# Patient Record
Sex: MALE | Race: WHITE | NOT HISPANIC OR LATINO | Employment: OTHER | URBAN - NONMETROPOLITAN AREA
[De-identification: names, ages, dates, MRNs, and addresses within clinical notes are randomized per-mention and may not be internally consistent; named-entity substitution may affect disease eponyms.]

---

## 2019-06-06 DIAGNOSIS — Z79.01 ANTICOAGULATED: Primary | ICD-10-CM

## 2019-06-07 DIAGNOSIS — Z79.01 ANTICOAGULATED: Primary | ICD-10-CM

## 2019-06-11 ENCOUNTER — APPOINTMENT (OUTPATIENT)
Dept: LAB | Facility: CLINIC | Age: 84
End: 2019-06-11
Payer: MEDICARE

## 2019-06-11 LAB
INR PPP: 1.67 (ref 0.86–1.17)
PROTHROMBIN TIME: 19.8 SECONDS (ref 11.8–14.2)

## 2019-06-11 PROCEDURE — 85610 PROTHROMBIN TIME: CPT | Performed by: FAMILY MEDICINE

## 2019-06-11 PROCEDURE — 36415 COLL VENOUS BLD VENIPUNCTURE: CPT | Performed by: FAMILY MEDICINE

## 2019-07-01 DIAGNOSIS — D68.9 COAGULATION DEFECT (HCC): Primary | ICD-10-CM

## 2019-07-02 ENCOUNTER — APPOINTMENT (OUTPATIENT)
Dept: LAB | Facility: CLINIC | Age: 84
End: 2019-07-02
Payer: MEDICARE

## 2019-07-02 DIAGNOSIS — D68.9 COAGULATION DEFECT (HCC): ICD-10-CM

## 2019-07-02 LAB
INR PPP: 1.63 (ref 0.84–1.19)
PROTHROMBIN TIME: 18.9 SECONDS (ref 11.6–14.5)

## 2019-07-02 PROCEDURE — 85610 PROTHROMBIN TIME: CPT

## 2019-07-02 PROCEDURE — 36415 COLL VENOUS BLD VENIPUNCTURE: CPT

## 2019-08-01 ENCOUNTER — TELEPHONE (OUTPATIENT)
Dept: FAMILY MEDICINE CLINIC | Facility: CLINIC | Age: 84
End: 2019-08-01

## 2019-08-01 DIAGNOSIS — I27.82 CHRONIC PULMONARY EMBOLISM WITHOUT ACUTE COR PULMONALE, UNSPECIFIED PULMONARY EMBOLISM TYPE (HCC): Primary | ICD-10-CM

## 2019-08-01 RX ORDER — WARFARIN SODIUM 5 MG/1
TABLET ORAL
Qty: 135 TABLET | Refills: 2 | Status: SHIPPED | OUTPATIENT
Start: 2019-08-01 | End: 2019-09-03 | Stop reason: DRUGHIGH

## 2019-08-07 DIAGNOSIS — Z79.01 ANTICOAGULATED: ICD-10-CM

## 2019-08-07 LAB
INR PPP: 1.55 (ref 0.84–1.19)
PROTHROMBIN TIME: 18.1 SECONDS (ref 11.6–14.5)

## 2019-08-07 PROCEDURE — 85610 PROTHROMBIN TIME: CPT | Performed by: FAMILY MEDICINE

## 2019-08-30 ENCOUNTER — OFFICE VISIT (OUTPATIENT)
Dept: FAMILY MEDICINE CLINIC | Facility: CLINIC | Age: 84
End: 2019-08-30
Payer: MEDICARE

## 2019-08-30 VITALS
HEIGHT: 69 IN | OXYGEN SATURATION: 98 % | BODY MASS INDEX: 26.28 KG/M2 | WEIGHT: 177.4 LBS | HEART RATE: 79 BPM | SYSTOLIC BLOOD PRESSURE: 152 MMHG | DIASTOLIC BLOOD PRESSURE: 76 MMHG

## 2019-08-30 DIAGNOSIS — J45.30 MILD PERSISTENT ASTHMA, UNCOMPLICATED: ICD-10-CM

## 2019-08-30 DIAGNOSIS — E78.5 DYSLIPIDEMIA: ICD-10-CM

## 2019-08-30 DIAGNOSIS — Z79.899 ENCOUNTER FOR LONG-TERM (CURRENT) USE OF OTHER MEDICATIONS: ICD-10-CM

## 2019-08-30 DIAGNOSIS — I10 ESSENTIAL HYPERTENSION, BENIGN: ICD-10-CM

## 2019-08-30 DIAGNOSIS — B02.9 HERPES ZOSTER WITHOUT COMPLICATION: ICD-10-CM

## 2019-08-30 DIAGNOSIS — I27.82 CHRONIC PULMONARY EMBOLISM WITHOUT ACUTE COR PULMONALE, UNSPECIFIED PULMONARY EMBOLISM TYPE (HCC): Primary | ICD-10-CM

## 2019-08-30 DIAGNOSIS — D68.52 PROTHROMBIN GENE MUTATION (HCC): ICD-10-CM

## 2019-08-30 DIAGNOSIS — J45.909 UNCOMPLICATED ASTHMA, UNSPECIFIED ASTHMA SEVERITY, UNSPECIFIED WHETHER PERSISTENT: ICD-10-CM

## 2019-08-30 LAB
INR PPP: 1.61 (ref 0.84–1.19)
PROTHROMBIN TIME: 18.7 SECONDS (ref 11.6–14.5)

## 2019-08-30 PROCEDURE — 99214 OFFICE O/P EST MOD 30 MIN: CPT | Performed by: FAMILY MEDICINE

## 2019-08-30 PROCEDURE — 85610 PROTHROMBIN TIME: CPT | Performed by: FAMILY MEDICINE

## 2019-08-30 RX ORDER — LOSARTAN POTASSIUM 25 MG/1
25 TABLET ORAL DAILY
COMMUNITY
Start: 2018-11-23 | End: 2019-08-30 | Stop reason: CLARIF

## 2019-08-30 RX ORDER — BUDESONIDE AND FORMOTEROL FUMARATE DIHYDRATE 160; 4.5 UG/1; UG/1
2 AEROSOL RESPIRATORY (INHALATION) 2 TIMES DAILY
Refills: 3 | COMMUNITY
Start: 2019-08-24 | End: 2019-12-17 | Stop reason: SDUPTHER

## 2019-08-30 RX ORDER — ALBUTEROL SULFATE 90 UG/1
2 AEROSOL, METERED RESPIRATORY (INHALATION) AS NEEDED
COMMUNITY
End: 2019-12-17 | Stop reason: SDUPTHER

## 2019-08-30 RX ORDER — TRAVOPROST 0.004 %
1 DROPS OPHTHALMIC (EYE)
Refills: 0 | COMMUNITY
Start: 2019-08-20 | End: 2021-08-19 | Stop reason: ALTCHOICE

## 2019-08-30 RX ORDER — MONTELUKAST SODIUM 10 MG/1
10 TABLET ORAL DAILY
Refills: 5 | COMMUNITY
Start: 2019-07-13 | End: 2019-08-30 | Stop reason: SDUPTHER

## 2019-08-30 RX ORDER — LOSARTAN POTASSIUM AND HYDROCHLOROTHIAZIDE 12.5; 5 MG/1; MG/1
1 TABLET ORAL DAILY
Refills: 3 | COMMUNITY
Start: 2019-07-13 | End: 2019-11-20 | Stop reason: SDUPTHER

## 2019-08-30 RX ORDER — TRIAMCINOLONE ACETONIDE 1 MG/G
0.1 CREAM TOPICAL
COMMUNITY
Start: 2012-09-21 | End: 2019-12-17 | Stop reason: SDUPTHER

## 2019-08-30 RX ORDER — OMEPRAZOLE 20 MG/1
20 TABLET, DELAYED RELEASE ORAL
COMMUNITY
Start: 2012-06-20

## 2019-08-30 RX ORDER — POLYETHYLENE GLYCOL 3350 17 G/17G
17 POWDER, FOR SOLUTION ORAL AS NEEDED
COMMUNITY
Start: 2018-11-23

## 2019-08-30 RX ORDER — MONTELUKAST SODIUM 10 MG/1
10 TABLET ORAL DAILY
Qty: 90 TABLET | Refills: 3 | Status: SHIPPED | OUTPATIENT
Start: 2019-08-30 | End: 2020-09-30

## 2019-08-30 NOTE — PROGRESS NOTES
Assessment/Plan:    Essential hypertension, benign  Patient has hypertension  His blood pressures generally very well controlled  Today, it was very unusual for his systolic blood pressure to be up like that  His systolic blood pressure generally runs in the 120s  As such, I did not make any changes with his medication today  However, I discussed with the patient that if it remains high, we are going to have to adjust his medicine  He does check his blood pressures at home and tells me it has been running good  He plans to continue checking it  He will contact me if he sees any elevated numbers  Dyslipidemia  I ordered fasting blood work to be done towards the end of September  I reviewed his previous labs which were done September 19, 2018  Prothrombin gene mutation 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  He has prothrombin gene  He will continue lifelong anticoagulation with warfarin  Mild persistent asthma, uncomplicated  Patient's asthma is currently stable  He will continue Symbicort inhaler twice a day and albuterol p r n  Herpes zoster without complication  We discussed this diagnosis  Fortunately, patient has not had pain  At this point, he does not require any treatment  If he starts to develop pain, he needs to contact me immediately  We discussed his options  Diagnoses and all orders for this visit:    Chronic pulmonary embolism without acute cor pulmonale, unspecified pulmonary embolism type ([address] )  -     Protime-INR; Standing  -     Protime-INR    Essential hypertension, benign    Dyslipidemia  -     Lipid panel; Future  -     Comprehensive metabolic panel; Future    Encounter for long-term (current) use of other medications  -     CBC and differential; Future    Uncomplicated asthma, unspecified asthma severity, unspecified whether persistent  -     montelukast (SINGULAIR) 10 mg tablet;  Take 1 tablet (10 mg total) by mouth daily    Prothrombin gene mutation ([address] )    Mild persistent asthma, uncomplicated    Herpes zoster without complication    Other orders  -     SYMBICORT 160-4 5 MCG/ACT inhaler; TAKE 2 PUFFS HFA AEROSOL WITH ADAPTER (GRAM) PUFF(S) TWICE A DAY  -     losartan-hydrochlorothiazide (HYZAAR) 50-12 5 mg per tablet; Take 1 tablet by mouth daily  -     Discontinue: montelukast (SINGULAIR) 10 mg tablet; Take 10 mg by mouth daily  -     TRAVATAN Z 0 004 % ophthalmic solution; 1 DROP INTO BOTH EYES AT BEDTIME  -     triamcinolone (KENALOG) 0 1 % cream; Apply 0 1 % topically  -     omeprazole (PRILOSEC OTC) 20 MG tablet; Take 20 mg by mouth  -     Discontinue: losartan (COZAAR) 25 mg tablet; Take 25 mg by mouth daily  -     polyethylene glycol (MIRALAX) powder; Take 17 g by mouth  -     albuterol (PROVENTIL HFA,VENTOLIN HFA) 90 mcg/act inhaler; Inhale 2 puffs as needed for wheezing          Subjective:      Patient ID: Martínez Delgado is a 80 y o  male  This patient is an 26-year-old white male presents to the office today for his routine checkup  The patient is doing well  He does report he is just getting over a case of shingles  It began several weeks ago  He did not do anything for it  He did not contact me  He tells me he is not experiencing any pain  He tells me he had shingles once before years ago  He is taking his medication as prescribed  He has little more difficulty ambulating now since his hip fracture  He has to walk with a cane  Despite this, he still goes into his garage in exercises regularly on his exercise equipment  The following portions of the patient's history were reviewed and updated as appropriate: allergies, current medications, past family history, past medical history, past social history, past surgical history and problem list     Review of Systems   Constitutional: Negative for activity change, appetite change, fatigue and unexpected weight change  Respiratory: Negative for cough, shortness of breath and wheezing  Cardiovascular: Negative for chest pain, palpitations and leg swelling  Gastrointestinal: Negative for abdominal distention, abdominal pain, blood in stool, constipation, diarrhea, nausea and vomiting  Musculoskeletal: Positive for arthralgias and gait problem  Negative for back pain, joint swelling and myalgias  Skin: Positive for color change and rash  Objective:      /76 (BP Location: Left arm, Patient Position: Sitting, Cuff Size: Adult)   Pulse 79   Ht 5' 9" (1 753 m)   Wt 80 5 kg (177 lb 6 4 oz)   SpO2 98%   BMI 26 20 kg/m²          Physical Exam   Constitutional:   The patient is a pleasant 49-year-old white male who appears his stated age  He is in no apparent distress   HENT:   Head: Normocephalic and atraumatic  Right Ear: External ear normal    Left Ear: External ear normal    Mouth/Throat: Oropharynx is clear and moist  No oropharyngeal exudate  Tympanic membranes are clear   Eyes: Pupils are equal, round, and reactive to light  Conjunctivae are normal  No scleral icterus  Neck: Neck supple  No tracheal deviation present  No thyromegaly present  Cardiovascular: Normal rate, regular rhythm and normal heart sounds  Exam reveals no gallop and no friction rub  No murmur heard  Pulmonary/Chest: Effort normal and breath sounds normal  No stridor  No respiratory distress  He has no wheezes  He has no rales  Abdominal: Soft  He exhibits no distension and no mass  There is no tenderness  There is no rebound and no guarding  Skin:   There is postinflammatory hyperpigmentation present along the left lower back extending into the left abdomen   Vitals reviewed  extremities:  Without cyanosis, clubbing, or edema

## 2019-09-03 DIAGNOSIS — D68.52 PROTHROMBIN GENE MUTATION (HCC): Primary | ICD-10-CM

## 2019-09-03 PROBLEM — J45.30 MILD PERSISTENT ASTHMA, UNCOMPLICATED: Status: ACTIVE | Noted: 2019-09-03

## 2019-09-03 PROBLEM — I10 ESSENTIAL HYPERTENSION, BENIGN: Status: ACTIVE | Noted: 2019-09-03

## 2019-09-03 PROBLEM — B02.9 HERPES ZOSTER WITHOUT COMPLICATION: Status: ACTIVE | Noted: 2019-09-03

## 2019-09-03 PROBLEM — M15.0 PRIMARY GENERALIZED (OSTEO)ARTHRITIS: Status: ACTIVE | Noted: 2019-09-03

## 2019-09-03 PROBLEM — E78.5 DYSLIPIDEMIA: Status: ACTIVE | Noted: 2019-09-03

## 2019-09-03 RX ORDER — WARFARIN SODIUM 4 MG/1
TABLET ORAL
Qty: 60 TABLET | Refills: 5 | Status: SHIPPED | OUTPATIENT
Start: 2019-09-03 | End: 2019-12-17 | Stop reason: SDUPTHER

## 2019-09-03 NOTE — ASSESSMENT & PLAN NOTE
I ordered fasting blood work to be done towards the end of September  I reviewed his previous labs which were done September 19, 2018

## 2019-09-03 NOTE — ASSESSMENT & PLAN NOTE
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

## 2019-09-03 NOTE — ASSESSMENT & PLAN NOTE
Patient has hypertension  His blood pressures generally very well controlled  Today, it was very unusual for his systolic blood pressure to be up like that  His systolic blood pressure generally runs in the 120s  As such, I did not make any changes with his medication today  However, I discussed with the patient that if it remains high, we are going to have to adjust his medicine  He does check his blood pressures at home and tells me it has been running good  He plans to continue checking it  He will contact me if he sees any elevated numbers

## 2019-09-03 NOTE — ASSESSMENT & PLAN NOTE
We discussed this diagnosis  Fortunately, patient has not had pain  At this point, he does not require any treatment  If he starts to develop pain, he needs to contact me immediately  We discussed his options

## 2019-09-03 NOTE — ASSESSMENT & PLAN NOTE
Patient's asthma is currently stable  He will continue Symbicort inhaler twice a day and albuterol p r n Rosella Goldmann

## 2019-09-30 ENCOUNTER — APPOINTMENT (OUTPATIENT)
Dept: LAB | Facility: CLINIC | Age: 84
End: 2019-09-30
Payer: MEDICARE

## 2019-09-30 DIAGNOSIS — I27.82 CHRONIC PULMONARY EMBOLISM WITHOUT ACUTE COR PULMONALE, UNSPECIFIED PULMONARY EMBOLISM TYPE (HCC): Primary | ICD-10-CM

## 2019-09-30 DIAGNOSIS — Z79.899 ENCOUNTER FOR LONG-TERM (CURRENT) USE OF OTHER MEDICATIONS: ICD-10-CM

## 2019-09-30 DIAGNOSIS — E78.5 DYSLIPIDEMIA: ICD-10-CM

## 2019-09-30 LAB
ALBUMIN SERPL BCP-MCNC: 3.9 G/DL (ref 3.5–5)
ALP SERPL-CCNC: 114 U/L (ref 46–116)
ALT SERPL W P-5'-P-CCNC: 26 U/L (ref 12–78)
ANION GAP SERPL CALCULATED.3IONS-SCNC: 6 MMOL/L (ref 4–13)
AST SERPL W P-5'-P-CCNC: 23 U/L (ref 5–45)
BASOPHILS # BLD AUTO: 0.05 THOUSANDS/ΜL (ref 0–0.1)
BASOPHILS NFR BLD AUTO: 1 % (ref 0–1)
BILIRUB SERPL-MCNC: 0.54 MG/DL (ref 0.2–1)
BUN SERPL-MCNC: 13 MG/DL (ref 5–25)
CALCIUM SERPL-MCNC: 9.7 MG/DL (ref 8.3–10.1)
CHLORIDE SERPL-SCNC: 104 MMOL/L (ref 100–108)
CHOLEST SERPL-MCNC: 189 MG/DL (ref 50–200)
CO2 SERPL-SCNC: 27 MMOL/L (ref 21–32)
CREAT SERPL-MCNC: 0.94 MG/DL (ref 0.6–1.3)
EOSINOPHIL # BLD AUTO: 0.13 THOUSAND/ΜL (ref 0–0.61)
EOSINOPHIL NFR BLD AUTO: 2 % (ref 0–6)
ERYTHROCYTE [DISTWIDTH] IN BLOOD BY AUTOMATED COUNT: 13.1 % (ref 11.6–15.1)
GFR SERPL CREATININE-BSD FRML MDRD: 72 ML/MIN/1.73SQ M
GLUCOSE P FAST SERPL-MCNC: 96 MG/DL (ref 65–99)
HCT VFR BLD AUTO: 41.6 % (ref 36.5–49.3)
HDLC SERPL-MCNC: 74 MG/DL (ref 40–60)
HGB BLD-MCNC: 13.6 G/DL (ref 12–17)
IMM GRANULOCYTES # BLD AUTO: 0.01 THOUSAND/UL (ref 0–0.2)
IMM GRANULOCYTES NFR BLD AUTO: 0 % (ref 0–2)
INR PPP: 1.79 (ref 0.84–1.19)
LDLC SERPL CALC-MCNC: 96 MG/DL (ref 0–100)
LYMPHOCYTES # BLD AUTO: 2.58 THOUSANDS/ΜL (ref 0.6–4.47)
LYMPHOCYTES NFR BLD AUTO: 42 % (ref 14–44)
MCH RBC QN AUTO: 30.2 PG (ref 26.8–34.3)
MCHC RBC AUTO-ENTMCNC: 32.7 G/DL (ref 31.4–37.4)
MCV RBC AUTO: 92 FL (ref 82–98)
MONOCYTES # BLD AUTO: 0.63 THOUSAND/ΜL (ref 0.17–1.22)
MONOCYTES NFR BLD AUTO: 10 % (ref 4–12)
NEUTROPHILS # BLD AUTO: 2.81 THOUSANDS/ΜL (ref 1.85–7.62)
NEUTS SEG NFR BLD AUTO: 45 % (ref 43–75)
NONHDLC SERPL-MCNC: 115 MG/DL
NRBC BLD AUTO-RTO: 0 /100 WBCS
PLATELET # BLD AUTO: 265 THOUSANDS/UL (ref 149–390)
PMV BLD AUTO: 10 FL (ref 8.9–12.7)
POTASSIUM SERPL-SCNC: 4.1 MMOL/L (ref 3.5–5.3)
PROT SERPL-MCNC: 7 G/DL (ref 6.4–8.2)
PROTHROMBIN TIME: 20.3 SECONDS (ref 11.6–14.5)
RBC # BLD AUTO: 4.51 MILLION/UL (ref 3.88–5.62)
SODIUM SERPL-SCNC: 137 MMOL/L (ref 136–145)
TRIGL SERPL-MCNC: 95 MG/DL
WBC # BLD AUTO: 6.21 THOUSAND/UL (ref 4.31–10.16)

## 2019-09-30 PROCEDURE — 36415 COLL VENOUS BLD VENIPUNCTURE: CPT

## 2019-09-30 PROCEDURE — 80053 COMPREHEN METABOLIC PANEL: CPT

## 2019-09-30 PROCEDURE — 85610 PROTHROMBIN TIME: CPT

## 2019-09-30 PROCEDURE — 85025 COMPLETE CBC W/AUTO DIFF WBC: CPT

## 2019-09-30 PROCEDURE — 80061 LIPID PANEL: CPT

## 2019-10-22 ENCOUNTER — APPOINTMENT (OUTPATIENT)
Dept: LAB | Facility: CLINIC | Age: 84
End: 2019-10-22
Payer: MEDICARE

## 2019-10-22 DIAGNOSIS — Z79.01 ANTICOAGULATED: ICD-10-CM

## 2019-10-24 ENCOUNTER — DOCUMENTATION (OUTPATIENT)
Dept: FAMILY MEDICINE CLINIC | Facility: CLINIC | Age: 84
End: 2019-10-24

## 2019-11-13 ENCOUNTER — APPOINTMENT (OUTPATIENT)
Dept: LAB | Facility: CLINIC | Age: 84
End: 2019-11-13
Payer: MEDICARE

## 2019-11-13 DIAGNOSIS — Z79.01 ANTICOAGULATED: ICD-10-CM

## 2019-11-20 DIAGNOSIS — I10 ESSENTIAL HYPERTENSION, BENIGN: Primary | ICD-10-CM

## 2019-11-20 RX ORDER — LOSARTAN POTASSIUM AND HYDROCHLOROTHIAZIDE 12.5; 5 MG/1; MG/1
1 TABLET ORAL DAILY
Qty: 30 TABLET | Refills: 5 | Status: SHIPPED | OUTPATIENT
Start: 2019-11-20 | End: 2019-12-17 | Stop reason: RX

## 2019-12-04 ENCOUNTER — APPOINTMENT (OUTPATIENT)
Dept: LAB | Facility: CLINIC | Age: 84
End: 2019-12-04
Payer: MEDICARE

## 2019-12-04 LAB — INR PPP: 2 (ref 0.84–1.19)

## 2019-12-05 ENCOUNTER — ANTICOAG VISIT (OUTPATIENT)
Dept: FAMILY MEDICINE CLINIC | Facility: CLINIC | Age: 84
End: 2019-12-05

## 2019-12-17 ENCOUNTER — OFFICE VISIT (OUTPATIENT)
Dept: FAMILY MEDICINE CLINIC | Facility: CLINIC | Age: 84
End: 2019-12-17
Payer: MEDICARE

## 2019-12-17 VITALS
OXYGEN SATURATION: 97 % | SYSTOLIC BLOOD PRESSURE: 152 MMHG | WEIGHT: 175 LBS | HEART RATE: 90 BPM | HEIGHT: 66 IN | DIASTOLIC BLOOD PRESSURE: 86 MMHG | BODY MASS INDEX: 28.12 KG/M2

## 2019-12-17 DIAGNOSIS — I10 ESSENTIAL HYPERTENSION, BENIGN: Primary | ICD-10-CM

## 2019-12-17 DIAGNOSIS — E78.5 DYSLIPIDEMIA: ICD-10-CM

## 2019-12-17 DIAGNOSIS — R23.3 PETECHIAE: ICD-10-CM

## 2019-12-17 DIAGNOSIS — D68.52 PROTHROMBIN GENE MUTATION (HCC): ICD-10-CM

## 2019-12-17 DIAGNOSIS — L20.84 INTRINSIC ECZEMA: ICD-10-CM

## 2019-12-17 DIAGNOSIS — J45.30 MILD PERSISTENT ASTHMA, UNCOMPLICATED: ICD-10-CM

## 2019-12-17 PROCEDURE — 99214 OFFICE O/P EST MOD 30 MIN: CPT | Performed by: FAMILY MEDICINE

## 2019-12-17 RX ORDER — BUDESONIDE AND FORMOTEROL FUMARATE DIHYDRATE 160; 4.5 UG/1; UG/1
2 AEROSOL RESPIRATORY (INHALATION) 2 TIMES DAILY
Qty: 3 INHALER | Refills: 3 | Status: SHIPPED | OUTPATIENT
Start: 2019-12-17 | End: 2021-05-19 | Stop reason: SDUPTHER

## 2019-12-17 RX ORDER — HYDROCHLOROTHIAZIDE 12.5 MG/1
12.5 CAPSULE, GELATIN COATED ORAL DAILY
Qty: 90 CAPSULE | Refills: 3 | Status: SHIPPED | OUTPATIENT
Start: 2019-12-17 | End: 2020-11-19 | Stop reason: SDUPTHER

## 2019-12-17 RX ORDER — CARBOXYMETHYLCELLULOSE SODIUM 10 MG/ML
1 GEL OPHTHALMIC AS NEEDED
COMMUNITY
End: 2022-08-05

## 2019-12-17 RX ORDER — LOSARTAN POTASSIUM 50 MG/1
50 TABLET ORAL DAILY
Qty: 90 TABLET | Refills: 3 | Status: SHIPPED | OUTPATIENT
Start: 2019-12-17 | End: 2020-11-19 | Stop reason: SDUPTHER

## 2019-12-17 RX ORDER — TRIAMCINOLONE ACETONIDE 1 MG/G
CREAM TOPICAL 2 TIMES DAILY PRN
Qty: 80 G | Refills: 2 | Status: SHIPPED | OUTPATIENT
Start: 2019-12-17

## 2019-12-17 RX ORDER — WARFARIN SODIUM 4 MG/1
TABLET ORAL
Qty: 180 TABLET | Refills: 1 | Status: SHIPPED | OUTPATIENT
Start: 2019-12-17 | End: 2020-06-13 | Stop reason: SDUPTHER

## 2019-12-17 RX ORDER — ALBUTEROL SULFATE 90 UG/1
2 AEROSOL, METERED RESPIRATORY (INHALATION) AS NEEDED
Qty: 3 INHALER | Refills: 1 | Status: SHIPPED | OUTPATIENT
Start: 2019-12-17 | End: 2021-07-22 | Stop reason: SDUPTHER

## 2019-12-17 NOTE — PROGRESS NOTES
Assessment/Plan:    Essential hypertension, benign  Blood pressure was elevated today due to the unavailability of his blood pressure medication  I sent in a new prescription for losartan as well as a separate prescription for hydrochlorothiazide  I am certain his blood pressure will normalize quickly when he gets back on his medication  Mild persistent asthma, uncomplicated  I encouraged the patient to continue to use his Symbicort inhaler and rinse his mouth out after each use  He will continue albuterol inhaler p r n  Kymberly Frejackelyn Petechiae  I am going to check a CBC with diff to stool out any abnormalities  I do not see any evidence of vasculitis  I am going to have the patient use triamcinolone cream   If it fails to improve or worsens, he needs to get back to the office  Prothrombin gene mutation [facility])  Patient has history of unprovoked pulmonary embolism  Workup later revealed a factor to prothrombin gene  I refilled his warfarin  Diagnoses and all orders for this visit:    Essential hypertension, benign  -     losartan (COZAAR) 50 mg tablet; Take 1 tablet (50 mg total) by mouth daily  -     hydrochlorothiazide (MICROZIDE) 12 5 mg capsule; Take 1 capsule (12 5 mg total) by mouth daily    Prothrombin gene mutation (HCC)  -     warfarin (COUMADIN) 4 mg tablet; Take 2 daily or as directed    Mild persistent asthma, uncomplicated  -     SYMBICORT 160-4 5 MCG/ACT inhaler; Inhale 2 puffs 2 (two) times a day  -     albuterol (PROVENTIL HFA,VENTOLIN HFA) 90 mcg/act inhaler; Inhale 2 puffs as needed for wheezing    Dyslipidemia    Intrinsic eczema  -     triamcinolone (KENALOG) 0 1 % cream; Apply topically 2 (two) times a day as needed for rash    Petechiae  -     CBC and differential    Other orders  -     Carboxymethylcellulose Sodium (REFRESH LIQUIGEL) 1 % GEL; Apply 1 drop to eye as needed          Subjective:      Patient ID: Armando Villegas is a 80 y o  male      This patient is an 55-year-old white male presents to the office today for his routine checkup  Patient is doing well  He reports no breathing difficulties  No wheezing or shortness of breath or cough  His appetite is good  He is still exercising regularly in his garage  He reports an issue with his medication  He tells me he has not been taking his blood pressure medication  He tells me losartan-hydrochlorothiazide has been unavailable from the pharmacy  He asked if I can prescribe the medication to him separately  His only other complaint is a rash which he has on his legs, arms, and back  He tells me it is not itchy  He just developed this yesterday  He thinks he may be allergic to something the 8  He tells me if it was not for the appointment today, he would not have scheduled an appointment to have this evaluated  The following portions of the patient's history were reviewed and updated as appropriate: allergies, current medications, past family history, past medical history, past social history, past surgical history and problem list     Review of Systems   Constitutional: Negative for activity change, appetite change and unexpected weight change  Respiratory: Negative for cough, shortness of breath and wheezing  Cardiovascular: Negative for chest pain, palpitations and leg swelling  Gastrointestinal: Negative for abdominal distention, abdominal pain, blood in stool, constipation, diarrhea, nausea and vomiting  Skin: Positive for rash  Denies pruritus   Allergic/Immunologic: Negative for food allergies  Hematological: Negative for adenopathy  Does not bruise/bleed easily  Objective:      /86 (BP Location: Left arm, Patient Position: Sitting, Cuff Size: Adult)   Pulse 90   Ht 5' 6 25" (1 683 m)   Wt 79 4 kg (175 lb)   SpO2 97%   BMI 28 03 kg/m²          Physical Exam   Constitutional: He appears well-developed and well-nourished  No distress  HENT:   Head: Normocephalic and atraumatic  Right Ear: External ear normal    Left Ear: External ear normal    Mouth/Throat: Oropharynx is clear and moist  No oropharyngeal exudate  Eyes: Pupils are equal, round, and reactive to light  Conjunctivae are normal  No scleral icterus  Neck: Neck supple  No tracheal deviation present  No thyromegaly present  Cardiovascular: Normal rate, regular rhythm and normal heart sounds  Exam reveals no gallop and no friction rub  No murmur heard  Pulmonary/Chest: Effort normal and breath sounds normal  No stridor  No respiratory distress  He has no wheezes  He has no rales  Abdominal: Soft  Bowel sounds are normal  He exhibits no distension and no mass  There is no tenderness  There is no rebound and no guarding  Musculoskeletal:   Scoliosis is present unchanged   Lymphadenopathy:     He has no cervical adenopathy  Skin:   There is a petechial rash present on the legs which extends up into the lower thighs  It is nonpalpable  It did not visualize any rash on his arms or trunk  Vitals reviewed  extremities:  Varicosities present in both lower extremities with no cyanosis, clubbing, or edema present

## 2019-12-18 ENCOUNTER — APPOINTMENT (OUTPATIENT)
Dept: LAB | Facility: CLINIC | Age: 84
End: 2019-12-18
Payer: MEDICARE

## 2019-12-18 LAB
BASOPHILS # BLD AUTO: 0.04 THOUSANDS/ΜL (ref 0–0.1)
BASOPHILS NFR BLD AUTO: 1 % (ref 0–1)
EOSINOPHIL # BLD AUTO: 0.21 THOUSAND/ΜL (ref 0–0.61)
EOSINOPHIL NFR BLD AUTO: 4 % (ref 0–6)
ERYTHROCYTE [DISTWIDTH] IN BLOOD BY AUTOMATED COUNT: 13.2 % (ref 11.6–15.1)
HCT VFR BLD AUTO: 40.1 % (ref 36.5–49.3)
HGB BLD-MCNC: 13.2 G/DL (ref 12–17)
IMM GRANULOCYTES # BLD AUTO: 0.02 THOUSAND/UL (ref 0–0.2)
IMM GRANULOCYTES NFR BLD AUTO: 0 % (ref 0–2)
LYMPHOCYTES # BLD AUTO: 2.14 THOUSANDS/ΜL (ref 0.6–4.47)
LYMPHOCYTES NFR BLD AUTO: 35 % (ref 14–44)
MCH RBC QN AUTO: 30.8 PG (ref 26.8–34.3)
MCHC RBC AUTO-ENTMCNC: 32.9 G/DL (ref 31.4–37.4)
MCV RBC AUTO: 94 FL (ref 82–98)
MONOCYTES # BLD AUTO: 0.66 THOUSAND/ΜL (ref 0.17–1.22)
MONOCYTES NFR BLD AUTO: 11 % (ref 4–12)
NEUTROPHILS # BLD AUTO: 3.01 THOUSANDS/ΜL (ref 1.85–7.62)
NEUTS SEG NFR BLD AUTO: 49 % (ref 43–75)
NRBC BLD AUTO-RTO: 0 /100 WBCS
PLATELET # BLD AUTO: 242 THOUSANDS/UL (ref 149–390)
PMV BLD AUTO: 10.2 FL (ref 8.9–12.7)
RBC # BLD AUTO: 4.29 MILLION/UL (ref 3.88–5.62)
WBC # BLD AUTO: 6.08 THOUSAND/UL (ref 4.31–10.16)

## 2019-12-18 PROCEDURE — 36415 COLL VENOUS BLD VENIPUNCTURE: CPT | Performed by: FAMILY MEDICINE

## 2019-12-18 PROCEDURE — 85025 COMPLETE CBC W/AUTO DIFF WBC: CPT | Performed by: FAMILY MEDICINE

## 2019-12-18 NOTE — ASSESSMENT & PLAN NOTE
I encouraged the patient to continue to use his Symbicort inhaler and rinse his mouth out after each use  He will continue albuterol inhaler jorge Cabral

## 2019-12-18 NOTE — ASSESSMENT & PLAN NOTE
I am going to check a CBC with diff to stool out any abnormalities  I do not see any evidence of vasculitis  I am going to have the patient use triamcinolone cream   If it fails to improve or worsens, he needs to get back to the office

## 2019-12-18 NOTE — ASSESSMENT & PLAN NOTE
Patient has history of unprovoked pulmonary embolism  Workup later revealed a factor to prothrombin gene  I refilled his warfarin

## 2019-12-18 NOTE — ASSESSMENT & PLAN NOTE
Blood pressure was elevated today due to the unavailability of his blood pressure medication  I sent in a new prescription for losartan as well as a separate prescription for hydrochlorothiazide  I am certain his blood pressure will normalize quickly when he gets back on his medication

## 2020-01-07 ENCOUNTER — APPOINTMENT (OUTPATIENT)
Dept: LAB | Facility: CLINIC | Age: 85
End: 2020-01-07
Payer: MEDICARE

## 2020-01-08 ENCOUNTER — ANTICOAG VISIT (OUTPATIENT)
Dept: FAMILY MEDICINE CLINIC | Facility: CLINIC | Age: 85
End: 2020-01-08

## 2020-01-08 LAB — INR PPP: 2.07 (ref 0.84–1.19)

## 2020-02-05 ENCOUNTER — ANTICOAG VISIT (OUTPATIENT)
Dept: FAMILY MEDICINE CLINIC | Facility: CLINIC | Age: 85
End: 2020-02-05

## 2020-02-05 ENCOUNTER — APPOINTMENT (OUTPATIENT)
Dept: LAB | Facility: CLINIC | Age: 85
End: 2020-02-05
Payer: MEDICARE

## 2020-03-11 ENCOUNTER — ANTICOAG VISIT (OUTPATIENT)
Dept: FAMILY MEDICINE CLINIC | Facility: CLINIC | Age: 85
End: 2020-03-11

## 2020-03-11 ENCOUNTER — APPOINTMENT (OUTPATIENT)
Dept: LAB | Facility: CLINIC | Age: 85
End: 2020-03-11
Payer: MEDICARE

## 2020-05-07 ENCOUNTER — APPOINTMENT (OUTPATIENT)
Dept: LAB | Facility: CLINIC | Age: 85
End: 2020-05-07
Payer: MEDICARE

## 2020-05-08 ENCOUNTER — ANTICOAG VISIT (OUTPATIENT)
Dept: FAMILY MEDICINE CLINIC | Facility: CLINIC | Age: 85
End: 2020-05-08

## 2020-05-19 ENCOUNTER — OFFICE VISIT (OUTPATIENT)
Dept: FAMILY MEDICINE CLINIC | Facility: CLINIC | Age: 85
End: 2020-05-19
Payer: MEDICARE

## 2020-05-19 ENCOUNTER — TELEPHONE (OUTPATIENT)
Dept: FAMILY MEDICINE CLINIC | Facility: CLINIC | Age: 85
End: 2020-05-19

## 2020-05-19 VITALS
TEMPERATURE: 98.8 F | HEIGHT: 66 IN | OXYGEN SATURATION: 97 % | WEIGHT: 177.2 LBS | DIASTOLIC BLOOD PRESSURE: 82 MMHG | HEART RATE: 72 BPM | BODY MASS INDEX: 28.48 KG/M2 | SYSTOLIC BLOOD PRESSURE: 128 MMHG

## 2020-05-19 DIAGNOSIS — L21.8 OTHER SEBORRHEIC DERMATITIS: ICD-10-CM

## 2020-05-19 DIAGNOSIS — J45.30 MILD PERSISTENT ASTHMA, UNCOMPLICATED: ICD-10-CM

## 2020-05-19 DIAGNOSIS — E78.5 DYSLIPIDEMIA: ICD-10-CM

## 2020-05-19 DIAGNOSIS — Z00.00 ENCOUNTER FOR MEDICARE ANNUAL WELLNESS EXAM: ICD-10-CM

## 2020-05-19 DIAGNOSIS — I10 ESSENTIAL HYPERTENSION, BENIGN: Primary | ICD-10-CM

## 2020-05-19 DIAGNOSIS — R26.9 ABNORMALITY OF GAIT: ICD-10-CM

## 2020-05-19 DIAGNOSIS — D68.52 PROTHROMBIN GENE MUTATION (HCC): ICD-10-CM

## 2020-05-19 DIAGNOSIS — L21.8 OTHER SEBORRHEIC DERMATITIS: Primary | ICD-10-CM

## 2020-05-19 PROCEDURE — 3008F BODY MASS INDEX DOCD: CPT | Performed by: FAMILY MEDICINE

## 2020-05-19 PROCEDURE — 1123F ACP DISCUSS/DSCN MKR DOCD: CPT | Performed by: FAMILY MEDICINE

## 2020-05-19 PROCEDURE — 3079F DIAST BP 80-89 MM HG: CPT | Performed by: FAMILY MEDICINE

## 2020-05-19 PROCEDURE — 99214 OFFICE O/P EST MOD 30 MIN: CPT | Performed by: FAMILY MEDICINE

## 2020-05-19 PROCEDURE — 1125F AMNT PAIN NOTED PAIN PRSNT: CPT | Performed by: FAMILY MEDICINE

## 2020-05-19 PROCEDURE — 1170F FXNL STATUS ASSESSED: CPT | Performed by: FAMILY MEDICINE

## 2020-05-19 PROCEDURE — G0438 PPPS, INITIAL VISIT: HCPCS | Performed by: FAMILY MEDICINE

## 2020-05-19 PROCEDURE — 3074F SYST BP LT 130 MM HG: CPT | Performed by: FAMILY MEDICINE

## 2020-05-19 PROCEDURE — 1160F RVW MEDS BY RX/DR IN RCRD: CPT | Performed by: FAMILY MEDICINE

## 2020-05-19 PROCEDURE — 1036F TOBACCO NON-USER: CPT | Performed by: FAMILY MEDICINE

## 2020-05-19 RX ORDER — DESONIDE 0.5 MG/G
CREAM TOPICAL 2 TIMES DAILY
Qty: 60 G | Refills: 0 | Status: SHIPPED | OUTPATIENT
Start: 2020-05-19 | End: 2020-05-19 | Stop reason: CLARIF

## 2020-05-19 RX ORDER — ALCLOMETASONE DIPROPIONATE 0.5 MG/G
CREAM TOPICAL 2 TIMES DAILY
Qty: 60 G | Refills: 0 | Status: SHIPPED | OUTPATIENT
Start: 2020-05-19 | End: 2022-08-05

## 2020-06-03 ENCOUNTER — APPOINTMENT (OUTPATIENT)
Dept: LAB | Facility: CLINIC | Age: 85
End: 2020-06-03
Payer: MEDICARE

## 2020-06-03 DIAGNOSIS — E78.5 DYSLIPIDEMIA: ICD-10-CM

## 2020-06-03 DIAGNOSIS — I10 ESSENTIAL HYPERTENSION, BENIGN: ICD-10-CM

## 2020-06-03 LAB
ALBUMIN SERPL BCP-MCNC: 3.5 G/DL (ref 3.5–5)
ALP SERPL-CCNC: 102 U/L (ref 46–116)
ALT SERPL W P-5'-P-CCNC: 30 U/L (ref 12–78)
ANION GAP SERPL CALCULATED.3IONS-SCNC: 4 MMOL/L (ref 4–13)
AST SERPL W P-5'-P-CCNC: 22 U/L (ref 5–45)
BILIRUB SERPL-MCNC: 0.59 MG/DL (ref 0.2–1)
BUN SERPL-MCNC: 14 MG/DL (ref 5–25)
CALCIUM SERPL-MCNC: 9.4 MG/DL (ref 8.3–10.1)
CHLORIDE SERPL-SCNC: 101 MMOL/L (ref 100–108)
CO2 SERPL-SCNC: 28 MMOL/L (ref 21–32)
CREAT SERPL-MCNC: 1.01 MG/DL (ref 0.6–1.3)
GFR SERPL CREATININE-BSD FRML MDRD: 66 ML/MIN/1.73SQ M
GLUCOSE P FAST SERPL-MCNC: 102 MG/DL (ref 65–99)
LDLC SERPL DIRECT ASSAY-MCNC: 107 MG/DL (ref 0–100)
POTASSIUM SERPL-SCNC: 4.4 MMOL/L (ref 3.5–5.3)
PROT SERPL-MCNC: 7.2 G/DL (ref 6.4–8.2)
SODIUM SERPL-SCNC: 133 MMOL/L (ref 136–145)

## 2020-06-03 PROCEDURE — 83721 ASSAY OF BLOOD LIPOPROTEIN: CPT

## 2020-06-03 PROCEDURE — 80053 COMPREHEN METABOLIC PANEL: CPT

## 2020-06-04 ENCOUNTER — ANTICOAG VISIT (OUTPATIENT)
Dept: FAMILY MEDICINE CLINIC | Facility: CLINIC | Age: 85
End: 2020-06-04

## 2020-06-13 DIAGNOSIS — D68.52 PROTHROMBIN GENE MUTATION (HCC): ICD-10-CM

## 2020-06-13 RX ORDER — WARFARIN SODIUM 4 MG/1
TABLET ORAL
Qty: 180 TABLET | Refills: 1 | Status: SHIPPED | OUTPATIENT
Start: 2020-06-13 | End: 2020-08-19 | Stop reason: SDUPTHER

## 2020-06-25 ENCOUNTER — OFFICE VISIT (OUTPATIENT)
Dept: OBGYN CLINIC | Facility: CLINIC | Age: 85
End: 2020-06-25
Payer: MEDICARE

## 2020-06-25 VITALS
TEMPERATURE: 96.8 F | SYSTOLIC BLOOD PRESSURE: 152 MMHG | DIASTOLIC BLOOD PRESSURE: 90 MMHG | BODY MASS INDEX: 28.84 KG/M2 | WEIGHT: 176 LBS

## 2020-06-25 DIAGNOSIS — M17.0 PRIMARY OSTEOARTHRITIS OF BOTH KNEES: Primary | ICD-10-CM

## 2020-06-25 PROCEDURE — 99213 OFFICE O/P EST LOW 20 MIN: CPT | Performed by: ORTHOPAEDIC SURGERY

## 2020-06-25 PROCEDURE — 1160F RVW MEDS BY RX/DR IN RCRD: CPT | Performed by: ORTHOPAEDIC SURGERY

## 2020-06-25 PROCEDURE — 3077F SYST BP >= 140 MM HG: CPT | Performed by: ORTHOPAEDIC SURGERY

## 2020-06-25 PROCEDURE — 1036F TOBACCO NON-USER: CPT | Performed by: ORTHOPAEDIC SURGERY

## 2020-06-25 PROCEDURE — 3080F DIAST BP >= 90 MM HG: CPT | Performed by: ORTHOPAEDIC SURGERY

## 2020-06-25 PROCEDURE — 20610 DRAIN/INJ JOINT/BURSA W/O US: CPT | Performed by: ORTHOPAEDIC SURGERY

## 2020-06-25 RX ADMIN — BUPIVACAINE HYDROCHLORIDE 10 ML: 2.5 INJECTION, SOLUTION EPIDURAL; INFILTRATION; INTRACAUDAL at 09:53

## 2020-06-25 RX ADMIN — METHYLPREDNISOLONE ACETATE 2 ML: 40 INJECTION, SUSPENSION INTRA-ARTICULAR; INTRALESIONAL; INTRAMUSCULAR; SOFT TISSUE at 09:53

## 2020-06-27 RX ORDER — BUPIVACAINE HYDROCHLORIDE 2.5 MG/ML
10 INJECTION, SOLUTION EPIDURAL; INFILTRATION; INTRACAUDAL
Status: DISCONTINUED | OUTPATIENT
Start: 2020-06-25 | End: 2022-08-05

## 2020-06-27 RX ORDER — METHYLPREDNISOLONE ACETATE 40 MG/ML
2 INJECTION, SUSPENSION INTRA-ARTICULAR; INTRALESIONAL; INTRAMUSCULAR; SOFT TISSUE
Status: DISCONTINUED | OUTPATIENT
Start: 2020-06-25 | End: 2022-08-05

## 2020-07-07 ENCOUNTER — APPOINTMENT (OUTPATIENT)
Dept: LAB | Facility: CLINIC | Age: 85
End: 2020-07-07
Payer: MEDICARE

## 2020-07-08 ENCOUNTER — ANTICOAG VISIT (OUTPATIENT)
Dept: FAMILY MEDICINE CLINIC | Facility: CLINIC | Age: 85
End: 2020-07-08

## 2020-07-17 DIAGNOSIS — R26.9 ABNORMALITY OF GAIT: Primary | ICD-10-CM

## 2020-07-24 ENCOUNTER — TELEPHONE (OUTPATIENT)
Dept: OBGYN CLINIC | Facility: HOSPITAL | Age: 85
End: 2020-07-24

## 2020-07-24 NOTE — TELEPHONE ENCOUNTER
Kristen Patterson    [phone]    Dr Gt Bernstein    Patient is calling statting that he needs to cancel his visco appt due to having a cortisone injection 6/25  When is he able to get his visco injections?

## 2020-08-13 ENCOUNTER — APPOINTMENT (OUTPATIENT)
Dept: LAB | Facility: CLINIC | Age: 85
End: 2020-08-13
Payer: MEDICARE

## 2020-08-17 RX ORDER — PREDNISOLONE ACETATE 10 MG/ML
SUSPENSION/ DROPS OPHTHALMIC
COMMUNITY
Start: 2020-07-17 | End: 2020-12-30 | Stop reason: ALTCHOICE

## 2020-08-17 RX ORDER — OFLOXACIN 3 MG/ML
SOLUTION/ DROPS OPHTHALMIC
COMMUNITY
Start: 2020-07-17 | End: 2020-12-30 | Stop reason: ALTCHOICE

## 2020-08-19 ENCOUNTER — OFFICE VISIT (OUTPATIENT)
Dept: FAMILY MEDICINE CLINIC | Facility: CLINIC | Age: 85
End: 2020-08-19
Payer: MEDICARE

## 2020-08-19 ENCOUNTER — ANTICOAG VISIT (OUTPATIENT)
Dept: FAMILY MEDICINE CLINIC | Facility: CLINIC | Age: 85
End: 2020-08-19

## 2020-08-19 DIAGNOSIS — D68.52 PROTHROMBIN GENE MUTATION (HCC): ICD-10-CM

## 2020-08-19 DIAGNOSIS — J45.30 MILD PERSISTENT ASTHMA, UNCOMPLICATED: ICD-10-CM

## 2020-08-19 DIAGNOSIS — I10 ESSENTIAL HYPERTENSION, BENIGN: ICD-10-CM

## 2020-08-19 DIAGNOSIS — I27.82 CHRONIC PULMONARY EMBOLISM WITHOUT ACUTE COR PULMONALE, UNSPECIFIED PULMONARY EMBOLISM TYPE (HCC): ICD-10-CM

## 2020-08-19 DIAGNOSIS — M15.0 PRIMARY GENERALIZED (OSTEO)ARTHRITIS: Primary | ICD-10-CM

## 2020-08-19 DIAGNOSIS — R26.9 ABNORMALITY OF GAIT: ICD-10-CM

## 2020-08-19 PROCEDURE — 3078F DIAST BP <80 MM HG: CPT | Performed by: FAMILY MEDICINE

## 2020-08-19 PROCEDURE — 99214 OFFICE O/P EST MOD 30 MIN: CPT | Performed by: FAMILY MEDICINE

## 2020-08-19 PROCEDURE — 1160F RVW MEDS BY RX/DR IN RCRD: CPT | Performed by: FAMILY MEDICINE

## 2020-08-19 PROCEDURE — 3075F SYST BP GE 130 - 139MM HG: CPT | Performed by: FAMILY MEDICINE

## 2020-08-19 PROCEDURE — 1036F TOBACCO NON-USER: CPT | Performed by: FAMILY MEDICINE

## 2020-08-19 RX ORDER — WARFARIN SODIUM 4 MG/1
TABLET ORAL
Qty: 180 TABLET | Refills: 1 | Status: SHIPPED | OUTPATIENT
Start: 2020-08-19 | End: 2020-11-19 | Stop reason: SDUPTHER

## 2020-08-19 NOTE — PROGRESS NOTES
Assessment/Plan:    Essential hypertension, benign  Patient has hypertension which is well controlled  He will continue losartan 50 mg daily and hydrochlorothiazide 12 5 mg daily    Mild persistent asthma, uncomplicated  Asthma is well controlled  He will continue Symbicort 2 puffs twice a day  Continue albuterol p r n  [place] Alexise Chronic pulmonary embolism without acute cor pulmonale (HCC)  Patient has history of pulmonary embolism secondary to factor 2 prothrombin gene  He will continue warfarin indefinitely  I will continue to monitor his pro times monthly and p r n       Abnormality of gait  Physical therapy has been going well for the patient  We discussed his treatment and I encouraged him to continue his exercises at home as well  Primary generalized (osteo)arthritis  I renewed the patient's diclofenac gel  Diagnoses and all orders for this visit:    Primary generalized (osteo)arthritis  -     diclofenac sodium (VOLTAREN) 1 %; Apply 2 g topically 4 (four) times a day    Prothrombin gene mutation (HCC)  -     warfarin (COUMADIN) 4 mg tablet; Take 2 daily or as directed    Chronic pulmonary embolism without acute cor pulmonale, unspecified pulmonary embolism type ([facility] )  -     Protime-INR; Standing  -     Protime-INR    Mild persistent asthma, uncomplicated    Essential hypertension, benign    Abnormality of gait          Subjective:      Patient ID: Rosalia Lamb is a 80 y o  male  This is a 54-year-old white male who presents to the office today for his routine checkup  He tells me his balance is much better  He is going to physical therapy twice a week  He is also doing exercises at home  He just celebrated his 89th birthday  He thinks he is doing well  He reports his asthma has not been an issue  He is not experiencing any acid reflux  He reports compliance with his medication        The following portions of the patient's history were reviewed and updated as appropriate: allergies, current medications, past family history, past medical history, past social history, past surgical history and problem list     Review of Systems   Constitutional: Negative for activity change, appetite change and unexpected weight change  Respiratory: Negative for cough, shortness of breath and wheezing  Cardiovascular: Negative for chest pain, palpitations and leg swelling  Gastrointestinal: Negative for abdominal distention, abdominal pain, blood in stool, constipation, diarrhea, nausea and vomiting  Genitourinary: Negative for difficulty urinating, dysuria and frequency  Musculoskeletal: Positive for gait problem (Reports improvement in his balance and gait)  Negative for arthralgias and back pain  Objective:      /76   Pulse 97   Temp 98 1 °F (36 7 °C) (Temporal)   Ht 5' 6" (1 676 m)   Wt 79 4 kg (175 lb 1 6 oz)   SpO2 96%   BMI 28 26 kg/m²          Physical Exam  Vitals signs reviewed  Constitutional:       Comments: This is a 25-year-old white male who appears his stated age  He is pleasant, cooperative, and in no distress  HENT:      Head: Normocephalic and atraumatic  Right Ear: Tympanic membrane, ear canal and external ear normal       Left Ear: Tympanic membrane, ear canal and external ear normal       Mouth/Throat:      Mouth: Mucous membranes are moist       Pharynx: Oropharynx is clear  No oropharyngeal exudate or posterior oropharyngeal erythema  Eyes:      General: No scleral icterus  Right eye: No discharge  Left eye: No discharge  Conjunctiva/sclera: Conjunctivae normal       Pupils: Pupils are equal, round, and reactive to light  Neck:      Musculoskeletal: Neck supple  Vascular: No carotid bruit  Comments: No thyromegaly is noted  Cardiovascular:      Rate and Rhythm: Normal rate and regular rhythm  Heart sounds: Normal heart sounds  No murmur  No friction rub  No gallop      Pulmonary:      Effort: Pulmonary effort is normal  No respiratory distress  Breath sounds: Normal breath sounds  No stridor  No wheezing, rhonchi or rales  Abdominal:      General: Abdomen is flat  Bowel sounds are normal  There is no distension  Palpations: Abdomen is soft  There is no mass  Tenderness: There is no abdominal tenderness  There is no guarding  Comments: No organomegaly is noted   Lymphadenopathy:      Cervical: No cervical adenopathy  Psychiatric:         Mood and Affect: Mood normal          Behavior: Behavior normal          Thought Content: Thought content normal          Judgment: Judgment normal        extremities: There is trace lower extremity edema noted bilaterally with no cyanosis or clubbing

## 2020-08-19 NOTE — PATIENT INSTRUCTIONS
Cataracts, Ambulatory Care   GENERAL INFORMATION:   A cataract  is clouding of the eye lens  You may develop a cataract in one or both eyes  The cause of a cataracts is not known  Common symptoms include the following:   · Vision loss    · Cloudy, foggy, fuzzy, or hazy blurring of vision    · Problems driving at night or in bright sunlight    · Double vision    · Problem seeing shades of colors  Seek immediate care for the following symptoms:   · Vision loss    · Sudden, sharp pain in your eye  Treatment for cataracts  may include glasses or contact lenses to correct your vision  You may also need surgery to remove your cataract  An artificial lens will be put into your eye to replace the damaged lens  Protect your eyes:   · Wear sunglasses  to protect your eyes from the sunlight and prevent eye discomfort  · Do not smoke  If you smoke, it is never too late to quit  Smoking can damage your eyes  Ask for information if you need help quitting  Follow up with your healthcare provider as directed:  Write down your questions so you remember to ask them during your visits  CARE AGREEMENT:   You have the right to help plan your care  Learn about your health condition and how it may be treated  Discuss treatment options with your caregivers to decide what care you want to receive  You always have the right to refuse treatment  The above information is an  only  It is not intended as medical advice for individual conditions or treatments  Talk to your doctor, nurse or pharmacist before following any medical regimen to see if it is safe and effective for you  © 2014 [address] is for End User's use only and may not be sold, redistributed or otherwise used for commercial purposes  All illustrations and images included in CareNotes® are the copyrighted property of A D [employer] M [employer]  or Reynaldo Calderon

## 2020-08-30 VITALS
BODY MASS INDEX: 28.14 KG/M2 | TEMPERATURE: 98.1 F | OXYGEN SATURATION: 96 % | SYSTOLIC BLOOD PRESSURE: 136 MMHG | WEIGHT: 175.1 LBS | HEIGHT: 66 IN | HEART RATE: 97 BPM | DIASTOLIC BLOOD PRESSURE: 76 MMHG

## 2020-08-30 NOTE — ASSESSMENT & PLAN NOTE
Physical therapy has been going well for the patient  We discussed his treatment and I encouraged him to continue his exercises at home as well

## 2020-08-30 NOTE — ASSESSMENT & PLAN NOTE
Patient has hypertension which is well controlled    He will continue losartan 50 mg daily and hydrochlorothiazide 12 5 mg daily

## 2020-08-30 NOTE — ASSESSMENT & PLAN NOTE
Patient has history of pulmonary embolism secondary to factor 2 prothrombin gene  He will continue warfarin indefinitely  I will continue to monitor his pro times monthly and jorge Negron

## 2020-08-30 NOTE — ASSESSMENT & PLAN NOTE
Asthma is well controlled  He will continue Symbicort 2 puffs twice a day  Continue albuterol jorge r nelsy Brenner

## 2020-09-02 ENCOUNTER — APPOINTMENT (OUTPATIENT)
Dept: LAB | Facility: CLINIC | Age: 85
End: 2020-09-02
Payer: MEDICARE

## 2020-09-02 LAB
INR PPP: 2.99 (ref 0.84–1.19)
PROTHROMBIN TIME: 30.9 SECONDS (ref 11.6–14.5)

## 2020-09-02 PROCEDURE — 85610 PROTHROMBIN TIME: CPT | Performed by: FAMILY MEDICINE

## 2020-09-02 PROCEDURE — 36415 COLL VENOUS BLD VENIPUNCTURE: CPT | Performed by: FAMILY MEDICINE

## 2020-09-03 ENCOUNTER — ANTICOAG VISIT (OUTPATIENT)
Dept: FAMILY MEDICINE CLINIC | Facility: CLINIC | Age: 85
End: 2020-09-03

## 2020-09-04 ENCOUNTER — CONSULT (OUTPATIENT)
Dept: FAMILY MEDICINE CLINIC | Facility: CLINIC | Age: 85
End: 2020-09-04
Payer: MEDICARE

## 2020-09-04 VITALS
WEIGHT: 180.7 LBS | DIASTOLIC BLOOD PRESSURE: 80 MMHG | SYSTOLIC BLOOD PRESSURE: 158 MMHG | TEMPERATURE: 97.4 F | HEART RATE: 93 BPM | BODY MASS INDEX: 29.04 KG/M2 | OXYGEN SATURATION: 94 % | HEIGHT: 66 IN

## 2020-09-04 DIAGNOSIS — H25.9 AGE-RELATED CATARACT OF BOTH EYES, UNSPECIFIED AGE-RELATED CATARACT TYPE: Primary | ICD-10-CM

## 2020-09-04 DIAGNOSIS — I49.49 PREMATURE BEATS: ICD-10-CM

## 2020-09-04 DIAGNOSIS — I10 ESSENTIAL HYPERTENSION, BENIGN: ICD-10-CM

## 2020-09-04 PROCEDURE — 93000 ELECTROCARDIOGRAM COMPLETE: CPT | Performed by: FAMILY MEDICINE

## 2020-09-04 PROCEDURE — 99213 OFFICE O/P EST LOW 20 MIN: CPT | Performed by: FAMILY MEDICINE

## 2020-09-04 NOTE — PROGRESS NOTES
Assessment/Plan:    Age-related cataract of both eyes  I advised the patient that he may continue to take warfarin as he normally does  It does not need to be stopped  The patient's forms were completed and he was cleared for his proposed surgery    Premature beats  Frequent ectopy was noted on exam today  The patient was asymptomatic in this regard  An EKG was done today  The EKG showed a normal sinus rhythm at 93 beats per minute  There were nonspecific ST and T changes noted  No ectopy was noted on his EKG  Essential hypertension, benign  Blood pressure was up a bit today which is unusual for the patient  This will be followed  Diagnoses and all orders for this visit:    Age-related cataract of both eyes, unspecified age-related cataract type    Premature beats  -     POCT ECG    Essential hypertension, benign          Subjective:      Patient ID: Megan Escobar is a 80 y o  male  This patient is a 77-year-old white male presents to the office today for preoperative medical clearance  The patient is scheduled to undergo cataract surgery  Surgeon will be Dr Caitlin Beasley  He is having right eye surgery on September 14  He is having left eye surgery on September 28  He wonders if he needs to stop his warfarin  The following portions of the patient's history were reviewed and updated as appropriate: allergies, current medications, past family history, past medical history, past social history, past surgical history and problem list     Review of Systems   Respiratory: Negative for cough, shortness of breath and wheezing  Cardiovascular: Negative for chest pain, palpitations and leg swelling  Gastrointestinal: Negative for abdominal distention, abdominal pain, blood in stool, constipation, diarrhea, nausea and vomiting           Objective:      /80   Pulse 93   Temp (!) 97 4 °F (36 3 °C) (Temporal)   Ht 5' 6" (1 676 m)   Wt 82 kg (180 lb 11 2 oz)   SpO2 94%   BMI 29 17 kg/m²          Physical Exam  Vitals signs reviewed  Constitutional:       Comments: This is a pleasant 25-year-old white male who appears his stated age  He is pleasant, cooperative, and in no distress   HENT:      Head: Normocephalic and atraumatic  Right Ear: Tympanic membrane, ear canal and external ear normal       Left Ear: Tympanic membrane, ear canal and external ear normal       Nose: No congestion or rhinorrhea  Mouth/Throat:      Mouth: Mucous membranes are moist       Pharynx: Oropharynx is clear  No oropharyngeal exudate or posterior oropharyngeal erythema  Eyes:      General: No scleral icterus  Right eye: No discharge  Left eye: No discharge  Conjunctiva/sclera: Conjunctivae normal       Pupils: Pupils are equal, round, and reactive to light  Neck:      Musculoskeletal: Neck supple  Cardiovascular:      Rate and Rhythm: Normal rate  Heart sounds: Murmur present  No friction rub  No gallop  Comments: Frequent ectopy was noted on exam   There is a grade 2/6 systolic ejection murmur present  Pulmonary:      Effort: No respiratory distress  Breath sounds: Normal breath sounds  No stridor  No wheezing, rhonchi or rales  Abdominal:      General: Bowel sounds are normal  There is no distension  Palpations: Abdomen is soft  There is no mass  Tenderness: There is no abdominal tenderness  There is no guarding  Hernia: No hernia is present  Lymphadenopathy:      Cervical: No cervical adenopathy  Psychiatric:         Mood and Affect: Mood normal          Behavior: Behavior normal          Thought Content:  Thought content normal          Judgment: Judgment normal        extremities:  Without cyanosis, clubbing, or edema

## 2020-09-04 NOTE — ASSESSMENT & PLAN NOTE
Frequent ectopy was noted on exam today  The patient was asymptomatic in this regard  An EKG was done today  The EKG showed a normal sinus rhythm at 93 beats per minute  There were nonspecific ST and T changes noted  No ectopy was noted on his EKG

## 2020-09-04 NOTE — ASSESSMENT & PLAN NOTE
I advised the patient that he may continue to take warfarin as he normally does  It does not need to be stopped    The patient's forms were completed and he was cleared for his proposed surgery

## 2020-09-29 ENCOUNTER — OFFICE VISIT (OUTPATIENT)
Dept: OBGYN CLINIC | Facility: CLINIC | Age: 85
End: 2020-09-29
Payer: MEDICARE

## 2020-09-29 VITALS
HEART RATE: 137 BPM | WEIGHT: 180 LBS | BODY MASS INDEX: 28.93 KG/M2 | DIASTOLIC BLOOD PRESSURE: 94 MMHG | HEIGHT: 66 IN | TEMPERATURE: 98.5 F | SYSTOLIC BLOOD PRESSURE: 157 MMHG

## 2020-09-29 DIAGNOSIS — M17.0 PRIMARY OSTEOARTHRITIS OF BOTH KNEES: Primary | ICD-10-CM

## 2020-09-29 PROCEDURE — 20610 DRAIN/INJ JOINT/BURSA W/O US: CPT | Performed by: ORTHOPAEDIC SURGERY

## 2020-09-29 RX ORDER — HYALURONATE SODIUM 10 MG/ML
20 SYRINGE (ML) INTRAARTICULAR
Status: COMPLETED | OUTPATIENT
Start: 2020-09-29 | End: 2020-09-29

## 2020-09-29 RX ADMIN — Medication 20 MG: at 11:47

## 2020-09-29 NOTE — PROGRESS NOTES
ASSESSMENT/PLAN:    Diagnoses and all orders for this visit:    Primary osteoarthritis of both knees    Other orders  -     Large joint arthrocentesis        Both of the patient's knees were injected with his 1st set of Hyalgan  He tolerated the injections quite well  He will follow up next week for his 2nd set of injections  Return in about 1 week (around 10/6/2020)  _____________________________________________________  CHIEF COMPLAINT:  Chief Complaint   Patient presents with    Right Knee - Pain    Left Knee - Pain         SUBJECTIVE:  Mary Martin is a 80y o  year old male who presents for viscosupplementation of bilateral knees  He presents today to begin Hyalgan injections  He still complains of bilateral knee pain  He denies any numbness or tingling  He denies any fever or chills      PAST MEDICAL HISTORY:  Past Medical History:   Diagnosis Date    Arthritis     Coagulation defect ([facility] )     last assessed: 11/28/2018    COPD (chronic obstructive pulmonary disease) ([facility] )     last assessed: 5/14/2019, 12/6/2017    Generalized osteoarthritis     last assessed: 1/28/2019    GERD without esophagitis     last assessed: 1/28/2019    Glaucoma     last assessed: 8/4/2011    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[facility] )     last assessed: 10/16/2018    [facility]    History of kidney stones 1985    Hx of blood clots     Hypertension     last assessed: 5/14/2019    Impaired fasting glucose     last assessed: 8/26/2013    Mild persistent asthma, uncomplicated     last assessed: 11/28/2018    Nephrolithiasis     Nondisplaced intertrochanteric fracture of right femur, initial encounter for closed fracture ([facility] )     last assessed: 1/28/2019    Premature ventricular contractions     last assessed: 8/4/2011    Pulmonary nodule     Scoliosis (and kyphoscoliosis), idiopathic        PAST SURGICAL HISTORY:  Past Surgical History:   Procedure Laterality Date    CARPAL TUNNEL RELEASE Left     ENDO 8/801    COLONOSCOPY  10/07/2008    last assessed: 3/29/2016    HERNIA REPAIR      HIP SURGERY      HIP SURGERY Left 03/05/1999    [facility]    OTHER SURGICAL HISTORY Right 10/30/2001    [facility]       FAMILY HISTORY:  Family History   Problem Relation Age of Onset    Hypertension Mother     Stroke Mother     Heart attack Father        SOCIAL HISTORY:  Social History     Tobacco Use    Smoking status: Former Smoker    Smokeless tobacco: Never Used   Substance Use Topics    Alcohol use: Yes     Comment: social    Drug use: Never       MEDICATIONS:    Current Outpatient Medications:     albuterol (PROVENTIL HFA,VENTOLIN HFA) 90 mcg/act inhaler, Inhale 2 puffs as needed for wheezing, Disp: 3 Inhaler, Rfl: 1    alclomethasone (ACLOVATE) 0 05 % cream, Apply topically 2 (two) times a day, Disp: 60 g, Rfl: 0    Carboxymethylcellulose Sodium (REFRESH LIQUIGEL) 1 % GEL, Apply 1 drop to eye as needed, Disp: , Rfl:     diclofenac sodium (VOLTAREN) 1 %, Apply 2 g topically 4 (four) times a day, Disp: 500 g, Rfl: 5    hydrochlorothiazide (MICROZIDE) 12 5 mg capsule, Take 1 capsule (12 5 mg total) by mouth daily, Disp: 90 capsule, Rfl: 3    losartan (COZAAR) 50 mg tablet, Take 1 tablet (50 mg total) by mouth daily, Disp: 90 tablet, Rfl: 3    montelukast (SINGULAIR) 10 mg tablet, Take 1 tablet (10 mg total) by mouth daily, Disp: 90 tablet, Rfl: 3    ofloxacin (OCUFLOX) 0 3 % ophthalmic solution, INSTILL 1 DROP BY OPHTHALMIC ROUTE 4 TIMES EVERY DAY INTO LEFT EYE START 3 DAYS PRIOR TO SURGERY, Disp: , Rfl:     omeprazole (PRILOSEC OTC) 20 MG tablet, Take 20 mg by mouth, Disp: , Rfl:     polyethylene glycol (MIRALAX) powder, Take 17 g by mouth as needed , Disp: , Rfl:     prednisoLONE acetate (PRED FORTE) 1 % ophthalmic suspension, INSTILL 1 DROP BY OPHTHALMIC ROUTE 4 TIMES EVERY DAY LEFT EYE STARTING AFTER SURGERY, Disp: , Rfl:     SYMBICORT 160-4 5 MCG/ACT inhaler, Inhale 2 puffs 2 (two) times a day, Disp: 3 Inhaler, Rfl: 3    TRAVATAN Z 0 004 % ophthalmic solution, Administer 1 drop to both eyes daily at bedtime , Disp: , Rfl: 0    triamcinolone (KENALOG) 0 1 % cream, Apply topically 2 (two) times a day as needed for rash, Disp: 80 g, Rfl: 2    warfarin (COUMADIN) 4 mg tablet, Take 2 daily or as directed, Disp: 180 tablet, Rfl: 1    Current Facility-Administered Medications:     bupivacaine (PF) (MARCAINE) 0 25 % injection 10 mL, 10 mL, Infiltration, , Simonne Weatherby [place], DO, 10 mL at 06/25/20 0953    bupivacaine (PF) (MARCAINE) 0 25 % injection 10 mL, 10 mL, Infiltration, , Simonne [place], DO, 10 mL at 06/25/20 4889    methylPREDNISolone acetate (DEPO-MEDROL) injection 2 mL, 2 mL, Intra-articular, , Simonne [place], DO, 2 mL at 06/25/20 5429    methylPREDNISolone acetate (DEPO-MEDROL) injection 2 mL, 2 mL, Intra-articular, , Simonne [place], DO, 2 mL at 06/25/20 5538    ALLERGIES:  Allergies   Allergen Reactions    Alphagan P [Brimonidine Tartrate] Itching       REVIEW OF SYSTEMS:  Pertinent items are noted in HPI  A comprehensive review of systems was negative   _____________________________________________________  PHYSICAL EXAMINATION:  General: well developed and well nourished, alert, oriented times 3 and appears comfortable  Psychiatric: Normal  HEENT:  Normocephalic, atraumatic  Cardiovascular:  Regular  Pulmonary: No wheezing or stridor  Skin: No masses, erthema, lacerations, fluctation, ulcerations  Neurovascular: Motor and sensory exams are grossly intact, +2 PT pulse       MUSCULOSKELETAL EXAMINATION:    Bilateral lower extremities are neurovascularly intact  Toes are pink and mobile   Compartments are soft  No warmth, erythema or ecchymosis  ROM of knees are from 5-115 degrees  Negative Lachman, drawer or pivot shift  No medial instability  Medial joint line tenderness, slight lateral joint line tenderness  Patellofemoral crepitation    _____________________________________________________  PROCEDURES PERFORMED:  Large joint arthrocentesis: bilateral knee  Date/Time: 9/29/2020 11:47 AM  Consent given by: patient  Site marked: site marked  Timeout: Immediately prior to procedure a time out was called to verify the correct patient, procedure, equipment, support staff and site/side marked as required   Supporting Documentation  Indications: pain   Procedure Details  Location: knee - bilateral knee  Preparation: Patient was prepped and draped in the usual sterile fashion  Needle size: 22 G  Approach: lateral    Medications (Right): 20 mg Sodium Hyaluronate 20 MG/2MLMedications (Left): 20 mg Sodium Hyaluronate 20 MG/2ML   Patient tolerance: patient tolerated the procedure well with no immediate complications  Dressing:  Sterile dressing applied              Fahad Olvera PA-C

## 2020-09-30 DIAGNOSIS — J45.909 UNCOMPLICATED ASTHMA, UNSPECIFIED ASTHMA SEVERITY, UNSPECIFIED WHETHER PERSISTENT: ICD-10-CM

## 2020-09-30 RX ORDER — MONTELUKAST SODIUM 10 MG/1
TABLET ORAL
Qty: 90 TABLET | Refills: 3 | Status: SHIPPED | OUTPATIENT
Start: 2020-09-30 | End: 2021-07-22 | Stop reason: SDUPTHER

## 2020-10-06 ENCOUNTER — OFFICE VISIT (OUTPATIENT)
Dept: OBGYN CLINIC | Facility: CLINIC | Age: 85
End: 2020-10-06
Payer: MEDICARE

## 2020-10-06 VITALS
BODY MASS INDEX: 28.93 KG/M2 | TEMPERATURE: 98.2 F | HEIGHT: 66 IN | SYSTOLIC BLOOD PRESSURE: 157 MMHG | DIASTOLIC BLOOD PRESSURE: 94 MMHG | WEIGHT: 180 LBS | HEART RATE: 93 BPM

## 2020-10-06 DIAGNOSIS — M17.0 PRIMARY OSTEOARTHRITIS OF BOTH KNEES: Primary | ICD-10-CM

## 2020-10-06 PROCEDURE — 20610 DRAIN/INJ JOINT/BURSA W/O US: CPT | Performed by: ORTHOPAEDIC SURGERY

## 2020-10-06 RX ORDER — HYALURONATE SODIUM 10 MG/ML
20 SYRINGE (ML) INTRAARTICULAR
Status: COMPLETED | OUTPATIENT
Start: 2020-10-06 | End: 2020-10-06

## 2020-10-06 RX ADMIN — Medication 20 MG: at 10:57

## 2020-10-13 ENCOUNTER — APPOINTMENT (OUTPATIENT)
Dept: LAB | Facility: CLINIC | Age: 85
End: 2020-10-13
Payer: MEDICARE

## 2020-10-13 ENCOUNTER — OFFICE VISIT (OUTPATIENT)
Dept: OBGYN CLINIC | Facility: CLINIC | Age: 85
End: 2020-10-13
Payer: MEDICARE

## 2020-10-13 VITALS
WEIGHT: 180 LBS | DIASTOLIC BLOOD PRESSURE: 82 MMHG | BODY MASS INDEX: 28.93 KG/M2 | SYSTOLIC BLOOD PRESSURE: 160 MMHG | HEIGHT: 66 IN | TEMPERATURE: 98.2 F

## 2020-10-13 DIAGNOSIS — M17.0 PRIMARY OSTEOARTHRITIS OF BOTH KNEES: Primary | ICD-10-CM

## 2020-10-13 PROCEDURE — 20610 DRAIN/INJ JOINT/BURSA W/O US: CPT | Performed by: ORTHOPAEDIC SURGERY

## 2020-10-13 RX ORDER — HYALURONATE SODIUM 10 MG/ML
20 SYRINGE (ML) INTRAARTICULAR
Status: COMPLETED | OUTPATIENT
Start: 2020-10-13 | End: 2020-10-13

## 2020-10-13 RX ADMIN — Medication 20 MG: at 10:53

## 2020-10-14 ENCOUNTER — ANTICOAG VISIT (OUTPATIENT)
Dept: FAMILY MEDICINE CLINIC | Facility: CLINIC | Age: 85
End: 2020-10-14

## 2020-10-16 ENCOUNTER — ANTICOAG VISIT (OUTPATIENT)
Dept: FAMILY MEDICINE CLINIC | Facility: CLINIC | Age: 85
End: 2020-10-16

## 2020-10-20 ENCOUNTER — OFFICE VISIT (OUTPATIENT)
Dept: OBGYN CLINIC | Facility: CLINIC | Age: 85
End: 2020-10-20
Payer: MEDICARE

## 2020-10-20 VITALS
WEIGHT: 180 LBS | BODY MASS INDEX: 28.93 KG/M2 | HEART RATE: 108 BPM | DIASTOLIC BLOOD PRESSURE: 102 MMHG | TEMPERATURE: 97.4 F | SYSTOLIC BLOOD PRESSURE: 182 MMHG | HEIGHT: 66 IN

## 2020-10-20 DIAGNOSIS — M17.0 PRIMARY OSTEOARTHRITIS OF BOTH KNEES: Primary | ICD-10-CM

## 2020-10-20 PROCEDURE — 20610 DRAIN/INJ JOINT/BURSA W/O US: CPT | Performed by: ORTHOPAEDIC SURGERY

## 2020-10-20 RX ORDER — HYALURONATE SODIUM 10 MG/ML
20 SYRINGE (ML) INTRAARTICULAR
Status: COMPLETED | OUTPATIENT
Start: 2020-10-20 | End: 2020-10-20

## 2020-10-20 RX ADMIN — Medication 20 MG: at 11:07

## 2020-10-27 ENCOUNTER — OFFICE VISIT (OUTPATIENT)
Dept: OBGYN CLINIC | Facility: CLINIC | Age: 85
End: 2020-10-27
Payer: MEDICARE

## 2020-10-27 VITALS
DIASTOLIC BLOOD PRESSURE: 98 MMHG | HEART RATE: 114 BPM | WEIGHT: 180 LBS | BODY MASS INDEX: 28.93 KG/M2 | TEMPERATURE: 97.5 F | SYSTOLIC BLOOD PRESSURE: 185 MMHG | HEIGHT: 66 IN

## 2020-10-27 DIAGNOSIS — M17.0 PRIMARY OSTEOARTHRITIS OF BOTH KNEES: Primary | ICD-10-CM

## 2020-10-27 PROCEDURE — 20610 DRAIN/INJ JOINT/BURSA W/O US: CPT | Performed by: PHYSICIAN ASSISTANT

## 2020-10-27 RX ORDER — HYALURONATE SODIUM 10 MG/ML
20 SYRINGE (ML) INTRAARTICULAR
Status: COMPLETED | OUTPATIENT
Start: 2020-10-27 | End: 2020-10-27

## 2020-10-27 RX ADMIN — Medication 20 MG: at 11:31

## 2020-11-16 ENCOUNTER — LAB (OUTPATIENT)
Dept: LAB | Facility: CLINIC | Age: 85
End: 2020-11-16
Payer: MEDICARE

## 2020-11-17 ENCOUNTER — ANTICOAG VISIT (OUTPATIENT)
Dept: FAMILY MEDICINE CLINIC | Facility: CLINIC | Age: 85
End: 2020-11-17

## 2020-11-19 ENCOUNTER — OFFICE VISIT (OUTPATIENT)
Dept: FAMILY MEDICINE CLINIC | Facility: CLINIC | Age: 85
End: 2020-11-19
Payer: MEDICARE

## 2020-11-19 VITALS
HEIGHT: 65 IN | TEMPERATURE: 97.8 F | SYSTOLIC BLOOD PRESSURE: 152 MMHG | BODY MASS INDEX: 29.16 KG/M2 | WEIGHT: 175 LBS | HEART RATE: 101 BPM | OXYGEN SATURATION: 97 % | DIASTOLIC BLOOD PRESSURE: 82 MMHG

## 2020-11-19 DIAGNOSIS — E78.5 DYSLIPIDEMIA: ICD-10-CM

## 2020-11-19 DIAGNOSIS — D68.52 PROTHROMBIN GENE MUTATION (HCC): ICD-10-CM

## 2020-11-19 DIAGNOSIS — J45.30 MILD PERSISTENT ASTHMA, UNCOMPLICATED: ICD-10-CM

## 2020-11-19 DIAGNOSIS — D48.5 NEOPLASM OF UNCERTAIN BEHAVIOR OF SKIN: ICD-10-CM

## 2020-11-19 DIAGNOSIS — Z79.899 ENCOUNTER FOR LONG-TERM (CURRENT) USE OF MEDICATIONS: ICD-10-CM

## 2020-11-19 DIAGNOSIS — I10 ESSENTIAL HYPERTENSION, BENIGN: Primary | ICD-10-CM

## 2020-11-19 PROCEDURE — 99214 OFFICE O/P EST MOD 30 MIN: CPT | Performed by: FAMILY MEDICINE

## 2020-11-19 RX ORDER — LOSARTAN POTASSIUM 50 MG/1
50 TABLET ORAL DAILY
Qty: 90 TABLET | Refills: 3 | Status: SHIPPED | OUTPATIENT
Start: 2020-11-19 | End: 2021-03-02 | Stop reason: SDUPTHER

## 2020-11-19 RX ORDER — HYDROCHLOROTHIAZIDE 12.5 MG/1
12.5 CAPSULE, GELATIN COATED ORAL DAILY
Qty: 90 CAPSULE | Refills: 3 | Status: SHIPPED | OUTPATIENT
Start: 2020-11-19 | End: 2021-11-19 | Stop reason: SDUPTHER

## 2020-11-19 RX ORDER — WARFARIN SODIUM 4 MG/1
TABLET ORAL
Qty: 180 TABLET | Refills: 1 | Status: SHIPPED | OUTPATIENT
Start: 2020-11-19 | End: 2020-11-23 | Stop reason: SDUPTHER

## 2020-11-20 ENCOUNTER — TELEPHONE (OUTPATIENT)
Dept: FAMILY MEDICINE CLINIC | Facility: CLINIC | Age: 85
End: 2020-11-20

## 2020-11-23 DIAGNOSIS — D68.52 PROTHROMBIN GENE MUTATION (HCC): ICD-10-CM

## 2020-11-23 RX ORDER — WARFARIN SODIUM 4 MG/1
TABLET ORAL
Qty: 180 TABLET | Refills: 1 | Status: SHIPPED | OUTPATIENT
Start: 2020-11-23 | End: 2021-08-19 | Stop reason: SDUPTHER

## 2020-12-08 ENCOUNTER — APPOINTMENT (OUTPATIENT)
Dept: LAB | Facility: CLINIC | Age: 85
End: 2020-12-08
Payer: MEDICARE

## 2020-12-08 ENCOUNTER — OFFICE VISIT (OUTPATIENT)
Dept: OBGYN CLINIC | Facility: CLINIC | Age: 85
End: 2020-12-08
Payer: MEDICARE

## 2020-12-08 VITALS
DIASTOLIC BLOOD PRESSURE: 103 MMHG | BODY MASS INDEX: 29.16 KG/M2 | HEIGHT: 65 IN | HEART RATE: 120 BPM | SYSTOLIC BLOOD PRESSURE: 183 MMHG | WEIGHT: 175 LBS

## 2020-12-08 DIAGNOSIS — M17.0 PRIMARY OSTEOARTHRITIS OF BOTH KNEES: Primary | ICD-10-CM

## 2020-12-08 PROCEDURE — 20610 DRAIN/INJ JOINT/BURSA W/O US: CPT | Performed by: PHYSICIAN ASSISTANT

## 2020-12-08 PROCEDURE — 99213 OFFICE O/P EST LOW 20 MIN: CPT | Performed by: PHYSICIAN ASSISTANT

## 2020-12-08 RX ORDER — METHYLPREDNISOLONE ACETATE 40 MG/ML
2 INJECTION, SUSPENSION INTRA-ARTICULAR; INTRALESIONAL; INTRAMUSCULAR; SOFT TISSUE
Status: COMPLETED | OUTPATIENT
Start: 2020-12-08 | End: 2020-12-08

## 2020-12-08 RX ORDER — BUPIVACAINE HYDROCHLORIDE 2.5 MG/ML
4 INJECTION, SOLUTION INFILTRATION; PERINEURAL
Status: COMPLETED | OUTPATIENT
Start: 2020-12-08 | End: 2020-12-08

## 2020-12-08 RX ADMIN — BUPIVACAINE HYDROCHLORIDE 4 ML: 2.5 INJECTION, SOLUTION INFILTRATION; PERINEURAL at 11:24

## 2020-12-08 RX ADMIN — METHYLPREDNISOLONE ACETATE 2 ML: 40 INJECTION, SUSPENSION INTRA-ARTICULAR; INTRALESIONAL; INTRAMUSCULAR; SOFT TISSUE at 11:24

## 2020-12-09 ENCOUNTER — ANTICOAG VISIT (OUTPATIENT)
Dept: FAMILY MEDICINE CLINIC | Facility: CLINIC | Age: 85
End: 2020-12-09

## 2020-12-23 ENCOUNTER — ANTICOAG VISIT (OUTPATIENT)
Dept: FAMILY MEDICINE CLINIC | Facility: CLINIC | Age: 85
End: 2020-12-23

## 2020-12-30 ENCOUNTER — OFFICE VISIT (OUTPATIENT)
Dept: FAMILY MEDICINE CLINIC | Facility: CLINIC | Age: 85
End: 2020-12-30
Payer: MEDICARE

## 2020-12-30 VITALS
HEIGHT: 64 IN | HEART RATE: 114 BPM | WEIGHT: 175.5 LBS | SYSTOLIC BLOOD PRESSURE: 162 MMHG | DIASTOLIC BLOOD PRESSURE: 92 MMHG | OXYGEN SATURATION: 95 % | BODY MASS INDEX: 29.96 KG/M2 | TEMPERATURE: 96.9 F

## 2020-12-30 DIAGNOSIS — D48.5 NEOPLASM OF UNCERTAIN BEHAVIOR OF SKIN: Primary | ICD-10-CM

## 2020-12-30 PROCEDURE — 99212 OFFICE O/P EST SF 10 MIN: CPT | Performed by: FAMILY MEDICINE

## 2020-12-30 PROCEDURE — 88341 IMHCHEM/IMCYTCHM EA ADD ANTB: CPT | Performed by: PATHOLOGY

## 2020-12-30 PROCEDURE — 88305 TISSUE EXAM BY PATHOLOGIST: CPT | Performed by: PATHOLOGY

## 2020-12-30 PROCEDURE — 88364 INSITU HYBRIDIZATION (FISH): CPT | Performed by: PATHOLOGY

## 2020-12-30 PROCEDURE — 88312 SPECIAL STAINS GROUP 1: CPT | Performed by: PATHOLOGY

## 2020-12-30 PROCEDURE — 11311 SHAVE SKIN LESION 0.6-1.0 CM: CPT | Performed by: FAMILY MEDICINE

## 2020-12-30 PROCEDURE — 88365 INSITU HYBRIDIZATION (FISH): CPT | Performed by: PATHOLOGY

## 2020-12-30 PROCEDURE — 88342 IMHCHEM/IMCYTCHM 1ST ANTB: CPT | Performed by: PATHOLOGY

## 2020-12-30 RX ORDER — DIPHENOXYLATE HYDROCHLORIDE AND ATROPINE SULFATE 2.5; .025 MG/1; MG/1
1 TABLET ORAL DAILY
COMMUNITY

## 2021-01-19 DIAGNOSIS — I27.82 CHRONIC PULMONARY EMBOLISM WITHOUT ACUTE COR PULMONALE, UNSPECIFIED PULMONARY EMBOLISM TYPE (HCC): ICD-10-CM

## 2021-01-19 LAB
INR PPP: 3.18 (ref 0.84–1.19)
PROTHROMBIN TIME: 32.4 SECONDS (ref 11.6–14.5)

## 2021-01-19 PROCEDURE — 85610 PROTHROMBIN TIME: CPT | Performed by: FAMILY MEDICINE

## 2021-01-21 ENCOUNTER — ANTICOAG VISIT (OUTPATIENT)
Dept: FAMILY MEDICINE CLINIC | Facility: CLINIC | Age: 86
End: 2021-01-21

## 2021-02-12 DIAGNOSIS — Z23 ENCOUNTER FOR IMMUNIZATION: ICD-10-CM

## 2021-02-16 ENCOUNTER — LAB (OUTPATIENT)
Dept: LAB | Facility: CLINIC | Age: 86
End: 2021-02-16
Payer: MEDICARE

## 2021-02-16 LAB
INR PPP: 2.95 (ref 0.84–1.19)
PROTHROMBIN TIME: 30.5 SECONDS (ref 11.6–14.5)

## 2021-02-16 PROCEDURE — 36415 COLL VENOUS BLD VENIPUNCTURE: CPT

## 2021-02-16 PROCEDURE — 85610 PROTHROMBIN TIME: CPT

## 2021-02-18 ENCOUNTER — ANTICOAG VISIT (OUTPATIENT)
Dept: FAMILY MEDICINE CLINIC | Facility: CLINIC | Age: 86
End: 2021-02-18

## 2021-03-02 ENCOUNTER — OFFICE VISIT (OUTPATIENT)
Dept: FAMILY MEDICINE CLINIC | Facility: CLINIC | Age: 86
End: 2021-03-02
Payer: MEDICARE

## 2021-03-02 VITALS
HEART RATE: 120 BPM | WEIGHT: 179.6 LBS | OXYGEN SATURATION: 97 % | TEMPERATURE: 97.7 F | BODY MASS INDEX: 30.66 KG/M2 | HEIGHT: 64 IN | SYSTOLIC BLOOD PRESSURE: 148 MMHG | DIASTOLIC BLOOD PRESSURE: 90 MMHG

## 2021-03-02 DIAGNOSIS — M15.0 PRIMARY GENERALIZED (OSTEO)ARTHRITIS: ICD-10-CM

## 2021-03-02 DIAGNOSIS — J45.30 MILD PERSISTENT ASTHMA, UNCOMPLICATED: Primary | ICD-10-CM

## 2021-03-02 DIAGNOSIS — D68.52 PROTHROMBIN GENE MUTATION (HCC): ICD-10-CM

## 2021-03-02 DIAGNOSIS — K04.7 DENTAL ABSCESS: ICD-10-CM

## 2021-03-02 DIAGNOSIS — E78.5 DYSLIPIDEMIA: ICD-10-CM

## 2021-03-02 DIAGNOSIS — I10 ESSENTIAL HYPERTENSION, BENIGN: ICD-10-CM

## 2021-03-02 DIAGNOSIS — I27.82 CHRONIC PULMONARY EMBOLISM WITHOUT ACUTE COR PULMONALE, UNSPECIFIED PULMONARY EMBOLISM TYPE (HCC): ICD-10-CM

## 2021-03-02 PROCEDURE — 99214 OFFICE O/P EST MOD 30 MIN: CPT | Performed by: FAMILY MEDICINE

## 2021-03-02 RX ORDER — LOSARTAN POTASSIUM 50 MG/1
50 TABLET ORAL 2 TIMES DAILY
Qty: 180 TABLET | Refills: 3 | Status: SHIPPED | OUTPATIENT
Start: 2021-03-02 | End: 2022-04-18

## 2021-03-02 RX ORDER — LATANOPROST 50 UG/ML
1 SOLUTION/ DROPS OPHTHALMIC
COMMUNITY
Start: 2021-02-04

## 2021-03-02 NOTE — ASSESSMENT & PLAN NOTE
Patient will continue warfarin therapy  He will be having a repeat protime done in approximately 1 week  Patient does have a strong family history of thromboembolisms

## 2021-03-02 NOTE — ASSESSMENT & PLAN NOTE
Patient's blood pressure was noted to be high today  I recheck his blood pressure myself and found to be similar to what is noted in chart  I also noted that the last 2 visits he was here, prior to today, his blood pressure was also high  I increased his losartan to 50 mg twice a day  He will continue hydrochlorothiazide 12 5 mg once a day  His wife argues that she checks his blood pressure with a digital risk off and his systolic blood pressures are typically 120s-130s  Unfortunately, that is not were seeing here    I asked the patient to follow a low-sodium diet

## 2021-03-02 NOTE — PATIENT INSTRUCTIONS
Low-Sodium Diet   AMBULATORY CARE:   A low-sodium diet  limits foods that are high in sodium (salt)  You will need to follow a low-sodium diet if you have high blood pressure, kidney disease, or heart failure  You may also need to follow this diet if you have a condition that is causing your body to retain (hold) extra fluid  You may need to limit the amount of sodium you eat in a day to 1,500 to 2,000 mg  Ask your healthcare provider how much sodium you can have each day  How to use food labels to choose foods that are low in sodium:  Read food labels to find the amount of sodium they contain  The amount of sodium is listed in milligrams (mg)  The % Daily Value (DV) column tells you how much of your daily needs are met by 1 serving of the food for each nutrient listed  Choose foods that have less than 5% of the DV of sodium  These foods are considered low in sodium  Foods that have 20% or more of the DV of sodium are considered high in sodium  Some food labels may also list any of the following terms that tell you about the sodium content in the food:  · Sodium-free:  Less than 5 mg in each serving    · Very low sodium:  35 mg of sodium or less in each serving    · Low sodium:  140 mg of sodium or less in each serving    · Reduced sodium: At least 25% less sodium in each serving than the regular type    · Light in sodium:  50% less sodium in each serving    · Unsalted or no added salt:  No extra salt is added during processing (the food may still contain sodium)     Foods to avoid:  Salty foods are high in sodium  You should avoid the following:  · Processed foods:      ? Mixes for cornbread, biscuits, cake, and pudding     ? Instant foods, such as potatoes, cereals, noodles, and rice     ? Packaged foods, such as bread stuffing, rice and pasta mixes, snack dip mixes, and macaroni and cheese     ? Canned foods, such as canned vegetables, soups, broths, sauces, and vegetable or tomato juice    ?  Snack foods, such as salted chips, popcorn, pretzels, pork rinds, salted crackers, and salted nuts    ? Frozen foods, such as dinners, entrees, vegetables with sauces, and breaded meats    ? Sauerkraut, pickled vegetables, and other foods prepared in brine    · Meats and cheeses:      ? Smoked or cured meat, such as corned beef, hicks, ham, hot dogs, and sausage    ? Canned meats or spreads, such as potted meats, sardines, anchovies, and imitation seafood    ? Deli or lunch meats, such as bologna, ham, turkey, and roast beef    ? Processed cheese, such as American cheese and cheese spreads    · Condiments, sauces, and seasonings:      ? Salt (¼ teaspoon of salt contains 575 mg of sodium)    ? Seasonings made with salt, such as garlic salt, celery salt, onion salt, and seasoned salt    ? Regular soy sauce, barbecue sauce, teriyaki sauce, steak sauce, Worcestershire sauce, and most flavored vinegars    ? Canned gravy and mixes     ? Regular condiments, such as mustard, ketchup, and salad dressings    ? Pickles and olives    ? Meat tenderizers and monosodium glutamate (MSG)    Foods to include:  Read the food label to find the exact amount of sodium in each serving  · Bread and cereal:  Try to choose breads with less than 80 mg of sodium per serving  ? Bread, roll, keerthi, tortilla, or unsalted crackers  ? Ready-to-eat cereals with less than 5% DV of sodium (examples include shredded wheat and puffed rice)    ? Pasta    · Vegetables and fruits:      ? Unsalted fresh, frozen, or canned vegetables    ? Fresh, frozen, or canned fruits    ? Fruit juice    · Dairy:  One serving has about 150 mg of sodium  ? Milk, all types    ? Yogurt    ? Hard cheese, such as cheddar, Swiss, [employer], or mozzarella    · Meat and other protein foods:  Some raw meats may have added sodium  ? Plain meats, fish, and poultry     ? Eggs    · Other foods:      ? Homemade pudding    ? Unsalted nuts, popcorn, or pretzels    ?  Unsalted butter or margarine    Ways to decrease sodium:   · Add spices and herbs to foods instead of salt during cooking  Use salt-free seasonings to add flavor to foods  Examples include onion powder, garlic powder, basil, mayorga powder, paprika, and parsley  Try lemon or lime juice or vinegar to give foods a tart flavor  Use hot peppers, pepper, or cayenne pepper to add a spicy flavor  · Do not keep a salt shaker at your kitchen table  This may help keep you from adding salt to food at the table  A teaspoon of salt has 2,300 mg of sodium  It may take time to get used to enjoying the natural flavor of food instead of adding salt  Talk to your healthcare provider before you use salt substitutes  Some salt substitutes have a high amount of potassium and need to be avoided if you have kidney disease  · Choose low-sodium foods at restaurants  Meals from restaurants are often high in sodium  Some restaurants have nutrition information on the menu that tells you the amount of sodium in their foods  If possible, ask for your food to be prepared with less, or no salt  · Shop for unsalted or low-sodium foods and snacks at the grocery store  Examples include unsalted or low-sodium broths, soups, and canned vegetables  Choose fresh or frozen vegetables instead  Choose unsalted nuts or seeds or fresh fruits or vegetables as snacks  Read food labels and choose salt-free, very low-sodium, or low-sodium foods  © Copyright [address] Information is for End User's use only and may not be sold, redistributed or otherwise used for commercial purposes  All illustrations and images included in CareNotes® are the copyrighted property of [employer]  or [facility]   The above information is an  only  It is not intended as medical advice for individual conditions or treatments  Talk to your doctor, nurse or pharmacist before following any medical regimen to see if it is safe and effective for you

## 2021-03-02 NOTE — ASSESSMENT & PLAN NOTE
Patient never had his blood work done that I ordered back in November for him  I asked him to get this done fasting when he goes for his next prothrombin time

## 2021-03-02 NOTE — PROGRESS NOTES
Assessment/Plan:    Essential hypertension, benign    Patient's blood pressure was noted to be high today  I recheck his blood pressure myself and found to be similar to what is noted in chart  I also noted that the last 2 visits he was here, prior to today, his blood pressure was also high  I increased his losartan to 50 mg twice a day  He will continue hydrochlorothiazide 12 5 mg once a day  His wife argues that she checks his blood pressure with a digital risk off and his systolic blood pressures are typically 120s-130s  Unfortunately, that is not were seeing here  I asked the patient to follow a low-sodium diet    Mild persistent asthma, uncomplicated   Patient's asthma is currently stable  He is going to continue his Symbicort inhaler regularly  Continue albuterol p r n       I counseled the patient regarding the COVID-19 vaccine  His wife is dead set against this vaccine and I am not sure I was able to persuade him to get it  Prothrombin gene mutation [facility])    Patient will continue warfarin therapy  He will be having a repeat protime done in approximately 1 week  Patient does have a strong family history of thromboembolisms  Dyslipidemia    Patient never had his blood work done that I ordered back in November for him  I asked him to get this done fasting when he goes for his next prothrombin time  GERD without esophagitis    Patient has gastroesophageal reflux disease which is currently stable  He will continue omeprazole 20 mg daily  He purchase this over-the-counter because it is cheaper for him  Dental abscess   The biopsy a did on the patient's face showed granulation tissue  I believe this is consistent with a draining sinus from his dental abscess  He needs to follow up with his dentist for further evaluation  Diagnoses and all orders for this visit:    Mild persistent asthma, uncomplicated    Essential hypertension, benign  -     losartan (COZAAR) 50 mg tablet;  Take 1 tablet (50 mg total) by mouth 2 (two) times a day    Chronic pulmonary embolism without acute cor pulmonale, unspecified pulmonary embolism type (HCC)    Prothrombin gene mutation ([address] )    Dyslipidemia    Dental abscess    Other orders  -     latanoprost (XALATAN) 0 005 % ophthalmic solution; Administer 1 drop to both eyes daily at bedtime  -     sodium chloride (ARIEL 128) 2 % hypertonic ophthalmic solution; Administer 1 drop into the left eye 5 (five) times a day  -     Polyethyl Glycol-Propyl Glycol (SYSTANE OP); Administer 1 drop to both eyes 4 (four) times a day          Subjective:      Patient ID: Ammon Tee is a 80 y o  male  This is 6h 78-year-old white male who presents to the office today for his regular checkup  Despite his advanced age, he is exercising regularly in his garage  He uses various equipment, including a treadmill, recumbent bike, stair stepper and does various exercises with a pulley  He reports compliance with his medication  Since his last visit, he still has not seen a dentist  He denies toothache  The following portions of the patient's history were reviewed and updated as appropriate: allergies, current medications, past family history, past medical history, past social history, past surgical history and problem list     Review of Systems   Constitutional: Negative for activity change, appetite change and unexpected weight change  HENT:        Denies toothache   Respiratory: Negative for cough, shortness of breath and wheezing  Cardiovascular: Negative for chest pain, palpitations and leg swelling  Gastrointestinal: Negative for abdominal distention, abdominal pain, blood in stool, constipation, diarrhea and nausea  Denies heartburn and indigestion   Musculoskeletal: Positive for arthralgias and gait problem  Negative for back pain  Neurological: Positive for weakness  Hematological: Adenopathy: Reports generalized weakness  Objective:      /90 (BP Location: Left arm, Patient Position: Sitting, Cuff Size: Adult)   Pulse (!) 120   Temp 97 7 °F (36 5 °C) (Tympanic)   Ht 5' 4" (1 626 m)   Wt 81 5 kg (179 lb 9 6 oz)   SpO2 97%   BMI 30 83 kg/m²          Physical Exam  Vitals signs reviewed  Constitutional:       Comments: This is a 59-year-old white male who appears his stated age  He was pleasant, cooperative, and in no distress  HENT:      Head: Normocephalic and atraumatic  Right Ear: Tympanic membrane, ear canal and external ear normal       Left Ear: Tympanic membrane, ear canal and external ear normal       Mouth/Throat:      Mouth: Mucous membranes are moist       Pharynx: Oropharynx is clear  No oropharyngeal exudate or posterior oropharyngeal erythema  Eyes:      General: No scleral icterus  Right eye: No discharge  Left eye: No discharge  Conjunctiva/sclera: Conjunctivae normal       Pupils: Pupils are equal, round, and reactive to light  Neck:      Musculoskeletal: Neck supple  Vascular: No carotid bruit  Comments: There was no thyromegaly appreciated  Cardiovascular:      Rate and Rhythm: Normal rate and regular rhythm  Heart sounds: Normal heart sounds  No murmur  No friction rub  No gallop  Pulmonary:      Effort: Pulmonary effort is normal  No respiratory distress  Breath sounds: Normal breath sounds  No stridor  No wheezing, rhonchi or rales  Abdominal:      General: Bowel sounds are normal  There is no distension  Palpations: Abdomen is soft  There is no mass  Tenderness: There is no abdominal tenderness  There is no guarding  Hernia: No hernia is present  Comments: There is no organomegaly noted   Lymphadenopathy:      Cervical: No cervical adenopathy     Skin:     Comments: Small reddish friable papule present on the patient's right face   Psychiatric:         Mood and Affect: Mood normal          Behavior: Behavior normal  Thought Content:  Thought content normal          Judgment: Judgment normal        Extremities:  Without cyanosis, clubbing, or edema

## 2021-03-02 NOTE — ASSESSMENT & PLAN NOTE
Patient's asthma is currently stable  He is going to continue his Symbicort inhaler regularly  Continue albuterol p r n       I counseled the patient regarding the COVID-19 vaccine  His wife is dead set against this vaccine and I am not sure I was able to persuade him to get it

## 2021-03-02 NOTE — ASSESSMENT & PLAN NOTE
Patient has gastroesophageal reflux disease which is currently stable  He will continue omeprazole 20 mg daily  He purchase this over-the-counter because it is cheaper for him

## 2021-03-02 NOTE — ASSESSMENT & PLAN NOTE
The biopsy a did on the patient's face showed granulation tissue  I believe this is consistent with a draining sinus from his dental abscess  He needs to follow up with his dentist for further evaluation

## 2021-03-09 ENCOUNTER — LAB (OUTPATIENT)
Dept: LAB | Facility: CLINIC | Age: 86
End: 2021-03-09
Payer: MEDICARE

## 2021-03-09 DIAGNOSIS — I27.82 CHRONIC PULMONARY EMBOLISM WITHOUT ACUTE COR PULMONALE, UNSPECIFIED PULMONARY EMBOLISM TYPE (HCC): ICD-10-CM

## 2021-03-09 DIAGNOSIS — Z79.899 ENCOUNTER FOR LONG-TERM (CURRENT) USE OF MEDICATIONS: ICD-10-CM

## 2021-03-09 DIAGNOSIS — E78.5 DYSLIPIDEMIA: ICD-10-CM

## 2021-03-09 LAB
ALBUMIN SERPL BCP-MCNC: 3.7 G/DL (ref 3.5–5)
ALP SERPL-CCNC: 96 U/L (ref 46–116)
ALT SERPL W P-5'-P-CCNC: 26 U/L (ref 12–78)
ANION GAP SERPL CALCULATED.3IONS-SCNC: 4 MMOL/L (ref 4–13)
AST SERPL W P-5'-P-CCNC: 25 U/L (ref 5–45)
BASOPHILS # BLD AUTO: 0.04 THOUSANDS/ΜL (ref 0–0.1)
BASOPHILS NFR BLD AUTO: 1 % (ref 0–1)
BILIRUB SERPL-MCNC: 0.68 MG/DL (ref 0.2–1)
BUN SERPL-MCNC: 21 MG/DL (ref 5–25)
CALCIUM SERPL-MCNC: 9.6 MG/DL (ref 8.3–10.1)
CHLORIDE SERPL-SCNC: 102 MMOL/L (ref 100–108)
CHOLEST SERPL-MCNC: 216 MG/DL (ref 50–200)
CO2 SERPL-SCNC: 29 MMOL/L (ref 21–32)
CREAT SERPL-MCNC: 1.08 MG/DL (ref 0.6–1.3)
EOSINOPHIL # BLD AUTO: 0.17 THOUSAND/ΜL (ref 0–0.61)
EOSINOPHIL NFR BLD AUTO: 3 % (ref 0–6)
ERYTHROCYTE [DISTWIDTH] IN BLOOD BY AUTOMATED COUNT: 13.1 % (ref 11.6–15.1)
GFR SERPL CREATININE-BSD FRML MDRD: 60 ML/MIN/1.73SQ M
GLUCOSE P FAST SERPL-MCNC: 97 MG/DL (ref 65–99)
HCT VFR BLD AUTO: 40.3 % (ref 36.5–49.3)
HDLC SERPL-MCNC: 83 MG/DL
HGB BLD-MCNC: 13.2 G/DL (ref 12–17)
IMM GRANULOCYTES # BLD AUTO: 0.02 THOUSAND/UL (ref 0–0.2)
IMM GRANULOCYTES NFR BLD AUTO: 0 % (ref 0–2)
LDLC SERPL CALC-MCNC: 112 MG/DL (ref 0–100)
LYMPHOCYTES # BLD AUTO: 2.18 THOUSANDS/ΜL (ref 0.6–4.47)
LYMPHOCYTES NFR BLD AUTO: 40 % (ref 14–44)
MCH RBC QN AUTO: 30.5 PG (ref 26.8–34.3)
MCHC RBC AUTO-ENTMCNC: 32.8 G/DL (ref 31.4–37.4)
MCV RBC AUTO: 93 FL (ref 82–98)
MONOCYTES # BLD AUTO: 0.61 THOUSAND/ΜL (ref 0.17–1.22)
MONOCYTES NFR BLD AUTO: 11 % (ref 4–12)
NEUTROPHILS # BLD AUTO: 2.37 THOUSANDS/ΜL (ref 1.85–7.62)
NEUTS SEG NFR BLD AUTO: 45 % (ref 43–75)
NONHDLC SERPL-MCNC: 133 MG/DL
NRBC BLD AUTO-RTO: 0 /100 WBCS
PLATELET # BLD AUTO: 234 THOUSANDS/UL (ref 149–390)
PMV BLD AUTO: 10.3 FL (ref 8.9–12.7)
POTASSIUM SERPL-SCNC: 4.4 MMOL/L (ref 3.5–5.3)
PROT SERPL-MCNC: 7 G/DL (ref 6.4–8.2)
RBC # BLD AUTO: 4.33 MILLION/UL (ref 3.88–5.62)
SODIUM SERPL-SCNC: 135 MMOL/L (ref 136–145)
TRIGL SERPL-MCNC: 104 MG/DL
WBC # BLD AUTO: 5.39 THOUSAND/UL (ref 4.31–10.16)

## 2021-03-09 PROCEDURE — 36415 COLL VENOUS BLD VENIPUNCTURE: CPT

## 2021-03-09 PROCEDURE — 85610 PROTHROMBIN TIME: CPT

## 2021-03-09 PROCEDURE — 80061 LIPID PANEL: CPT

## 2021-03-09 PROCEDURE — 85025 COMPLETE CBC W/AUTO DIFF WBC: CPT

## 2021-03-09 PROCEDURE — 80053 COMPREHEN METABOLIC PANEL: CPT

## 2021-03-10 ENCOUNTER — ANTICOAG VISIT (OUTPATIENT)
Dept: FAMILY MEDICINE CLINIC | Facility: CLINIC | Age: 86
End: 2021-03-10

## 2021-03-10 LAB
INR PPP: 3.37 (ref 0.84–1.19)
PROTHROMBIN TIME: 33.9 SECONDS (ref 11.6–14.5)

## 2021-03-30 ENCOUNTER — TELEPHONE (OUTPATIENT)
Dept: FAMILY MEDICINE CLINIC | Facility: CLINIC | Age: 86
End: 2021-03-30

## 2021-04-08 ENCOUNTER — APPOINTMENT (OUTPATIENT)
Dept: LAB | Facility: CLINIC | Age: 86
End: 2021-04-08
Payer: MEDICARE

## 2021-04-08 DIAGNOSIS — I27.82 CHRONIC PULMONARY EMBOLISM WITHOUT ACUTE COR PULMONALE, UNSPECIFIED PULMONARY EMBOLISM TYPE (HCC): ICD-10-CM

## 2021-04-08 LAB
INR PPP: 2.03 (ref 0.84–1.19)
PROTHROMBIN TIME: 22.9 SECONDS (ref 11.6–14.5)

## 2021-04-08 PROCEDURE — 85610 PROTHROMBIN TIME: CPT

## 2021-04-08 PROCEDURE — 36415 COLL VENOUS BLD VENIPUNCTURE: CPT

## 2021-04-09 ENCOUNTER — ANTICOAG VISIT (OUTPATIENT)
Dept: FAMILY MEDICINE CLINIC | Facility: CLINIC | Age: 86
End: 2021-04-09

## 2021-04-27 ENCOUNTER — OFFICE VISIT (OUTPATIENT)
Dept: OBGYN CLINIC | Facility: CLINIC | Age: 86
End: 2021-04-27
Payer: MEDICARE

## 2021-04-27 VITALS
BODY MASS INDEX: 30.56 KG/M2 | HEIGHT: 64 IN | SYSTOLIC BLOOD PRESSURE: 182 MMHG | WEIGHT: 179 LBS | DIASTOLIC BLOOD PRESSURE: 96 MMHG | HEART RATE: 123 BPM

## 2021-04-27 DIAGNOSIS — M17.0 PRIMARY OSTEOARTHRITIS OF BOTH KNEES: Primary | ICD-10-CM

## 2021-04-27 PROCEDURE — 20610 DRAIN/INJ JOINT/BURSA W/O US: CPT | Performed by: ORTHOPAEDIC SURGERY

## 2021-04-27 RX ORDER — HYALURONATE SODIUM 10 MG/ML
20 SYRINGE (ML) INTRAARTICULAR
Status: COMPLETED | OUTPATIENT
Start: 2021-04-27 | End: 2021-04-27

## 2021-04-27 RX ADMIN — Medication 20 MG: at 11:06

## 2021-04-27 NOTE — PROGRESS NOTES
Assessment/Plan:    No problem-specific Assessment & Plan notes found for this encounter  Diagnoses and all orders for this visit:    Primary osteoarthritis of both knees            Both knees were injected with his 1st set of Hyalgan  He tolerated procedures quite well  Return back next week for 2nd set of injections    Subjective:      Patient ID: Yancy Schwarz is a 80 y o  male  HPI     the patient has a history of degenerative joint disease of his bilateral knees  He is here for another set of viscosupplementation with Euflexxa  He denies any problems from last visit  He denies any numbness or tingling  He denies any fever chills  He does well from the injections  He states he is slowing down which is an age related issue  The following portions of the patient's history were reviewed and updated as appropriate: allergies, current medications, past family history, past medical history, past social history, past surgical history and problem list     Review of Systems   Constitutional: Negative for chills, fever and unexpected weight change  HENT: Negative for hearing loss, nosebleeds and sore throat  Eyes: Negative for pain, redness and visual disturbance  Respiratory: Negative for cough, shortness of breath and wheezing  Cardiovascular: Negative for chest pain, palpitations and leg swelling  Gastrointestinal: Negative for abdominal pain, nausea and vomiting  Endocrine: Negative for polydipsia and polyuria  Genitourinary: Negative for dysuria and hematuria  Musculoskeletal: Positive for arthralgias, gait problem and myalgias  Negative for back pain, joint swelling, neck pain and neck stiffness  As noted in HPI   Skin: Negative for rash and wound  Neurological: Negative for dizziness, numbness and headaches  Psychiatric/Behavioral: Negative for decreased concentration and suicidal ideas  The patient is not nervous/anxious            Objective:      BP (!) 182/96 Pulse (!) 123   Ht 5' 4" (1 626 m)   Wt 81 2 kg (179 lb)   BMI 30 73 kg/m²          Physical Exam          Bilateral lower extremities are neurovascular intact  Toes are pink and mobile  Compartments are soft  Range of motion both his knees are from 5-115 degrees  There is a negative Lachman's, drawer, pivot shift test   There is medial joint tenderness, lateral joint tenderness, and patellofemoral crepitation  There is a painful arc of motion of both his knees  Large joint arthrocentesis: bilateral knee  Universal Protocol:  Consent: Verbal consent obtained  Risks and benefits: risks, benefits and alternatives were discussed  Consent given by: patient  Time out: Immediately prior to procedure a "time out" was called to verify the correct patient, procedure, equipment, support staff and site/side marked as required  Patient understanding: patient states understanding of the procedure being performed  Test results: test results available and properly labeled  Site marked: the operative site was marked  Radiology Images displayed and confirmed   If images not available, report reviewed: imaging studies available  Patient identity confirmed: verbally with patient    Supporting Documentation  Indications: pain   Procedure Details  Location: knee - bilateral knee  Needle size: 22 G  Ultrasound guidance: no  Approach: lateral    Medications (Right): 20 mg Sodium Hyaluronate 20 MG/2MLMedications (Left): 20 mg Sodium Hyaluronate 20 MG/2ML   Patient tolerance: patient tolerated the procedure well with no immediate complications  Dressing:  Sterile dressing applied

## 2021-05-04 ENCOUNTER — OFFICE VISIT (OUTPATIENT)
Dept: OBGYN CLINIC | Facility: CLINIC | Age: 86
End: 2021-05-04
Payer: MEDICARE

## 2021-05-04 ENCOUNTER — APPOINTMENT (OUTPATIENT)
Dept: LAB | Facility: CLINIC | Age: 86
End: 2021-05-04
Payer: MEDICARE

## 2021-05-04 VITALS
WEIGHT: 179 LBS | DIASTOLIC BLOOD PRESSURE: 81 MMHG | HEART RATE: 121 BPM | SYSTOLIC BLOOD PRESSURE: 159 MMHG | BODY MASS INDEX: 30.56 KG/M2 | HEIGHT: 64 IN

## 2021-05-04 DIAGNOSIS — M17.0 PRIMARY OSTEOARTHRITIS OF BOTH KNEES: Primary | ICD-10-CM

## 2021-05-04 DIAGNOSIS — I27.82 CHRONIC PULMONARY EMBOLISM WITHOUT ACUTE COR PULMONALE, UNSPECIFIED PULMONARY EMBOLISM TYPE (HCC): ICD-10-CM

## 2021-05-04 LAB
INR PPP: 2.68 (ref 0.84–1.19)
PROTHROMBIN TIME: 28.4 SECONDS (ref 11.6–14.5)

## 2021-05-04 PROCEDURE — 20610 DRAIN/INJ JOINT/BURSA W/O US: CPT | Performed by: ORTHOPAEDIC SURGERY

## 2021-05-04 PROCEDURE — 85610 PROTHROMBIN TIME: CPT

## 2021-05-04 PROCEDURE — 36415 COLL VENOUS BLD VENIPUNCTURE: CPT

## 2021-05-04 RX ORDER — HYALURONATE SODIUM 10 MG/ML
20 SYRINGE (ML) INTRAARTICULAR
Status: COMPLETED | OUTPATIENT
Start: 2021-05-04 | End: 2021-05-04

## 2021-05-04 RX ADMIN — Medication 20 MG: at 11:11

## 2021-05-04 NOTE — PROGRESS NOTES
Assessment/Plan:    No problem-specific Assessment & Plan notes found for this encounter  Diagnoses and all orders for this visit:    Primary osteoarthritis of both knees           both knees were injected with her 2nd set of Hyalgan  He tolerated suture quite well  Return back next week for his 3rd set    Subjective:      Patient ID: Michele Cortes is a 80 y o  male  HPI     the patient has a history of degenerative joint disease of his bilateral knees  He presents for his 2nd set of Hyalgan  He denies any problems from last visit  He denies any numbness or tingling  He denies any fever chills  He continues to ambulate with a single-point cane which is his baseline  The following portions of the patient's history were reviewed and updated as appropriate: allergies, current medications, past family history, past medical history, past social history, past surgical history and problem list     Review of Systems   Constitutional: Negative for chills, fever and unexpected weight change  HENT: Negative for hearing loss, nosebleeds and sore throat  Eyes: Negative for pain, redness and visual disturbance  Respiratory: Negative for cough, shortness of breath and wheezing  Cardiovascular: Negative for chest pain, palpitations and leg swelling  Gastrointestinal: Negative for abdominal pain, nausea and vomiting  Endocrine: Negative for polydipsia and polyuria  Genitourinary: Negative for dysuria and hematuria  Musculoskeletal: Positive for arthralgias, gait problem and myalgias  Negative for back pain, joint swelling, neck pain and neck stiffness  As noted in HPI   Skin: Negative for rash and wound  Neurological: Negative for dizziness, numbness and headaches  Psychiatric/Behavioral: Negative for decreased concentration and suicidal ideas  The patient is not nervous/anxious            Objective:      /81 (BP Location: Left arm, Patient Position: Sitting, Cuff Size: Standard)   Pulse (!) 121   Ht 5' 4" (1 626 m)   Wt 81 2 kg (179 lb)   BMI 30 73 kg/m²          Physical Exam          Bilateral lower extremities are neurovascular intact  Toes are pink and mobile  Compartments are soft  Range of motion both his knees from 5-115 degrees  There is a negative Lachman's, drawer, pivot shift test   There is medial joint tenderness, lateral joint tenderness, patellofemoral crepitation  There is a painful arc of motion throughout both his knees  Neurologically intact distally  Negative Homans  Large joint arthrocentesis: bilateral knee  Universal Protocol:  Consent: Verbal consent obtained  Risks and benefits: risks, benefits and alternatives were discussed  Consent given by: patient  Time out: Immediately prior to procedure a "time out" was called to verify the correct patient, procedure, equipment, support staff and site/side marked as required  Patient understanding: patient states understanding of the procedure being performed  Test results: test results available and properly labeled  Site marked: the operative site was marked  Radiology Images displayed and confirmed   If images not available, report reviewed: imaging studies available  Patient identity confirmed: verbally with patient    Supporting Documentation  Indications: pain   Procedure Details  Location: knee - bilateral knee  Needle size: 22 G  Ultrasound guidance: no  Approach: lateral    Medications (Right): 20 mg Sodium Hyaluronate 20 MG/2MLMedications (Left): 20 mg Sodium Hyaluronate 20 MG/2ML   Patient tolerance: patient tolerated the procedure well with no immediate complications  Dressing:  Sterile dressing applied

## 2021-05-05 ENCOUNTER — ANTICOAG VISIT (OUTPATIENT)
Dept: FAMILY MEDICINE CLINIC | Facility: CLINIC | Age: 86
End: 2021-05-05

## 2021-05-11 ENCOUNTER — OFFICE VISIT (OUTPATIENT)
Dept: OBGYN CLINIC | Facility: CLINIC | Age: 86
End: 2021-05-11
Payer: MEDICARE

## 2021-05-11 VITALS
WEIGHT: 179 LBS | SYSTOLIC BLOOD PRESSURE: 163 MMHG | BODY MASS INDEX: 30.56 KG/M2 | HEART RATE: 114 BPM | DIASTOLIC BLOOD PRESSURE: 91 MMHG | HEIGHT: 64 IN

## 2021-05-11 DIAGNOSIS — M17.0 PRIMARY OSTEOARTHRITIS OF BOTH KNEES: Primary | ICD-10-CM

## 2021-05-11 PROCEDURE — 20610 DRAIN/INJ JOINT/BURSA W/O US: CPT | Performed by: ORTHOPAEDIC SURGERY

## 2021-05-11 RX ORDER — HYALURONATE SODIUM 10 MG/ML
20 SYRINGE (ML) INTRAARTICULAR
Status: COMPLETED | OUTPATIENT
Start: 2021-05-11 | End: 2021-05-11

## 2021-05-11 RX ADMIN — Medication 20 MG: at 11:12

## 2021-05-11 NOTE — PROGRESS NOTES
ASSESSMENT/PLAN:    Diagnoses and all orders for this visit:    Primary osteoarthritis of both knees    Other orders  -     Large joint arthrocentesis      80 y o  [place] with bilateral knee osteoarthritis  Patient received his 3rd set of Hyalgan injections today  He tolerated the injections well  I will see him back in office next week for his 4th set  Return in about 1 week (around 5/18/2021) for Recheck bilateral knee injections   both knees were injected with his 3rd set of Hyalgan  He tolerated procedure quite well  Return back next week for his 4th set of injections  The patient is noting a therapeutic advantage with the  medications      _____________________________________________________  CHIEF COMPLAINT:  Chief Complaint   Patient presents with    Right Knee - Follow-up    Left Knee - Follow-up         SUBJECTIVE:  Catherine Singleton is a 80 y o  male who presents for his 3rd set of Hyalgan injections about his bilateral knees for his bilateral knee osteoarthritis  Patient denied any previous problems from last visit  He denied any numbness or tingling  He denied any fever or chills  He presents to the office ambulating with a single-point cane       The following portions of the patient's history were reviewed and updated as appropriate: allergies, current medications, past family history, past medical history, past social history, past surgical history and problem list     PAST MEDICAL HISTORY:  Past Medical History:   Diagnosis Date    Arthritis     Coagulation defect ([facility] [address] )     last assessed: 11/28/2018    COPD (chronic obstructive pulmonary disease) ([facility] [address] )     last assessed: 5/14/2019, 12/6/2017    Generalized osteoarthritis     last assessed: 1/28/2019    GERD without esophagitis     last assessed: 1/28/2019    Glaucoma     last assessed: 8/4/2011    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[facility] [address] )     last assessed: 10/16/2018    Factor II Prothrombin Gene per [place]    History of kidney stones 1985    Hx of blood clots     Hypertension     last assessed: 2019    Impaired fasting glucose     last assessed: 2013    Mild persistent asthma, uncomplicated     last assessed: 2018    Nephrolithiasis     Nondisplaced intertrochanteric fracture of right femur, initial encounter for closed fracture ([facility] [address] )     last assessed: 2019    Premature ventricular contractions     last assessed: 2011    Pulmonary nodule     Scoliosis (and kyphoscoliosis), idiopathic     Wears dentures        PAST SURGICAL HISTORY:  Past Surgical History:   Procedure Laterality Date    CARPAL TUNNEL RELEASE Left     ENDO     CATARACT EXTRACTION Left     COLONOSCOPY  10/07/2008    last assessed: 3/29/2016    HERNIA REPAIR      HIP SURGERY      HIP SURGERY Left 1999    [facility]    MULTIPLE TOOTH EXTRACTIONS Bilateral     OTHER SURGICAL HISTORY Right 10/30/2001    [facility]    TONSILLECTOMY Bilateral     WISDOM TOOTH EXTRACTION Bilateral        FAMILY HISTORY:  Family History   Problem Relation Age of Onset    Hypertension Mother     Stroke Mother     Heart attack Father        SOCIAL HISTORY:  Social History     Tobacco Use    Smoking status: Former Smoker     Years: 10      Types: Pipe     Start date:      Quit date:      Years since quittin 3    Smokeless tobacco: Never Used   Substance Use Topics    Alcohol use: Yes     Comment: social    Drug use: Never       MEDICATIONS:    Current Outpatient Medications:     albuterol (PROVENTIL HFA,VENTOLIN HFA) 90 mcg/act inhaler, Inhale 2 puffs as needed for wheezing, Disp: 3 Inhaler, Rfl: 1    alclomethasone (ACLOVATE) 0 05 % cream, Apply topically 2 (two) times a day, Disp: 60 g, Rfl: 0    Carboxymethylcellulose Sodium (REFRESH LIQUIGEL) 1 % GEL, Apply 1 drop to eye as needed, Disp: , Rfl:     Diclofenac Sodium (VOLTAREN) 1 %, APPLY 2 GRAMS TO AFFECTED AREA 4 TIMES A DAY, Disp: 500 g, Rfl: 5   hydrochlorothiazide (MICROZIDE) 12 5 mg capsule, Take 1 capsule (12 5 mg total) by mouth daily, Disp: 90 capsule, Rfl: 3    latanoprost (XALATAN) 0 005 % ophthalmic solution, Administer 1 drop to both eyes daily at bedtime, Disp: , Rfl:     losartan (COZAAR) 50 mg tablet, Take 1 tablet (50 mg total) by mouth 2 (two) times a day, Disp: 180 tablet, Rfl: 3    montelukast (SINGULAIR) 10 mg tablet, TAKE 1 TABLET BY MOUTH EVERY DAY, Disp: 90 tablet, Rfl: 3    multivitamin (THERAGRAN) TABS, Take 1 tablet by mouth daily, Disp: , Rfl:     omeprazole (PRILOSEC OTC) 20 MG tablet, Take 20 mg by mouth, Disp: , Rfl:     Polyethyl Glycol-Propyl Glycol (SYSTANE OP), Administer 1 drop to both eyes 4 (four) times a day, Disp: , Rfl:     polyethylene glycol (MIRALAX) powder, Take 17 g by mouth as needed , Disp: , Rfl:     sodium chloride (ARIEL 128) 2 % hypertonic ophthalmic solution, Administer 1 drop into the left eye 5 (five) times a day, Disp: , Rfl:     SYMBICORT 160-4 5 MCG/ACT inhaler, Inhale 2 puffs 2 (two) times a day, Disp: 3 Inhaler, Rfl: 3    TRAVATAN Z 0 004 % ophthalmic solution, Administer 1 drop to both eyes daily at bedtime , Disp: , Rfl: 0    triamcinolone (KENALOG) 0 1 % cream, Apply topically 2 (two) times a day as needed for rash, Disp: 80 g, Rfl: 2    warfarin (COUMADIN) 4 mg tablet, Take 2 daily or as directed, Disp: 180 tablet, Rfl: 1    Current Facility-Administered Medications:     bupivacaine (PF) (MARCAINE) 0 25 % injection 10 mL, 10 mL, Infiltration, , Matthew Bhat DO, 10 mL at 06/25/20 0953    bupivacaine (PF) (MARCAINE) 0 25 % injection 10 mL, 10 mL, Infiltration, , Matthew Bhat DO, 10 mL at 06/25/20 0953    methylPREDNISolone acetate (DEPO-MEDROL) injection 2 mL, 2 mL, Intra-articular, , Matthew Bhat DO, 2 mL at 06/25/20 0991    methylPREDNISolone acetate (DEPO-MEDROL) injection 2 mL, 2 mL, Intra-articular, , Matthew Bhat DO, 2 mL at 06/25/20 2367    ALLERGIES:  Allergies   Allergen Reactions    Alphagan P [Brimonidine Tartrate] Itching       ROS:  Review of Systems   Constitutional: Negative for chills and fever  HENT: Negative for ear pain and sore throat  Eyes: Negative for pain and visual disturbance  Respiratory: Negative for cough and shortness of breath  Cardiovascular: Negative for chest pain and palpitations  Gastrointestinal: Negative for abdominal pain and vomiting  Genitourinary: Negative for dysuria and hematuria  Musculoskeletal: Positive for arthralgias  Negative for back pain  Skin: Negative for color change and rash  Neurological: Negative for seizures and syncope  All other systems reviewed and are negative  Constitutional: Negative for fatigue, fever or loss of appetite  HENT: Negative  Respiratory: Negative for shortness of breath, dyspnea  Cardiovascular: Negative for chest pain/tightness  Gastrointestinal: Negative for abdominal pain, N/V  Endocrine: Negative for cold/heat intolerance, unexplained weight loss/gain  Genitourinary: Negative for flank pain, dysuria, hematuria  Musculoskeletal: Positive for arthralgia   Skin: Negative for rash  Neurological: Negative for numbness or tingling  Psychiatric/Behavioral: Negative for agitation  _____________________________________________________  PHYSICAL EXAMINATION:    Blood pressure 163/91, pulse (!) 114, height 5' 4" (1 626 m), weight 81 2 kg (179 lb)  Constitutional: Oriented to person, place, and time  Appears well-developed and well-nourished  No distress  HENT:   Head: Normocephalic  Eyes: Conjunctivae are normal  Right eye exhibits no discharge  Left eye exhibits no discharge  No scleral icterus  Cardiovascular: Normal rate  Pulmonary/Chest: Effort normal    Neurological: Alert and oriented to person, place, and time  Skin: Skin is warm and dry  No rash noted  Not diaphoretic  No erythema  No pallor  Psychiatric: Normal mood and affect   Behavior is normal  Judgment and thought content normal       MUSCULOSKELETAL EXAMINATION:   Physical Exam  Ortho Exam    Bilateral Knees:   Skin intact  No obvious swelling   Toes pink and mobile  Compartments soft   ROM of knee is 5-115   Negative Lachman, drawer or pivot shift   Medial joint line tenderness, slight lateral joint line tenderness  Patellofemoral crepitation   Lower extremity is neurovascularly intact   Sensation intact     Objective:  BP Readings from Last 1 Encounters:   05/11/21 163/91      Wt Readings from Last 1 Encounters:   05/11/21 81 2 kg (179 lb)        BMI:   Estimated body mass index is 30 73 kg/m² as calculated from the following:    Height as of this encounter: 5' 4" (1 626 m)  Weight as of this encounter: 81 2 kg (179 lb)  PROCEDURES PERFORMED:  Large joint arthrocentesis: bilateral knee  Universal Protocol:  Consent: Verbal consent obtained  Risks and benefits: risks, benefits and alternatives were discussed  Consent given by: patient  Time out: Immediately prior to procedure a "time out" was called to verify the correct patient, procedure, equipment, support staff and site/side marked as required    Timeout called at: 5/11/2021 11:00 AM   Patient understanding: patient states understanding of the procedure being performed  Site marked: the operative site was marked  Patient identity confirmed: verbally with patient    Supporting Documentation  Indications: pain   Procedure Details  Location: knee - bilateral knee  Preparation: Patient was prepped and draped in the usual sterile fashion  Needle size: 22 G  Ultrasound guidance: no  Approach: anterolateral    Medications (Right): 20 mg Sodium Hyaluronate 20 MG/2MLMedications (Left): 20 mg Sodium Hyaluronate 20 MG/2ML   Patient tolerance: patient tolerated the procedure well with no immediate complications  Dressing:  Sterile dressing applied          Scribe Attestation    I,:  Alexsander Flowers am acting as a scribe while in the presence of the attending physician :       I,:  Sindy Rose, DO personally performed the services described in this documentation    as scribed in my presence :

## 2021-05-14 RX ORDER — TRIPROLIDINE/PSEUDOEPHEDRINE 2.5MG-60MG
1 TABLET ORAL 2 TIMES DAILY
COMMUNITY
Start: 2021-05-10 | End: 2022-08-05

## 2021-05-14 RX ORDER — LATANOPROST 50 UG/ML
1 SOLUTION/ DROPS OPHTHALMIC
COMMUNITY
Start: 2021-05-10 | End: 2021-11-19

## 2021-05-18 ENCOUNTER — OFFICE VISIT (OUTPATIENT)
Dept: OBGYN CLINIC | Facility: CLINIC | Age: 86
End: 2021-05-18
Payer: MEDICARE

## 2021-05-18 VITALS
DIASTOLIC BLOOD PRESSURE: 96 MMHG | WEIGHT: 179 LBS | BODY MASS INDEX: 30.56 KG/M2 | HEIGHT: 64 IN | SYSTOLIC BLOOD PRESSURE: 165 MMHG | HEART RATE: 99 BPM

## 2021-05-18 DIAGNOSIS — M17.0 PRIMARY OSTEOARTHRITIS OF BOTH KNEES: Primary | ICD-10-CM

## 2021-05-18 PROCEDURE — 20610 DRAIN/INJ JOINT/BURSA W/O US: CPT | Performed by: ORTHOPAEDIC SURGERY

## 2021-05-18 RX ORDER — HYALURONATE SODIUM 10 MG/ML
20 SYRINGE (ML) INTRAARTICULAR
Status: COMPLETED | OUTPATIENT
Start: 2021-05-18 | End: 2021-05-18

## 2021-05-18 RX ADMIN — Medication 20 MG: at 11:08

## 2021-05-18 NOTE — PROGRESS NOTES
Assessment/Plan:    No problem-specific Assessment & Plan notes found for this encounter  Diagnoses and all orders for this visit:    Primary osteoarthritis of both knees           both knees were injected with his 4th set of Hyalgan  He tolerated procedure quite well  Return back next week for his last set of injections      Subjective:      Patient ID: Leda Guerrero is a 80 y o  male  HPI     the patient has a history of degenerative joint disease of his bilateral knees  He presents for his 4th set of Hyalgan  He denies any problems from last visit  He denies any numbness or tingling  He denies any fever or chills  The following portions of the patient's history were reviewed and updated as appropriate: allergies, current medications, past family history, past medical history, past social history, past surgical history and problem list     Review of Systems   Constitutional: Negative for chills, fever and unexpected weight change  HENT: Negative for hearing loss, nosebleeds and sore throat  Eyes: Negative for pain, redness and visual disturbance  Respiratory: Negative for cough, shortness of breath and wheezing  Cardiovascular: Negative for chest pain, palpitations and leg swelling  Gastrointestinal: Negative for abdominal pain, nausea and vomiting  Endocrine: Negative for polydipsia and polyuria  Genitourinary: Negative for dysuria and hematuria  Musculoskeletal: Positive for arthralgias, gait problem and joint swelling  Negative for back pain, myalgias, neck pain and neck stiffness  As noted in HPI   Skin: Negative for rash and wound  Neurological: Negative for dizziness, numbness and headaches  Psychiatric/Behavioral: Negative for decreased concentration and suicidal ideas  The patient is not nervous/anxious            Objective:      /96   Pulse 99   Ht 5' 4" (1 626 m)   Wt 81 2 kg (179 lb)   BMI 30 73 kg/m²          Physical Exam          Bilateral lower extremities are neurovascular intact  Toes are pink and mobile  Compartments are soft  Range of motion both his knees are from 5-115 degrees  There is a negative Lachman's, drawer, pivot shift test   There is medial joint tenderness, lateral joint tenderness, and patellofemoral crepitation  There is a painful arc of motion of both his knees  Arc of motion is however improved    Large joint arthrocentesis: bilateral knee  Universal Protocol:  Consent: Verbal consent obtained  Risks and benefits: risks, benefits and alternatives were discussed  Consent given by: patient  Time out: Immediately prior to procedure a "time out" was called to verify the correct patient, procedure, equipment, support staff and site/side marked as required  Patient understanding: patient states understanding of the procedure being performed  Test results: test results available and properly labeled  Site marked: the operative site was marked  Radiology Images displayed and confirmed   If images not available, report reviewed: imaging studies available  Patient identity confirmed: verbally with patient    Supporting Documentation  Indications: pain   Procedure Details  Location: knee - bilateral knee  Needle size: 22 G  Ultrasound guidance: no  Approach: lateral    Medications (Right): 20 mg Sodium Hyaluronate 20 MG/2MLMedications (Left): 20 mg Sodium Hyaluronate 20 MG/2ML   Patient tolerance: patient tolerated the procedure well with no immediate complications  Dressing:  Sterile dressing applied

## 2021-05-19 ENCOUNTER — OFFICE VISIT (OUTPATIENT)
Dept: FAMILY MEDICINE CLINIC | Facility: CLINIC | Age: 86
End: 2021-05-19
Payer: MEDICARE

## 2021-05-19 VITALS
BODY MASS INDEX: 29.94 KG/M2 | TEMPERATURE: 97.9 F | WEIGHT: 175.4 LBS | HEIGHT: 64 IN | SYSTOLIC BLOOD PRESSURE: 138 MMHG | OXYGEN SATURATION: 96 % | DIASTOLIC BLOOD PRESSURE: 78 MMHG | HEART RATE: 94 BPM

## 2021-05-19 DIAGNOSIS — D68.52 PROTHROMBIN GENE MUTATION (HCC): ICD-10-CM

## 2021-05-19 DIAGNOSIS — J43.9 PULMONARY EMPHYSEMA, UNSPECIFIED EMPHYSEMA TYPE (HCC): ICD-10-CM

## 2021-05-19 DIAGNOSIS — I10 ESSENTIAL HYPERTENSION, BENIGN: Primary | ICD-10-CM

## 2021-05-19 DIAGNOSIS — J45.30 MILD PERSISTENT ASTHMA, UNCOMPLICATED: ICD-10-CM

## 2021-05-19 PROCEDURE — 99213 OFFICE O/P EST LOW 20 MIN: CPT | Performed by: FAMILY MEDICINE

## 2021-05-19 RX ORDER — BUDESONIDE AND FORMOTEROL FUMARATE DIHYDRATE 160; 4.5 UG/1; UG/1
2 AEROSOL RESPIRATORY (INHALATION) 2 TIMES DAILY
Qty: 30.6 G | Refills: 3 | Status: SHIPPED | OUTPATIENT
Start: 2021-05-19 | End: 2021-09-09 | Stop reason: SDUPTHER

## 2021-05-19 NOTE — ASSESSMENT & PLAN NOTE
Patient has asthma - COPD overlap  I renewed his prescription for Symbicort inhaler  He will continue 2 puffs twice a day  He should rinse his mouth out after each use    I encouraged the patient to continue with his exercise program

## 2021-05-19 NOTE — PROGRESS NOTES
Assessment/Plan:    Essential hypertension, benign   Patient has hypertension  His blood pressure is reasonably well controlled  He is going to continue losartan  50 mg daily and  Hydrochlorothiazide 12 5 mg daily  I asked him to follow a low-salt diet  I reviewed the patient's most recent labs  Pulmonary emphysema ([address] )   Patient has asthma - COPD overlap  I renewed his prescription for Symbicort inhaler  He will continue 2 puffs twice a day  He should rinse his mouth out after each use  I encouraged the patient to continue with his exercise program     Prothrombin gene mutation [facility])    Patient will continue warfarin therapy  We discussed dental extractions  He is going to need to stop his warfarin for 3-5 days prior to any dental extractions  He should have the dentist contact me  Diagnoses and all orders for this visit:    Essential hypertension, benign    Mild persistent asthma, uncomplicated  -     Symbicort 160-4 5 MCG/ACT inhaler; Inhale 2 puffs 2 (two) times a day    Pulmonary emphysema, unspecified emphysema type ([address] )    Prothrombin gene mutation ([facility] )          Subjective:      Patient ID: Garo Cardoza is a 80 y o  male  This is a 19-year-old white male who presents to the office today for his routine checkup  The patient is doing well and has no complaints  He is still exercising regularly  His current regimen consists of 15 minutes on a treadmill, 1-2 minutes on a cardio glide, and 5 minutes on a recumbent bike  He reports compliance with his medication  He is not experiencing any wheezing or shortness of breath  Overall, he has felt well        The following portions of the patient's history were reviewed and updated as appropriate: allergies, current medications, past family history, past medical history, past social history, past surgical history and problem list     Review of Systems   Constitutional: Negative for activity change, appetite change and unexpected weight change  Respiratory: Negative for cough, shortness of breath and wheezing  Cardiovascular: Negative for chest pain, palpitations and leg swelling  Gastrointestinal: Negative for abdominal distention, abdominal pain, blood in stool, constipation, diarrhea and nausea  Objective:      /78 (BP Location: Left arm, Patient Position: Sitting, Cuff Size: Adult)   Pulse 94   Temp 97 9 °F (36 6 °C) (Temporal)   Ht 5' 4" (1 626 m)   Wt 79 6 kg (175 lb 6 4 oz)   SpO2 96%   BMI 30 11 kg/m²          Physical Exam  Vitals signs reviewed  Constitutional:       Comments: This 59-year-old white male appears his stated age  He is well nourished, cooperative, and in no distress  HENT:      Head: Normocephalic and atraumatic  Right Ear: Tympanic membrane, ear canal and external ear normal  There is no impacted cerumen  Left Ear: Tympanic membrane, ear canal and external ear normal  There is no impacted cerumen  Mouth/Throat:      Mouth: Mucous membranes are moist       Pharynx: Oropharynx is clear  No oropharyngeal exudate or posterior oropharyngeal erythema  Eyes:      General: No scleral icterus  Right eye: No discharge  Left eye: No discharge  Conjunctiva/sclera: Conjunctivae normal       Pupils: Pupils are equal, round, and reactive to light  Neck:      Musculoskeletal: Neck supple  Vascular: No carotid bruit  Comments: There is no thyromegaly  Cardiovascular:      Rate and Rhythm: Normal rate and regular rhythm  Heart sounds: Normal heart sounds  No murmur  No friction rub  No gallop  Pulmonary:      Effort: Pulmonary effort is normal  No respiratory distress  Breath sounds: Normal breath sounds  No stridor  No wheezing, rhonchi or rales  Abdominal:      General: Bowel sounds are normal  There is no distension  Palpations: Abdomen is soft  There is no mass  Tenderness: There is no abdominal tenderness   There is no guarding  Comments: There is no organomegaly   Musculoskeletal:      Comments:   Patient has a scoliosis which is present and unchanged   Lymphadenopathy:      Cervical: No cervical adenopathy  Psychiatric:         Mood and Affect: Mood normal          Behavior: Behavior normal          Thought Content:  Thought content normal          Judgment: Judgment normal        Extremities:  Without cyanosis, clubbing, or edema

## 2021-05-19 NOTE — ASSESSMENT & PLAN NOTE
Patient will continue warfarin therapy  We discussed dental extractions  He is going to need to stop his warfarin for 3-5 days prior to any dental extractions  He should have the dentist contact me

## 2021-05-19 NOTE — PATIENT INSTRUCTIONS
Low-Sodium Diet   AMBULATORY CARE:   A low-sodium diet  limits foods that are high in sodium (salt)  You will need to follow a low-sodium diet if you have high blood pressure, kidney disease, or heart failure  You may also need to follow this diet if you have a condition that is causing your body to retain (hold) extra fluid  You may need to limit the amount of sodium you eat in a day to 1,500 to 2,000 mg  Ask your healthcare provider how much sodium you can have each day  How to use food labels to choose foods that are low in sodium:  Read food labels to find the amount of sodium they contain  The amount of sodium is listed in milligrams (mg)  The % Daily Value (DV) column tells you how much of your daily needs are met by 1 serving of the food for each nutrient listed  Choose foods that have less than 5% of the DV of sodium  These foods are considered low in sodium  Foods that have 20% or more of the DV of sodium are considered high in sodium  Some food labels may also list any of the following terms that tell you about the sodium content in the food:  · Sodium-free:  Less than 5 mg in each serving    · Very low sodium:  35 mg of sodium or less in each serving    · Low sodium:  140 mg of sodium or less in each serving    · Reduced sodium: At least 25% less sodium in each serving than the regular type    · Light in sodium:  50% less sodium in each serving    · Unsalted or no added salt:  No extra salt is added during processing (the food may still contain sodium)     Foods to avoid:  Salty foods are high in sodium  You should avoid the following:  · Processed foods:      ? Mixes for cornbread, biscuits, cake, and pudding     ? Instant foods, such as potatoes, cereals, noodles, and rice     ? Packaged foods, such as bread stuffing, rice and pasta mixes, snack dip mixes, and macaroni and cheese     ? Canned foods, such as canned vegetables, soups, broths, sauces, and vegetable or tomato juice    ?  Snack foods, such as salted chips, popcorn, pretzels, pork rinds, salted crackers, and salted nuts    ? Frozen foods, such as dinners, entrees, vegetables with sauces, and breaded meats    ? Sauerkraut, pickled vegetables, and other foods prepared in brine    · Meats and cheeses:      ? Smoked or cured meat, such as corned beef, hicks, ham, hot dogs, and sausage    ? Canned meats or spreads, such as potted meats, sardines, anchovies, and imitation seafood    ? Deli or lunch meats, such as bologna, ham, turkey, and roast beef    ? Processed cheese, such as American cheese and cheese spreads    · Condiments, sauces, and seasonings:      ? Salt (¼ teaspoon of salt contains 575 mg of sodium)    ? Seasonings made with salt, such as garlic salt, celery salt, onion salt, and seasoned salt    ? Regular soy sauce, barbecue sauce, teriyaki sauce, steak sauce, Worcestershire sauce, and most flavored vinegars    ? Canned gravy and mixes     ? Regular condiments, such as mustard, ketchup, and salad dressings    ? Pickles and olives    ? Meat tenderizers and monosodium glutamate (MSG)    Foods to include:  Read the food label to find the exact amount of sodium in each serving  · Bread and cereal:  Try to choose breads with less than 80 mg of sodium per serving  ? Bread, roll, keerthi, tortilla, or unsalted crackers  ? Ready-to-eat cereals with less than 5% DV of sodium (examples include shredded wheat and puffed rice)    ? Pasta    · Vegetables and fruits:      ? Unsalted fresh, frozen, or canned vegetables    ? Fresh, frozen, or canned fruits    ? Fruit juice    · Dairy:  One serving has about 150 mg of sodium  ? Milk, all types    ? Yogurt    ? Hard cheese, such as cheddar, Swiss, [employer], or mozzarella    · Meat and other protein foods:  Some raw meats may have added sodium  ? Plain meats, fish, and poultry     ? Eggs    · Other foods:      ? Homemade pudding    ? Unsalted nuts, popcorn, or pretzels    ?  Unsalted butter or margarine    Ways to decrease sodium:   · Add spices and herbs to foods instead of salt during cooking  Use salt-free seasonings to add flavor to foods  Examples include onion powder, garlic powder, basil, mayorga powder, paprika, and parsley  Try lemon or lime juice or vinegar to give foods a tart flavor  Use hot peppers, pepper, or cayenne pepper to add a spicy flavor  · Do not keep a salt shaker at your kitchen table  This may help keep you from adding salt to food at the table  A teaspoon of salt has 2,300 mg of sodium  It may take time to get used to enjoying the natural flavor of food instead of adding salt  Talk to your healthcare provider before you use salt substitutes  Some salt substitutes have a high amount of potassium and need to be avoided if you have kidney disease  · Choose low-sodium foods at restaurants  Meals from restaurants are often high in sodium  Some restaurants have nutrition information on the menu that tells you the amount of sodium in their foods  If possible, ask for your food to be prepared with less, or no salt  · Shop for unsalted or low-sodium foods and snacks at the grocery store  Examples include unsalted or low-sodium broths, soups, and canned vegetables  Choose fresh or frozen vegetables instead  Choose unsalted nuts or seeds or fresh fruits or vegetables as snacks  Read food labels and choose salt-free, very low-sodium, or low-sodium foods  © Copyright [address] Information is for End User's use only and may not be sold, redistributed or otherwise used for commercial purposes  All illustrations and images included in CareNotes® are the copyrighted property of [employer]  or [facility] Donovan Saldaña   The above information is an  only  It is not intended as medical advice for individual conditions or treatments  Talk to your doctor, nurse or pharmacist before following any medical regimen to see if it is safe and effective for you

## 2021-05-19 NOTE — ASSESSMENT & PLAN NOTE
Patient has hypertension  His blood pressure is reasonably well controlled  He is going to continue losartan  50 mg daily and  Hydrochlorothiazide 12 5 mg daily  I asked him to follow a low-salt diet  I reviewed the patient's most recent labs

## 2021-05-25 ENCOUNTER — OFFICE VISIT (OUTPATIENT)
Dept: OBGYN CLINIC | Facility: CLINIC | Age: 86
End: 2021-05-25
Payer: MEDICARE

## 2021-05-25 ENCOUNTER — APPOINTMENT (OUTPATIENT)
Dept: LAB | Facility: CLINIC | Age: 86
End: 2021-05-25
Payer: MEDICARE

## 2021-05-25 VITALS
BODY MASS INDEX: 29.88 KG/M2 | WEIGHT: 175 LBS | HEIGHT: 64 IN | SYSTOLIC BLOOD PRESSURE: 181 MMHG | DIASTOLIC BLOOD PRESSURE: 88 MMHG | HEART RATE: 132 BPM

## 2021-05-25 DIAGNOSIS — I27.82 CHRONIC PULMONARY EMBOLISM WITHOUT ACUTE COR PULMONALE, UNSPECIFIED PULMONARY EMBOLISM TYPE (HCC): ICD-10-CM

## 2021-05-25 DIAGNOSIS — M17.0 PRIMARY OSTEOARTHRITIS OF BOTH KNEES: Primary | ICD-10-CM

## 2021-05-25 LAB
INR PPP: 2.67 (ref 0.84–1.19)
PROTHROMBIN TIME: 28.3 SECONDS (ref 11.6–14.5)

## 2021-05-25 PROCEDURE — 85610 PROTHROMBIN TIME: CPT

## 2021-05-25 PROCEDURE — 20610 DRAIN/INJ JOINT/BURSA W/O US: CPT | Performed by: PHYSICIAN ASSISTANT

## 2021-05-25 PROCEDURE — 36415 COLL VENOUS BLD VENIPUNCTURE: CPT

## 2021-05-25 RX ORDER — HYALURONATE SODIUM 10 MG/ML
20 SYRINGE (ML) INTRAARTICULAR
Status: COMPLETED | OUTPATIENT
Start: 2021-05-25 | End: 2021-05-25

## 2021-05-25 RX ADMIN — Medication 20 MG: at 10:35

## 2021-05-25 NOTE — PROGRESS NOTES
ASSESSMENT/PLAN:    Diagnoses and all orders for this visit:    Primary osteoarthritis of both knees    Other orders  -     Large joint arthrocentesis          Both of the patient's knees were injected with his 5th and final set of Hyalgan  He tolerated the injections quite well  He will follow up with our office in 6 weeks  The patient is acceptable to this plan  Return in about 6 weeks (around 7/6/2021)  _____________________________________________________  CHIEF COMPLAINT:  Chief Complaint   Patient presents with    Left Knee - Follow-up    Right Knee - Follow-up         SUBJECTIVE:  Mai Richards is a 80 y o  male who presents   To our office for viscosupplementation of both knees  He is here today for his 5th and final set of Hyalgan  He tolerated last week's injections without issue  He denies any numbness or tingling  He denies any fever or chills       The following portions of the patient's history were reviewed and updated as appropriate: allergies, current medications, past family history, past medical history, past social history, past surgical history and problem list     PAST MEDICAL HISTORY:  Past Medical History:   Diagnosis Date    Arthritis     Coagulation defect ([address] )     last assessed: 11/28/2018    COPD (chronic obstructive pulmonary disease) ([address] )     last assessed: 5/14/2019, 12/6/2017    Generalized osteoarthritis     last assessed: 1/28/2019    GERD without esophagitis     last assessed: 1/28/2019    Glaucoma     last assessed: 8/4/2011    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[facility] )     last assessed: 10/16/2018    Factor II Prothrombin Gene per [place]    History of kidney stones 1985    Hx of blood clots     Hypertension     last assessed: 5/14/2019    Impaired fasting glucose     last assessed: 8/26/2013    Mild persistent asthma, uncomplicated     last assessed: 11/28/2018    Nephrolithiasis     Nondisplaced intertrochanteric fracture of right femur, initial encounter for closed fracture ([facility] )     last assessed: 2019    Premature ventricular contractions     last assessed: 2011    Pulmonary nodule     Scoliosis (and kyphoscoliosis), idiopathic     Wears dentures        PAST SURGICAL HISTORY:  Past Surgical History:   Procedure Laterality Date    CARPAL TUNNEL RELEASE Left     ENDO     CATARACT EXTRACTION Left     COLONOSCOPY  10/07/2008    last assessed: 3/29/2016    HERNIA REPAIR      HIP SURGERY      HIP SURGERY Left 1999    [facility]    MULTIPLE TOOTH EXTRACTIONS Bilateral     OTHER SURGICAL HISTORY Right 10/30/2001    [facility]    TONSILLECTOMY Bilateral     WISDOM TOOTH EXTRACTION Bilateral        FAMILY HISTORY:  Family History   Problem Relation Age of Onset    Hypertension Mother     Stroke Mother     Heart attack Father        SOCIAL HISTORY:  Social History     Tobacco Use    Smoking status: Former Smoker     Years: 10      Types: Pipe     Start date:      Quit date:      Years since quittin 4    Smokeless tobacco: Never Used   Substance Use Topics    Alcohol use: Yes     Comment: social    Drug use: Never       MEDICATIONS:    Current Outpatient Medications:     albuterol (PROVENTIL HFA,VENTOLIN HFA) 90 mcg/act inhaler, Inhale 2 puffs as needed for wheezing, Disp: 3 Inhaler, Rfl: 1    alclomethasone (ACLOVATE) 0 05 % cream, Apply topically 2 (two) times a day, Disp: 60 g, Rfl: 0    Carboxymethylcellulose Sodium (REFRESH LIQUIGEL) 1 % GEL, Apply 1 drop to eye as needed, Disp: , Rfl:     Diclofenac Sodium (VOLTAREN) 1 %, APPLY 2 GRAMS TO AFFECTED AREA 4 TIMES A DAY, Disp: 500 g, Rfl: 5    Difluprednate (Durezol) 0 05 % EMUL, Apply 1 drop to eye 2 (two) times a day, Disp: , Rfl:     hydrochlorothiazide (MICROZIDE) 12 5 mg capsule, Take 1 capsule (12 5 mg total) by mouth daily, Disp: 90 capsule, Rfl: 3    latanoprost (XALATAN) 0 005 % ophthalmic solution, Administer 1 drop to both eyes daily at bedtime, Disp: , Rfl:     latanoprost (XALATAN) 0 005 % ophthalmic solution, Apply 1 drop to eye, Disp: , Rfl:     losartan (COZAAR) 50 mg tablet, Take 1 tablet (50 mg total) by mouth 2 (two) times a day, Disp: 180 tablet, Rfl: 3    montelukast (SINGULAIR) 10 mg tablet, TAKE 1 TABLET BY MOUTH EVERY DAY, Disp: 90 tablet, Rfl: 3    multivitamin (THERAGRAN) TABS, Take 1 tablet by mouth daily, Disp: , Rfl:     omeprazole (PRILOSEC OTC) 20 MG tablet, Take 20 mg by mouth, Disp: , Rfl:     Polyethyl Glycol-Propyl Glycol (SYSTANE OP), Administer 1 drop to both eyes 4 (four) times a day, Disp: , Rfl:     polyethylene glycol (MIRALAX) powder, Take 17 g by mouth as needed , Disp: , Rfl:     sodium chloride (ARIEL 128) 2 % hypertonic ophthalmic solution, Administer 1 drop into the left eye 5 (five) times a day, Disp: , Rfl:     Symbicort 160-4 5 MCG/ACT inhaler, Inhale 2 puffs 2 (two) times a day, Disp: 30 6 g, Rfl: 3    TRAVATAN Z 0 004 % ophthalmic solution, Administer 1 drop to both eyes daily at bedtime , Disp: , Rfl: 0    warfarin (COUMADIN) 4 mg tablet, Take 2 daily or as directed, Disp: 180 tablet, Rfl: 1    triamcinolone (KENALOG) 0 1 % cream, Apply topically 2 (two) times a day as needed for rash (Patient not taking: Reported on 5/19/2021), Disp: 80 g, Rfl: 2    Current Facility-Administered Medications:     bupivacaine (PF) (MARCAINE) 0 25 % injection 10 mL, 10 mL, Infiltration, , Simonne O[place]ogan, DO, 10 mL at 06/25/20 0953    bupivacaine (PF) (MARCAINE) 0 25 % injection 10 mL, 10 mL, Infiltration, , Simonne O[place]ogan, DO, 10 mL at 06/25/20 2510    methylPREDNISolone acetate (DEPO-MEDROL) injection 2 mL, 2 mL, Intra-articular, , Gem O[place]ogan, DO, 2 mL at 06/25/20 6242    methylPREDNISolone acetate (DEPO-MEDROL) injection 2 mL, 2 mL, Intra-articular, , Gem Nixon DO, 2 mL at 06/25/20 2276    ALLERGIES:  Allergies   Allergen Reactions    Alphagan P [Brimonidine Tartrate] Itching       ROS:  Review of Systems     Constitutional: Negative for fatigue, fever or loss of appetite  HENT: Negative  Respiratory: Negative for shortness of breath, dyspnea  Cardiovascular: Negative for chest pain/tightness  Gastrointestinal: Negative for abdominal pain, N/V  Endocrine: Negative for cold/heat intolerance, unexplained weight loss/gain  Genitourinary: Negative for flank pain, dysuria, hematuria  Musculoskeletal: Positive for arthralgia   Skin: Negative for rash  Neurological: Negative for numbness or tingling  Psychiatric/Behavioral: Negative for agitation  _____________________________________________________  PHYSICAL EXAMINATION:    Blood pressure (!) 181/88, pulse (!) 132, height 5' 4" (1 626 m), weight 79 4 kg (175 lb)  Constitutional: Oriented to person, place, and time  Appears well-developed and well-nourished  No distress  HENT:   Head: Normocephalic  Eyes: Conjunctivae are normal  Right eye exhibits no discharge  Left eye exhibits no discharge  No scleral icterus  Cardiovascular: Normal rate  Pulmonary/Chest: Effort normal    Neurological: Alert and oriented to person, place, and time  Skin: Skin is warm and dry  No rash noted  Not diaphoretic  No erythema  No pallor  Psychiatric: Normal mood and affect   Behavior is normal  Judgment and thought content normal       MUSCULOSKELETAL EXAMINATION:   Physical Exam  Ortho Exam    Bilateral lower extremities are neurovascularly intact  Toes are pink and mobile   Compartments are soft  No warmth, erythema or ecchymosis  ROM of knees are from 5-115 degrees  Negative Lachman, drawer or pivot shift  No medial instability  Medial joint line tenderness, slight lateral joint line tenderness  Patellofemoral crepitation    Objective:  BP Readings from Last 1 Encounters:   05/25/21 (!) 181/88      Wt Readings from Last 1 Encounters:   05/25/21 79 4 kg (175 lb)        BMI:   Estimated body mass index is 30 04 kg/m² as calculated from the following:    Height as of this encounter: 5' 4" (1 626 m)  Weight as of this encounter: 79 4 kg (175 lb)  PROCEDURES PERFORMED:  Large joint arthrocentesis: bilateral knee  Universal Protocol:  Consent given by: patient  Time out: Immediately prior to procedure a "time out" was called to verify the correct patient, procedure, equipment, support staff and site/side marked as required    Site marked: the operative site was marked  Supporting Documentation  Indications: pain   Procedure Details  Location: knee - bilateral knee  Preparation: Patient was prepped and draped in the usual sterile fashion  Needle size: 22 G  Approach: lateral    Medications (Right): 20 mg Sodium Hyaluronate 20 MG/2MLMedications (Left): 20 mg Sodium Hyaluronate 20 MG/2ML   Patient tolerance: patient tolerated the procedure well with no immediate complications  Dressing:  Sterile dressing applied            Ervin Martinez PA-C

## 2021-05-26 ENCOUNTER — ANTICOAG VISIT (OUTPATIENT)
Dept: FAMILY MEDICINE CLINIC | Facility: CLINIC | Age: 86
End: 2021-05-26

## 2021-07-06 ENCOUNTER — APPOINTMENT (OUTPATIENT)
Dept: LAB | Facility: CLINIC | Age: 86
End: 2021-07-06
Payer: MEDICARE

## 2021-07-06 ENCOUNTER — OFFICE VISIT (OUTPATIENT)
Dept: OBGYN CLINIC | Facility: CLINIC | Age: 86
End: 2021-07-06
Payer: MEDICARE

## 2021-07-06 VITALS
HEIGHT: 64 IN | DIASTOLIC BLOOD PRESSURE: 100 MMHG | HEART RATE: 94 BPM | BODY MASS INDEX: 29.88 KG/M2 | WEIGHT: 175 LBS | SYSTOLIC BLOOD PRESSURE: 175 MMHG

## 2021-07-06 DIAGNOSIS — I27.82 CHRONIC PULMONARY EMBOLISM WITHOUT ACUTE COR PULMONALE, UNSPECIFIED PULMONARY EMBOLISM TYPE (HCC): ICD-10-CM

## 2021-07-06 DIAGNOSIS — M17.0 PRIMARY OSTEOARTHRITIS OF BOTH KNEES: Primary | ICD-10-CM

## 2021-07-06 LAB
INR PPP: 2.09 (ref 0.84–1.19)
PROTHROMBIN TIME: 23.4 SECONDS (ref 11.6–14.5)

## 2021-07-06 PROCEDURE — 36415 COLL VENOUS BLD VENIPUNCTURE: CPT

## 2021-07-06 PROCEDURE — 99213 OFFICE O/P EST LOW 20 MIN: CPT | Performed by: ORTHOPAEDIC SURGERY

## 2021-07-06 PROCEDURE — 20610 DRAIN/INJ JOINT/BURSA W/O US: CPT | Performed by: ORTHOPAEDIC SURGERY

## 2021-07-06 PROCEDURE — 85610 PROTHROMBIN TIME: CPT

## 2021-07-06 RX ORDER — BUPIVACAINE HYDROCHLORIDE 2.5 MG/ML
4 INJECTION, SOLUTION INFILTRATION; PERINEURAL
Status: COMPLETED | OUTPATIENT
Start: 2021-07-06 | End: 2021-07-06

## 2021-07-06 RX ORDER — METHYLPREDNISOLONE ACETATE 40 MG/ML
2 INJECTION, SUSPENSION INTRA-ARTICULAR; INTRALESIONAL; INTRAMUSCULAR; SOFT TISSUE
Status: COMPLETED | OUTPATIENT
Start: 2021-07-06 | End: 2021-07-06

## 2021-07-06 RX ADMIN — METHYLPREDNISOLONE ACETATE 2 ML: 40 INJECTION, SUSPENSION INTRA-ARTICULAR; INTRALESIONAL; INTRAMUSCULAR; SOFT TISSUE at 10:25

## 2021-07-06 RX ADMIN — BUPIVACAINE HYDROCHLORIDE 4 ML: 2.5 INJECTION, SOLUTION INFILTRATION; PERINEURAL at 10:25

## 2021-07-06 NOTE — PROGRESS NOTES
Assessment/Plan:  Assessment/Plan   Diagnoses and all orders for this visit:    Primary osteoarthritis of both knees            Patient did not respond well to viscosupplementation treatment series  Patient was offered, and accepted,   Depo-Medrol and Marcaine injection to the  Bilateral knees for relief of pain and inflammation  Patient tolerated treatment well  He can continue using his walker as needed  He will be seen for follow-up in 3 months for re-evaluation and consideration for repeat injections  Patient is in agreement with this treatment plan  under aseptic technique, both knees were injected with Depo-Medrol and Marcaine  He tolerated procedures quite well  Return back in 3 months for re-evaluation  This any problems sooner, he will not hesitate to let us know    Subjective:   Patient ID: Gloria Pizarro is a 80 y o  male  HPI     patient presents for follow-up evaluation of primary osteoarthritis of the bilateral knees  He was last seen in regards to this issue on 5/25/2021, which time he completed a course of viscosupplementation injections  On today's presentation he states that he had very minimal relief following those injections  On today's presentation he continues to report medial and anterior knee pain with weight-bearing activity  He reports grinding sensations with knee flexion motions  He is able to bear weight without the assistance of a device for short distances, but if he is walking for any length of time, he currently uses a walker      The following portions of the patient's history were reviewed and updated as appropriate: allergies, current medications, past family history, past medical history, past social history, past surgical history and problem list     Past Medical History:   Diagnosis Date    Arthritis     Coagulation defect ([facility] [place] [facility] )     last assessed: 11/28/2018    COPD (chronic obstructive pulmonary disease) ([facility] [place] [facility] )     last assessed: 5/14/2019, 12/6/2017  Generalized osteoarthritis     last assessed: 2019    GERD without esophagitis     last assessed: 2019    Glaucoma     last assessed: 2011    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[facility] )     last assessed: 10/16/2018    Factor II Prothrombin Gene per Chartmaker    History of kidney stones 1985    Hx of blood clots     Hypertension     last assessed: 2019    Impaired fasting glucose     last assessed: 2013    Mild persistent asthma, uncomplicated     last assessed: 2018    Nephrolithiasis     Nondisplaced intertrochanteric fracture of right femur, initial encounter for closed fracture ([facility] )     last assessed: 2019    Premature ventricular contractions     last assessed: 2011    Pulmonary nodule     Scoliosis (and kyphoscoliosis), idiopathic     Wears dentures      Past Surgical History:   Procedure Laterality Date    CARPAL TUNNEL RELEASE Left     ENDO     CATARACT EXTRACTION Left     COLONOSCOPY  10/07/2008    last assessed: 3/29/2016    HERNIA REPAIR      HIP SURGERY      HIP SURGERY Left 1999    [facility]    MULTIPLE TOOTH EXTRACTIONS Bilateral     OTHER SURGICAL HISTORY Right 10/30/2001    [facility]    TONSILLECTOMY Bilateral     WISDOM TOOTH EXTRACTION Bilateral      Family History   Problem Relation Age of Onset    Hypertension Mother     Stroke Mother     Heart attack Father      Social History     Socioeconomic History    Marital status: /Civil Union     Spouse name: None    Number of children: None    Years of education: None    Highest education level: None   Occupational History    None   Tobacco Use    Smoking status: Former Smoker     Years: 10      Types: Pipe     Start date:      Quit date: 1970     Years since quittin 5    Smokeless tobacco: Never Used   Vaping Use    Vaping Use: Never used   Substance and Sexual Activity    Alcohol use: Yes     Comment: social    Drug use: Never    Sexual activity: None   Other Topics Concern    None   Social History Narrative    None     Social Determinants of Health     Financial Resource Strain:     Difficulty of Paying Living Expenses:    Food Insecurity:     Worried About Running Out of Food in the Last Year:     [address] in the Last Year:    Transportation Needs:     Lack of Transportation (Medical):      Lack of Transportation (Non-Medical):    Physical Activity:     Days of Exercise per Week:     Minutes of Exercise per Session:    Stress:     Feeling of Stress :    Social Connections:     Frequency of Communication with Friends and Family:     Frequency of Social Gatherings with Friends and Family:     Attends Yaz[employer] Services:     Active Member of Clubs or Organizations:     Attends Club or Organization Meetings:     Marital Status:    Intimate Partner Violence:     Fear of Current or Ex-Partner:     Emotionally Abused:     Physically Abused:     Sexually Abused:        Current Outpatient Medications:     albuterol (PROVENTIL HFA,VENTOLIN HFA) 90 mcg/act inhaler, Inhale 2 puffs as needed for wheezing, Disp: 3 Inhaler, Rfl: 1    alclomethasone (ACLOVATE) 0 05 % cream, Apply topically 2 (two) times a day, Disp: 60 g, Rfl: 0    Carboxymethylcellulose Sodium (REFRESH LIQUIGEL) 1 % GEL, Apply 1 drop to eye as needed, Disp: , Rfl:     Diclofenac Sodium (VOLTAREN) 1 %, APPLY 2 GRAMS TO AFFECTED AREA 4 TIMES A DAY, Disp: 500 g, Rfl: 5    Difluprednate (Durezol) 0 05 % EMUL, Apply 1 drop to eye 2 (two) times a day, Disp: , Rfl:     hydrochlorothiazide (MICROZIDE) 12 5 mg capsule, Take 1 capsule (12 5 mg total) by mouth daily, Disp: 90 capsule, Rfl: 3    latanoprost (XALATAN) 0 005 % ophthalmic solution, Administer 1 drop to both eyes daily at bedtime, Disp: , Rfl:     latanoprost (XALATAN) 0 005 % ophthalmic solution, Apply 1 drop to eye, Disp: , Rfl:     losartan (COZAAR) 50 mg tablet, Take 1 tablet (50 mg total) by mouth 2 (two) times a day, Disp: 180 tablet, Rfl: 3    montelukast (SINGULAIR) 10 mg tablet, TAKE 1 TABLET BY MOUTH EVERY DAY, Disp: 90 tablet, Rfl: 3    multivitamin (THERAGRAN) TABS, Take 1 tablet by mouth daily, Disp: , Rfl:     omeprazole (PRILOSEC OTC) 20 MG tablet, Take 20 mg by mouth, Disp: , Rfl:     Polyethyl Glycol-Propyl Glycol (SYSTANE OP), Administer 1 drop to both eyes 4 (four) times a day, Disp: , Rfl:     polyethylene glycol (MIRALAX) powder, Take 17 g by mouth as needed , Disp: , Rfl:     sodium chloride (ARIEL 128) 2 % hypertonic ophthalmic solution, Administer 1 drop into the left eye 5 (five) times a day, Disp: , Rfl:     Symbicort 160-4 5 MCG/ACT inhaler, Inhale 2 puffs 2 (two) times a day, Disp: 30 6 g, Rfl: 3    TRAVATAN Z 0 004 % ophthalmic solution, Administer 1 drop to both eyes daily at bedtime , Disp: , Rfl: 0    triamcinolone (KENALOG) 0 1 % cream, Apply topically 2 (two) times a day as needed for rash, Disp: 80 g, Rfl: 2    warfarin (COUMADIN) 4 mg tablet, Take 2 daily or as directed, Disp: 180 tablet, Rfl: 1    Current Facility-Administered Medications:     bupivacaine (PF) (MARCAINE) 0 25 % injection 10 mL, 10 mL, Infiltration, , Cresenciano Louisa, DO, 10 mL at 06/25/20 0953    bupivacaine (PF) (MARCAINE) 0 25 % injection 10 mL, 10 mL, Infiltration, , Cresenciano Louisa, DO, 10 mL at 06/25/20 0953    methylPREDNISolone acetate (DEPO-MEDROL) injection 2 mL, 2 mL, Intra-articular, , Cresenciano Louisa, DO, 2 mL at 06/25/20 1837    methylPREDNISolone acetate (DEPO-MEDROL) injection 2 mL, 2 mL, Intra-articular, , Cresenciano Louisa, DO, 2 mL at 06/25/20 6011    Allergies   Allergen Reactions    Alphagan P [Brimonidine Tartrate] Itching       Review of Systems   Constitutional: Negative for chills, fever and unexpected weight change  HENT: Negative for hearing loss, nosebleeds and sore throat  Eyes: Negative for pain, redness and visual disturbance     Respiratory: Negative for cough, shortness of breath and wheezing  Cardiovascular: Negative for chest pain, palpitations and leg swelling  Gastrointestinal: Negative for abdominal pain, nausea and vomiting  Endocrine: Negative for polydipsia and polyuria  Genitourinary: Negative for dysuria and hematuria  Musculoskeletal:        As noted in HPI   Skin: Negative for rash and wound  Neurological: Negative for dizziness, numbness and headaches  Psychiatric/Behavioral: Negative for decreased concentration and suicidal ideas  The patient is not nervous/anxious  Objective:  BP (!) 175/100   Pulse 94   Ht 5' 4" (1 626 m)   Wt 79 4 kg (175 lb)   BMI 30 04 kg/m²     Ortho Exam   Bilateral knees -    Patient ambulates with antalgic gait pattern  Uses walker as assistive device   genu varus anatomical deformity  Skin is warm and dry to touch with no signs of erythema, ecchymosis, infection  No soft tissue swelling,  trace effusion noted  ROM  5°- 115°  TTP over  Medial joint line, mildly TTP over lateral joint line  Flexor and extensor mechanisms intact  Knee is stable to varus and valgus stress  - Lachman's  - Anterior Drawer, - Posterior Drawer  - medial Allyson's, - lateral Allyson's  - Pivot Shift  Patella tracks centrally with significant palpable crepitus  Calf compartments are soft and supple  2+ TP and DP pulses with brisk capillary refill to the toes  Sural, saphenous, tibial, superficial and deep peroneal motor and sensory distributions intact  Sensation light touch intact distally    Physical Exam  Constitutional:       Appearance: He is well-developed  HENT:      Right Ear: External ear normal       Left Ear: External ear normal       Nose: Nose normal    Eyes:      Conjunctiva/sclera: Conjunctivae normal       Pupils: Pupils are equal, round, and reactive to light  Pulmonary:      Effort: Pulmonary effort is normal    Musculoskeletal:      Cervical back: Normal range of motion        Comments:  As noted in HPI   Skin:     General: Skin is warm and dry  Neurological:      Mental Status: He is alert and oriented to person, place, and time  Psychiatric:         Behavior: Behavior normal          Thought Content: Thought content normal          Judgment: Judgment normal        Imaging:  No new imaging performed this visit    Large joint arthrocentesis: R knee  [place] Protocol:  Procedure performed by: BRIDGER Araujo ATC)  Consent: Verbal consent obtained  Risks and benefits: risks, benefits and alternatives were discussed  Consent given by: patient  Time out: Immediately prior to procedure a "time out" was called to verify the correct patient, procedure, equipment, support staff and site/side marked as required  Timeout called at: 7/6/2021 10:18 AM   Patient understanding: patient states understanding of the procedure being performed  Site marked: the operative site was marked  Patient identity confirmed: verbally with patient    Supporting Documentation  Indications: pain and joint swelling   Procedure Details  Location: knee - R knee  Preparation: Patient was prepped and draped in the usual sterile fashion  Needle size: 22 G  Ultrasound guidance: no  Approach: anterolateral  Medications administered: 4 mL bupivacaine 0 25 %; 2 mL methylPREDNISolone acetate 40 mg/mL    Patient tolerance: patient tolerated the procedure well with no immediate complications  Dressing:  Sterile dressing applied    Large joint arthrocentesis: L knee  Universal Protocol:  Procedure performed by: BRIDGER Araujo ATC)  Consent: Verbal consent obtained  Risks and benefits: risks, benefits and alternatives were discussed  Consent given by: patient  Time out: Immediately prior to procedure a "time out" was called to verify the correct patient, procedure, equipment, support staff and site/side marked as required    Timeout called at: 7/6/2021 10:18 AM   Patient understanding: patient states understanding of the procedure being performed  Site marked: the operative site was marked  Patient identity confirmed: verbally with patient    Supporting Documentation  Indications: pain and joint swelling   Procedure Details  Location: knee - L knee  Preparation: Patient was prepped and draped in the usual sterile fashion  Needle size: 22 G  Ultrasound guidance: no  Approach: anterolateral  Medications administered: 4 mL bupivacaine 0 25 %; 2 mL methylPREDNISolone acetate 40 mg/mL    Patient tolerance: patient tolerated the procedure well with no immediate complications  Dressing:  Sterile dressing applied        Scribe Attestation    I,:  Diana Osman am acting as a scribe while in the presence of the attending physician :       I,:  Vickie Tee DO personally performed the services described in this documentation    as scribed in my presence :

## 2021-07-07 ENCOUNTER — ANTICOAG VISIT (OUTPATIENT)
Dept: FAMILY MEDICINE CLINIC | Facility: CLINIC | Age: 86
End: 2021-07-07

## 2021-07-07 ENCOUNTER — TELEPHONE (OUTPATIENT)
Dept: FAMILY MEDICINE CLINIC | Facility: CLINIC | Age: 86
End: 2021-07-07

## 2021-07-07 DIAGNOSIS — D68.9 COAGULATION DEFECT, UNSPECIFIED (HCC): ICD-10-CM

## 2021-07-07 DIAGNOSIS — I27.82 CHRONIC PULMONARY EMBOLISM WITHOUT ACUTE COR PULMONALE, UNSPECIFIED PULMONARY EMBOLISM TYPE (HCC): Primary | ICD-10-CM

## 2021-07-22 DIAGNOSIS — J45.909 UNCOMPLICATED ASTHMA, UNSPECIFIED ASTHMA SEVERITY, UNSPECIFIED WHETHER PERSISTENT: ICD-10-CM

## 2021-07-22 DIAGNOSIS — J45.30 MILD PERSISTENT ASTHMA, UNCOMPLICATED: ICD-10-CM

## 2021-07-22 RX ORDER — ALBUTEROL SULFATE 90 UG/1
2 AEROSOL, METERED RESPIRATORY (INHALATION) EVERY 4 HOURS PRN
Qty: 18 G | Refills: 2 | Status: SHIPPED | OUTPATIENT
Start: 2021-07-22 | End: 2022-06-21 | Stop reason: CLARIF

## 2021-07-22 RX ORDER — MONTELUKAST SODIUM 10 MG/1
10 TABLET ORAL DAILY
Qty: 90 TABLET | Refills: 3 | Status: SHIPPED | OUTPATIENT
Start: 2021-07-22

## 2021-08-11 ENCOUNTER — ANTICOAG VISIT (OUTPATIENT)
Dept: FAMILY MEDICINE CLINIC | Facility: CLINIC | Age: 86
End: 2021-08-11

## 2021-08-11 ENCOUNTER — APPOINTMENT (OUTPATIENT)
Dept: LAB | Facility: CLINIC | Age: 86
End: 2021-08-11
Payer: MEDICARE

## 2021-08-15 ENCOUNTER — RA CDI HCC (OUTPATIENT)
Dept: OTHER | Facility: HOSPITAL | Age: 86
End: 2021-08-15

## 2021-08-15 NOTE — PROGRESS NOTES
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

## 2021-08-19 ENCOUNTER — OFFICE VISIT (OUTPATIENT)
Dept: FAMILY MEDICINE CLINIC | Facility: CLINIC | Age: 86
End: 2021-08-19
Payer: MEDICARE

## 2021-08-19 VITALS
HEART RATE: 106 BPM | OXYGEN SATURATION: 97 % | BODY MASS INDEX: 29.72 KG/M2 | HEIGHT: 64 IN | SYSTOLIC BLOOD PRESSURE: 128 MMHG | TEMPERATURE: 97.4 F | WEIGHT: 174.1 LBS | DIASTOLIC BLOOD PRESSURE: 84 MMHG

## 2021-08-19 DIAGNOSIS — Z00.00 ENCOUNTER FOR MEDICARE ANNUAL WELLNESS EXAM: ICD-10-CM

## 2021-08-19 DIAGNOSIS — N18.30 STAGE 3 CHRONIC KIDNEY DISEASE, UNSPECIFIED WHETHER STAGE 3A OR 3B CKD (HCC): ICD-10-CM

## 2021-08-19 DIAGNOSIS — J45.30 MILD PERSISTENT ASTHMA, UNCOMPLICATED: ICD-10-CM

## 2021-08-19 DIAGNOSIS — D68.52 PROTHROMBIN GENE MUTATION (HCC): ICD-10-CM

## 2021-08-19 DIAGNOSIS — I10 ESSENTIAL HYPERTENSION, BENIGN: Primary | ICD-10-CM

## 2021-08-19 PROCEDURE — 1123F ACP DISCUSS/DSCN MKR DOCD: CPT | Performed by: FAMILY MEDICINE

## 2021-08-19 PROCEDURE — G0439 PPPS, SUBSEQ VISIT: HCPCS | Performed by: FAMILY MEDICINE

## 2021-08-19 PROCEDURE — 99213 OFFICE O/P EST LOW 20 MIN: CPT | Performed by: FAMILY MEDICINE

## 2021-08-19 RX ORDER — WARFARIN SODIUM 4 MG/1
TABLET ORAL
Qty: 180 TABLET | Refills: 2 | Status: SHIPPED | OUTPATIENT
Start: 2021-08-19 | End: 2021-11-19 | Stop reason: SDUPTHER

## 2021-08-19 RX ORDER — BACITRACIN ZINC AND POLYMYXIN B SULFATE 500; 10000 [USP'U]/G; [USP'U]/G
1 OINTMENT OPHTHALMIC
COMMUNITY
Start: 2021-07-30 | End: 2022-08-05

## 2021-08-19 NOTE — ASSESSMENT & PLAN NOTE
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

## 2021-08-19 NOTE — PROGRESS NOTES
Assessment and Plan:     Problem List Items Addressed This Visit     None        BMI Counseling: Body mass index is 29 88 kg/m²  Follow-up plan was not completed due to elderly patient (72 years old) where weight reduction/weight gain would complicate underlying health condition such as: illness or physical disability  Preventive health issues were discussed with patient, and age appropriate screening tests were ordered as noted in patient's After Visit Summary  Personalized health advice and appropriate referrals for health education or preventive services given if needed, as noted in patient's After Visit Summary       History of Present Illness:     Patient presents for Medicare Annual Wellness visit    Patient Care Team:  Elinor Andrew DO as PCP - General (Family Medicine)     Problem List:     Patient Active Problem List   Diagnosis    Mild persistent asthma, uncomplicated    Prothrombin gene mutation ([facility] )    Primary generalized (osteo)arthritis    Essential hypertension, benign    Dyslipidemia    Herpes zoster without complication    Petechiae    Intrinsic eczema    Encounter for Medicare annual wellness exam    Other seborrheic dermatitis    Abnormality of gait    Osteoarthritis of both knees    Chronic pulmonary embolism without acute cor pulmonale (HCC)    Age-related cataract of both eyes    Premature beats    Primary osteoarthritis of both knees    Neoplasm of uncertain behavior of skin    Encounter for long-term (current) use of medications    GERD without esophagitis    Dental abscess    Pulmonary emphysema ([facility] )      Past Medical and Surgical History:     Past Medical History:   Diagnosis Date    Arthritis     Coagulation defect ([facility] )     last assessed: 11/28/2018    COPD (chronic obstructive pulmonary disease) ([facility] )     last assessed: 5/14/2019, 12/6/2017    Generalized osteoarthritis     last assessed: 1/28/2019    GERD without esophagitis     last assessed: 2019    Glaucoma     last assessed: 2011    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[facility] )     last assessed: 10/16/2018    Factor II Prothrombin Gene per Chart[employer]    History of kidney stones 1985    Hx of blood clots     Hypertension     last assessed: 2019    Impaired fasting glucose     last assessed: 2013    Mild persistent asthma, uncomplicated     last assessed: 2018    Nephrolithiasis     Nondisplaced intertrochanteric fracture of right femur, initial encounter for closed fracture ([facility] )     last assessed: 2019    Premature ventricular contractions     last assessed: 2011    Pulmonary nodule     Scoliosis (and kyphoscoliosis), idiopathic     Wears dentures      Past Surgical History:   Procedure Laterality Date    CARPAL TUNNEL RELEASE Left     ENDO     CATARACT EXTRACTION Left     COLONOSCOPY  10/07/2008    last assessed: 3/29/2016    HERNIA REPAIR      HIP SURGERY      HIP SURGERY Left 1999    [facility]    MULTIPLE TOOTH EXTRACTIONS Bilateral     OTHER SURGICAL HISTORY Right 10/30/2001    [facility]    TONSILLECTOMY Bilateral     WISDOM TOOTH EXTRACTION Bilateral       Family History:     Family History   Problem Relation Age of Onset    Hypertension Mother     Stroke Mother     Heart attack Father       Social History:     Social History     Socioeconomic History    Marital status: /Civil Union     Spouse name: None    Number of children: None    Years of education: None    Highest education level: None   Occupational History    None   Tobacco Use    Smoking status: Former Smoker     Years: 10      Types: Pipe     Start date:      Quit date: 1970     Years since quittin 6    Smokeless tobacco: Never Used   Vaping Use    Vaping Use: Never used   Substance and Sexual Activity    Alcohol use: Yes     Comment: social    Drug use: Never    Sexual activity: None   Other Topics Concern    None   Social History Narrative    None     Social Determinants of Health     Financial Resource Strain:     Difficulty of Paying Living Expenses:    Food Insecurity:     Worried About Running Out of Food in the Last Year:     [address] in the Last Year:    Transportation Needs:     Lack of Transportation (Medical):      Lack of Transportation (Non-Medical):    Physical Activity:     Days of Exercise per Week:     Minutes of Exercise per Session:    Stress:     Feeling of Stress :    Social Connections:     Frequency of Communication with Friends and Family:     Frequency of Social Gatherings with Friends and Family:     Attends [employer] Services:     Active Member of Clubs or Organizations:     Attends Club or Organization Meetings:     Marital Status:    Intimate Partner Violence:     Fear of Current or Ex-Partner:     Emotionally Abused:     Physically Abused:     Sexually Abused:       Medications and Allergies:     Current Outpatient Medications   Medication Sig Dispense Refill    albuterol (PROVENTIL HFA,VENTOLIN HFA) 90 mcg/act inhaler Inhale 2 puffs every 4 (four) hours as needed for wheezing 18 g 2    alclomethasone (ACLOVATE) 0 05 % cream Apply topically 2 (two) times a day 60 g 0    bacitracin-polymyxin b ophthalmic ointment Administer 1 inch into the left eye daily at bedtime      Carboxymethylcellulose Sodium (REFRESH LIQUIGEL) 1 % GEL Apply 1 drop to eye as needed      Diclofenac Sodium (VOLTAREN) 1 % APPLY 2 GRAMS TO AFFECTED AREA 4 TIMES A  g 5    Difluprednate (Durezol) 0 05 % EMUL Apply 1 drop to eye 2 (two) times a day      hydrochlorothiazide (MICROZIDE) 12 5 mg capsule Take 1 capsule (12 5 mg total) by mouth daily 90 capsule 3    latanoprost (XALATAN) 0 005 % ophthalmic solution Administer 1 drop to both eyes daily at bedtime      latanoprost (XALATAN) 0 005 % ophthalmic solution Apply 1 drop to eye      losartan (COZAAR) 50 mg tablet Take 1 tablet (50 mg total) by mouth 2 (two) times a day 180 tablet 3    montelukast (SINGULAIR) 10 mg tablet Take 1 tablet (10 mg total) by mouth daily 90 tablet 3    multivitamin (THERAGRAN) TABS Take 1 tablet by mouth daily      omeprazole (PRILOSEC OTC) 20 MG tablet Take 20 mg by mouth      Polyethyl Glycol-Propyl Glycol (SYSTANE OP) Administer 1 drop to both eyes 4 (four) times a day      polyethylene glycol (MIRALAX) powder Take 17 g by mouth as needed       sodium chloride (ARIEL 128) 2 % hypertonic ophthalmic solution Administer 1 drop into the left eye 5 (five) times a day      Symbicort 160-4 5 MCG/ACT inhaler Inhale 2 puffs 2 (two) times a day 30 6 g 3    triamcinolone (KENALOG) 0 1 % cream Apply topically 2 (two) times a day as needed for rash 80 g 2    warfarin (COUMADIN) 4 mg tablet Take 2 daily or as directed 180 tablet 1     Current Facility-Administered Medications   Medication Dose Route Frequency Provider Last Rate Last Admin    bupivacaine (PF) (MARCAINE) 0 25 % injection 10 mL  10 mL Infiltration  Anne Gale, DO   10 mL at 06/25/20 0953    bupivacaine (PF) (MARCAINE) 0 25 % injection 10 mL  10 mL Infiltration  Anne Gale, DO   10 mL at 06/25/20 0953    methylPREDNISolone acetate (DEPO-MEDROL) injection 2 mL  2 mL Intra-articular  Anne Gale, DO   2 mL at 06/25/20 8106    methylPREDNISolone acetate (DEPO-MEDROL) injection 2 mL  2 mL Intra-articular  Anne Gale, DO   2 mL at 06/25/20 9472     Allergies   Allergen Reactions    Alphagan P [Brimonidine Tartrate] Itching      Immunizations:     Immunization History   Administered Date(s) Administered    INFLUENZA 12/18/2012      Health Maintenance: There are no preventive care reminders to display for this patient        Topic Date Due    Pneumococcal Vaccine: 65+ Years (1 of 2 - PPSV23) Never done    COVID-19 Vaccine (1) Never done    DTaP,Tdap,and Td Vaccines (1 - Tdap) Never done    Influenza Vaccine (1) 09/01/2021      Medicare Health Risk Assessment:     BP (!) 188/100 (BP Location: Left arm, Patient Position: Sitting, Cuff Size: Adult)   Pulse (!) 106   Temp (!) 97 4 °F (36 3 °C) (Tympanic)   Ht 5' 4" (1 626 m)   Wt 79 kg (174 lb 1 6 oz)   SpO2 97%   BMI 29 88 kg/m²      Swapna Vargas is here for his Subsequent Wellness visit  Last Medicare Wellness visit information reviewed, patient interviewed and updates made to the record today  Health Risk Assessment:   Patient rates overall health as very good  Patient feels that their physical health rating is same  Patient is satisfied with their life  Eyesight was rated as slightly worse  Hearing was rated as same  Patient feels that their emotional and mental health rating is same  Patients states they are never, rarely angry  Patient states they are sometimes unusually tired/fatigued  Pain experienced in the last 7 days has been some  Patient's pain rating has been 2/10  Patient states that he has experienced no weight loss or gain in last 6 months  Reports knee pain and lower back pain    Depression Screening:   PHQ-2 Score: 0      Fall Risk Screening: In the past year, patient has experienced: no history of falling in past year      Home Safety:  Patient has trouble with stairs inside or outside of their home  Patient has working smoke alarms and has no working carbon monoxide detector  Home safety hazards include: none  Nutrition:   Current diet is Regular and No Added Salt  Medications:   Patient is currently taking over-the-counter supplements  OTC medications include: see medication list  Patient is able to manage medications  Activities of Daily Living (ADLs)/Instrumental Activities of Daily Living (IADLs):   Walk and transfer into and out of bed and chair?: Yes  Dress and groom yourself?: Yes    Bathe or shower yourself?: Yes    Feed yourself?  Yes  Do your laundry/housekeeping?: Yes  Manage your money, pay your bills and track your expenses?: Yes  Make your own meals?: Yes Do your own shopping?: Yes    Previous Hospitalizations:   Any hospitalizations or ED visits within the last 12 months?: No      Advance Care Planning:   Living will: Yes    Durable POA for healthcare: Yes    Advanced directive: Yes      Cognitive Screening:   Provider or family/friend/caregiver concerned regarding cognition?: No    PREVENTIVE SCREENINGS      Cardiovascular Screening:    General: Screening Current      Diabetes Screening:     General: Screening Current      Colorectal Cancer Screening:     General: Screening Not Indicated      Prostate Cancer Screening:    General: Screening Not Indicated      Osteoporosis Screening:    General: Screening Not Indicated      Abdominal Aortic Aneurysm (AAA) Screening:    Risk factors include: tobacco use        General: Screening Not Indicated      Lung Cancer Screening:     General: Screening Not Indicated      Hepatitis C Screening:    General: Screening Not Indicated    Screening, Brief Intervention, and Referral to Treatment (SBIRT)    Screening  Typical number of drinks in a day: 0  Typical number of drinks in a week: 3  Interpretation: Low risk drinking behavior  AUDIT-C Screenin) How often did you have a drink containing alcohol in the past year? 2 to 3 times a week  2) How many drinks did you have on a typical day when you were drinking in the past year?  1 to 2  3) How often did you have 6 or more drinks on one occasion in the past year? never    AUDIT-C Score: 3  Interpretation: Score 0-3 (male): Negative screen for alcohol misuse    Single Item Drug Screening:  How often have you used an illegal drug (including marijuana) or a prescription medication for non-medical reasons in the past year? never    Single Item Drug Screen Score: 0  Interpretation: Negative screen for possible drug use disorder      Tray Olivo,

## 2021-08-19 NOTE — PATIENT INSTRUCTIONS

## 2021-08-19 NOTE — ASSESSMENT & PLAN NOTE
Patient has hypertension  I recheck his blood pressure myself and found to be 128/84  I am going to have him continue losartan 50 mg b i d  and losartan 25 mg q day  Patient should continue to follow a low-sodium diet and check his blood pressures regularly at home

## 2021-08-19 NOTE — PROGRESS NOTES
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  Mild persistent asthma, uncomplicated    Patient will continue his inhaled bronchodilators  Diagnoses and all orders for this visit:    Essential hypertension, benign    Prothrombin gene mutation (HCC)  -     warfarin (COUMADIN) 4 mg tablet; Take 2 daily or as directed    Stage 3 chronic kidney disease, unspecified whether stage 3a or 3b CKD (HCC)    Mild persistent asthma, uncomplicated    Encounter for Medicare annual wellness exam    Other orders  -     bacitracin-polymyxin b ophthalmic ointment; Administer 1 inch into the left eye daily at bedtime          Subjective:      Patient ID: Ryanne Lynn is a 80 y o  male  This is a 25-year-old white female who presents to the office today for his routine checkup and annual Medicare wellness exam   The patient is doing well and has no complaints  He is still exercising regularly  He tells me rather than using a treadmill, he is now using some type of rolling or gliding machine  He tells me he checks his blood pressures daily at home  He tells me yesterday, his blood pressure was 114/70 and in general, his blood pressures have been very well controlled  He reports compliance with his medication  He requests a refill for his warfarin        The following portions of the patient's history were reviewed and updated as appropriate: allergies, current medications, past family history, past medical history, past social history, past surgical history and problem list     Review of Systems   Respiratory: Negative for cough, shortness of breath and wheezing  Cardiovascular: Negative for chest pain, palpitations and leg swelling  Gastrointestinal: Negative for abdominal distention, abdominal pain, blood in stool, constipation, diarrhea and nausea  Musculoskeletal: Positive for arthralgias and gait problem  Objective:      /84   Pulse (!) 106   Temp (!) 97 4 °F (36 3 °C) (Tympanic)   Ht 5' 4" (1 626 m)   Wt 79 kg (174 lb 1 6 oz)   SpO2 97%   BMI 29 88 kg/m²          Physical Exam  Vitals reviewed  Constitutional:       Comments: This is a 51-year-old white male who appears his stated age  He is pleasant, cooperative, and in no distress  HENT:      Head: Normocephalic and atraumatic  Right Ear: Tympanic membrane, ear canal and external ear normal  There is no impacted cerumen  Left Ear: Tympanic membrane, ear canal and external ear normal  There is no impacted cerumen  Mouth/Throat:      Mouth: Mucous membranes are moist       Pharynx: Oropharynx is clear  No oropharyngeal exudate or posterior oropharyngeal erythema  Eyes:      General: No scleral icterus  Right eye: No discharge  Left eye: No discharge  Conjunctiva/sclera: Conjunctivae normal       Pupils: Pupils are equal, round, and reactive to light  Cardiovascular:      Rate and Rhythm: Normal rate and regular rhythm  Heart sounds: Normal heart sounds  No murmur heard  No friction rub  No gallop  Pulmonary:      Effort: Pulmonary effort is normal  No respiratory distress  Breath sounds: Normal breath sounds  No stridor  No wheezing, rhonchi or rales  Abdominal:      General: Bowel sounds are normal  There is no distension  Palpations: Abdomen is soft  There is no mass  Tenderness: There is no abdominal tenderness   There is no guarding  Comments:   Because of his gait dysfunction, I examined him seated in the exam room chair  There was no organomegaly   Musculoskeletal:      Cervical back: Neck supple  Comments:  his scoliosis is present and unchanged  There is swelling of both knees present   Lymphadenopathy:      Cervical: No cervical adenopathy  Psychiatric:         Mood and Affect: Mood normal          Behavior: Behavior normal          Thought Content:  Thought content normal          Judgment: Judgment normal         Extremities: Without cyanosis, clubbing, or edema

## 2021-09-08 ENCOUNTER — APPOINTMENT (OUTPATIENT)
Dept: LAB | Facility: CLINIC | Age: 86
End: 2021-09-08
Payer: MEDICARE

## 2021-09-09 DIAGNOSIS — J45.30 MILD PERSISTENT ASTHMA, UNCOMPLICATED: ICD-10-CM

## 2021-09-09 RX ORDER — BUDESONIDE AND FORMOTEROL FUMARATE DIHYDRATE 160; 4.5 UG/1; UG/1
2 AEROSOL RESPIRATORY (INHALATION) 2 TIMES DAILY
Qty: 30.6 G | Refills: 3 | Status: SHIPPED | OUTPATIENT
Start: 2021-09-09 | End: 2021-09-10 | Stop reason: SDUPTHER

## 2021-09-10 DIAGNOSIS — J45.30 MILD PERSISTENT ASTHMA, UNCOMPLICATED: ICD-10-CM

## 2021-09-10 RX ORDER — BUDESONIDE AND FORMOTEROL FUMARATE DIHYDRATE 160; 4.5 UG/1; UG/1
2 AEROSOL RESPIRATORY (INHALATION) 2 TIMES DAILY
Qty: 30.6 G | Refills: 3 | Status: SHIPPED | OUTPATIENT
Start: 2021-09-10 | End: 2021-09-16 | Stop reason: SDUPTHER

## 2021-09-10 RX ORDER — BUDESONIDE AND FORMOTEROL FUMARATE DIHYDRATE 160; 4.5 UG/1; UG/1
2 AEROSOL RESPIRATORY (INHALATION) 2 TIMES DAILY
Qty: 30.6 G | Refills: 3 | Status: SHIPPED | OUTPATIENT
Start: 2021-09-10 | End: 2021-09-10 | Stop reason: SDUPTHER

## 2021-09-13 ENCOUNTER — ANTICOAG VISIT (OUTPATIENT)
Dept: FAMILY MEDICINE CLINIC | Facility: CLINIC | Age: 86
End: 2021-09-13

## 2021-09-16 DIAGNOSIS — J45.30 MILD PERSISTENT ASTHMA, UNCOMPLICATED: ICD-10-CM

## 2021-09-16 RX ORDER — BUDESONIDE AND FORMOTEROL FUMARATE DIHYDRATE 160; 4.5 UG/1; UG/1
2 AEROSOL RESPIRATORY (INHALATION) 2 TIMES DAILY
Qty: 30.6 G | Refills: 3 | Status: SHIPPED | OUTPATIENT
Start: 2021-09-16 | End: 2021-11-19 | Stop reason: SDUPTHER

## 2021-09-16 RX ORDER — BUDESONIDE AND FORMOTEROL FUMARATE DIHYDRATE 160; 4.5 UG/1; UG/1
2 AEROSOL RESPIRATORY (INHALATION) 2 TIMES DAILY
Qty: 30.6 G | Refills: 3 | Status: SHIPPED | OUTPATIENT
Start: 2021-09-16 | End: 2021-09-16 | Stop reason: SDUPTHER

## 2021-10-12 ENCOUNTER — OFFICE VISIT (OUTPATIENT)
Dept: OBGYN CLINIC | Facility: CLINIC | Age: 86
End: 2021-10-12
Payer: MEDICARE

## 2021-10-12 ENCOUNTER — APPOINTMENT (OUTPATIENT)
Dept: LAB | Facility: CLINIC | Age: 86
End: 2021-10-12
Payer: MEDICARE

## 2021-10-12 VITALS
HEART RATE: 130 BPM | DIASTOLIC BLOOD PRESSURE: 82 MMHG | HEIGHT: 64 IN | WEIGHT: 174 LBS | SYSTOLIC BLOOD PRESSURE: 183 MMHG | BODY MASS INDEX: 29.71 KG/M2

## 2021-10-12 DIAGNOSIS — M17.0 PRIMARY OSTEOARTHRITIS OF BOTH KNEES: Primary | ICD-10-CM

## 2021-10-12 PROCEDURE — 99213 OFFICE O/P EST LOW 20 MIN: CPT | Performed by: PHYSICIAN ASSISTANT

## 2021-10-12 PROCEDURE — 20610 DRAIN/INJ JOINT/BURSA W/O US: CPT | Performed by: PHYSICIAN ASSISTANT

## 2021-10-12 RX ORDER — BUPIVACAINE HYDROCHLORIDE 2.5 MG/ML
4 INJECTION, SOLUTION INFILTRATION; PERINEURAL
Status: COMPLETED | OUTPATIENT
Start: 2021-10-12 | End: 2021-10-12

## 2021-10-12 RX ORDER — METHYLPREDNISOLONE ACETATE 40 MG/ML
2 INJECTION, SUSPENSION INTRA-ARTICULAR; INTRALESIONAL; INTRAMUSCULAR; SOFT TISSUE
Status: COMPLETED | OUTPATIENT
Start: 2021-10-12 | End: 2021-10-12

## 2021-10-12 RX ADMIN — BUPIVACAINE HYDROCHLORIDE 4 ML: 2.5 INJECTION, SOLUTION INFILTRATION; PERINEURAL at 09:59

## 2021-10-12 RX ADMIN — METHYLPREDNISOLONE ACETATE 2 ML: 40 INJECTION, SUSPENSION INTRA-ARTICULAR; INTRALESIONAL; INTRAMUSCULAR; SOFT TISSUE at 09:59

## 2021-10-14 ENCOUNTER — ANTICOAG VISIT (OUTPATIENT)
Dept: FAMILY MEDICINE CLINIC | Facility: CLINIC | Age: 86
End: 2021-10-14

## 2021-11-11 ENCOUNTER — APPOINTMENT (OUTPATIENT)
Dept: LAB | Facility: CLINIC | Age: 86
End: 2021-11-11
Payer: MEDICARE

## 2021-11-12 ENCOUNTER — ANTICOAG VISIT (OUTPATIENT)
Dept: FAMILY MEDICINE CLINIC | Facility: CLINIC | Age: 86
End: 2021-11-12

## 2021-11-19 ENCOUNTER — OFFICE VISIT (OUTPATIENT)
Dept: FAMILY MEDICINE CLINIC | Facility: CLINIC | Age: 86
End: 2021-11-19
Payer: MEDICARE

## 2021-11-19 DIAGNOSIS — Z28.20 VACCINE REFUSED BY PATIENT: ICD-10-CM

## 2021-11-19 DIAGNOSIS — E78.5 DYSLIPIDEMIA: ICD-10-CM

## 2021-11-19 DIAGNOSIS — Z79.899 ENCOUNTER FOR LONG-TERM (CURRENT) USE OF MEDICATIONS: ICD-10-CM

## 2021-11-19 DIAGNOSIS — J45.30 MILD PERSISTENT ASTHMA, UNCOMPLICATED: ICD-10-CM

## 2021-11-19 DIAGNOSIS — I10 ESSENTIAL HYPERTENSION, BENIGN: Primary | ICD-10-CM

## 2021-11-19 DIAGNOSIS — D68.52 PROTHROMBIN GENE MUTATION (HCC): ICD-10-CM

## 2021-11-19 PROCEDURE — 99214 OFFICE O/P EST MOD 30 MIN: CPT | Performed by: FAMILY MEDICINE

## 2021-11-19 RX ORDER — WARFARIN SODIUM 4 MG/1
TABLET ORAL
Qty: 180 TABLET | Refills: 2 | Status: SHIPPED | OUTPATIENT
Start: 2021-11-19 | End: 2022-05-20 | Stop reason: SDUPTHER

## 2021-11-19 RX ORDER — BUDESONIDE AND FORMOTEROL FUMARATE DIHYDRATE 160; 4.5 UG/1; UG/1
2 AEROSOL RESPIRATORY (INHALATION) 2 TIMES DAILY
Qty: 30.6 G | Refills: 3 | Status: SHIPPED | OUTPATIENT
Start: 2021-11-19 | End: 2021-11-19 | Stop reason: SDUPTHER

## 2021-11-19 RX ORDER — BUDESONIDE AND FORMOTEROL FUMARATE DIHYDRATE 160; 4.5 UG/1; UG/1
2 AEROSOL RESPIRATORY (INHALATION) 2 TIMES DAILY
Qty: 30.6 G | Refills: 3 | Status: SHIPPED | OUTPATIENT
Start: 2021-11-19 | End: 2022-05-20 | Stop reason: SDUPTHER

## 2021-11-19 RX ORDER — HYDROCHLOROTHIAZIDE 12.5 MG/1
12.5 CAPSULE, GELATIN COATED ORAL DAILY
Qty: 90 CAPSULE | Refills: 3 | Status: SHIPPED | OUTPATIENT
Start: 2021-11-19 | End: 2022-08-05

## 2021-11-22 VITALS
TEMPERATURE: 96.5 F | DIASTOLIC BLOOD PRESSURE: 70 MMHG | SYSTOLIC BLOOD PRESSURE: 150 MMHG | BODY MASS INDEX: 29.45 KG/M2 | HEART RATE: 108 BPM | OXYGEN SATURATION: 95 % | HEIGHT: 64 IN | WEIGHT: 172.5 LBS

## 2021-11-22 PROBLEM — Z28.20 VACCINE REFUSED BY PATIENT: Status: ACTIVE | Noted: 2021-11-22

## 2021-11-30 ENCOUNTER — OFFICE VISIT (OUTPATIENT)
Dept: OBGYN CLINIC | Facility: CLINIC | Age: 86
End: 2021-11-30
Payer: MEDICARE

## 2021-11-30 VITALS
BODY MASS INDEX: 29.61 KG/M2 | SYSTOLIC BLOOD PRESSURE: 170 MMHG | HEART RATE: 128 BPM | HEIGHT: 64 IN | DIASTOLIC BLOOD PRESSURE: 96 MMHG

## 2021-11-30 DIAGNOSIS — M17.0 PRIMARY OSTEOARTHRITIS OF BOTH KNEES: Primary | ICD-10-CM

## 2021-11-30 PROCEDURE — 20610 DRAIN/INJ JOINT/BURSA W/O US: CPT | Performed by: PHYSICIAN ASSISTANT

## 2021-11-30 RX ORDER — HYALURONATE SODIUM 10 MG/ML
20 SYRINGE (ML) INTRAARTICULAR
Status: COMPLETED | OUTPATIENT
Start: 2021-11-30 | End: 2021-11-30

## 2021-11-30 RX ADMIN — Medication 20 MG: at 09:45

## 2021-12-06 ENCOUNTER — APPOINTMENT (OUTPATIENT)
Dept: LAB | Facility: CLINIC | Age: 86
End: 2021-12-06
Payer: MEDICARE

## 2021-12-06 DIAGNOSIS — E78.5 DYSLIPIDEMIA: ICD-10-CM

## 2021-12-06 DIAGNOSIS — I10 ESSENTIAL HYPERTENSION, BENIGN: ICD-10-CM

## 2021-12-06 DIAGNOSIS — Z79.899 ENCOUNTER FOR LONG-TERM (CURRENT) USE OF MEDICATIONS: ICD-10-CM

## 2021-12-06 DIAGNOSIS — E83.52 HYPERCALCEMIA: Primary | ICD-10-CM

## 2021-12-06 DIAGNOSIS — E83.52 HYPERCALCEMIA: ICD-10-CM

## 2021-12-06 LAB
ALBUMIN SERPL BCP-MCNC: 3.4 G/DL (ref 3.5–5)
ALP SERPL-CCNC: 96 U/L (ref 46–116)
ALT SERPL W P-5'-P-CCNC: 32 U/L (ref 12–78)
ANION GAP SERPL CALCULATED.3IONS-SCNC: 7 MMOL/L (ref 4–13)
AST SERPL W P-5'-P-CCNC: 25 U/L (ref 5–45)
BASOPHILS # BLD AUTO: 0.05 THOUSANDS/ΜL (ref 0–0.1)
BASOPHILS NFR BLD AUTO: 1 % (ref 0–1)
BILIRUB SERPL-MCNC: 0.79 MG/DL (ref 0.2–1)
BUN SERPL-MCNC: 17 MG/DL (ref 5–25)
CALCIUM ALBUM COR SERPL-MCNC: 11.1 MG/DL (ref 8.3–10.1)
CALCIUM SERPL-MCNC: 10.6 MG/DL (ref 8.3–10.1)
CHLORIDE SERPL-SCNC: 101 MMOL/L (ref 100–108)
CHOLEST SERPL-MCNC: 208 MG/DL
CO2 SERPL-SCNC: 27 MMOL/L (ref 21–32)
CREAT SERPL-MCNC: 0.9 MG/DL (ref 0.6–1.3)
EOSINOPHIL # BLD AUTO: 0.13 THOUSAND/ΜL (ref 0–0.61)
EOSINOPHIL NFR BLD AUTO: 2 % (ref 0–6)
ERYTHROCYTE [DISTWIDTH] IN BLOOD BY AUTOMATED COUNT: 13.3 % (ref 11.6–15.1)
GFR SERPL CREATININE-BSD FRML MDRD: 74 ML/MIN/1.73SQ M
GLUCOSE P FAST SERPL-MCNC: 97 MG/DL (ref 65–99)
HCT VFR BLD AUTO: 39.4 % (ref 36.5–49.3)
HDLC SERPL-MCNC: 94 MG/DL
HGB BLD-MCNC: 13.3 G/DL (ref 12–17)
IMM GRANULOCYTES # BLD AUTO: 0.02 THOUSAND/UL (ref 0–0.2)
IMM GRANULOCYTES NFR BLD AUTO: 0 % (ref 0–2)
LDLC SERPL CALC-MCNC: 98 MG/DL (ref 0–100)
LYMPHOCYTES # BLD AUTO: 2.69 THOUSANDS/ΜL (ref 0.6–4.47)
LYMPHOCYTES NFR BLD AUTO: 42 % (ref 14–44)
MCH RBC QN AUTO: 30.6 PG (ref 26.8–34.3)
MCHC RBC AUTO-ENTMCNC: 33.8 G/DL (ref 31.4–37.4)
MCV RBC AUTO: 91 FL (ref 82–98)
MONOCYTES # BLD AUTO: 0.66 THOUSAND/ΜL (ref 0.17–1.22)
MONOCYTES NFR BLD AUTO: 10 % (ref 4–12)
NEUTROPHILS # BLD AUTO: 2.93 THOUSANDS/ΜL (ref 1.85–7.62)
NEUTS SEG NFR BLD AUTO: 45 % (ref 43–75)
NONHDLC SERPL-MCNC: 114 MG/DL
NRBC BLD AUTO-RTO: 0 /100 WBCS
PLATELET # BLD AUTO: 252 THOUSANDS/UL (ref 149–390)
PMV BLD AUTO: 9.4 FL (ref 8.9–12.7)
POTASSIUM SERPL-SCNC: 3.9 MMOL/L (ref 3.5–5.3)
PROT SERPL-MCNC: 7 G/DL (ref 6.4–8.2)
PTH-INTACT SERPL-MCNC: 83.2 PG/ML (ref 18.4–80.1)
RBC # BLD AUTO: 4.35 MILLION/UL (ref 3.88–5.62)
SODIUM SERPL-SCNC: 135 MMOL/L (ref 136–145)
TRIGL SERPL-MCNC: 79 MG/DL
WBC # BLD AUTO: 6.48 THOUSAND/UL (ref 4.31–10.16)

## 2021-12-06 PROCEDURE — 85025 COMPLETE CBC W/AUTO DIFF WBC: CPT

## 2021-12-06 PROCEDURE — 80061 LIPID PANEL: CPT

## 2021-12-06 PROCEDURE — 83970 ASSAY OF PARATHORMONE: CPT

## 2021-12-06 PROCEDURE — 80053 COMPREHEN METABOLIC PANEL: CPT

## 2021-12-07 ENCOUNTER — PROCEDURE VISIT (OUTPATIENT)
Dept: OBGYN CLINIC | Facility: CLINIC | Age: 86
End: 2021-12-07
Payer: MEDICARE

## 2021-12-07 ENCOUNTER — ANTICOAG VISIT (OUTPATIENT)
Dept: FAMILY MEDICINE CLINIC | Facility: CLINIC | Age: 86
End: 2021-12-07

## 2021-12-07 VITALS
HEART RATE: 120 BPM | DIASTOLIC BLOOD PRESSURE: 99 MMHG | BODY MASS INDEX: 29.61 KG/M2 | SYSTOLIC BLOOD PRESSURE: 197 MMHG | HEIGHT: 64 IN

## 2021-12-07 DIAGNOSIS — M17.0 PRIMARY OSTEOARTHRITIS OF BOTH KNEES: Primary | ICD-10-CM

## 2021-12-07 PROCEDURE — 20610 DRAIN/INJ JOINT/BURSA W/O US: CPT | Performed by: PHYSICIAN ASSISTANT

## 2021-12-07 RX ORDER — HYALURONATE SODIUM 10 MG/ML
20 SYRINGE (ML) INTRAARTICULAR
Status: COMPLETED | OUTPATIENT
Start: 2021-12-07 | End: 2021-12-07

## 2021-12-07 RX ADMIN — Medication 20 MG: at 10:04

## 2021-12-09 DIAGNOSIS — E83.52 HYPERCALCEMIA: Primary | ICD-10-CM

## 2021-12-14 ENCOUNTER — PROCEDURE VISIT (OUTPATIENT)
Dept: OBGYN CLINIC | Facility: CLINIC | Age: 86
End: 2021-12-14
Payer: MEDICARE

## 2021-12-14 VITALS — HEIGHT: 64 IN | WEIGHT: 172 LBS | BODY MASS INDEX: 29.37 KG/M2

## 2021-12-14 DIAGNOSIS — M17.0 PRIMARY OSTEOARTHRITIS OF BOTH KNEES: Primary | ICD-10-CM

## 2021-12-14 PROCEDURE — 20610 DRAIN/INJ JOINT/BURSA W/O US: CPT | Performed by: ORTHOPAEDIC SURGERY

## 2021-12-14 RX ORDER — HYALURONATE SODIUM 10 MG/ML
20 SYRINGE (ML) INTRAARTICULAR
Status: COMPLETED | OUTPATIENT
Start: 2021-12-14 | End: 2021-12-14

## 2021-12-14 RX ADMIN — Medication 20 MG: at 10:58

## 2021-12-20 ENCOUNTER — APPOINTMENT (OUTPATIENT)
Dept: LAB | Facility: CLINIC | Age: 86
End: 2021-12-20
Payer: MEDICARE

## 2021-12-20 DIAGNOSIS — D68.9 COAGULATION DEFECT, UNSPECIFIED (HCC): ICD-10-CM

## 2021-12-20 DIAGNOSIS — I27.82 CHRONIC PULMONARY EMBOLISM WITHOUT ACUTE COR PULMONALE, UNSPECIFIED PULMONARY EMBOLISM TYPE (HCC): ICD-10-CM

## 2021-12-20 LAB
INR PPP: 2.48 (ref 0.84–1.19)
PROTHROMBIN TIME: 25.6 SECONDS (ref 11.6–14.5)

## 2021-12-20 PROCEDURE — 85610 PROTHROMBIN TIME: CPT

## 2021-12-20 PROCEDURE — 36415 COLL VENOUS BLD VENIPUNCTURE: CPT

## 2021-12-21 ENCOUNTER — PROCEDURE VISIT (OUTPATIENT)
Dept: OBGYN CLINIC | Facility: CLINIC | Age: 86
End: 2021-12-21
Payer: MEDICARE

## 2021-12-21 VITALS
BODY MASS INDEX: 29.37 KG/M2 | WEIGHT: 172 LBS | HEIGHT: 64 IN | DIASTOLIC BLOOD PRESSURE: 91 MMHG | SYSTOLIC BLOOD PRESSURE: 181 MMHG

## 2021-12-21 DIAGNOSIS — M17.0 PRIMARY OSTEOARTHRITIS OF BOTH KNEES: Primary | ICD-10-CM

## 2021-12-21 PROCEDURE — 20610 DRAIN/INJ JOINT/BURSA W/O US: CPT | Performed by: PHYSICIAN ASSISTANT

## 2021-12-21 RX ORDER — HYALURONATE SODIUM 10 MG/ML
20 SYRINGE (ML) INTRAARTICULAR
Status: COMPLETED | OUTPATIENT
Start: 2021-12-21 | End: 2021-12-21

## 2021-12-21 RX ADMIN — Medication 20 MG: at 10:22

## 2021-12-22 ENCOUNTER — ANTICOAG VISIT (OUTPATIENT)
Dept: FAMILY MEDICINE CLINIC | Facility: CLINIC | Age: 86
End: 2021-12-22

## 2022-01-04 ENCOUNTER — PROCEDURE VISIT (OUTPATIENT)
Dept: OBGYN CLINIC | Facility: CLINIC | Age: 87
End: 2022-01-04
Payer: MEDICARE

## 2022-01-04 VITALS
DIASTOLIC BLOOD PRESSURE: 94 MMHG | HEART RATE: 96 BPM | SYSTOLIC BLOOD PRESSURE: 182 MMHG | BODY MASS INDEX: 29.37 KG/M2 | HEIGHT: 64 IN | WEIGHT: 172 LBS

## 2022-01-04 DIAGNOSIS — M17.0 PRIMARY OSTEOARTHRITIS OF BOTH KNEES: Primary | ICD-10-CM

## 2022-01-04 PROCEDURE — 20610 DRAIN/INJ JOINT/BURSA W/O US: CPT | Performed by: PHYSICIAN ASSISTANT

## 2022-01-04 RX ORDER — HYALURONATE SODIUM 10 MG/ML
20 SYRINGE (ML) INTRAARTICULAR
Status: COMPLETED | OUTPATIENT
Start: 2022-01-04 | End: 2022-01-04

## 2022-01-04 RX ADMIN — Medication 20 MG: at 10:19

## 2022-01-04 NOTE — PROGRESS NOTES
ASSESSMENT/PLAN:    Diagnoses and all orders for this visit:    Primary osteoarthritis of both knees    Other orders  -     Large joint arthrocentesis        Both of the patient's knees were injected with his 5th and final set of Hyalgan  He tolerated the injections quite well  He will follow up with our office in 6 weeks  The patient is acceptable to this plan  Return in about 6 weeks (around 2/15/2022)  Under aseptic technique, both knees were injected with his final set of Hyalgan  He tolerated procedures quite well  Return back in 6 weeks for re-evaluation  If his condition changes, he will not hesitate to let us know    _____________________________________________________  CHIEF COMPLAINT:  Chief Complaint   Patient presents with    Left Knee - Follow-up    Right Knee - Follow-up         SUBJECTIVE:  Caesar Connolly is a 80 y o  male who presents to our office for viscosupplementation of both knees  He is here today for his 5th and final set of Hyalgan  He tolerated last week's injections without issue  He denies any numbness or tingling  He denies any fever or chills        The following portions of the patient's history were reviewed and updated as appropriate: allergies, current medications, past family history, past medical history, past social history, past surgical history and problem list     PAST MEDICAL HISTORY:  Past Medical History:   Diagnosis Date    Arthritis     Coagulation defect ([address] )     last assessed: 11/28/2018    COPD (chronic obstructive pulmonary disease) [address] [address] )     last assessed: 5/14/2019, 12/6/2017    Generalized osteoarthritis     last assessed: 1/28/2019    GERD without esophagitis     last assessed: 1/28/2019    Glaucoma     last assessed: 8/4/2011    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[address] )     last assessed: 10/16/2018    Factor II Prothrombin Gene per [facility]    History of kidney stones 1985    Hx of blood clots     Hypertension last assessed: 2019    Impaired fasting glucose     last assessed: 2013    Mild persistent asthma, uncomplicated     last assessed: 2018    Nephrolithiasis     Nondisplaced intertrochanteric fracture of right femur, initial encounter for closed fracture (HCC)     last assessed: 2019    Premature ventricular contractions     last assessed: 2011    Pulmonary nodule     Scoliosis (and kyphoscoliosis), idiopathic     Wears dentures        PAST SURGICAL HISTORY:  Past Surgical History:   Procedure Laterality Date    CARPAL TUNNEL RELEASE Left     ENDO     CATARACT EXTRACTION Left     COLONOSCOPY  10/07/2008    last assessed: 3/29/2016    HERNIA REPAIR      HIP SURGERY      HIP SURGERY Left 1999    [facility]    MULTIPLE TOOTH EXTRACTIONS Bilateral     OTHER SURGICAL HISTORY Right 10/30/2001    [facility]    TONSILLECTOMY Bilateral     WISDOM TOOTH EXTRACTION Bilateral        FAMILY HISTORY:  Family History   Problem Relation Age of Onset    Hypertension Mother     Stroke Mother     Heart attack Father        SOCIAL HISTORY:  Social History     Tobacco Use    Smoking status: Former Smoker     Years: 10      Types: Pipe     Start date:      Quit date:      Years since quittin 0    Smokeless tobacco: Never Used   Vaping Use    Vaping Use: Never used   Substance Use Topics    Alcohol use: Yes     Comment: social    Drug use: Never       MEDICATIONS:    Current Outpatient Medications:     albuterol (PROVENTIL HFA,VENTOLIN HFA) 90 mcg/act inhaler, Inhale 2 puffs every 4 (four) hours as needed for wheezing, Disp: 18 g, Rfl: 2    alclomethasone (ACLOVATE) 0 05 % cream, Apply topically 2 (two) times a day, Disp: 60 g, Rfl: 0    bacitracin-polymyxin b ophthalmic ointment, Administer 1 inch into the left eye daily at bedtime, Disp: , Rfl:     Carboxymethylcellulose Sodium (REFRESH LIQUIGEL) 1 % GEL, Apply 1 drop to eye as needed, Disp: , Rfl:    Diclofenac Sodium (VOLTAREN) 1 %, APPLY 2 GRAMS TO AFFECTED AREA 4 TIMES A DAY, Disp: 500 g, Rfl: 5    Difluprednate (Durezol) 0 05 % EMUL, Apply 1 drop to eye 2 (two) times a day, Disp: , Rfl:     hydrochlorothiazide (MICROZIDE) 12 5 mg capsule, Take 1 capsule (12 5 mg total) by mouth daily, Disp: 90 capsule, Rfl: 3    latanoprost (XALATAN) 0 005 % ophthalmic solution, Administer 1 drop to both eyes daily at bedtime, Disp: , Rfl:     losartan (COZAAR) 50 mg tablet, Take 1 tablet (50 mg total) by mouth 2 (two) times a day, Disp: 180 tablet, Rfl: 3    montelukast (SINGULAIR) 10 mg tablet, Take 1 tablet (10 mg total) by mouth daily, Disp: 90 tablet, Rfl: 3    multivitamin (THERAGRAN) TABS, Take 1 tablet by mouth daily, Disp: , Rfl:     omeprazole (PRILOSEC OTC) 20 MG tablet, Take 20 mg by mouth, Disp: , Rfl:     Polyethyl Glycol-Propyl Glycol (SYSTANE OP), Administer 1 drop to both eyes 4 (four) times a day, Disp: , Rfl:     polyethylene glycol (MIRALAX) powder, Take 17 g by mouth as needed , Disp: , Rfl:     sodium chloride (ARIEL 128) 2 % hypertonic ophthalmic solution, Administer 1 drop into the left eye 5 (five) times a day, Disp: , Rfl:     Symbicort 160-4 5 MCG/ACT inhaler, Inhale 2 puffs 2 (two) times a day, Disp: 30 6 g, Rfl: 3    triamcinolone (KENALOG) 0 1 % cream, Apply topically 2 (two) times a day as needed for rash, Disp: 80 g, Rfl: 2    warfarin (COUMADIN) 4 mg tablet, Take 2 daily or as directed, Disp: 180 tablet, Rfl: 2    Current Facility-Administered Medications:     bupivacaine (PF) (MARCAINE) 0 25 % injection 10 mL, 10 mL, Infiltration, , Tougaloo Lusty, DO, 10 mL at 06/25/20 0953    bupivacaine (PF) (MARCAINE) 0 25 % injection 10 mL, 10 mL, Infiltration, , T[place]aloo Lusty, DO, 10 mL at 06/25/20 0953    methylPREDNISolone acetate (DEPO-MEDROL) injection 2 mL, 2 mL, Intra-articular, , Christine Allen DO, 2 mL at 06/25/20 0953    methylPREDNISolone acetate (DEPO-MEDROL) injection 2 mL, 2 mL, Intra-articular, , Pamela Cabral DO, 2 mL at 06/25/20 0742    ALLERGIES:  Allergies   Allergen Reactions    Alphagan P [Brimonidine Tartrate] Itching       ROS:  Review of Systems     Constitutional: Negative for fatigue, fever or loss of appetite  HENT: Negative  Respiratory: Negative for shortness of breath, dyspnea  Cardiovascular: Negative for chest pain/tightness  Gastrointestinal: Negative for abdominal pain, N/V  Endocrine: Negative for cold/heat intolerance, unexplained weight loss/gain  Genitourinary: Negative for flank pain, dysuria, hematuria  Musculoskeletal: Positive for arthralgia   Skin: Negative for rash  Neurological: Negative for numbness or tingling  Psychiatric/Behavioral: Negative for agitation  _____________________________________________________  PHYSICAL EXAMINATION:    Blood pressure (!) 182/94, pulse 96, height 5' 4" (1 626 m), weight 78 kg (172 lb)  Constitutional: Oriented to person, place, and time  Appears well-developed and well-nourished  No distress  HENT:   Head: Normocephalic  Eyes: Conjunctivae are normal  Right eye exhibits no discharge  Left eye exhibits no discharge  No scleral icterus  Cardiovascular: Normal rate  Pulmonary/Chest: Effort normal    Neurological: Alert and oriented to person, place, and time  Skin: Skin is warm and dry  No rash noted  Not diaphoretic  No erythema  No pallor  Psychiatric: Normal mood and affect   Behavior is normal  Judgment and thought content normal       MUSCULOSKELETAL EXAMINATION:   Physical Exam  Ortho Exam    Bilateral lower extremities are neurovascularly intact  Toes are pink and mobile   Compartments are soft  No warmth, erythema or ecchymosis  ROM of knees are from 5-115 degrees  Negative Lachman, drawer or pivot shift  No medial instability  Medial joint line tenderness, slight lateral joint line tenderness  Patellofemoral crepitation  Objective:  BP Readings from Last 1 Encounters:   01/04/22 (!) 182/94      Wt Readings from Last 1 Encounters:   01/04/22 78 kg (172 lb)        BMI:   Estimated body mass index is 29 52 kg/m² as calculated from the following:    Height as of this encounter: 5' 4" (1 626 m)  Weight as of this encounter: 78 kg (172 lb)        PROCEDURES PERFORMED:  Large joint arthrocentesis: bilateral knee  Universal Protocol:  Risks and benefits: risks, benefits and alternatives were discussed  Consent given by: patient  Patient understanding: patient states understanding of the procedure being performed  Site marked: the operative site was marked  Supporting Documentation  Indications: pain   Procedure Details  Location: knee - bilateral knee  Preparation: Patient was prepped and draped in the usual sterile fashion  Needle size: 22 G  Ultrasound guidance: no  Approach: lateral    Medications (Right): 20 mg Sodium Hyaluronate 20 MG/2MLMedications (Left): 20 mg Sodium Hyaluronate 20 MG/2ML   Patient tolerance: patient tolerated the procedure well with no immediate complications  Dressing:  Sterile dressing applied            Suyapa Kern PA-C

## 2022-01-11 ENCOUNTER — APPOINTMENT (OUTPATIENT)
Dept: LAB | Facility: CLINIC | Age: 87
End: 2022-01-11
Payer: MEDICARE

## 2022-01-11 DIAGNOSIS — I27.82 CHRONIC PULMONARY EMBOLISM WITHOUT ACUTE COR PULMONALE, UNSPECIFIED PULMONARY EMBOLISM TYPE (HCC): ICD-10-CM

## 2022-01-11 DIAGNOSIS — D68.9 COAGULATION DEFECT, UNSPECIFIED (HCC): ICD-10-CM

## 2022-01-11 LAB
INR PPP: 2.79 (ref 0.84–1.19)
PROTHROMBIN TIME: 28 SECONDS (ref 11.6–14.5)

## 2022-01-11 PROCEDURE — 36415 COLL VENOUS BLD VENIPUNCTURE: CPT

## 2022-01-11 PROCEDURE — 85610 PROTHROMBIN TIME: CPT

## 2022-01-20 ENCOUNTER — ANTICOAG VISIT (OUTPATIENT)
Dept: FAMILY MEDICINE CLINIC | Facility: CLINIC | Age: 87
End: 2022-01-20

## 2022-02-07 ENCOUNTER — APPOINTMENT (OUTPATIENT)
Dept: LAB | Facility: CLINIC | Age: 87
End: 2022-02-07
Payer: MEDICARE

## 2022-02-07 DIAGNOSIS — D68.9 COAGULATION DEFECT, UNSPECIFIED (HCC): ICD-10-CM

## 2022-02-07 DIAGNOSIS — I27.82 CHRONIC PULMONARY EMBOLISM WITHOUT ACUTE COR PULMONALE, UNSPECIFIED PULMONARY EMBOLISM TYPE (HCC): ICD-10-CM

## 2022-02-07 LAB
INR PPP: 2.29 (ref 0.84–1.19)
PROTHROMBIN TIME: 24.1 SECONDS (ref 11.6–14.5)

## 2022-02-07 PROCEDURE — 85610 PROTHROMBIN TIME: CPT

## 2022-02-07 PROCEDURE — 36415 COLL VENOUS BLD VENIPUNCTURE: CPT

## 2022-02-08 ENCOUNTER — TELEPHONE (OUTPATIENT)
Dept: FAMILY MEDICINE CLINIC | Facility: CLINIC | Age: 87
End: 2022-02-08

## 2022-02-08 NOTE — TELEPHONE ENCOUNTER
Staci Seymour and did review INR with him  He verbalized understanding  I was unable to notate that I did review on the lab

## 2022-02-10 ENCOUNTER — OFFICE VISIT (OUTPATIENT)
Dept: FAMILY MEDICINE CLINIC | Facility: CLINIC | Age: 87
End: 2022-02-10
Payer: MEDICARE

## 2022-02-10 VITALS
DIASTOLIC BLOOD PRESSURE: 74 MMHG | HEART RATE: 91 BPM | BODY MASS INDEX: 29.53 KG/M2 | SYSTOLIC BLOOD PRESSURE: 130 MMHG | WEIGHT: 173 LBS | HEIGHT: 64 IN | TEMPERATURE: 96.6 F | OXYGEN SATURATION: 98 %

## 2022-02-10 DIAGNOSIS — N18.30 STAGE 3 CHRONIC KIDNEY DISEASE, UNSPECIFIED WHETHER STAGE 3A OR 3B CKD (HCC): ICD-10-CM

## 2022-02-10 DIAGNOSIS — I10 ESSENTIAL HYPERTENSION, BENIGN: Primary | ICD-10-CM

## 2022-02-10 DIAGNOSIS — E83.52 HYPERCALCEMIA: ICD-10-CM

## 2022-02-10 DIAGNOSIS — I27.82 CHRONIC PULMONARY EMBOLISM WITHOUT ACUTE COR PULMONALE, UNSPECIFIED PULMONARY EMBOLISM TYPE (HCC): ICD-10-CM

## 2022-02-10 DIAGNOSIS — D68.52 PROTHROMBIN GENE MUTATION (HCC): ICD-10-CM

## 2022-02-10 DIAGNOSIS — J43.9 PULMONARY EMPHYSEMA, UNSPECIFIED EMPHYSEMA TYPE (HCC): ICD-10-CM

## 2022-02-10 PROCEDURE — 99214 OFFICE O/P EST MOD 30 MIN: CPT | Performed by: FAMILY MEDICINE

## 2022-02-10 NOTE — ASSESSMENT & PLAN NOTE
Patient has hypertension  His blood pressure is very well controlled  Patient will continue losartan 50 mg b i d  And hydrochlorothiazide 12 5 mg daily  I ordered a BMP to assess his potassium, BUN and creatinine

## 2022-02-10 NOTE — ASSESSMENT & PLAN NOTE
Patient's last serum calcium was elevated  It was even higher when corrected for his albumin  His PTH was mildly elevated  Patient is asymptomatic  I had recommended referral to Endocrinology but he refused  I ordered repeat calcium and PTH level to be done prior to his next office visit

## 2022-02-10 NOTE — ASSESSMENT & PLAN NOTE
Patient has history of unprovoked pulmonary embolism  Workup revealed prothrombin gene  He will continue warfarin indefinitely

## 2022-02-10 NOTE — PROGRESS NOTES
Assessment/Plan:    Essential hypertension, benign  Patient has hypertension  His blood pressure is very well controlled  Patient will continue losartan 50 mg b i d  And hydrochlorothiazide 12 5 mg daily  I ordered a BMP to assess his potassium, BUN and creatinine  Hypercalcemia  Patient's last serum calcium was elevated  It was even higher when corrected for his albumin  His PTH was mildly elevated  Patient is asymptomatic  I had recommended referral to Endocrinology but he refused  I ordered repeat calcium and PTH level to be done prior to his next office visit  Pulmonary emphysema ([facility] )  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  He is currently using his Ventolin inhaler 3 times per week on average  He will continue his Symbicort inhaler  I did recommend vaccination for COVID-19 virus infection but he declined    Chronic pulmonary embolism without acute cor pulmonale ([facility] [address] )  Patient has history of unprovoked pulmonary embolism  Workup revealed prothrombin gene  He will continue warfarin indefinitely  Diagnoses and all orders for this visit:    Essential hypertension, benign  -     Basic metabolic panel; Future    Hypercalcemia  -     Basic metabolic panel; Future  -     PTH, intact; Future    Chronic pulmonary embolism without acute cor pulmonale, unspecified pulmonary embolism type (HCC)    Pulmonary emphysema, unspecified emphysema type (HCC)    Prothrombin gene mutation (HCC)    Stage 3 chronic kidney disease, unspecified whether stage 3a or 3b CKD ([facility] )          Subjective:      Patient ID: Ava Ortiz is a 80 y o  male  This is a 70-year-old white male who presents to the office today for his routine checkup  The patient is doing well and has no complaints  The patient is taking his medication as prescribed  He is still exercising regularly at home  He was brought to the office today by his daughter    He tells me that he still drives a little bit locally  He received a letter from [facility] dot as to his ability to continue to drive  The following portions of the patient's history were reviewed and updated as appropriate: allergies, current medications, past family history, past medical history, past social history, past surgical history and problem list     Review of Systems   Constitutional: Negative for appetite change and unexpected weight change  Respiratory: Positive for shortness of breath and wheezing  Cardiovascular: Negative for chest pain, palpitations and leg swelling  Gastrointestinal: Positive for constipation  Negative for abdominal distention, abdominal pain and blood in stool  Objective:      /74   Pulse 91   Temp (!) 96 6 °F (35 9 °C)   Ht 5' 4" (1 626 m)   Wt 78 5 kg (173 lb)   SpO2 98%   BMI 29 70 kg/m²          Physical Exam  Vitals reviewed  Constitutional:       Comments: This is a 80-year-old white male who appears his stated age  He is pleasant, cooperative, and in no distress   HENT:      Head: Normocephalic and atraumatic  Right Ear: Tympanic membrane, ear canal and external ear normal  There is no impacted cerumen  Left Ear: Tympanic membrane, ear canal and external ear normal  There is no impacted cerumen  Mouth/Throat:      Mouth: Mucous membranes are moist       Pharynx: Oropharynx is clear  No oropharyngeal exudate or posterior oropharyngeal erythema  Eyes:      General: No scleral icterus  Right eye: No discharge  Left eye: No discharge  Conjunctiva/sclera: Conjunctivae normal       Pupils: Pupils are equal, round, and reactive to light  Neck:      Vascular: No carotid bruit  Comments: No thyromegaly  Cardiovascular:      Rate and Rhythm: Normal rate and regular rhythm  Heart sounds: Normal heart sounds  No murmur heard  No friction rub  No gallop  Pulmonary:      Effort: Pulmonary effort is normal  No respiratory distress        Breath sounds: Normal breath sounds  No stridor  No wheezing, rhonchi or rales  Abdominal:      General: Bowel sounds are normal  There is no distension  Palpations: Abdomen is soft  Tenderness: There is no abdominal tenderness  There is no guarding  Comments: There is no organomegaly   Musculoskeletal:      Cervical back: Neck supple  Comments: Scoliosis is present unchanged   Lymphadenopathy:      Cervical: No cervical adenopathy  Psychiatric:         Mood and Affect: Mood normal          Behavior: Behavior normal          Thought Content:  Thought content normal          Judgment: Judgment normal        extremities:  Without cyanosis, clubbing, or edema

## 2022-02-10 NOTE — ASSESSMENT & PLAN NOTE
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

## 2022-03-14 ENCOUNTER — APPOINTMENT (OUTPATIENT)
Dept: LAB | Facility: CLINIC | Age: 87
End: 2022-03-14
Payer: MEDICARE

## 2022-03-14 DIAGNOSIS — I27.82 CHRONIC PULMONARY EMBOLISM WITHOUT ACUTE COR PULMONALE, UNSPECIFIED PULMONARY EMBOLISM TYPE (HCC): ICD-10-CM

## 2022-03-14 DIAGNOSIS — D68.9 COAGULATION DEFECT, UNSPECIFIED (HCC): ICD-10-CM

## 2022-03-14 LAB
INR PPP: 2.21 (ref 0.84–1.19)
PROTHROMBIN TIME: 23.4 SECONDS (ref 11.6–14.5)

## 2022-03-14 PROCEDURE — 36415 COLL VENOUS BLD VENIPUNCTURE: CPT

## 2022-03-14 PROCEDURE — 85610 PROTHROMBIN TIME: CPT

## 2022-03-16 ENCOUNTER — ANTICOAG VISIT (OUTPATIENT)
Dept: FAMILY MEDICINE CLINIC | Facility: CLINIC | Age: 87
End: 2022-03-16

## 2022-04-11 ENCOUNTER — APPOINTMENT (OUTPATIENT)
Dept: LAB | Facility: CLINIC | Age: 87
End: 2022-04-11
Payer: MEDICARE

## 2022-04-11 DIAGNOSIS — D68.9 COAGULATION DEFECT, UNSPECIFIED (HCC): ICD-10-CM

## 2022-04-11 DIAGNOSIS — I27.82 CHRONIC PULMONARY EMBOLISM WITHOUT ACUTE COR PULMONALE, UNSPECIFIED PULMONARY EMBOLISM TYPE (HCC): ICD-10-CM

## 2022-04-11 LAB
INR PPP: 2.88 (ref 0.84–1.19)
PROTHROMBIN TIME: 28.7 SECONDS (ref 11.6–14.5)

## 2022-04-11 PROCEDURE — 85610 PROTHROMBIN TIME: CPT

## 2022-04-11 PROCEDURE — 36415 COLL VENOUS BLD VENIPUNCTURE: CPT

## 2022-04-14 ENCOUNTER — ANTICOAG VISIT (OUTPATIENT)
Dept: FAMILY MEDICINE CLINIC | Facility: CLINIC | Age: 87
End: 2022-04-14

## 2022-04-17 DIAGNOSIS — I10 ESSENTIAL HYPERTENSION, BENIGN: ICD-10-CM

## 2022-04-18 RX ORDER — LOSARTAN POTASSIUM 50 MG/1
TABLET ORAL
Qty: 180 TABLET | Refills: 3 | Status: SHIPPED | OUTPATIENT
Start: 2022-04-18 | End: 2022-08-05

## 2022-05-13 ENCOUNTER — APPOINTMENT (OUTPATIENT)
Dept: LAB | Facility: CLINIC | Age: 87
End: 2022-05-13
Payer: MEDICARE

## 2022-05-13 DIAGNOSIS — I27.82 CHRONIC PULMONARY EMBOLISM WITHOUT ACUTE COR PULMONALE, UNSPECIFIED PULMONARY EMBOLISM TYPE (HCC): ICD-10-CM

## 2022-05-13 DIAGNOSIS — E83.52 HYPERCALCEMIA: ICD-10-CM

## 2022-05-13 DIAGNOSIS — D68.9 COAGULATION DEFECT, UNSPECIFIED (HCC): ICD-10-CM

## 2022-05-13 DIAGNOSIS — I10 ESSENTIAL HYPERTENSION, BENIGN: ICD-10-CM

## 2022-05-13 LAB
ANION GAP SERPL CALCULATED.3IONS-SCNC: 9 MMOL/L (ref 4–13)
BUN SERPL-MCNC: 24 MG/DL (ref 5–25)
CALCIUM SERPL-MCNC: 9.5 MG/DL (ref 8.3–10.1)
CHLORIDE SERPL-SCNC: 101 MMOL/L (ref 100–108)
CO2 SERPL-SCNC: 23 MMOL/L (ref 21–32)
CREAT SERPL-MCNC: 1.14 MG/DL (ref 0.6–1.3)
GFR SERPL CREATININE-BSD FRML MDRD: 55 ML/MIN/1.73SQ M
GLUCOSE P FAST SERPL-MCNC: 104 MG/DL (ref 65–99)
INR PPP: 2.2 (ref 0.84–1.19)
POTASSIUM SERPL-SCNC: 4.4 MMOL/L (ref 3.5–5.3)
PROTHROMBIN TIME: 23.4 SECONDS (ref 11.6–14.5)
PTH-INTACT SERPL-MCNC: 105.1 PG/ML (ref 18.4–80.1)
SODIUM SERPL-SCNC: 133 MMOL/L (ref 136–145)

## 2022-05-13 PROCEDURE — 36415 COLL VENOUS BLD VENIPUNCTURE: CPT

## 2022-05-13 PROCEDURE — 85610 PROTHROMBIN TIME: CPT

## 2022-05-13 PROCEDURE — 83970 ASSAY OF PARATHORMONE: CPT

## 2022-05-13 PROCEDURE — 80048 BASIC METABOLIC PNL TOTAL CA: CPT

## 2022-05-17 ENCOUNTER — ANTICOAG VISIT (OUTPATIENT)
Dept: FAMILY MEDICINE CLINIC | Facility: CLINIC | Age: 87
End: 2022-05-17

## 2022-05-20 ENCOUNTER — OFFICE VISIT (OUTPATIENT)
Dept: FAMILY MEDICINE CLINIC | Facility: CLINIC | Age: 87
End: 2022-05-20
Payer: MEDICARE

## 2022-05-20 VITALS
HEIGHT: 64 IN | WEIGHT: 173.8 LBS | DIASTOLIC BLOOD PRESSURE: 98 MMHG | TEMPERATURE: 98.8 F | BODY MASS INDEX: 29.67 KG/M2 | SYSTOLIC BLOOD PRESSURE: 148 MMHG | OXYGEN SATURATION: 98 % | HEART RATE: 99 BPM

## 2022-05-20 DIAGNOSIS — I10 ESSENTIAL HYPERTENSION, BENIGN: Primary | ICD-10-CM

## 2022-05-20 DIAGNOSIS — E83.52 HYPERCALCEMIA: ICD-10-CM

## 2022-05-20 DIAGNOSIS — J45.30 MILD PERSISTENT ASTHMA, UNCOMPLICATED: ICD-10-CM

## 2022-05-20 DIAGNOSIS — D68.52 PROTHROMBIN GENE MUTATION (HCC): ICD-10-CM

## 2022-05-20 PROCEDURE — 99214 OFFICE O/P EST MOD 30 MIN: CPT | Performed by: FAMILY MEDICINE

## 2022-05-20 RX ORDER — BUDESONIDE AND FORMOTEROL FUMARATE DIHYDRATE 160; 4.5 UG/1; UG/1
2 AEROSOL RESPIRATORY (INHALATION) 2 TIMES DAILY
Qty: 30.6 G | Refills: 3 | Status: SHIPPED | OUTPATIENT
Start: 2022-05-20

## 2022-05-20 RX ORDER — WARFARIN SODIUM 4 MG/1
TABLET ORAL
Qty: 180 TABLET | Refills: 2 | Status: SHIPPED | OUTPATIENT
Start: 2022-05-20

## 2022-05-20 NOTE — PROGRESS NOTES
Assessment/Plan:    Mild persistent asthma, uncomplicated  Patient has asthma which is well controlled  The patient will continue his Symbicort inhaler  Prothrombin gene mutation [facility])  Patient has history of unprovoked pulmonary embolism  He had a workup revealed a prothrombin II gene mutation  The patient will continue warfarin indefinitely  I will continue to monitor his pro times every 4 weeks and p r n  Essential hypertension, benign  Patient has hypertension which is elevated today  I rechecked the patient's blood pressure myself and found it also to be high at 146/94  I discussed this with the patient  He argues that his blood pressure is normal at home  He is currently taking losartan, and has taken this medication for quite a while  I reviewed his labs  His potassium was noted to be 4 4  I reviewed his previous office notes  I did not make any change with his blood pressure medication today  I will see him back again in 3 months  If his blood pressure is still high, we are going to need to consider adding another agent to his regiment  I discussed this with the patient and he was agreeable  Hypercalcemia  Patient's repeat calcium level was normal at 9 5  However, his PTH level is elevated at 105 1  I am going to need to continue to follow his calcium and parathyroid hormone level  Diagnoses and all orders for this visit:    Essential hypertension, benign    Mild persistent asthma, uncomplicated  -     budesonide-formoterol (Symbicort) 160-4 5 mcg/act inhaler; Inhale 2 puffs  in the morning and 2 puffs in the evening  Prothrombin gene mutation (HCC)  -     warfarin (COUMADIN) 4 mg tablet; Take 2 daily or as directed    Hypercalcemia          Subjective:      Patient ID: Mingo Gomes is a 80 y o  male  Patient is a 80-year-old white male who presents to the office today for his routine checkup  The patient is doing well  He exercises regularly    He reports his asthma has been well controlled  He tries to follow a low-salt diet  He does check his blood pressures regularly at home and has found them to be controlled  The following portions of the patient's history were reviewed and updated as appropriate: allergies, current medications, past family history, past medical history, past social history, past surgical history and problem list     Review of Systems   Eyes: Positive for visual disturbance  Respiratory: Positive for shortness of breath  Negative for cough and wheezing  Occasional phlegm   Cardiovascular: Negative for chest pain, palpitations and leg swelling  Gastrointestinal: Negative for abdominal distention, abdominal pain, blood in stool, constipation, diarrhea and nausea           Objective:      /98 (BP Location: Left arm, Patient Position: Sitting, Cuff Size: Adult)   Pulse 99   Temp 98 8 °F (37 1 °C) (Tympanic)   Ht 5' 4" (1 626 m)   Wt 78 8 kg (173 lb 12 8 oz)   SpO2 98%   BMI 29 83 kg/m²          Physical Exam

## 2022-06-01 NOTE — ASSESSMENT & PLAN NOTE
Patient's repeat calcium level was normal at 9 5  However, his PTH level is elevated at 105 1  I am going to need to continue to follow his calcium and parathyroid hormone level

## 2022-06-01 NOTE — ASSESSMENT & PLAN NOTE
Patient has hypertension which is elevated today  I rechecked the patient's blood pressure myself and found it also to be high at 146/94  I discussed this with the patient  He argues that his blood pressure is normal at home  He is currently taking losartan, and has taken this medication for quite a while  I reviewed his labs  His potassium was noted to be 4 4  I reviewed his previous office notes  I did not make any change with his blood pressure medication today  I will see him back again in 3 months  If his blood pressure is still high, we are going to need to consider adding another agent to his regiment  I discussed this with the patient and he was agreeable

## 2022-06-01 NOTE — ASSESSMENT & PLAN NOTE
Patient has history of unprovoked pulmonary embolism  He had a workup revealed a prothrombin II gene mutation  The patient will continue warfarin indefinitely  I will continue to monitor his pro times every 4 weeks and p r n

## 2022-06-10 ENCOUNTER — APPOINTMENT (OUTPATIENT)
Dept: LAB | Facility: CLINIC | Age: 87
End: 2022-06-10
Payer: MEDICARE

## 2022-06-10 DIAGNOSIS — I27.82 CHRONIC PULMONARY EMBOLISM WITHOUT ACUTE COR PULMONALE, UNSPECIFIED PULMONARY EMBOLISM TYPE (HCC): ICD-10-CM

## 2022-06-10 DIAGNOSIS — D68.9 COAGULATION DEFECT, UNSPECIFIED (HCC): ICD-10-CM

## 2022-06-10 LAB
INR PPP: 2.38 (ref 0.84–1.19)
PROTHROMBIN TIME: 24.8 SECONDS (ref 11.6–14.5)

## 2022-06-10 PROCEDURE — 36415 COLL VENOUS BLD VENIPUNCTURE: CPT

## 2022-06-10 PROCEDURE — 85610 PROTHROMBIN TIME: CPT

## 2022-06-13 ENCOUNTER — ANTICOAG VISIT (OUTPATIENT)
Dept: FAMILY MEDICINE CLINIC | Facility: CLINIC | Age: 87
End: 2022-06-13

## 2022-06-14 ENCOUNTER — TELEPHONE (OUTPATIENT)
Dept: OBGYN CLINIC | Facility: HOSPITAL | Age: 87
End: 2022-06-14

## 2022-06-21 DIAGNOSIS — J45.30 MILD PERSISTENT ASTHMA, UNCOMPLICATED: Primary | ICD-10-CM

## 2022-06-21 RX ORDER — LEVALBUTEROL TARTRATE 45 UG/1
2 AEROSOL, METERED ORAL EVERY 6 HOURS PRN
Qty: 15 G | Refills: 2 | Status: SHIPPED | OUTPATIENT
Start: 2022-06-21 | End: 2022-08-05

## 2022-06-29 ENCOUNTER — APPOINTMENT (OUTPATIENT)
Dept: LAB | Facility: CLINIC | Age: 87
End: 2022-06-29
Payer: MEDICARE

## 2022-06-29 DIAGNOSIS — D68.9 COAGULATION DEFECT, UNSPECIFIED (HCC): ICD-10-CM

## 2022-06-29 DIAGNOSIS — I27.82 CHRONIC PULMONARY EMBOLISM WITHOUT ACUTE COR PULMONALE, UNSPECIFIED PULMONARY EMBOLISM TYPE (HCC): ICD-10-CM

## 2022-06-29 LAB
INR PPP: 2.43 (ref 0.84–1.19)
PROTHROMBIN TIME: 25.2 SECONDS (ref 11.6–14.5)

## 2022-06-29 PROCEDURE — 85610 PROTHROMBIN TIME: CPT

## 2022-06-29 PROCEDURE — 36415 COLL VENOUS BLD VENIPUNCTURE: CPT

## 2022-06-30 ENCOUNTER — ANTICOAG VISIT (OUTPATIENT)
Dept: FAMILY MEDICINE CLINIC | Facility: CLINIC | Age: 87
End: 2022-06-30

## 2022-07-26 ENCOUNTER — ANTICOAG VISIT (OUTPATIENT)
Dept: FAMILY MEDICINE CLINIC | Facility: CLINIC | Age: 87
End: 2022-07-26

## 2022-07-26 ENCOUNTER — APPOINTMENT (OUTPATIENT)
Dept: LAB | Facility: CLINIC | Age: 87
End: 2022-07-26
Payer: MEDICARE

## 2022-07-26 DIAGNOSIS — I27.82 CHRONIC PULMONARY EMBOLISM WITHOUT ACUTE COR PULMONALE, UNSPECIFIED PULMONARY EMBOLISM TYPE (HCC): Primary | ICD-10-CM

## 2022-07-26 DIAGNOSIS — D68.52 PROTHROMBIN GENE MUTATION (HCC): ICD-10-CM

## 2022-07-26 LAB
INR PPP: 2.2 (ref 0.84–1.19)
PROTHROMBIN TIME: 24.7 SECONDS (ref 11.6–14.5)

## 2022-07-26 PROCEDURE — 36415 COLL VENOUS BLD VENIPUNCTURE: CPT

## 2022-07-26 PROCEDURE — 85610 PROTHROMBIN TIME: CPT

## 2022-07-28 ENCOUNTER — TELEMEDICINE (OUTPATIENT)
Dept: FAMILY MEDICINE CLINIC | Facility: CLINIC | Age: 87
End: 2022-07-28
Payer: MEDICARE

## 2022-07-28 VITALS
WEIGHT: 174 LBS | HEART RATE: 110 BPM | OXYGEN SATURATION: 98 % | BODY MASS INDEX: 29.87 KG/M2 | DIASTOLIC BLOOD PRESSURE: 77 MMHG | SYSTOLIC BLOOD PRESSURE: 135 MMHG | TEMPERATURE: 98 F

## 2022-07-28 DIAGNOSIS — U07.1 COVID-19 VIRUS INFECTION: Primary | ICD-10-CM

## 2022-07-28 DIAGNOSIS — J44.9 OBSTRUCTIVE CHRONIC BRONCHITIS WITHOUT EXACERBATION (HCC): ICD-10-CM

## 2022-07-28 PROBLEM — J44.89 OBSTRUCTIVE CHRONIC BRONCHITIS WITHOUT EXACERBATION: Status: ACTIVE | Noted: 2022-07-28

## 2022-07-28 PROCEDURE — 99441 PR PHYS/QHP TELEPHONE EVALUATION 5-10 MIN: CPT | Performed by: FAMILY MEDICINE

## 2022-07-28 RX ORDER — ALBUTEROL SULFATE 2.5 MG/3ML
2.5 SOLUTION RESPIRATORY (INHALATION) EVERY 4 HOURS PRN
Qty: 100 ML | Refills: 0 | Status: SHIPPED | OUTPATIENT
Start: 2022-07-28

## 2022-07-28 NOTE — ASSESSMENT & PLAN NOTE
Patient has COVID-19 virus infection  Today is day 0  Symptoms are just starting and they are very mild at this point  However, this patient has COPD, is 80years old, and is unvaccinated  He is very high risk for progression of disease  I discussed this with the patient  I recommend he start vitamin-C, vitamin-D, and zinc   I recommend he quarantine at home for at least 5 days although I would prefer he quarantine at home for 10 days  I do not think he would be able to wear mask at all times around other individuals  As such, I think a 10 day quarantine would be prefer will  I asked the patient to check his temperature several times per day as well as his pulse ox  I told him if his pulse ox drops and is between 90 and 92 he needs to call me immediately  If his pulse ox drops below 90, he needs to go to the ER  The patient is a candidate for antiviral therapy  Because of his warfarin, and the interaction between Paxlovid and warfarin, I prescribed molnupiravir  He will take this as prescribed for the entire 5 day course  Patient has been advised to drink plenty of fluids  Call if he has any worsening of symptoms

## 2022-07-28 NOTE — PROGRESS NOTES
COVID-19 Outpatient Progress Note    Assessment/Plan:    Problem List Items Addressed This Visit        Respiratory    Obstructive chronic bronchitis without exacerbation (HCC)    Relevant Medications    albuterol (2 5 mg/3 mL) 0 083 % nebulizer solution       Other    COVID-19 virus infection - Primary     Patient has COVID-19 virus infection  Today is day 0  Symptoms are just starting and they are very mild at this point  However, this patient has COPD, is 80years old, and is unvaccinated  He is very high risk for progression of disease  I discussed this with the patient  I recommend he start vitamin-C, vitamin-D, and zinc   I recommend he quarantine at home for at least 5 days although I would prefer he quarantine at home for 10 days  I do not think he would be able to wear mask at all times around other individuals  As such, I think a 10 day quarantine would be prefer will  I asked the patient to check his temperature several times per day as well as his pulse ox  I told him if his pulse ox drops and is between 90 and 92 he needs to call me immediately  If his pulse ox drops below 90, he needs to go to the ER  The patient is a candidate for antiviral therapy  Because of his warfarin, and the interaction between Paxlovid and warfarin, I prescribed molnupiravir  He will take this as prescribed for the entire 5 day course  Patient has been advised to drink plenty of fluids  Call if he has any worsening of symptoms  Relevant Medications    molnupiravir 200 mg capsule         Disposition:     I recommended continued isolation until at least 24 hours have passed since recovery defined as resolution of fever without the use of fever-reducing medications AND improvement in COVID symptoms AND 10 days have passed since onset of symptoms (or 10 days have passed since date of first positive viral diagnostic test for asymptomatic patients)       Discussed vitamin D, vitamin C, and/or zinc supplementation with patient  Patient meets criteria for Molnupiravir and they have been counseled according to EUA documentation provided by the FDA  After discussion, patient agrees to treatment  Ellamae Moors is an investigational medicine used to treat mild-to-moderate COVID-19 in adults with positive results of direct SARS-CoV-2 viral testing, and who are at high risk for progressing to severe COVID-19 including hospitalization or death, and for whom other COVID-19 treatment options authorized by the FDA are not accessible or clinically appropriate  The FDA has authorized the emergency use of molnupiravir for the treatment of mild-tomoderate COVID-19 in adults under an EUA  Ellamae Moors is not authorized:  - for use in people less than 25years of age   - for prevention of COVID-19   - for people needing hospitalization for COVID-19   - for use for longer than 5 consecutive days    What is the most important information I should know about molnupiravir? Ellamae Moors may cause serious side effects, including:    Ellamae Moors may cause harm to your unborn baby  It is not known if molnupiravir will harm your baby if you take molnupiravir during pregnancy  - Ellamae Moors is not recommended for use in pregnancy  - Ellamae Moors has not been studied in pregnancy  Ellamae Moors was studied in pregnant animals only  When molnupiravir was given to pregnant animals, molnupiravir caused harm to their unborn babies   - You and your healthcare provider may decide that you should take molnupiravir duringpregnancy if there are no other COVID-19 treatment options authorized by the FDA that are accessible or clinically appropriate for you  - If you and your healthcare provider decide that you should take molnupiravir during pregnancy, you and your healthcare provider should discuss the known and potential benefits and the potential risks of taking molnupiravir during pregnancy      For individuals who are able to become pregnant:  - You should use a reliable method of birth control (contraception) consistently and correctly during treatment with molnupiravir and for 4 days after the last dose of molnupiravir  Talk to your healthcare provider about reliable birth control methods  - Before starting treatment with molnupiravir your healthcare provider may do a pregnancy test to see if you are pregnant before starting treatment with molnupiravir  - Tell your healthcare provider right away if you become pregnant or think you may be pregnant during treatment with molnupiravir  Pregnancy Surveillance Program:  - There is a pregnancy surveillance program for individuals who take molnupiravir during pregnancy  The purpose of this program is to collect information about the health of you and your baby  Talk to your healthcare provider about how to take part in this program   - If you take molnupiravir during pregnancy and you agree to participate in the pregnancy surveillance program and allow your healthcare provider to share your information with Juliocesar Blane Vergara, then your healthcare provider will report your use of molnupiravir during pregnancy to [address] [place] [address],[address]  by calling [phone] or pregnancyreporting msd com  For individuals who are sexually active with partners who are able to become pregnant:  - It is not known if molnupiravir can affect sperm  While the risk is regarded as low, animal studies to fully assess the potential for molnupiravir to affect the babies of males treated with molnupiravir have not been completed  A reliable method of birth control (contraception) should be used consistently and correctly during treatment with molnupiravir and for at least 3 months after the last dose  The risk to sperm beyond 3 months is not known  Studies to understand the risk to sperm beyond 3 months are ongoing  Talk to your healthcare provider about reliable birth control methods   Talk to your healthcare provider if you have questions or concerns about how molnupiravir may affect sperm  How do I take molnupiravir? - Take molnupiravir exactly as your healthcare provider tells you to take it  - Take 4 capsules of molnupiravir every 12 hours (for example, at 8 am and at 8 pm)  - Take molnupiravir for 5 days  It is important that you complete the full 5 days of treatment with molnupiravir  Do not stop taking molnupiravir before you complete the full 5 days of treatment, even if you feel better  - Take molnupiravir with or without food  - You should stay in isolation for as long as your healthcare provider tells you to  Talk to your healthcare provider if you are not sure about how to properly isolate while you have COVID-19   - Swallow molnupiravir capsules whole  Do not open, break, or crush the capsules  If you cannot swallow capsules whole, tell your healthcare provider  What to do if you miss a dose:  - If it has been less than 10 hours since the missed dose, take it as soon as you remember  - If it has been more than 10 hours since the missed dose, skip the missed dose and take your dose at the next scheduled time  - Do not double the dose of molnupiravir to make up for a missed dose  What are the important possible side effects of molnupiravir? Possible side effects of molnupiravir are:  - See, Bretta Boxer is the most important information I should know about molnupiravir?   - Diarrhea  - Nausea  - Dizziness    These are not all the possible side effects of molnupiravir  Not many people have taken molnupiravir  Serious and unexpected side effects may happen  This medicine is still being studied, so it is possible that all of the risks are not known at this time  What other treatment choices are there? Like Savannah Millan may allow for the emergency use of other medicines to treat people with COVID-19   Go to https://[employer]/ for more information  It is your choice to be treated or not to be treated with molnupiravir  Should you decide not to take it, it will not change your standard medical care  What if I am breastfeeding? Breastfeeding is not recommended during treatment with molnupiravir and for 4 days after the last dose of molnupiravir  If you are breastfeeding or plan to breastfeed, talk to your healthcare provider about your options and specific situation before taking molnupiravir  How do I report side effects with molnupiravir? Contact your healthcare provider if you have any side effects that bother you or do not go away  Report side effects to FDA MedWatch at www fda gov/medwatch or call 0-399-SPJ-7383 (1-362.192.5363)  How should I store Estela Yao? - Store molnupiravir capsules at room temperature between 68°F to 77°F (20°C to 25°C)  - Keep molnupiravir and all medicines out of the reach of children and pets  Full fact sheet for patients, parents, and caregivers can be found at: [employer]    I have spent 10 minutes directly with the patient  Greater than 50% of this time was spent in counseling/coordination of care regarding: risks and benefits of treatment options, instructions for management, patient and family education and impressions  Encounter provider Justo Brown DO    Provider located at [address] [place] [address] [id number] [phone]    Recent Visits  No visits were found meeting these conditions    Showing recent visits within past 7 days and meeting all other requirements  Today's Visits  Date Type Provider Dept   07/28/22 Telemedicine Justo Brown DO  [facility]   Showing today's visits and meeting all other requirements  Future Appointments  No visits were found meeting these conditions  Showing future appointments within next 150 days and meeting all other requirements     This virtual check-in was done via telephone and he agrees to proceed  Patient agrees to participate in a virtual check in via telephone or video visit instead of presenting to the office to address urgent/immediate medical needs  Patient is aware this is a billable service  After connecting through Telephone, the patient was identified by name and date of birth  Kimberly Rodriguez was informed that this was a telemedicine visit and that the exam was being conducted confidentially over secure lines  My office door was closed  No one else was in the room  Kimberly Rodriguez acknowledged consent and understanding of privacy and security of the telemedicine visit  I informed the patient that I have reviewed his record in Epic and presented the opportunity for him to ask any questions regarding the visit today  The patient agreed to participate  Verification of patient location:  Patient is located in the following state in which I hold an active license: [place]    Subjective: Kimberly Rodriguez is a 80 y o  male who has been screened for COVID-19  Symptom change since last report: resolving  Patient's symptoms include rhinorrhea, sore throat and myalgias  Patient denies fever, chills, fatigue, malaise, congestion, anosmia, loss of taste, cough, shortness of breath, chest tightness, abdominal pain, nausea, vomiting, diarrhea and headaches  - Date of symptom onset: 7/28/2022  - Date of positive COVID-19 test: 7/28/2022  Type of test: Home antigen  Patient with typical symptoms of COVID-19 and they attest that they were positive on home rapid antigen testing  Image of positive result is not able to be uploaded into their chart  COVID-19 vaccination status: Not vaccinated    Cha Salazar has been staying home and has isolated themselves in his home   is cleaning all surfaces that are touched often, like counters, tabletops, and doorknobs using household cleaning sprays or wipes  He is wearing a mask when he leaves his room       No results found for: Laxmi Amezcau, [address] [facility], [address] [facility], [facility], [facility], [facility]  Past Medical History:   Diagnosis Date    Arthritis     Coagulation defect ([facility] )     last assessed: 11/28/2018    COPD [facility]chronic obstructive pulmonary disease) [facility] )     last assessed: 5/14/2019, 12/6/2017    Generalized osteoarthritis     last assessed: 1/28/2019    GERD without esophagitis     last assessed: 1/28/2019    Glaucoma     last assessed: 8/4/2011    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[facility] )     last assessed: 10/16/2018    Factor II Prothrombin Gene per [employer]    History of kidney stones 1985    Hx of blood clots     Hypertension     last assessed: 5/14/2019    Impaired fasting glucose     last assessed: 8/26/2013    Mild persistent asthma, uncomplicated     last assessed: 11/28/2018    Nephrolithiasis     Nondisplaced intertrochanteric fracture of right femur, initial encounter for closed fracture ([facility] )     last assessed: 1/28/2019    Premature ventricular contractions     last assessed: 8/4/2011    Pulmonary nodule     Scoliosis (and kyphoscoliosis), idiopathic     Wears dentures      Past Surgical History:   Procedure Laterality Date    CARPAL TUNNEL RELEASE Left     ENDO 8/801    CATARACT EXTRACTION Left     COLONOSCOPY  10/07/2008    last assessed: 3/29/2016    HERNIA REPAIR      HIP SURGERY      HIP SURGERY Left 03/05/1999    ORIF    MULTIPLE TOOTH EXTRACTIONS Bilateral     OTHER SURGICAL HISTORY Right 10/30/2001    [facility]    TONSILLECTOMY Bilateral     WISDOM TOOTH EXTRACTION Bilateral      Current Outpatient Medications   Medication Sig Dispense Refill    albuterol (2 5 mg/3 mL) 0 083 % nebulizer solution Take 3 mL (2 5 mg total) by nebulization every 4 (four) hours as needed for wheezing or shortness of breath 100 mL 0    alclomethasone (ACLOVATE) 0 05 % cream Apply topically 2 (two) times a day 60 g 0    budesonide-formoterol (Symbicort) 160-4 5 mcg/act inhaler Inhale 2 puffs  in the morning and 2 puffs in the evening   30 6 g 3    Difluprednate (Durezol) 0 05 % EMUL Apply 1 drop to eye 2 (two) times a day      hydrochlorothiazide (MICROZIDE) 12 5 mg capsule Take 1 capsule (12 5 mg total) by mouth daily 90 capsule 3    levalbuterol (XOPENEX HFA) 45 mcg/act inhaler Inhale 2 puffs every 6 (six) hours as needed for wheezing or shortness of breath 15 g 2    losartan (COZAAR) 50 mg tablet TAKE 1 TABLET BY MOUTH TWICE A  tablet 3    molnupiravir 200 mg capsule Take 4 capsules (800 mg total) by mouth every 12 (twelve) hours for 5 days 40 capsule 0    montelukast (SINGULAIR) 10 mg tablet Take 1 tablet (10 mg total) by mouth daily 90 tablet 3    multivitamin (THERAGRAN) TABS Take 1 tablet by mouth daily      omeprazole (PriLOSEC OTC) 20 MG tablet Take 20 mg by mouth      Polyethyl Glycol-Propyl Glycol (SYSTANE OP) Administer 1 drop to both eyes 4 (four) times a day      polyethylene glycol (GLYCOLAX) 17 GM/SCOOP powder Take 17 g by mouth as needed       sodium chloride (ARIEL 128) 2 % hypertonic ophthalmic solution Administer 1 drop into the left eye 5 (five) times a day      triamcinolone (KENALOG) 0 1 % cream Apply topically 2 (two) times a day as needed for rash 80 g 2    warfarin (COUMADIN) 4 mg tablet Take 2 daily or as directed 180 tablet 2    bacitracin-polymyxin b ophthalmic ointment Administer 1 inch into the left eye daily at bedtime (Patient not taking: Reported on 7/28/2022)      Carboxymethylcellulose Sodium 1 % GEL Apply 1 drop to eye as needed (Patient not taking: Reported on 7/28/2022)      Diclofenac Sodium (VOLTAREN) 1 % APPLY 2 GRAMS TO AFFECTED AREA 4 TIMES A DAY (Patient not taking: Reported on 7/28/2022) 500 g 5    latanoprost (XALATAN) 0 005 % ophthalmic solution Administer 1 drop to both eyes daily at bedtime (Patient not taking: Reported on 7/28/2022)       Current Facility-Administered Medications   Medication Dose Route Frequency Provider Last Rate Last Admin    bupivacaine (PF) (MARCAINE) 0 25 % injection 10 mL  10 mL Infiltration  Penelope Hands, DO   10 mL at 06/25/20 0953    bupivacaine (PF) (MARCAINE) 0 25 % injection 10 mL  10 mL Infiltration  Penelope Hands, DO   10 mL at 06/25/20 0953    methylPREDNISolone acetate (DEPO-MEDROL) injection 2 mL  2 mL Intra-articular  Penelope Hands, DO   2 mL at 06/25/20 0388    methylPREDNISolone acetate (DEPO-MEDROL) injection 2 mL  2 mL Intra-articular  Penelope Hands, DO   2 mL at 06/25/20 0857     Allergies   Allergen Reactions    Alphagan P [Brimonidine Tartrate] Itching       Review of Systems   Constitutional: Negative for chills, fatigue and fever  HENT: Positive for rhinorrhea and sore throat  Negative for congestion  Respiratory: Negative for cough, chest tightness and shortness of breath  Gastrointestinal: Negative for abdominal pain, diarrhea, nausea and vomiting  Musculoskeletal: Positive for myalgias  Neurological: Negative for headaches  Objective:    Vitals:    07/28/22 1348   BP: 135/77   Pulse: (!) 110   Temp: 98 °F (36 7 °C)   SpO2: 98%   Weight: 78 9 kg (174 lb)       Physical Exam  Vitals reviewed  VIRTUAL VISIT DISCLAIMER    Jeoffrey Holter verbally agrees to participate in [facility]  Pt is aware that [facility] could be limited without vital signs or the ability to perform a full hands-on physical exam  Enrico A Lenn Post understands he or the provider may request at any time to terminate the video visit and request the patient to seek care or treatment in person

## 2022-07-31 ENCOUNTER — HOSPITAL ENCOUNTER (INPATIENT)
Facility: HOSPITAL | Age: 87
LOS: 5 days | Discharge: HOME HEALTH | DRG: 178 | End: 2022-08-05
Attending: EMERGENCY MEDICINE | Admitting: INTERNAL MEDICINE
Payer: MEDICARE

## 2022-07-31 ENCOUNTER — APPOINTMENT (EMERGENCY)
Dept: RADIOLOGY | Facility: HOSPITAL | Age: 87
DRG: 178 | End: 2022-07-31
Payer: MEDICARE

## 2022-07-31 DIAGNOSIS — I10 ESSENTIAL HYPERTENSION, BENIGN: ICD-10-CM

## 2022-07-31 DIAGNOSIS — M17.0 PRIMARY OSTEOARTHRITIS OF BOTH KNEES: ICD-10-CM

## 2022-07-31 DIAGNOSIS — D68.52 PROTHROMBIN GENE MUTATION (HCC): ICD-10-CM

## 2022-07-31 DIAGNOSIS — E87.1 HYPONATREMIA: Primary | ICD-10-CM

## 2022-07-31 PROBLEM — D68.2: Status: ACTIVE | Noted: 2018-11-01

## 2022-07-31 PROBLEM — H59.012 PSEUDOPHAKIC BULLOUS KERATOPATHY OF LEFT EYE: Status: ACTIVE | Noted: 2021-03-12

## 2022-07-31 PROBLEM — H26.8 MATURE CATARACT: Status: ACTIVE | Noted: 2021-03-12

## 2022-07-31 PROBLEM — H18.513 ENDOTHELIAL CORNEAL DYSTROPHY OF BOTH EYES: Status: ACTIVE | Noted: 2021-03-12

## 2022-07-31 PROBLEM — D68.51 HETEROZYGOUS FACTOR V LEIDEN MUTATION (HCC): Status: ACTIVE | Noted: 2018-11-05

## 2022-07-31 PROBLEM — H40.1131 PRIMARY OPEN-ANGLE GLAUCOMA, BILATERAL, MILD STAGE: Status: ACTIVE | Noted: 2021-03-12

## 2022-07-31 PROBLEM — Z22.7 TB LUNG, LATENT: Status: ACTIVE | Noted: 2018-11-01

## 2022-07-31 PROBLEM — Z86.711 HISTORY OF PULMONARY EMBOLISM: Status: ACTIVE | Noted: 2018-11-01

## 2022-07-31 LAB
ALBUMIN SERPL BCP-MCNC: 3.7 G/DL (ref 3.5–5)
ALP SERPL-CCNC: 70 U/L (ref 34–104)
ALT SERPL W P-5'-P-CCNC: 26 U/L (ref 7–52)
ANION GAP SERPL CALCULATED.3IONS-SCNC: 6 MMOL/L (ref 4–13)
AST SERPL W P-5'-P-CCNC: 50 U/L (ref 13–39)
BASOPHILS # BLD AUTO: 0.01 THOUSANDS/ΜL (ref 0–0.1)
BASOPHILS NFR BLD AUTO: 0 % (ref 0–1)
BILIRUB SERPL-MCNC: 0.4 MG/DL (ref 0.2–1)
BUN SERPL-MCNC: 22 MG/DL (ref 5–25)
CALCIUM SERPL-MCNC: 8.6 MG/DL (ref 8.4–10.2)
CHLORIDE SERPL-SCNC: 91 MMOL/L (ref 96–108)
CK MB SERPL-MCNC: 15.5 NG/ML (ref 0.6–6.3)
CK MB SERPL-MCNC: 3.5 % (ref 0–2.5)
CK SERPL-CCNC: 448 U/L (ref 39–308)
CO2 SERPL-SCNC: 25 MMOL/L (ref 21–32)
CREAT SERPL-MCNC: 1.02 MG/DL (ref 0.6–1.3)
CRP SERPL QL: 2.5 MG/L
D DIMER PPP FEU-MCNC: 0.84 UG/ML FEU
EOSINOPHIL # BLD AUTO: 0.01 THOUSAND/ΜL (ref 0–0.61)
EOSINOPHIL NFR BLD AUTO: 0 % (ref 0–6)
ERYTHROCYTE [DISTWIDTH] IN BLOOD BY AUTOMATED COUNT: 12.6 % (ref 11.6–15.1)
GFR SERPL CREATININE-BSD FRML MDRD: 63 ML/MIN/1.73SQ M
GLUCOSE SERPL-MCNC: 86 MG/DL (ref 65–140)
HCT VFR BLD AUTO: 37.5 % (ref 36.5–49.3)
HGB BLD-MCNC: 12.6 G/DL (ref 12–17)
IMM GRANULOCYTES # BLD AUTO: 0 THOUSAND/UL (ref 0–0.2)
IMM GRANULOCYTES NFR BLD AUTO: 0 % (ref 0–2)
LACTATE SERPL-SCNC: 0.8 MMOL/L (ref 0.5–2)
LYMPHOCYTES # BLD AUTO: 0.9 THOUSANDS/ΜL (ref 0.6–4.47)
LYMPHOCYTES NFR BLD AUTO: 27 % (ref 14–44)
MCH RBC QN AUTO: 30.5 PG (ref 26.8–34.3)
MCHC RBC AUTO-ENTMCNC: 33.6 G/DL (ref 31.4–37.4)
MCV RBC AUTO: 91 FL (ref 82–98)
MONOCYTES # BLD AUTO: 0.57 THOUSAND/ΜL (ref 0.17–1.22)
MONOCYTES NFR BLD AUTO: 17 % (ref 4–12)
NEUTROPHILS # BLD AUTO: 1.9 THOUSANDS/ΜL (ref 1.85–7.62)
NEUTS SEG NFR BLD AUTO: 56 % (ref 43–75)
NRBC BLD AUTO-RTO: 0 /100 WBCS
PLATELET # BLD AUTO: 162 THOUSANDS/UL (ref 149–390)
PMV BLD AUTO: 9.3 FL (ref 8.9–12.7)
POTASSIUM SERPL-SCNC: 4.7 MMOL/L (ref 3.5–5.3)
PROCALCITONIN SERPL-MCNC: 0.11 NG/ML
PROT SERPL-MCNC: 6.1 G/DL (ref 6.4–8.4)
RBC # BLD AUTO: 4.13 MILLION/UL (ref 3.88–5.62)
SARS-COV-2 RNA RESP QL NAA+PROBE: POSITIVE
SODIUM SERPL-SCNC: 122 MMOL/L (ref 135–147)
WBC # BLD AUTO: 3.39 THOUSAND/UL (ref 4.31–10.16)

## 2022-07-31 PROCEDURE — 83605 ASSAY OF LACTIC ACID: CPT | Performed by: EMERGENCY MEDICINE

## 2022-07-31 PROCEDURE — 82550 ASSAY OF CK (CPK): CPT | Performed by: PHYSICIAN ASSISTANT

## 2022-07-31 PROCEDURE — 85379 FIBRIN DEGRADATION QUANT: CPT | Performed by: PHYSICIAN ASSISTANT

## 2022-07-31 PROCEDURE — 87040 BLOOD CULTURE FOR BACTERIA: CPT | Performed by: PHYSICIAN ASSISTANT

## 2022-07-31 PROCEDURE — 36415 COLL VENOUS BLD VENIPUNCTURE: CPT | Performed by: EMERGENCY MEDICINE

## 2022-07-31 PROCEDURE — 84145 PROCALCITONIN (PCT): CPT | Performed by: PHYSICIAN ASSISTANT

## 2022-07-31 PROCEDURE — 85610 PROTHROMBIN TIME: CPT | Performed by: PHYSICIAN ASSISTANT

## 2022-07-31 PROCEDURE — 85025 COMPLETE CBC W/AUTO DIFF WBC: CPT | Performed by: EMERGENCY MEDICINE

## 2022-07-31 PROCEDURE — 82553 CREATINE MB FRACTION: CPT | Performed by: PHYSICIAN ASSISTANT

## 2022-07-31 PROCEDURE — 80053 COMPREHEN METABOLIC PANEL: CPT | Performed by: EMERGENCY MEDICINE

## 2022-07-31 PROCEDURE — 71045 X-RAY EXAM CHEST 1 VIEW: CPT

## 2022-07-31 PROCEDURE — 99285 EMERGENCY DEPT VISIT HI MDM: CPT | Performed by: EMERGENCY MEDICINE

## 2022-07-31 PROCEDURE — 93005 ELECTROCARDIOGRAM TRACING: CPT

## 2022-07-31 PROCEDURE — 99285 EMERGENCY DEPT VISIT HI MDM: CPT

## 2022-07-31 PROCEDURE — 86140 C-REACTIVE PROTEIN: CPT | Performed by: PHYSICIAN ASSISTANT

## 2022-07-31 PROCEDURE — 80048 BASIC METABOLIC PNL TOTAL CA: CPT | Performed by: EMERGENCY MEDICINE

## 2022-07-31 PROCEDURE — 83880 ASSAY OF NATRIURETIC PEPTIDE: CPT | Performed by: PHYSICIAN ASSISTANT

## 2022-07-31 PROCEDURE — 87635 SARS-COV-2 COVID-19 AMP PRB: CPT | Performed by: PHYSICIAN ASSISTANT

## 2022-07-31 RX ORDER — ALBUTEROL SULFATE 2.5 MG/3ML
2.5 SOLUTION RESPIRATORY (INHALATION) EVERY 4 HOURS PRN
Status: DISCONTINUED | OUTPATIENT
Start: 2022-07-31 | End: 2022-08-04

## 2022-07-31 RX ORDER — MONTELUKAST SODIUM 10 MG/1
10 TABLET ORAL DAILY
Status: DISCONTINUED | OUTPATIENT
Start: 2022-08-01 | End: 2022-08-05 | Stop reason: HOSPADM

## 2022-07-31 RX ORDER — ACETAMINOPHEN 325 MG/1
650 TABLET ORAL EVERY 6 HOURS PRN
Status: DISCONTINUED | OUTPATIENT
Start: 2022-07-31 | End: 2022-08-05 | Stop reason: HOSPADM

## 2022-07-31 RX ORDER — HYDROCHLOROTHIAZIDE 12.5 MG/1
12.5 TABLET ORAL DAILY
Status: DISCONTINUED | OUTPATIENT
Start: 2022-08-01 | End: 2022-08-01

## 2022-07-31 RX ORDER — ONDANSETRON 2 MG/ML
4 INJECTION INTRAMUSCULAR; INTRAVENOUS EVERY 6 HOURS PRN
Status: DISCONTINUED | OUTPATIENT
Start: 2022-07-31 | End: 2022-08-05 | Stop reason: HOSPADM

## 2022-07-31 RX ORDER — POLYETHYLENE GLYCOL 3350 17 G/17G
17 POWDER, FOR SOLUTION ORAL DAILY
Status: DISCONTINUED | OUTPATIENT
Start: 2022-08-01 | End: 2022-08-03

## 2022-07-31 RX ORDER — LOSARTAN POTASSIUM 50 MG/1
50 TABLET ORAL 2 TIMES DAILY
Status: DISCONTINUED | OUTPATIENT
Start: 2022-08-01 | End: 2022-08-01

## 2022-07-31 RX ORDER — DIFLUPREDNATE 0.5 MG/ML
1 EMULSION OPHTHALMIC 2 TIMES DAILY
Status: DISCONTINUED | OUTPATIENT
Start: 2022-08-01 | End: 2022-08-03

## 2022-07-31 RX ORDER — PANTOPRAZOLE SODIUM 40 MG/1
40 TABLET, DELAYED RELEASE ORAL
Status: DISCONTINUED | OUTPATIENT
Start: 2022-08-01 | End: 2022-08-05 | Stop reason: HOSPADM

## 2022-07-31 RX ORDER — POLYVINYL ALCOHOL 14 MG/ML
1 SOLUTION/ DROPS OPHTHALMIC
Status: DISCONTINUED | OUTPATIENT
Start: 2022-07-31 | End: 2022-08-01

## 2022-07-31 RX ORDER — WARFARIN SODIUM 4 MG/1
4 TABLET ORAL
Status: DISCONTINUED | OUTPATIENT
Start: 2022-08-01 | End: 2022-08-01

## 2022-07-31 RX ORDER — BUDESONIDE AND FORMOTEROL FUMARATE DIHYDRATE 160; 4.5 UG/1; UG/1
2 AEROSOL RESPIRATORY (INHALATION) 2 TIMES DAILY
Status: DISCONTINUED | OUTPATIENT
Start: 2022-08-01 | End: 2022-08-05 | Stop reason: HOSPADM

## 2022-07-31 RX ORDER — HEPARIN SODIUM 5000 [USP'U]/ML
5000 INJECTION, SOLUTION INTRAVENOUS; SUBCUTANEOUS EVERY 8 HOURS SCHEDULED
Status: DISCONTINUED | OUTPATIENT
Start: 2022-07-31 | End: 2022-07-31

## 2022-07-31 RX ORDER — TRIAMCINOLONE ACETONIDE 1 MG/G
CREAM TOPICAL 2 TIMES DAILY PRN
Status: DISCONTINUED | OUTPATIENT
Start: 2022-07-31 | End: 2022-08-05 | Stop reason: HOSPADM

## 2022-07-31 RX ORDER — SODIUM CHLORIDE 9 MG/ML
50 INJECTION, SOLUTION INTRAVENOUS CONTINUOUS
Status: DISCONTINUED | OUTPATIENT
Start: 2022-07-31 | End: 2022-08-01

## 2022-07-31 RX ADMIN — SODIUM CHLORIDE 50 ML/HR: 0.9 INJECTION, SOLUTION INTRAVENOUS at 22:02

## 2022-07-31 NOTE — ED PROVIDER NOTES
History  Chief Complaint   Patient presents with    Shortness of Breath     Tested positive for covid and continues to be short of breath with exertion       59-year-old male, unvaccinated, with history of COVID since few days ago, presents emergency room for an evaluation due to having COVID-19  Patient is currently on molnupiravir, however patient's wife came in hypoxic earlier today  Patient is also here to be seen  Patient notes that he was brought in because his wife was brought in who is COVID positive and hypoxic  Patient denies chills chest pain or shortness of breath at this time  Prior to Admission Medications   Prescriptions Last Dose Informant Patient Reported? Taking? Carboxymethylcellulose Sodium 1 % GEL  Self Yes No   Sig: Apply 1 drop to eye as needed   Patient not taking: Reported on 7/28/2022   Diclofenac Sodium (VOLTAREN) 1 %  Self No No   Sig: APPLY 2 GRAMS TO AFFECTED AREA 4 TIMES A DAY   Patient not taking: Reported on 7/28/2022   Difluprednate (Durezol) 0 05 % EMUL  Self Yes No   Sig: Apply 1 drop to eye 2 (two) times a day   Polyethyl Glycol-Propyl Glycol (SYSTANE OP)  Self Yes No   Sig: Administer 1 drop to both eyes 4 (four) times a day   albuterol (2 5 mg/3 mL) 0 083 % nebulizer solution   No No   Sig: Take 3 mL (2 5 mg total) by nebulization every 4 (four) hours as needed for wheezing or shortness of breath   alclomethasone (ACLOVATE) 0 05 % cream  Self No No   Sig: Apply topically 2 (two) times a day   bacitracin-polymyxin b ophthalmic ointment  Self Yes No   Sig: Administer 1 inch into the left eye daily at bedtime   Patient not taking: Reported on 7/28/2022   budesonide-formoterol (Symbicort) 160-4 5 mcg/act inhaler  Self No No   Sig: Inhale 2 puffs  in the morning and 2 puffs in the evening     hydrochlorothiazide (MICROZIDE) 12 5 mg capsule  Self No No   Sig: Take 1 capsule (12 5 mg total) by mouth daily   latanoprost (XALATAN) 0 005 % ophthalmic solution  Self Yes No Sig: Administer 1 drop to both eyes daily at bedtime   Patient not taking: Reported on 7/28/2022   [facility]) 45 mcg/act inhaler  Self No No   Sig: Inhale 2 puffs every 6 (six) hours as needed for wheezing or shortness of breath   losartan (COZAAR) 50 mg tablet  Self No No   Sig: TAKE 1 TABLET BY MOUTH TWICE A DAY   molnupiravir 200 mg capsule   No No   Sig: Take 4 capsules (800 mg total) by mouth every 12 (twelve) hours for 5 days   montelukast (SINGULAIR) 10 mg tablet  Self No No   Sig: Take 1 tablet (10 mg total) by mouth daily   multivitamin (THERAGRAN) TABS  Self Yes No   Sig: Take 1 tablet by mouth daily   omeprazole (PriLOSEC OTC) 20 MG tablet  Self Yes No   Sig: Take 20 mg by mouth   polyethylene glycol (GLYCOLAX) 17 GM/SCOOP powder  Self Yes No   Sig: Take 17 g by mouth as needed    sodium chloride (ARIEL 128) 2 % hypertonic ophthalmic solution  Self Yes No   Sig: Administer 1 drop into the left eye 5 (five) times a day   triamcinolone (KENALOG) 0 1 % cream  Self No No   Sig: Apply topically 2 (two) times a day as needed for rash   warfarin (COUMADIN) 4 mg tablet  Self No No   Sig: Take 2 daily or as directed      Facility-Administered Medications Last Administration Doses Remaining   bupivacaine (PF) (MARCAINE) 0 25 % injection 10 mL 6/25/2020  9:53 AM    bupivacaine (PF) (MARCAINE) 0 25 % injection 10 mL 6/25/2020  9:53 AM    methylPREDNISolone acetate (DEPO-MEDROL) injection 2 mL 6/25/2020  9:53 AM    methylPREDNISolone acetate (DEPO-MEDROL) injection 2 mL 6/25/2020  9:53 AM           Past Medical History:   Diagnosis Date    Arthritis     Coagulation defect ([facility] )     last assessed: 11/28/2018    COPD (chronic obstructive pulmonary disease) ([facility] )     last assessed: 5/14/2019, 12/6/2017    Generalized osteoarthritis     last assessed: 1/28/2019    GERD without esophagitis     last assessed: 1/28/2019    Glaucoma     last assessed: 8/4/2011    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[facility] )     last assessed: 10/16/2018    Factor II Prothrombin Gene per [employer]    History of kidney stones 1985    Hx of blood clots     Hypertension     last assessed: 2019    Impaired fasting glucose     last assessed: 2013    Mild persistent asthma, uncomplicated     last assessed: 2018    Nephrolithiasis     Nondisplaced intertrochanteric fracture of right femur, initial encounter for closed fracture ([facility] )     last assessed: 2019    Premature ventricular contractions     last assessed: 2011    Pulmonary nodule     Scoliosis (and kyphoscoliosis), idiopathic     Wears dentures        Past Surgical History:   Procedure Laterality Date    CARPAL TUNNEL RELEASE Left     ENDO     CATARACT EXTRACTION Left     COLONOSCOPY  10/07/2008    last assessed: 3/29/2016    HERNIA REPAIR      HIP SURGERY      HIP SURGERY Left 1999    [facility]    MULTIPLE TOOTH EXTRACTIONS Bilateral     OTHER SURGICAL HISTORY Right 10/30/2001    [facility]    TONSILLECTOMY Bilateral     WISDOM TOOTH EXTRACTION Bilateral        Family History   Problem Relation Age of Onset    Hypertension Mother     Stroke Mother     Heart attack Father      I have reviewed and agree with the history as documented  E-Cigarette/Vaping    E-Cigarette Use Never User      E-Cigarette/Vaping Substances    Nicotine No     THC No     CBD No     Flavoring No     Other No     Unknown No      Social History     Tobacco Use    Smoking status: Former Smoker     Years: 10 00     Types: Pipe     Start date:      Quit date: 1970     Years since quittin 6    Smokeless tobacco: Never Used   Vaping Use    Vaping Use: Never used   Substance Use Topics    Alcohol use: Yes     Comment: social    Drug use: Never       Review of Systems   Constitutional: Positive for activity change and fatigue  Negative for chills and fever  HENT: Negative for ear pain and sore throat      Eyes: Negative for pain and visual disturbance  Respiratory: Positive for cough  Negative for shortness of breath  Cardiovascular: Negative for chest pain and palpitations  Gastrointestinal: Negative for abdominal pain and vomiting  Genitourinary: Negative for dysuria and hematuria  Musculoskeletal: Negative for arthralgias and back pain  Skin: Negative for color change and rash  Neurological: Negative for seizures and syncope  All other systems reviewed and are negative  Physical Exam  Physical Exam  Constitutional:       General: He is not in acute distress  Appearance: Normal appearance  He is normal weight  He is not ill-appearing  HENT:      Head: Normocephalic and atraumatic  Right Ear: External ear normal       Left Ear: External ear normal       Nose: Nose normal       Mouth/Throat:      Mouth: Mucous membranes are moist    Eyes:      Conjunctiva/sclera: Conjunctivae normal    Cardiovascular:      Rate and Rhythm: Normal rate and regular rhythm  Pulses: Normal pulses  Heart sounds: Normal heart sounds  Pulmonary:      Effort: Pulmonary effort is normal       Breath sounds: Normal breath sounds  No decreased breath sounds, wheezing, rhonchi or rales  Abdominal:      General: Abdomen is flat  There is no distension  Palpations: Abdomen is soft  There is no mass  Musculoskeletal:         General: No swelling, tenderness or deformity  Normal range of motion  Cervical back: Normal range of motion  Skin:     General: Skin is warm and dry  Capillary Refill: Capillary refill takes 2 to 3 seconds  Coloration: Skin is not pale  Neurological:      General: No focal deficit present  Mental Status: He is alert and oriented to person, place, and time  Mental status is at baseline     Psychiatric:         Mood and Affect: Mood normal          Vital Signs  ED Triage Vitals   Temperature Pulse Respirations Blood Pressure SpO2   07/31/22 2211 07/31/22 1910 07/31/22 1910 07/31/22 1910 07/31/22 1910   98 5 °F (36 9 °C) 96 20 (!) 173/95 97 %      Temp Source Heart Rate Source Patient Position - Orthostatic VS BP Location FiO2 (%)   07/31/22 2211 07/31/22 1910 07/31/22 1910 07/31/22 1910 --   Oral Monitor Sitting Left arm       Pain Score       07/31/22 1910       No Pain           Vitals:    08/01/22 0400 08/01/22 0529 08/01/22 0600 08/01/22 0833   BP:       Pulse: 67 74 67 84   Patient Position - Orthostatic VS:             Visual Acuity  Visual Acuity    Flowsheet Row Most Recent Value   L Pupil Size (mm) 3   R Pupil Size (mm) 3   L Pupil Shape Round   R Pupil Shape Round          ED Medications  Medications   sodium chloride 0 9 % infusion (50 mL/hr Intravenous New Bag 7/31/22 2202)   albuterol inhalation solution 2 5 mg (has no administration in time range)   budesonide-formoterol (SYMBICORT) 160-4 5 mcg/act inhaler 2 puff (2 puffs Inhalation Given 8/1/22 0837)   Difluprednate 0 05 % EMUL 1 drop (1 drop Ophthalmic Not Given 8/1/22 0826)   montelukast (SINGULAIR) tablet 10 mg (10 mg Oral Given 8/1/22 0834)   multivitamin stress formula tablet 1 tablet (1 tablet Oral Given 8/1/22 0834)   pantoprazole (PROTONIX) EC tablet 40 mg (40 mg Oral Given 8/1/22 0521)   polyethylene glycol (GLYCOLAX) bowel prep 17 g (17 g Oral Not Given 8/1/22 0924)   triamcinolone (KENALOG) 0 1 % cream (has no administration in time range)   acetaminophen (TYLENOL) tablet 650 mg (has no administration in time range)   ondansetron (ZOFRAN) injection 4 mg (has no administration in time range)   warfarin (COUMADIN) tablet 8 mg (has no administration in time range)   glycerin-hypromellose- (ARTIFICIAL TEARS) ophthalmic solution 1 drop (has no administration in time range)   amLODIPine (NORVASC) tablet 10 mg (10 mg Oral Given 8/1/22 0834)       Diagnostic Studies  Results Reviewed     Procedure Component Value Units Date/Time    Blood culture #2 [[id number]] Collected: 07/31/22 9148    Lab Status: Preliminary result Specimen: Blood from Arm, Left Updated: 08/01/22 0903     Blood Culture Received in Microbiology Lab  Culture in Progress  Blood culture #1 [[id number]] Collected: 07/31/22 2256    Lab Status: Preliminary result Specimen: Blood from Arm, Right Updated: 08/01/22 0903     Blood Culture Received in Microbiology Lab  Culture in Progress      NT-BNP PRO [[id number]]  (Normal) Collected: 07/31/22 2341    Lab Status: Final result Specimen: Blood Updated: 08/01/22 0842     NT-proBNP 241 pg/mL     Comprehensive metabolic panel [[id number]]  (Abnormal) Collected: 08/01/22 0525    Lab Status: Final result Specimen: Blood from Arm, Right Updated: 08/01/22 0550     Sodium 126 mmol/L      Potassium 4 0 mmol/L      Chloride 95 mmol/L      CO2 24 mmol/L      ANION GAP 7 mmol/L      BUN 16 mg/dL      Creatinine 0 88 mg/dL      Glucose 87 mg/dL      Calcium 8 7 mg/dL      Corrected Calcium 9 3 mg/dL      AST 41 U/L      ALT 24 U/L      Alkaline Phosphatase 57 U/L      Total Protein 5 6 g/dL      Albumin 3 3 g/dL      Total Bilirubin 0 37 mg/dL      eGFR 75 ml/min/1 73sq m     Narrative:      [place] guidelines for Chronic Kidney Disease (CKD):     Stage 1 with normal or high GFR (GFR > 90 mL/min/1 73 square meters)    Stage 2 Mild CKD (GFR = 60-89 mL/min/1 73 square meters)    Stage 3A Moderate CKD (GFR = 45-59 mL/min/1 73 square meters)    Stage 3B Moderate CKD (GFR = 30-44 mL/min/1 73 square meters)    Stage 4 Severe CKD (GFR = 15-29 mL/min/1 73 square meters)    Stage 5 End Stage CKD (GFR <15 mL/min/1 73 square meters)  Note: GFR calculation is accurate only with a steady state creatinine    BMP Q8 hours X 3 (Hyponatremia monitoring) [[id number]]  (Abnormal) Collected: 08/01/22 0525    Lab Status: Final result Specimen: Blood from Arm, Right Updated: 08/01/22 0549     Sodium 126 mmol/L      Potassium 4 0 mmol/L      Chloride 95 mmol/L      CO2 24 mmol/L      ANION GAP 7 mmol/L BUN 16 mg/dL      Creatinine 0 86 mg/dL      Glucose 87 mg/dL      Calcium 8 6 mg/dL      eGFR 75 ml/min/1 73sq m     Narrative:      [place] guidelines for Chronic Kidney Disease (CKD):     Stage 1 with normal or high GFR (GFR > 90 mL/min/1 73 square meters)    Stage 2 Mild CKD (GFR = 60-89 mL/min/1 73 square meters)    Stage 3A Moderate CKD (GFR = 45-59 mL/min/1 73 square meters)    Stage 3B Moderate CKD (GFR = 30-44 mL/min/1 73 square meters)    Stage 4 Severe CKD (GFR = 15-29 mL/min/1 73 square meters)    Stage 5 End Stage CKD (GFR <15 mL/min/1 73 square meters)  Note: GFR calculation is accurate only with a steady state creatinine    CBC and differential [[id number]]  (Abnormal) Collected: 08/01/22 0525    Lab Status: Final result Specimen: Blood from Arm, Right Updated: 08/01/22 0532     WBC 2 54 Thousand/uL      RBC 3 88 Million/uL      Hemoglobin 12 0 g/dL      Hematocrit 34 6 %      MCV 89 fL      MCH 30 9 pg      MCHC 34 7 g/dL      RDW 12 7 %      MPV 9 2 fL      Platelets 727 Thousands/uL      nRBC 0 /100 WBCs      Neutrophils Relative 39 %      Immat GRANS % 0 %      Lymphocytes Relative 41 %      Monocytes Relative 19 %      Eosinophils Relative 1 %      Basophils Relative 0 %      Neutrophils Absolute 0 99 Thousands/µL      Immature Grans Absolute 0 01 Thousand/uL      Lymphocytes Absolute 1 04 Thousands/µL      Monocytes Absolute 0 47 Thousand/µL      Eosinophils Absolute 0 02 Thousand/µL      Basophils Absolute 0 01 Thousands/µL     BMP Q8 hours X 3 (Hyponatremia monitoring) [[id number]]  (Abnormal) Collected: 07/31/22 2341    Lab Status: Final result Specimen: Blood from Arm, Left Updated: 08/01/22 0016     Sodium 121 mmol/L      Potassium 4 3 mmol/L      Chloride 90 mmol/L      CO2 25 mmol/L      ANION GAP 6 mmol/L      BUN 20 mg/dL      Creatinine 0 92 mg/dL      Glucose 85 mg/dL      Calcium 8 7 mg/dL      eGFR 72 ml/min/1 73sq m     Narrative:      [employer] guidelines for Chronic Kidney Disease (CKD):     Stage 1 with normal or high GFR (GFR > 90 mL/min/1 73 square meters)    Stage 2 Mild CKD (GFR = 60-89 mL/min/1 73 square meters)    Stage 3A Moderate CKD (GFR = 45-59 mL/min/1 73 square meters)    Stage 3B Moderate CKD (GFR = 30-44 mL/min/1 73 square meters)    Stage 4 Severe CKD (GFR = 15-29 mL/min/1 73 square meters)    Stage 5 End Stage CKD (GFR <15 mL/min/1 73 square meters)  Note: GFR calculation is accurate only with a steady state creatinine    Protime-INR [[id number]]  (Abnormal) Collected: 07/31/22 2256    Lab Status: Final result Specimen: Blood from Arm, Right Updated: 08/01/22 0004     Protime 23 0 seconds      INR 2 05    Procalcitonin [[id number]]  (Normal) Collected: 07/31/22 1935    Lab Status: Final result Specimen: Blood from Arm, Right Updated: 07/31/22 2355     Procalcitonin 0 11 ng/ml     CKMB [[id number]]  (Abnormal) Collected: 07/31/22 1935    Lab Status: Final result Specimen: Blood from Arm, Right Updated: 07/31/22 2355     CK-MB Index 3 5 %      CK-MB 15 5 ng/mL     COVID only [[id number]]  (Abnormal) Collected: 07/31/22 2256    Lab Status: Final result Specimen: Nares from Nose Updated: 07/31/22 2340     SARS-CoV-2 Positive    Narrative:      FOR PEDIATRIC PATIENTS - copy/paste COVID Guidelines URL to browser: [email or web address]    SARS-CoV-2 assay is a Nucleic Acid Amplification assay intended for the  qualitative detection of nucleic acid from SARS-CoV-2 in nasopharyngeal  swabs  Results are for the presumptive identification of SARS-CoV-2 RNA  Positive results are indicative of infection with SARS-CoV-2, the virus  causing COVID-19, but do not rule out bacterial infection or co-infection  with other viruses  Laboratories within the [place] and its  territories are required to report all positive results to the appropriate  public health authorities  Negative results do not preclude SARS-CoV-2  infection and should not be used as the sole basis for treatment or other  patient management decisions  Negative results must be combined with  clinical observations, patient history, and epidemiological information  This test has not been FDA cleared or approved  This test has been authorized by FDA under an Emergency Use Authorization  (EUA)  This test is only authorized for the duration of time the  declaration that circumstances exist justifying the authorization of the  emergency use of an in vitro diagnostic tests for detection of SARS-CoV-2  virus and/or diagnosis of COVID-19 infection under section 564(b)(1) of  the Act, 21 U  S C  267QSI-6(G)(8), unless the authorization is terminated  or revoked sooner  The test has been validated but independent review by FDA  and CLIA is pending  Test performed using [email or web address] Gene[employer]pert: This RT-PCR assay targets N2,  a region unique to SARS-CoV-2  A conserved region in the E-gene was chosen  for pan-Sarbecovirus detection which includes SARS-CoV-2  C-reactive protein [[id number]]  (Normal) Collected: 07/31/22 1935    Lab Status: Final result Specimen: Blood from Arm, Right Updated: 07/31/22 2331     CRP 2 5 mg/L     CK (with reflex to MB) [[id number]]  (Abnormal) Collected: 07/31/22 1935    Lab Status: Final result Specimen: Blood from Arm, Right Updated: 07/31/22 2331     Total  U/L     D-dimer, quantitative [[id number]]  (Abnormal) Collected: 07/31/22 2256    Lab Status: Final result Specimen: Blood from Arm, Right Updated: 07/31/22 2316     D-Dimer, Quant 0 84 ug/ml FEU     Narrative: In the evaluation for possible pulmonary embolism, in the appropriate (Well's Score of 4 or less) patient, the age adjusted d-dimer cutoff for this patient can be calculated as:    Age x 0 01 (in ug/mL) for Age-adjusted D-dimer exclusion threshold for a patient over 50 years      Comprehensive metabolic panel [[id number]] (Abnormal) Collected: 07/31/22 1935    Lab Status: Final result Specimen: Blood from Arm, Right Updated: 07/31/22 2005     Sodium 122 mmol/L      Potassium 4 7 mmol/L      Chloride 91 mmol/L      CO2 25 mmol/L      ANION GAP 6 mmol/L      BUN 22 mg/dL      Creatinine 1 02 mg/dL      Glucose 86 mg/dL      Calcium 8 6 mg/dL      AST 50 U/L      ALT 26 U/L      Alkaline Phosphatase 70 U/L      Total Protein 6 1 g/dL      Albumin 3 7 g/dL      Total Bilirubin 0 40 mg/dL      eGFR 63 ml/min/1 73sq m     Narrative:      [facility] guidelines for Chronic Kidney Disease (CKD):     Stage 1 with normal or high GFR (GFR > 90 mL/min/1 73 square meters)    Stage 2 Mild CKD (GFR = 60-89 mL/min/1 73 square meters)    Stage 3A Moderate CKD (GFR = 45-59 mL/min/1 73 square meters)    Stage 3B Moderate CKD (GFR = 30-44 mL/min/1 73 square meters)    Stage 4 Severe CKD (GFR = 15-29 mL/min/1 73 square meters)    Stage 5 End Stage CKD (GFR <15 mL/min/1 73 square meters)  Note: GFR calculation is accurate only with a steady state creatinine    Lactic acid [[id number]]  (Normal) Collected: 07/31/22 1935    Lab Status: Final result Specimen: Blood from Arm, Right Updated: 07/31/22 2000     LACTIC ACID 0 8 mmol/L     Narrative:      Result may be elevated if tourniquet was used during collection      CBC and differential [[id number]]  (Abnormal) Collected: 07/31/22 1935    Lab Status: Final result Specimen: Blood from Arm, Right Updated: 07/31/22 1942     WBC 3 39 Thousand/uL      RBC 4 13 Million/uL      Hemoglobin 12 6 g/dL      Hematocrit 37 5 %      MCV 91 fL      MCH 30 5 pg      MCHC 33 6 g/dL      RDW 12 6 %      MPV 9 3 fL      Platelets 471 Thousands/uL      nRBC 0 /100 WBCs      Neutrophils Relative 56 %      Immat GRANS % 0 %      Lymphocytes Relative 27 %      Monocytes Relative 17 %      Eosinophils Relative 0 %      Basophils Relative 0 %      Neutrophils Absolute 1 90 Thousands/µL      Immature Grans Absolute 0 00 Thousand/uL      Lymphocytes Absolute 0 90 Thousands/µL      Monocytes Absolute 0 57 Thousand/µL      Eosinophils Absolute 0 01 Thousand/µL      Basophils Absolute 0 01 Thousands/µL                  XR chest 1 view portable   ED Interpretation by Kunal Avalos DO (07/31 2009)   Scoliosis noted  No definitive acute pulmonary pathology  Final Result by Robbie Swartz MD (08/01 7236)      No acute cardiopulmonary disease  Right lung scarring  Workstation performed: [id number]                    Procedures  ECG 12 Lead Documentation Only    Date/Time: 7/31/2022 8:06 PM  Performed by: Kunal Avalos DO  Authorized by: Kunal Avalos DO     ECG reviewed by me, the ED Provider: yes    Patient location:  ED  Comments:      EKG shows a sinus rhythm at 91 per with normal axis  There is no definitive acute ST or T-wave changes appreciated  There is questionable old anterior septal MI               ED Course                               SBIRT 22yo+    Flowsheet Row Most Recent Value   SBIRT (23 yo +)    In order to provide better care to our patients, we are screening all of our patients for alcohol and drug use  Would it be okay to ask you these screening questions? Yes Filed at: 08/01/2022 0006   Initial Alcohol Screen: US AUDIT-C     1  How often do you have a drink containing alcohol? 0 Filed at: 08/01/2022 0006   2  How many drinks containing alcohol do you have on a typical day you are drinking? 0 Filed at: 08/01/2022 0006   3a  Male UNDER 65: How often do you have five or more drinks on one occasion? 0 Filed at: 08/01/2022 0006   3b  FEMALE Any Age, or MALE 65+: How often do you have 4 or more drinks on one occassion? 0 Filed at: 07/31/2022 2207   Audit-C Score 0 Filed at: 08/01/2022 0006   SPIKE: How many times in the past year have you    Used an illegal drug or used a prescription medication for non-medical reasons?  Never Filed at: 08/01/2022 0006                    MDM    Disposition  Final diagnoses:   Hyponatremia     Time reflects when diagnosis was documented in both MDM as applicable and the Disposition within this note     Time User Action Codes Description Comment    7/31/2022  9:16 PM Gwendloyn Level Add [E87 1] Hyponatremia     7/31/2022 11:05 PM Vida Spotted Add [M17 0] Primary osteoarthritis of both knees       ED Disposition     ED Disposition   Admit    Condition   Stable    Date/Time   Sun Jul 31, 2022  9:50 PM    Comment   Case was discussed with Johnson Yeboah and the patient's admission status was agreed to be Admission Status: inpatient status to the service of Dr Susan Crawford   Follow-up Information    None         Patient's Medications   Discharge Prescriptions    No medications on file       No discharge procedures on file      PDMP Review     None          ED Provider  Electronically Signed by           Nae Hawley DO  08/01/22 1023

## 2022-08-01 PROBLEM — K04.7 DENTAL ABSCESS: Status: RESOLVED | Noted: 2021-03-02 | Resolved: 2022-08-01

## 2022-08-01 PROBLEM — B02.9 HERPES ZOSTER WITHOUT COMPLICATION: Status: RESOLVED | Noted: 2019-09-03 | Resolved: 2022-08-01

## 2022-08-01 PROBLEM — E87.1 HYPONATREMIA: Status: ACTIVE | Noted: 2022-08-01

## 2022-08-01 PROBLEM — R23.3 PETECHIAE: Status: RESOLVED | Noted: 2019-12-17 | Resolved: 2022-08-01

## 2022-08-01 LAB
ALBUMIN SERPL BCP-MCNC: 3.3 G/DL (ref 3.5–5)
ALP SERPL-CCNC: 57 U/L (ref 34–104)
ALT SERPL W P-5'-P-CCNC: 24 U/L (ref 7–52)
ANION GAP SERPL CALCULATED.3IONS-SCNC: 5 MMOL/L (ref 4–13)
ANION GAP SERPL CALCULATED.3IONS-SCNC: 6 MMOL/L (ref 4–13)
ANION GAP SERPL CALCULATED.3IONS-SCNC: 7 MMOL/L (ref 4–13)
ANION GAP SERPL CALCULATED.3IONS-SCNC: 7 MMOL/L (ref 4–13)
AST SERPL W P-5'-P-CCNC: 41 U/L (ref 13–39)
BASOPHILS # BLD AUTO: 0.01 THOUSANDS/ΜL (ref 0–0.1)
BASOPHILS NFR BLD AUTO: 0 % (ref 0–1)
BILIRUB SERPL-MCNC: 0.37 MG/DL (ref 0.2–1)
BUN SERPL-MCNC: 16 MG/DL (ref 5–25)
BUN SERPL-MCNC: 20 MG/DL (ref 5–25)
CALCIUM ALBUM COR SERPL-MCNC: 9.3 MG/DL (ref 8.3–10.1)
CALCIUM SERPL-MCNC: 8.4 MG/DL (ref 8.4–10.2)
CALCIUM SERPL-MCNC: 8.6 MG/DL (ref 8.4–10.2)
CALCIUM SERPL-MCNC: 8.7 MG/DL (ref 8.4–10.2)
CALCIUM SERPL-MCNC: 8.7 MG/DL (ref 8.4–10.2)
CHLORIDE SERPL-SCNC: 90 MMOL/L (ref 96–108)
CHLORIDE SERPL-SCNC: 95 MMOL/L (ref 96–108)
CO2 SERPL-SCNC: 24 MMOL/L (ref 21–32)
CO2 SERPL-SCNC: 24 MMOL/L (ref 21–32)
CO2 SERPL-SCNC: 25 MMOL/L (ref 21–32)
CO2 SERPL-SCNC: 25 MMOL/L (ref 21–32)
CREAT SERPL-MCNC: 0.86 MG/DL (ref 0.6–1.3)
CREAT SERPL-MCNC: 0.88 MG/DL (ref 0.6–1.3)
CREAT SERPL-MCNC: 0.92 MG/DL (ref 0.6–1.3)
CREAT SERPL-MCNC: 1.04 MG/DL (ref 0.6–1.3)
EOSINOPHIL # BLD AUTO: 0.02 THOUSAND/ΜL (ref 0–0.61)
EOSINOPHIL NFR BLD AUTO: 1 % (ref 0–6)
ERYTHROCYTE [DISTWIDTH] IN BLOOD BY AUTOMATED COUNT: 12.7 % (ref 11.6–15.1)
GFR SERPL CREATININE-BSD FRML MDRD: 62 ML/MIN/1.73SQ M
GFR SERPL CREATININE-BSD FRML MDRD: 72 ML/MIN/1.73SQ M
GFR SERPL CREATININE-BSD FRML MDRD: 75 ML/MIN/1.73SQ M
GFR SERPL CREATININE-BSD FRML MDRD: 75 ML/MIN/1.73SQ M
GLUCOSE SERPL-MCNC: 113 MG/DL (ref 65–140)
GLUCOSE SERPL-MCNC: 85 MG/DL (ref 65–140)
GLUCOSE SERPL-MCNC: 87 MG/DL (ref 65–140)
GLUCOSE SERPL-MCNC: 87 MG/DL (ref 65–140)
HCT VFR BLD AUTO: 34.6 % (ref 36.5–49.3)
HGB BLD-MCNC: 12 G/DL (ref 12–17)
IMM GRANULOCYTES # BLD AUTO: 0.01 THOUSAND/UL (ref 0–0.2)
IMM GRANULOCYTES NFR BLD AUTO: 0 % (ref 0–2)
INR PPP: 2.05 (ref 0.84–1.19)
LYMPHOCYTES # BLD AUTO: 1.04 THOUSANDS/ΜL (ref 0.6–4.47)
LYMPHOCYTES NFR BLD AUTO: 41 % (ref 14–44)
MCH RBC QN AUTO: 30.9 PG (ref 26.8–34.3)
MCHC RBC AUTO-ENTMCNC: 34.7 G/DL (ref 31.4–37.4)
MCV RBC AUTO: 89 FL (ref 82–98)
MONOCYTES # BLD AUTO: 0.47 THOUSAND/ΜL (ref 0.17–1.22)
MONOCYTES NFR BLD AUTO: 19 % (ref 4–12)
NEUTROPHILS # BLD AUTO: 0.99 THOUSANDS/ΜL (ref 1.85–7.62)
NEUTS SEG NFR BLD AUTO: 39 % (ref 43–75)
NRBC BLD AUTO-RTO: 0 /100 WBCS
NT-PROBNP SERPL-MCNC: 241 PG/ML
PLATELET # BLD AUTO: 153 THOUSANDS/UL (ref 149–390)
PMV BLD AUTO: 9.2 FL (ref 8.9–12.7)
POTASSIUM SERPL-SCNC: 4 MMOL/L (ref 3.5–5.3)
POTASSIUM SERPL-SCNC: 4 MMOL/L (ref 3.5–5.3)
POTASSIUM SERPL-SCNC: 4.3 MMOL/L (ref 3.5–5.3)
POTASSIUM SERPL-SCNC: 4.5 MMOL/L (ref 3.5–5.3)
PROT SERPL-MCNC: 5.6 G/DL (ref 6.4–8.4)
PROTHROMBIN TIME: 23 SECONDS (ref 11.6–14.5)
RBC # BLD AUTO: 3.88 MILLION/UL (ref 3.88–5.62)
SODIUM SERPL-SCNC: 121 MMOL/L (ref 135–147)
SODIUM SERPL-SCNC: 125 MMOL/L (ref 135–147)
SODIUM SERPL-SCNC: 126 MMOL/L (ref 135–147)
SODIUM SERPL-SCNC: 126 MMOL/L (ref 135–147)
WBC # BLD AUTO: 2.54 THOUSAND/UL (ref 4.31–10.16)

## 2022-08-01 PROCEDURE — 85025 COMPLETE CBC W/AUTO DIFF WBC: CPT | Performed by: PHYSICIAN ASSISTANT

## 2022-08-01 PROCEDURE — 36415 COLL VENOUS BLD VENIPUNCTURE: CPT | Performed by: PHYSICIAN ASSISTANT

## 2022-08-01 PROCEDURE — 80053 COMPREHEN METABOLIC PANEL: CPT | Performed by: PHYSICIAN ASSISTANT

## 2022-08-01 PROCEDURE — 80048 BASIC METABOLIC PNL TOTAL CA: CPT | Performed by: EMERGENCY MEDICINE

## 2022-08-01 PROCEDURE — 99223 1ST HOSP IP/OBS HIGH 75: CPT | Performed by: INTERNAL MEDICINE

## 2022-08-01 RX ORDER — AMLODIPINE BESYLATE 10 MG/1
10 TABLET ORAL DAILY
Status: DISCONTINUED | OUTPATIENT
Start: 2022-08-01 | End: 2022-08-05 | Stop reason: HOSPADM

## 2022-08-01 RX ADMIN — BUDESONIDE AND FORMOTEROL FUMARATE DIHYDRATE 2 PUFF: 160; 4.5 AEROSOL RESPIRATORY (INHALATION) at 19:31

## 2022-08-01 RX ADMIN — PANTOPRAZOLE SODIUM 40 MG: 40 TABLET, DELAYED RELEASE ORAL at 05:21

## 2022-08-01 RX ADMIN — BUDESONIDE AND FORMOTEROL FUMARATE DIHYDRATE 2 PUFF: 160; 4.5 AEROSOL RESPIRATORY (INHALATION) at 08:37

## 2022-08-01 RX ADMIN — AMLODIPINE BESYLATE 10 MG: 10 TABLET ORAL at 08:34

## 2022-08-01 RX ADMIN — B-COMPLEX W/ C & FOLIC ACID TAB 1 TABLET: TAB at 08:34

## 2022-08-01 RX ADMIN — MONTELUKAST 10 MG: 10 TABLET, FILM COATED ORAL at 08:34

## 2022-08-01 RX ADMIN — WARFARIN SODIUM 8 MG: 5 TABLET ORAL at 19:31

## 2022-08-01 NOTE — ASSESSMENT & PLAN NOTE
Lab Results   Component Value Date    EGFR 72 07/31/2022    EGFR 63 07/31/2022    EGFR 55 05/13/2022    CREATININE 0 92 07/31/2022    CREATININE 1 02 07/31/2022    CREATININE 1 14 05/13/2022     Creatinine appears to be better than baseline, will continue to monitor with repeat labs in a m

## 2022-08-01 NOTE — CASE MANAGEMENT
Case Management Assessment & Discharge Planning Note    Patient name Puja Frost  Location ED 24/ED 24 MRN [id number]  : 1930 Date 2022       Current Admission Date: 2022  Current Admission Diagnosis:Hyponatremia   Patient Active Problem List    Diagnosis Date Noted    Hyponatremia 2022    COVID-19 virus infection 2022    Obstructive chronic bronchitis without exacerbation ([facility] ) 2022    Hypercalcemia 02/10/2022    Vaccine refused by patient 2021    Stage 3 chronic kidney disease, unspecified whether stage 3a or 3b CKD ([facility] ) 2021    Pulmonary emphysema ([facility] ) 2021    Endothelial corneal dystrophy of both eyes 2021    Mature cataract 2021    Primary open-angle glaucoma, bilateral, mild stage 2021    Pseudophakic bullous keratopathy of left eye 2021    GERD without esophagitis     Neoplasm of uncertain behavior of skin 2020    Encounter for long-term (current) use of medications 2020    Primary osteoarthritis of both knees 2020    Age-related cataract of both eyes 2020    Premature beats 2020    Chronic pulmonary embolism without acute cor pulmonale (HCC) 2020    Osteoarthritis of both knees 2020    Other seborrheic dermatitis 2020    Abnormality of gait 2020    Intrinsic eczema 2019    Mild persistent asthma, uncomplicated     Prothrombin gene mutation ([facility] ) 2019    Primary generalized (osteo)arthritis 2019    Essential hypertension, benign 2019    Dyslipidemia 2019    Heterozygous factor V Leiden mutation ([facility] ) 2018    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 ([facility] ) 2018    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[facility] ) 2018    History of pulmonary embolism 2018    TB lung, latent 2018    Asthma 2012      LOS (days): 1  Geometric Mean LOS (GMLOS) (days): 3 30  Days to GMLOS:2 5     OBJECTIVE:    Risk of Unplanned Readmission Score: 14 67      Current admission status: Inpatient  Referral Reason: Other (Assess for discharge needs)    Preferred Pharmacy:   [facility]/pharmacy #[id number]- [address]  [address] [facility] [address]  Phone: [phone] Fax: [phone]    Primary Care Provider: Beny Ha DO    Primary Insurance: MEDICARE  Secondary Insurance: Dw[address] 20:  [address] Proxies    There are no active Health Care Proxies on file  Advance Directives  Does patient have a [address]?: Yes (Per patient copy provided)  Does patient have Advance Directives?: Yes (Per patient copy provided)  Advance Directives: Living will, Power of  for health care  Primary Contact: Sylvie Zhang (Daughter)   [phone] (Mobile)    Readmission Root Cause  30 Day Readmission: No    Patient Information  Admitted from[de-identified] Home  Mental Status: Alert  During Assessment patient was accompanied by: Not accompanied during assessment  Assessment information provided by[de-identified] Patient  Primary Caregiver: Self  Support Systems: Self, Spouse/significant other, Daughter  [place] of Residence: [address] do you live in?: [address] entry access options   Select all that apply : No steps to enter home  Type of Current Residence: Apartment  Floor Level: 1  Upon entering residence, is there a bedroom on the main floor (no further steps)?: Yes  Upon entering residence, is there a bathroom on the main floor (no further steps)?: Yes  In the last 12 months, was there a time when you were not able to pay the mortgage or rent on time?: Yes  In the last 12 months, how many places have you lived?: 1  In the last 12 months, was there a time when you did not have a steady place to sleep or slept in a shelter (including now)?: No  Homeless/housing insecurity resource given?: N/A  Living Arrangements: Lives w/ Spouse/significant other  Is patient a ?: Yes  Is patient active with [facility])?: No    Activities of Daily Living Prior to Admission  Functional Status: Independent  Completes ADLs independently?: Yes  Ambulates independently?: Yes  Does patient use assisted devices?: Yes  Assisted Devices (DME) used: Straight Cane  Does patient currently own DME?: Yes  What DME does the patient currently own?: Straight Cane, Walker, [address], Wheelchair, Other (Comment) (Tez)  Does patient have a history of Outpatient Therapy (PT/OT)?: No  Does the patient have a history of Short-Term Rehab?: No  Does patient have a history of HHC?: No  Does patient currently have EDP [address]?: No    Patient Information Continued  Income Source: Pension/penitentiary  Does patient have prescription coverage?: Yes  Within the past 12 months, you worried that your food would run out before you got the money to buy more : Never true  Within the past 12 months, the food you bought just didn't last and you didn't have money to get more : Never true  Food insecurity resource given?: N/A  Does patient receive dialysis treatments?: No  Does patient have a history of substance abuse?: No  Does patient have a history of Mental Health Diagnosis?: No  Has patient received inpatient treatment related to mental health in the last 2 years?: No    Means of Transportation  Means of Transport to Appts[de-identified] Drives Self  In the past 12 months, has lack of transportation kept you from medical appointments or from getting medications?: No  In the past 12 months, has lack of transportation kept you from meetings, work, or from getting things needed for daily living?: No  Was application for public transport provided?: N/A    DISCHARGE DETAILS:    Discharge planning discussed with[de-identified] Patient  Freedom of Choice: Yes     CM contacted family/caregiver?: No- see comments (Declines)     Contacts  Patient Contacts:  Max Lnae (Daughter) [phone] (Mobile)  Relationship to Patient[de-identified] Family  Contact Method: Phone  Phone Number: Lindsey Rendon (Daughter)   [phone] [facility]  Reason/Outcome: Emergency [address]         Is the patient interested in [facility] at discharge?: No    Other Referral/Resources/Interventions Provided:  Interventions: None Indicated    Treatment Team Recommendation: Home      Additional Comments: Met with patient at bedside  Patient is IPTA  Spouse is admitted and is in ICU  No DC needs identified at this time

## 2022-08-01 NOTE — H&P
[address]  H&P- Jazmin Bloodgood 8/12/1930, 80 y o  male MRN: [id number]  Unit/Bed#: ED 24 Encounter: [id number]  Primary Care Provider: Donna Fulton DO   Date and time admitted to hospital: 7/31/2022  7:09 PM    * Hyponatremia  Assessment & Plan  · Admit to Medicine  · Hydrate with normal saline at 125 cc/hour  · Continue to monitor with serial BMPs    COVID-19 virus infection  Assessment & Plan  · Patient is doing well from a COVID standpoint  · Patient is unvaccinated and had a positive home test 4 days ago  · Is not requiring any supplemental oxygen  · Will obtain baseline COVID labs  · Trend is indicated  · Placed on O2 and respiratory protocol  · Continue pre-hospital respiratory medications  · Incentive spirometry    Stage 3 chronic kidney disease, unspecified whether stage 3a or 3b CKD [facility])  Assessment & Plan  Lab Results   Component Value Date    EGFR 72 07/31/2022    EGFR 63 07/31/2022    EGFR 55 05/13/2022    CREATININE 0 92 07/31/2022    CREATININE 1 02 07/31/2022    CREATININE 1 14 05/13/2022     Creatinine appears to be better than baseline, will continue to monitor with repeat labs in a m  GERD without esophagitis  Assessment & Plan  Substitute Protonix 40 mg p o  daily for pre-hospital Prilosec    Essential hypertension, benign  Assessment & Plan  Continue pre-hospital Cozaar 50 mg p o  b i d  and hydrochlorothiazide 12 5 mg p o  daily    Mild persistent asthma, uncomplicated  Assessment & Plan  · Continue pre-hospital singular 10 mg p o  daily and Symbicort 160/4 52 puffs daily  · Not in exacerbation at this time  · Will place on O2 and respiratory protocol      VTE Prophylaxis: Warfarin (Coumadin)  Code Status:  Level 1  Discussion with family:  None present at bedside at time of exam    Anticipated Length of Stay:  Patient will be admitted on an Inpatient basis with an anticipated length of stay of  > 2 midnights     Justification for Hospital Stay:  Hyponatremia requiring IV fluid resuscitation and continue monitoring    Total Time for Visit, including Counseling / Coordination of Care: 1 hour  Greater than 50% of this total time spent on direct patient counseling and coordination of care  Chief Complaint:   Generalized fatigue x4 days    History of Present Illness:    Leda Guerrero is a 80 y o  male who presents with generalized fatigue x4 days  Patient presents ER along with his wife after testing positive for COVID 4 days ago complaining of generalized fatigue  Patient has been on molnupiravir at home which he states he has been compliant with  Patient is resting comfortably in bed at time of exam and offers no complaints other than his generalized fatigue  Patient denies any fever or chills, no cough or shortness of breath, no new muscle aches or body aches though he does state that he had as several chronic once secondary to osteoarthritis    Patient denies any recent sick contacts other than his wife was also admitted to the hospital and he is unvaccinated for COVID 19  Review of Systems:    Review of Systems   Constitutional: Positive for fatigue  Negative for chills and fever  Respiratory: Negative for cough, shortness of breath and wheezing  Cardiovascular: Negative for chest pain and palpitations  Gastrointestinal: Negative for diarrhea, nausea and vomiting  Genitourinary: Negative for dysuria, frequency, hematuria and urgency  Neurological: Positive for weakness  Negative for light-headedness and headaches  All other systems reviewed and are negative        Past Medical and Surgical History:     Past Medical History:   Diagnosis Date    Arthritis     Coagulation defect ([facility] )     last assessed: 11/28/2018    COPD (chronic obstructive pulmonary disease) ([facility] )     last assessed: 5/14/2019, 12/6/2017    Generalized osteoarthritis     last assessed: 1/28/2019    GERD without esophagitis     last assessed: 1/28/2019    Glaucoma last assessed: 8/4/2011    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[facility] )     last assessed: 10/16/2018    Factor II Prothrombin Gene per Chartmaker    History of kidney stones 1985    Hx of blood clots     Hypertension     last assessed: 5/14/2019    Impaired fasting glucose     last assessed: 8/26/2013    Mild persistent asthma, uncomplicated     last assessed: 11/28/2018    Nephrolithiasis     Nondisplaced intertrochanteric fracture of right femur, initial encounter for closed fracture ([facility] )     last assessed: 1/28/2019    Premature ventricular contractions     last assessed: 8/4/2011    Pulmonary nodule     Scoliosis (and kyphoscoliosis), idiopathic     Wears dentures        Past Surgical History:   Procedure Laterality Date    CARPAL TUNNEL RELEASE Left     ENDO 8/801    CATARACT EXTRACTION Left     COLONOSCOPY  10/07/2008    last assessed: 3/29/2016    HERNIA REPAIR      HIP SURGERY      HIP SURGERY Left 03/05/1999    ORIF    MULTIPLE TOOTH EXTRACTIONS Bilateral     OTHER SURGICAL HISTORY Right 10/30/2001    [facility]    TONSILLECTOMY Bilateral     WISDOM TOOTH EXTRACTION Bilateral        Meds/Allergies:    Prior to Admission medications    Medication Sig Start Date End Date Taking?  Authorizing Provider   albuterol (2 5 mg/3 mL) 0 083 % nebulizer solution Take 3 mL (2 5 mg total) by nebulization every 4 (four) hours as needed for wheezing or shortness of breath 7/28/22   Justo Brown,    alclomethasone (ACLOVATE) 0 05 % cream Apply topically 2 (two) times a day 5/19/20   Justo Brown DO   bacitracin-polymyxin b ophthalmic ointment Administer 1 inch into the left eye daily at bedtime  Patient not taking: Reported on 7/28/2022 7/30/21   Historical Provider, MD   budesonide-formoterol (Symbicort) 160-4 5 mcg/act inhaler Inhale 2 puffs  in the morning and 2 puffs in the evening  5/20/22   Justo Brown, DO   Carboxymethylcellulose Sodium 1 % GEL Apply 1 drop to eye as needed  Patient not taking: Reported on 7/28/2022    Historical Provider, MD   Diclofenac Sodium (VOLTAREN) 1 % APPLY 2 GRAMS TO AFFECTED AREA 4 TIMES A DAY  Patient not taking: Reported on 7/28/2022 3/2/21   Veneda Bernheim, DO   Difluprednate (Durezol) 0 05 % EMUL Apply 1 drop to eye 2 (two) times a day 5/10/21   Historical Provider, MD   hydrochlorothiazide (MICROZIDE) 12 5 mg capsule Take 1 capsule (12 5 mg total) by mouth daily 11/19/21   Veneda Bernheim, DO   latanoprost (XALATAN) 0 005 % ophthalmic solution Administer 1 drop to both eyes daily at bedtime  Patient not taking: Reported on 7/28/2022 2/4/21   Historical Provider, MD   levalbuterol [facility]) 45 mcg/act inhaler Inhale 2 puffs every 6 (six) hours as needed for wheezing or shortness of breath 6/21/22   Veneda Bernheim, DO   losartan (COZAAR) 50 mg tablet TAKE 1 TABLET BY MOUTH TWICE A DAY 4/18/22   Brodie Anne DO   molnupiravir 200 mg capsule Take 4 capsules (800 mg total) by mouth every 12 (twelve) hours for 5 days 7/28/22 8/2/22  Veneda Bernheim, DO   montelukast (SINGULAIR) 10 mg tablet Take 1 tablet (10 mg total) by mouth daily 7/22/21   Veneda Bernheim, DO   multivitamin [facility]) TABS Take 1 tablet by mouth daily    Historical Provider, MD   omeprazole (PriLOSEC OTC) 20 MG tablet Take 20 mg by mouth 6/20/12   Historical Provider, MD   Polyethyl Glycol-Propyl Glycol (SYSTANE OP) Administer 1 drop to both eyes 4 (four) times a day    Historical Provider, MD   polyethylene glycol (GLYCOLAX) 17 GM/SCOOP powder Take 17 g by mouth as needed  11/23/18   Historical Provider, MD   sodium chloride ([address]) 2 % hypertonic ophthalmic solution Administer 1 drop into the left eye 5 (five) times a day    Historical Provider, MD   triamcinolone (KENALOG) 0 1 % cream Apply topically 2 (two) times a day as needed for rash 12/17/19   Veneda Bernheim, DO   warfarin (COUMADIN) 4 mg tablet Take 2 daily or as directed 5/20/22   Veneda Bernheim, DO     all medications and allergies reviewed    Allergies:    Allergies Allergen Reactions    Alphagan P [Brimonidine Tartrate] Itching       Social History:     Marital Status: /Civil Union   Occupation:  Retired maintenance [employer]  Patient Pre-hospital Living Situation:  Resides at home with wife  Patient Pre-hospital Level of Mobility:  Full with assist of a cane  Patient Pre-hospital Diet Restrictions:  None  Substance Use History:   Social History     Substance and Sexual Activity   Alcohol Use Yes    Comment: social     Social History     Tobacco Use   Smoking Status Former Smoker    Years: 10 00    Types: Pipe    Start date: 56    Quit date:     Years since quittin 6   Smokeless Tobacco Never Used     Social History     Substance and Sexual Activity   Drug Use Never       Family History:  I have reviewed the patient's family history    Physical Exam:     Vitals:   Blood Pressure: 166/88 (22)  Pulse: 79 (22)  Temperature: 98 5 °F (36 9 °C) (22)  Temp Source: Oral (22)  Respirations: 18 (22)  SpO2: 96 % (22)    Physical Exam  Vitals and nursing note reviewed  Constitutional:       General: He is not in acute distress  Appearance: Normal appearance  HENT:      Head: Normocephalic and atraumatic  Right Ear: Tympanic membrane normal       Left Ear: Tympanic membrane normal       Nose: Nose normal       Mouth/Throat:      Mouth: Mucous membranes are moist       Pharynx: No oropharyngeal exudate or posterior oropharyngeal erythema  Eyes:      Extraocular Movements: Extraocular movements intact  Pupils: Pupils are equal, round, and reactive to light  Cardiovascular:      Rate and Rhythm: Normal rate and regular rhythm  Pulses: Normal pulses  Heart sounds: Normal heart sounds  Pulmonary:      Effort: Pulmonary effort is normal  No respiratory distress  Breath sounds: Normal breath sounds  Abdominal:      General: Abdomen is flat   Bowel sounds are normal       Palpations: Abdomen is soft  Musculoskeletal:         General: Normal range of motion  Cervical back: Normal range of motion and neck supple  Right lower leg: No edema  Left lower leg: No edema  Skin:     General: Skin is warm and dry  Capillary Refill: Capillary refill takes less than 2 seconds  Neurological:      General: No focal deficit present  Mental Status: He is alert and oriented to person, place, and time  Additional Data:     Lab Results: I have personally reviewed pertinent reports  Results from last 7 days   Lab Units 07/31/22  1935   WBC Thousand/uL 3 39*   HEMOGLOBIN g/dL 12 6   HEMATOCRIT % 37 5   PLATELETS Thousands/uL 162   NEUTROS PCT % 56   LYMPHS PCT % 27   MONOS PCT % 17*   EOS PCT % 0     Results from last 7 days   Lab Units 07/31/22  2341 07/31/22  1935   SODIUM mmol/L 121* 122*   POTASSIUM mmol/L 4 3 4 7   CHLORIDE mmol/L 90* 91*   CO2 mmol/L 25 25   BUN mg/dL 20 22   CREATININE mg/dL 0 92 1 02   ANION GAP mmol/L 6 6   CALCIUM mg/dL 8 7 8 6   ALBUMIN g/dL  --  3 7   TOTAL BILIRUBIN mg/dL  --  0 40   ALK PHOS U/L  --  70   ALT U/L  --  26   AST U/L  --  50*   GLUCOSE RANDOM mg/dL 85 86     Results from last 7 days   Lab Units 07/31/22  2256   INR  2 05*             Results from last 7 days   Lab Units 07/31/22  1935   LACTIC ACID mmol/L 0 8   PROCALCITONIN ng/ml 0 11       Imaging: I have personally reviewed pertinent reports  XR chest 1 view portable   ED Interpretation by Marcelino Delaney DO (07/31 2009)   Scoliosis noted  No definitive acute pulmonary pathology  EKG, Pathology, and Other Studies Reviewed on Admission:   · EKG: N/A    [facility] / Care Everywhere Records Reviewed: Yes    ** Please Note: This note has been constructed using a voice recognition system   **

## 2022-08-01 NOTE — ASSESSMENT & PLAN NOTE
· Admit to Medicine  · Hydrate with normal saline at 125 cc/hour  · Continue to monitor with serial BMPs

## 2022-08-01 NOTE — ASSESSMENT & PLAN NOTE
· Continue pre-hospital singular 10 mg p o  daily and Symbicort 160/4 52 puffs daily  · Not in exacerbation at this time  · Will place on O2 and respiratory protocol

## 2022-08-01 NOTE — ASSESSMENT & PLAN NOTE
· Patient is doing well from a COVID standpoint  · Patient is unvaccinated and had a positive home test 4 days ago  · Is not requiring any supplemental oxygen  · Will obtain baseline COVID labs  · Trend is indicated  · Placed on O2 and respiratory protocol  · Continue pre-hospital respiratory medications  · Incentive spirometry

## 2022-08-02 ENCOUNTER — HOME HEALTH ADMISSION (OUTPATIENT)
Dept: HOME HEALTH SERVICES | Facility: HOME HEALTHCARE | Age: 87
End: 2022-08-02
Payer: MEDICARE

## 2022-08-02 LAB
ANION GAP SERPL CALCULATED.3IONS-SCNC: 6 MMOL/L (ref 4–13)
BACTERIA UR QL AUTO: NORMAL /HPF
BILIRUB UR QL STRIP: NEGATIVE
BUN SERPL-MCNC: 12 MG/DL (ref 5–25)
CALCIUM SERPL-MCNC: 8.3 MG/DL (ref 8.4–10.2)
CHLORIDE SERPL-SCNC: 97 MMOL/L (ref 96–108)
CHOLEST SERPL-MCNC: 170 MG/DL
CLARITY UR: CLEAR
CO2 SERPL-SCNC: 24 MMOL/L (ref 21–32)
COLOR UR: YELLOW
CREAT SERPL-MCNC: 0.9 MG/DL (ref 0.6–1.3)
CREAT UR-MCNC: 34.9 MG/DL
ERYTHROCYTE [DISTWIDTH] IN BLOOD BY AUTOMATED COUNT: 12.7 % (ref 11.6–15.1)
GFR SERPL CREATININE-BSD FRML MDRD: 74 ML/MIN/1.73SQ M
GLUCOSE SERPL-MCNC: 91 MG/DL (ref 65–140)
GLUCOSE UR STRIP-MCNC: NEGATIVE MG/DL
HCT VFR BLD AUTO: 35.8 % (ref 36.5–49.3)
HDLC SERPL-MCNC: 64 MG/DL
HGB BLD-MCNC: 12.2 G/DL (ref 12–17)
HGB UR QL STRIP.AUTO: NEGATIVE
INR PPP: 2.13 (ref 0.84–1.19)
KETONES UR STRIP-MCNC: NEGATIVE MG/DL
LDLC SERPL CALC-MCNC: 89 MG/DL (ref 0–100)
LEUKOCYTE ESTERASE UR QL STRIP: NEGATIVE
MCH RBC QN AUTO: 30.7 PG (ref 26.8–34.3)
MCHC RBC AUTO-ENTMCNC: 34.1 G/DL (ref 31.4–37.4)
MCV RBC AUTO: 90 FL (ref 82–98)
MICROALBUMIN UR-MCNC: 35.6 MG/L (ref 0–20)
MICROALBUMIN/CREAT 24H UR: 102 MG/G CREATININE (ref 0–30)
NITRITE UR QL STRIP: NEGATIVE
NON-SQ EPI CELLS URNS QL MICRO: NORMAL /HPF
NONHDLC SERPL-MCNC: 106 MG/DL
OSMOLALITY UR/SERPL-RTO: 275 MMOL/KG (ref 282–298)
OSMOLALITY UR: 311 MMOL/KG
PH UR STRIP.AUTO: 7.5 [PH]
PLATELET # BLD AUTO: 154 THOUSANDS/UL (ref 149–390)
PMV BLD AUTO: 8.9 FL (ref 8.9–12.7)
POTASSIUM SERPL-SCNC: 4 MMOL/L (ref 3.5–5.3)
PROT UR STRIP-MCNC: NEGATIVE MG/DL
PROT UR-MCNC: 15 MG/DL
PROT/CREAT UR: 0.43 MG/G{CREAT} (ref 0–0.1)
PROTHROMBIN TIME: 23.7 SECONDS (ref 11.6–14.5)
RBC # BLD AUTO: 3.97 MILLION/UL (ref 3.88–5.62)
RBC #/AREA URNS AUTO: NORMAL /HPF
SODIUM 24H UR-SCNC: 76 MOL/L
SODIUM SERPL-SCNC: 127 MMOL/L (ref 135–147)
SP GR UR STRIP.AUTO: 1.01 (ref 1–1.03)
TRIGL SERPL-MCNC: 84 MG/DL
TSH SERPL DL<=0.05 MIU/L-ACNC: 4.49 UIU/ML (ref 0.45–4.5)
UROBILINOGEN UR QL STRIP.AUTO: 0.2 E.U./DL
UUN 24H UR-MCNC: 268 MG/DL
WBC # BLD AUTO: 3.86 THOUSAND/UL (ref 4.31–10.16)
WBC #/AREA URNS AUTO: NORMAL /HPF

## 2022-08-02 PROCEDURE — 97116 GAIT TRAINING THERAPY: CPT

## 2022-08-02 PROCEDURE — 82043 UR ALBUMIN QUANTITATIVE: CPT | Performed by: INTERNAL MEDICINE

## 2022-08-02 PROCEDURE — 83935 ASSAY OF URINE OSMOLALITY: CPT | Performed by: INTERNAL MEDICINE

## 2022-08-02 PROCEDURE — 84300 ASSAY OF URINE SODIUM: CPT | Performed by: INTERNAL MEDICINE

## 2022-08-02 PROCEDURE — 84540 ASSAY OF URINE/UREA-N: CPT | Performed by: INTERNAL MEDICINE

## 2022-08-02 PROCEDURE — 82570 ASSAY OF URINE CREATININE: CPT | Performed by: INTERNAL MEDICINE

## 2022-08-02 PROCEDURE — 99223 1ST HOSP IP/OBS HIGH 75: CPT | Performed by: INTERNAL MEDICINE

## 2022-08-02 PROCEDURE — 80061 LIPID PANEL: CPT | Performed by: INTERNAL MEDICINE

## 2022-08-02 PROCEDURE — 97163 PT EVAL HIGH COMPLEX 45 MIN: CPT

## 2022-08-02 PROCEDURE — 81001 URINALYSIS AUTO W/SCOPE: CPT | Performed by: INTERNAL MEDICINE

## 2022-08-02 PROCEDURE — 99232 SBSQ HOSP IP/OBS MODERATE 35: CPT | Performed by: INTERNAL MEDICINE

## 2022-08-02 PROCEDURE — 85610 PROTHROMBIN TIME: CPT | Performed by: INTERNAL MEDICINE

## 2022-08-02 PROCEDURE — 83930 ASSAY OF BLOOD OSMOLALITY: CPT | Performed by: INTERNAL MEDICINE

## 2022-08-02 PROCEDURE — 85027 COMPLETE CBC AUTOMATED: CPT | Performed by: INTERNAL MEDICINE

## 2022-08-02 PROCEDURE — 80048 BASIC METABOLIC PNL TOTAL CA: CPT | Performed by: INTERNAL MEDICINE

## 2022-08-02 PROCEDURE — 84156 ASSAY OF PROTEIN URINE: CPT | Performed by: INTERNAL MEDICINE

## 2022-08-02 PROCEDURE — 84443 ASSAY THYROID STIM HORMONE: CPT | Performed by: INTERNAL MEDICINE

## 2022-08-02 PROCEDURE — 36415 COLL VENOUS BLD VENIPUNCTURE: CPT | Performed by: INTERNAL MEDICINE

## 2022-08-02 RX ORDER — CARVEDILOL 3.12 MG/1
3.12 TABLET ORAL 2 TIMES DAILY WITH MEALS
Status: DISCONTINUED | OUTPATIENT
Start: 2022-08-02 | End: 2022-08-05 | Stop reason: HOSPADM

## 2022-08-02 RX ORDER — HYDRALAZINE HYDROCHLORIDE 20 MG/ML
5 INJECTION INTRAMUSCULAR; INTRAVENOUS EVERY 6 HOURS PRN
Status: DISCONTINUED | OUTPATIENT
Start: 2022-08-02 | End: 2022-08-05 | Stop reason: HOSPADM

## 2022-08-02 RX ADMIN — BUDESONIDE AND FORMOTEROL FUMARATE DIHYDRATE 2 PUFF: 160; 4.5 AEROSOL RESPIRATORY (INHALATION) at 08:15

## 2022-08-02 RX ADMIN — BUDESONIDE AND FORMOTEROL FUMARATE DIHYDRATE 2 PUFF: 160; 4.5 AEROSOL RESPIRATORY (INHALATION) at 18:32

## 2022-08-02 RX ADMIN — CARVEDILOL 3.12 MG: 3.12 TABLET, FILM COATED ORAL at 17:03

## 2022-08-02 RX ADMIN — AMLODIPINE BESYLATE 10 MG: 10 TABLET ORAL at 08:14

## 2022-08-02 RX ADMIN — PANTOPRAZOLE SODIUM 40 MG: 40 TABLET, DELAYED RELEASE ORAL at 06:05

## 2022-08-02 RX ADMIN — B-COMPLEX W/ C & FOLIC ACID TAB 1 TABLET: TAB at 08:14

## 2022-08-02 RX ADMIN — GLYCERIN, HYPROMELLOSE, POLYETHYLENE GLYCOL 1 DROP: .2; .2; 1 LIQUID OPHTHALMIC at 18:32

## 2022-08-02 RX ADMIN — MONTELUKAST 10 MG: 10 TABLET, FILM COATED ORAL at 08:14

## 2022-08-02 RX ADMIN — WARFARIN SODIUM 8 MG: 5 TABLET ORAL at 18:32

## 2022-08-02 NOTE — ASSESSMENT & PLAN NOTE
· Tested positive on 7/28 at home at was taking Molnupiravir  · No evidence on PNA on CXR   · As he is not requiring supplemental oxygen, no decadron or Remdesivir is required   · Continue supportive care

## 2022-08-02 NOTE — ED NOTES
Lab called regarding UA with microscopic and will collected with urine sent down       Vero Valle RN  08/02/22 [id number]

## 2022-08-02 NOTE — PLAN OF CARE
Problem: PHYSICAL THERAPY ADULT  Goal: Performs mobility at highest level of function for planned discharge setting  See evaluation for individualized goals  Description: Treatment/Interventions: Functional transfer training, LE strengthening/ROM, Therapeutic exercise, Endurance training, Patient/family training, Equipment eval/education, Bed mobility, Gait training, Spoke to case management  Equipment Recommended: Merlin Sovereign (has own)       See flowsheet documentation for full assessment, interventions and recommendations  Note: Prognosis: Good  Problem List: Decreased strength, Decreased range of motion, Decreased endurance, Impaired balance, Decreased mobility, Decreased coordination, Decreased safety awareness  Assessment: Pt  admitted to [place] [id number] on 7/31/22 for hyponatremia  Pt  evaluated by PT on 8/2/22 in ED to assess functional mobility  Pt  was lying supine with HOB elevated at the beginning of the session  Pt  was cognitively oriented and responsive to evaluation questions  Pain scale on a 0-10 was a 0  During bed mobility pt  required supervision  During transfers pt  required minimal assistance from sit <> stand from bed and moderate assistance sit <> stand chair  Pt  ambulated 40 ft with RW with Minimal assistance x2  40 feet total, two 20 feet walks within room  One sitting break in between to switch AD from cane to RW  Gait pattern during ambulation with a cane was wide DAVID, forward flexed, improper weight shift, and unsteady  Gait pattern with RW was narrow DAVID, forward flexed, and improper shift shift  Overall pt  Ambulatory balance was Poor  At the end of the session pt  seated in chair with all needs in reach  Pt  daughter called, talked to daughter about AD recommendation and reasoning  Pt  will benefit from skilled physical therapy to help increase B LE strength, improve balance, improve transfers, increase ambulation distance and endurance and receive education on RW usage  PT Discharge Recommendation: (S) Home with home health rehabilitation    See flowsheet documentation for full assessment

## 2022-08-02 NOTE — ASSESSMENT & PLAN NOTE
Lab Results   Component Value Date    EGFR 74 08/02/2022    EGFR 62 08/01/2022    EGFR 75 08/01/2022    EGFR 75 08/01/2022    CREATININE 0 90 08/02/2022    CREATININE 1 04 08/01/2022    CREATININE 0 88 08/01/2022    CREATININE 0 86 08/01/2022     · Creatinine is at baseline  · Continue to monitor

## 2022-08-02 NOTE — PLAN OF CARE
Problem: PAIN - ADULT  Goal: Verbalizes/displays adequate comfort level or baseline comfort level  Description: Interventions:  - Encourage patient to monitor pain and request assistance  - Assess pain using appropriate pain scale  - Administer analgesics based on type and severity of pain and evaluate response  - Implement non-pharmacological measures as appropriate and evaluate response  - Consider cultural and social influences on pain and pain management  - Notify physician/advanced practitioner if interventions unsuccessful or patient reports new pain  Outcome: Progressing     Problem: INFECTION - ADULT  Goal: Absence or prevention of progression during hospitalization  Description: INTERVENTIONS:  - Assess and monitor for signs and symptoms of infection  - Monitor lab/diagnostic results  - Monitor all insertion sites, i e  indwelling lines, tubes, and drains  - Monitor endotracheal if appropriate and nasal secretions for changes in amount and color  - [place] appropriate cooling/warming therapies per order  - Administer medications as ordered  - Instruct and encourage patient and family to use good hand hygiene technique  - Identify and instruct in appropriate isolation precautions for identified infection/condition  Outcome: Progressing  Goal: Absence of fever/infection during neutropenic period  Description: INTERVENTIONS:  - Monitor WBC    Outcome: Progressing

## 2022-08-02 NOTE — ED NOTES
Patient given breakfast meal at this time       Xiomara FaustV[facility]  08/02/22 [id number]

## 2022-08-02 NOTE — ASSESSMENT & PLAN NOTE
· Home Losartan and HCTZ held due to hyponatremia  · Continue Amlodipine 10mg daily  · Coreg 3 125 BID added today for tighter BP control

## 2022-08-02 NOTE — NURSING NOTE
Patient transferred from ER to room, contact isolation due to COVID positive  No complaints of pain, back achy from ER bed  Lung sounds diminished with expiratory wheezing, non productive cough  Ambulates well with cane  Oriented to room, call bell in reach

## 2022-08-02 NOTE — CASE MANAGEMENT
Case Management Discharge Planning Note    Patient name Frederick Older  Location /-74 MRN [id number]  : 1930 Date 2022       Current Admission Date: 2022  Current Admission Diagnosis:Hyponatremia   Patient Active Problem List    Diagnosis Date Noted    Hyponatremia 2022    COVID-19 virus infection 2022    [facility] chronic [facility] exacerbation [facility] 2022    Hypercalcemia 02/10/2022    Vaccine refused by patient 2021    Stage 3 chronic kidney disease, unspecified whether stage 3a or 3b CKD ([facility] ) 2021    Pulmonary emphysema ([facility] ) 2021    Endothelial corneal dystrophy of both eyes 2021    Mature cataract 2021    Primary open-angle glaucoma, bilateral, mild stage 2021    Pseudophakic bullous keratopathy of left eye 2021    GERD without esophagitis     Neoplasm of uncertain behavior of skin 2020    Encounter for long-term (current) use of medications 2020    Primary osteoarthritis of both knees 2020    Age-related cataract of both eyes 2020    Premature beats 2020    Chronic pulmonary embolism without acute cor pulmonale (HCC) 2020    Osteoarthritis of both knees 2020    Other seborrheic dermatitis 2020    Abnormality of gait 2020    Intrinsic eczema 2019    Mild persistent asthma, uncomplicated     Prothrombin gene mutation [facility] ) 2019    Primary generalized (osteo)arthritis 2019    Essential hypertension, benign 2019    Dyslipidemia 2019    Heterozygous factor V Leiden mutation [facility] ) 2018    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 ([facility] ) 2018    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[facility] ) 2018    History of pulmonary embolism 2018    TB lung, latent 2018    Asthma 2012      LOS (days): 2  Geometric Mean LOS (GMLOS) (days): 3 30  Days to GMLOS:1 5     OBJECTIVE:  Risk of Unplanned Readmission Score: 17 48      Current admission status: Inpatient   Preferred Pharmacy:   [address]  [address] [facility] [address]  Phone: [phone] Fax: [phone]    Primary Care Provider: Ambika Gregorio DO    Primary Insurance: MEDICARE  Secondary Insurance: [facility]    DISCHARGE DETAILS:    Discharge planning discussed with[de-identified] Sepideh [place] of Choice: Yes     CM contacted family/caregiver?: Yes  Were Treatment Team discharge recommendations reviewed with patient/caregiver?: Yes  Did patient/caregiver verbalize understanding of patient care needs?: Yes  Were patient/caregiver advised of the risks associated with not following Treatment Team discharge recommendations?: Yes    Contacts  Patient Contacts:  Gabriel Cleary (Daughter)   [phone] (Mobile)  Relationship to Patient[de-identified] Family  Contact Method: Phone  Phone Number: Gabriel Cleary (Daughter)   [phone] (Mobile)  Reason/Outcome: Emergency Contact, Discharge [address]         Is the patient interested in [facility] at discharge?: Yes  [address] requested[de-identified] Nursing, Occupational Therapy, Physical [address] Name[de-identified] [facility] Provider[de-identified] PCP  Home Health Services Needed[de-identified] Evaluate Functional Status and Safety, Gait/ADL Training, Strengthening/Theraputic Exercises to Improve Function, Other (comment) (COVID + CP assessment Med instruction)  Homebound Criteria Met[de-identified] Uses an Assist Device (i e  cane, walker, etc)  Supporting Clincal Findings[de-identified] Fatigues Easliy in [employer], [employer] Endurance    DME Referral Provided  Referral made for DME?: No    Other Referral/Resources/Interventions Provided:  Interventions: [facility]    Treatment Team Recommendation: Home with [address]     Additional Comments: Per therapy michael recommendations are home with HHC  Therapy eval done and dtr was on phone and recommendations already discussed  Referral made to [facility] and they can accept same discussed with dtr   Per dtr patient has walkers at home

## 2022-08-02 NOTE — CONSULTS
Consultation - Nephrology   Kimberly Rodriguez 80 y o  male MRN: [id number]  Unit/Bed#: ED 24 Encounter: [id number]      A/P:  1  Hyponatremia   - multifactorial due to HCTZ use and high free water intake in the setting of Covid 19   -I asked him to li it his water intake to 30 ounces or less (he has a 10 ounce mug that he refills)   - He will need follow up labs as an outpatient   - avoid HCTZ completely as an outpatient   - mild fluid restriction to raise serum sodium  > 130 mmol/liter for safety to avoid falls    2  Covid 19   - relatively asymptomatic    3  GERD without esophagitis   - taking PPI    4  Benign essential hypertension   - blood pressure is too high >>160   - start low dose carvedilol 3 125mg twice a day and titrate upward         Thank you for allowing us to participate in the care of your patient  Please feel free to contact us regarding the care of this patient, or any other questions/concerns that may be applicable      Patient Active Problem List   Diagnosis    Mild persistent asthma, uncomplicated    [facility]rombin gene mutation [facility] )    Primary generalized (osteo)arthritis    Essential hypertension, benign    Dyslipidemia    Intrinsic eczema    Other seborrheic dermatitis    Abnormality of gait    Osteoarthritis of both knees    Chronic pulmonary embolism without acute cor pulmonale (HCC)    Age-related cataract of both eyes    Premature beats    Primary osteoarthritis of both knees    Neoplasm of uncertain behavior of skin    Encounter for long-term (current) use of medications    GERD without esophagitis    [facility]ma [facility] )    Stage 3 chronic kidney disease, unspecified whether stage 3a or 3b CKD ([facility] )    Vaccine refused by patient    Hypercalcemia    COVID-19 virus infection    Obstructive chronic bronchitis without exacerbation ([facility] )    Asthma    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[facility] )    Endothelial corneal dystrophy of both eyes    Heterozygous factor V Leiden mutation ([facility] )    Heterozygous for prothrombin [id number] mutation ([facility] )    History of pulmonary embolism    Mature cataract    Primary open-angle glaucoma, bilateral, mild stage    Pseudophakic bullous keratopathy of left eye    TB lung, latent    Hyponatremia       History of Present Illness   Physician Requesting Consult: Kristal Koehler,   Reason for Consult / Principal Problem: hyponatremia  Hx and PE limited by:   HPI: Naye Rodriguez is a 80y o  year old male who presents with fatigue and tested positive for COVID 4 days ago  He had  Been eating and drinking well and denied any change in urinary pattern  He had no nausea or vomiting but had 2 days of diarrhea  He presented with a sodium level of 121 mmol/liter  He had been taking HCTZ and losartan PTA  He was treated with normal saline with improvement in serum sodium to 127 mmol/liter  No urine studies are available  Records review reveal that his serum sodium runs in the 133-135 range dating back to last December  Beverage history --> 50 ounces of water and 40 ounces of coffee, occasional beer (every other day)        History obtained from chart review and the patient    Constitutional ROS-Had fatigue,no  fever, chills, night sweats, weight changes  HEENT ROS- Denies history of eye surgeries, glaucoma, headaches or history of trauma, blurred vision     Endocrine ROS- No history diabetes mellitus or thyroid disease  Cardiovascular ROS- Denies chest pain, palpitation, dyspnea exertion, orthopnea, claudication  Pulmonary ROS- Has history of COPD, asthma  Denies cough, hemoptysis, shortness of breath  GI ROS- Denies abdominal pain, had 2 days of diarrhea no , nausea, swallowing problems, vomiting, constipation, blood in stools, fecal incontinence  Hematological ROS- Denies history of easy bruising had , blood clots,     Genitourinary ROS- Denies recent hematuria, pyuria, flank pain, change in urinary stream, decreased urinary output, increased urinary frequency, nocturia, foamy urine, or urinary incontinence  Lymphatic ROS- Denies lymphadenopathy  Musculoskeletal ROS- Denies history of muscle weakness,has  joint pain  Dermatological ROS- Denies rash,   Psychiatric ROS- Denies anxiety, depression,   Neurological ROS- No stroke or TIA symptoms        Historical Information   Past Medical History:   Diagnosis Date    Arthritis     Coagulation defect ([facility] )     last assessed: 11/28/2018    COPD (chronic obstructive pulmonary disease) ([facility] )     last assessed: 5/14/2019, 12/6/2017    Generalized osteoarthritis     last assessed: 1/28/2019    GERD without esophagitis     last assessed: 1/28/2019    Glaucoma     last assessed: 8/4/2011    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[facility] )     last assessed: 10/16/2018    Factor II Prothrombin Gene per Chartmaker    History of kidney stones 1985    Hx of blood clots     Hypertension     last assessed: 5/14/2019    Impaired fasting glucose     last assessed: 8/26/2013    Mild persistent asthma, uncomplicated     last assessed: 11/28/2018    Nephrolithiasis     Nondisplaced intertrochanteric fracture of right femur, initial encounter for closed fracture ([facility] )     last assessed: 1/28/2019    Premature ventricular contractions     last assessed: 8/4/2011    Pulmonary nodule     Scoliosis (and kyphoscoliosis), idiopathic     Wears dentures      Past Surgical History:   Procedure Laterality Date    CARPAL TUNNEL RELEASE Left     ENDO 8/801    CATARACT EXTRACTION Left     COLONOSCOPY  10/07/2008    last assessed: 3/29/2016    HERNIA REPAIR      HIP SURGERY      HIP SURGERY Left 03/05/1999    ORIF    MULTIPLE TOOTH EXTRACTIONS Bilateral     OTHER SURGICAL HISTORY Right 10/30/2001    [facility]    TONSILLECTOMY Bilateral     WISDOM TOOTH EXTRACTION Bilateral      Social History   Social History     Substance and Sexual Activity   Alcohol Use Yes Comment: social     Social History     Substance and Sexual Activity   Drug Use Never     Social History     Tobacco Use   Smoking Status Former Smoker    Years: 10 00    Types: Pipe    Start date: 56    Quit date: 1970    Years since quittin 6   Smokeless Tobacco Never Used     Family History   Problem Relation Age of Onset    Hypertension Mother     Stroke Mother     Heart attack Father        Meds/Allergies   all current active meds have been reviewed, current meds:   Current Facility-Administered Medications   Medication Dose Route Frequency    acetaminophen (TYLENOL) tablet 650 mg  650 mg Oral Q6H PRN    albuterol inhalation solution 2 5 mg  2 5 mg Nebulization Q4H PRN    amLODIPine (NORVASC) tablet 10 mg  10 mg Oral Daily    budesonide-formoterol (SYMBICORT) 160-4 5 mcg/act inhaler 2 puff  2 puff Inhalation BID    bupivacaine (PF) (MARCAINE) 0 25 % injection 10 mL  10 mL Infiltration     bupivacaine (PF) (MARCAINE) 0 25 % injection 10 mL  10 mL Infiltration     Difluprednate 0 05 % EMUL 1 drop  1 drop Ophthalmic BID    glycerin-hypromellose- (ARTIFICIAL TEARS) ophthalmic solution 1 drop  1 drop Both Eyes Q3H PRN    hydrALAZINE (APRESOLINE) injection 5 mg  5 mg Intravenous Q6H PRN    methylPREDNISolone acetate (DEPO-MEDROL) injection 2 mL  2 mL Intra-articular     methylPREDNISolone acetate (DEPO-MEDROL) injection 2 mL  2 mL Intra-articular     montelukast (SINGULAIR) tablet 10 mg  10 mg Oral Daily    multivitamin stress formula tablet 1 tablet  1 tablet Oral Daily    ondansetron (ZOFRAN) injection 4 mg  4 mg Intravenous Q6H PRN    pantoprazole (PROTONIX) EC tablet 40 mg  40 mg Oral Early Morning    polyethylene glycol (GLYCOLAX) bowel prep 17 g  17 g Oral Daily    triamcinolone (KENALOG) 0 1 % cream   Topical BID PRN    warfarin (COUMADIN) tablet 8 mg  8 mg Oral Daily (warfarin)    and PTA meds:  (Not in a hospital admission)        Allergies   Allergen Reactions    Alphagan P [Brimonidine Tartrate] Itching       Objective     Intake/Output Summary (Last 24 hours) at 8/2/2022 1301  Last data filed at 8/2/2022 1009  Gross per 24 hour   Intake --   Output 1290 ml   Net -1290 ml       Invasive Devices:        Physical Exam      I/O last 3 completed shifts: In: 741 7 [I V :741 7]  Out: 1550 [Urine:1550]    Vitals:    08/02/22 0812   BP: 168/89   Pulse: 82   Resp: 18   Temp: 97 8 °F (36 6 °C)   SpO2: 97%       General Appearance:    No acute distress  Cooperative  Looks younger than  stated age  Head:    Normocephalic  Atraumatic  Normal jaw occlusion  Eyes:    Lids, conjunctiva normal  No scleral icterus  Ears:    Normal external ears  Nose:   Nares normal  No drainage  Mouth:   Lips, tongue normal  Mucosa normal  Phonation normal    Neck:   Supple  Symmetrical    Back:     Symmetric  No CVA tenderness  Lungs:     Normal respiratory effort  Clear to auscultation bilaterally  Chest wall:    No tenderness or deformity  Heart:    Regular rate and rhythm  Normal S1 and S2  No murmur  No JVD  No edema  Abdomen:     Soft  Non-tender  Bowel sounds active  Genitourinary:   No Butler catheter present  Urine in urinal clear yellow   Extremities:   Extremities normal  Atraumatic  No cyanosis  Skin:   Warm and dry  No pallor, jaundice, rash, ecchymoses  Neurologic:   Alert and oriented to person, place, time  No focal deficit  Current Weight: Weight - Scale: 79 4 kg (175 lb)  First Weight: Weight - Scale: 79 4 kg (175 lb)    Lab Results:  I have personally reviewed pertinent labs      CBC:   Lab Results   Component Value Date    WBC 3 86 (L) 08/02/2022    HGB 12 2 08/02/2022    HCT 35 8 (L) 08/02/2022    MCV 90 08/02/2022     08/02/2022    MCH 30 7 08/02/2022    MCHC 34 1 08/02/2022    RDW 12 7 08/02/2022    MPV 8 9 08/02/2022     CMP:   Lab Results   Component Value Date    K 4 0 08/02/2022    CL 97 08/02/2022    CO2 24 08/02/2022    BUN 12 08/02/2022 CREATININE 0 90 08/02/2022    CALCIUM 8 3 (L) 08/02/2022    EGFR 74 08/02/2022     Phosphorus: No results found for: PHOS  Magnesium: No results found for: MG  Urinalysis: No results found for: COLORU, CLARITYU, SPECGRAV, PHUR, LEUKOCYTESUR, NITRITE, PROTEINUA, GLUCOSEU, KETONESU, BILIRUBINUR, BLOODU  Ionized Calcium: No results found for: CAION  Coagulation:   Lab Results   Component Value Date    INR 2 13 (H) 08/02/2022     Troponin: No results found for: TROPONINI  ABG: No results found for: PHART, AQN1KLE, PO2ART, LDH8CRY, A3RYRZGV, BEART, SOURCE    Results from last 7 days   Lab Units 08/02/22  0605 08/01/22  1548 08/01/22  0525 07/31/22  2341 07/31/22  1935   POTASSIUM mmol/L 4 0 4 5 4 0  4 0   < > 4 7   CHLORIDE mmol/L 97 95* 95*  95*   < > 91*   CO2 mmol/L 24 25 24  24   < > 25   BUN mg/dL 12 16 16  16   < > 22   CREATININE mg/dL 0 90 1 04 0 88  0 86   < > 1 02   CALCIUM mg/dL 8 3* 8 4 8 7  8 6   < > 8 6   ALK PHOS U/L  --   --  57  --  70   ALT U/L  --   --  24  --  26   AST U/L  --   --  41*  --  50*    < > = values in this interval not displayed  Radiology review:  Procedure: XR chest 1 view portable    Result Date: 8/1/2022  Narrative: CHEST INDICATION:   Shortness of breath COVID positive  COMPARISON:  Chest x-ray October 2016, CT thorax 2010 EXAM PERFORMED/VIEWS:  XR CHEST PORTABLE FINDINGS: Cardiomediastinal silhouette appears unremarkable  Scarring in the right lateral middle lobe is unchanged  Dextroscoliosis and spondylosis  Impression: No acute cardiopulmonary disease  Right lung scarring  Workstation performed: [id number]         EKG, Pathology, and Other Studies: I have reviewed labs and notes and  Records review for historic serum sodium  I personally reviewed the CXR and it appears unremarkable to my reading  Radiology thinks there is right sided scarring    Counseling / Coordination of Care  Total ADDITIONAL floor / unit time spent today 40 minutes   Greater than 50% of total time was spent with the patient and / or family counseling and / or coordination of care  A description of the counseling / coordination of care: will discuss with primary team    Sherley Good MD      This consultation note was produced in part using a dictation device which may document imprecise wording from author's original intent

## 2022-08-02 NOTE — PROGRESS NOTES
[facility]  Progress Note - Philipp Learn 8/12/1930, 80 y o  male MRN: [id number]  Unit/Bed#: ED 24 Encounter: [id number]  Primary Care Provider: Yuridia Oviedo DO   Date and time admitted to hospital: 7/31/2022  7:09 PM    * Hyponatremia  Assessment & Plan  · Na 121 upon admission which has improved to 127   · Continue to hold home HCTZ and Losartan   · IVF discontinued and he was placed on 1 8L fluid restriction  · Follow-up AM labs  · Nephrology following    COVID-19 virus infection  Assessment & Plan  · Tested positive on 7/28 at home at was taking Molnupiravir  · No evidence on PNA on CXR   · As he is not requiring supplemental oxygen, no decadron or Remdesivir is required   · Continue supportive care         Stage 3 chronic kidney disease, unspecified whether stage 3a or 3b CKD [facility])  Assessment & Plan  Lab Results   Component Value Date    EGFR 74 08/02/2022    EGFR 62 08/01/2022    EGFR 75 08/01/2022    EGFR 75 08/01/2022    CREATININE 0 90 08/02/2022    CREATININE 1 04 08/01/2022    CREATININE 0 88 08/01/2022    CREATININE 0 86 08/01/2022     · Creatinine is at baseline  · Continue to monitor     Essential hypertension, benign  Assessment & Plan  · Home Losartan and HCTZ held due to hyponatremia  · Continue Amlodipine 10mg daily  · Coreg 3 125 BID added today for tighter BP control      VTE Pharmacologic Prophylaxis: VTE Score: 6 High Risk (Score >/= 5) - Pharmacological DVT Prophylaxis Ordered: warfarin (Coumadin)  Sequential Compression Devices Ordered  Patient Centered Rounds: I performed bedside rounds with nursing staff today  Discussions with Specialists or Other Care Team Provider: Nursing, PT, and CM    Education and Discussions with Family / Patient: Updated patient's wife at her bedside as she is also admitted to the hospital      Time Spent for Care: 30 minutes  More than 50% of total time spent on counseling and coordination of care as described above      Current Length of Stay: 2 day(s)  Current Patient Status: Inpatient   Certification Statement: The patient will continue to require additional inpatient hospital stay due to hyponatremia  Discharge Plan: Anticipate discharge in 24-48 hrs to home with home services  Code Status: Level 1 - Full Code    Subjective:   Patient sitting up in bedside chair without any acute complaints  He is very anxious for discharge home  No significant overnight events reported by nursing  Objective:     Vitals:   Temp (24hrs), Av 8 °F (36 6 °C), Min:97 8 °F (36 6 °C), Max:97 8 °F (36 6 °C)    Temp:  [97 8 °F (36 6 °C)] 97 8 °F (36 6 °C)  HR:  [] 82  Resp:  [18] 18  BP: (160-187)/(83-92) 168/89  SpO2:  [94 %-97 %] 97 %  Body mass index is 30 04 kg/m²  Input and Output Summary (last 24 hours): Intake/Output Summary (Last 24 hours) at 2022 1335  Last data filed at 2022 1009  Gross per 24 hour   Intake --   Output 1290 ml   Net -1290 ml       Physical Exam:   Physical Exam  Vitals and nursing note reviewed  Constitutional:       General: He is not in acute distress  Appearance: Normal appearance  He is obese  HENT:      Head: Normocephalic and atraumatic  Mouth/Throat:      Mouth: Mucous membranes are moist       Pharynx: Oropharynx is clear  Eyes:      Extraocular Movements: Extraocular movements intact  Conjunctiva/sclera: Conjunctivae normal    Cardiovascular:      Rate and Rhythm: Normal rate and regular rhythm  Pulses: Normal pulses  Heart sounds: Normal heart sounds  No murmur heard  Pulmonary:      Effort: Pulmonary effort is normal  No respiratory distress  Breath sounds: Normal breath sounds  No wheezing  Abdominal:      General: Bowel sounds are normal  There is no distension  Palpations: Abdomen is soft  Tenderness: There is no abdominal tenderness  Musculoskeletal:         General: Normal range of motion        Cervical back: Normal range of motion and neck supple  Skin:     General: Skin is warm and dry  Neurological:      General: No focal deficit present  Mental Status: He is alert and oriented to person, place, and time  Mental status is at baseline  Psychiatric:         Mood and Affect: Mood normal          Behavior: Behavior normal          Judgment: Judgment normal         Labs:  Results from last 7 days   Lab Units 08/02/22  0605 08/01/22  0525   WBC Thousand/uL 3 86* 2 54*   HEMOGLOBIN g/dL 12 2 12 0   HEMATOCRIT % 35 8* 34 6*   PLATELETS Thousands/uL 154 153   NEUTROS PCT %  --  39*   LYMPHS PCT %  --  41   MONOS PCT %  --  19*   EOS PCT %  --  1     Results from last 7 days   Lab Units 08/02/22  0605 08/01/22  1548 08/01/22  0525   SODIUM mmol/L 127*   < > 126*  126*   POTASSIUM mmol/L 4 0   < > 4 0  4 0   CHLORIDE mmol/L 97   < > 95*  95*   CO2 mmol/L 24   < > 24  24   BUN mg/dL 12   < > 16  16   CREATININE mg/dL 0 90   < > 0 88  0 86   ANION GAP mmol/L 6   < > 7  7   CALCIUM mg/dL 8 3*   < > 8 7  8 6   ALBUMIN g/dL  --   --  3 3*   TOTAL BILIRUBIN mg/dL  --   --  0 37   ALK PHOS U/L  --   --  57   ALT U/L  --   --  24   AST U/L  --   --  41*   GLUCOSE RANDOM mg/dL 91   < > 87  87    < > = values in this interval not displayed  Results from last 7 days   Lab Units 08/02/22  1125   INR  2 13*             Results from last 7 days   Lab Units 07/31/22  1935   LACTIC ACID mmol/L 0 8   PROCALCITONIN ng/ml 0 11       Lines/Drains:  Invasive Devices  Report    Peripheral Intravenous Line  Duration           Peripheral IV 07/31/22 Right Wrist 2 days              Imaging: No new imaging  Recent Cultures (last 7 days):   Results from last 7 days   Lab Units 07/31/22  2341 07/31/22  2256   BLOOD CULTURE  No Growth at 24 hrs  No Growth at 24 hrs         Last 24 Hours Medication List:   Current Facility-Administered Medications   Medication Dose Route Frequency Provider Last Rate    acetaminophen  650 mg Oral Q6H PRN Eileen Serra PA-C  albuterol  2 5 mg Nebulization Q4H PRN Vita Clamp, PA-C      amLODIPine  10 mg Oral Daily [place], [place]      budesonide-formoterol  2 puff Inhalation BID Goltry Clamp, PA-C      bupivacaine (PF)  10 mL Infiltration  Jurline Leonardo, DO      bupivacaine (PF)  10 mL Infiltration  Jurline Leonardo, DO      carvedilol  3 125 mg Oral BID With Meals Gustavo Muller MD      Difluprednate  1 drop Ophthalmic BID Vita Clamp, PA-C      glycerin-hypromellose-  1 drop Both Eyes Q3H PRN Vita Clamp, PA-C      hydrALAZINE  5 mg Intravenous Q6H PRN [facility], DO      methylPREDNISolone acetate  2 mL Intra-articular  Jurline Leonardo, DO      methylPREDNISolone acetate  2 mL Intra-articular  Jurline Leonardo, DO      montelukast  10 mg Oral Daily Goltry Clamp, PA-C      multivitamin stress formula  1 tablet Oral Daily Goltry Clamp, PA-C      ondansetron  4 mg Intravenous Q6H PRN Vita Clamp, PA-C      pantoprazole  40 mg Oral Early Morning Goltry Clamp, PA-C      polyethylene glycol  17 g Oral Daily Vita Clamp, PA-C      triamcinolone   Topical BID PRN Vita Clamp, PA-C      warfarin  8 mg Oral Daily (warfarin) Vita Clamp, PA-C          Today, Patient Was Seen By: [facility], DO    **Please Note: This note may have been constructed using a voice recognition system  **

## 2022-08-02 NOTE — CASE MANAGEMENT
Case Management Discharge Planning Note    Patient name Brian Jones  Location ED 24/ED 24 MRN [id number]  : 1930 Date 2022       Current Admission Date: 2022  Current Admission Diagnosis:Hyponatremia   Patient Active Problem List    Diagnosis Date Noted    Hyponatremia 2022    COVID-19 virus infection 2022    Obstructive chronic bronchitis without exacerbation ([facility] ) 2022    Hypercalcemia 02/10/2022    Vaccine refused by patient 2021    Stage 3 chronic kidney disease, unspecified whether stage 3a or 3b CKD ([facility] ) 2021    Pulmonary emphysema ([facility] ) 2021    Endothelial corneal dystrophy of both eyes 2021    Mature cataract 2021    Primary open-angle glaucoma, bilateral, mild stage 2021    Pseudophakic bullous keratopathy of left eye 2021    GERD without esophagitis     Neoplasm of uncertain behavior of skin 2020    Encounter for long-term (current) use of medications 2020    Primary osteoarthritis of both knees 2020    Age-related cataract of both eyes 2020    Premature beats 2020    Chronic pulmonary embolism without acute cor pulmonale (HCC) 2020    Osteoarthritis of both knees 2020    Other seborrheic dermatitis 2020    Abnormality of gait 2020    Intrinsic eczema 2019    Mild persistent asthma, uncomplicated     Prothrombin gene mutation ([facility] ) 2019    Primary generalized (osteo)arthritis 2019    Essential hypertension, benign 2019    Dyslipidemia 2019    Heterozygous factor V Leiden mutation ([facility] ) 2018    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 ([facility] ) 2018    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[facility] ) 2018    History of pulmonary embolism 2018    TB lung, latent 2018    Asthma 2012      LOS (days): 2  Geometric Mean LOS (GMLOS) (days): 3 30  Days to LOS:1 8     OBJECTIVE:  Risk of Unplanned Readmission Score: 17 22      Current admission status: Inpatient   Preferred Pharmacy:   [facility]/pharmacy [id number]- [place] [address]  Phone: [phone] Fax: [phone]    Primary Care Provider: Dion Vasquez DO    Primary Insurance: MEDICARE  Secondary Insurance: [address] DETAILS:    Discharge planning discussed with[de-identified] Daughter Panda Haring of Choice: Yes    Contacts  Patient Contacts: Cuauhtemoc Bell (Daughter)   [phone] (Mobile)  Relationship to Patient[de-identified] Family  Contact Method: Phone  Phone Number: Cuauhtemoc Bell (Daughter)   [phone] (Mobile)  Reason/Outcome: Emergency Contact, Discharge Planning       Additional Comments: Call recieved from dtr Darinel Tena John and siblings concerned about patient and spouses ability to care for selfs at home  Discussed levels of care SNF, PCF,half-way or private duty in the home  Patient has 10 children  Message to [place] provider requested PT/OT eval as discussed with dtr  Explained to dtr have to have therapy evals and recommendations  Explained if patient needs rehab choices are limited due to Ambrosio and not being vaccinated  Dtr states understanding  Directed dtr to patient's spouses CM in ICU    CM department following

## 2022-08-02 NOTE — PHYSICAL THERAPY NOTE
Physical Therapy Evaluation     Patient's Name: Bernabe Ruvalcaba    Admitting Diagnosis  SOB (shortness of breath) [R06 02]    Problem List  Patient Active Problem List   Diagnosis    Mild persistent asthma, uncomplicated    Prothrombin gene mutation ([facility] )    Primary generalized (osteo)arthritis    Essential hypertension, benign    Dyslipidemia    Intrinsic eczema    Other seborrheic dermatitis    Abnormality of gait    Osteoarthritis of both knees    Chronic pulmonary embolism without acute cor pulmonale (HCC)    Age-related cataract of both eyes    Premature beats    Primary osteoarthritis of both knees    Neoplasm of uncertain behavior of skin    Encounter for long-term (current) use of medications    GERD without esophagitis    Pulmonary emphysema (HCC)    Stage 3 chronic kidney disease, unspecified whether stage 3a or 3b CKD ([facility] )    Vaccine refused by patient    Hypercalcemia    COVID-19 virus infection    Obstructive chronic bronchitis without exacerbation (HCC)    Asthma    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[facility] )    Endothelial corneal dystrophy of both eyes    Heterozygous factor V Leiden mutation ([facility] )    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 ([facility] )    History of pulmonary embolism    Mature cataract    Primary open-angle glaucoma, bilateral, mild stage    Pseudophakic bullous keratopathy of left eye    TB lung, latent    Hyponatremia       Past Medical History  Past Medical History:   Diagnosis Date    Arthritis     Coagulation defect ([facility] )     last assessed: 11/28/2018    COPD (chronic obstructive pulmonary disease) ([facility] )     last assessed: 5/14/2019, 12/6/2017    Generalized osteoarthritis     last assessed: 1/28/2019    GERD without esophagitis     last assessed: 1/28/2019    Glaucoma     last assessed: 8/4/2011    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[facility] )     last assessed: 10/16/2018    Factor II Prothrombin Gene per [employer]    History of kidney stones 1985    Hx of blood clots     Hypertension     last assessed: 5/14/2019    Impaired fasting glucose     last assessed: 8/26/2013    Mild persistent asthma, uncomplicated     last assessed: 11/28/2018    Nephrolithiasis     Nondisplaced intertrochanteric fracture of right femur, initial encounter for closed fracture ([facility] )     last assessed: 1/28/2019    Premature ventricular contractions     last assessed: 8/4/2011    Pulmonary nodule     Scoliosis (and kyphoscoliosis), idiopathic     Wears dentures        Past Surgical History  Past Surgical History:   Procedure Laterality Date    CARPAL TUNNEL RELEASE Left     ENDO 8/801    CATARACT EXTRACTION Left     COLONOSCOPY  10/07/2008    last assessed: 3/29/2016    HERNIA REPAIR      HIP SURGERY      HIP SURGERY Left 03/05/1999    ORIF    MULTIPLE TOOTH EXTRACTIONS Bilateral     OTHER SURGICAL HISTORY Right 10/30/2001    [facility]    TONSILLECTOMY Bilateral     WISDOM TOOTH EXTRACTION Bilateral         08/02/22 0958   PT Last Visit   PT Visit Date 08/02/22   Note Type   Note type Evaluation   Pain Assessment   Pain Assessment Tool 0-10   Pain Score No Pain   Restrictions/Precautions   Other Precautions Contact/isolation; Airborne/isolation; Fall Risk   Home Living   Type of [address] [place] [address] Two level; Able to live on main level with bedroom/bathroom; Performs ADLs on one level;Stairs to enter with rails  (2 NILESH with B)   Bathroom Shower/Tub Walk-in shower   Bathroom Toilet Raised   Bathroom Equipment Grab bars in shower;Grab bars around toilet; Shower chair;Commode  (occasionally uses commode)   [address] Cane;Walker;Electric scooter  (Cane at baseline ; rolator)   Prior Function   Level of [place] Independent with ADLs and functional mobility   Lives With [facility] Help From Family   ADL Assistance Independent   IADLs Independent   Vocational Retired   Comments + drive   Cognition   Overall Cognitive Status WFL   Arousal/Participation Alert   Orientation Level Oriented X4   Subjective   Subjective pt  reason for admittance "wife positive for COVID first, I then tested positive for COVID  I had phlegm in my throat and a bout of diarrhea "   RLE Assessment   RLE Assessment X  (3/5 grossly assessed during transfers and ambulation)   LLE Assessment   LLE Assessment X  (3/5 grossly assessed during transfers and ambulation)   Vision-Basic Assessment   Current Vision Wears glasses only for reading   Coordination   Sensation X   Bed Mobility   Supine to Sit 5  Supervision   Additional items Assist x 1;HOB elevated; Bedrails; Increased time required;Verbal cues   Additional Comments Pt  seated in recliner at end of session  VCs to initiate UE and LE movement  Needed Vcs to mobilize feet to touch the floor before continuing therapy  Transfers   Sit to Stand 3  Moderate assistance   Additional items Assist x 1; Increased time required;Verbal cues  (RW)   Stand to Sit 4  Minimal assistance   Additional items Assist x 1; Increased time required;Verbal cues;Armrests   Additional Comments VCs to use one hand to push off from bed while keeping the other on the cane/walker during sit to stand transfer  Verbal cues to use hands to reach for armrests while descending from standing to sit  Pt  performed three sit <> stands, two of which done from a chair  Pt  Reported the chair was deep and needed a mod assist of 1 to stand from the chair; whereas, only needed a supervision for a sit to stand from the bed  Ambulation/Elevation   Gait pattern Wide DAVID; Forward Flexion; Improper Weight shift; Step to;Excessively slow;[place];Narrow DAVID; Foward flexed  (Pt  completed two walks within the room  First walk with cane, pt  had a wide DAVID, forward flexed, and improper weight shift  Second walk in room patient used a RW and had a narrow DAVID )   Gait Assistance 4  Minimal assist   Additional items Verbal cues; Assist x 1   Assistive Device Rolling walker;SPC   Distance 40 feet  (40 feet total  two 20 feet walks within room  One sitting break in between to switch AD from cane to RW )   Ambulation/Elevation Additional Comments VCs for ambulatory direction  VCs for safety while using SPC  VCs to correct gait pattern while using RW  Balance   Static Sitting Good   Dynamic Sitting Fair +   Static Standing Fair -   Dynamic Standing Poor +   Ambulatory Poor   Endurance Deficit   Endurance Deficit Yes   Endurance Deficit Description impaired balance   Activity Tolerance   Activity Tolerance Patient limited by fatigue   Medical Staff Made Aware [facility]   Assessment   Prognosis Good   Problem List Decreased strength;Decreased range of motion;Decreased endurance; Impaired balance;Decreased mobility; Decreased coordination;Decreased safety awareness   Assessment Pt  admitted to [place] [address] on 7/31/22 for hyponatremia  Pt  evaluated by PT on 8/2/22 in ED to assess functional mobility  Pt  was lying supine with HOB elevated at the beginning of the session  Pt  was cognitively oriented and responsive to evaluation questions  Pain scale on a 0-10 was a 0  During bed mobility pt  required supervision  During transfers pt  required minimal assistance from sit <> stand from bed and moderate assistance sit <> stand chair  Pt  ambulated 40 ft with RW with Minimal assistance x2  40 feet total, two 20 feet walks within room  One sitting break in between to switch AD from cane to RW  Gait pattern during ambulation with a cane was wide DAVID, forward flexed, improper weight shift, and unsteady  Gait pattern with RW was narrow DAVID, forward flexed, and improper shift shift  Overall pt  Ambulatory balance was Poor  At the end of the session pt  seated in chair with all needs in reach  Pt  daughter called, talked to daughter about AD recommendation and reasoning   Pt  will benefit from skilled physical therapy to help increase B LE strength, improve balance, improve transfers, increase ambulation distance and endurance and receive education on RW usage  Goals   Patient Goals to go home   LTG Expiration Date 08/12/22   Long Term Goal #1 1) Pt  Will increase gross B LE strength by a half a grade to be able to improve ambulation endurance, decrease fall risk, and improve overall gait pattern  2) Pt  Will increase ambulation to 75 ft RW close CGA to be able walk within home environment  3) Pt  Will improve ambulatory balance to Fair to be able to attend to external cues and perform head turns  4) Pt  Will improve sit <> stand transfers from chair to CGA to be able to decrease burden of care and increase chair safety  5) Pt  Will understand ambulate using RW and receive zero verbal cues to improve safety and decrease fall risk  Plan   Treatment/Interventions Functional transfer training;LE strengthening/ROM; Therapeutic exercise; Endurance training;Patient/family training;Equipment eval/education; Bed mobility;Gait training;Spoke to case management   PT Frequency 3-5x/wk   Recommendation   PT Discharge Recommendation (S)  Home with home health rehabilitation   Equipment Recommended Warner Bazan  (has own)   Pranav [address] walker   Change/add to [address]? No   AM-PAC Basic Mobility Inpatient   Turning in Bed Without Bedrails 3   Lying on Back to Sitting on Edge of Flat Bed 3   Moving Bed to Chair 3   Standing Up From Chair 3   Walk in Room 2   Climb 3-5 Stairs 2   Basic Mobility Inpatient Raw Score 16   Basic Mobility Standardized Score 38 32   Highest Level Of Mobility   JH-HLM Goal 5: Stand one or more mins   JH-HLM Achieved 7: Walk 25 feet or more   Additional Treatment Session   Start Time 1010   End Time 1021   Treatment Assessment Pt  treated for functional mobility in addition to evaluation on 8/2/22  Pt  received one unit of gait training  Treatment included fitting for new AD, standard RW  During walk pt   Received VCs for gait pattern, needs reinforcement  Pt  tolerated treatment well and will benefit from continuous physical therapy  Equipment Use RW   End of Consult   Patient Position at End of Consult   (Pt  seated in chair at end of session )     History: COVID, COPD, hx of PE, mild persistent asthma, stage three chronic kidney disease, pulmonary emphysema, idiopathic scoliosis  Body System Impairments: SOB, increased workload and stress of the cardiopulomnary system, leg length discrepancy on the left from hip surgery  Clinical Presentation: poor gait pattern, increased AD usage from cane to RW, poor balance, decreased actvity tolerance, weakness during sit to stand transfer from chair, increased fall risk  Based off this the pts  clinical presentation is of a high complexity           Kailey Hickman, SPT

## 2022-08-02 NOTE — ASSESSMENT & PLAN NOTE
· Na 121 upon admission which has improved to 127   · Continue to hold home HCTZ and Losartan   · IVF discontinued and he was placed on 1 8L fluid restriction  · Follow-up AM labs  · Nephrology following

## 2022-08-03 LAB
ANION GAP SERPL CALCULATED.3IONS-SCNC: 4 MMOL/L (ref 4–13)
ATRIAL RATE: 91 BPM
BUN SERPL-MCNC: 12 MG/DL (ref 5–25)
CALCIUM SERPL-MCNC: 8.6 MG/DL (ref 8.4–10.2)
CHLORIDE SERPL-SCNC: 96 MMOL/L (ref 96–108)
CO2 SERPL-SCNC: 26 MMOL/L (ref 21–32)
CREAT SERPL-MCNC: 0.88 MG/DL (ref 0.6–1.3)
ERYTHROCYTE [DISTWIDTH] IN BLOOD BY AUTOMATED COUNT: 12.8 % (ref 11.6–15.1)
GFR SERPL CREATININE-BSD FRML MDRD: 75 ML/MIN/1.73SQ M
GLUCOSE SERPL-MCNC: 92 MG/DL (ref 65–140)
HCT VFR BLD AUTO: 34.4 % (ref 36.5–49.3)
HGB BLD-MCNC: 11.9 G/DL (ref 12–17)
INR PPP: 2.43 (ref 0.84–1.19)
MCH RBC QN AUTO: 30.9 PG (ref 26.8–34.3)
MCHC RBC AUTO-ENTMCNC: 34.6 G/DL (ref 31.4–37.4)
MCV RBC AUTO: 89 FL (ref 82–98)
P AXIS: 33 DEGREES
PLATELET # BLD AUTO: 154 THOUSANDS/UL (ref 149–390)
PMV BLD AUTO: 8.9 FL (ref 8.9–12.7)
POTASSIUM SERPL-SCNC: 4.4 MMOL/L (ref 3.5–5.3)
PR INTERVAL: 198 MS
PROTHROMBIN TIME: 26.3 SECONDS (ref 11.6–14.5)
QRS AXIS: 78 DEGREES
QRSD INTERVAL: 80 MS
QT INTERVAL: 336 MS
QTC INTERVAL: 413 MS
RBC # BLD AUTO: 3.85 MILLION/UL (ref 3.88–5.62)
SODIUM SERPL-SCNC: 126 MMOL/L (ref 135–147)
T WAVE AXIS: 28 DEGREES
VENTRICULAR RATE: 91 BPM
WBC # BLD AUTO: 3.91 THOUSAND/UL (ref 4.31–10.16)

## 2022-08-03 PROCEDURE — 97530 THERAPEUTIC ACTIVITIES: CPT

## 2022-08-03 PROCEDURE — 80048 BASIC METABOLIC PNL TOTAL CA: CPT | Performed by: INTERNAL MEDICINE

## 2022-08-03 PROCEDURE — 85610 PROTHROMBIN TIME: CPT | Performed by: INTERNAL MEDICINE

## 2022-08-03 PROCEDURE — 93010 ELECTROCARDIOGRAM REPORT: CPT | Performed by: INTERNAL MEDICINE

## 2022-08-03 PROCEDURE — 85027 COMPLETE CBC AUTOMATED: CPT | Performed by: INTERNAL MEDICINE

## 2022-08-03 PROCEDURE — 97116 GAIT TRAINING THERAPY: CPT

## 2022-08-03 PROCEDURE — 99232 SBSQ HOSP IP/OBS MODERATE 35: CPT | Performed by: INTERNAL MEDICINE

## 2022-08-03 PROCEDURE — 99233 SBSQ HOSP IP/OBS HIGH 50: CPT | Performed by: INTERNAL MEDICINE

## 2022-08-03 PROCEDURE — 97110 THERAPEUTIC EXERCISES: CPT

## 2022-08-03 PROCEDURE — 97166 OT EVAL MOD COMPLEX 45 MIN: CPT

## 2022-08-03 RX ORDER — POLYETHYLENE GLYCOL 3350 17 G/17G
17 POWDER, FOR SOLUTION ORAL DAILY
Status: DISCONTINUED | OUTPATIENT
Start: 2022-08-03 | End: 2022-08-05 | Stop reason: HOSPADM

## 2022-08-03 RX ADMIN — POLYETHYLENE GLYCOL 3350 17 G: 17 POWDER, FOR SOLUTION ORAL at 11:16

## 2022-08-03 RX ADMIN — B-COMPLEX W/ C & FOLIC ACID TAB 1 TABLET: TAB at 10:53

## 2022-08-03 RX ADMIN — MONTELUKAST 10 MG: 10 TABLET, FILM COATED ORAL at 10:52

## 2022-08-03 RX ADMIN — BUDESONIDE AND FORMOTEROL FUMARATE DIHYDRATE 2 PUFF: 160; 4.5 AEROSOL RESPIRATORY (INHALATION) at 10:52

## 2022-08-03 RX ADMIN — WARFARIN SODIUM 8 MG: 5 TABLET ORAL at 17:30

## 2022-08-03 RX ADMIN — PANTOPRAZOLE SODIUM 40 MG: 40 TABLET, DELAYED RELEASE ORAL at 05:46

## 2022-08-03 RX ADMIN — AMLODIPINE BESYLATE 10 MG: 10 TABLET ORAL at 10:53

## 2022-08-03 RX ADMIN — CARVEDILOL 3.12 MG: 3.12 TABLET, FILM COATED ORAL at 17:29

## 2022-08-03 RX ADMIN — CARVEDILOL 3.12 MG: 3.12 TABLET, FILM COATED ORAL at 10:53

## 2022-08-03 RX ADMIN — BUDESONIDE AND FORMOTEROL FUMARATE DIHYDRATE 2 PUFF: 160; 4.5 AEROSOL RESPIRATORY (INHALATION) at 17:29

## 2022-08-03 NOTE — PROGRESS NOTES
[facility]  Progress Note - Alexandra Hand 8/12/1930, 80 y o  male MRN: [id number]  Unit/Bed#: -01 Encounter: [id number]  Primary Care Provider: Haylie Dalal DO   Date and time admitted to hospital: 7/31/2022  7:09 PM    * Hyponatremia  Assessment & Plan  · Na 121 upon admission   · Na 126; his remained relatively stable with little improvement in the last several days  · Continue to hold home HCTZ and Losartan   · Continue 1 8L fluid restriction  · Nephrology following    COVID-19 virus infection  Assessment & Plan  · Tested positive on 7/28 at home at was taking Molnupiravir  · No evidence on PNA on CXR   · As he is not requiring supplemental oxygen, no decadron or Remdesivir is required   · Continue supportive care         Stage 3 chronic kidney disease, unspecified whether stage 3a or 3b CKD [facility])  Assessment & Plan  Lab Results   Component Value Date    EGFR 75 08/03/2022    EGFR 74 08/02/2022    EGFR 62 08/01/2022    CREATININE 0 88 08/03/2022    CREATININE 0 90 08/02/2022    CREATININE 1 04 08/01/2022     · Creatinine is at baseline  · Continue to monitor     Essential hypertension, benign  Assessment & Plan  · Home Losartan and HCTZ held due to hyponatremia  · Continue Amlodipine 10mg daily and Coreg 3 125 BID       VTE Pharmacologic Prophylaxis: VTE Score: 6 High Risk (Score >/= 5) - Pharmacological DVT Prophylaxis Ordered: warfarin (Coumadin)  Sequential Compression Devices Ordered  Patient Centered Rounds: I performed bedside rounds with nursing staff today  Discussions with Specialists or Other Care Team Provider: Nephrology, Nursing, PT/OT, and CM     Education and Discussions with Family / Patient: Patient declined call to   Wife updated at her bedside as she is also admitted  Time Spent for Care: 30 minutes  More than 50% of total time spent on counseling and coordination of care as described above      Current Length of Stay: 3 day(s)  Current Patient Status: Inpatient   Certification Statement: The patient will continue to require additional inpatient hospital stay due to hyponatremia  Discharge Plan: TBD based on clinical improvement  Home with Ar[facility] Key once medically cleared  Code Status: Level 1 - Full Code    Subjective:   Patient sitting up in bedside chair without any acute complaints  No significant overnight events reported by nursing  Objective:     Vitals:   Temp (24hrs), Av 9 °F (36 6 °C), Min:97 6 °F (36 4 °C), Max:98 3 °F (36 8 °C)    Temp:  [97 6 °F (36 4 °C)-98 3 °F (36 8 °C)] 97 6 °F (36 4 °C)  HR:  [71-94] 71  Resp:  [18-20] 20  BP: (143-152)/(78-88) 143/88  SpO2:  [95 %-98 %] 97 %  Body mass index is 30 04 kg/m²  Input and Output Summary (last 24 hours): Intake/Output Summary (Last 24 hours) at 8/3/2022 1156  Last data filed at 8/3/2022 1101  Gross per 24 hour   Intake 120 ml   Output 950 ml   Net -830 ml       Physical Exam:   Physical Exam  Vitals and nursing note reviewed  Constitutional:       General: He is not in acute distress  Appearance: Normal appearance  He is obese  HENT:      Head: Normocephalic and atraumatic  Mouth/Throat:      Mouth: Mucous membranes are moist       Pharynx: Oropharynx is clear  Eyes:      Extraocular Movements: Extraocular movements intact  Conjunctiva/sclera: Conjunctivae normal    Cardiovascular:      Rate and Rhythm: Normal rate and regular rhythm  Pulses: Normal pulses  Heart sounds: Normal heart sounds  Pulmonary:      Effort: Pulmonary effort is normal  No respiratory distress  Breath sounds: Normal breath sounds  No wheezing  Abdominal:      General: Bowel sounds are normal  There is no distension  Palpations: Abdomen is soft  Tenderness: There is no abdominal tenderness  Musculoskeletal:         General: Normal range of motion  Cervical back: Normal range of motion and neck supple  Skin:     General: Skin is warm and dry  Neurological:      General: No focal deficit present  Mental Status: He is alert and oriented to person, place, and time  Mental status is at baseline  Psychiatric:         Mood and Affect: Mood normal          Behavior: Behavior normal          Judgment: Judgment normal         Labs:  Results from last 7 days   Lab Units 08/03/22  0553 08/02/22  0605 08/01/22  0525   WBC Thousand/uL 3 91*   < > 2 54*   HEMOGLOBIN g/dL 11 9*   < > 12 0   HEMATOCRIT % 34 4*   < > 34 6*   PLATELETS Thousands/uL 154   < > 153   NEUTROS PCT %  --   --  39*   LYMPHS PCT %  --   --  41   MONOS PCT %  --   --  19*   EOS PCT %  --   --  1    < > = values in this interval not displayed  Results from last 7 days   Lab Units 08/03/22  0553 08/01/22  1548 08/01/22  0525   SODIUM mmol/L 126*   < > 126*  126*   POTASSIUM mmol/L 4 4   < > 4 0  4 0   CHLORIDE mmol/L 96   < > 95*  95*   CO2 mmol/L 26   < > 24  24   BUN mg/dL 12   < > 16  16   CREATININE mg/dL 0 88   < > 0 88  0 86   ANION GAP mmol/L 4   < > 7  7   CALCIUM mg/dL 8 6   < > 8 7  8 6   ALBUMIN g/dL  --   --  3 3*   TOTAL BILIRUBIN mg/dL  --   --  0 37   ALK PHOS U/L  --   --  57   ALT U/L  --   --  24   AST U/L  --   --  41*   GLUCOSE RANDOM mg/dL 92   < > 87  87    < > = values in this interval not displayed  Results from last 7 days   Lab Units 08/03/22  1056   INR  2 43*             Results from last 7 days   Lab Units 07/31/22  1935   LACTIC ACID mmol/L 0 8   PROCALCITONIN ng/ml 0 11       Lines/Drains:  Invasive Devices  Report    Peripheral Intravenous Line  Duration           Peripheral IV 07/31/22 Right Wrist 3 days              Imaging: No new imaging  Recent Cultures (last 7 days):   Results from last 7 days   Lab Units 07/31/22  2341 07/31/22  2256   BLOOD CULTURE  No Growth at 48 hrs  No Growth at 48 hrs         Last 24 Hours Medication List:   Current Facility-Administered Medications   Medication Dose Route Frequency Provider Last Rate    acetaminophen  650 mg Oral Q6H PRN Juan Brood, PA-C      albuterol  2 5 mg Nebulization Q4H PRN Juan Brood, PA-C      amLODIPine  10 mg Oral Daily [place], [place]      budesonide-formoterol  2 puff Inhalation BID Juan Brood, PA-C      carvedilol  3 125 mg Oral BID With Meals Lulú Lopez MD      glycerin-hypromellose-  1 drop Both Eyes Q3H PRN Juan Brood, PA-C      hydrALAZINE  5 mg Intravenous Q6H PRN Monica Phuong Muniz       montelukast  10 mg Oral Daily Juan Brood, PA-C      multivitamin stress formula  1 tablet Oral Daily Juan Brood, PA-C      ondansetron  4 mg Intravenous Q6H PRN Juan Brood, PA-C      pantoprazole  40 mg Oral Early Morning Juan Brood, PA-C      polyethylene glycol  17 g Oral Daily Juan Brood, PA-C      triamcinolone   Topical BID PRN Juan Brood, PA-C      warfarin  8 mg Oral Daily (warfarin) Juan Brood, PA-C          Today, Patient Was Seen By: Tabatha Pandey DO    **Please Note: This note may have been constructed using a voice recognition system  **

## 2022-08-03 NOTE — PLAN OF CARE
Problem: PHYSICAL THERAPY ADULT  Goal: Performs mobility at highest level of function for planned discharge setting  See evaluation for individualized goals  Description: Treatment/Interventions: Functional transfer training, LE strengthening/ROM, Therapeutic exercise, Endurance training, Patient/family training, Equipment eval/education, Bed mobility, Gait training, Spoke to case management  Equipment Recommended: Chad Delgado (has own)       See flowsheet documentation for full assessment, interventions and recommendations  Note: Prognosis: Good  Problem List: Decreased strength, Decreased range of motion, Decreased endurance, Impaired balance, Decreased mobility, Decreased coordination, Decreased safety awareness  Assessment: Pt  assessment completed by PT and OT on 8/3/22 in Med Surg to assess functional mobility and progress toward goals  Pt  received one unit of gait training, therapeutic activity, and therapeutic activity  Pt  was seated in recliner at the beginning of the session  Pt  was cognitively oriented and responsive to treatment questions  Pain scale on a 0-10 was a 0  During transfers pt  required CGA  Pt  ambulated a total of 45 feet with RW and close CGA  Gait pattern during ambulation was narrow DAVID, improper akua shift, trendelenberg, excessively slow, step through, and decreased foot clearance  Pt  ambulated at the beginning of session from recliner to bathroom, performed toileting with Supervision, and back to recliner  At recliner pt  completed standing therapeutic exercise within RW  Exercises outlined in chart  Part training exercises to improve glute medius strength for single limb stance, step ups to increase knee flexion and foot clearance during swing  At end of session pt  completed a second walk to implement part training into whole training of gait  At the end of the session pt  seated in recliner with all needs in reach   During activity patient Pt  will benefit from skilled physical therapy to help increase B LE strength, improve balance, increase ambulation distance and endurance and receive education on RW usage  Include single limb balance training, supported by rolling walker tap targets on floor with LE  PT Discharge Recommendation: (S) Home with home health rehabilitation    See flowsheet documentation for full assessment

## 2022-08-03 NOTE — PROGRESS NOTES
Progress Note - Nephrology   Mai Richards 80 y o  male MRN: [id number]  Unit/Bed#: -01 Encounter: [id number]    A/P:  1  Hyponatremia multifactorial   - due to HCTZ and high free water intake in the setting of Covid 19 pneumonia   - Fractional exc of sodium is 1 54% and urine osmolality > serum osmolality     Suggesting osmoreceptor stimulation due to pneumonia   - needs to avoid diuretic therapy and limit fluids    2  Covid 19 pneumonia   - asymptomatic and sitting with oxygen    3  Chronic kidney disease stage 2   - baseline creatinine 0 8-1 mg/dl    4  Essential hypertension   - hold losartan until discharge   - please do not prescribe HCTZ on discharge   - continue amlodipine 10mg and carvedilol 3 125mg twice a day    5   History of asthma   - has dyspnea on exertion but has clear lung fields    Follow up reason for today's visit: Hyponatremia/CKD 2/ essential hypertension/Covid 19 infection    Hyponatremia    Patient Active Problem List   Diagnosis    Mild persistent asthma, uncomplicated    Prothrombin gene mutation ([facility] [address] )    Primary generalized (osteo)arthritis    Essential hypertension, benign    Dyslipidemia    Intrinsic eczema    Other seborrheic dermatitis    Abnormality of gait    Osteoarthritis of both knees    Chronic pulmonary embolism without acute cor pulmonale (HCC)    Age-related cataract of both eyes    Premature beats    Primary osteoarthritis of both knees    Neoplasm of uncertain behavior of skin    Encounter for long-term (current) use of medications    GERD without esophagitis    Pulmonary emphysema ([address] )    Stage 3 chronic kidney disease, unspecified whether stage 3a or 3b CKD ([address] )    Vaccine refused by patient    Hypercalcemia    COVID-19 virus infection    Obstructive chronic bronchitis without exacerbation (HCC)    Asthma    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[address] )    Endothelial corneal dystrophy of both eyes    Heterozygous factor V Leiden mutation ([facility] [address] [facility]    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 ([facility])    History of pulmonary embolism    Mature cataract    Primary open-angle glaucoma, bilateral, mild stage    Pseudophakic bullous keratopathy of left eye    TB lung, latent    Hyponatremia         Subjective:   A 10 point ROS is negative other than a cough and KHALIL - he says he has asthma  Has constipation - no BM since Sunday    Objective:     Vitals: Blood pressure 143/88, pulse 71, temperature 97 6 °F (36 4 °C), resp  rate 20, height 5' 4" (1 626 m), weight 79 4 kg (175 lb), SpO2 97 %  ,Body mass index is 30 04 kg/m²      Weight (last 2 days)     Date/Time Weight    08/02/22 0812 79 4 (175)            Intake/Output Summary (Last 24 hours) at 8/3/2022 1426  Last data filed at 8/3/2022 1101  Gross per 24 hour   Intake 120 ml   Output 950 ml   Net -830 ml     I/O last 3 completed shifts:  In: -   Out: 9600 [Urine:1740]         Physical Exam: /88   Pulse 71   Temp 97 6 °F (36 4 °C)   Resp 20   Ht 5' 4" (1 626 m)   Wt 79 4 kg (175 lb)   SpO2 97%   BMI 30 04 kg/m²     General Appearance:    Alert, cooperative, no distress, appears stated age   Head:    Normocephalic, without obvious abnormality, atraumatic   Eyes:    Conjunctiva/corneas clear   Ears:    Normal external ears   Nose:   Nares normal, septum midline, mucosa normal, no drainage    or sinus tenderness   Throat:   Lips, mucosa, and tongue normal; teeth and gums normal   Neck:   Supple, symmetrical, trachea midline, no adenopathy;        thyroid:  No enlargement/tenderness/nodules; no carotid    bruit or JVD   Back:     Symmetric, no curvature, ROM normal, no CVA tenderness   Lungs:     Clear to auscultation bilaterally, respirations unlabored   Chest wall:    No tenderness or deformity   Heart:    Regular rate and rhythm, S1 and S2 normal, no murmur, rub   or gallop   Abdomen:     Soft, non-tender, bowel sounds active   Extremities: Extremities normal, atraumatic, no cyanosis or edema   Skin:   Skin color, texture, turgor normal, no rashes or lesions   Lymph nodes:   Cervical normal   Neurologic:   CNII-XII intact            Lab, Imaging and other studies: I have personally reviewed pertinent labs  CBC:   Lab Results   Component Value Date    WBC 3 91 (L) 08/03/2022    HGB 11 9 (L) 08/03/2022    HCT 34 4 (L) 08/03/2022    MCV 89 08/03/2022     08/03/2022    MCH 30 9 08/03/2022    MCHC 34 6 08/03/2022    RDW 12 8 08/03/2022    MPV 8 9 08/03/2022     CMP:   Lab Results   Component Value Date    K 4 4 08/03/2022    CL 96 08/03/2022    CO2 26 08/03/2022    BUN 12 08/03/2022    CREATININE 0 88 08/03/2022    CALCIUM 8 6 08/03/2022    EGFR 75 08/03/2022         Results from last 7 days   Lab Units 08/03/22  0553 08/02/22  0605 08/01/22  1548 08/01/22  0525 07/31/22  2341 07/31/22  1935   POTASSIUM mmol/L 4 4 4 0 4 5 4 0  4 0   < > 4 7   CHLORIDE mmol/L 96 97 95* 95*  95*   < > 91*   CO2 mmol/L 26 24 25 24  24   < > 25   BUN mg/dL 12 12 16 16  16   < > 22   CREATININE mg/dL 0 88 0 90 1 04 0 88  0 86   < > 1 02   CALCIUM mg/dL 8 6 8 3* 8 4 8 7  8 6   < > 8 6   ALK PHOS U/L  --   --   --  57  --  70   ALT U/L  --   --   --  24  --  26   AST U/L  --   --   --  41*  --  50*    < > = values in this interval not displayed           Phosphorus: No results found for: PHOS  Magnesium: No results found for: MG  Urinalysis: No results found for: Tedra Paige, SPECGRAV, PHUR, LEUKOCYTESUR, NITRITE, PROTEINUA, GLUCOSEU, KETONESU, BILIRUBINUR, BLOODU  Ionized Calcium: No results found for: CAION  Coagulation:   Lab Results   Component Value Date    INR 2 43 (H) 08/03/2022     Troponin: No results found for: TROPONINI  ABG: No results found for: PHART, WUC6WYN, PO2ART, FDS9QFX, B2XVSGQY, BEART, SOURCE  Radiology review:     IMAGING  Procedure: XR chest 1 view portable    Result Date: 8/1/2022  Narrative: CHEST INDICATION:   Shortness of breath COVID positive  COMPARISON:  Chest x-ray October 2016, CT thorax 2010 EXAM PERFORMED/VIEWS:  XR CHEST PORTABLE FINDINGS: Cardiomediastinal silhouette appears unremarkable  Scarring in the right lateral middle lobe is unchanged  Dextroscoliosis and spondylosis  Impression: No acute cardiopulmonary disease  Right lung scarring   Workstation performed: [id number]       Current Facility-Administered Medications   Medication Dose Route Frequency    acetaminophen (TYLENOL) tablet 650 mg  650 mg Oral Q6H PRN    albuterol inhalation solution 2 5 mg  2 5 mg Nebulization Q4H PRN    amLODIPine (NORVASC) tablet 10 mg  10 mg Oral Daily    budesonide-formoterol (SYMBICORT) 160-4 5 mcg/act inhaler 2 puff  2 puff Inhalation BID    carvedilol (COREG) tablet 3 125 mg  3 125 mg Oral BID With Meals    glycerin-hypromellose- (ARTIFICIAL TEARS) ophthalmic solution 1 drop  1 drop Both Eyes Q3H PRN    hydrALAZINE (APRESOLINE) injection 5 mg  5 mg Intravenous Q6H PRN    montelukast (SINGULAIR) tablet 10 mg  10 mg Oral Daily    multivitamin stress formula tablet 1 tablet  1 tablet Oral Daily    ondansetron (ZOFRAN) injection 4 mg  4 mg Intravenous Q6H PRN    pantoprazole (PROTONIX) EC tablet 40 mg  40 mg Oral Early Morning    polyethylene glycol (MIRALAX) packet 17 g  17 g Oral Daily    triamcinolone (KENALOG) 0 1 % cream   Topical BID PRN    warfarin (COUMADIN) tablet 8 mg  8 mg Oral Daily (warfarin)     Medications Discontinued During This Encounter   Medication Reason    heparin (porcine) subcutaneous injection 5,000 Units     warfarin (COUMADIN) tablet 4 mg     polyvinyl alcohol (LIQUIFILM TEARS) 1 4 % ophthalmic solution 1 drop     hydrochlorothiazide (HYDRODIURIL) tablet 12 5 mg     losartan (COZAAR) tablet 50 mg     sodium chloride 0 9 % infusion     Difluprednate 0 05 % EMUL 1 drop     polyethylene glycol (GLYCOLAX) bowel prep 17 g        Nigel Tim MD      This progress note was produced in part using a dictation device which may document imprecise wording from author's original intent

## 2022-08-03 NOTE — ASSESSMENT & PLAN NOTE
· Na 121 upon admission   · Na 126; his remained relatively stable with little improvement in the last several days  · Continue to hold home HCTZ and Losartan   · Continue 1 8L fluid restriction  · Nephrology following

## 2022-08-03 NOTE — ASSESSMENT & PLAN NOTE
Lab Results   Component Value Date    EGFR 75 08/03/2022    EGFR 74 08/02/2022    EGFR 62 08/01/2022    CREATININE 0 88 08/03/2022    CREATININE 0 90 08/02/2022    CREATININE 1 04 08/01/2022     · Creatinine is at baseline  · Continue to monitor

## 2022-08-03 NOTE — PHYSICAL THERAPY NOTE
Physical Therapy Treatment    Patient's Name: Lindy Khan    Admitting Diagnosis  Hyponatremia [E87 1]  SOB (shortness of breath) [R06 02]  Primary osteoarthritis of both knees [M17 0]    Problem List  Patient Active Problem List   Diagnosis    Mild persistent asthma, uncomplicated    Prothrombin gene mutation ([facility] [address] )    Primary generalized (osteo)arthritis    Essential hypertension, benign    Dyslipidemia    Intrinsic eczema    Other seborrheic dermatitis    Abnormality of gait    Osteoarthritis of both knees    Chronic pulmonary embolism without acute cor pulmonale (HCC)    Age-related cataract of both eyes    Premature beats    Primary osteoarthritis of both knees    Neoplasm of uncertain behavior of skin    Encounter for long-term (current) use of medications    GERD without esophagitis    Pulmonary emphysema (HCC)    Stage 3 chronic kidney disease, unspecified whether stage 3a or 3b CKD ([address] )    Vaccine refused by patient    Hypercalcemia    COVID-19 virus infection    Obstructive chronic bronchitis without exacerbation (HCC)    Asthma    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[address] )    Endothelial corneal dystrophy of both eyes    Heterozygous factor V Leiden mutation ([facility] [address] )    Heterozygous for prothrombin N94275H mutation ([facility] [address] )    History of pulmonary embolism    Mature cataract    Primary open-angle glaucoma, bilateral, mild stage    Pseudophakic bullous keratopathy of left eye    TB lung, latent    Hyponatremia       Past Medical History  Past Medical History:   Diagnosis Date    Arthritis     Coagulation defect ([address] )     last assessed: 11/28/2018    COPD (chronic obstructive pulmonary disease) ([address] )     last assessed: 5/14/2019, 12/6/2017    Generalized osteoarthritis     last assessed: 1/28/2019    GERD without esophagitis     last assessed: 1/28/2019    Glaucoma     last assessed: 8/4/2011    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[facility] [address] )     last assessed: 10/16/2018    Factor II Prothrombin Gene per [employer]    History of kidney stones 1985    Hx of blood clots     Hypertension     last assessed: 5/14/2019    Impaired fasting glucose     last assessed: 8/26/2013    Mild persistent asthma, uncomplicated     last assessed: 11/28/2018    Nephrolithiasis     Nondisplaced intertrochanteric fracture of right femur, initial encounter for closed fracture ([facility] [address] )     last assessed: 1/28/2019    Premature ventricular contractions     last assessed: 8/4/2011    Pulmonary nodule     Scoliosis (and kyphoscoliosis), idiopathic     Wears dentures        Past Surgical History  Past Surgical History:   Procedure Laterality Date    CARPAL TUNNEL RELEASE Left     ENDO 8/801    CATARACT EXTRACTION Left     COLONOSCOPY  10/07/2008    last assessed: 3/29/2016    HERNIA REPAIR      HIP SURGERY      HIP SURGERY Left 03/05/1999    ORIF    MULTIPLE TOOTH EXTRACTIONS Bilateral     OTHER SURGICAL HISTORY Right 10/30/2001    [facility]    TONSILLECTOMY Bilateral     WISDOM TOOTH EXTRACTION Bilateral         08/03/22 1003   PT Last Visit   PT Visit Date 08/03/22   Note Type   Note Type Treatment   Pain Assessment   Pain Assessment Tool 0-10   Pain Score No Pain   Restrictions/Precautions   Weight Bearing Precautions Per Order No   Other Precautions Airborne/isolation;Contact/isolation; Fall Risk   General   Chart Reviewed Yes   Cognition   Overall Cognitive Status WFL   Arousal/Participation Alert; Responsive; Cooperative   Attention Within functional limits   Orientation Level Oriented X4   Memory Within functional limits   Following Commands Follows all commands and directions without difficulty   Subjective   Subjective pt  reports that they are interested in home health care, wife had it in the past and it worked out well for them  Bed Mobility   Additional Comments Pt  seated in recliner at the beginning and end of session     Transfers   Sit to Stand   (CGA)   Additional items Assist x 1;Armrests; Increased time required;Verbal cues  (RW)   Stand to Sit   (CGA)   Additional items Assist x 1;Verbal cues; Increased time required;Armrests   Toilet transfer 5  Supervision   Additional items Assist x 1;Standard toilet;Verbal cues;Armrests   Additional Comments VCs to use one hand to push off from bed while keeping the other on the walker during sit to stand transfer  Pt  used grab bars for toilet transfer  VCs to use hands to reach for armrests while descending from standing to sit  Ambulation/Elevation   Gait pattern Narrow DAVID; Improper Weight shift;[place];Excessively slow; Step through pattern;Decreased foot clearance  (Pt  has a leg length discrepancy, R leg shorter )   Gait Assistance   (close CGA)   Additional items Verbal cues; Assist x 1   Assistive Device Rolling walker   Distance 45 feet  (total  one walk from recliner to bathroom and back to recliner  second walk from recliner to door and back to recliner )   Ambulation/Elevation Additional Comments VCs for ambulatory direction  Pt  instructed to increase DAVID and increase step length  Balance   Static Sitting Good   Dynamic Sitting Good   Static Standing Fair +   Dynamic Standing Fair   Ambulatory Poor +   Endurance Deficit   Endurance Deficit Yes   Endurance Deficit Description Pt  had SOB, pt  was given increased time for symptoms to subside before continuing therapy and frequent rest breaks with water  Pt  had Poor + ambulatory balance  Activity Tolerance   Activity Tolerance Patient limited by fatigue   Medical Staff Made Aware OT Arsenio  (Coassessment completed with Fadi Pascual, due to the complexity of the patient two skilled clinicians were required )   Exercises   Hip Abduction Standing;10 reps;Right;Left;AROM   Hip Adduction Standing;10 reps;AROM; Right;Left   Squat   (Pt  completed seven sit <> stands throughout session )   Marching Standing;15 reps;AROM; Right;Left  (step over obstacle on floor)   Assessment   Prognosis Good Problem List Decreased strength;Decreased range of motion;Decreased endurance; Impaired balance;Decreased mobility; Decreased coordination;Decreased safety awareness   Assessment Pt  assessment completed by PT and OT on 8/3/22 in Med Surg to assess functional mobility and progress toward goals  Pt  received one unit of gait training, therapeutic activity, and therapeutic activity  Pt  was seated in recliner at the beginning of the session  Pt  was cognitively oriented and responsive to treatment questions  Pain scale on a 0-10 was a 0  During transfers pt  required CGA  Pt  ambulated a total of 45 feet with RW and close CGA  Gait pattern during ambulation was narrow DAVID, improper akua shift, trendelenberg, excessively slow, step through, and decreased foot clearance  Pt  ambulated at the beginning of session from recliner to bathroom, performed toileting with Supervision, and back to recliner  At recliner pt  completed standing therapeutic exercise within RW  Exercises outlined in chart  Part training exercises to improve glute medius strength for single limb stance, step ups to increase knee flexion and foot clearance during swing  At end of session pt  completed a second walk to implement part training into whole training of gait  At the end of the session pt  seated in recliner with all needs in reach  During activity patient Pt  will benefit from skilled physical therapy to help increase B LE strength, improve balance, increase ambulation distance and endurance and receive education on RW usage  Goals   Patient Goals to improve sodium levels, call wife, and to go home   LTG Expiration Date 08/12/22   Long Term Goal #1 pt  met two of five goals, remaining three are still relevant  Plan   Treatment/Interventions Functional transfer training;LE strengthening/ROM; Therapeutic exercise; Endurance training;Equipment eval/education;Gait training;OT   Progress Progressing toward goals   PT Frequency 3-5x/wk Recommendation   PT Discharge Recommendation (S)  Home with home health rehabilitation   [id number] [facility] Basic Mobility Inpatient   Turning in Bed Without Bedrails 3   Lying on Back to Sitting on Edge of Flat Bed 3   Moving Bed to Chair 3   Standing Up From Chair 4   Walk in Room 3   Climb 3-5 Stairs 2   Basic Mobility Inpatient Raw Score 18   Basic Mobility Standardized Score 41 05   Highest Level Of Mobility   JH-HLM Goal 6: Walk 10 steps or more   JH-HLM Achieved 7: Walk 25 feet or more   Education   Education Provided Mobility training;Assistive device   Patient Demonstrates acceptance/verbal understanding   End of Consult   Patient Position at End of Consult   (Pt  seated in recliner at the end of session )     Treatment Time: 10:02-10:45        [facility], [facility]

## 2022-08-03 NOTE — PLAN OF CARE
Problem: Potential for Falls  Goal: Patient will remain free of falls  Description: INTERVENTIONS:  - Educate patient/family on patient safety including physical limitations  - Instruct patient to call for assistance with activity   - Consult OT/PT to assist with strengthening/mobility   - Keep Call bell within reach  - Keep bed low and locked with side rails adjusted as appropriate  - Keep care items and personal belongings within reach  - Initiate and maintain comfort rounds  - Make Fall Risk Sign visible to staff  - Apply yellow socks and bracelet for high fall risk patients  - Consider moving patient to room near nurses station  Outcome: Progressing     Problem: INFECTION - ADULT  Goal: Absence or prevention of progression during hospitalization  Description: INTERVENTIONS:  - Assess and monitor for signs and symptoms of infection  - Monitor lab/diagnostic results  - Monitor all insertion sites, i e  indwelling lines, tubes, and drains  - Monitor endotracheal if appropriate and nasal secretions for changes in amount and color  - [place] appropriate cooling/warming therapies per order  - Administer medications as ordered  - Instruct and encourage patient and family to use good hand hygiene technique  - Identify and instruct in appropriate isolation precautions for identified infection/condition  Outcome: Progressing  Goal: Absence of fever/infection during neutropenic period  Description: INTERVENTIONS:  - Monitor WBC    Outcome: Progressing     Problem: SAFETY ADULT  Goal: Patient will remain free of falls  Description: INTERVENTIONS:  - Educate patient/family on patient safety including physical limitations  - Instruct patient to call for assistance with activity   - Consult OT/PT to assist with strengthening/mobility   - Keep Call bell within reach  - Keep bed low and locked with side rails adjusted as appropriate  - Keep care items and personal belongings within reach  - Initiate and maintain comfort rounds  - Make Fall Risk Sign visible to staff  - Apply yellow socks and bracelet for high fall risk patients  - Consider moving patient to room near nurses station  Outcome: Progressing  Goal: Maintain or return to baseline ADL function  Description: INTERVENTIONS:  -  Assess patient's ability to carry out ADLs; assess patient's baseline for ADL function and identify physical deficits which impact ability to perform ADLs (bathing, care of mouth/teeth, toileting, grooming, dressing, etc )  - Assess/evaluate cause of self-care deficits   - Assess range of motion  - Assess patient's mobility; develop plan if impaired  - Assess patient's need for assistive devices and provide as appropriate  - Encourage maximum independence but intervene and supervise when necessary  - Involve family in performance of ADLs  - Assess for home care needs following discharge   - Consider OT consult to assist with ADL evaluation and planning for discharge  - Provide patient education as appropriate  Outcome: Progressing  Goal: Maintains/Returns to pre admission functional level  Description: INTERVENTIONS:  - Perform BMAT or MOVE assessment daily    - Set and communicate daily mobility goal to care team and patient/family/caregiver     - Collaborate with rehabilitation services on mobility goals if consulted  - Out of bed for toileting  - Record patient progress and toleration of activity level   Outcome: Progressing     Problem: DISCHARGE PLANNING  Goal: Discharge to home or other facility with appropriate resources  Description: INTERVENTIONS:  - Identify barriers to discharge w/patient and caregiver  - Arrange for needed discharge resources and transportation as appropriate  - Identify discharge learning needs (meds, wound care, etc )  - Arrange for interpretive services to assist at discharge as needed  - Refer to Case Management Department for coordinating discharge planning if the patient needs post-hospital services based on physician/advanced practitioner order or complex needs related to functional status, cognitive ability, or social support system  Outcome: Progressing     Problem: Knowledge Deficit  Goal: Patient/family/caregiver demonstrates understanding of disease process, treatment plan, medications, and discharge instructions  Description: Complete learning assessment and assess knowledge base    Interventions:  - Provide teaching at level of understanding  - Provide teaching via preferred learning methods  Outcome: Progressing

## 2022-08-03 NOTE — ASSESSMENT & PLAN NOTE
· Home Losartan and HCTZ held due to hyponatremia  · Continue Amlodipine 10mg daily and Coreg 3 125 BID

## 2022-08-03 NOTE — OCCUPATIONAL THERAPY NOTE
Occupational Therapy Evaluation      Emma Matta    8/3/2022    Principal Problem:    Hyponatremia  Active Problems:    Essential hypertension, benign    Stage 3 chronic kidney disease, unspecified whether stage 3a or 3b CKD ([facility] )    COVID-19 virus infection      Past Medical History:   Diagnosis Date    Arthritis     Coagulation defect ([facility] )     last assessed: 11/28/2018    COPD (chronic obstructive pulmonary disease) ([facility] )     last assessed: 5/14/2019, 12/6/2017    Generalized osteoarthritis     last assessed: 1/28/2019    GERD without esophagitis     last assessed: 1/28/2019    Glaucoma     last assessed: 8/4/2011    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[facility] )     last assessed: 10/16/2018    Factor II Prothrombin Gene per [employer]    History of kidney stones 1985    Hx of blood clots     Hypertension     last assessed: 5/14/2019    Impaired fasting glucose     last assessed: 8/26/2013    Mild persistent asthma, uncomplicated     last assessed: 11/28/2018    Nephrolithiasis     Nondisplaced intertrochanteric fracture of right femur, initial encounter for closed fracture ([place] [id number] )     last assessed: 1/28/2019    Premature ventricular contractions     last assessed: 8/4/2011    Pulmonary nodule     Scoliosis (and kyphoscoliosis), idiopathic     Wears dentures        Past Surgical History:   Procedure Laterality Date    CARPAL TUNNEL RELEASE Left     ENDO 8/801    CATARACT EXTRACTION Left     COLONOSCOPY  10/07/2008    last assessed: 3/29/2016    HERNIA REPAIR      HIP SURGERY      HIP SURGERY Left 03/05/1999    ORIF    MULTIPLE TOOTH EXTRACTIONS Bilateral     OTHER SURGICAL HISTORY Right 10/30/2001    [facility]    TONSILLECTOMY Bilateral     WISDOM TOOTH EXTRACTION Bilateral         08/03/22 1022   OT Last Visit   OT Visit Date 08/03/22   Note Type   Note type Evaluation   Restrictions/Precautions   Weight Bearing Precautions Per Order No   Other Precautions Contact/isolation; Airborne/isolation; Fall Risk;Chair Alarm   Pain Assessment   Pain Assessment Tool 0-10   Pain Score No Pain   Home Living   Type of Home House   Home Layout Two level; Able to live on main level with bedroom/bathroom; Performs ADLs on one level;Stairs to enter with rails  (2 NILESH with B HR)   Bathroom Shower/Tub Walk-in shower   Bathroom Toilet Raised   Bathroom Equipment Grab bars in shower;Grab bars around toilet; Shower chair;Commode  (occasionally uses commode)   [address] Cane;Walker;Electric scooter  (Cane at baseline, rollator)   Prior Function   Level of [place] Independent with ADLs and functional mobility   Lives With [place] Help From Family   ADL Assistance Independent   IADLs Independent   Vocational Retired   Comments +   ADL   UB Bathing Assistance 5  Supervision/Setup   LB Bathing Assistance   ([address])   [address] 5  Supervision/Setup    [facility] [address]   ([address])   [address]  5  Supervision/Setup   Bed Mobility   Additional Comments Pt seated OOB in recliner upon arrival and conclusion   Transfers   Sit to Stand   (CGA)   Additional items Assist x 1; Armrests; Increased time required;Verbal cues   Stand to Sit   (CGA)   Additional items Assist x 1; Armrests; Increased time required;Verbal cues   Functional Mobility   Functional Mobility   (CGA)   Additional Comments A x1   Additional items Rolling walker   Balance   Static Sitting Good   Dynamic Sitting Fair +   Static Standing Fair   Dynamic Standing Fair -   Ambulatory Fair -   Activity Tolerance   Activity Tolerance Patient limited by fatigue   Medical Staff Made Aware CARROLL NAVARRO Assessment   RUE Assessment WFL   LUE Assessment   LUE Assessment WFL   Vision-Basic Assessment   Current Vision Wears glasses only for reading   Cognition   Overall Cognitive Status [facility]   Arousal/Participation Alert; Cooperative   Attention Within functional limits   Orientation Level Oriented X4   Memory Within functional limits   Following Commands Follows all commands and directions without difficulty   Assessment   Limitation Decreased ADL status; Decreased Safe judgement during ADL;Decreased endurance;Decreased self-care trans;Decreased high-level ADLs   Prognosis Good   Assessment Pt is a 80 y o  male seen for OT evaluation s/p [facility] w/ Hyponatremia  Comorbidities affecting pt's functional performance at time of assessment include: HTN, CKD, COVID-19  Personal factors affecting pt at time of IE include:steps to enter environment and difficulty performing ADLS  Prior to admission, pt was Mod I with ADLs  Upon evaluation: the following deficits impact occupational performance: decreased balance and decreased tolerance  Pt to benefit from continued skilled OT tx while in the hospital to address deficits as defined above and maximize level of functional independence w ADL's and functional mobility  Occupational Performance areas to address include: bathing/shower, toilet hygiene, dressing, functional mobility and clothing management  From OT standpoint, recommendation at time of d/c would be Home with OT services  Goals   Patient Goals To feel better   Plan   Treatment Interventions ADL retraining;Functional transfer training; Endurance training; Compensatory technique education; Energy conservation; Activityengagement   Goal Expiration Date 08/13/22   OT Treatment Day 0   OT Frequency 3-5x/wk   Recommendation   OT Discharge Recommendation Home with home health rehabilitation   Additional Comments  Pt seen as a co-eval with PT due to the patient's co-morbidities, clinically unstable presentation, and present impairments which are a regression from the patient's baseline  Additional Comments 2 The patient's raw score on the AM-PAC Daily Activity inpatient short form is 20, standardized score is 42 03, greater than 39 4  Patients at this level are likely to benefit from discharge to home   Please refer to the recommendation of the Occupational Therapist for safe discharge planning     AM-PAC Daily Activity Inpatient   Lower Body Dressing 3   Bathing 3   Toileting 3   Upper Body Dressing 3   Grooming 4   Eating 4   Daily Activity Raw Score 20   Daily Activity Standardized Score (Calc for Raw Score >=11) 42 03   AM-PAC Applied Cognition Inpatient   Following a Speech/Presentation 4   Understanding Ordinary Conversation 4   Taking Medications 4   Remembering Where Things Are Placed or Put Away 4   Remembering List of 4-5 Errands 4   Taking Care of Complicated Tasks 4   Applied Cognition Raw Score 24   Applied Cognition Standardized Score 62 21     GOALS:    Pt will achieve the following within specified time frame: STG  Pt will achieve the following goals within 5 days    *ADL transfers with (S) for inc'd independence with ADLs/purposeful tasks    *UB ADL with (I) for inc'd independence with self cares    *LB ADL with (S) using AE prn for inc'd independence with self cares    *Toileting with (S) for clothing management and hygiene for return to PLOF with personal care    *Increase static stand balance to F+ and dyn stand balance to F for inc'd safety with standing purposeful tasks    *Increase stand tolerance x3 m for inc'd tolerance with standing purposeful tasks    *Participate in 10m UE therex to increase overall stamina/activity tolerance for purposeful tasks    *Bed mobility- (S) for inc'd independence to manage own comfort and initiate EOB & OOB purposeful tasks    Pt will achieve the following within specified time frame: LTG  Pt will achieve the following goals within 10 days    *ADL transfers with (I) for inc'd independence with ADLs/purposeful tasks    *LB ADL with (I) using AE prn for inc'd independence with self cares    *Toileting with (I) for clothing management and hygiene for return to PLOF with personal care    *Increase static stand balance to G- and dyn stand balance to F+ for inc'd safety with standing purposeful tasks    *Increase stand tolerance x5 m for inc'd tolerance with standing purposeful tasks    *Bed mobility- (I) for inc'd independence to manage own comfort and initiate EOB & OOB purposeful tasks      Arsenio Yu MS, OTR/L

## 2022-08-03 NOTE — PLAN OF CARE
Problem: Potential for Falls  Goal: Patient will remain free of falls  Description: INTERVENTIONS:  - Educate patient/family on patient safety including physical limitations  - Instruct patient to call for assistance with activity   - Consult OT/PT to assist with strengthening/mobility   - Keep Call bell within reach  - Keep bed low and locked with side rails adjusted as appropriate  - Keep care items and personal belongings within reach  - Initiate and maintain comfort rounds  - Make Fall Risk Sign visible to staff  - Offer Toileting every *** Hours, in advance of need  - Initiate/Maintain ***alarm  - Obtain necessary fall risk management equipment: ***  - Apply yellow socks and bracelet for high fall risk patients  - Consider moving patient to room near nurses station  Outcome: Progressing     Problem: INFECTION - ADULT  Goal: Absence or prevention of progression during hospitalization  Description: INTERVENTIONS:  - Assess and monitor for signs and symptoms of infection  - Monitor lab/diagnostic results  - Monitor all insertion sites, i e  indwelling lines, tubes, and drains  - Monitor endotracheal if appropriate and nasal secretions for changes in amount and color  - [place] appropriate cooling/warming therapies per order  - Administer medications as ordered  - Instruct and encourage patient and family to use good hand hygiene technique  - Identify and instruct in appropriate isolation precautions for identified infection/condition  Outcome: Progressing  Goal: Absence of fever/infection during neutropenic period  Description: INTERVENTIONS:  - Monitor WBC    Outcome: Progressing     Problem: SAFETY ADULT  Goal: Patient will remain free of falls  Description: INTERVENTIONS:  - Educate patient/family on patient safety including physical limitations  - Instruct patient to call for assistance with activity   - Consult OT/PT to assist with strengthening/mobility   - Keep Call bell within reach  - Keep bed low and locked with side rails adjusted as appropriate  - Keep care items and personal belongings within reach  - Initiate and maintain comfort rounds  - Make Fall Risk Sign visible to staff  - Offer Toileting every *** Hours, in advance of need  - Initiate/Maintain ***alarm  - Obtain necessary fall risk management equipment: ***  - Apply yellow socks and bracelet for high fall risk patients  - Consider moving patient to room near nurses station  Outcome: Progressing     Problem: DISCHARGE PLANNING  Goal: Discharge to home or other facility with appropriate resources  Description: INTERVENTIONS:  - Identify barriers to discharge w/patient and caregiver  - Arrange for needed discharge resources and transportation as appropriate  - Identify discharge learning needs (meds, wound care, etc )  - Arrange for interpretive services to assist at discharge as needed  - Refer to Case Management Department for coordinating discharge planning if the patient needs post-hospital services based on physician/advanced practitioner order or complex needs related to functional status, cognitive ability, or social support system  Outcome: Progressing     Problem: Knowledge Deficit  Goal: Patient/family/caregiver demonstrates understanding of disease process, treatment plan, medications, and discharge instructions  Description: Complete learning assessment and assess knowledge base    Interventions:  - Provide teaching at level of understanding  - Provide teaching via preferred learning methods  Outcome: Progressing

## 2022-08-03 NOTE — PLAN OF CARE
Problem: OCCUPATIONAL THERAPY ADULT  Goal: Performs self-care activities at highest level of function for planned discharge setting  See evaluation for individualized goals  Description: Treatment Interventions: ADL retraining, Functional transfer training, Endurance training, Compensatory technique education, Energy conservation, Activityengagement     See flowsheet documentation for full assessment, interventions and recommendations  Note: Limitation: Decreased ADL status, Decreased Safe judgement during ADL, Decreased endurance, Decreased self-care trans, Decreased high-level ADLs  Prognosis: Good  Assessment: Pt is a 80 y o  male seen for OT evaluation s/p admit to [facility] on 7/31/2022 w/ Hyponatremia  Comorbidities affecting pt's functional performance at time of assessment include: HTN, CKD, COVID-19  Personal factors affecting pt at time of IE include:steps to enter environment and difficulty performing ADLS  Prior to admission, pt was Mod I with ADLs  Upon evaluation: the following deficits impact occupational performance: decreased balance and decreased tolerance  Pt to benefit from continued skilled OT tx while in the hospital to address deficits as defined above and maximize level of functional independence w ADL's and functional mobility  Occupational Performance areas to address include: bathing/shower, toilet hygiene, dressing, functional mobility and clothing management  From OT standpoint, recommendation at time of d/c would be Home with OT services       OT Discharge Recommendation: Home with home health rehabilitation     [address] [place] [address] Road, MS, OTR/L

## 2022-08-04 LAB
ANION GAP SERPL CALCULATED.3IONS-SCNC: 5 MMOL/L (ref 4–13)
ANION GAP SERPL CALCULATED.3IONS-SCNC: 6 MMOL/L (ref 4–13)
ANION GAP SERPL CALCULATED.3IONS-SCNC: 7 MMOL/L (ref 4–13)
BUN SERPL-MCNC: 15 MG/DL (ref 5–25)
BUN SERPL-MCNC: 15 MG/DL (ref 5–25)
BUN SERPL-MCNC: 16 MG/DL (ref 5–25)
BUN SERPL-MCNC: 17 MG/DL (ref 5–25)
BUN SERPL-MCNC: 17 MG/DL (ref 5–25)
CALCIUM SERPL-MCNC: 8.8 MG/DL (ref 8.4–10.2)
CALCIUM SERPL-MCNC: 8.8 MG/DL (ref 8.4–10.2)
CALCIUM SERPL-MCNC: 8.9 MG/DL (ref 8.4–10.2)
CALCIUM SERPL-MCNC: 9.1 MG/DL (ref 8.4–10.2)
CALCIUM SERPL-MCNC: 9.5 MG/DL (ref 8.4–10.2)
CHLORIDE SERPL-SCNC: 93 MMOL/L (ref 96–108)
CHLORIDE SERPL-SCNC: 95 MMOL/L (ref 96–108)
CHLORIDE SERPL-SCNC: 95 MMOL/L (ref 96–108)
CHLORIDE SERPL-SCNC: 97 MMOL/L (ref 96–108)
CHLORIDE SERPL-SCNC: 97 MMOL/L (ref 96–108)
CO2 SERPL-SCNC: 22 MMOL/L (ref 21–32)
CO2 SERPL-SCNC: 23 MMOL/L (ref 21–32)
CO2 SERPL-SCNC: 25 MMOL/L (ref 21–32)
CREAT SERPL-MCNC: 0.84 MG/DL (ref 0.6–1.3)
CREAT SERPL-MCNC: 0.86 MG/DL (ref 0.6–1.3)
CREAT SERPL-MCNC: 0.88 MG/DL (ref 0.6–1.3)
CREAT SERPL-MCNC: 0.93 MG/DL (ref 0.6–1.3)
CREAT SERPL-MCNC: 0.94 MG/DL (ref 0.6–1.3)
GFR SERPL CREATININE-BSD FRML MDRD: 70 ML/MIN/1.73SQ M
GFR SERPL CREATININE-BSD FRML MDRD: 71 ML/MIN/1.73SQ M
GFR SERPL CREATININE-BSD FRML MDRD: 75 ML/MIN/1.73SQ M
GFR SERPL CREATININE-BSD FRML MDRD: 75 ML/MIN/1.73SQ M
GFR SERPL CREATININE-BSD FRML MDRD: 76 ML/MIN/1.73SQ M
GLUCOSE SERPL-MCNC: 101 MG/DL (ref 65–140)
GLUCOSE SERPL-MCNC: 118 MG/DL (ref 65–140)
GLUCOSE SERPL-MCNC: 119 MG/DL (ref 65–140)
GLUCOSE SERPL-MCNC: 150 MG/DL (ref 65–140)
GLUCOSE SERPL-MCNC: 91 MG/DL (ref 65–140)
INR PPP: 2.91 (ref 0.84–1.19)
POTASSIUM SERPL-SCNC: 4.3 MMOL/L (ref 3.5–5.3)
POTASSIUM SERPL-SCNC: 4.5 MMOL/L (ref 3.5–5.3)
POTASSIUM SERPL-SCNC: 4.6 MMOL/L (ref 3.5–5.3)
PROTHROMBIN TIME: 30.2 SECONDS (ref 11.6–14.5)
SODIUM SERPL-SCNC: 123 MMOL/L (ref 135–147)
SODIUM SERPL-SCNC: 124 MMOL/L (ref 135–147)
SODIUM SERPL-SCNC: 125 MMOL/L (ref 135–147)
SODIUM SERPL-SCNC: 126 MMOL/L (ref 135–147)
SODIUM SERPL-SCNC: 127 MMOL/L (ref 135–147)

## 2022-08-04 PROCEDURE — 85610 PROTHROMBIN TIME: CPT | Performed by: INTERNAL MEDICINE

## 2022-08-04 PROCEDURE — 97535 SELF CARE MNGMENT TRAINING: CPT

## 2022-08-04 PROCEDURE — 99233 SBSQ HOSP IP/OBS HIGH 50: CPT | Performed by: INTERNAL MEDICINE

## 2022-08-04 PROCEDURE — 97110 THERAPEUTIC EXERCISES: CPT

## 2022-08-04 PROCEDURE — 97116 GAIT TRAINING THERAPY: CPT

## 2022-08-04 PROCEDURE — 99232 SBSQ HOSP IP/OBS MODERATE 35: CPT | Performed by: INTERNAL MEDICINE

## 2022-08-04 PROCEDURE — 80048 BASIC METABOLIC PNL TOTAL CA: CPT | Performed by: INTERNAL MEDICINE

## 2022-08-04 PROCEDURE — 97530 THERAPEUTIC ACTIVITIES: CPT

## 2022-08-04 RX ORDER — POLYVINYL ALCOHOL 14 MG/ML
1 SOLUTION/ DROPS OPHTHALMIC 4 TIMES DAILY
Status: DISCONTINUED | OUTPATIENT
Start: 2022-08-04 | End: 2022-08-05

## 2022-08-04 RX ORDER — LATANOPROST 50 UG/ML
1 SOLUTION/ DROPS OPHTHALMIC
Status: DISCONTINUED | OUTPATIENT
Start: 2022-08-04 | End: 2022-08-05 | Stop reason: HOSPADM

## 2022-08-04 RX ORDER — SILICONE ADHESIVE 1.5" X 3"
1 SHEET (EA) TOPICAL 4 TIMES DAILY
Status: DISCONTINUED | OUTPATIENT
Start: 2022-08-04 | End: 2022-08-05 | Stop reason: HOSPADM

## 2022-08-04 RX ORDER — DEXTROSE MONOHYDRATE 50 MG/ML
50 INJECTION, SOLUTION INTRAVENOUS CONTINUOUS
Status: DISCONTINUED | OUTPATIENT
Start: 2022-08-04 | End: 2022-08-05

## 2022-08-04 RX ORDER — ALBUTEROL SULFATE 90 UG/1
2 AEROSOL, METERED RESPIRATORY (INHALATION) EVERY 4 HOURS PRN
Status: DISCONTINUED | OUTPATIENT
Start: 2022-08-04 | End: 2022-08-05 | Stop reason: HOSPADM

## 2022-08-04 RX ADMIN — WARFARIN SODIUM 8 MG: 5 TABLET ORAL at 18:52

## 2022-08-04 RX ADMIN — CARVEDILOL 3.12 MG: 3.12 TABLET, FILM COATED ORAL at 07:30

## 2022-08-04 RX ADMIN — MONTELUKAST 10 MG: 10 TABLET, FILM COATED ORAL at 09:24

## 2022-08-04 RX ADMIN — PANTOPRAZOLE SODIUM 40 MG: 40 TABLET, DELAYED RELEASE ORAL at 06:44

## 2022-08-04 RX ADMIN — POLYETHYLENE GLYCOL 3350 17 G: 17 POWDER, FOR SOLUTION ORAL at 09:24

## 2022-08-04 RX ADMIN — DEXTROSE 50 ML/HR: 5 SOLUTION INTRAVENOUS at 10:42

## 2022-08-04 RX ADMIN — B-COMPLEX W/ C & FOLIC ACID TAB 1 TABLET: TAB at 09:24

## 2022-08-04 RX ADMIN — BUDESONIDE AND FORMOTEROL FUMARATE DIHYDRATE 2 PUFF: 160; 4.5 AEROSOL RESPIRATORY (INHALATION) at 18:52

## 2022-08-04 RX ADMIN — Medication 15 MG: at 10:57

## 2022-08-04 RX ADMIN — BUDESONIDE AND FORMOTEROL FUMARATE DIHYDRATE 2 PUFF: 160; 4.5 AEROSOL RESPIRATORY (INHALATION) at 09:26

## 2022-08-04 RX ADMIN — AMLODIPINE BESYLATE 10 MG: 10 TABLET ORAL at 09:24

## 2022-08-04 RX ADMIN — CARVEDILOL 3.12 MG: 3.12 TABLET, FILM COATED ORAL at 16:56

## 2022-08-04 NOTE — PLAN OF CARE
Problem: OCCUPATIONAL THERAPY ADULT  Goal: Performs self-care activities at highest level of function for planned discharge setting  See evaluation for individualized goals  Description: Treatment Interventions: ADL retraining, Functional transfer training, Endurance training, Compensatory technique education, Energy conservation, Activityengagement     See flowsheet documentation for full assessment, interventions and recommendations  Note: Limitation: Decreased ADL status, Decreased Safe judgement during ADL, Decreased endurance, Decreased self-care trans, Decreased high-level ADLs  Prognosis: Good  Assessment: Patient participated in Skilled OT session this date with interventions consisting of therapeutic exercise to: increase functional use of BUEs, increase BUE muscle strength  and  therapeutic activities to: increase activity tolerance   Patient agreeable to OT treatment session, upon arrival patient was found seated OOB to Recliner  In comparison to previous session, patient with improvements in functional mobility  Patient requiring frequent rest periods  Patient continues to be functioning below baseline level, occupational performance remains limited secondary to factors listed above and increased risk for falls and injury  From OT standpoint, recommendation at time of d/c would be Home OT   Patient to benefit from continued Occupational Therapy treatment while in the hospital to address deficits as defined above and maximize level of functional independence with ADLs and functional mobility     OT Discharge Recommendation: Home with home health rehabilitation     Anup Judd MS, OTR/L

## 2022-08-04 NOTE — PLAN OF CARE
Problem: PHYSICAL THERAPY ADULT  Goal: Performs mobility at highest level of function for planned discharge setting  See evaluation for individualized goals  Description: Treatment/Interventions: Functional transfer training, LE strengthening/ROM, Therapeutic exercise, Endurance training, Patient/family training, Equipment eval/education, Bed mobility, Gait training, Spoke to case management  Equipment Recommended: Abena Mccartney (has own)       See flowsheet documentation for full assessment, interventions and recommendations  Note: Prognosis: Good  Problem List: Decreased strength, Decreased range of motion, Decreased endurance, Impaired balance, Decreased mobility, Decreased coordination, Decreased safety awareness  Assessment: Pt  assessed by PT and OT on 8/4/22 in Med Surg to assess functional mobility and stair climbing ability  Pt  was seated in recliner at the beginning of the session  Pt  was cognitively oriented and responsive to treatment questions  Pain scale on a 0-10 was a 0  During transfers pt  required supervision  Pt  ambulated 25 feet with RW CGA  Pt  ambulated to bathroom, toileting completed with Supervision  Pt  Ambulated to door and transported to stairs in hallway  Pt  completed 10 stairs with close CGA  Pt  Wheeled back in room and ambulated to recliner  PT and OT alternated therapeutic exercise  Exercises outline in chart  Pt  Tolerated balance training well and showed good strength and motor control of the LE reaching across body to touch targets  Continue balance exercises with pt  Gait pattern during ambulation was excessively slow, short stride, forward flexed, decreased foot clearance, and improper weight shift  During and after ambulation pt  Was SOB and was given frequent breaks to recover before continuing therapy  Suspect gait abnormality is structural due to leg length discrepancy, will continue strengthening surrounding musculature   At the end of the session pt  seated in recliner with all needs in reach  Pt  will benefit from skilled physical therapy to help increase B LE strength, improve balance and increase ambulation distance and endurance  PT Discharge Recommendation: (S) Home with home health rehabilitation    See flowsheet documentation for full assessment

## 2022-08-04 NOTE — PROGRESS NOTES
[address] 45  Progress Note - Jyoti [place] 8/12/1930, 80 y o  male MRN: [id number]  Unit/Bed#: -01 Encounter: [id number]  Primary Care Provider: Ambika Gregorio DO   Date and time admitted to hospital: 7/31/2022  7:09 PM    * Hyponatremia  Assessment & Plan  · Not improving thus far  · Na 121 upon admission   · Na 125 this AM which has further down-trended to 123, 124  · Continue to hold home HCTZ and Losartan   · Continue 1 8L fluid restriction  · Given 1x dose of Tolvaptan  · Nephrology following    COVID-19 virus infection  Assessment & Plan  · Tested positive on 7/28 at home at was taking Molnupiravir  · No evidence on PNA on CXR   · As he is not requiring supplemental oxygen, no decadron or Remdesivir is required   · Continue supportive care         Stage 3 chronic kidney disease, unspecified whether stage 3a or 3b CKD [facility])  Assessment & Plan  Lab Results   Component Value Date    EGFR 76 08/04/2022    EGFR 75 08/04/2022    EGFR 75 08/04/2022    CREATININE 0 84 08/04/2022    CREATININE 0 88 08/04/2022    CREATININE 0 86 08/04/2022     · Creatinine is at baseline  · Continue to monitor     Essential hypertension, benign  Assessment & Plan  · Home Losartan and HCTZ held due to hyponatremia  · Continue Amlodipine 10mg daily and Coreg 3 125 BID       VTE Pharmacologic Prophylaxis: VTE Score: 6 High Risk (Score >/= 5) - Pharmacological DVT Prophylaxis Ordered: warfarin (Coumadin)  Sequential Compression Devices Ordered  Patient Centered Rounds: I performed bedside rounds with nursing staff today  Discussions with Specialists or Other Care Team Provider: Nephrology, Nursing, and CM    Education and Discussions with Family / Patient: Updated  (significant other) at bedside  Time Spent for Care: 45 minutes  More than 50% of total time spent on counseling and coordination of care as described above      Current Length of Stay: 4 day(s)  Current Patient Status: Inpatient   Certification Statement: The patient will continue to require additional inpatient hospital stay due to hyponatremia  Discharge Plan: TBD based on clinical improvement  Code Status: Level 1 - Full Code    Subjective:   Patient sitting up in bedside chair without any acute complaints  No significant overnight events reported by nursing  Objective:     Vitals:   Temp (24hrs), Av 7 °F (36 5 °C), Min:97 5 °F (36 4 °C), Max:97 9 °F (36 6 °C)    Temp:  [97 5 °F (36 4 °C)-97 9 °F (36 6 °C)] 97 5 °F (36 4 °C)  HR:  [70-81] 70  Resp:  [20] 20  BP: (133-143)/(78-83) 143/83  SpO2:  [91 %-97 %] 97 %  Body mass index is 30 04 kg/m²  Input and Output Summary (last 24 hours): Intake/Output Summary (Last 24 hours) at 2022 1432  Last data filed at 2022 1217  Gross per 24 hour   Intake 1660 ml   Output --   Net 1660 ml       Physical Exam:   Physical Exam  Vitals and nursing note reviewed  Constitutional:       General: He is not in acute distress  Appearance: Normal appearance  He is obese  HENT:      Head: Normocephalic and atraumatic  Mouth/Throat:      Mouth: Mucous membranes are moist       Pharynx: Oropharynx is clear  Eyes:      Extraocular Movements: Extraocular movements intact  Conjunctiva/sclera: Conjunctivae normal    Cardiovascular:      Rate and Rhythm: Normal rate and regular rhythm  Pulses: Normal pulses  Heart sounds: Normal heart sounds  Pulmonary:      Effort: Pulmonary effort is normal  No respiratory distress  Breath sounds: Normal breath sounds  No wheezing  Abdominal:      General: Bowel sounds are normal  There is no distension  Palpations: Abdomen is soft  Tenderness: There is no abdominal tenderness  Musculoskeletal:         General: Normal range of motion  Cervical back: Normal range of motion and neck supple  Skin:     General: Skin is warm and dry  Neurological:      General: No focal deficit present  Mental Status: He is alert and oriented to person, place, and time  Psychiatric:         Mood and Affect: Mood normal          Judgment: Judgment normal           Labs:  Results from last 7 days   Lab Units 08/03/22  0553 08/02/22  0605 08/01/22  0525   WBC Thousand/uL 3 91*   < > 2 54*   HEMOGLOBIN g/dL 11 9*   < > 12 0   HEMATOCRIT % 34 4*   < > 34 6*   PLATELETS Thousands/uL 154   < > 153   NEUTROS PCT %  --   --  39*   LYMPHS PCT %  --   --  41   MONOS PCT %  --   --  19*   EOS PCT %  --   --  1    < > = values in this interval not displayed  Results from last 7 days   Lab Units 08/04/22  1403 08/01/22  1548 08/01/22  0525   SODIUM mmol/L 124*   < > 126*  126*   POTASSIUM mmol/L 4 3   < > 4 0  4 0   CHLORIDE mmol/L 95*   < > 95*  95*   CO2 mmol/L 22   < > 24  24   BUN mg/dL 17   < > 16  16   CREATININE mg/dL 0 84   < > 0 88  0 86   ANION GAP mmol/L 7   < > 7  7   CALCIUM mg/dL 8 8   < > 8 7  8 6   ALBUMIN g/dL  --   --  3 3*   TOTAL BILIRUBIN mg/dL  --   --  0 37   ALK PHOS U/L  --   --  57   ALT U/L  --   --  24   AST U/L  --   --  41*   GLUCOSE RANDOM mg/dL 150*   < > 87  87    < > = values in this interval not displayed  Results from last 7 days   Lab Units 08/04/22  1052   INR  2 91*             Results from last 7 days   Lab Units 07/31/22  1935   LACTIC ACID mmol/L 0 8   PROCALCITONIN ng/ml 0 11       Lines/Drains:  Invasive Devices  Report    Peripheral Intravenous Line  Duration           Peripheral IV 07/31/22 Right Wrist 4 days              Imaging: No new imaging  Recent Cultures (last 7 days):   Results from last 7 days   Lab Units 07/31/22  2341 07/31/22  2256   BLOOD CULTURE  No Growth at 72 hrs  No Growth at 72 hrs         Last 24 Hours Medication List:   Current Facility-Administered Medications   Medication Dose Route Frequency Provider Last Rate    acetaminophen  650 mg Oral Q6H PRN Hernandez Bowers PA-C      albuterol  2 puff Inhalation Q4H PRN [address],       amLODIPine  10 mg Oral Daily [facility], DO      budesonide-formoterol  2 puff Inhalation BID Emiliano Smith PA-C      carvedilol  3 125 mg Oral BID With Meals Chinyere Patton MD      dextrose  50 mL/hr Intravenous Continuous Chinyere Patton MD 50 mL/hr (08/04/22 1042)    glycerin-hypromellose-  1 drop Both Eyes Q3H PRN Emiliano Smith PA-C      hydrALAZINE  5 mg Intravenous Q6H PRN [facility], DO      montelukast  10 mg Oral Daily Emiliano Smith PA-C      multivitamin stress formula  1 tablet Oral Daily Emiliano Smith PA-C      ondansetron  4 mg Intravenous Q6H PRN Emiliano Smith PA-C      pantoprazole  40 mg Oral Early Morning Emiliano Smith PA-C      polyethylene glycol  17 g Oral Daily Emiliano Smith PA-C      triamcinolone   Topical BID PRN Emiliano Smith PA-C      warfarin  8 mg Oral Daily (warfarin) Emiliano Smith PA-C          Today, Patient Was Seen By: [facility], DO    **Please Note: This note may have been constructed using a voice recognition system  **

## 2022-08-04 NOTE — PLAN OF CARE
Problem: PAIN - ADULT  Goal: Verbalizes/displays adequate comfort level or baseline comfort level  Description: Interventions:  - Encourage patient to monitor pain and request assistance  - Assess pain using appropriate pain scale  - Administer analgesics based on type and severity of pain and evaluate response  - Implement non-pharmacological measures as appropriate and evaluate response  - Consider cultural and social influences on pain and pain management  - Notify physician/advanced practitioner if interventions unsuccessful or patient reports new pain  Outcome: Progressing     Problem: INFECTION - ADULT  Goal: Absence or prevention of progression during hospitalization  Description: INTERVENTIONS:  - Assess and monitor for signs and symptoms of infection  - Monitor lab/diagnostic results  - Monitor all insertion sites, i e  indwelling lines, tubes, and drains  - Monitor endotracheal if appropriate and nasal secretions for changes in amount and color  - [place] appropriate cooling/warming therapies per order  - Administer medications as ordered  - Instruct and encourage patient and family to use good hand hygiene technique  - Identify and instruct in appropriate isolation precautions for identified infection/condition  Outcome: Progressing     Problem: SAFETY ADULT  Goal: Maintain or return to baseline ADL function  Description: INTERVENTIONS:  -  Assess patient's ability to carry out ADLs; assess patient's baseline for ADL function and identify physical deficits which impact ability to perform ADLs (bathing, care of mouth/teeth, toileting, grooming, dressing, etc )  - Assess/evaluate cause of self-care deficits   - Assess range of motion  - Assess patient's mobility; develop plan if impaired  - Assess patient's need for assistive devices and provide as appropriate  - Encourage maximum independence but intervene and supervise when necessary  - Involve family in performance of ADLs  - Assess for home care needs following discharge   - Consider OT consult to assist with ADL evaluation and planning for discharge  - Provide patient education as appropriate  Outcome: Progressing

## 2022-08-04 NOTE — ASSESSMENT & PLAN NOTE
Lab Results   Component Value Date    EGFR 76 08/04/2022    EGFR 75 08/04/2022    EGFR 75 08/04/2022    CREATININE 0 84 08/04/2022    CREATININE 0 88 08/04/2022    CREATININE 0 86 08/04/2022     · Creatinine is at baseline  · Continue to monitor

## 2022-08-04 NOTE — OCCUPATIONAL THERAPY NOTE
Occupational Therapy Treatment Note      Garo Cardoza    8/4/2022    Principal Problem:    Hyponatremia  Active Problems:    Essential hypertension, benign    Stage 3 chronic kidney disease, unspecified whether stage 3a or 3b CKD ([facility] )    COVID-19 virus infection      Past Medical History:   Diagnosis Date    Arthritis     Coagulation defect ([facility] )     last assessed: 11/28/2018    COPD (chronic obstructive pulmonary disease) ([facility] )     last assessed: 5/14/2019, 12/6/2017    Generalized osteoarthritis     last assessed: 1/28/2019    GERD without esophagitis     last assessed: 1/28/2019    Glaucoma     last assessed: 8/4/2011    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[place] [address] )     last assessed: 10/16/2018    Factor II Prothrombin Gene per [employer]    History of kidney stones 1985    Hx of blood clots     Hypertension     last assessed: 5/14/2019    Impaired fasting glucose     last assessed: 8/26/2013    Mild persistent asthma, uncomplicated     last assessed: 11/28/2018    Nephrolithiasis     Nondisplaced intertrochanteric fracture of right femur, initial encounter for closed fracture ([place] [address] )     last assessed: 1/28/2019    Premature ventricular contractions     last assessed: 8/4/2011    Pulmonary nodule     Scoliosis (and kyphoscoliosis), idiopathic     Wears dentures        Past Surgical History:   Procedure Laterality Date    CARPAL TUNNEL RELEASE Left     ENDO 8/801    CATARACT EXTRACTION Left     COLONOSCOPY  10/07/2008    last assessed: 3/29/2016    HERNIA REPAIR      HIP SURGERY      HIP SURGERY Left 03/05/1999    ORIF    MULTIPLE TOOTH EXTRACTIONS Bilateral     OTHER SURGICAL HISTORY Right 10/30/2001    [facility]    TONSILLECTOMY Bilateral     WISDOM TOOTH EXTRACTION Bilateral         08/04/22 0956   OT Last Visit   OT Visit Date 08/04/22   Note Type   Note Type Treatment   Restrictions/Precautions   Weight Bearing Precautions Per Order No   Other Precautions Airborne/isolation;Contact/isolation; Chair Alarm; Fall Risk   Pain Assessment   Pain Assessment Tool 0-10   Pain Score No Pain   ADL   LB Dressing Assistance 5  Supervision/Setup   LB Dressing Deficit Setup   LB Dressing Comments don B shoes   Toileting Assistance  5  Supervision/Setup   Toileting Deficit Setup;Supervison/safety; Increased time to complete;Grab bar use   Bed Mobility   Additional Comments Pt seated OOB in recliner upon arrival and conclsusion  Transfers   Sit to Stand 5  Supervision   Additional items Assist x 1; Armrests; Increased time required;Verbal cues   Stand to Sit 5  Supervision   Additional items Assist x 1; Increased time required;Verbal cues;Armrests   Toilet transfer 5  Supervision   Additional items Assist x 1;Standard toilet;Verbal cues; Increased time required  (grab bar use)   Toilet Transfers   Toilet Transfer From   (recliner)   Toilet Transfer Type To   Toilet Transfer to Standard toilet   Toilet Transfer Technique Ambulating   Toilet Transfers Supervision   Therapeutic Excerise-Strength   UE Strength Yes   Right Upper Extremity- Strength   R Shoulder   (pro/ret)   R Elbow Elbow flexion;Elbow extension  (sup/pro)   R Position Seated  (in recliner)   R Weight/Reps/Sets no weight, 20 reps, 1 set   Left Upper Extremity-Strength   L Shoulder   (pro/ret)   L Elbow Elbow flexion;Elbow extension  (sup/pro)   L Position Seated  (in recliner)   L Weights/Reps/Sets no weight, 20 reps, 1 set   Cognition   Overall Cognitive Status WFL   Arousal/Participation Alert; Cooperative   Attention Within functional limits   Orientation Level Oriented X4   Memory Within functional limits   Following Commands Follows all commands and directions without difficulty   Activity Tolerance   Activity Tolerance Patient limited by fatigue   Assessment   Assessment Patient participated in Skilled OT session this date with interventions consisting of therapeutic exercise to: increase functional use of BUEs, increase BUE muscle strength  and  therapeutic activities to: increase activity tolerance   Patient agreeable to OT treatment session, upon arrival patient was found seated OOB to Recliner  In comparison to previous session, patient with improvements in functional mobility  Patient requiring frequent rest periods  Patient continues to be functioning below baseline level, occupational performance remains limited secondary to factors listed above and increased risk for falls and injury  From OT standpoint, recommendation at time of d/c would be Home OT  Patient to benefit from continued Occupational Therapy treatment while in the hospital to address deficits as defined above and maximize level of functional independence with ADLs and functional mobility   Plan   Treatment Interventions ADL retraining;Functional transfer training; Endurance training; Compensatory technique education; Energy conservation; Activityengagement   Goal Expiration Date 08/13/22   OT Treatment Day 1   OT Frequency 3-5x/wk   Recommendation   OT Discharge Recommendation Home with home health rehabilitation   Additional Comments  Co treatment with PT completed secondary to complex medical condition of pt  Additional Comments 2 The patient's raw score on the AM-PAC Daily Activity inpatient short form is 20, standardized score is 42 03, greater than 39 4  Patients at this level are likely to benefit from discharge to home  Please refer to the recommendation of the Occupational Therapist for safe discharge planning     AM-PAC Daily Activity Inpatient   Lower Body Dressing 3   Bathing 3   Toileting 3   Upper Body Dressing 3   Grooming 4   Eating 4   Daily Activity Raw Score 20   Daily Activity Standardized Score (Calc for Raw Score >=11) 42 03   AM-[facility] Applied Cognition Inpatient   Following a Speech/Presentation 4   Understanding Ordinary Conversation 4   Taking Medications 4   Remembering Where Things Are Placed or Put Away 4   Remembering List of 4-5 Errands 4   Taking Care of Complicated Tasks 4   Applied Cognition Raw Score 24   Applied Cognition Standardized Score 62 21     Arsenio Trujillo MS, OTR/L

## 2022-08-04 NOTE — PROGRESS NOTES
Progress Note - Nephrology   Emma Matta 80 y o  male MRN: [id number]  Unit/Bed#: -01 Encounter: [id number]    A/P:  1  Hyponatremia   - tolvaptan ordered for 80 serum sodium that had dropped to 125 millimoles per L   - T5 was stopped-it was infusing erroneously  - it should only be used if the serum sodium rises too rapidly by greater than 1 mEq in 2 hours   - patient is voiding well - clear yellow urine in urinal     - continue to monitor BMPs   - avoid diuretic therapy on discharge    2  Covid 19 pneumonia   - completely asymptomatic    3   Chronic kidney disease stage 2   - kidney function is stable with a creatinine of 0 8 mg/dl (eGFR 75-76 ml/min)    Primary hypertension   - blood pressure 130 to 140/80    Follow up reason for today's visit:  Hyponatremia    Hyponatremia    Patient Active Problem List   Diagnosis    Mild persistent asthma, uncomplicated    Prothrombin gene mutation ([facility] )    Primary generalized (osteo)arthritis    Essential hypertension, benign    Dyslipidemia    Intrinsic eczema    Other seborrheic dermatitis    Abnormality of gait    Osteoarthritis of both knees    Chronic pulmonary embolism without acute cor pulmonale (HCC)    Age-related cataract of both eyes    Premature beats    Primary osteoarthritis of both knees    Neoplasm of uncertain behavior of skin    Encounter for long-term (current) use of medications    GERD without esophagitis    Pulmonary emphysema ([facility] )    Stage 3 chronic kidney disease, unspecified whether stage 3a or 3b CKD ([facility] )    Vaccine refused by patient    Hypercalcemia    COVID-19 virus infection    Obstructive chronic bronchitis without exacerbation (HCC)    Asthma    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[address] )    Endothelial corneal dystrophy of both eyes    Heterozygous factor V Leiden mutation ([address] )    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 ([facility] )    History of pulmonary embolism    Mature cataract    Primary open-angle glaucoma, bilateral, mild stage    Pseudophakic bullous keratopathy of left eye    TB lung, latent    Hyponatremia         Subjective:   A 10 point review of systems is unremarkable  The patient looks and feels completely comfortable    Objective:     Vitals: Blood pressure 143/83, pulse 70, temperature 97 5 °F (36 4 °C), resp  rate 20, height 5' 4" (1 626 m), weight 79 4 kg (175 lb), SpO2 97 %  ,Body mass index is 30 04 kg/m²  Weight (last 2 days)     Date/Time Weight    08/02/22 0812 79 4 (175)            Intake/Output Summary (Last 24 hours) at 8/4/2022 1432  Last data filed at 8/4/2022 1217  Gross per 24 hour   Intake 1660 ml   Output --   Net 1660 ml     I/O last 3 completed shifts: In: 56 [P O :600;  I V :10]  Out: 950 [Urine:950]         Physical Exam: /83   Pulse 70   Temp 97 5 °F (36 4 °C)   Resp 20   Ht 5' 4" (1 626 m)   Wt 79 4 kg (175 lb)   SpO2 97%   BMI 30 04 kg/m²     General Appearance:    Alert, cooperative, no distress, appears stated age   Head:    Normocephalic, without obvious abnormality, atraumatic   Eyes:    Conjunctiva/corneas clear   Ears:    Normal external ears   Nose:   Nares normal, septum midline, mucosa normal, no drainage    or sinus tenderness   Throat:   Lips, mucosa, and tongue normal; teeth and gums normal   Neck:   Supple, symmetrical, trachea midline, no adenopathy;        thyroid:  No enlargement/tenderness/nodules; no carotid    bruit or JVD   Back:     Symmetric, no curvature, ROM normal, no CVA tenderness   Lungs:     Clear to auscultation bilaterally, respirations unlabored   Chest wall:    No tenderness or deformity   Heart:    Regular rate and rhythm, S1 and S2 normal, no murmur, rub   or gallop   Abdomen:     Soft, non-tender, bowel sounds active   Extremities:   Extremities normal, atraumatic, no cyanosis or edema   Skin:   Skin color, texture, turgor normal, no rashes or lesions   Lymph nodes:   Cervical normal   Neurologic:   CNII-XII intact            Lab, Imaging and other studies: I have personally reviewed pertinent labs  CBC: No results found for: WBC, HGB, HCT, MCV, PLT, ADJUSTEDWBC, MCH, MCHC, RDW, MPV, NRBC  CMP:   Lab Results   Component Value Date    K 4 3 08/04/2022    CL 95 (L) 08/04/2022    CO2 22 08/04/2022    BUN 17 08/04/2022    CREATININE 0 84 08/04/2022    CALCIUM 8 8 08/04/2022    EGFR 76 08/04/2022         Results from last 7 days   Lab Units 08/04/22  1403 08/04/22  1052 08/04/22  0921 08/01/22  1548 08/01/22  0525 07/31/22  2341 07/31/22  1935   POTASSIUM mmol/L 4 3 4 6 4 5   < > 4 0  4 0   < > 4 7   CHLORIDE mmol/L 95* 93* 95*   < > 95*  95*   < > 91*   CO2 mmol/L 22 25 23   < > 24  24   < > 25   BUN mg/dL 17 15 15   < > 16  16   < > 22   CREATININE mg/dL 0 84 0 88 0 86   < > 0 88  0 86   < > 1 02   CALCIUM mg/dL 8 8 8 8 8 9   < > 8 7  8 6   < > 8 6   ALK PHOS U/L  --   --   --   --  57  --  70   ALT U/L  --   --   --   --  24  --  26   AST U/L  --   --   --   --  41*  --  50*    < > = values in this interval not displayed  Phosphorus: No results found for: PHOS  Magnesium: No results found for: MG  Urinalysis: No results found for: Thomas Bevel, SPECGRAV, PHUR, LEUKOCYTESUR, NITRITE, PROTEINUA, GLUCOSEU, KETONESU, BILIRUBINUR, BLOODU  Ionized Calcium: No results found for: CAION  Coagulation:   Lab Results   Component Value Date    INR 2 91 (H) 08/04/2022     Troponin: No results found for: TROPONINI  ABG: No results found for: PHART, BTW2HKU, PO2ART, BRD3HLJ, U4DQAKOW, BEART, SOURCE  Radiology review:     IMAGING  No results found      Current Facility-Administered Medications   Medication Dose Route Frequency    acetaminophen (TYLENOL) tablet 650 mg  650 mg Oral Q6H PRN    albuterol (PROVENTIL HFA,VENTOLIN HFA) inhaler 2 puff  2 puff Inhalation Q4H PRN    amLODIPine (NORVASC) tablet 10 mg  10 mg Oral Daily    budesonide-formoterol (SYMBICORT) 160-4 5 mcg/act inhaler 2 puff  2 puff Inhalation BID    carvedilol (COREG) tablet 3 125 mg  3 125 mg Oral BID With Meals    dextrose 5 % infusion  50 mL/hr Intravenous Continuous    glycerin-hypromellose- (ARTIFICIAL TEARS) ophthalmic solution 1 drop  1 drop Both Eyes Q3H PRN    hydrALAZINE (APRESOLINE) injection 5 mg  5 mg Intravenous Q6H PRN    montelukast (SINGULAIR) tablet 10 mg  10 mg Oral Daily    multivitamin stress formula tablet 1 tablet  1 tablet Oral Daily    ondansetron (ZOFRAN) injection 4 mg  4 mg Intravenous Q6H PRN    pantoprazole (PROTONIX) EC tablet 40 mg  40 mg Oral Early Morning    polyethylene glycol (MIRALAX) packet 17 g  17 g Oral Daily    triamcinolone (KENALOG) 0 1 % cream   Topical BID PRN    warfarin (COUMADIN) tablet 8 mg  8 mg Oral Daily (warfarin)     Medications Discontinued During This Encounter   Medication Reason    heparin (porcine) subcutaneous injection 5,000 Units     warfarin (COUMADIN) tablet 4 mg     polyvinyl alcohol (LIQUIFILM TEARS) 1 4 % ophthalmic solution 1 drop     hydrochlorothiazide (HYDRODIURIL) tablet 12 5 mg     losartan (COZAAR) tablet 50 mg     sodium chloride 0 9 % infusion     Difluprednate 0 05 % EMUL 1 drop     polyethylene glycol (GLYCOLAX) bowel prep 17 g     albuterol inhalation solution 2 5 mg        Jomar Travis MD      This progress note was produced in part using a dictation device which may document imprecise wording from author's original intent

## 2022-08-04 NOTE — ASSESSMENT & PLAN NOTE
· Not improving thus far  · Na 121 upon admission   · Na 125 this AM which has further down-trended to 123, 124  · Continue to hold home HCTZ and Losartan   · Continue 1 8L fluid restriction  · Given 1x dose of Tolvaptan  · Nephrology following

## 2022-08-04 NOTE — PHYSICAL THERAPY NOTE
Physical Therapy Treatment    Patient's Name: Violette Brock    Admitting Diagnosis  Hyponatremia [E87 1]  SOB (shortness of breath) [R06 02]  Primary osteoarthritis of both knees [M17 0]    Problem List  Patient Active Problem List   Diagnosis    Mild persistent asthma, uncomplicated    Prothrombin gene mutation ([facility] )    Primary generalized (osteo)arthritis    Essential hypertension, benign    Dyslipidemia    Intrinsic eczema    Other seborrheic dermatitis    Abnormality of gait    Osteoarthritis of both knees    Chronic pulmonary embolism without acute cor pulmonale (HCC)    Age-related cataract of both eyes    Premature beats    Primary osteoarthritis of both knees    Neoplasm of uncertain behavior of skin    Encounter for long-term (current) use of medications    GERD without esophagitis    [facility]monary emph[facility]sema [facility] )    Stage 3 chronic kidney disease, unspecified whether stage 3a or 3b CKD ([facility] )    Vaccine refused by patient    Hypercalcemia    COVID-19 virus infection    Obstructive chronic bronchitis without exacerbation ([facility] )    Asthma    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 )    History of pulmonary embolism    Mature cataract    Primary open-angle glaucoma, bilateral, mild stage    Pseudophakic bullous keratopathy of left eye    TB lung, latent    Hyponatremia       Past Medical History  Past Medical History:   Diagnosis Date    Arthritis     Coagulation defect ([facility] )     last assessed: 11/28/2018    COPD (chronic obstructive pulmonary disease) ([facility] )     last assessed: 5/14/2019, 12/6/2017    Generalized osteoarthritis     last assessed: 1/28/2019    GERD without esophagitis     last assessed: 1/28/2019    Glaucoma     last assessed: 8/4/2011    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[facility] )     last assessed: 10/16/2018    Factor II Prothrombin Gene per Chartmaker    History of kidney stones 1985    Hx of blood clots     Hypertension     last assessed: 5/14/2019    Impaired fasting glucose     last assessed: 8/26/2013    Mild persistent asthma, uncomplicated     last assessed: 11/28/2018    Nephrolithiasis     Nondisplaced intertrochanteric fracture of right femur, initial encounter for closed fracture ([facility] )     last assessed: 1/28/2019    Premature ventricular contractions     last assessed: 8/4/2011    Pulmonary nodule     Scoliosis (and kyphoscoliosis), idiopathic     Wears dentures        Past Surgical History  Past Surgical History:   Procedure Laterality Date    CARPAL TUNNEL RELEASE Left     ENDO 8/801    CATARACT EXTRACTION Left     COLONOSCOPY  10/07/2008    last assessed: 3/29/2016    HERNIA REPAIR      HIP SURGERY      HIP SURGERY Left 03/05/1999    OR[facility]    MULTIPLE TOOTH EXTRACTIONS Bilateral     OTHER SURGICAL HISTORY Right 10/30/2001    [facility]    TONSILLECTOMY Bilateral     WISDOM TOOTH EXTRACTION Bilateral         08/04/22 0955   PT Last Visit   PT Visit Date 08/04/22   Pain Assessment   Pain Assessment Tool 0-10   Pain Score No Pain   Restrictions/Precautions   Weight Bearing Precautions Per Order No   Other Precautions Contact/isolation; Airborne/isolation; Chair Alarm; Fall Risk   Cognition   Overall Cognitive Status WFL   Attention Within functional limits   Orientation Level Oriented X4   Memory Within functional limits   Following Commands Follows all commands and directions without difficulty   Subjective   Subjective pt  wanted to use inhaler before starting therapy   Bed Mobility   Additional Comments Pt  seated in recliner at beginning of session  Transfers   Sit to Stand 5  Supervision   Additional items Assist x 1; Armrests; Increased time required;Verbal cues  (RW)   Stand to Sit 5  Supervision   Additional items Assist x 1; Increased time required;Verbal cues;Armrests   Toilet transfer 5  Supervision   Additional items Assist x 1;Standard toilet;Verbal cues; Increased time required  (grab bar use)   Additional Comments VCs to use one hand to push off from bed while keeping the other on the walker during sit to stand transfer  VCs to use hands to reach for armrests while descending from standing to sit  Ambulation/Elevation   Gait pattern Step through pattern;Excessively slow; Short stride; Foward flexed;[place];Decreased foot clearance; Improper Weight shift   Gait Assistance   (CGA)   Additional items Assist x 1;Verbal cues   Assistive Device Rolling walker   Distance 25 feet   Stair Management Assistance   (Close CGA)   Additional items Assist x 1;Verbal cues  (plus one for safety gaurding up and down stairs )   Stair Management Technique Two rails; Step to pattern   Number of Stairs 10  (total  5 up and down, done twice)   Ambulation/Elevation Additional Comments VCs for ambulatory direction, activity pacing, and stair climbing safety  Balance   Static Sitting Good   Dynamic Sitting Good   Static Standing Fair -   Dynamic Standing Poor +   Ambulatory Poor   Endurance Deficit   Endurance Deficit Yes   Endurance Deficit Description pt  was SOB during ambulation and stairs  Pt  was given increased time for symptoms to subside before continuing therapy  Activity Tolerance   Activity Tolerance Patient limited by fatigue   Medical Staff Made Aware OT Javier  (Coassessment completed with Justina Boeck, due to the complexity of the patient two skilled clinicians were required )   Assessment   Prognosis Good   Problem List Decreased strength;Decreased range of motion;Decreased endurance; Impaired balance;Decreased mobility; Decreased coordination;Decreased safety awareness   Assessment Pt  assessed by PT and OT on 8/4/22 in Med Surg to assess functional mobility and stair climbing ability   Pt  Treated with two units for gait training and one unit for therapeutic exercise  Pt  was seated in recliner at the beginning of the session  Pt  was cognitively oriented and responsive to treatment questions  Pain scale on a 0-10 was a 0  During transfers pt  required supervision  Pt  ambulated 25 feet with RW CGA  Pt  ambulated to bathroom, toileting completed with Supervision  Pt  Ambulated to door and transported to stairs in hallway  Pt  completed 10 stairs with close CGA  Pt  Wheeled back in room and ambulated to recliner  PT and OT alternated therapeutic exercise  Exercises outline in chart  Pt  Tolerated balance training well and showed good strength and motor control of the LE reaching across body to touch targets  Continue balance exercises with pt  Gait pattern during ambulation was excessively slow, short stride, forward flexed, decreased foot clearance, and improper weight shift  During and after ambulation pt  Was SOB and was given frequent breaks to recover before continuing therapy  Suspect gait abnormality is structural due to leg length discrepancy, will continue strengthening surrounding musculature  At the end of the session pt  seated in recliner with all needs in reach  Pt  will benefit from skilled physical therapy to help increase B LE strength, improve balance and increase ambulation distance and endurance  Goals   Patient Goals to go home today   LTG Expiration Date 08/12/22   Long Term Goal #1 pt  met goal #5, others still remain  w/ addition of Pt  will complete single limb exercises to increase balance during single limb phase of gait and decrease fall risk  Plan   Treatment/Interventions ADL retraining;Functional transfer training;LE strengthening/ROM; Therapeutic exercise; Endurance training;Equipment eval/education;Gait training;OT   PT Frequency 3-5x/wk   Recommendation   PT Discharge Recommendation (S)  Home with home health rehabilitation   [address] [place] [address] Basic Mobility Inpatient   Turning in Bed Without Bedrails 3 Lying on Back to Sitting on Edge of Flat Bed 3   Moving Bed to Chair 3   Standing Up From Chair 4   Walk in Room 3   Climb 3-5 Stairs 3   Basic Mobility Inpatient Raw Score 19   Basic Mobility Standardized Score 42 48   Highest Level Of Mobility   JH-HLM Goal 6: Walk 10 steps or more   JH-HLM Achieved 7: Walk 25 feet or more   Exercises   Squat   (pt  completed at ~8 sit <> stands throughout therapy session )   Balance training  3 trials of 10+ taps, standing within the walker and with close CGA  Pt  tapped targets on floor that were outside of DAVID     End of Consult   Patient Position at End of Consult   (Pt  seated in recliner at the end of session )     Treatment Time: 9:55-10:33        [facility], SPT

## 2022-08-05 ENCOUNTER — TRANSITIONAL CARE MANAGEMENT (OUTPATIENT)
Dept: FAMILY MEDICINE CLINIC | Facility: CLINIC | Age: 87
End: 2022-08-05

## 2022-08-05 VITALS
WEIGHT: 175 LBS | TEMPERATURE: 98.3 F | RESPIRATION RATE: 17 BRPM | OXYGEN SATURATION: 95 % | BODY MASS INDEX: 29.88 KG/M2 | SYSTOLIC BLOOD PRESSURE: 137 MMHG | HEART RATE: 77 BPM | DIASTOLIC BLOOD PRESSURE: 74 MMHG | HEIGHT: 64 IN

## 2022-08-05 LAB
ANION GAP SERPL CALCULATED.3IONS-SCNC: 5 MMOL/L (ref 4–13)
BUN SERPL-MCNC: 14 MG/DL (ref 5–25)
CALCIUM SERPL-MCNC: 9 MG/DL (ref 8.4–10.2)
CHLORIDE SERPL-SCNC: 101 MMOL/L (ref 96–108)
CO2 SERPL-SCNC: 26 MMOL/L (ref 21–32)
CREAT SERPL-MCNC: 0.91 MG/DL (ref 0.6–1.3)
ERYTHROCYTE [DISTWIDTH] IN BLOOD BY AUTOMATED COUNT: 12.8 % (ref 11.6–15.1)
GFR SERPL CREATININE-BSD FRML MDRD: 73 ML/MIN/1.73SQ M
GLUCOSE SERPL-MCNC: 90 MG/DL (ref 65–140)
HCT VFR BLD AUTO: 34.1 % (ref 36.5–49.3)
HGB BLD-MCNC: 11.7 G/DL (ref 12–17)
MCH RBC QN AUTO: 30.8 PG (ref 26.8–34.3)
MCHC RBC AUTO-ENTMCNC: 34.3 G/DL (ref 31.4–37.4)
MCV RBC AUTO: 90 FL (ref 82–98)
PLATELET # BLD AUTO: 176 THOUSANDS/UL (ref 149–390)
PMV BLD AUTO: 9.2 FL (ref 8.9–12.7)
POTASSIUM SERPL-SCNC: 4.2 MMOL/L (ref 3.5–5.3)
RBC # BLD AUTO: 3.8 MILLION/UL (ref 3.88–5.62)
SODIUM SERPL-SCNC: 132 MMOL/L (ref 135–147)
WBC # BLD AUTO: 3.42 THOUSAND/UL (ref 4.31–10.16)

## 2022-08-05 PROCEDURE — 80048 BASIC METABOLIC PNL TOTAL CA: CPT | Performed by: INTERNAL MEDICINE

## 2022-08-05 PROCEDURE — 99232 SBSQ HOSP IP/OBS MODERATE 35: CPT | Performed by: NURSE PRACTITIONER

## 2022-08-05 PROCEDURE — 99239 HOSP IP/OBS DSCHRG MGMT >30: CPT | Performed by: INTERNAL MEDICINE

## 2022-08-05 PROCEDURE — 85027 COMPLETE CBC AUTOMATED: CPT | Performed by: INTERNAL MEDICINE

## 2022-08-05 RX ORDER — CARVEDILOL 3.12 MG/1
3.12 TABLET ORAL 2 TIMES DAILY WITH MEALS
Qty: 60 TABLET | Refills: 0 | Status: SHIPPED | OUTPATIENT
Start: 2022-08-05 | End: 2022-08-16

## 2022-08-05 RX ORDER — AMLODIPINE BESYLATE 10 MG/1
10 TABLET ORAL DAILY
Qty: 30 TABLET | Refills: 0 | Status: SHIPPED | OUTPATIENT
Start: 2022-08-06 | End: 2022-09-06 | Stop reason: SDUPTHER

## 2022-08-05 RX ADMIN — POLYETHYLENE GLYCOL 3350 17 G: 17 POWDER, FOR SOLUTION ORAL at 08:13

## 2022-08-05 RX ADMIN — SODIUM CHLORIDE 1 DROP: 50 SOLUTION/ DROPS OPHTHALMIC at 09:30

## 2022-08-05 RX ADMIN — BUDESONIDE AND FORMOTEROL FUMARATE DIHYDRATE 2 PUFF: 160; 4.5 AEROSOL RESPIRATORY (INHALATION) at 08:13

## 2022-08-05 RX ADMIN — MONTELUKAST 10 MG: 10 TABLET, FILM COATED ORAL at 08:13

## 2022-08-05 RX ADMIN — GLYCERIN, HYPROMELLOSE, POLYETHYLENE GLYCOL 1 DROP: .2; .2; 1 LIQUID OPHTHALMIC at 12:09

## 2022-08-05 RX ADMIN — AMLODIPINE BESYLATE 10 MG: 10 TABLET ORAL at 08:13

## 2022-08-05 RX ADMIN — CARVEDILOL 3.12 MG: 3.12 TABLET, FILM COATED ORAL at 08:13

## 2022-08-05 RX ADMIN — PANTOPRAZOLE SODIUM 40 MG: 40 TABLET, DELAYED RELEASE ORAL at 05:11

## 2022-08-05 RX ADMIN — SODIUM CHLORIDE 1 DROP: 50 SOLUTION/ DROPS OPHTHALMIC at 12:09

## 2022-08-05 RX ADMIN — GLYCERIN, HYPROMELLOSE, POLYETHYLENE GLYCOL 1 DROP: .2; .2; 1 LIQUID OPHTHALMIC at 08:14

## 2022-08-05 RX ADMIN — B-COMPLEX W/ C & FOLIC ACID TAB 1 TABLET: TAB at 08:13

## 2022-08-05 NOTE — ASSESSMENT & PLAN NOTE
· Multifactorial in the setting of COVID infection, HCTZ and Losartan use  · Na 121 upon admission   · Improved to 132 today following 1 dose of tolvaptan  · Continue fluid restrictions at home  · Follow-up outpatient with Nephrology

## 2022-08-05 NOTE — PROGRESS NOTES
NEPHROLOGY PROGRESS NOTE   Emma Matta 80 y o  male MRN: [id number]  Unit/Bed#: -01 Encounter: [id number]    Assessment/Plan:    80year old man with hypertension and CKD to admitted 07/31 for weakness found to be COVID-19 positive  Nephrology following along for hyponatremia  1  Chronic moderate hypoosmolar hyponatremia  · Presented with a serum sodium 122 millimoles per L-> 132 millimoles per L today  Corrected appropriately  Etiology felt to be secondary to hydrochlorothiazide and possible ADH over secretion due to COVID-19  · Status post tolvaptan 8/4  · reinitiate 1 8 L per day fluid restriction and okay to be discharged  · Do not restart hydrochlorothiazide  2  Possible SIADH  · Urine osmolality elevated, possibly due to COVID-19 + asthma  Continue fluid restriction into the outpatient setting  3  Hypertension in the setting of CKD 2  · Do not restart hydrochlorothiazide  4  Proteinuria  · Re-evaluate urine protein studies as outpatient    ROS  Complains of constipation  A complete 10 point review of systems have been performed and are otherwise negative         Historical Information   Past Medical History:   Diagnosis Date    Arthritis     Coagulation defect ([facility] )     last assessed: 11/28/2018    COPD (chronic obstructive pulmonary disease) ([facility] )     last assessed: 5/14/2019, 12/6/2017    Generalized osteoarthritis     last assessed: 1/28/2019    GERD without esophagitis     last assessed: 1/28/2019    Glaucoma     last assessed: 8/4/2011    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[facility] )     last assessed: 10/16/2018    Factor II Prothrombin Gene per Chartmaker    History of kidney stones 1985    Hx of blood clots     Hypertension     last assessed: 5/14/2019    Impaired fasting glucose     last assessed: 8/26/2013    Mild persistent asthma, uncomplicated     last assessed: 11/28/2018    Nephrolithiasis     Nondisplaced intertrochanteric fracture of right femur, initial encounter for closed fracture ([facility] )     last assessed: 2019    Premature ventricular contractions     last assessed: 2011    Pulmonary nodule     Scoliosis (and kyphoscoliosis), idiopathic     Wears dentures      Past Surgical History:   Procedure Laterality Date    CARPAL TUNNEL RELEASE Left     ENDO     CATARACT EXTRACTION Left     COLONOSCOPY  10/07/2008    last assessed: 3/29/2016    HERNIA REPAIR      HIP SURGERY      HIP SURGERY Left 1999    [facility]    MULTIPLE TOOTH EXTRACTIONS Bilateral     OTHER SURGICAL HISTORY Right 10/30/2001    [facility]    TONSILLECTOMY Bilateral     WISDOM TOOTH EXTRACTION Bilateral      Social History   Social History     Substance and Sexual Activity   Alcohol Use Yes    Comment: social     Social History     Substance and Sexual Activity   Drug Use Never     Social History     Tobacco Use   Smoking Status Former Smoker    Years: 10     Types: Pipe    Start date:     Quit date:     Years since quittin 6   Smokeless Tobacco Never Used       Family History:   Family History   Problem Relation Age of Onset    Hypertension Mother     Stroke Mother     Heart attack Father        Medications:  Pertinent medications were reviewed  Current Facility-Administered Medications   Medication Dose Route Frequency Provider Last Rate    acetaminophen  650 mg Oral Q6H PRN Darrian Rowell PA-C      albuterol  2 puff Inhalation Q4H PRN [facility],       amLODIPine  10 mg Oral Daily [place], [place]      budesonide-formoterol  2 puff Inhalation BID Darrian Rowell PA-C      carvedilol  3 125 mg Oral BID With Meals Georgina Green MD      dextrose  50 mL/hr Intravenous Continuous Georgina Green MD 50 mL/hr (22 1042)    glycerin-hypromellose-  1 drop Both Eyes Q3H PRN Darrian Rowell PA-C      glycerin-hypromellose-  1 drop Both Eyes 4x Daily Courtney Pearce PA-C      hydrALAZINE  5 mg Intravenous Q6H PRN [place] Muniz, DO      latanoprost  1 drop Both Eyes HS [place], [place]      montelukast  10 mg Oral Daily [place] , [place]      multivitamin stress formula  1 tablet Oral Daily Abigail Mendoza PA-C      ondansetron  4 mg Intravenous Q6H PRN Abigail Mendoza PA-C      pantoprazole  40 mg Oral Early Morning Abigail Mendoza PA-C      polyethylene glycol  17 g Oral Daily Abigail Mendoza PA-C      sodium chloride  1 drop Left Eye 4x Daily [place], [place]      triamcinolone   Topical BID PRN Abigail Mendoza PA-C      warfarin  8 mg Oral Daily (warfarin) Abigail Mendoza PA-C           Allergies   Allergen Reactions    Alphagan P [Brimonidine Tartrate] Itching         Vitals:   /74   Pulse 77   Temp 98 3 °F (36 8 °C)   Resp 17   Ht 5' 4" (1 626 m)   Wt 79 4 kg (175 lb)   SpO2 95%   BMI 30 04 kg/m²   Body mass index is 30 04 kg/m²  SpO2: 95 %,   SpO2 Activity: At Rest,   O2 Device: None (Room air)      Intake/Output Summary (Last 24 hours) at 8/5/2022 0941  Last data filed at 8/5/2022 0900  Gross per 24 hour   Intake 1540 ml   Output --   Net 1540 ml     Invasive Devices  Report    Peripheral Intravenous Line  Duration           Peripheral IV 08/05/22 Left;Ventral (anterior) Forearm <1 day                Physical Exam  General: conscious, cooperative, in no acute distress  Eyes: conjunctivae pink, anicteric sclerae  ENT: lips and mucous membranes moist  Neck: supple, no JVD, no masses  Chest:  Diminished breath sounds bilaterally, fine expiratory wheezing anteriorly  CVS: S1 & S2, normal rate, regular rhythm  Abdomen: soft, non-tender, non-distended, normoactive bowel sounds  Extremities:  Trace edema of both legs  Skin: no rash  Neuro: awake, alert, oriented      Diagnostic Data:  Lab: I have personally reviewed pertinent lab results  ,   CBC:  Results from last 7 days   Lab Units 08/05/22  0510   WBC Thousand/uL 3 42*   HEMOGLOBIN g/dL 11 7*   HEMATOCRIT % 34 1* PLATELETS Thousands/uL 176      CMP:   Lab Results   Component Value Date    SODIUM 132 (L) 08/05/2022    K 4 2 08/05/2022     08/05/2022    CO2 26 08/05/2022    BUN 14 08/05/2022    CREATININE 0 91 08/05/2022    CALCIUM 9 0 08/05/2022    EGFR 73 08/05/2022   ,   PT/INR:   Lab Results   Component Value Date    INR 2 91 (H) 08/04/2022   ,   Magnesium: No components found for: MAG,  Phosphorous: No results found for: PHOS    Microbiology:  @LABRCNTIP,(urinecx:7)@        RODY Pepe    Portions of the record may have been created with voice recognition software  Occasional wrong word or "sound a like" substitutions may have occurred due to the inherent limitations of voice recognition software  Read the chart carefully and recognize, using context, where substitutions have occurred

## 2022-08-05 NOTE — ASSESSMENT & PLAN NOTE
Lab Results   Component Value Date    EGFR 73 08/05/2022    EGFR 70 08/04/2022    EGFR 71 08/04/2022    CREATININE 0 91 08/05/2022    CREATININE 0 94 08/04/2022    CREATININE 0 93 08/04/2022     · Creatinine is at baseline  · Continue to monitor

## 2022-08-05 NOTE — DISCHARGE SUMMARY
Zan U  66   Discharge- Kimberly Rodriguez 8/12/1930, 80 y o  male MRN: [id number]  Unit/Bed#: -01 Encounter: [id number]  Primary Care Provider: Douglas Blanco DO   Date and time admitted to hospital: 7/31/2022  7:09 PM    * Hyponatremia  Assessment & Plan  · Multifactorial in the setting of COVID infection, HCTZ and Losartan use  · Na 121 upon admission   · Improved to 132 today following 1 dose of tolvaptan  · Continue fluid restrictions at home  · Follow-up outpatient with Nephrology    COVID-19 virus infection  Assessment & Plan  · Tested positive on 7/28 at home at was taking Molnupiravir  · No evidence on PNA on CXR   · As he is not requiring supplemental oxygen, no decadron or Remdesivir is required   · Continue supportive care         Stage 3 chronic kidney disease, unspecified whether stage 3a or 3b CKD [facility])  Assessment & Plan  Lab Results   Component Value Date    EGFR 73 08/05/2022    EGFR 70 08/04/2022    EGFR 71 08/04/2022    CREATININE 0 91 08/05/2022    CREATININE 0 94 08/04/2022    CREATININE 0 93 08/04/2022     · Creatinine is at baseline  · Continue to monitor     Essential hypertension, benign  Assessment & Plan  · Home Losartan and HCTZ discontinue due to hyponatremia  · Continue Amlodipine 10mg daily and Coreg 3 125 BID       Medical Problems             Resolved Problems  Date Reviewed: 7/31/2022   None               Discharging Physician / Practitioner: [facility],   PCP: Douglas Blanco DO  Admission Date:   Admission Orders (From admission, onward)     Ordered        07/31/22 2151  INPATIENT ADMISSION  Once                      Discharge Date: 08/05/22    Consultations During Hospital Stay:  · Nephrology, PT/OT    Procedures Performed:   · None    Significant Findings / Test Results:   · COVID-19  · Hyponatremia    Incidental Findings:   · None     Test Results Pending at Discharge (will require follow up):    · None     Outpatient Tests Requested:  · To be determined in the outpatient setting upon follow-up with PCP and Nephrology     Complications:  None    Reason for Admission: Hyponatremia    Hospital Course: Mely Ann is a 80 y o  male patient who originally presented to the hospital on 7/31/2022 due to generalized weakness  In brief, he has a past medical history of hypertension on home hydrochlorothiazide and losartan who presented for evaluation generalized weakness and shortness of breath  Him and his wife both tested positive for COVID-19 on 12 and were started on Molnupiravir either primary care physician  Because of worsening symptoms they both actually elected to present to the emergency department for further evaluation  From a COVID standpoint 2 the patient was relatively stable and not requiring any supplemental oxygen  Chest x-ray showed no evidence of pneumonia  Unfortunately, labs were significant for hyponatremia of 121 and therefore he was admitted to the medical floor for further observation and management  Hyponatremia was deemed likely multifactorial due to COVID infection as well as medication side effect from hydrochlorothiazide and losartan  Both of these home medications were discontinued and he was initiated on amlodipine and Coreg for blood pressure control  Hyponatremia was relatively resistant to medication discontinuation and fluid restriction and there for 1 dose of tolvaptan was required during admission  Following this dose of tolvaptan the patient's sodium gradually improved to 132 and he was ultimately discharged home  He was discharged home with home healthcare services in stable condition in all of his questions were answered prior to departure  This is a brief discharge summary please see full medical record for more details  Please see above list of diagnoses and related plan for additional information       Condition at Discharge: good    Discharge Day Visit / Exam:   Subjective:  Patient sitting up in bedside chair without any acute complaints  No significant events reported by nursing  Vitals: Blood Pressure: 137/74 (08/05/22 0703)  Pulse: 77 (08/05/22 0703)  Temperature: 98 3 °F (36 8 °C) (08/05/22 0703)  Temp Source: Temporal (08/03/22 2159)  Respirations: 17 (08/05/22 0703)  Height: 5' 4" (162 6 cm) (08/02/22 2219)  Weight - Scale: 79 4 kg (175 lb) (08/02/22 0812)  SpO2: 95 % (08/05/22 0703)     Exam:   Physical Exam  Vitals and nursing note reviewed  Constitutional:       General: He is not in acute distress  Appearance: Normal appearance  He is obese  HENT:      Head: Normocephalic and atraumatic  Mouth/Throat:      Mouth: Mucous membranes are moist       Pharynx: Oropharynx is clear  Eyes:      Extraocular Movements: Extraocular movements intact  Conjunctiva/sclera: Conjunctivae normal    Cardiovascular:      Rate and Rhythm: Normal rate and regular rhythm  Pulses: Normal pulses  Heart sounds: Normal heart sounds  Pulmonary:      Effort: Pulmonary effort is normal  No respiratory distress  Breath sounds: Normal breath sounds  No wheezing  Abdominal:      General: Bowel sounds are normal  There is no distension  Palpations: Abdomen is soft  Tenderness: There is no abdominal tenderness  Musculoskeletal:         General: Normal range of motion  Cervical back: Normal range of motion and neck supple  Skin:     General: Skin is warm and dry  Neurological:      General: No focal deficit present  Mental Status: He is alert and oriented to person, place, and time  Mental status is at baseline  Psychiatric:         Mood and Affect: Mood normal          Behavior: Behavior normal          Judgment: Judgment normal         Discussion with Family: Attempted to update  (daughter) via phone  Left voicemail       Discharge instructions/Information to patient and family:   See after visit summary for information provided to patient and family  Provisions for Follow-Up Care:  See after visit summary for information related to follow-up care and any pertinent home health orders  Disposition:   Home with VNA Services (Reminder: Complete face to face encounter)    Planned Readmission: None     Discharge Statement:  I spent > 35 minutes discharging the patient  This time was spent on the day of discharge  I had direct contact with the patient on the day of discharge  Greater than 50% of the total time was spent examining patient, answering all patient questions, arranging and discussing plan of care with patient as well as directly providing post-discharge instructions  Additional time then spent on discharge activities  Discharge Medications:  See after visit summary for reconciled discharge medications provided to patient and/or family        **Please Note: This note may have been constructed using a voice recognition system**

## 2022-08-05 NOTE — PLAN OF CARE
Problem: Potential for Falls  Goal: Patient will remain free of falls  Description: INTERVENTIONS:  - Educate patient/family on patient safety including physical limitations  - Instruct patient to call for assistance with activity   - Consult OT/PT to assist with strengthening/mobility   - Keep Call bell within reach  - Keep bed low and locked with side rails adjusted as appropriate  - Keep care items and personal belongings within reach  - Initiate and maintain comfort rounds  - Make Fall Risk Sign visible to staff  - Offer Toileting every 2 Hours, in advance of need  - Initiate/Maintain bed alarm  - Obtain necessary fall risk management equipment: non slip socks  - Apply yellow socks and bracelet for high fall risk patients  - Consider moving patient to room near nurses station  Outcome: Adequate for Discharge     Problem: Prexisting or High Potential for Compromised Skin Integrity  Goal: Skin integrity is maintained or improved  Description: INTERVENTIONS:  - Identify patients at risk for skin breakdown  - Assess and monitor skin integrity  - Assess and monitor nutrition and hydration status  - Monitor labs   - Assess for incontinence   - Turn and reposition patient  - Assist with mobility/ambulation  - Relieve pressure over bony prominences  - Avoid friction and shearing  - Provide appropriate hygiene as needed including keeping skin clean and dry  - Evaluate need for skin moisturizer/barrier cream  - Collaborate with interdisciplinary team   - Patient/family teaching  - Consider wound care consult   Outcome: Adequate for Discharge     Problem: PAIN - ADULT  Goal: Verbalizes/displays adequate comfort level or baseline comfort level  Description: Interventions:  - Encourage patient to monitor pain and request assistance  - Assess pain using appropriate pain scale  - Administer analgesics based on type and severity of pain and evaluate response  - Implement non-pharmacological measures as appropriate and evaluate response  - Consider cultural and social influences on pain and pain management  - Notify physician/advanced practitioner if interventions unsuccessful or patient reports new pain  Outcome: Adequate for Discharge     Problem: INFECTION - ADULT  Goal: Absence or prevention of progression during hospitalization  Description: INTERVENTIONS:  - Assess and monitor for signs and symptoms of infection  - Monitor lab/diagnostic results  - Monitor all insertion sites, i e  indwelling lines, tubes, and drains  - Monitor endotracheal if appropriate and nasal secretions for changes in amount and color  - [place] appropriate cooling/warming therapies per order  - Administer medications as ordered  - Instruct and encourage patient and family to use good hand hygiene technique  - Identify and instruct in appropriate isolation precautions for identified infection/condition  Outcome: Adequate for Discharge  Goal: Absence of fever/infection during neutropenic period  Description: INTERVENTIONS:  - Monitor WBC    Outcome: Adequate for Discharge     Problem: SAFETY ADULT  Goal: Patient will remain free of falls  Description: INTERVENTIONS:  - Educate patient/family on patient safety including physical limitations  - Instruct patient to call for assistance with activity   - Consult OT/PT to assist with strengthening/mobility   - Keep Call bell within reach  - Keep bed low and locked with side rails adjusted as appropriate  - Keep care items and personal belongings within reach  - Initiate and maintain comfort rounds  - Make Fall Risk Sign visible to staff  - Offer Toileting every 2 Hours, in advance of need  - Initiate/Maintain bed alarm  - Obtain necessary fall risk management equipment: non slip socks  - Apply yellow socks and bracelet for high fall risk patients  - Consider moving patient to room near nurses station  Outcome: Adequate for Discharge  Goal: Maintain or return to baseline ADL function  Description: INTERVENTIONS:  -  Assess patient's ability to carry out ADLs; assess patient's baseline for ADL function and identify physical deficits which impact ability to perform ADLs (bathing, care of mouth/teeth, toileting, grooming, dressing, etc )  - Assess/evaluate cause of self-care deficits   - Assess range of motion  - Assess patient's mobility; develop plan if impaired  - Assess patient's need for assistive devices and provide as appropriate  - Encourage maximum independence but intervene and supervise when necessary  - Involve family in performance of ADLs  - Assess for home care needs following discharge   - Consider OT consult to assist with ADL evaluation and planning for discharge  - Provide patient education as appropriate  Outcome: Adequate for Discharge  Goal: Maintains/Returns to pre admission functional level  Description: INTERVENTIONS:  - Perform BMAT or MOVE assessment daily    - Set and communicate daily mobility goal to care team and patient/family/caregiver  - Collaborate with rehabilitation services on mobility goals if consulted  - Perform Range of Motion 2 times a day  - Reposition patient every 2 hours    - Dangle patient 3 times a day  - Stand patient 3 times a day  - Ambulate patient 3 times a day  - Out of bed to chair 3 times a day   - Out of bed for meals 3 times a day  - Out of bed for toileting  - Record patient progress and toleration of activity level   Outcome: Adequate for Discharge     Problem: DISCHARGE PLANNING  Goal: Discharge to home or other facility with appropriate resources  Description: INTERVENTIONS:  - Identify barriers to discharge w/patient and caregiver  - Arrange for needed discharge resources and transportation as appropriate  - Identify discharge learning needs (meds, wound care, etc )  - Arrange for interpretive services to assist at discharge as needed  - Refer to Case Management Department for coordinating discharge planning if the patient needs post-hospital services based on physician/advanced practitioner order or complex needs related to functional status, cognitive ability, or social support system  Outcome: Adequate for Discharge     Problem: Knowledge Deficit  Goal: Patient/family/caregiver demonstrates understanding of disease process, treatment plan, medications, and discharge instructions  Description: Complete learning assessment and assess knowledge base    Interventions:  - Provide teaching at level of understanding  - Provide teaching via preferred learning methods  Outcome: Adequate for Discharge

## 2022-08-05 NOTE — CASE MANAGEMENT
Case Management Discharge Planning Note    Patient name iLndy Khan  Location /-34 MRN [id number]  : 1930 Date 2022       Current Admission Date: 2022  Current Admission Diagnosis:Hyponatremia   Patient Active Problem List    Diagnosis Date Noted    Hyponatremia 2022    COVID-19 virus infection 2022    [facility] 2022    Hypercalcemia 02/10/2022    Vaccine refused by patient 2021    Stage 3 chronic kidney disease, unspecified whether stage 3a or 3b CKD ([facility] ) 2021    Pulmonary emphysema ([facility] ) 2021    Endothelial corneal dystrophy of both eyes 2021    Mature cataract 2021    Primary open-angle glaucoma, bilateral, mild stage 2021    Pseudophakic bullous keratopathy of left eye 2021    GERD without esophagitis     Neoplasm of uncertain behavior of skin 2020    Encounter for long-term (current) use of medications 2020    Primary osteoarthritis of both knees 2020    Age-related cataract of both eyes 2020    Premature beats 2020    Chronic pulmonary embolism without acute cor pulmonale (HCC) 2020    Osteoarthritis of both knees 2020    Other seborrheic dermatitis 2020    Abnormality of gait 2020    Intrinsic eczema 2019    Mild persistent asthma, uncomplicated 1783    Prothrombin gene mutation ([facility] ) 2019    Primary generalized (osteo)arthritis 2019    Essential hypertension, benign 2019    Dyslipidemia 2019    Heterozygous factor V Leiden mutation [facility] ) 2018    Heterozygous for prothrombin [id number] mutation ([facility] ) 2018    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    History of pulmonary embolism 2018    TB lung, latent 2018    Asthma 2012      LOS (days): 5  Geometric Mean LOS (GMLOS) (days): 3 30  Days to GMLOS:-1 2     OBJECTIVE:  Risk of Unplanned Readmission Score: 16 97         Current admission status: Inpatient   Preferred Pharmacy:   [address] [place] [address]  Phone: [phone] Fax: [phone]    Primary Care Provider: Justo Brown DO    Primary Insurance: MEDICARE  Secondary Insurance: [address] DETAILS:    Discharge planning discussed with[de-identified] Daughter Agustin Shoulder of Choice: Yes  Comments - Freedom of Choice: Pt for d/c home today per Dr Santos Common  Pt will return home with [facility] for [facility] services and daughter transporting  CM contacted family/caregiver?: Yes  Were Treatment Team discharge recommendations reviewed with patient/caregiver?: Yes  Did patient/caregiver verbalize understanding of patient care needs?: Yes  Were patient/caregiver advised of the risks associated with not following Treatment Team discharge recommendations?: Yes    Contacts  Patient Contacts:  Max Lane (Daughter)   [phone] (Mobile)  Relationship to Patient[de-identified] Family  Contact Method: Phone  Phone Number: Max Lane (Daughter)   [phone] (Mobile)  Reason/Outcome: Emergency Contact, Discharge Planning       IMM Given (Date):: 08/04/22  IMM Given to[de-identified] Family  Family notified[de-identified] Maicol Smith - daughter

## 2022-08-05 NOTE — NURSING NOTE
Pt discharged home with [facility]  avs reviewed with pt all questions answered  IV removed without complications and tip intact  All belongings with pt  Pt denies pain, denies shortness of breath  Pt escorted via wheelchair by pca to ICU to visit wife with his daughter, pt care manager aware

## 2022-08-05 NOTE — ASSESSMENT & PLAN NOTE
· Home Losartan and HCTZ discontinue due to hyponatremia  · Continue Amlodipine 10mg daily and Coreg 3 125 BID

## 2022-08-05 NOTE — PLAN OF CARE
Problem: Potential for Falls  Goal: Patient will remain free of falls  Description: INTERVENTIONS:  - Educate patient/family on patient safety including physical limitations  - Instruct patient to call for assistance with activity   - Consult OT/PT to assist with strengthening/mobility   - Keep Call bell within reach  - Keep bed low and locked with side rails adjusted as appropriate  - Keep care items and personal belongings within reach  - Initiate and maintain comfort rounds  - Make Fall Risk Sign visible to staff  - Offer Toileting every 2 Hours, in advance of need  - Initiate/Maintain bed alarm  - Apply yellow socks and bracelet for high fall risk patients  - Consider moving patient to room near nurses station  Outcome: Progressing     Problem: Prexisting or High Potential for Compromised Skin Integrity  Goal: Skin integrity is maintained or improved  Description: INTERVENTIONS:  - Identify patients at risk for skin breakdown  - Assess and monitor skin integrity  - Assess and monitor nutrition and hydration status  - Monitor labs   - Assess for incontinence   - Turn and reposition patient  - Assist with mobility/ambulation  - Relieve pressure over bony prominences  - Avoid friction and shearing  - Provide appropriate hygiene as needed including keeping skin clean and dry  - Evaluate need for skin moisturizer/barrier cream  - Collaborate with interdisciplinary team   - Patient/family teaching  - Consider wound care consult   Outcome: Progressing     Problem: PAIN - ADULT  Goal: Verbalizes/displays adequate comfort level or baseline comfort level  Description: Interventions:  - Encourage patient to monitor pain and request assistance  - Assess pain using appropriate pain scale  - Administer analgesics based on type and severity of pain and evaluate response  - Implement non-pharmacological measures as appropriate and evaluate response  - Consider cultural and social influences on pain and pain management  - Notify physician/advanced practitioner if interventions unsuccessful or patient reports new pain  Outcome: Progressing     Problem: INFECTION - ADULT  Goal: Absence or prevention of progression during hospitalization  Description: INTERVENTIONS:  - Assess and monitor for signs and symptoms of infection  - Monitor lab/diagnostic results  - Monitor all insertion sites, i e  indwelling lines, tubes, and drains  - Monitor endotracheal if appropriate and nasal secretions for changes in amount and color  - [place] appropriate cooling/warming therapies per order  - Administer medications as ordered  - Instruct and encourage patient and family to use good hand hygiene technique  - Identify and instruct in appropriate isolation precautions for identified infection/condition  Outcome: Progressing  Goal: Absence of fever/infection during neutropenic period  Description: INTERVENTIONS:  - Monitor WBC    Outcome: Progressing     Problem: SAFETY ADULT  Goal: Patient will remain free of falls  Description: INTERVENTIONS:  - Educate patient/family on patient safety including physical limitations  - Instruct patient to call for assistance with activity   - Consult OT/PT to assist with strengthening/mobility   - Keep Call bell within reach  - Keep bed low and locked with side rails adjusted as appropriate  - Keep care items and personal belongings within reach  - Initiate and maintain comfort rounds  - Make Fall Risk Sign visible to staff  - Offer Toileting every 2 Hours, in advance of need  - Initiate/Maintain bed alarm  - Apply yellow socks and bracelet for high fall risk patients  - Consider moving patient to room near nurses station  Outcome: Progressing  Goal: Maintain or return to baseline ADL function  Description: INTERVENTIONS:  -  Assess patient's ability to carry out ADLs; assess patient's baseline for ADL function and identify physical deficits which impact ability to perform ADLs (bathing, care of mouth/teeth, toileting, grooming, dressing, etc )  - Assess/evaluate cause of self-care deficits   - Assess range of motion  - Assess patient's mobility; develop plan if impaired  - Assess patient's need for assistive devices and provide as appropriate  - Encourage maximum independence but intervene and supervise when necessary  - Involve family in performance of ADLs  - Assess for home care needs following discharge   - Consider OT consult to assist with ADL evaluation and planning for discharge  - Provide patient education as appropriate  Outcome: Progressing  Goal: Maintains/Returns to pre admission functional level  Description: INTERVENTIONS:  - Perform BMAT or MOVE assessment daily    - Set and communicate daily mobility goal to care team and patient/family/caregiver  - Collaborate with rehabilitation services on mobility goals if consulted  - Perform Range of Motion 3 times a day  - Reposition patient every 2 hours    - Dangle patient 3 times a day  - Stand patient 3 times a day  - Ambulate patient 3 times a day  - Out of bed to chair 3 times a day   - Out of bed for meals 3 times a day  - Out of bed for toileting  - Record patient progress and toleration of activity level   Outcome: Progressing     Problem: DISCHARGE PLANNING  Goal: Discharge to home or other facility with appropriate resources  Description: INTERVENTIONS:  - Identify barriers to discharge w/patient and caregiver  - Arrange for needed discharge resources and transportation as appropriate  - Identify discharge learning needs (meds, wound care, etc )  - Arrange for interpretive services to assist at discharge as needed  - Refer to Case Management Department for coordinating discharge planning if the patient needs post-hospital services based on physician/advanced practitioner order or complex needs related to functional status, cognitive ability, or social support system  Outcome: Progressing     Problem: Knowledge Deficit  Goal: Patient/family/caregiver demonstrates understanding of disease process, treatment plan, medications, and discharge instructions  Description: Complete learning assessment and assess knowledge base    Interventions:  - Provide teaching at level of understanding  - Provide teaching via preferred learning methods  Outcome: Progressing

## 2022-08-06 LAB
BACTERIA BLD CULT: NORMAL
BACTERIA BLD CULT: NORMAL

## 2022-08-07 ENCOUNTER — HOME CARE VISIT (OUTPATIENT)
Dept: HOME HEALTH SERVICES | Facility: HOME HEALTHCARE | Age: 87
End: 2022-08-07

## 2022-08-08 ENCOUNTER — HOME CARE VISIT (OUTPATIENT)
Dept: HOME HEALTH SERVICES | Facility: HOME HEALTHCARE | Age: 87
End: 2022-08-08

## 2022-08-09 ENCOUNTER — HOME CARE VISIT (OUTPATIENT)
Dept: HOME HEALTH SERVICES | Facility: HOME HEALTHCARE | Age: 87
End: 2022-08-09
Payer: MEDICARE

## 2022-08-09 VITALS
OXYGEN SATURATION: 97 % | TEMPERATURE: 96.7 F | DIASTOLIC BLOOD PRESSURE: 62 MMHG | RESPIRATION RATE: 20 BRPM | HEART RATE: 76 BPM | SYSTOLIC BLOOD PRESSURE: 144 MMHG | HEIGHT: 66 IN | WEIGHT: 174.8 LBS | BODY MASS INDEX: 28.09 KG/M2

## 2022-08-09 PROCEDURE — G0299 HHS/HOSPICE OF RN EA 15 MIN: HCPCS

## 2022-08-09 PROCEDURE — 10330081 VN NO-PAY CLAIM PROCEDURE

## 2022-08-09 PROCEDURE — 400013 VN SOC

## 2022-08-10 ENCOUNTER — HOME CARE VISIT (OUTPATIENT)
Dept: HOME HEALTH SERVICES | Facility: HOME HEALTHCARE | Age: 87
End: 2022-08-10
Payer: MEDICARE

## 2022-08-10 PROCEDURE — G0151 HHCP-SERV OF PT,EA 15 MIN: HCPCS

## 2022-08-11 ENCOUNTER — HOME CARE VISIT (OUTPATIENT)
Dept: HOME HEALTH SERVICES | Facility: HOME HEALTHCARE | Age: 87
End: 2022-08-11
Payer: MEDICARE

## 2022-08-11 VITALS
HEART RATE: 78 BPM | TEMPERATURE: 97.3 F | DIASTOLIC BLOOD PRESSURE: 85 MMHG | SYSTOLIC BLOOD PRESSURE: 147 MMHG | OXYGEN SATURATION: 98 %

## 2022-08-11 VITALS — HEART RATE: 76 BPM | SYSTOLIC BLOOD PRESSURE: 122 MMHG | OXYGEN SATURATION: 96 % | DIASTOLIC BLOOD PRESSURE: 70 MMHG

## 2022-08-11 PROCEDURE — G0152 HHCP-SERV OF OT,EA 15 MIN: HCPCS

## 2022-08-11 NOTE — CASE COMMUNICATION
PT dhruv on 8/10/22: PT 1wk2  Gait/transfers/balance  LE ther ex/HEP, edu, fall prevention  DR DAVENPORT: pt wants to return to using exercise equipment he has in home (treadmill, exercise bike, etc)  I recommend he check with you for clearance at appt       Thank you, Jah Kwon PT

## 2022-08-12 ENCOUNTER — HOME CARE VISIT (OUTPATIENT)
Dept: HOME HEALTH SERVICES | Facility: HOME HEALTHCARE | Age: 87
End: 2022-08-12
Payer: MEDICARE

## 2022-08-12 VITALS
DIASTOLIC BLOOD PRESSURE: 82 MMHG | RESPIRATION RATE: 18 BRPM | BODY MASS INDEX: 27.5 KG/M2 | OXYGEN SATURATION: 97 % | WEIGHT: 170.4 LBS | HEART RATE: 96 BPM | SYSTOLIC BLOOD PRESSURE: 148 MMHG | TEMPERATURE: 96.7 F

## 2022-08-12 DIAGNOSIS — N18.30 STAGE 3 CHRONIC KIDNEY DISEASE, UNSPECIFIED WHETHER STAGE 3A OR 3B CKD (HCC): Primary | ICD-10-CM

## 2022-08-12 PROCEDURE — G0299 HHS/HOSPICE OF RN EA 15 MIN: HCPCS

## 2022-08-15 PROCEDURE — G0180 MD CERTIFICATION HHA PATIENT: HCPCS | Performed by: INTERNAL MEDICINE

## 2022-08-16 ENCOUNTER — OFFICE VISIT (OUTPATIENT)
Dept: FAMILY MEDICINE CLINIC | Facility: CLINIC | Age: 87
End: 2022-08-16
Payer: MEDICARE

## 2022-08-16 ENCOUNTER — APPOINTMENT (OUTPATIENT)
Dept: LAB | Facility: CLINIC | Age: 87
End: 2022-08-16
Payer: MEDICARE

## 2022-08-16 VITALS
WEIGHT: 173.3 LBS | SYSTOLIC BLOOD PRESSURE: 150 MMHG | HEART RATE: 107 BPM | HEIGHT: 64 IN | DIASTOLIC BLOOD PRESSURE: 70 MMHG | OXYGEN SATURATION: 98 % | BODY MASS INDEX: 29.59 KG/M2 | TEMPERATURE: 97.4 F

## 2022-08-16 DIAGNOSIS — I10 ESSENTIAL HYPERTENSION, BENIGN: ICD-10-CM

## 2022-08-16 DIAGNOSIS — U07.1 COVID-19 VIRUS INFECTION: ICD-10-CM

## 2022-08-16 DIAGNOSIS — D68.52 PROTHROMBIN GENE MUTATION (HCC): ICD-10-CM

## 2022-08-16 DIAGNOSIS — E87.1 HYPONATREMIA: Primary | ICD-10-CM

## 2022-08-16 DIAGNOSIS — N18.30 STAGE 3 CHRONIC KIDNEY DISEASE, UNSPECIFIED WHETHER STAGE 3A OR 3B CKD (HCC): ICD-10-CM

## 2022-08-16 DIAGNOSIS — J44.9 ASTHMA-COPD OVERLAP SYNDROME (HCC): ICD-10-CM

## 2022-08-16 LAB
ALBUMIN SERPL BCP-MCNC: 3.5 G/DL (ref 3.5–5)
ALP SERPL-CCNC: 92 U/L (ref 46–116)
ALT SERPL W P-5'-P-CCNC: 29 U/L (ref 12–78)
ANION GAP SERPL CALCULATED.3IONS-SCNC: 6 MMOL/L (ref 4–13)
AST SERPL W P-5'-P-CCNC: 28 U/L (ref 5–45)
BILIRUB SERPL-MCNC: 0.66 MG/DL (ref 0.2–1)
BUN SERPL-MCNC: 19 MG/DL (ref 5–25)
CALCIUM SERPL-MCNC: 9.4 MG/DL (ref 8.3–10.1)
CHLORIDE SERPL-SCNC: 100 MMOL/L (ref 96–108)
CO2 SERPL-SCNC: 25 MMOL/L (ref 21–32)
CREAT SERPL-MCNC: 0.86 MG/DL (ref 0.6–1.3)
GFR SERPL CREATININE-BSD FRML MDRD: 75 ML/MIN/1.73SQ M
GLUCOSE SERPL-MCNC: 99 MG/DL (ref 65–140)
INR PPP: 3.83 (ref 0.84–1.19)
POTASSIUM SERPL-SCNC: 4.5 MMOL/L (ref 3.5–5.3)
PROT SERPL-MCNC: 7 G/DL (ref 6.4–8.4)
PROTHROMBIN TIME: 37.9 SECONDS (ref 11.6–14.5)
SODIUM SERPL-SCNC: 131 MMOL/L (ref 135–147)

## 2022-08-16 PROCEDURE — 85610 PROTHROMBIN TIME: CPT

## 2022-08-16 PROCEDURE — 99495 TRANSJ CARE MGMT MOD F2F 14D: CPT | Performed by: FAMILY MEDICINE

## 2022-08-16 PROCEDURE — 80053 COMPREHEN METABOLIC PANEL: CPT

## 2022-08-16 PROCEDURE — 36415 COLL VENOUS BLD VENIPUNCTURE: CPT

## 2022-08-16 RX ORDER — CARVEDILOL 3.12 MG/1
3.12 TABLET ORAL DAILY
Qty: 30 TABLET | Refills: 0
Start: 2022-08-16 | End: 2022-09-15

## 2022-08-16 NOTE — ASSESSMENT & PLAN NOTE
Patient has asthma-COPD overlap  He is going to continue his Symbicort inhaler regularly  Continue albuterol p r n     As noted, were going to get him off the beta-blocker due to his underlying pulmonary disease

## 2022-08-16 NOTE — ASSESSMENT & PLAN NOTE
Patient was hospitalized from July 31st to August 5th  I reviewed his discharge summary, diagnostic studies and labs  I note that upon discharge, the patient's sodium was still somewhat low at 132  I see that Dr Guerita Suero ordered a CMP  He has an appointment to see her on Friday  I asked him to go to the lab next door to get this done while he is here today  It should be noted that the patient's labs have been checked at least every 6 months for years  Over the past 3 years, his sodiums have ranged from 133-139  I told the patient that I suspect COVID infection and perhaps some dehydration may have caused his sodium to suddenly drop  He was on Molnupiravir admits she was not drinking enough fluids when he was on that medication  I agree that we should continue to hold his hydrochlorothiazide

## 2022-08-16 NOTE — PROGRESS NOTES
Assessment/Plan:     Hyponatremia  Patient was hospitalized from July 31st to August 5th  I reviewed his discharge summary, diagnostic studies and labs  I note that upon discharge, the patient's sodium was still somewhat low at 132  I see that Dr Jacobo Nelson ordered a CMP  He has an appointment to see her on Friday  I asked him to go to the lab next door to get this done while he is here today  It should be noted that the patient's labs have been checked at least every 6 months for years  Over the past 3 years, his sodiums have ranged from 133-139  I told the patient that I suspect COVID infection and perhaps some dehydration may have caused his sodium to suddenly drop  He was on Molnupiravir admits she was not drinking enough fluids when he was on that medication  I agree that we should continue to hold his hydrochlorothiazide  Essential hypertension, benign  When patient initially came into the office his blood pressure was elevated at 170/90  After he sat for a bit I recheck the any came down to 150/70  He is tolerating the amlodipine well  I do not think that the Coreg is a good choice for him  This patient has asthma-COPD overlap  He should not be on a beta-blocker unless absolutely needed for cardiac reasons  This is simply being used for blood pressure control  I think they are better choices  I am going to wean him off of this  I told him to decrease the dose to 1 a day for 2 weeks, then 1 every other day, then stop  If his sodium comes back up, I do not see a reason that we can not restart losartan  I look forward to hearing Dr Zoë Diaz opinion regarding this  I did schedule the patient a follow-up visit for 1 month  COVID-19 virus infection  Patient has COVID-19 virus infection  He is currently doing well and has no sequelae  Asthma-COPD overlap syndrome [facility])  Patient has asthma-COPD overlap  He is going to continue his Symbicort inhaler regularly    Continue albuterol p  r  n     As noted, were going to get him off the beta-blocker due to his underlying pulmonary disease  Diagnoses and all orders for this visit:    Hyponatremia    Essential hypertension, benign  -     carvedilol (COREG) 3 125 mg tablet; Take 1 tablet (3 125 mg total) by mouth in the morning    COVID-19 virus infection    Asthma-COPD overlap syndrome (HCC)         Subjective:     Patient ID: Bernabe Ruvalcaba is a 80 y o  male  This is a 45-year-old white male who presents to the office today as a transition of care management evaluation  The patient was accompanied to the office today by 1 of his daughters  The patient tells me he is doing well  Patient's problem began when he was diagnosed with COVID-19 virus infection  He was unvaccinated, as his wife would not allow him to be vaccinated  The patient was started on molnupiravir as an outpatient  His wife became quite ill and required hospitalization  As such, he went to the ER for evaluation as well  The patient's chest x-ray was clear and he was oxygenating well  However, his labs revealed hyponatremia and as such, the patient was admitted  He was seen by Nephrology in consultation  His hydrochlorothiazide as well as his losartan were discontinued during his hospital stay  He was started on carvedilol and amlodipine for blood pressure control  Since his return home, he notes a little increased shortness of breath  He tells me he is eating well, although a purees is food because his dentures fit so poorly and is gums are sore  He tells me he is drinking plenty of fluids  He tells me he started exercising again      Review of Systems   Constitutional: Negative for activity change, appetite change and unexpected weight change  Respiratory: Positive for shortness of breath  Negative for cough and wheezing  Cardiovascular: Negative for chest pain, palpitations and leg swelling     Gastrointestinal: Negative for abdominal distention, abdominal pain, blood in stool, constipation, diarrhea and nausea  Neurological: Negative for weakness  Objective:     Physical Exam  Vitals reviewed  Constitutional:       Comments: This is a 42-year-old white male who appears his stated age  He is pleasant, cooperative, and in no distress   HENT:      Head: Normocephalic and atraumatic  Right Ear: Tympanic membrane, ear canal and external ear normal  There is no impacted cerumen  Left Ear: Tympanic membrane, ear canal and external ear normal  There is no impacted cerumen  Mouth/Throat:      Mouth: Mucous membranes are moist       Pharynx: Oropharynx is clear  No oropharyngeal exudate or posterior oropharyngeal erythema  Eyes:      General: No scleral icterus  Right eye: No discharge  Left eye: No discharge  Conjunctiva/sclera: Conjunctivae normal       Pupils: Pupils are equal, round, and reactive to light  Cardiovascular:      Rate and Rhythm: Normal rate and regular rhythm  Heart sounds: Normal heart sounds  No murmur heard  No friction rub  No gallop  Pulmonary:      Effort: Pulmonary effort is normal  No respiratory distress  Breath sounds: Normal breath sounds  No stridor  No wheezing, rhonchi or rales  Abdominal:      General: Bowel sounds are normal  There is no distension  Palpations: Abdomen is soft  There is no mass  Tenderness: There is no abdominal tenderness  There is no guarding  Musculoskeletal:      Cervical back: Neck supple  Lymphadenopathy:      Cervical: No cervical adenopathy  Psychiatric:         Mood and Affect: Mood normal          Behavior: Behavior normal          Thought Content:  Thought content normal          Judgment: Judgment normal          Extremities:  Without cyanosis, clubbing, or edema    Vitals:    08/16/22 1055 08/16/22 1934   BP: 170/90 150/70   Pulse: (!) 107    Temp: (!) 97 4 °F (36 3 °C)    TempSrc: Tympanic    SpO2: 98%    Weight: 78 6 kg (173 lb 4 8 oz)    Height: 5' 4" (1 626 m)        Transitional Care Management Review:  Monica Contreras is a 80 y o  male here for TCM follow up  During the TCM phone call patient stated:    TCM Call     Date and time call was made  8/5/2022 10:23 AM    Patient was hospitialized at  [address]    Date of Admission  07/31/22    Date of discharge  08/05/22    Diagnosis  HYPOMATREMIA    Disposition  Home    Current Symptoms  Fatigue    Fatigue severity  Mild      TCM Call     Post hospital issues  None    Scheduled for follow up?   Yes    Did you obtain your prescribed medications  No    Do you need help managing your prescriptions or medications  No    Is transportation to your appointment needed  No    I have advised the patient to call PCP with any new or worsening symptoms  [address] or [place] other    Support System  Family    Are you recieving any outpatient services  No    Are you recieving home care services  No    Are you using any community resources  No    Current waiver services  No    Have you fallen in the last 12 months  No          Veneda Bernheim, DO

## 2022-08-16 NOTE — ASSESSMENT & PLAN NOTE
When patient initially came into the office his blood pressure was elevated at 170/90  After he sat for a bit I recheck the any came down to 150/70  He is tolerating the amlodipine well  I do not think that the Coreg is a good choice for him  This patient has asthma-COPD overlap  He should not be on a beta-blocker unless absolutely needed for cardiac reasons  This is simply being used for blood pressure control  I think they are better choices  I am going to wean him off of this  I told him to decrease the dose to 1 a day for 2 weeks, then 1 every other day, then stop  If his sodium comes back up, I do not see a reason that we can not restart losartan  I look forward to hearing Dr Rachele Castro opinion regarding this  I did schedule the patient a follow-up visit for 1 month

## 2022-08-17 ENCOUNTER — HOME CARE VISIT (OUTPATIENT)
Dept: HOME HEALTH SERVICES | Facility: HOME HEALTHCARE | Age: 87
End: 2022-08-17
Payer: MEDICARE

## 2022-08-17 ENCOUNTER — TELEPHONE (OUTPATIENT)
Dept: FAMILY MEDICINE CLINIC | Facility: CLINIC | Age: 87
End: 2022-08-17

## 2022-08-17 VITALS
OXYGEN SATURATION: 96 % | TEMPERATURE: 96.7 F | SYSTOLIC BLOOD PRESSURE: 126 MMHG | DIASTOLIC BLOOD PRESSURE: 68 MMHG | HEART RATE: 74 BPM | RESPIRATION RATE: 20 BRPM

## 2022-08-17 VITALS — HEART RATE: 80 BPM | DIASTOLIC BLOOD PRESSURE: 98 MMHG | OXYGEN SATURATION: 97 % | SYSTOLIC BLOOD PRESSURE: 170 MMHG

## 2022-08-17 PROCEDURE — G0299 HHS/HOSPICE OF RN EA 15 MIN: HCPCS

## 2022-08-17 PROCEDURE — G0151 HHCP-SERV OF PT,EA 15 MIN: HCPCS

## 2022-08-17 NOTE — TELEPHONE ENCOUNTER
JAY FROM [facility] CALLED WITH A PATIENT UPDATE  PATIENT IS BEING DC FROM [facility]  [place] [facility] [place] [phone] PHONE NUMBER [phone]

## 2022-08-18 ENCOUNTER — ANTICOAG VISIT (OUTPATIENT)
Dept: FAMILY MEDICINE CLINIC | Facility: CLINIC | Age: 87
End: 2022-08-18

## 2022-08-19 ENCOUNTER — HOME CARE VISIT (OUTPATIENT)
Dept: HOME HEALTH SERVICES | Facility: HOME HEALTHCARE | Age: 87
End: 2022-08-19
Payer: MEDICARE

## 2022-08-19 ENCOUNTER — OFFICE VISIT (OUTPATIENT)
Dept: NEPHROLOGY | Facility: CLINIC | Age: 87
End: 2022-08-19
Payer: MEDICARE

## 2022-08-19 VITALS
WEIGHT: 172 LBS | BODY MASS INDEX: 29.37 KG/M2 | DIASTOLIC BLOOD PRESSURE: 80 MMHG | HEART RATE: 84 BPM | OXYGEN SATURATION: 98 % | HEIGHT: 64 IN | SYSTOLIC BLOOD PRESSURE: 152 MMHG

## 2022-08-19 DIAGNOSIS — N18.30 STAGE 3 CHRONIC KIDNEY DISEASE, UNSPECIFIED WHETHER STAGE 3A OR 3B CKD (HCC): ICD-10-CM

## 2022-08-19 DIAGNOSIS — D68.51 HETEROZYGOUS FACTOR V LEIDEN MUTATION (HCC): ICD-10-CM

## 2022-08-19 DIAGNOSIS — Z86.711 HISTORY OF PULMONARY EMBOLISM: Primary | ICD-10-CM

## 2022-08-19 DIAGNOSIS — J44.9 ASTHMA-COPD OVERLAP SYNDROME (HCC): ICD-10-CM

## 2022-08-19 DIAGNOSIS — E87.1 HYPONATREMIA: ICD-10-CM

## 2022-08-19 PROCEDURE — 99214 OFFICE O/P EST MOD 30 MIN: CPT | Performed by: INTERNAL MEDICINE

## 2022-08-19 NOTE — ASSESSMENT & PLAN NOTE
Serum sodium has improved to 131 millimoles per L today  He is not on hydrochlorothiazide which she should never take   He needs to elevate his legs as he does have lower extremity edema   This is likely due to dependent edema due to calcium channel blockers/amlodipine 10 mg daily    This was explained to him and his daughter

## 2022-08-19 NOTE — ASSESSMENT & PLAN NOTE
Lab Results   Component Value Date    EGFR 75 08/16/2022    EGFR 73 08/05/2022    EGFR 70 08/04/2022    CREATININE 0 86 08/16/2022    CREATININE 0 91 08/05/2022    CREATININE 0 94 08/04/2022    he has improvement in his kidney function and is now stage II

## 2022-08-19 NOTE — PROGRESS NOTES
[address] [facility] MD    Name: Evelina Garcia  YOB: 1930      Assessment/Plan:         Problem List Items Addressed This Visit        Respiratory    Asthma-COPD overlap syndrome ([facility] )     Has wheezing and uses inhalers            Hematopoietic and Hemostatic    Heterozygous factor V Leiden mutation ([facility] )      On anticoagulant            Genitourinary    Stage 3 chronic kidney disease, unspecified whether stage 3a or 3b CKD ([facility] )     Lab Results   Component Value Date    EGFR 75 08/16/2022    EGFR 73 08/05/2022    EGFR 70 08/04/2022    CREATININE 0 86 08/16/2022    CREATININE 0 91 08/05/2022    CREATININE 0 94 08/04/2022    he has improvement in his kidney function and is now stage II         Relevant Orders    Comprehensive metabolic panel    Osmolality, urine    Osmolality-If this is regarding a toxic alcohol, STOP  Test is not routinely indicated  Please consult medical  on call for further guidance  Urinalysis with microscopic    Sodium With Creatinine, Random Urine       Other    History of pulmonary embolism - Primary      On lifelong Coumadin         Hyponatremia      Serum sodium has improved to 131 millimoles per L today  He is not on hydrochlorothiazide which she should never take   He needs to elevate his legs as he does have lower extremity edema   This is likely due to dependent edema due to calcium channel blockers/amlodipine 10 mg daily  This was explained to him and his daughter           Relevant Orders    Comprehensive metabolic panel    Osmolality, urine    Osmolality-If this is regarding a toxic alcohol, STOP  Test is not routinely indicated  Please consult medical  on call for further guidance  Urinalysis with microscopic    Sodium With Creatinine, Random Urine            Subjective:      Patient ID: Evelina Garcia is a 80 y o  male  referred by Dr Rell Ott    HPI He has a history of chronic hypoosmolar hyponatremia  He presented with a serum sodium of 122 which did rise to 132 millimoles per L today  Etiology was thought to be due to hydrochlorothiazide and possible ADH secretion due to COVID-19  Gave him 1 day of tolvaptan and then re-initiated 1 8 L fluid restrictions  Did extremely well and he was not discharged on hydrochlorothiazide    The following portions of the patient's history were reviewed and updated as appropriate: allergies, current medications, past family history, past medical history, past social history, past surgical history and problem list     Review of Systems   Constitutional: Negative for fatigue  Appetite good   HENT: Negative for hearing loss  Eyes: Negative for visual disturbance  Respiratory: Positive for shortness of breath  Hs asthma and has KHAILL  Ok at rest and no orthopnea   Cardiovascular: Negative for chest pain, palpitations and leg swelling  Gastrointestinal: Negative for abdominal pain, constipation, diarrhea, nausea and vomiting  Genitourinary: Negative for difficulty urinating, dysuria, hematuria and urgency  Hesitancy and slower flow  Sits to void  Urine is clear yellow   Musculoskeletal: Positive for arthralgias  Having PT due to leg and arm weakness s/p Covid  He uses a walker for longer distances and uses a cane for shorter distances   Neurological: Positive for weakness  Negative for dizziness and light-headedness  Hematological: Negative for adenopathy  Psychiatric/Behavioral: Negative for dysphoric mood           Social History     Socioeconomic History    Marital status: /Civil Union     Spouse name: None    Number of children: None    Years of education: None    Highest education level: None   Occupational History    None   Tobacco Use    Smoking status: Former Smoker     Years: 10 00     Types: Pipe     Start date:      Quit date:      Years since quittin 6    Smokeless tobacco: Never Used   Vaping Use    Vaping Use: Never used   Substance and Sexual Activity    Alcohol use: Yes     Comment: social    Drug use: Never    Sexual activity: None   Other Topics Concern    None   Social History Narrative    None     Social Determinants of Health     Financial Resource Strain: Not on file   Food Insecurity: No Food Insecurity    Worried About Running Out of Food in the Last Year: Never true    Sorin of Food in the Last Year: Never true   Transportation Needs: No Transportation Needs    Lack of Transportation (Medical): No    Lack of Transportation (Non-Medical):  No   Physical Activity: Not on file   Stress: Not on file   Social Connections: Not on file   Intimate Partner Violence: Not on file   Housing Stability: High Risk    Unable to Pay for Housing in the Last Year: Yes    Number of Places Lived in the Last Year: 1    Unstable Housing in the Last Year: No     Past Medical History:   Diagnosis Date    Arthritis     Coagulation defect ([facility] )     last assessed: 11/28/2018    COPD (chronic obstructive pulmonary disease) ([place] [address] )     last assessed: 5/14/2019, 12/6/2017    Generalized osteoarthritis     last assessed: 1/28/2019    GERD without esophagitis     last assessed: 1/28/2019    Glaucoma     last assessed: 8/4/2011    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[facility] )     last assessed: 10/16/2018    [facility]    History of kidney stones 1985    Hx of blood clots     Hypertension     last assessed: 5/14/2019    Impaired fasting glucose     last assessed: 8/26/2013    Mild persistent asthma, uncomplicated     last assessed: 11/28/2018    Nephrolithiasis     Nondisplaced intertrochanteric fracture of right femur, initial encounter for closed fracture ([facility] )     last assessed: 1/28/2019    Premature ventricular contractions     last assessed: 8/4/2011    Pulmonary nodule     Scoliosis (and kyphoscoliosis), idiopathic     Wears dentures      Past Surgical History:   Procedure Laterality Date    CARPAL TUNNEL RELEASE Left     ENDO 8/801    CATARACT EXTRACTION Left     COLONOSCOPY  10/07/2008    last assessed: 3/29/2016    HERNIA REPAIR      HIP SURGERY      HIP SURGERY Left 03/05/1999    ORIF    MULTIPLE TOOTH EXTRACTIONS Bilateral     OTHER SURGICAL HISTORY Right 10/30/2001    [facility]    TONSILLECTOMY Bilateral     WISDOM TOOTH EXTRACTION Bilateral        Current Outpatient Medications:     albuterol (2 5 mg/3 mL) 0 083 % nebulizer solution, Take 3 mL (2 5 mg total) by nebulization every 4 (four) hours as needed for wheezing or shortness of breath, Disp: 100 mL, Rfl: 0    amLODIPine (NORVASC) 10 mg tablet, Take 1 tablet (10 mg total) by mouth daily, Disp: 30 tablet, Rfl: 0    budesonide-formoterol (Symbicort) 160-4 5 mcg/act inhaler, Inhale 2 puffs  in the morning and 2 puffs in the evening , Disp: 30 6 g, Rfl: 3    carvedilol (COREG) 3 125 mg tablet, Take 1 tablet (3 125 mg total) by mouth in the morning, Disp: 30 tablet, Rfl: 0    docusate sodium (COLACE) 100 mg capsule, Take 100 mg by mouth in the morning   Indications: Constipation, Disp: , Rfl:     latanoprost (XALATAN) 0 005 % ophthalmic solution, Administer 1 drop to both eyes daily at bedtime, Disp: , Rfl:     montelukast (SINGULAIR) 10 mg tablet, Take 1 tablet (10 mg total) by mouth daily, Disp: 90 tablet, Rfl: 3    multivitamin (THERAGRAN) TABS, Take 1 tablet by mouth daily, Disp: , Rfl:     omeprazole (PriLOSEC OTC) 20 MG tablet, Take 20 mg by mouth, Disp: , Rfl:     Polyethyl Glycol-Propyl Glycol (SYSTANE OP), Administer 1 drop to both eyes 4 (four) times a day, Disp: , Rfl:     polyethylene glycol (GLYCOLAX) 17 GM/SCOOP powder, Take 17 g by mouth as needed , Disp: , Rfl:     sodium chloride (ARIEL 128) 2 % hypertonic ophthalmic solution, Administer 1 drop into the left eye 4 (four) times a day, Disp: , Rfl:     triamcinolone (KENALOG) 0 1 % cream, Apply topically 2 (two) times a day as needed for rash, Disp: 80 g, Rfl: 2    warfarin (COUMADIN) 4 mg tablet, Take 2 daily or as directed, Disp: 180 tablet, Rfl: 2    Lab Results   Component Value Date    SODIUM 131 (L) 08/16/2022    K 4 5 08/16/2022     08/16/2022    CO2 25 08/16/2022    AGAP 6 08/16/2022    BUN 19 08/16/2022    CREATININE 0 86 08/16/2022    GLUC 99 08/16/2022    GLUF 104 (H) 05/13/2022    CALCIUM 9 4 08/16/2022    AST 28 08/16/2022    ALT 29 08/16/2022    ALKPHOS 92 08/16/2022    TP 7 0 08/16/2022    TBILI 0 66 08/16/2022    EGFR 75 08/16/2022     Lab Results   Component Value Date    WBC 3 42 (L) 08/05/2022    HGB 11 7 (L) 08/05/2022    HCT 34 1 (L) 08/05/2022    MCV 90 08/05/2022     08/05/2022     Lab Results   Component Value Date    CHOLESTEROL 170 08/02/2022    CHOLESTEROL 208 (H) 12/06/2021    CHOLESTEROL 216 (H) 03/09/2021     Lab Results   Component Value Date    HDL 64 08/02/2022    HDL 94 12/06/2021    HDL 83 03/09/2021     Lab Results   Component Value Date    LDLCALC 89 08/02/2022    LDLCALC 98 12/06/2021    LDLCALC 112 (H) 03/09/2021     Lab Results   Component Value Date    TRIG 84 08/02/2022    TRIG 79 12/06/2021    TRIG 104 03/09/2021     No results found for: [place]  Lab Results   Component Value Date    QJT4EHRSASYC 4 486 08/02/2022     Lab Results   Component Value Date     1 (H) 05/13/2022    CALCIUM 9 4 08/16/2022     No results found for: SPEP, UPEP  No results found for: ROSANNA ALASHUR        Objective:      /80 (BP Location: Left arm, Patient Position: Sitting, Cuff Size: Standard)   Pulse 84   Ht 5' 4" (1 626 m)   Wt 78 kg (172 lb)   SpO2 98%   BMI 29 52 kg/m²          Physical Exam  Vitals reviewed  Constitutional:       General: He is not in acute distress  Appearance: He is normal weight  He is not toxic-appearing or diaphoretic  Comments: Looks younger than stated age   HENT:      Head: Normocephalic and atraumatic  Right Ear: External ear normal       Left Ear: External ear normal    Eyes:      Extraocular Movements: Extraocular movements intact  Pupils: Pupils are equal, round, and reactive to light  Cardiovascular:      Rate and Rhythm: Normal rate and regular rhythm  Pulmonary:      Effort: Pulmonary effort is normal  No respiratory distress  Breath sounds: Normal breath sounds  No wheezing or rales  Abdominal:      General: Bowel sounds are normal  There is no distension  Palpations: Abdomen is soft  Tenderness: There is no abdominal tenderness  Musculoskeletal:         General: Normal range of motion  Cervical back: Normal range of motion and neck supple  Right lower leg: Edema present  Left lower leg: Edema present  Lymphadenopathy:      Cervical: No cervical adenopathy  Neurological:      General: No focal deficit present  Mental Status: He is alert  Gait: Gait abnormal    Psychiatric:         Mood and Affect: Mood normal          Behavior: Behavior normal          Thought Content:  Thought content normal          Judgment: Judgment normal

## 2022-08-22 ENCOUNTER — HOME CARE VISIT (OUTPATIENT)
Dept: HOME HEALTH SERVICES | Facility: HOME HEALTHCARE | Age: 87
End: 2022-08-22
Payer: MEDICARE

## 2022-08-22 VITALS
TEMPERATURE: 96.7 F | SYSTOLIC BLOOD PRESSURE: 122 MMHG | OXYGEN SATURATION: 97 % | DIASTOLIC BLOOD PRESSURE: 64 MMHG | HEART RATE: 76 BPM | RESPIRATION RATE: 18 BRPM

## 2022-08-22 PROCEDURE — G0299 HHS/HOSPICE OF RN EA 15 MIN: HCPCS

## 2022-08-23 ENCOUNTER — APPOINTMENT (OUTPATIENT)
Dept: LAB | Facility: CLINIC | Age: 87
End: 2022-08-23
Payer: MEDICARE

## 2022-08-23 DIAGNOSIS — D68.52 PROTHROMBIN GENE MUTATION (HCC): ICD-10-CM

## 2022-08-23 LAB
INR PPP: 2.07 (ref 0.84–1.19)
PROTHROMBIN TIME: 23.6 SECONDS (ref 11.6–14.5)

## 2022-08-23 PROCEDURE — 85610 PROTHROMBIN TIME: CPT

## 2022-08-23 PROCEDURE — 36415 COLL VENOUS BLD VENIPUNCTURE: CPT

## 2022-08-24 ENCOUNTER — ANTICOAG VISIT (OUTPATIENT)
Dept: FAMILY MEDICINE CLINIC | Facility: CLINIC | Age: 87
End: 2022-08-24

## 2022-08-26 DIAGNOSIS — J45.909 UNCOMPLICATED ASTHMA, UNSPECIFIED ASTHMA SEVERITY, UNSPECIFIED WHETHER PERSISTENT: ICD-10-CM

## 2022-08-26 RX ORDER — MONTELUKAST SODIUM 10 MG/1
TABLET ORAL
Qty: 90 TABLET | Refills: 3 | Status: SHIPPED | OUTPATIENT
Start: 2022-08-26

## 2022-09-06 DIAGNOSIS — I10 ESSENTIAL HYPERTENSION, BENIGN: ICD-10-CM

## 2022-09-06 RX ORDER — AMLODIPINE BESYLATE 10 MG/1
10 TABLET ORAL DAILY
Qty: 30 TABLET | Refills: 0 | Status: SHIPPED | OUTPATIENT
Start: 2022-09-06 | End: 2022-09-19 | Stop reason: SDUPTHER

## 2022-09-12 ENCOUNTER — APPOINTMENT (OUTPATIENT)
Dept: LAB | Facility: CLINIC | Age: 87
End: 2022-09-12
Payer: MEDICARE

## 2022-09-12 ENCOUNTER — ANTICOAG VISIT (OUTPATIENT)
Dept: FAMILY MEDICINE CLINIC | Facility: CLINIC | Age: 87
End: 2022-09-12

## 2022-09-12 DIAGNOSIS — N18.30 STAGE 3 CHRONIC KIDNEY DISEASE, UNSPECIFIED WHETHER STAGE 3A OR 3B CKD (HCC): ICD-10-CM

## 2022-09-12 DIAGNOSIS — D68.52 PROTHROMBIN GENE MUTATION (HCC): ICD-10-CM

## 2022-09-12 DIAGNOSIS — E87.1 HYPONATREMIA: ICD-10-CM

## 2022-09-12 LAB
INR PPP: 4.04 (ref 0.84–1.19)
PROTHROMBIN TIME: 39.6 SECONDS (ref 11.6–14.5)

## 2022-09-12 PROCEDURE — 85610 PROTHROMBIN TIME: CPT

## 2022-09-12 PROCEDURE — 36415 COLL VENOUS BLD VENIPUNCTURE: CPT

## 2022-09-19 ENCOUNTER — APPOINTMENT (OUTPATIENT)
Dept: LAB | Facility: CLINIC | Age: 87
End: 2022-09-19
Payer: MEDICARE

## 2022-09-19 ENCOUNTER — ANTICOAG VISIT (OUTPATIENT)
Dept: FAMILY MEDICINE CLINIC | Facility: CLINIC | Age: 87
End: 2022-09-19

## 2022-09-19 ENCOUNTER — OFFICE VISIT (OUTPATIENT)
Dept: FAMILY MEDICINE CLINIC | Facility: CLINIC | Age: 87
End: 2022-09-19
Payer: MEDICARE

## 2022-09-19 VITALS
HEART RATE: 108 BPM | OXYGEN SATURATION: 96 % | BODY MASS INDEX: 30.01 KG/M2 | WEIGHT: 175.8 LBS | DIASTOLIC BLOOD PRESSURE: 86 MMHG | TEMPERATURE: 97.9 F | SYSTOLIC BLOOD PRESSURE: 150 MMHG | HEIGHT: 64 IN

## 2022-09-19 DIAGNOSIS — N18.30 STAGE 3 CHRONIC KIDNEY DISEASE, UNSPECIFIED WHETHER STAGE 3A OR 3B CKD (HCC): ICD-10-CM

## 2022-09-19 DIAGNOSIS — D68.52 PROTHROMBIN GENE MUTATION (HCC): ICD-10-CM

## 2022-09-19 DIAGNOSIS — I10 ESSENTIAL HYPERTENSION, BENIGN: Primary | ICD-10-CM

## 2022-09-19 DIAGNOSIS — J44.9 ASTHMA-COPD OVERLAP SYNDROME (HCC): ICD-10-CM

## 2022-09-19 DIAGNOSIS — R06.02 SHORTNESS OF BREATH: ICD-10-CM

## 2022-09-19 LAB
INR PPP: 3.09 (ref 0.84–1.19)
PROTHROMBIN TIME: 32.2 SECONDS (ref 11.6–14.5)

## 2022-09-19 PROCEDURE — 36415 COLL VENOUS BLD VENIPUNCTURE: CPT

## 2022-09-19 PROCEDURE — 85610 PROTHROMBIN TIME: CPT

## 2022-09-19 PROCEDURE — 99214 OFFICE O/P EST MOD 30 MIN: CPT | Performed by: FAMILY MEDICINE

## 2022-09-19 RX ORDER — DORZOLAMIDE HCL 20 MG/ML
SOLUTION/ DROPS OPHTHALMIC
COMMUNITY
Start: 2022-09-15

## 2022-09-19 RX ORDER — LOSARTAN POTASSIUM 50 MG/1
50 TABLET ORAL DAILY
Qty: 30 TABLET | Refills: 5 | Status: SHIPPED | OUTPATIENT
Start: 2022-09-19 | End: 2022-10-29

## 2022-09-19 RX ORDER — DIFLUPREDNATE 0.5 MG/ML
EMULSION OPHTHALMIC
COMMUNITY
Start: 2022-09-06

## 2022-09-19 RX ORDER — AMLODIPINE BESYLATE 10 MG/1
10 TABLET ORAL DAILY
Qty: 30 TABLET | Refills: 5 | Status: SHIPPED | OUTPATIENT
Start: 2022-09-19 | End: 2022-10-19

## 2022-09-19 NOTE — ASSESSMENT & PLAN NOTE
Patient has hypertension  His blood pressure was noted to be elevated today in the office  He had changes in his medication regimen since his hospitalization  Today, his blood pressure, when I checked in his left arm was 160/80  He reports it is generally 20 points lower in the right arm  I did check his right arm any was 144/80  I suspect he has atherosclerosis  Regardless, we need better blood pressure control  I reviewed his labs  His renal function has returned to normal   As such, I am going to start him back on losartan 50 mg once a day  In the past, he had taken it twice a day  He will continue amlodipine 10 mg daily  I did tell the patient that I believe the amlodipine is what is causing his lower extremity edema  I will try to lower the dose if possible  I will see him back for a follow-up visit in 4 weeks  I asked the patient to follow a low-sodium diet

## 2022-09-19 NOTE — ASSESSMENT & PLAN NOTE
Patient had a prothrombin time done earlier today  Results are currently pending  He will continue warfarin therapy due to his history of unprovoked pulmonary embolism

## 2022-09-19 NOTE — PROGRESS NOTES
Name: Monica Contreras      : 1930      MRN: [id number]  Encounter Provider: Veneda Bernheim, DO  Encounter Date: 2022   Encounter department: [facility]     1  Essential hypertension, benign  Assessment & Plan:  Patient has hypertension  His blood pressure was noted to be elevated today in the office  He had changes in his medication regimen since his hospitalization  Today, his blood pressure, when I checked in his left arm was 160/80  He reports it is generally 20 points lower in the right arm  I did check his right arm any was 144/80  I suspect he has atherosclerosis  Regardless, we need better blood pressure control  I reviewed his labs  His renal function has returned to normal   As such, I am going to start him back on losartan 50 mg once a day  In the past, he had taken it twice a day  He will continue amlodipine 10 mg daily  I did tell the patient that I believe the amlodipine is what is causing his lower extremity edema  I will try to lower the dose if possible  I will see him back for a follow-up visit in 4 weeks  I asked the patient to follow a low-sodium diet  Orders:  -     amLODIPine (NORVASC) 10 mg tablet; Take 1 tablet (10 mg total) by mouth daily  -     losartan (COZAAR) 50 mg tablet; Take 1 tablet (50 mg total) by mouth daily    2  Shortness of breath  Assessment & Plan:  I think the patient's lower extremity edema is all secondary to the amlodipine 10 mg which he was recently started on  However, I will check a BNP, just to be sure    Orders:  -     NT-BNP PRO; Future    3   Stage 3 chronic kidney disease, unspecified whether stage 3a or 3b CKD [facility])  Assessment & Plan:  Lab Results   Component Value Date    EGFR 75 2022    EGFR 73 2022    EGFR 70 2022    CREATININE 0 86 2022    CREATININE 0 91 2022    CREATININE 0 94 2022   After starting the patient back on losartan 50 mg daily, I ordered a BMP to assess the patient's renal function  I think he will do just fine  Orders:  -     Basic metabolic panel; Future    4  Prothrombin gene mutation [facility])  Assessment & Plan:  Patient had a prothrombin time done earlier today  Results are currently pending  He will continue warfarin therapy due to his history of unprovoked pulmonary embolism  5  Asthma-COPD overlap syndrome ([address] )  Assessment & Plan:  Patient has asthma COPD overlap which is currently stable  The patient will continue Symbicort inhaler 2 puffs twice a day  Continue albuterol p r n  BMI Counseling: Body mass index is 30 18 kg/m²  Follow-up plan was not completed due to elderly patient (72 years old) where weight reduction/weight gain would complicate underlying health condition such as: illness or physical disability  Subjective      This is a 70-year-old white male who presents to the office today for his routine checkup  The patient is doing well and has no complaints  He reports no sequelae from his recent COVID-19 virus infection  He has resumed normal activity  He is getting in-home physical therapy at this time  He still exercises regularly  He tells me his ankles have been swollen  He does have mild chronic shortness of breath  He attributes this to his asthma  He thinks it might be a little worse  He denies any orthopnea or paroxysmal nocturnal dyspnea  Review of Systems   Respiratory: Negative for cough and shortness of breath  Cardiovascular: Negative for chest pain, palpitations and leg swelling  Denies orthopnea  Denies paroxysmal nocturnal dyspnea  Gastrointestinal: Negative for abdominal distention, abdominal pain, blood in stool, constipation, diarrhea and nausea  Genitourinary: Negative for dysuria and frequency  Musculoskeletal: Positive for arthralgias and back pain         Current Outpatient Medications on File Prior to Visit   Medication Sig    albuterol (2 5 mg/3 mL) 0 083 % nebulizer solution Take 3 mL (2 5 mg total) by nebulization every 4 (four) hours as needed for wheezing or shortness of breath    budesonide-formoterol (Symbicort) 160-4 5 mcg/act inhaler Inhale 2 puffs  in the morning and 2 puffs in the evening   Difluprednate 0 05 % EMUL INSTILL 1 DROP INTO LEFT EYE TWICE A DAY    docusate sodium (COLACE) 100 mg capsule Take 100 mg by mouth in the morning  Indications: Constipation    dorzolamide (TRUSOPT) 2 % ophthalmic solution INSTILL 1 DROP INTO LEFT EYE TWICE A DAY    latanoprost (XALATAN) 0 005 % ophthalmic solution Administer 1 drop to both eyes daily at bedtime    montelukast (SINGULAIR) 10 mg tablet TAKE 1 TABLET BY MOUTH EVERY DAY    multivitamin (THERAGRAN) TABS Take 1 tablet by mouth daily    omeprazole (PriLOSEC OTC) 20 MG tablet Take 20 mg by mouth    Polyethyl Glycol-Propyl Glycol (SYSTANE OP) Administer 1 drop to both eyes 4 (four) times a day    polyethylene glycol (GLYCOLAX) 17 GM/SCOOP powder Take 17 g by mouth as needed     sodium chloride (ARIEL 128) 2 % hypertonic ophthalmic solution Administer 1 drop into the left eye 4 (four) times a day    triamcinolone (KENALOG) 0 1 % cream Apply topically 2 (two) times a day as needed for rash    warfarin (COUMADIN) 4 mg tablet Take 2 daily or as directed    [DISCONTINUED] amLODIPine (NORVASC) 10 mg tablet Take 1 tablet (10 mg total) by mouth daily    carvedilol (COREG) 3 125 mg tablet Take 1 tablet (3 125 mg total) by mouth in the morning       Objective     /86   Pulse (!) 108   Temp 97 9 °F (36 6 °C) (Tympanic)   Ht 5' 4" (1 626 m)   Wt 79 7 kg (175 lb 12 8 oz)   SpO2 96%   BMI 30 18 kg/m²     Physical Exam  Vitals reviewed  Constitutional:       Comments: Patient is a 70-year-old white male who appears his stated age  He is pleasant, cooperative, and in no distress  HENT:      Head: Normocephalic and atraumatic        Right Ear: Tympanic membrane, ear canal and external ear normal  There is no impacted cerumen  Left Ear: Tympanic membrane, ear canal and external ear normal  There is no impacted cerumen  Mouth/Throat:      Mouth: Mucous membranes are moist       Pharynx: Oropharynx is clear  No oropharyngeal exudate or posterior oropharyngeal erythema  Eyes:      General: No scleral icterus  Right eye: No discharge  Left eye: No discharge  Conjunctiva/sclera: Conjunctivae normal       Pupils: Pupils are equal, round, and reactive to light  Neck:      Comments: No thyromegaly was noted  Cardiovascular:      Rate and Rhythm: Normal rate and regular rhythm  Heart sounds: Normal heart sounds  No murmur heard  No friction rub  No gallop  Pulmonary:      Effort: Pulmonary effort is normal  No respiratory distress  Breath sounds: Normal breath sounds  No stridor  No wheezing, rhonchi or rales  Abdominal:      General: Bowel sounds are normal  There is no distension  Palpations: Abdomen is soft  There is no mass  Tenderness: There is no abdominal tenderness  There is no guarding  Comments: Due to his ambulatory dysfunction, I had to examine his abdomen with him seated in the exam room chair  Musculoskeletal:      Cervical back: Neck supple  Lymphadenopathy:      Cervical: No cervical adenopathy  Psychiatric:         Mood and Affect: Mood normal          Behavior: Behavior normal          Thought Content: Thought content normal          Judgment: Judgment normal      Extremities:  Without cyanosis, clubbing  There is pitting edema noted to the knees bilaterally    Xavi Martinez DO

## 2022-09-19 NOTE — ASSESSMENT & PLAN NOTE
Lab Results   Component Value Date    EGFR 75 08/16/2022    EGFR 73 08/05/2022    EGFR 70 08/04/2022    CREATININE 0 86 08/16/2022    CREATININE 0 91 08/05/2022    CREATININE 0 94 08/04/2022   After starting the patient back on losartan 50 mg daily, I ordered a BMP to assess the patient's renal function  I think he will do just fine

## 2022-09-19 NOTE — ASSESSMENT & PLAN NOTE
I think the patient's lower extremity edema is all secondary to the amlodipine 10 mg which he was recently started on    However, I will check a BNP, just to be sure

## 2022-09-19 NOTE — ASSESSMENT & PLAN NOTE
Patient has asthma COPD overlap which is currently stable  The patient will continue Symbicort inhaler 2 puffs twice a day  Continue albuterol jorge Aviles

## 2022-10-10 ENCOUNTER — APPOINTMENT (OUTPATIENT)
Dept: LAB | Facility: CLINIC | Age: 87
End: 2022-10-10
Payer: MEDICARE

## 2022-10-10 DIAGNOSIS — D68.52 PROTHROMBIN GENE MUTATION (HCC): ICD-10-CM

## 2022-10-10 LAB
INR PPP: 3.36 (ref 0.84–1.19)
PROTHROMBIN TIME: 34.3 SECONDS (ref 11.6–14.5)

## 2022-10-10 PROCEDURE — 36415 COLL VENOUS BLD VENIPUNCTURE: CPT

## 2022-10-10 PROCEDURE — 85610 PROTHROMBIN TIME: CPT

## 2022-10-11 ENCOUNTER — ANTICOAG VISIT (OUTPATIENT)
Dept: FAMILY MEDICINE CLINIC | Facility: CLINIC | Age: 87
End: 2022-10-11

## 2022-10-17 ENCOUNTER — APPOINTMENT (OUTPATIENT)
Dept: LAB | Facility: CLINIC | Age: 87
End: 2022-10-17
Payer: MEDICARE

## 2022-10-17 DIAGNOSIS — D68.52 PROTHROMBIN GENE MUTATION (HCC): ICD-10-CM

## 2022-10-17 DIAGNOSIS — R06.02 SHORTNESS OF BREATH: ICD-10-CM

## 2022-10-17 LAB
ALBUMIN SERPL BCP-MCNC: 3.5 G/DL (ref 3.5–5)
ALP SERPL-CCNC: 94 U/L (ref 46–116)
ALT SERPL W P-5'-P-CCNC: 30 U/L (ref 12–78)
ANION GAP SERPL CALCULATED.3IONS-SCNC: 9 MMOL/L (ref 4–13)
AST SERPL W P-5'-P-CCNC: 26 U/L (ref 5–45)
BILIRUB SERPL-MCNC: 0.53 MG/DL (ref 0.2–1)
BUN SERPL-MCNC: 19 MG/DL (ref 5–25)
CALCIUM SERPL-MCNC: 9.6 MG/DL (ref 8.3–10.1)
CHLORIDE SERPL-SCNC: 103 MMOL/L (ref 96–108)
CO2 SERPL-SCNC: 22 MMOL/L (ref 21–32)
CREAT SERPL-MCNC: 0.91 MG/DL (ref 0.6–1.3)
GFR SERPL CREATININE-BSD FRML MDRD: 72 ML/MIN/1.73SQ M
GLUCOSE P FAST SERPL-MCNC: 117 MG/DL (ref 65–99)
INR PPP: 2.37 (ref 0.84–1.19)
POTASSIUM SERPL-SCNC: 4 MMOL/L (ref 3.5–5.3)
PROT SERPL-MCNC: 7.3 G/DL (ref 6.4–8.4)
PROTHROMBIN TIME: 26.1 SECONDS (ref 11.6–14.5)
SODIUM SERPL-SCNC: 134 MMOL/L (ref 135–147)

## 2022-10-17 PROCEDURE — 80053 COMPREHEN METABOLIC PANEL: CPT

## 2022-10-17 PROCEDURE — 85610 PROTHROMBIN TIME: CPT

## 2022-10-17 PROCEDURE — 36415 COLL VENOUS BLD VENIPUNCTURE: CPT

## 2022-10-18 ENCOUNTER — ANTICOAG VISIT (OUTPATIENT)
Dept: FAMILY MEDICINE CLINIC | Facility: CLINIC | Age: 87
End: 2022-10-18

## 2022-10-20 ENCOUNTER — OFFICE VISIT (OUTPATIENT)
Dept: FAMILY MEDICINE CLINIC | Facility: CLINIC | Age: 87
End: 2022-10-20
Payer: MEDICARE

## 2022-10-20 VITALS
TEMPERATURE: 97.4 F | SYSTOLIC BLOOD PRESSURE: 162 MMHG | HEIGHT: 64 IN | BODY MASS INDEX: 29.5 KG/M2 | WEIGHT: 172.8 LBS | DIASTOLIC BLOOD PRESSURE: 78 MMHG | OXYGEN SATURATION: 96 % | HEART RATE: 118 BPM

## 2022-10-20 DIAGNOSIS — I10 ESSENTIAL HYPERTENSION, BENIGN: Primary | ICD-10-CM

## 2022-10-20 DIAGNOSIS — Z00.00 ENCOUNTER FOR MEDICARE ANNUAL WELLNESS EXAM: ICD-10-CM

## 2022-10-20 PROCEDURE — G0439 PPPS, SUBSEQ VISIT: HCPCS | Performed by: FAMILY MEDICINE

## 2022-10-20 PROCEDURE — 99213 OFFICE O/P EST LOW 20 MIN: CPT | Performed by: FAMILY MEDICINE

## 2022-10-20 NOTE — PATIENT INSTRUCTIONS
Medicare Preventive Visit Patient Instructions  Thank you for completing your Welcome to Medicare Visit or Medicare Annual Wellness Visit today  Your next wellness visit will be due in one year (10/21/2023)  The screening/preventive services that you may require over the next 5-10 years are detailed below  Some tests may not apply to you based off risk factors and/or age  Screening tests ordered at today's visit but not completed yet may show as past due  Also, please note that scanned in results may not display below  Preventive Screenings:  Service Recommendations Previous Testing/Comments   Colorectal Cancer Screening  · Colonoscopy    · Fecal Occult Blood Test (FOBT)/Fecal Immunochemical Test (FIT)  · Fecal DNA/Cologuard Test  · Flexible Sigmoidoscopy Age: 39-70 years old   Colonoscopy: every 10 years (May be performed more frequently if at higher risk)  OR  FOBT/FIT: every 1 year  OR  Cologuard: every 3 years  OR  Sigmoidoscopy: every 5 years  Screening may be recommended earlier than age 39 if at higher risk for colorectal cancer  Also, an individualized decision between you and your healthcare provider will decide whether screening between the ages of 74-80 would be appropriate   Colonoscopy: 10/07/2008  FOBT/FIT: Not on file  Cologuard: Not on file  Sigmoidoscopy: Not on file    Screening Not Indicated     Prostate Cancer Screening Individualized decision between patient and health care provider in men between ages of 53-78   Medicare will cover every 12 months beginning on the day after your 50th birthday PSA: No results in last 5 years     Screening Not Indicated     Hepatitis C Screening Once for adults born between 1945 and 1965  More frequently in patients at high risk for Hepatitis C Hep C Antibody: Not on file        Diabetes Screening 1-2 times per year if you're at risk for diabetes or have pre-diabetes Fasting glucose: 117 mg/dL (10/17/2022)  A1C: No results in last 5 years (No results in last 5 years)  Screening Current   Cholesterol Screening Once every 5 years if you don't have a lipid disorder  May order more often based on risk factors  Lipid panel: 08/02/2022  Screening Current      Other Preventive Screenings Covered by Medicare:  1  Abdominal Aortic Aneurysm (AAA) Screening: covered once if your at risk  You're considered to be at risk if you have a family history of AAA or a male between the age of 73-68 who smoking at least 100 cigarettes in your lifetime  2  Lung Cancer Screening: covers low dose CT scan once per year if you meet all of the following conditions: (1) Age 50-69; (2) No signs or symptoms of lung cancer; (3) Current smoker or have quit smoking within the last 15 years; (4) You have a tobacco smoking history of at least 20 pack years (packs per day x number of years you smoked); (5) You get a written order from a healthcare provider  3  Glaucoma Screening: covered annually if you're considered high risk: (1) You have diabetes OR (2) Family history of glaucoma OR (3)  aged 48 and older OR (3)  American aged 72 and older  3  Osteoporosis Screening: covered every 2 years if you meet one of the following conditions: (1) Have a vertebral abnormality; (2) On glucocorticoid therapy for more than 3 months; (3) Have primary hyperparathyroidism; (4) On osteoporosis medications and need to assess response to drug therapy  5  HIV Screening: covered annually if you're between the age of 12-76  Also covered annually if you are younger than 13 and older than 72 with risk factors for HIV infection  For pregnant patients, it is covered up to 3 times per pregnancy      Immunizations:  Immunization Recommendations   Influenza Vaccine Annual influenza vaccination during flu season is recommended for all persons aged >= 6 months who do not have contraindications   Pneumococcal Vaccine   * Pneumococcal conjugate vaccine = PCV13 (Prevnar 13), PCV15 (Vaxneuvance), PCV20 (Prevnar 20)  * Pneumococcal polysaccharide vaccine = PPSV23 (Pneumovax) Adults 2364 years old: 1-3 doses may be recommended based on certain risk factors  Adults 72 years old: 1-2 doses may be recommended based off what pneumonia vaccine you previously received   Hepatitis B Vaccine 3 dose series if at intermediate or high risk (ex: diabetes, end stage renal disease, liver disease)   Tetanus (Td) Vaccine - COST NOT COVERED BY MEDICARE PART B Following completion of primary series, a booster dose should be given every 10 years to maintain immunity against tetanus  Td may also be given as tetanus wound prophylaxis  Tdap Vaccine - COST NOT COVERED BY MEDICARE PART B Recommended at least once for all adults  For pregnant patients, recommended with each pregnancy  Shingles Vaccine (Shingrix) - COST NOT COVERED BY MEDICARE PART B  2 shot series recommended in those aged 48 and above     Health Maintenance Due:  There are no preventive care reminders to display for this patient  Immunizations Due:      Topic Date Due   • COVID-19 Vaccine (1) Never done   • Pneumococcal Vaccine: 65+ Years (1 - PCV) Never done   • Influenza Vaccine (1) 09/01/2022     Advance Directives   What are advance directives? Advance directives are legal documents that state your wishes and plans for medical care  These plans are made ahead of time in case you lose your ability to make decisions for yourself  Advance directives can apply to any medical decision, such as the treatments you want, and if you want to donate organs  What are the types of advance directives? There are many types of advance directives, and each state has rules about how to use them  You may choose a combination of any of the following:  · Living will: This is a written record of the treatment you want  You can also choose which treatments you do not want, which to limit, and which to stop at a certain time  This includes surgery, medicine, IV fluid, and tube feedings  · Durable power of  for healthcare [facility]): This is a written record that states who you want to make healthcare choices for you when you are unable to make them for yourself  This person, called a proxy, is usually a family member or a friend  You may choose more than 1 proxy  · Do not resuscitate (DNR) order:  A DNR order is used in case your heart stops beating or you stop breathing  It is a request not to have certain forms of treatment, such as CPR  A DNR order may be included in other types of advance directives  · Medical directive: This covers the care that you want if you are in a coma, near death, or unable to make decisions for yourself  You can list the treatments you want for each condition  Treatment may include pain medicine, surgery, blood transfusions, dialysis, IV or tube feedings, and a ventilator (breathing machine)  · Values history: This document has questions about your views, beliefs, and how you feel and think about life  This information can help others choose the care that you would choose  Why are advance directives important? An advance directive helps you control your care  Although spoken wishes may be used, it is better to have your wishes written down  Spoken wishes can be misunderstood, or not followed  Treatments may be given even if you do not want them  An advance directive may make it easier for your family to make difficult choices about your care  Weight Management   Why it is important to manage your weight:  Being overweight increases your risk of health conditions such as heart disease, high blood pressure, type 2 diabetes, and certain types of cancer  It can also increase your risk for osteoarthritis, sleep apnea, and other respiratory problems  Aim for a slow, steady weight loss  Even a small amount of weight loss can lower your risk of health problems    How to lose weight safely:  A safe and healthy way to lose weight is to eat fewer calories and get regular exercise  You can lose up about 1 pound a week by decreasing the number of calories you eat by 500 calories each day  Healthy meal plan for weight management:  A healthy meal plan includes a variety of foods, contains fewer calories, and helps you stay healthy  A healthy meal plan includes the following:  · Eat whole-grain foods more often  A healthy meal plan should contain fiber  Fiber is the part of grains, fruits, and vegetables that is not broken down by your body  Whole-grain foods are healthy and provide extra fiber in your diet  Some examples of whole-grain foods are whole-wheat breads and pastas, oatmeal, brown rice, and bulgur  · Eat a variety of vegetables every day  Include dark, leafy greens such as spinach, kale, zaynab greens, and mustard greens  Eat yellow and orange vegetables such as carrots, sweet potatoes, and winter squash  · Eat a variety of fruits every day  Choose fresh or canned fruit (canned in its own juice or light syrup) instead of juice  Fruit juice has very little or no fiber  · Eat low-fat dairy foods  Drink fat-free (skim) milk or 1% milk  Eat fat-free yogurt and low-fat cottage cheese  Try low-fat cheeses such as mozzarella and other reduced-fat cheeses  · Choose meat and other protein foods that are low in fat  Choose beans or other legumes such as split peas or lentils  Choose fish, skinless poultry (chicken or turkey), or lean cuts of red meat (beef or pork)  Before you cook meat or poultry, cut off any visible fat  · Use less fat and oil  Try baking foods instead of frying them  Add less fat, such as margarine, sour cream, regular salad dressing and mayonnaise to foods  Eat fewer high-fat foods  Some examples of high-fat foods include french fries, doughnuts, ice cream, and cakes  · Eat fewer sweets  Limit foods and drinks that are high in sugar  This includes candy, cookies, regular soda, and sweetened drinks    Exercise:  Exercise at least 30 minutes per day on most days of the week  Some examples of exercise include walking, biking, dancing, and swimming  You can also fit in more physical activity by taking the stairs instead of the elevator or parking farther away from stores  Ask your healthcare provider about the best exercise plan for you  © Copyright [employer] 2018 Information is for End User's use only and may not be sold, redistributed or otherwise used for commercial purposes   All illustrations and images included in CareNotes® are the copyrighted property of [employer]  or [address] [place] [address] [employer]

## 2022-10-20 NOTE — PROGRESS NOTES
Assessment and Plan:     Problem List Items Addressed This Visit        Cardiovascular and Mediastinum    Essential hypertension, benign - Primary     Patient has hypertension  His blood pressure is now much better controlled  I am going to have him continue losartan 50 mg once a day in addition to his other medications  If his blood pressure stays down, perhaps I can reduce the dose of his amlodipine to 5 mg per day  I reviewed his CMP with him  His sodium was 134  Fasting blood sugar was 117  Potassium was normal   Creatinine was 0 91 with an estimated GFR of 72  I plan to recheck a BMP when I see the patient back again in January  Other    Encounter for Medicare annual wellness exam        BMI Counseling: Body mass index is 29 66 kg/m²  Follow-up plan was not completed due to elderly patient (72 years old) where weight reduction/weight gain would complicate underlying health condition such as: illness or physical disability  Depression Screening and Follow-up Plan: Patient was screened for depression during today's encounter  They screened negative with a PHQ-2 score of 0  Preventive health issues were discussed with patient, and age appropriate screening tests were ordered as noted in patient's After Visit Summary  Personalized health advice and appropriate referrals for health education or preventive services given if needed, as noted in patient's After Visit Summary  History of Present Illness:     Patient presents for a Medicare Wellness Visit    This is a 70-year-old white male who presents to the office today for his annual Medicare wellness exam as well as for his blood pressure recheck  Patient is doing well  He is receiving home physical therapy  He tells me that they are working on his balance  He restarted his losartan  He had some repeat labs done which show his renal function is normal   He tells me he is eating well  He has no complaints    His daughter accompanied him to the office today  Patient Care Team:  Daun Spatz, DO as PCP - General (Family Medicine)     Review of Systems:     Review of Systems   Constitutional: Negative for activity change, appetite change and unexpected weight change  Respiratory: Negative for cough, shortness of breath and wheezing  Cardiovascular: Positive for leg swelling  Negative for chest pain and palpitations  Gastrointestinal: Negative for abdominal distention, abdominal pain, blood in stool, constipation, diarrhea and nausea          Problem List:     Patient Active Problem List   Diagnosis   • Prothrombin gene mutation ([place] [address] )   • Primary generalized (osteo)arthritis   • Essential hypertension, benign   • Dyslipidemia   • Intrinsic eczema   • Other seborrheic dermatitis   • Abnormality of gait   • Osteoarthritis of both knees   • Chronic pulmonary embolism without acute cor pulmonale (HCC)   • Age-related cataract of both eyes   • Premature beats   • Primary osteoarthritis of both knees   • Neoplasm of uncertain behavior of skin   • Encounter for long-term (current) use of medications   • GERD without esophagitis   • Pulmonary emphysema (HCC)   • Stage 3 chronic kidney disease, unspecified whether stage 3a or 3b CKD ([place] [address] )   • Vaccine refused by patient   • Hypercalcemia   • COVID-19 virus infection   • Obstructive chronic bronchitis without exacerbation (HCC)   • Asthma-COPD overlap syndrome ([place] [address] )   • 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[place] [address] )   • Endothelial corneal dystrophy of both eyes   • Heterozygous factor V Leiden mutation ([place] [address] )   • 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 ([place] [address] )   • History of pulmonary embolism   • Mature cataract   • Primary open-angle glaucoma, bilateral, mild stage   • Pseudophakic bullous keratopathy of left eye   • TB lung, latent   • Hyponatremia   • Shortness of breath   • Encounter for Medicare annual wellness exam      Past Medical and Surgical History: Past Medical History:   Diagnosis Date   • Arthritis    • Coagulation defect ([facility] )     last assessed: 11/28/2018   • COPD (chronic obstructive pulmonary disease) ([facility] )     last assessed: 5/14/2019, 12/6/2017   • Generalized osteoarthritis     last assessed: 1/28/2019   • GERD without esophagitis     last assessed: 1/28/2019   • Glaucoma     last assessed: 8/4/2011   • 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[place] [address] )     last assessed: 10/16/2018    Factor II Prothrombin Gene per [place]   • History of kidney stones 1985   • Hx of blood clots    • Hypertension     last assessed: 5/14/2019   • Impaired fasting glucose     last assessed: 8/26/2013   • Mild persistent asthma, uncomplicated     last assessed: 11/28/2018   • Nephrolithiasis    • Nondisplaced intertrochanteric fracture of right femur, initial encounter for closed fracture ([place] [address] )     last assessed: 1/28/2019   • Premature ventricular contractions     last assessed: 8/4/2011   • Pulmonary nodule    • Scoliosis (and kyphoscoliosis), idiopathic    • Wears dentures      Past Surgical History:   Procedure Laterality Date   • CARPAL TUNNEL RELEASE Left     ENDO 8/801   • CATARACT EXTRACTION Left    • COLONOSCOPY  10/07/2008    last assessed: 3/29/2016   • HERNIA REPAIR     • HIP SURGERY     • HIP SURGERY Left 03/05/1999    [facility]   • MULTIPLE TOOTH EXTRACTIONS Bilateral    • OTHER SURGICAL HISTORY Right 10/30/2001    [facility]   • TONSILLECTOMY Bilateral    • WISDOM TOOTH EXTRACTION Bilateral       Family History:     Family History   Problem Relation Age of Onset   • Hypertension Mother    • Stroke Mother    • Heart attack Father       Social History:     Social History     Socioeconomic History   • Marital status: /Civil Union     Spouse name: None   • Number of children: None   • Years of education: None   • Highest education level: None   Occupational History   • None   Tobacco Use   • Smoking status: Former Smoker     Years: 10 00     Types: Pipe     Start date: 56     Quit date: 1970     Years since quittin 8   • Smokeless tobacco: Never Used   Vaping Use   • Vaping Use: Never used   Substance and Sexual Activity   • Alcohol use: Yes     Comment: social   • Drug use: Never   • Sexual activity: None   Other Topics Concern   • None   Social History Narrative   • None     Social Determinants of Health     Financial Resource Strain: Medium Risk   • Difficulty of Paying Living Expenses: Somewhat hard   Food Insecurity: No Food Insecurity   • Worried About Running Out of Food in the Last Year: Never true   • Ran Out of Food in the Last Year: Never true   Transportation Needs: No Transportation Needs   • Lack of Transportation (Medical): No   • Lack of Transportation (Non-Medical): No   Physical Activity: Not on file   Stress: Not on file   Social Connections: Not on file   Intimate Partner Violence: Not on file   Housing Stability: High Risk   • Unable to Pay for Housing in the Last Year: Yes   • Number of Places Lived in the Last Year: 1   • Unstable Housing in the Last Year: No      Medications and Allergies:     Current Outpatient Medications   Medication Sig Dispense Refill   • albuterol (2 5 mg/3 mL) 0 083 % nebulizer solution Take 3 mL (2 5 mg total) by nebulization every 4 (four) hours as needed for wheezing or shortness of breath 100 mL 0   • budesonide-formoterol (Symbicort) 160-4 5 mcg/act inhaler Inhale 2 puffs  in the morning and 2 puffs in the evening  30 6 g 3   • Difluprednate 0 05 % EMUL INSTILL 1 DROP INTO LEFT EYE TWICE A DAY     • docusate sodium (COLACE) 100 mg capsule Take 100 mg by mouth in the morning   Indications: Constipation     • dorzolamide (TRUSOPT) 2 % ophthalmic solution INSTILL 1 DROP INTO LEFT EYE TWICE A DAY     • latanoprost (XALATAN) 0 005 % ophthalmic solution Administer 1 drop to both eyes daily at bedtime     • losartan (COZAAR) 50 mg tablet Take 1 tablet (50 mg total) by mouth daily 30 tablet 5   • montelukast (SINGULAIR) 10 mg tablet TAKE 1 TABLET BY MOUTH EVERY DAY 90 tablet 3   • multivitamin (THERAGRAN) TABS Take 1 tablet by mouth daily     • omeprazole (PriLOSEC OTC) 20 MG tablet Take 20 mg by mouth     • Polyethyl Glycol-Propyl Glycol (SYSTANE OP) Administer 1 drop to both eyes 4 (four) times a day     • polyethylene glycol (GLYCOLAX) 17 GM/SCOOP powder Take 17 g by mouth as needed      • sodium chloride (ARIEL 128) 2 % hypertonic ophthalmic solution Administer 1 drop into the left eye 4 (four) times a day     • triamcinolone (KENALOG) 0 1 % cream Apply topically 2 (two) times a day as needed for rash 80 g 2   • warfarin (COUMADIN) 4 mg tablet Take 2 daily or as directed 180 tablet 2   • amLODIPine (NORVASC) 10 mg tablet Take 1 tablet (10 mg total) by mouth daily 30 tablet 5   • carvedilol (COREG) 3 125 mg tablet Take 1 tablet (3 125 mg total) by mouth in the morning 30 tablet 0     No current facility-administered medications for this visit  Allergies   Allergen Reactions   • Alphagan P [Brimonidine Tartrate] Itching      Immunizations:     Immunization History   Administered Date(s) Administered   • INFLUENZA 12/18/2012      Health Maintenance: There are no preventive care reminders to display for this patient  Topic Date Due   • COVID-19 Vaccine (1) Never done   • Pneumococcal Vaccine: 65+ Years (1 - PCV) Never done   • Influenza Vaccine (1) 09/01/2022      Medicare Screening Tests and Risk Assessments:     Padmini Kerns is here for his Subsequent Wellness visit  Last Medicare Wellness visit information reviewed, patient interviewed and updates made to the record today  Health Risk Assessment:   Patient rates overall health as fair  Patient feels that their physical health rating is slightly worse  Patient is satisfied with their life  Eyesight was rated as slightly worse  Hearing was rated as same  Patient feels that their emotional and mental health rating is same   Patients states they are never, rarely angry  Patient states they are sometimes unusually tired/fatigued  Pain experienced in the last 7 days has been some  Patient's pain rating has been 6/10  Patient states that he has experienced no weight loss or gain in last 6 months  Reports lower back pain and knee pain    Depression Screening:   PHQ-2 Score: 0      Fall Risk Screening: In the past year, patient has experienced: no history of falling in past year      Home Safety:  Patient has trouble with stairs inside or outside of their home  Patient has working smoke alarms and has working carbon monoxide detector  Home safety hazards include: none  Nutrition:   Current diet is No Added Salt  Medications:   Patient is currently taking over-the-counter supplements  OTC medications include: instaflex ,multi vitamin,osteo biflex  Patient is able to manage medications  Activities of Daily Living (ADLs)/Instrumental Activities of Daily Living (IADLs):   Walk and transfer into and out of bed and chair?: Yes  Dress and groom yourself?: Yes    Bathe or shower yourself?: Yes    Feed yourself? Yes  Do your laundry/housekeeping?: Yes  Manage your money, pay your bills and track your expenses?: Yes  Make your own meals?: Yes    Do your own shopping?: Yes    Previous Hospitalizations:   Any hospitalizations or ED visits within the last 12 months?: Yes    How many hospitalizations have you had in the last year?: 1-2    Advance Care Planning:   Living will: Yes    Durable POA for healthcare:  Yes    Advanced directive: Yes      Cognitive Screening:   Provider or family/friend/caregiver concerned regarding cognition?: No    PREVENTIVE SCREENINGS      Cardiovascular Screening:    General: Screening Current      Diabetes Screening:     General: Screening Current      Colorectal Cancer Screening:     General: Screening Not Indicated      Prostate Cancer Screening:    General: Screening Not Indicated      Osteoporosis Screening:    General: Screening Not Indicated      Abdominal Aortic Aneurysm (AAA) Screening:    Risk factors include: tobacco use        General: Screening Not Indicated      Lung Cancer Screening:     General: Screening Not Indicated      Hepatitis C Screening:    General: Screening Not Indicated    Screening, Brief Intervention, and Referral to Treatment (SBIRT)    Screening  Typical number of drinks in a day: 1  Typical number of drinks in a week: 3  Interpretation: Low risk drinking behavior  AUDIT-C Screenin) How often did you have a drink containing alcohol in the past year? 2 to 3 times a week  2) How many drinks did you have on a typical day when you were drinking in the past year? 1 to 2  3) How often did you have 6 or more drinks on one occasion in the past year? never    AUDIT-C Score: 3  Interpretation: Score 0-3 (male): Negative screen for alcohol misuse    Single Item Drug Screening:  How often have you used an illegal drug (including marijuana) or a prescription medication for non-medical reasons in the past year? never    Single Item Drug Screen Score: 0  Interpretation: Negative screen for possible drug use disorder    No exam data present     Physical Exam:     /78   Pulse (!) 118   Temp (!) 97 4 °F (36 3 °C) (Tympanic)   Ht 5' 4" (1 626 m)   Wt 78 4 kg (172 lb 12 8 oz)   SpO2 96%   BMI 29 66 kg/m²     Physical Exam  Vitals reviewed  Constitutional:       Comments: This is a 27-year-old white male who appears his stated age  He is pleasant, well-nourished, and in no apparent distress  HENT:      Head: Normocephalic and atraumatic  Right Ear: Tympanic membrane, ear canal and external ear normal  There is no impacted cerumen  Left Ear: Tympanic membrane, ear canal and external ear normal  There is no impacted cerumen  Mouth/Throat:      Mouth: Mucous membranes are moist       Pharynx: Oropharynx is clear  No oropharyngeal exudate or posterior oropharyngeal erythema     Eyes:      General: No scleral icterus  Right eye: No discharge  Left eye: No discharge  Conjunctiva/sclera: Conjunctivae normal       Pupils: Pupils are equal, round, and reactive to light  Neck:      Comments: No thyromegaly is noted  Cardiovascular:      Rate and Rhythm: Normal rate and regular rhythm  Heart sounds: Normal heart sounds  No murmur heard  No friction rub  No gallop  Pulmonary:      Effort: Pulmonary effort is normal  No respiratory distress  Breath sounds: Normal breath sounds  No stridor  No wheezing, rhonchi or rales  Musculoskeletal:      Cervical back: Neck supple  Lymphadenopathy:      Cervical: No cervical adenopathy  Psychiatric:         Mood and Affect: Mood normal          Behavior: Behavior normal          Thought Content: Thought content normal          Judgment: Judgment normal         Extremities:  Without cyanosis or clubbing is present  There is pitting edema noted to the knees  Patient was wearing compression stockings      Tamia Cerda DO

## 2022-10-20 NOTE — ASSESSMENT & PLAN NOTE
Patient has hypertension  His blood pressure is now much better controlled  I am going to have him continue losartan 50 mg once a day in addition to his other medications  If his blood pressure stays down, perhaps I can reduce the dose of his amlodipine to 5 mg per day  I reviewed his CMP with him  His sodium was 134  Fasting blood sugar was 117  Potassium was normal   Creatinine was 0 91 with an estimated GFR of 72  I plan to recheck a BMP when I see the patient back again in January

## 2022-10-29 DIAGNOSIS — I10 ESSENTIAL HYPERTENSION, BENIGN: ICD-10-CM

## 2022-10-29 RX ORDER — LOSARTAN POTASSIUM 50 MG/1
TABLET ORAL
Qty: 90 TABLET | Refills: 2 | Status: SHIPPED | OUTPATIENT
Start: 2022-10-29

## 2022-11-14 ENCOUNTER — APPOINTMENT (OUTPATIENT)
Dept: LAB | Facility: CLINIC | Age: 87
End: 2022-11-14

## 2022-11-14 DIAGNOSIS — D68.52 PROTHROMBIN GENE MUTATION (HCC): ICD-10-CM

## 2022-11-14 LAB
INR PPP: 3.63 (ref 0.84–1.19)
PROTHROMBIN TIME: 36.4 SECONDS (ref 11.6–14.5)

## 2022-11-14 PROCEDURE — 36415 COLL VENOUS BLD VENIPUNCTURE: CPT

## 2022-11-14 PROCEDURE — 85610 PROTHROMBIN TIME: CPT

## 2022-11-15 ENCOUNTER — ANTICOAG VISIT (OUTPATIENT)
Dept: FAMILY MEDICINE CLINIC | Facility: CLINIC | Age: 87
End: 2022-11-15

## 2022-11-23 NOTE — TELEPHONE ENCOUNTER
Cardiologists     Jaimee Sabillon, Dylan, etc...(advocate) - [phone]    Patient is scheduled for his appointments   Will be B&B

## 2022-12-19 ENCOUNTER — APPOINTMENT (OUTPATIENT)
Dept: LAB | Facility: CLINIC | Age: 87
End: 2022-12-19

## 2022-12-19 DIAGNOSIS — D68.52 PROTHROMBIN GENE MUTATION (HCC): ICD-10-CM

## 2022-12-19 PROBLEM — Z00.00 ENCOUNTER FOR MEDICARE ANNUAL WELLNESS EXAM: Status: RESOLVED | Noted: 2022-10-20 | Resolved: 2022-12-19

## 2022-12-19 LAB
INR PPP: 4.09 (ref 0.84–1.19)
PROTHROMBIN TIME: 39.9 SECONDS (ref 11.6–14.5)

## 2022-12-20 ENCOUNTER — ANTICOAG VISIT (OUTPATIENT)
Dept: FAMILY MEDICINE CLINIC | Facility: CLINIC | Age: 87
End: 2022-12-20

## 2022-12-27 ENCOUNTER — APPOINTMENT (OUTPATIENT)
Dept: LAB | Facility: CLINIC | Age: 87
End: 2022-12-27

## 2022-12-27 DIAGNOSIS — N18.30 STAGE 3 CHRONIC KIDNEY DISEASE, UNSPECIFIED WHETHER STAGE 3A OR 3B CKD (HCC): Primary | ICD-10-CM

## 2022-12-27 LAB
ANION GAP SERPL CALCULATED.3IONS-SCNC: 8 MMOL/L (ref 4–13)
BACTERIA UR QL AUTO: NORMAL /HPF
BILIRUB UR QL STRIP: NEGATIVE
BUN SERPL-MCNC: 20 MG/DL (ref 5–25)
CALCIUM SERPL-MCNC: 9.7 MG/DL (ref 8.3–10.1)
CHLORIDE SERPL-SCNC: 103 MMOL/L (ref 96–108)
CLARITY UR: CLEAR
CO2 SERPL-SCNC: 24 MMOL/L (ref 21–32)
COLOR UR: NORMAL
CREAT SERPL-MCNC: 0.97 MG/DL (ref 0.6–1.3)
GFR SERPL CREATININE-BSD FRML MDRD: 67 ML/MIN/1.73SQ M
GLUCOSE P FAST SERPL-MCNC: 97 MG/DL (ref 65–99)
GLUCOSE UR STRIP-MCNC: NEGATIVE MG/DL
HGB UR QL STRIP.AUTO: NEGATIVE
INR PPP: 1.65 (ref 0.84–1.19)
KETONES UR STRIP-MCNC: NEGATIVE MG/DL
LEUKOCYTE ESTERASE UR QL STRIP: NEGATIVE
NITRITE UR QL STRIP: NEGATIVE
NON-SQ EPI CELLS URNS QL MICRO: NORMAL /HPF
OSMOLALITY UR: 381 MMOL/KG
PH UR STRIP.AUTO: 7.5 [PH]
POTASSIUM SERPL-SCNC: 4 MMOL/L (ref 3.5–5.3)
PROT UR STRIP-MCNC: NEGATIVE MG/DL
PROTHROMBIN TIME: 19.7 SECONDS (ref 11.6–14.5)
RBC #/AREA URNS AUTO: NORMAL /HPF
SODIUM 24H UR-SCNC: 56 MOL/L
SODIUM SERPL-SCNC: 135 MMOL/L (ref 135–147)
SP GR UR STRIP.AUTO: 1.01 (ref 1–1.03)
UROBILINOGEN UR STRIP-ACNC: <2 MG/DL
WBC #/AREA URNS AUTO: NORMAL /HPF

## 2022-12-28 ENCOUNTER — ANTICOAG VISIT (OUTPATIENT)
Dept: FAMILY MEDICINE CLINIC | Facility: CLINIC | Age: 87
End: 2022-12-28

## 2022-12-28 NOTE — PROGRESS NOTES
Patient's labs came back today and I reviewed them  I was evaluating the patient for hyponatremia  What I did not know until yesterday is that the patient has still been taking his hydrochlorothiazide, despite me and nephrology telling the patient not to take this  It was discontinued during his hospitalization  Despite this, when the patient came home, he began taking the medication again  I was unaware of this until yesterday afternoon when his wife insisted he be given a refill  We then contacted the patient and learned he was taking it  We explained to the patient that nephrology and I have both recommended he not take the medication  No doubt this is the etiology of his hyponatremia  The fact that he is taking his diuretics makes his labs much more difficult to interpret and really invalidates them

## 2023-01-09 ENCOUNTER — APPOINTMENT (OUTPATIENT)
Dept: LAB | Facility: CLINIC | Age: 88
End: 2023-01-09

## 2023-01-09 DIAGNOSIS — I27.82 CHRONIC PULMONARY EMBOLISM WITHOUT ACUTE COR PULMONALE, UNSPECIFIED PULMONARY EMBOLISM TYPE (HCC): Primary | ICD-10-CM

## 2023-01-09 DIAGNOSIS — Z86.711 HISTORY OF PULMONARY EMBOLISM: ICD-10-CM

## 2023-01-09 LAB
INR PPP: 3 (ref 0.84–1.19)
PROTHROMBIN TIME: 31.4 SECONDS (ref 11.6–14.5)

## 2023-01-10 ENCOUNTER — ANTICOAG VISIT (OUTPATIENT)
Dept: FAMILY MEDICINE CLINIC | Facility: CLINIC | Age: 88
End: 2023-01-10

## 2023-01-20 ENCOUNTER — OFFICE VISIT (OUTPATIENT)
Dept: FAMILY MEDICINE CLINIC | Facility: CLINIC | Age: 88
End: 2023-01-20

## 2023-01-20 VITALS
HEART RATE: 72 BPM | SYSTOLIC BLOOD PRESSURE: 140 MMHG | WEIGHT: 175.5 LBS | HEIGHT: 64 IN | TEMPERATURE: 97.2 F | BODY MASS INDEX: 29.96 KG/M2 | DIASTOLIC BLOOD PRESSURE: 62 MMHG | OXYGEN SATURATION: 97 %

## 2023-01-20 DIAGNOSIS — I27.82 CHRONIC PULMONARY EMBOLISM WITHOUT ACUTE COR PULMONALE, UNSPECIFIED PULMONARY EMBOLISM TYPE (HCC): Primary | ICD-10-CM

## 2023-01-20 DIAGNOSIS — J45.909 UNCOMPLICATED ASTHMA, UNSPECIFIED ASTHMA SEVERITY, UNSPECIFIED WHETHER PERSISTENT: ICD-10-CM

## 2023-01-20 DIAGNOSIS — I10 ESSENTIAL HYPERTENSION, BENIGN: ICD-10-CM

## 2023-01-20 DIAGNOSIS — J44.9 ASTHMA-COPD OVERLAP SYNDROME (HCC): ICD-10-CM

## 2023-01-20 DIAGNOSIS — N18.30 STAGE 3 CHRONIC KIDNEY DISEASE, UNSPECIFIED WHETHER STAGE 3A OR 3B CKD (HCC): ICD-10-CM

## 2023-01-20 DIAGNOSIS — J45.30 MILD PERSISTENT ASTHMA, UNCOMPLICATED: ICD-10-CM

## 2023-01-20 DIAGNOSIS — D68.52 PROTHROMBIN GENE MUTATION (HCC): ICD-10-CM

## 2023-01-20 RX ORDER — MONTELUKAST SODIUM 10 MG/1
10 TABLET ORAL DAILY
Qty: 90 TABLET | Refills: 3 | Status: SHIPPED | OUTPATIENT
Start: 2023-01-20

## 2023-01-20 RX ORDER — AMLODIPINE BESYLATE 10 MG/1
10 TABLET ORAL DAILY
Qty: 90 TABLET | Refills: 3 | Status: SHIPPED | OUTPATIENT
Start: 2023-01-20 | End: 2023-02-19

## 2023-01-20 RX ORDER — BUDESONIDE AND FORMOTEROL FUMARATE DIHYDRATE 160; 4.5 UG/1; UG/1
2 AEROSOL RESPIRATORY (INHALATION) 2 TIMES DAILY
Qty: 30.6 G | Refills: 3 | Status: SHIPPED | OUTPATIENT
Start: 2023-01-20

## 2023-01-20 RX ORDER — WARFARIN SODIUM 4 MG/1
TABLET ORAL
Qty: 180 TABLET | Refills: 2 | Status: SHIPPED | OUTPATIENT
Start: 2023-01-20

## 2023-01-20 NOTE — PROGRESS NOTES
Name: Miley Huang      : 1930      MRN: [id number]  Encounter Provider: Artie Osler, DO  Encounter Date: 2023   Encounter department: [facility]     1  Chronic pulmonary embolism without acute cor pulmonale, unspecified pulmonary embolism type [facility])  Assessment & Plan:  Patient with history of unprovoked pulmonary embolism  Work-up revealed a Factor II prothrombin gene as well as a factor V Leiden mutation  He will continue warfarin therapy  I will continue to monitor the patient's protimes  I reviewed his pro time from  which was better  2  Uncomplicated asthma, unspecified asthma severity, unspecified whether persistent  -     montelukast (SINGULAIR) 10 mg tablet; Take 1 tablet (10 mg total) by mouth daily    3  Mild persistent asthma, uncomplicated  -     budesonide-formoterol (Symbicort) 160-4 5 mcg/act inhaler; Inhale 2 puffs 2 (two) times a day    4  Essential hypertension, benign  Assessment & Plan:  Patient has hypertension which is reasonably well controlled  Blood pressure today was 140/62  I explained to the patient that the amlodipine 10 mg is what is causing his edema  I see no sign of congestive heart failure  Nephrology does not want this patient on a diuretic  As such, going to try to avoid that, if possible  Patient will continue amlodipine 10 mg daily and losartan 50 mg daily  I note that his renal function was checked just before the new year and it is again normal   Estimated GFR was 67  I think that is actually quite amazing for a 80-year-old with a lifelong history of hypertension  I did review the patient ambulatory blood pressure readings going back to   His blood pressures at home were quite good  The patient asked about hydrochlorothiazide  I told him to stay off the hydrochlorothiazide  Orders:  -     amLODIPine (NORVASC) 10 mg tablet;  Take 1 tablet (10 mg total) by mouth daily    5  Prothrombin gene mutation (HCC)  -     warfarin (COUMADIN) 4 mg tablet; Take 2 daily or as directed    6  Asthma-COPD overlap syndrome [facility])  Assessment & Plan:  Patient has COPD/asthma overlap syndrome  I refilled his prescription today for Singulair as well as his prescription for Symbicort inhaler  Patient is currently doing well  I encouraged him to continue to try to exercise as best as he can  7  Stage 3 chronic kidney disease, unspecified whether stage 3a or 3b CKD ([facility] )         Subjective      This is a 40-year-old white male who presents to the office today for his routine checkup  He is accompanied to the office today by his daughter  The patient reports that he is tolerating his medications  He does complain of lower extremity edema  It does improve if he elevates his legs but does not go completely away  He denies any shortness of breath or orthopnea  He is still exercising, despite his age  Review of Systems   HENT: Positive for dental problem  States she is on a soft diet and eats lots of soups because of his dental problems   Respiratory: Negative for cough and shortness of breath  Cardiovascular: Positive for leg swelling  Negative for chest pain and palpitations  Denies orthopnea paroxysmal nocturnal dyspnea   Gastrointestinal: Negative for abdominal distention, abdominal pain, blood in stool, constipation, diarrhea and nausea  Current Outpatient Medications on File Prior to Visit   Medication Sig   • albuterol (2 5 mg/3 mL) 0 083 % nebulizer solution Take 3 mL (2 5 mg total) by nebulization every 4 (four) hours as needed for wheezing or shortness of breath   • Difluprednate 0 05 % EMUL INSTILL 1 DROP INTO LEFT EYE TWICE A DAY   • docusate sodium (COLACE) 100 mg capsule Take 100 mg by mouth in the morning   Indications: Constipation   • dorzolamide (TRUSOPT) 2 % ophthalmic solution INSTILL 1 DROP INTO LEFT EYE TWICE A DAY   • latanoprost (XALATAN) 0 005 % ophthalmic solution Administer 1 drop to both eyes daily at bedtime   • losartan (COZAAR) 50 mg tablet TAKE 1 TABLET BY MOUTH EVERY DAY   • multivitamin (THERAGRAN) TABS Take 1 tablet by mouth daily   • omeprazole (PriLOSEC OTC) 20 MG tablet Take 20 mg by mouth   • Polyethyl Glycol-Propyl Glycol (SYSTANE OP) Administer 1 drop to both eyes 4 (four) times a day   • polyethylene glycol (GLYCOLAX) 17 GM/SCOOP powder Take 17 g by mouth as needed    • sodium chloride (ARIEL 128) 2 % hypertonic ophthalmic solution Administer 1 drop into the left eye 4 (four) times a day   • triamcinolone (KENALOG) 0 1 % cream Apply topically 2 (two) times a day as needed for rash       Objective     /62   Pulse 72   Temp (!) 97 2 °F (36 2 °C) (Tympanic)   Ht 5' 4" (1 626 m)   Wt 79 6 kg (175 lb 8 oz)   SpO2 97%   BMI 30 12 kg/m²     Physical Exam  Vitals reviewed  Constitutional:       Comments: This patient is a 70-year-old white male who appears his stated age  He is pleasant, cooperative, and in no apparent distress  HENT:      Head: Normocephalic and atraumatic  Right Ear: Tympanic membrane, ear canal and external ear normal  There is no impacted cerumen  Left Ear: Tympanic membrane, ear canal and external ear normal  There is no impacted cerumen  Mouth/Throat:      Mouth: Mucous membranes are moist       Pharynx: Oropharynx is clear  No oropharyngeal exudate or posterior oropharyngeal erythema  Eyes:      General: No scleral icterus  Right eye: No discharge  Left eye: No discharge  Conjunctiva/sclera: Conjunctivae normal       Pupils: Pupils are equal, round, and reactive to light  Neck:      Comments: There is no thyromegaly  There was no JVD although the patient had to be examined in the exam room chair because of his ambulatory dysfunction  Cardiovascular:      Rate and Rhythm: Normal rate and regular rhythm  Heart sounds: Normal heart sounds  No murmur heard  No friction rub  No gallop  Pulmonary:      Effort: Pulmonary effort is normal  No respiratory distress  Breath sounds: Normal breath sounds  No stridor  No wheezing, rhonchi or rales  Abdominal:      General: Bowel sounds are normal  There is no distension  Palpations: Abdomen is soft  There is no mass  Tenderness: There is no abdominal tenderness  There is no guarding  Comments: Abdominal exam was done in the exam room chair due to his ambulatory dysfunction  Musculoskeletal:      Cervical back: Neck supple  Lymphadenopathy:      Cervical: No cervical adenopathy  Psychiatric:         Mood and Affect: Mood normal          Behavior: Behavior normal          Thought Content: Thought content normal          Judgment: Judgment normal      Extremities: Without cyanosis or clubbing  There is pitting edema noted to the knees        Marella Brea, DO

## 2023-01-25 NOTE — ASSESSMENT & PLAN NOTE
Patient has COPD/asthma overlap syndrome  I refilled his prescription today for Singulair as well as his prescription for Symbicort inhaler  Patient is currently doing well  I encouraged him to continue to try to exercise as best as he can

## 2023-01-25 NOTE — ASSESSMENT & PLAN NOTE
Patient with history of unprovoked pulmonary embolism  Work-up revealed a Factor II prothrombin gene as well as a factor V Leiden mutation  He will continue warfarin therapy  I will continue to monitor the patient's protimes  I reviewed his pro time from January 9 which was better

## 2023-01-25 NOTE — ASSESSMENT & PLAN NOTE
Patient has hypertension which is reasonably well controlled  Blood pressure today was 140/62  I explained to the patient that the amlodipine 10 mg is what is causing his edema  I see no sign of congestive heart failure  Nephrology does not want this patient on a diuretic  As such, going to try to avoid that, if possible  Patient will continue amlodipine 10 mg daily and losartan 50 mg daily  I note that his renal function was checked just before the new year and it is again normal   Estimated GFR was 67  I think that is actually quite amazing for a 66-year-old with a lifelong history of hypertension  I did review the patient ambulatory blood pressure readings going back to November 4  His blood pressures at home were quite good  The patient asked about hydrochlorothiazide  I told him to stay off the hydrochlorothiazide

## 2023-01-30 ENCOUNTER — APPOINTMENT (OUTPATIENT)
Dept: LAB | Facility: CLINIC | Age: 88
End: 2023-01-30

## 2023-01-31 ENCOUNTER — ANTICOAG VISIT (OUTPATIENT)
Dept: FAMILY MEDICINE CLINIC | Facility: CLINIC | Age: 88
End: 2023-01-31

## 2023-02-03 ENCOUNTER — TELEPHONE (OUTPATIENT)
Dept: OTHER | Facility: OTHER | Age: 88
End: 2023-02-03

## 2023-02-03 NOTE — TELEPHONE ENCOUNTER
Pt daughter calling about her father medication  for  (Symbicort) 160-4 5 mcg/act inhaler  Stated needs to have a new prescription fax to [phone] and also provided a contact number which is [phone]  this is a outreach program that will able to fill the prescription at no cost to the patient   Pt daughter requesting a call back once completed

## 2023-02-06 ENCOUNTER — APPOINTMENT (OUTPATIENT)
Dept: LAB | Facility: CLINIC | Age: 88
End: 2023-02-06

## 2023-02-06 DIAGNOSIS — Z86.711 HISTORY OF PULMONARY EMBOLISM: ICD-10-CM

## 2023-02-06 LAB
INR PPP: 2.04 (ref 0.84–1.19)
PROTHROMBIN TIME: 23.3 SECONDS (ref 11.6–14.5)

## 2023-02-07 ENCOUNTER — ANTICOAG VISIT (OUTPATIENT)
Dept: FAMILY MEDICINE CLINIC | Facility: CLINIC | Age: 88
End: 2023-02-07

## 2023-02-23 ENCOUNTER — OFFICE VISIT (OUTPATIENT)
Dept: NEPHROLOGY | Facility: CLINIC | Age: 88
End: 2023-02-23

## 2023-02-23 VITALS
OXYGEN SATURATION: 95 % | BODY MASS INDEX: 30.05 KG/M2 | HEART RATE: 92 BPM | WEIGHT: 176 LBS | DIASTOLIC BLOOD PRESSURE: 62 MMHG | HEIGHT: 64 IN | SYSTOLIC BLOOD PRESSURE: 138 MMHG

## 2023-02-23 DIAGNOSIS — N18.30 STAGE 3 CHRONIC KIDNEY DISEASE, UNSPECIFIED WHETHER STAGE 3A OR 3B CKD (HCC): ICD-10-CM

## 2023-02-23 DIAGNOSIS — E87.1 HYPONATREMIA: ICD-10-CM

## 2023-02-23 DIAGNOSIS — I10 ESSENTIAL HYPERTENSION, BENIGN: Primary | ICD-10-CM

## 2023-02-23 NOTE — ASSESSMENT & PLAN NOTE
Lab Results   Component Value Date    EGFR 67 12/27/2022    EGFR 72 10/17/2022    EGFR 75 08/16/2022    CREATININE 0 97 12/27/2022    CREATININE 0 91 10/17/2022    CREATININE 0 86 08/16/2022   Renal function excellent  On losartan  Continue to monitor  To reduce visit burden, can follow-up with PCP as scheduled and we will see in 1 year  Can see sooner if any new concerns arise

## 2023-02-23 NOTE — PROGRESS NOTES
Assessment & Plan:    1  Essential hypertension, benign  Assessment & Plan:  Blood pressure controlled  Continue current antihypertensives  2  Stage 3 chronic kidney disease, unspecified whether stage 3a or 3b CKD [facility])  Assessment & Plan:  Lab Results   Component Value Date    EGFR 67 12/27/2022    EGFR 72 10/17/2022    EGFR 75 08/16/2022    CREATININE 0 97 12/27/2022    CREATININE 0 91 10/17/2022    CREATININE 0 86 08/16/2022   Renal function excellent  On losartan  Continue to monitor  To reduce visit burden, can follow-up with PCP as scheduled and we will see in 1 year  Can see sooner if any new concerns arise  3  Hyponatremia  Assessment & Plan:  At goal   Continue current fluid restriction  The benefits, risks and alternatives to the treatment plan were discussed at this visit  Patient was advised of common adverse effects of any medical therapies prescribed  All questions were answered and discussed with the patient and any accompanying family members or caretakers  Subjective:      Patient ID: Kevin Lei is a 80 y o  male seen in the [facility] office  Followed by Dr Aleksandra Peraza for stage II chronic kidney disease  HPI     Today, patient presents for routine follow-up without acute complaints relating to blood pressure, edema or nephrological concerns  Unfortunately lost his wife yesterday  He is actually scheduled to be needing repeat undertaker right now and asks if we can do a quick visit  Patient denies any changes in health, medication changes, hospitalizations, surgeries or trip to the emergency room, falls or bone fractures since last visit  Blood pressure is  Patient does monitor blood pressures at home and reports  Denies headaches, lightheadedness, dizziness  Patient reports adherence with antihypertensive regimen and denies adverse effects: Amlodipine 10 mg daily, losartan 50 mg daily  Patient denies lower extremity swelling      Reviewed and discussed the results of labs performed 12/27/2022 which revealed serum sodium 135 mmol/L at goal with excellent renal function creatinine 0 97 milligrams per deciliter with estimated GFR 67 mL/min  History is obtained from patient  The following portions of the patient's history were reviewed and updated as appropriate: allergies, current medications, past family history, past medical history, past social history, past surgical history, and problem list     Review of Systems   Constitutional: Negative for activity change, chills, diaphoresis, fatigue and fever  HENT: Negative for mouth sores and trouble swallowing  Respiratory: Negative for apnea, cough, chest tightness, shortness of breath and wheezing  Cardiovascular: Negative for chest pain, palpitations and leg swelling  Gastrointestinal: Negative for abdominal distention, abdominal pain, blood in stool, constipation, diarrhea and nausea  Genitourinary: Negative for decreased urine volume, difficulty urinating, dysuria, enuresis, frequency, hematuria and urgency  Musculoskeletal: Negative for arthralgias, back pain and joint swelling  Skin: Negative for pallor, rash and wound  Neurological: Negative for dizziness, seizures, light-headedness, numbness and headaches  Hematological: Does not bruise/bleed easily  Psychiatric/Behavioral: Negative for agitation, behavioral problems and confusion  The patient is not nervous/anxious  Objective:      /62 (BP Location: Left arm, Patient Position: Sitting, Cuff Size: Standard)   Pulse 92   Ht 5' 4" (1 626 m)   Wt 79 8 kg (176 lb)   SpO2 95%   BMI 30 21 kg/m²          Physical Exam  Vitals and nursing note reviewed  Constitutional:       General: He is awake  He is not in acute distress  Appearance: Normal appearance  He is well-developed and normal weight  He is not ill-appearing, toxic-appearing or diaphoretic  HENT:      Head: Normocephalic and atraumatic  Jaw: There is normal jaw occlusion  Nose: Nose normal       Mouth/Throat:      Mouth: Mucous membranes are moist       Pharynx: Oropharynx is clear  No oropharyngeal exudate or posterior oropharyngeal erythema  Eyes:      General: Lids are normal  Vision grossly intact  Gaze aligned appropriately  No scleral icterus  Right eye: No discharge  Left eye: No discharge  Extraocular Movements: Extraocular movements intact  Conjunctiva/sclera: Conjunctivae normal       Pupils: Pupils are equal, round, and reactive to light  Neck:      Thyroid: No thyroid mass or thyromegaly  Trachea: Trachea and phonation normal    Cardiovascular:      Rate and Rhythm: Normal rate and regular rhythm  Heart sounds: Normal heart sounds, S1 normal and S2 normal  No murmur heard  No friction rub  No gallop  Pulmonary:      Effort: Pulmonary effort is normal  No respiratory distress  Breath sounds: Normal breath sounds  No stridor  No wheezing, rhonchi or rales  Abdominal:      General: Abdomen is flat  Bowel sounds are normal  There is no distension  Palpations: Abdomen is soft  There is no mass  Tenderness: There is no abdominal tenderness  There is no guarding  Hernia: No hernia is present  Musculoskeletal:         General: Normal range of motion  Cervical back: Normal range of motion and neck supple  No rigidity or tenderness  Right lower leg: No edema  Left lower leg: No edema  Lymphadenopathy:      Cervical: No cervical adenopathy  Skin:     General: Skin is warm and dry  Coloration: Skin is not jaundiced  Findings: No bruising  Nails: There is no clubbing  Neurological:      General: No focal deficit present  Mental Status: He is alert and oriented to person, place, and time  Mental status is at baseline     Psychiatric:         Attention and Perception: Attention and perception normal          Mood and Affect: Mood and affect normal          Speech: Speech normal          Behavior: Behavior normal  Behavior is cooperative  Thought Content:  Thought content normal          Judgment: Judgment normal              Lab Results   Component Value Date    SODIUM 135 12/27/2022    K 4 0 12/27/2022     12/27/2022    CO2 24 12/27/2022    AGAP 8 12/27/2022    BUN 20 12/27/2022    CREATININE 0 97 12/27/2022    GLUC 99 08/16/2022    GLUF 97 12/27/2022    CALCIUM 9 7 12/27/2022    AST 26 10/17/2022    ALT 30 10/17/2022    ALKPHOS 94 10/17/2022    TP 7 3 10/17/2022    TBILI 0 53 10/17/2022    EGFR 67 12/27/2022      Lab Results   Component Value Date    CREATININE 0 97 12/27/2022    CREATININE 0 91 10/17/2022    CREATININE 0 86 08/16/2022    CREATININE 0 91 08/05/2022    CREATININE 0 94 08/04/2022    CREATININE 0 93 08/04/2022    CREATININE 0 84 08/04/2022    CREATININE 0 88 08/04/2022    CREATININE 0 86 08/04/2022    CREATININE 0 88 08/03/2022    CREATININE 0 90 08/02/2022    CREATININE 1 04 08/01/2022    CREATININE 0 88 08/01/2022    CREATININE 0 86 08/01/2022    CREATININE 0 92 07/31/2022      Lab Results   Component Value Date    COLORU Light Yellow 12/27/2022    CLARITYU Clear 12/27/2022    SPECGRAV 1 011 12/27/2022    PHUR 7 5 12/27/2022    LEUKOCYTESUR Negative 12/27/2022    NITRITE Negative 12/27/2022    PROTEIN UA Negative 12/27/2022    GLUCOSEU Negative 12/27/2022    KETONESU Negative 12/27/2022    UROBILINOGEN <2 0 12/27/2022    BILIRUBINUR Negative 12/27/2022    BLOODU Negative 12/27/2022    RBCUA None Seen 12/27/2022    WBCUA None Seen 12/27/2022    EPIS None Seen 12/27/2022    BACTERIA None Seen 12/27/2022      No results found for: LABPROT  No results found for: Milinda Ice  Lab Results   Component Value Date    WBC 3 42 (L) 08/05/2022    HGB 11 7 (L) 08/05/2022    HCT 34 1 (L) 08/05/2022    MCV 90 08/05/2022     08/05/2022      Lab Results   Component Value Date    HGB 11 7 (L) 08/05/2022    HGB 11 9 (L) 08/03/2022    HGB 12 2 08/02/2022    HGB 12 0 08/01/2022    HGB 12 6 07/31/2022      No results found for: IRON, TIBC, FERRITIN   No results found for: PTHCALCIUM, [id number], PHOSPHORUS   Lab Results   Component Value Date    CHOLESTEROL 170 08/02/2022    HDL 64 08/02/2022    LDLCALC 89 08/02/2022    TRIG 84 08/02/2022      No results found for: URICACID   No results found for: HGBA1C   No results found for: TSHANTIBODY, B7IGZES, FREET4   No results found for: JOE, DSDNAAB, RFIGM   No results found for: PROT, UPEP, IMMUNOFIX, KAPPALAMBDA, KAPPALIGHT     Portions of the record may have been created with voice recognition software  Occasional wrong word or "sound a like" substitutions may have occurred due to the inherent limitations of voice recognition software  Read the chart carefully and recognize, using context, where substitutions have occurred  If you have any questions, please contact the dictating provider

## 2023-03-06 ENCOUNTER — APPOINTMENT (OUTPATIENT)
Dept: LAB | Facility: CLINIC | Age: 88
End: 2023-03-06

## 2023-03-06 DIAGNOSIS — Z86.711 HISTORY OF PULMONARY EMBOLISM: ICD-10-CM

## 2023-03-06 LAB
INR PPP: 4.18 (ref 0.84–1.19)
PROTHROMBIN TIME: 40.6 SECONDS (ref 11.6–14.5)

## 2023-03-07 ENCOUNTER — ANTICOAG VISIT (OUTPATIENT)
Dept: FAMILY MEDICINE CLINIC | Facility: CLINIC | Age: 88
End: 2023-03-07

## 2023-03-13 ENCOUNTER — ANTICOAG VISIT (OUTPATIENT)
Dept: FAMILY MEDICINE CLINIC | Facility: CLINIC | Age: 88
End: 2023-03-13

## 2023-03-13 ENCOUNTER — APPOINTMENT (OUTPATIENT)
Dept: LAB | Facility: CLINIC | Age: 88
End: 2023-03-13

## 2023-03-13 DIAGNOSIS — Z86.711 HISTORY OF PULMONARY EMBOLISM: ICD-10-CM

## 2023-03-13 LAB
INR PPP: 1.94 (ref 0.84–1.19)
PROTHROMBIN TIME: 22.4 SECONDS (ref 11.6–14.5)

## 2023-03-27 ENCOUNTER — APPOINTMENT (OUTPATIENT)
Dept: LAB | Facility: CLINIC | Age: 88
End: 2023-03-27

## 2023-03-27 DIAGNOSIS — Z86.711 HISTORY OF PULMONARY EMBOLISM: ICD-10-CM

## 2023-03-27 LAB
INR PPP: 2.87 (ref 0.84–1.19)
PROTHROMBIN TIME: 30.3 SECONDS (ref 11.6–14.5)

## 2023-03-28 ENCOUNTER — ANTICOAG VISIT (OUTPATIENT)
Dept: FAMILY MEDICINE CLINIC | Facility: CLINIC | Age: 88
End: 2023-03-28

## 2023-04-28 DIAGNOSIS — J45.30 MILD PERSISTENT ASTHMA, UNCOMPLICATED: ICD-10-CM

## 2023-05-01 ENCOUNTER — APPOINTMENT (OUTPATIENT)
Dept: LAB | Facility: CLINIC | Age: 88
End: 2023-05-01

## 2023-05-01 ENCOUNTER — OFFICE VISIT (OUTPATIENT)
Dept: FAMILY MEDICINE CLINIC | Facility: CLINIC | Age: 88
End: 2023-05-01

## 2023-05-01 VITALS
HEART RATE: 107 BPM | OXYGEN SATURATION: 97 % | WEIGHT: 175.5 LBS | DIASTOLIC BLOOD PRESSURE: 70 MMHG | SYSTOLIC BLOOD PRESSURE: 142 MMHG | HEIGHT: 64 IN | BODY MASS INDEX: 29.96 KG/M2 | TEMPERATURE: 97.2 F

## 2023-05-01 DIAGNOSIS — Z79.899 ENCOUNTER FOR LONG-TERM (CURRENT) USE OF MEDICATIONS: ICD-10-CM

## 2023-05-01 DIAGNOSIS — Z86.711 HISTORY OF PULMONARY EMBOLISM: ICD-10-CM

## 2023-05-01 DIAGNOSIS — J44.9 ASTHMA-COPD OVERLAP SYNDROME (HCC): ICD-10-CM

## 2023-05-01 DIAGNOSIS — K21.9 GERD WITHOUT ESOPHAGITIS: ICD-10-CM

## 2023-05-01 DIAGNOSIS — I10 ESSENTIAL HYPERTENSION, BENIGN: Primary | ICD-10-CM

## 2023-05-01 DIAGNOSIS — D68.52 PROTHROMBIN GENE MUTATION (HCC): ICD-10-CM

## 2023-05-01 LAB
INR PPP: 3.02 (ref 0.84–1.19)
PROTHROMBIN TIME: 31.5 SECONDS (ref 11.6–14.5)

## 2023-05-01 RX ORDER — BUDESONIDE AND FORMOTEROL FUMARATE DIHYDRATE 160; 4.5 UG/1; UG/1
AEROSOL RESPIRATORY (INHALATION)
Qty: 30.6 G | Refills: 3 | Status: SHIPPED | OUTPATIENT
Start: 2023-05-01

## 2023-05-01 NOTE — PROGRESS NOTES
Name: Arnold Salgado      : 1930      MRN: [id number]  Encounter Provider: Navin Christiansen DO  Encounter Date: 2023   Encounter department: [facility]  PR[facility]    Assessment & Plan     1  Essential hypertension, benign  Assessment & Plan:  Patient has hypertension  Blood pressure control is satisfactory given his age  I recommended continue amlodipine and losartan  I did explain to the patient that I think a lot of his edema is due to the amlodipine  In the past, he had been on hydrochlorothiazide as well which was discontinued by nephrology  Hydrochlorothiazide likely helped his edema as well  Encouraged the patient to try to follow a low-sodium diet  Orders:  -     Comprehensive metabolic panel; Future    2  Encounter for long-term (current) use of medications  -     CBC and differential; Future    3  Asthma-COPD overlap syndrome [facility])  Assessment & Plan:  Patient has asthma-COPD overlap  He is currently stable  Does get a little more out of breath doing things than he did in the past which I attribute to his age  I am going to have the patient continue Symbicort 160/4 5, he will continue 2 puffs twice a day  Continue albuterol inhaler as needed      4  GERD without esophagitis  Assessment & Plan:  Patient has gastroesophageal reflux disease which is currently stable  He will continue omeprazole 20 mg once a day  5  Prothrombin gene mutation [facility])  Assessment & Plan:  Patient has history of unprovoked pulmonary embolism  Work-up did reveal hypercoagulability  The patient did have a prothrombin time drawn this morning, about an hour and a half before he came to the office  I explained to the patient that the results are currently pending  I will contact the patient when I get these results  BMI Counseling: Body mass index is 30 12 kg/m²   Follow-up plan was not completed due to elderly patient (72 years old) where weight reduction/weight gain would complicate underlying health condition such as: illness or physical disability  Subjective      Patient is a 20-year-old white male who presents to the office today for his routine checkup  He is accompanied to the office today by his daughter  The patient is doing well  He tells me that his daughters bring food for him but he also does his own cooking  He is still exercising regularly  He is currently using a rowing machine for 1/2-hour to 1 hour daily  He reports compliance with his medication  His daughter tells me she tries to get him out of the house at least once a week to go shopping  Review of Systems   Constitutional: Negative for activity change and appetite change  Respiratory: Negative for cough, shortness of breath and wheezing  Cardiovascular: Negative for chest pain, palpitations and leg swelling  Gastrointestinal: Negative for abdominal distention, abdominal pain, blood in stool, constipation, diarrhea and nausea     Genitourinary:        Admits to weak urinary stream   Denies nocturia       Current Outpatient Medications on File Prior to Visit   Medication Sig    albuterol (2 5 mg/3 mL) 0 083 % nebulizer solution Take 3 mL (2 5 mg total) by nebulization every 4 (four) hours as needed for wheezing or shortness of breath    albuterol (PROVENTIL HFA,VENTOLIN HFA) 90 mcg/act inhaler Inhale 2 puffs every 4 (four) hours as needed for wheezing    amLODIPine (NORVASC) 10 mg tablet Take 1 tablet (10 mg total) by mouth daily    Difluprednate 0 05 % EMUL INSTILL 1 DROP INTO LEFT EYE TWICE A DAY    docusate sodium (COLACE) 100 mg capsule Take 100 mg by mouth in the morning Taking as needed    dorzolamide (TRUSOPT) 2 % ophthalmic solution INSTILL 1 DROP INTO LEFT EYE TWICE A DAY    latanoprost (XALATAN) 0 005 % ophthalmic solution Administer 1 drop to both eyes daily at bedtime    losartan (COZAAR) 50 mg tablet TAKE 1 TABLET BY MOUTH EVERY DAY    montelukast (SINGULAIR) 10 mg tablet Take 1 "tablet (10 mg total) by mouth daily    multivitamin (THERAGRAN) TABS Take 1 tablet by mouth daily    omeprazole (PriLOSEC OTC) 20 MG tablet Take 20 mg by mouth    Polyethyl Glycol-Propyl Glycol (SYSTANE OP) Administer 1 drop to both eyes 4 (four) times a day Taking as needed    polyethylene glycol (GLYCOLAX) 17 GM/SCOOP powder Take 17 g by mouth as needed Taking as needed    sodium chloride (ARIEL 128) 2 % hypertonic ophthalmic solution Administer 1 drop into the left eye 4 (four) times a day    Symbicort 160-4 5 MCG/ACT inhaler INHALE 2 PUFFS TWICE A DAY    triamcinolone (KENALOG) 0 1 % cream Apply topically 2 (two) times a day as needed for rash (Patient taking differently: Apply topically 2 (two) times a day as needed for rash Taking as needed)    warfarin (COUMADIN) 4 mg tablet Take 2 daily or as directed    [DISCONTINUED] budesonide-formoterol (Symbicort) 160-4 5 mcg/act inhaler Inhale 2 puffs 2 (two) times a day       Objective     /70   Pulse (!) 107   Temp (!) 97 2 °F (36 2 °C) (Tympanic)   Ht 5' 4\" (1 626 m)   Wt 79 6 kg (175 lb 8 oz)   SpO2 97%   BMI 30 12 kg/m²     Physical Exam  Vitals reviewed  Constitutional:       Comments: Patient is a 43-year-old white male who appears his stated age  The patient is pleasant, cooperative, and in no distress  HENT:      Head: Normocephalic and atraumatic  Right Ear: Tympanic membrane, ear canal and external ear normal  There is no impacted cerumen  Left Ear: Tympanic membrane, ear canal and external ear normal  There is no impacted cerumen  Mouth/Throat:      Mouth: Mucous membranes are moist       Pharynx: Oropharynx is clear  No oropharyngeal exudate or posterior oropharyngeal erythema  Eyes:      General: No scleral icterus  Right eye: No discharge  Left eye: No discharge  Conjunctiva/sclera: Conjunctivae normal       Pupils: Pupils are equal, round, and reactive to light     Neck:      Comments: No " thyromegaly  Cardiovascular:      Rate and Rhythm: Normal rate and regular rhythm  Heart sounds: Normal heart sounds  No murmur heard  No friction rub  No gallop  Pulmonary:      Effort: Pulmonary effort is normal  No respiratory distress  Breath sounds: Normal breath sounds  No stridor  No wheezing, rhonchi or rales  Abdominal:      General: Bowel sounds are normal  There is no distension  Palpations: Abdomen is soft  There is no mass  Tenderness: There is no abdominal tenderness  There is no guarding  Hernia: No hernia is present  Musculoskeletal:      Cervical back: Neck supple  Comments: Scoliosis is present unchanged   Lymphadenopathy:      Cervical: No cervical adenopathy  Psychiatric:         Mood and Affect: Mood normal          Behavior: Behavior normal          Thought Content: Thought content normal          Judgment: Judgment normal      Extremities: There is +1-2/4 lower extremity edema noted bilaterally  The patient was wearing knee-high venous compression stockings  There is no cyanosis or clubbing      Riley Lara DO

## 2023-05-01 NOTE — ASSESSMENT & PLAN NOTE
Patient has gastroesophageal reflux disease which is currently stable  He will continue omeprazole 20 mg once a day

## 2023-05-01 NOTE — ASSESSMENT & PLAN NOTE
Patient has history of unprovoked pulmonary embolism  Work-up did reveal hypercoagulability  The patient did have a prothrombin time drawn this morning, about an hour and a half before he came to the office  I explained to the patient that the results are currently pending  I will contact the patient when I get these results

## 2023-05-01 NOTE — ASSESSMENT & PLAN NOTE
Patient has hypertension  Blood pressure control is satisfactory given his age  I recommended continue amlodipine and losartan  I did explain to the patient that I think a lot of his edema is due to the amlodipine  In the past, he had been on hydrochlorothiazide as well which was discontinued by nephrology  Hydrochlorothiazide likely helped his edema as well  Encouraged the patient to try to follow a low-sodium diet

## 2023-05-01 NOTE — ASSESSMENT & PLAN NOTE
Patient has asthma-COPD overlap  He is currently stable  Does get a little more out of breath doing things than he did in the past which I attribute to his age  I am going to have the patient continue Symbicort 160/4 5, he will continue 2 puffs twice a day    Continue albuterol inhaler as needed

## 2023-05-02 ENCOUNTER — ANTICOAG VISIT (OUTPATIENT)
Dept: FAMILY MEDICINE CLINIC | Facility: CLINIC | Age: 88
End: 2023-05-02

## 2023-06-05 ENCOUNTER — APPOINTMENT (OUTPATIENT)
Dept: LAB | Facility: CLINIC | Age: 88
End: 2023-06-05
Payer: MEDICARE

## 2023-06-05 DIAGNOSIS — I27.82 CHRONIC PULMONARY EMBOLISM WITHOUT ACUTE COR PULMONALE, UNSPECIFIED PULMONARY EMBOLISM TYPE (HCC): Primary | ICD-10-CM

## 2023-06-05 DIAGNOSIS — Z86.711 HISTORY OF PULMONARY EMBOLISM: ICD-10-CM

## 2023-06-05 DIAGNOSIS — I10 ESSENTIAL HYPERTENSION, BENIGN: ICD-10-CM

## 2023-06-05 DIAGNOSIS — Z79.899 ENCOUNTER FOR LONG-TERM (CURRENT) USE OF MEDICATIONS: ICD-10-CM

## 2023-06-05 LAB
INR PPP: 2.95 (ref 0.84–1.19)
PROTHROMBIN TIME: 31 SECONDS (ref 11.6–14.5)

## 2023-06-05 PROCEDURE — 36415 COLL VENOUS BLD VENIPUNCTURE: CPT

## 2023-06-05 PROCEDURE — 85610 PROTHROMBIN TIME: CPT

## 2023-06-06 ENCOUNTER — ANTICOAG VISIT (OUTPATIENT)
Dept: FAMILY MEDICINE CLINIC | Facility: CLINIC | Age: 88
End: 2023-06-06

## 2023-06-09 DIAGNOSIS — J44.9 ASTHMA-COPD OVERLAP SYNDROME (HCC): ICD-10-CM

## 2023-06-09 RX ORDER — ALBUTEROL SULFATE 90 UG/1
AEROSOL, METERED RESPIRATORY (INHALATION)
Qty: 8.5 G | Refills: 1 | Status: SHIPPED | OUTPATIENT
Start: 2023-06-09

## 2023-07-03 ENCOUNTER — APPOINTMENT (OUTPATIENT)
Dept: LAB | Facility: CLINIC | Age: 88
End: 2023-07-03
Payer: MEDICARE

## 2023-07-03 DIAGNOSIS — Z86.711 HISTORY OF PULMONARY EMBOLISM: ICD-10-CM

## 2023-07-03 LAB
ALBUMIN SERPL BCP-MCNC: 3.5 G/DL (ref 3.5–5)
ALP SERPL-CCNC: 91 U/L (ref 46–116)
ALT SERPL W P-5'-P-CCNC: 29 U/L (ref 12–78)
ANION GAP SERPL CALCULATED.3IONS-SCNC: 2 MMOL/L
AST SERPL W P-5'-P-CCNC: 22 U/L (ref 5–45)
BASOPHILS # BLD AUTO: 0.04 THOUSANDS/ÂΜL (ref 0–0.1)
BASOPHILS NFR BLD AUTO: 1 % (ref 0–1)
BILIRUB SERPL-MCNC: 0.53 MG/DL (ref 0.2–1)
BUN SERPL-MCNC: 25 MG/DL (ref 5–25)
CALCIUM SERPL-MCNC: 9.3 MG/DL (ref 8.3–10.1)
CHLORIDE SERPL-SCNC: 111 MMOL/L (ref 96–108)
CO2 SERPL-SCNC: 24 MMOL/L (ref 21–32)
CREAT SERPL-MCNC: 0.88 MG/DL (ref 0.6–1.3)
EOSINOPHIL # BLD AUTO: 0.17 THOUSAND/ÂΜL (ref 0–0.61)
EOSINOPHIL NFR BLD AUTO: 3 % (ref 0–6)
ERYTHROCYTE [DISTWIDTH] IN BLOOD BY AUTOMATED COUNT: 13.5 % (ref 11.6–15.1)
GFR SERPL CREATININE-BSD FRML MDRD: 74 ML/MIN/1.73SQ M
GLUCOSE P FAST SERPL-MCNC: 105 MG/DL (ref 65–99)
HCT VFR BLD AUTO: 37.8 % (ref 36.5–49.3)
HGB BLD-MCNC: 12.5 G/DL (ref 12–17)
IMM GRANULOCYTES # BLD AUTO: 0.01 THOUSAND/UL (ref 0–0.2)
IMM GRANULOCYTES NFR BLD AUTO: 0 % (ref 0–2)
INR PPP: 2.29 (ref 0.84–1.19)
LYMPHOCYTES # BLD AUTO: 2.01 THOUSANDS/ÂΜL (ref 0.6–4.47)
LYMPHOCYTES NFR BLD AUTO: 34 % (ref 14–44)
MCH RBC QN AUTO: 29.6 PG (ref 26.8–34.3)
MCHC RBC AUTO-ENTMCNC: 33.1 G/DL (ref 31.4–37.4)
MCV RBC AUTO: 90 FL (ref 82–98)
MONOCYTES # BLD AUTO: 0.68 THOUSAND/ÂΜL (ref 0.17–1.22)
MONOCYTES NFR BLD AUTO: 12 % (ref 4–12)
NEUTROPHILS # BLD AUTO: 2.99 THOUSANDS/ÂΜL (ref 1.85–7.62)
NEUTS SEG NFR BLD AUTO: 50 % (ref 43–75)
NRBC BLD AUTO-RTO: 0 /100 WBCS
PLATELET # BLD AUTO: 258 THOUSANDS/UL (ref 149–390)
PMV BLD AUTO: 9.9 FL (ref 8.9–12.7)
POTASSIUM SERPL-SCNC: 4.1 MMOL/L (ref 3.5–5.3)
PROT SERPL-MCNC: 6.9 G/DL (ref 6.4–8.4)
PROTHROMBIN TIME: 25.5 SECONDS (ref 11.6–14.5)
RBC # BLD AUTO: 4.22 MILLION/UL (ref 3.88–5.62)
SODIUM SERPL-SCNC: 137 MMOL/L (ref 135–147)
WBC # BLD AUTO: 5.9 THOUSAND/UL (ref 4.31–10.16)

## 2023-07-03 PROCEDURE — 85610 PROTHROMBIN TIME: CPT

## 2023-07-03 PROCEDURE — 85025 COMPLETE CBC W/AUTO DIFF WBC: CPT

## 2023-07-03 PROCEDURE — 80053 COMPREHEN METABOLIC PANEL: CPT

## 2023-07-03 PROCEDURE — 36415 COLL VENOUS BLD VENIPUNCTURE: CPT

## 2023-07-05 ENCOUNTER — ANTICOAG VISIT (OUTPATIENT)
Dept: FAMILY MEDICINE CLINIC | Facility: CLINIC | Age: 88
End: 2023-07-05

## 2023-07-26 DIAGNOSIS — I10 ESSENTIAL HYPERTENSION, BENIGN: ICD-10-CM

## 2023-07-26 RX ORDER — LOSARTAN POTASSIUM 50 MG/1
TABLET ORAL
Qty: 90 TABLET | Refills: 2 | Status: SHIPPED | OUTPATIENT
Start: 2023-07-26

## 2023-07-31 ENCOUNTER — OFFICE VISIT (OUTPATIENT)
Dept: FAMILY MEDICINE CLINIC | Facility: CLINIC | Age: 88
End: 2023-07-31
Payer: MEDICARE

## 2023-07-31 VITALS
SYSTOLIC BLOOD PRESSURE: 140 MMHG | HEART RATE: 107 BPM | DIASTOLIC BLOOD PRESSURE: 60 MMHG | WEIGHT: 175.3 LBS | OXYGEN SATURATION: 97 % | BODY MASS INDEX: 29.93 KG/M2 | HEIGHT: 64 IN | TEMPERATURE: 98.6 F

## 2023-07-31 DIAGNOSIS — I10 ESSENTIAL HYPERTENSION, BENIGN: Primary | ICD-10-CM

## 2023-07-31 DIAGNOSIS — D48.5 NEOPLASM OF UNCERTAIN BEHAVIOR OF SKIN: ICD-10-CM

## 2023-07-31 DIAGNOSIS — L21.8 OTHER SEBORRHEIC DERMATITIS: ICD-10-CM

## 2023-07-31 DIAGNOSIS — J44.9 ASTHMA-COPD OVERLAP SYNDROME (HCC): ICD-10-CM

## 2023-07-31 PROCEDURE — 99214 OFFICE O/P EST MOD 30 MIN: CPT | Performed by: FAMILY MEDICINE

## 2023-07-31 RX ORDER — DESONIDE 0.5 MG/G
CREAM TOPICAL
Qty: 60 G | Refills: 0 | Status: SHIPPED | OUTPATIENT
Start: 2023-07-31

## 2023-07-31 NOTE — ASSESSMENT & PLAN NOTE
Has hypertension. Blood pressure control is satisfactory. I checked his blood pressure myself and found his blood pressure to be 140/60. Patient will continue amlodipine 10 mg daily and losartan 50 mg daily.

## 2023-07-31 NOTE — ASSESSMENT & PLAN NOTE
Patient has asthma-COPD overlap syndrome. He is currently doing well. I encouraged the patient to continue to exercise. He will continue Symbicort inhaler twice daily. He will continue albuterol as needed.

## 2023-07-31 NOTE — PROGRESS NOTES
Name: Soco Minaya      : 1930      MRN: [id number]  Encounter Provider: Eda Cueva DO  Encounter Date: 2023   Encounter department: [address] [place] [address]     1. Essential hypertension, benign  Assessment & Plan:  Has hypertension. Blood pressure control is satisfactory. I checked his blood pressure myself and found his blood pressure to be 140/60. Patient will continue amlodipine 10 mg daily and losartan 50 mg daily. 2. Other seborrheic dermatitis  Assessment & Plan:  Patient has been applying triamcinolone cream to his face. I told the patient that this is a potent steroid that is too strong for his face. He needs to use a milder corticosteroid for treatment of his seborrheic dermatitis. I prescribed desonide 0.05% cream.  He will apply this to the face twice a day as needed. He may use the triamcinolone cream on his extremities or trunk if he needs it but never on his face. Orders:  -     desonide (DESOWEN) 0.05 % cream; Apply to face twice a day    3. Asthma-COPD overlap syndrome [facility])  Assessment & Plan:  Patient has asthma-COPD overlap syndrome. He is currently doing well. I encouraged the patient to continue to exercise. He will continue Symbicort inhaler twice daily. He will continue albuterol as needed. 4. Neoplasm of uncertain behavior of skin  Assessment & Plan:  Patient has a suspicious skin lesion on his left forehead. Basal cell carcinoma needs to be ruled out. I advised the patient not to pick at this or scratch it. I am going to have him return to the office for a biopsy of the lesion. Subjective      Is a 80-year-old white male presents to the office today for his routine checkup. He is accompanied to the office today by his daughter. The patient is doing well. She reports she is eating well. She tells me that she and her siblings bring him food. He reports that his children visit him every day.   The patient continues to exercise regularly. He has not been wheezing. He feels well overall. Review of Systems   Constitutional: Negative for activity change, appetite change and unexpected weight change. Respiratory: Negative for cough, shortness of breath and wheezing. Cardiovascular: Negative for chest pain, palpitations and leg swelling. Gastrointestinal: Negative for abdominal distention, abdominal pain, blood in stool, constipation, diarrhea and nausea.        Current Outpatient Medications on File Prior to Visit   Medication Sig   • albuterol (2.5 mg/3 mL) 0.083 % nebulizer solution Take 3 mL (2.5 mg total) by nebulization every 4 (four) hours as needed for wheezing or shortness of breath   • albuterol (PROVENTIL HFA,VENTOLIN HFA) 90 mcg/act inhaler TAKE 2 PUFFS BY MOUTH EVERY 4 HOURS AS NEEDED FOR WHEEZE   • amLODIPine (NORVASC) 10 mg tablet Take 1 tablet (10 mg total) by mouth daily   • Difluprednate 0.05 % EMUL INSTILL 1 DROP INTO LEFT EYE TWICE A DAY   • docusate sodium (COLACE) 100 mg capsule Take 100 mg by mouth in the morning Taking as needed   • dorzolamide (TRUSOPT) 2 % ophthalmic solution INSTILL 1 DROP INTO LEFT EYE TWICE A DAY   • latanoprost (XALATAN) 0.005 % ophthalmic solution Administer 1 drop to both eyes daily at bedtime   • losartan (COZAAR) 50 mg tablet TAKE 1 TABLET BY MOUTH EVERY DAY   • montelukast (SINGULAIR) 10 mg tablet Take 1 tablet (10 mg total) by mouth daily   • multivitamin (THERAGRAN) TABS Take 1 tablet by mouth daily   • omeprazole (PriLOSEC OTC) 20 MG tablet Take 20 mg by mouth   • Polyethyl Glycol-Propyl Glycol (SYSTANE OP) Administer 1 drop to both eyes 4 (four) times a day Taking as needed   • polyethylene glycol (GLYCOLAX) 17 GM/SCOOP powder Take 17 g by mouth as needed Taking as needed   • sodium chloride (ARIEL 128) 2 % hypertonic ophthalmic solution Administer 1 drop into the left eye 4 (four) times a day   • Symbicort 160-4.5 MCG/ACT inhaler INHALE 2 PUFFS TWICE A DAY   • triamcinolone (KENALOG) 0.1 % cream Apply topically 2 (two) times a day as needed for rash (Patient taking differently: Apply topically 2 (two) times a day as needed for rash Taking as needed)   • warfarin (COUMADIN) 4 mg tablet Take 2 daily or as directed       Objective     /60   Pulse (!) 107   Temp 98.6 °F (37 °C) (Tympanic)   Ht 5' 4" (1.626 m)   Wt 79.5 kg (175 lb 4.8 oz)   SpO2 97%   BMI 30.09 kg/m²     Physical Exam  Vitals reviewed. Constitutional:       Comments: Patient is a 35-year-old white male who appears his stated age. The patient is pleasant, cooperative, and in no distress. HENT:      Head: Normocephalic and atraumatic. Right Ear: Tympanic membrane, ear canal and external ear normal.      Left Ear: Tympanic membrane, ear canal and external ear normal.      Mouth/Throat:      Mouth: Mucous membranes are moist.      Pharynx: Oropharynx is clear. No oropharyngeal exudate or posterior oropharyngeal erythema. Eyes:      General: No scleral icterus. Right eye: No discharge. Left eye: No discharge. Conjunctiva/sclera: Conjunctivae normal.      Pupils: Pupils are equal, round, and reactive to light. Cardiovascular:      Rate and Rhythm: Normal rate and regular rhythm. Heart sounds: Normal heart sounds. No murmur heard. No friction rub. No gallop. Pulmonary:      Effort: Pulmonary effort is normal. No respiratory distress. Breath sounds: Normal breath sounds. No stridor. No wheezing, rhonchi or rales. Abdominal:      General: Bowel sounds are normal. There is no distension. Palpations: Abdomen is soft. There is no mass. Tenderness: There is no abdominal tenderness. There is no guarding. Musculoskeletal:      Cervical back: Neck supple. Lymphadenopathy:      Cervical: No cervical adenopathy. Skin:     Comments: There is a dome-shaped lesion with central ulceration present on the patient's left forehead.   There is also a dry red scaly rash present along the patient's hairline. Psychiatric:         Mood and Affect: Mood normal.         Behavior: Behavior normal.         Thought Content:  Thought content normal.         Judgment: Judgment normal.       Ami Evans, DO

## 2023-07-31 NOTE — ASSESSMENT & PLAN NOTE
Patient has been applying triamcinolone cream to his face. I told the patient that this is a potent steroid that is too strong for his face. He needs to use a milder corticosteroid for treatment of his seborrheic dermatitis. I prescribed desonide 0.05% cream.  He will apply this to the face twice a day as needed. He may use the triamcinolone cream on his extremities or trunk if he needs it but never on his face.

## 2023-07-31 NOTE — ASSESSMENT & PLAN NOTE
Patient has a suspicious skin lesion on his left forehead. Basal cell carcinoma needs to be ruled out. I advised the patient not to pick at this or scratch it. I am going to have him return to the office for a biopsy of the lesion.

## 2023-08-07 ENCOUNTER — ANTICOAG VISIT (OUTPATIENT)
Dept: FAMILY MEDICINE CLINIC | Facility: CLINIC | Age: 88
End: 2023-08-07

## 2023-08-07 ENCOUNTER — APPOINTMENT (OUTPATIENT)
Dept: LAB | Facility: CLINIC | Age: 88
End: 2023-08-07
Payer: MEDICARE

## 2023-08-07 DIAGNOSIS — Z86.711 HISTORY OF PULMONARY EMBOLISM: ICD-10-CM

## 2023-08-07 LAB
INR PPP: 2.92 (ref 0.84–1.19)
PROTHROMBIN TIME: 30.8 SECONDS (ref 11.6–14.5)

## 2023-08-07 PROCEDURE — 36415 COLL VENOUS BLD VENIPUNCTURE: CPT

## 2023-08-07 PROCEDURE — 85610 PROTHROMBIN TIME: CPT

## 2023-08-28 ENCOUNTER — OFFICE VISIT (OUTPATIENT)
Dept: FAMILY MEDICINE CLINIC | Facility: CLINIC | Age: 88
End: 2023-08-28
Payer: MEDICARE

## 2023-08-28 VITALS
OXYGEN SATURATION: 95 % | HEART RATE: 113 BPM | SYSTOLIC BLOOD PRESSURE: 163 MMHG | HEIGHT: 64 IN | DIASTOLIC BLOOD PRESSURE: 77 MMHG | WEIGHT: 176.9 LBS | TEMPERATURE: 97.9 F | BODY MASS INDEX: 30.2 KG/M2

## 2023-08-28 DIAGNOSIS — D48.5 NEOPLASM OF UNCERTAIN BEHAVIOR OF SKIN: Primary | ICD-10-CM

## 2023-08-28 PROCEDURE — 99213 OFFICE O/P EST LOW 20 MIN: CPT | Performed by: FAMILY MEDICINE

## 2023-08-28 PROCEDURE — 11312 SHAVE SKIN LESION 1.1-2.0 CM: CPT | Performed by: FAMILY MEDICINE

## 2023-08-28 PROCEDURE — 88305 TISSUE EXAM BY PATHOLOGIST: CPT | Performed by: PATHOLOGY

## 2023-08-28 NOTE — ASSESSMENT & PLAN NOTE
The area was cleansed with alcohol. I then used 1 cm of lidocaine 1% with epinephrine as a local anesthetic. A wheal was raised under the lesion. After sufficient anesthesia was achieved, a #10 scalpel was used to perform a shave biopsy of the lesion. I also included some curettings. The base felt very soft. The base of the lesion was then fulgurated. Excellent hemostasis was achieved. Triple antibiotic ointment was applied followed by Band-Aid. Wound care instructions were given to the patient. Patient was advised to restart his warfarin this evening. He will have a repeat pro time in 2 weeks. Further follow-up for the skin lesion pending biopsy results.

## 2023-08-28 NOTE — PROGRESS NOTES
Name: Kristy Granado      : 1930      MRN: [id number]  Encounter Provider: Mendy Castro DO  Encounter Date: 2023   Encounter department: [address] [place] [address]     1. Neoplasm of uncertain behavior of skin  Assessment & Plan:  The area was cleansed with alcohol. I then used 1 cm of lidocaine 1% with epinephrine as a local anesthetic. A wheal was raised under the lesion. After sufficient anesthesia was achieved, a #10 scalpel was used to perform a shave biopsy of the lesion. I also included some curettings. The base felt very soft. The base of the lesion was then fulgurated. Excellent hemostasis was achieved. Triple antibiotic ointment was applied followed by Band-Aid. Wound care instructions were given to the patient. Patient was advised to restart his warfarin this evening. He will have a repeat pro time in 2 weeks. Further follow-up for the skin lesion pending biopsy results. Orders:  -     Tissue Exam; Future  Shave lesion    Date/Time: 2023 1:15 PM    Performed by: Mendy Castro DO  Authorized by: Mendy Castro DO  Universal Protocol:  Consent: Verbal consent obtained. Written consent not obtained.   Consent given by: patient      Number of Lesions: 1  Lesion 1:     Body area: head/neck    Head/neck location: forehead (Left temple)    Initial size (mm): 16    Final defect size (mm): 15    Malignancy: malignancy unknown      Malignancy comment: Malignancy suspected    Destruction method: shave removal                 Subjective      HPI  Review of Systems    Current Outpatient Medications on File Prior to Visit   Medication Sig   • albuterol (2.5 mg/3 mL) 0.083 % nebulizer solution Take 3 mL (2.5 mg total) by nebulization every 4 (four) hours as needed for wheezing or shortness of breath   • albuterol (PROVENTIL HFA,VENTOLIN HFA) 90 mcg/act inhaler TAKE 2 PUFFS BY MOUTH EVERY 4 HOURS AS NEEDED FOR WHEEZE   • amLODIPine (NORVASC) 10 mg tablet Take 1 tablet (10 mg total) by mouth daily   • desonide (DESOWEN) 0.05 % cream Apply to face twice a day   • Difluprednate 0.05 % EMUL INSTILL 1 DROP INTO LEFT EYE TWICE A DAY   • docusate sodium (COLACE) 100 mg capsule Take 100 mg by mouth in the morning Taking as needed   • dorzolamide (TRUSOPT) 2 % ophthalmic solution INSTILL 1 DROP INTO LEFT EYE TWICE A DAY   • latanoprost (XALATAN) 0.005 % ophthalmic solution Administer 1 drop to both eyes daily at bedtime   • losartan (COZAAR) 50 mg tablet TAKE 1 TABLET BY MOUTH EVERY DAY   • montelukast (SINGULAIR) 10 mg tablet Take 1 tablet (10 mg total) by mouth daily   • multivitamin (THERAGRAN) TABS Take 1 tablet by mouth daily   • omeprazole (PriLOSEC OTC) 20 MG tablet Take 20 mg by mouth   • Polyethyl Glycol-Propyl Glycol (SYSTANE OP) Administer 1 drop to both eyes 4 (four) times a day Taking as needed   • polyethylene glycol (GLYCOLAX) 17 GM/SCOOP powder Take 17 g by mouth as needed Taking as needed   • sodium chloride (ARIEL 128) 2 % hypertonic ophthalmic solution Administer 1 drop into the left eye 4 (four) times a day   • Symbicort 160-4.5 MCG/ACT inhaler INHALE 2 PUFFS TWICE A DAY   • triamcinolone (KENALOG) 0.1 % cream Apply topically 2 (two) times a day as needed for rash (Patient taking differently: Apply topically 2 (two) times a day as needed for rash Taking as needed)   • warfarin (COUMADIN) 4 mg tablet Take 2 daily or as directed       Objective     /77   Pulse (!) 113   Temp 97.9 °F (36.6 °C) (Tympanic)   Ht 5' 4" (1.626 m)   Wt 80.2 kg (176 lb 14.4 oz)   SpO2 95%   BMI 30.36 kg/m²     Physical Exam  Vitals reviewed. Skin:     Comments: There is a 1.6 cm nodular lesion with central ulceration present on the patient's left temple. It appears to be growing rapidly.        Theresa Look, DO

## 2023-09-01 PROCEDURE — 88305 TISSUE EXAM BY PATHOLOGIST: CPT | Performed by: PATHOLOGY

## 2023-09-19 ENCOUNTER — APPOINTMENT (OUTPATIENT)
Dept: LAB | Facility: CLINIC | Age: 88
End: 2023-09-19
Payer: MEDICARE

## 2023-09-19 DIAGNOSIS — Z86.711 HISTORY OF PULMONARY EMBOLISM: ICD-10-CM

## 2023-09-19 LAB
INR PPP: 4.08 (ref 0.84–1.19)
PROTHROMBIN TIME: 39.9 SECONDS (ref 11.6–14.5)

## 2023-09-19 PROCEDURE — 36415 COLL VENOUS BLD VENIPUNCTURE: CPT

## 2023-09-19 PROCEDURE — 85610 PROTHROMBIN TIME: CPT

## 2023-09-21 ENCOUNTER — ANTICOAG VISIT (OUTPATIENT)
Dept: FAMILY MEDICINE CLINIC | Facility: CLINIC | Age: 88
End: 2023-09-21

## 2023-09-27 ENCOUNTER — APPOINTMENT (OUTPATIENT)
Dept: LAB | Facility: CLINIC | Age: 88
End: 2023-09-27
Payer: MEDICARE

## 2023-09-27 ENCOUNTER — ANTICOAG VISIT (OUTPATIENT)
Dept: FAMILY MEDICINE CLINIC | Facility: CLINIC | Age: 88
End: 2023-09-27

## 2023-09-27 DIAGNOSIS — Z86.711 HISTORY OF PULMONARY EMBOLISM: ICD-10-CM

## 2023-09-27 LAB
INR PPP: 1.78 (ref 0.84–1.19)
PROTHROMBIN TIME: 21 SECONDS (ref 11.6–14.5)

## 2023-09-27 PROCEDURE — 85610 PROTHROMBIN TIME: CPT

## 2023-09-27 PROCEDURE — 36415 COLL VENOUS BLD VENIPUNCTURE: CPT

## 2023-10-16 ENCOUNTER — APPOINTMENT (OUTPATIENT)
Dept: LAB | Facility: CLINIC | Age: 88
End: 2023-10-16
Payer: MEDICARE

## 2023-10-16 DIAGNOSIS — Z86.711 HISTORY OF PULMONARY EMBOLISM: ICD-10-CM

## 2023-10-16 LAB
INR PPP: 2.83 (ref 0.84–1.19)
PROTHROMBIN TIME: 29.1 SECONDS (ref 11.6–14.5)

## 2023-10-16 PROCEDURE — 85610 PROTHROMBIN TIME: CPT

## 2023-10-16 PROCEDURE — 36415 COLL VENOUS BLD VENIPUNCTURE: CPT

## 2023-10-17 ENCOUNTER — ANTICOAG VISIT (OUTPATIENT)
Dept: FAMILY MEDICINE CLINIC | Facility: CLINIC | Age: 88
End: 2023-10-17

## 2023-10-19 ENCOUNTER — RA CDI HCC (OUTPATIENT)
Dept: OTHER | Facility: HOSPITAL | Age: 88
End: 2023-10-19

## 2023-10-23 ENCOUNTER — OFFICE VISIT (OUTPATIENT)
Dept: FAMILY MEDICINE CLINIC | Facility: CLINIC | Age: 88
End: 2023-10-23
Payer: MEDICARE

## 2023-10-23 VITALS
OXYGEN SATURATION: 99 % | TEMPERATURE: 96.7 F | DIASTOLIC BLOOD PRESSURE: 74 MMHG | HEART RATE: 94 BPM | WEIGHT: 174.6 LBS | BODY MASS INDEX: 29.81 KG/M2 | SYSTOLIC BLOOD PRESSURE: 148 MMHG | HEIGHT: 64 IN

## 2023-10-23 DIAGNOSIS — N18.30 STAGE 3 CHRONIC KIDNEY DISEASE, UNSPECIFIED WHETHER STAGE 3A OR 3B CKD (HCC): ICD-10-CM

## 2023-10-23 DIAGNOSIS — Z00.00 MEDICARE ANNUAL WELLNESS VISIT, SUBSEQUENT: ICD-10-CM

## 2023-10-23 DIAGNOSIS — D68.52 PROTHROMBIN GENE MUTATION (HCC): ICD-10-CM

## 2023-10-23 DIAGNOSIS — J44.89 ASTHMA-COPD OVERLAP SYNDROME: Primary | ICD-10-CM

## 2023-10-23 DIAGNOSIS — I10 ESSENTIAL HYPERTENSION, BENIGN: ICD-10-CM

## 2023-10-23 DIAGNOSIS — Z79.899 ENCOUNTER FOR LONG-TERM (CURRENT) USE OF MEDICATIONS: ICD-10-CM

## 2023-10-23 DIAGNOSIS — E78.5 DYSLIPIDEMIA: ICD-10-CM

## 2023-10-23 PROCEDURE — 99214 OFFICE O/P EST MOD 30 MIN: CPT | Performed by: FAMILY MEDICINE

## 2023-10-23 PROCEDURE — G0439 PPPS, SUBSEQ VISIT: HCPCS | Performed by: FAMILY MEDICINE

## 2023-10-23 RX ORDER — AMLODIPINE BESYLATE 10 MG/1
10 TABLET ORAL DAILY
Qty: 90 TABLET | Refills: 3 | Status: SHIPPED | OUTPATIENT
Start: 2023-10-23 | End: 2024-01-21

## 2023-10-23 RX ORDER — ALBUTEROL SULFATE 90 UG/1
2 AEROSOL, METERED RESPIRATORY (INHALATION) EVERY 4 HOURS PRN
Qty: 18 G | Refills: 1 | Status: SHIPPED | OUTPATIENT
Start: 2023-10-23

## 2023-10-23 RX ORDER — WARFARIN SODIUM 4 MG/1
TABLET ORAL
Qty: 180 TABLET | Refills: 2 | Status: SHIPPED | OUTPATIENT
Start: 2023-10-23

## 2023-10-23 NOTE — ASSESSMENT & PLAN NOTE
I refilled the patient's prescription for warfarin. I answered the patient's questions regarding the home PT/INR monitor. For now, I am simply going to recommend he continue to get his protimes done at the lab.

## 2023-10-23 NOTE — ASSESSMENT & PLAN NOTE
Patient has asthma-COPD overlap. Pulmonary wise, the patient is very stable. After the new year, Symbicort will not be covered by his insurance. I discussed alternatives with his daughter. His daughter recalls Breo Ellipta inhaler being on his formulary. We will switch him to that at the appropriate time. For now, the patient will continue his current regiment. I refilled his prescription for his albuterol inhaler.

## 2023-10-23 NOTE — PROGRESS NOTES
Assessment and Plan:     Problem List Items Addressed This Visit          Respiratory    Asthma-COPD overlap syndrome - Primary     Patient has asthma-COPD overlap. Pulmonary wise, the patient is very stable. After the new year, Symbicort will not be covered by his insurance. I discussed alternatives with his daughter. His daughter recalls Breo Ellipta inhaler being on his formulary. We will switch him to that at the appropriate time. For now, the patient will continue his current regiment. I refilled his prescription for his albuterol inhaler. Relevant Medications    albuterol (PROVENTIL HFA,VENTOLIN HFA) 90 mcg/act inhaler       Cardiovascular and Mediastinum    Essential hypertension, benign     Patient has hypertension. I checked the patient's blood pressure myself today and found his blood pressure to be 148/74. Blood pressure is currently is factory for his age. We did discuss that amlodipine is likely the etiology of his lower extremity edema. I recommended no change with his medication at this time. Relevant Medications    amLODIPine (NORVASC) 10 mg tablet       Genitourinary    Stage 3 chronic kidney disease, unspecified whether stage 3a or 3b CKD (HCC)     Lab Results   Component Value Date    EGFR 74 07/03/2023    EGFR 67 12/27/2022    EGFR 72 10/17/2022    CREATININE 0.88 07/03/2023    CREATININE 0.97 12/27/2022    CREATININE 0.91 10/17/2022   Patient's kidney function has improved. Given his age of 80, his kidney function is actually considered normal.  He continues to follow with nephrology. I ordered a CMP and asked the patient to get this done when he gets his next pro time, which will be in approximately 2 weeks. 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 I answered the patient's questions regarding the home PT/INR monitor.   For now, I am simply going to recommend he continue to get his protimes done at the lab. Relevant Medications    warfarin (COUMADIN) 4 mg tablet    Dyslipidemia    Relevant Orders    Lipid panel    Comprehensive metabolic panel    Encounter for long-term (current) use of medications    Relevant Orders    CBC and differential    Medicare annual wellness visit, subsequent       Depression Screening and Follow-up Plan: Patient was screened for depression during today's encounter. They screened negative with a PHQ-2 score of 0. Preventive health issues were discussed with patient, and age appropriate screening tests were ordered as noted in patient's After Visit Summary. Personalized health advice and appropriate referrals for health education or preventive services given if needed, as noted in patient's After Visit Summary. History of Present Illness:     Patient presents for a Medicare Wellness Visit    This is a 80-year-old white male who presents to the office today for his routine checkup as well as his annual Medicare wellness exam.  He is accompanied to the office today by one of his daughters. The patient is doing well. He tells me he still does not do his garage and exercises daily. He brought one of his pieces of exercise equipment into the house in case it is too cold to go into the garage for the winter months. He finds that he uses his rescue inhaler 4-5 times per week. His daughter tells me that Symbicort will no longer be covered with his current insurance after the new year. Patient's appetite is good. He sleeps well and feels well overall. Patient Care Team:  Traci Archibald DO as PCP - General (Family Medicine)     Review of Systems:     Review of Systems   Constitutional:  Negative for activity change and appetite change. Respiratory:  Negative for cough, shortness of breath and wheezing. Cardiovascular:  Positive for leg swelling. Negative for chest pain and palpitations.    Gastrointestinal: Negative for abdominal distention, abdominal pain, blood in stool, constipation, diarrhea and nausea. Musculoskeletal:  Positive for arthralgias and gait problem. Psychiatric/Behavioral:  Negative for sleep disturbance.          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[address])    Primary generalized (osteo)arthritis    Essential hypertension, benign    Dyslipidemia    Intrinsic eczema    Other seborrheic dermatitis    Abnormality of gait    Osteoarthritis of both knees    Chronic pulmonary embolism without acute cor pulmonale (HCC)    Age-related cataract of both eyes    Premature beats    Primary osteoarthritis of both knees    Neoplasm of uncertain behavior of skin    Encounter for long-term (current) use of medications    GERD without esophagitis    Stage 3 chronic kidney disease, unspecified whether stage 3a or 3b CKD ([address])    Vaccine refused by patient    Hypercalcemia    COVID-19 virus infection    Asthma-COPD overlap syndrome    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[address])    Endothelial corneal dystrophy of both eyes    Heterozygous factor V Leiden mutation ([address])    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[address])    History of pulmonary embolism    Mature cataract    Primary open-angle glaucoma, bilateral, mild stage    Pseudophakic bullous keratopathy of left eye    TB lung, latent    Hyponatremia    Shortness of breath    Medicare annual wellness visit, subsequent      Past Medical and Surgical History:     Past Medical History:   Diagnosis Date    Arthritis     Coagulation defect ([address])     last assessed: 11/28/2018    COPD (chronic obstructive pulmonary disease) ([address])     last assessed: 5/14/2019, 12/6/2017    Generalized osteoarthritis     last assessed: 1/28/2019    GERD without esophagitis     last assessed: 1/28/2019    Glaucoma     last assessed: 8/4/2011    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[address])     last assessed: 10/16/2018    Factor II Prothrombin Gene per [employer]    History of kidney stones 1985    Hx of blood clots     Hypertension     last assessed: 2019    Impaired fasting glucose     last assessed: 2013    Mild persistent asthma, uncomplicated     last assessed: 2018    Nephrolithiasis     Nondisplaced intertrochanteric fracture of right femur, initial encounter for closed fracture ([address])     last assessed: 2019    Premature ventricular contractions     last assessed: 2011    Pulmonary nodule     Scoliosis (and kyphoscoliosis), idiopathic     Wears dentures      Past Surgical History:   Procedure Laterality Date    CARPAL TUNNEL RELEASE Left     ENDO     CATARACT EXTRACTION Left     COLONOSCOPY  10/07/2008    last assessed: 3/29/2016    EYE SURGERY Right 2022    HERNIA REPAIR      HIP SURGERY      HIP SURGERY Left 1999    ORIF    MULTIPLE TOOTH EXTRACTIONS Bilateral     OTHER SURGICAL HISTORY Right 10/30/2001    [facility]    TONSILLECTOMY Bilateral     WISDOM TOOTH EXTRACTION Bilateral       Family History:     Family History   Problem Relation Age of Onset    Hypertension Mother     Stroke Mother     Heart attack Father       Social History:     Social History     Socioeconomic History    Marital status:       Spouse name: None    Number of children: None    Years of education: None    Highest education level: None   Occupational History    None   Tobacco Use    Smoking status: Former     Types: Pipe     Start date:      Quit date: 1970     Years since quittin.8    Smokeless tobacco: Never   Vaping Use    Vaping Use: Never used   Substance and Sexual Activity    Alcohol use: Yes     Comment: social    Drug use: Never    Sexual activity: Not Currently   Other Topics Concern    None   Social History Narrative    None     Social Determinants of Health     Financial Resource Strain: Low Risk  (10/23/2023)    Overall Financial Resource Strain (CARDIA) Difficulty of Paying Living Expenses: Not very hard   Food Insecurity: No Food Insecurity (8/1/2022)    Hunger Vital Sign     Worried About Running Out of Food in the Last Year: Never true     Ran Out of Food in the Last Year: Never true   Transportation Needs: No Transportation Needs (10/23/2023)    PRAPARE - Transportation     Lack of Transportation (Medical): No     Lack of Transportation (Non-Medical):  No   Physical Activity: Not on file   Stress: Not on file   Social Connections: Not on file   Intimate Partner Violence: Not on file   Housing Stability: High Risk (8/1/2022)    Housing Stability Vital Sign     Unable to Pay for Housing in the Last Year: Yes     Number of Places Lived in the Last Year: 1     Unstable Housing in the Last Year: No      Medications and Allergies:     Current Outpatient Medications   Medication Sig Dispense Refill    albuterol (2.5 mg/3 mL) 0.083 % nebulizer solution Take 3 mL (2.5 mg total) by nebulization every 4 (four) hours as needed for wheezing or shortness of breath 100 mL 0    albuterol (PROVENTIL HFA,VENTOLIN HFA) 90 mcg/act inhaler Inhale 2 puffs every 4 (four) hours as needed for wheezing 18 g 1    amLODIPine (NORVASC) 10 mg tablet Take 1 tablet (10 mg total) by mouth daily 90 tablet 3    desonide (DESOWEN) 0.05 % cream Apply to face twice a day 60 g 0    Difluprednate 0.05 % EMUL INSTILL 1 DROP INTO LEFT EYE TWICE A DAY      docusate sodium (COLACE) 100 mg capsule Take 100 mg by mouth in the morning Taking as needed      dorzolamide (TRUSOPT) 2 % ophthalmic solution INSTILL 1 DROP INTO LEFT EYE TWICE A DAY      latanoprost (XALATAN) 0.005 % ophthalmic solution Administer 1 drop to both eyes daily at bedtime      losartan (COZAAR) 50 mg tablet TAKE 1 TABLET BY MOUTH EVERY DAY 90 tablet 2    montelukast (SINGULAIR) 10 mg tablet Take 1 tablet (10 mg total) by mouth daily 90 tablet 3    multivitamin (THERAGRAN) TABS Take 1 tablet by mouth daily      omeprazole (PriLOSEC OTC) 20 MG tablet Take 20 mg by mouth      Polyethyl Glycol-Propyl Glycol (SYSTANE OP) Administer 1 drop to both eyes 4 (four) times a day Taking as needed      polyethylene glycol (GLYCOLAX) 17 GM/SCOOP powder Take 17 g by mouth as needed Taking as needed      sodium chloride (ARIEL 128) 2 % hypertonic ophthalmic solution Administer 1 drop into the left eye 4 (four) times a day      Symbicort 160-4.5 MCG/ACT inhaler INHALE 2 PUFFS TWICE A DAY 30.6 g 3    triamcinolone (KENALOG) 0.1 % cream Apply topically 2 (two) times a day as needed for rash (Patient taking differently: Apply topically 2 (two) times a day as needed for rash Taking as needed) 80 g 2    warfarin (COUMADIN) 4 mg tablet Take 2 daily or as directed 180 tablet 2     No current facility-administered medications for this visit. Allergies   Allergen Reactions    Alphagan P [Brimonidine Tartrate] Itching      Immunizations:     Immunization History   Administered Date(s) Administered    INFLUENZA 12/18/2012      Health Maintenance: There are no preventive care reminders to display for this patient. Topic Date Due    COVID-19 Vaccine (1) Never done    Pneumococcal Vaccine: 65+ Years (1 - PCV) Never done    Influenza Vaccine (1) 09/01/2023      Medicare Screening Tests and Risk Assessments:     Estrellita iWley is here for his Subsequent Wellness visit. Last Medicare Wellness visit information reviewed, patient interviewed and updates made to the record today. Health Risk Assessment:   Patient rates overall health as good. Patient feels that their physical health rating is same. Patient is satisfied with their life. Eyesight was rated as same. Hearing was rated as same. Patient feels that their emotional and mental health rating is same. Patients states they are never, rarely angry. Patient states they are sometimes unusually tired/fatigued. Pain experienced in the last 7 days has been some. Patient's pain rating has been 2/10.  Patient states that he has experienced no weight loss or gain in last 6 months. Reports chronic knee and lower back pain    Depression Screening:   PHQ-2 Score: 0      Fall Risk Screening: In the past year, patient has experienced: no history of falling in past year      Home Safety:  Patient has trouble with stairs inside or outside of their home. Patient has working smoke alarms and has no working carbon monoxide detector. Home safety hazards include: none. Nutrition:   Current diet is No Added Salt and Low Cholesterol. Medications:   Patient is not currently taking any over-the-counter supplements. Patient is able to manage medications. Activities of Daily Living (ADLs)/Instrumental Activities of Daily Living (IADLs):   Walk and transfer into and out of bed and chair?: Yes  Dress and groom yourself?: Yes    Bathe or shower yourself?: Yes    Feed yourself? Yes  Do your laundry/housekeeping?: Yes  Manage your money, pay your bills and track your expenses?: Yes  Make your own meals?: Yes    Do your own shopping?: Yes    Previous Hospitalizations:   Any hospitalizations or ED visits within the last 12 months?: No      Advance Care Planning:   Living will: Yes    Durable POA for healthcare:  Yes    Advanced directive: Yes      Cognitive Screening:   Provider or family/friend/caregiver concerned regarding cognition?: No    PREVENTIVE SCREENINGS      Cardiovascular Screening:    General: Screening Current      Diabetes Screening:     General: Screening Current      Colorectal Cancer Screening:     General: Screening Not Indicated      Prostate Cancer Screening:    General: Screening Not Indicated      Osteoporosis Screening:    General: Screening Not Indicated      Abdominal Aortic Aneurysm (AAA) Screening:    Risk factors include: tobacco use        General: Screening Not Indicated      Lung Cancer Screening:     General: Screening Not Indicated      Hepatitis C Screening:    General: Screening Not Indicated    Screening, Brief Intervention, and Referral to Treatment (SBIRT)    Screening  Typical number of drinks in a day: 0  Typical number of drinks in a week: 0  Interpretation: Low risk drinking behavior. AUDIT-C Screenin) How often did you have a drink containing alcohol in the past year? monthly or less  2) How many drinks did you have on a typical day when you were drinking in the past year? 1 to 2  3) How often did you have 6 or more drinks on one occasion in the past year? never    AUDIT-C Score: 1  Interpretation: Score 0-3 (male): Negative screen for alcohol misuse    Single Item Drug Screening:  How often have you used an illegal drug (including marijuana) or a prescription medication for non-medical reasons in the past year? never    Single Item Drug Screen Score: 0  Interpretation: Negative screen for possible drug use disorder    No results found. Physical Exam:     /74   Pulse 94   Temp (!) 96.7 °F (35.9 °C) (Tympanic)   Ht 5' 4" (1.626 m)   Wt 79.2 kg (174 lb 9.6 oz)   SpO2 99%   BMI 29.97 kg/m²     Physical Exam  Vitals reviewed. Constitutional:       Comments: Patient is a 77-year-old white male who appears his stated age. The patient is pleasant, cooperative, and in no distress   HENT:      Head: Normocephalic and atraumatic. Right Ear: Tympanic membrane, ear canal and external ear normal. There is no impacted cerumen. Left Ear: Tympanic membrane, ear canal and external ear normal. There is no impacted cerumen. Mouth/Throat:      Mouth: Mucous membranes are moist.      Pharynx: Oropharynx is clear. No oropharyngeal exudate or posterior oropharyngeal erythema. Eyes:      General: No scleral icterus. Right eye: No discharge. Left eye: No discharge. Conjunctiva/sclera: Conjunctivae normal.      Pupils: Pupils are equal, round, and reactive to light. Neck:      Comments:  There is no thyromegaly  Cardiovascular:      Rate and Rhythm: Normal rate and regular rhythm. Heart sounds: Normal heart sounds. No murmur heard. No friction rub. No gallop. Pulmonary:      Effort: Pulmonary effort is normal. No respiratory distress. Breath sounds: Normal breath sounds. No stridor. No wheezing, rhonchi or rales. Abdominal:      General: Bowel sounds are normal. There is no distension. Palpations: Abdomen is soft. There is no mass. Tenderness: There is no abdominal tenderness. There is no guarding. Comments: Due to his ambulatory dysfunction, I examined the patient's abdomen while he was seated in the exam room chair   Musculoskeletal:      Cervical back: Neck supple. Lymphadenopathy:      Cervical: No cervical adenopathy. Psychiatric:         Mood and Affect: Mood normal.         Behavior: Behavior normal.         Thought Content: Thought content normal.         Judgment: Judgment normal.     Extremities: Without cyanosis or clubbing. The patient was wearing compression stockings on both legs. Despite this, I was able to appreciate 2+ pitting edema.     Marisol Rhodes, DO

## 2023-10-23 NOTE — ASSESSMENT & PLAN NOTE
Patient has hypertension. I checked the patient's blood pressure myself today and found his blood pressure to be 148/74. Blood pressure is currently is factory for his age. We did discuss that amlodipine is likely the etiology of his lower extremity edema. I recommended no change with his medication at this time.

## 2023-10-23 NOTE — ASSESSMENT & PLAN NOTE
Lab Results   Component Value Date    EGFR 74 07/03/2023    EGFR 67 12/27/2022    EGFR 72 10/17/2022    CREATININE 0.88 07/03/2023    CREATININE 0.97 12/27/2022    CREATININE 0.91 10/17/2022   Patient's kidney function has improved. Given his age of 80, his kidney function is actually considered normal.  He continues to follow with nephrology. I ordered a CMP and asked the patient to get this done when he gets his next pro time, which will be in approximately 2 weeks.

## 2023-10-23 NOTE — PATIENT INSTRUCTIONS
Medicare Preventive Visit Patient Instructions  Thank you for completing your Welcome to Medicare Visit or Medicare Annual Wellness Visit today. Your next wellness visit will be due in one year (10/23/2024). The screening/preventive services that you may require over the next 5-10 years are detailed below. Some tests may not apply to you based off risk factors and/or age. Screening tests ordered at today's visit but not completed yet may show as past due. Also, please note that scanned in results may not display below. Preventive Screenings:  Service Recommendations Previous Testing/Comments   Colorectal Cancer Screening  Colonoscopy    Fecal Occult Blood Test (FOBT)/Fecal Immunochemical Test (FIT)  Fecal DNA/Cologuard Test  Flexible Sigmoidoscopy Age: 43-73 years old   Colonoscopy: every 10 years (May be performed more frequently if at higher risk)  OR  FOBT/FIT: every 1 year  OR  Cologuard: every 3 years  OR  Sigmoidoscopy: every 5 years  Screening may be recommended earlier than age 39 if at higher risk for colorectal cancer. Also, an individualized decision between you and your healthcare provider will decide whether screening between the ages of 77-80 would be appropriate.  Colonoscopy: 10/07/2008  FOBT/FIT: Not on file  Cologuard: Not on file  Sigmoidoscopy: Not on file    Screening Not Indicated     Prostate Cancer Screening Individualized decision between patient and health care provider in men between ages of 53-66   Medicare will cover every 12 months beginning on the day after your 50th birthday PSA: No results in last 5 years     Screening Not Indicated     Hepatitis C Screening Once for adults born between 1945 and 1965  More frequently in patients at high risk for Hepatitis C Hep C Antibody: Not on file        Diabetes Screening 1-2 times per year if you're at risk for diabetes or have pre-diabetes Fasting glucose: 105 mg/dL (7/3/2023)  A1C: No results in last 5 years (No results in last 5 years)  Screening Current   Cholesterol Screening Once every 5 years if you don't have a lipid disorder. May order more often based on risk factors. Lipid panel: 08/02/2022  Screening Current      Other Preventive Screenings Covered by Medicare:  Abdominal Aortic Aneurysm (AAA) Screening: covered once if your at risk. You're considered to be at risk if you have a family history of AAA or a male between the age of 70-76 who smoking at least 100 cigarettes in your lifetime. Lung Cancer Screening: covers low dose CT scan once per year if you meet all of the following conditions: (1) Age 48-67; (2) No signs or symptoms of lung cancer; (3) Current smoker or have quit smoking within the last 15 years; (4) You have a tobacco smoking history of at least 20 pack years (packs per day x number of years you smoked); (5) You get a written order from a healthcare provider. Glaucoma Screening: covered annually if you're considered high risk: (1) You have diabetes OR (2) Family history of glaucoma OR (3)  aged 48 and older OR (3)  American aged 72 and older  Osteoporosis Screening: covered every 2 years if you meet one of the following conditions: (1) Have a vertebral abnormality; (2) On glucocorticoid therapy for more than 3 months; (3) Have primary hyperparathyroidism; (4) On osteoporosis medications and need to assess response to drug therapy. HIV Screening: covered annually if you're between the age of 14-79. Also covered annually if you are younger than 13 and older than 72 with risk factors for HIV infection. For pregnant patients, it is covered up to 3 times per pregnancy.     Immunizations:  Immunization Recommendations   Influenza Vaccine Annual influenza vaccination during flu season is recommended for all persons aged >= 6 months who do not have contraindications   Pneumococcal Vaccine   * Pneumococcal conjugate vaccine = PCV13 (Prevnar 13), PCV15 (Vaxneuvance), PCV20 (Prevnar 20)  * Pneumococcal polysaccharide vaccine = PPSV23 (Pneumovax) Adults 68-26 yo with certain risk factors or if 69+ yo  If never received any pneumonia vaccine: recommend Prevnar 20 (PCV20)  Give PCV20 if previously received 1 dose of PCV13 or PPSV23   Hepatitis B Vaccine 3 dose series if at intermediate or high risk (ex: diabetes, end stage renal disease, liver disease)   Respiratory syncytial virus (RSV) Vaccine - COVERED BY MEDICARE PART D  * RSVPreF3 (Arexvy) CDC recommends that adults 61years of age and older may receive a single dose of RSV vaccine using shared clinical decision-making (SCDM)   Tetanus (Td) Vaccine - COST NOT COVERED BY MEDICARE PART B Following completion of primary series, a booster dose should be given every 10 years to maintain immunity against tetanus. Td may also be given as tetanus wound prophylaxis. Tdap Vaccine - COST NOT COVERED BY MEDICARE PART B Recommended at least once for all adults. For pregnant patients, recommended with each pregnancy. Shingles Vaccine (Shingrix) - COST NOT COVERED BY MEDICARE PART B  2 shot series recommended in those 19 years and older who have or will have weakened immune systems or those 50 years and older     Health Maintenance Due:  There are no preventive care reminders to display for this patient. Immunizations Due:      Topic Date Due   • COVID-19 Vaccine (1) Never done   • Pneumococcal Vaccine: 65+ Years (1 - PCV) Never done   • Influenza Vaccine (1) 09/01/2023     Advance Directives   What are advance directives? Advance directives are legal documents that state your wishes and plans for medical care. These plans are made ahead of time in case you lose your ability to make decisions for yourself. Advance directives can apply to any medical decision, such as the treatments you want, and if you want to donate organs. What are the types of advance directives?   There are many types of advance directives, and each state has rules about how to use them. You may choose a combination of any of the following:  Living will: This is a written record of the treatment you want. You can also choose which treatments you do not want, which to limit, and which to stop at a certain time. This includes surgery, medicine, IV fluid, and tube feedings. Durable power of  for healthcare [facility] [place]): This is a written record that states who you want to make healthcare choices for you when you are unable to make them for yourself. This person, called a proxy, is usually a family member or a friend. You may choose more than 1 proxy. Do not resuscitate (DNR) order:  A DNR order is used in case your heart stops beating or you stop breathing. It is a request not to have certain forms of treatment, such as CPR. A DNR order may be included in other types of advance directives. Medical directive: This covers the care that you want if you are in a coma, near death, or unable to make decisions for yourself. You can list the treatments you want for each condition. Treatment may include pain medicine, surgery, blood transfusions, dialysis, IV or tube feedings, and a ventilator (breathing machine). Values history: This document has questions about your views, beliefs, and how you feel and think about life. This information can help others choose the care that you would choose. Why are advance directives important? An advance directive helps you control your care. Although spoken wishes may be used, it is better to have your wishes written down. Spoken wishes can be misunderstood, or not followed. Treatments may be given even if you do not want them. An advance directive may make it easier for your family to make difficult choices about your care. Weight Management   Why it is important to manage your weight:  Being overweight increases your risk of health conditions such as heart disease, high blood pressure, type 2 diabetes, and certain types of cancer.  It can also increase your risk for osteoarthritis, sleep apnea, and other respiratory problems. Aim for a slow, steady weight loss. Even a small amount of weight loss can lower your risk of health problems. How to lose weight safely:  A safe and healthy way to lose weight is to eat fewer calories and get regular exercise. You can lose up about 1 pound a week by decreasing the number of calories you eat by 500 calories each day. Healthy meal plan for weight management:  A healthy meal plan includes a variety of foods, contains fewer calories, and helps you stay healthy. A healthy meal plan includes the following:  Eat whole-grain foods more often. A healthy meal plan should contain fiber. Fiber is the part of grains, fruits, and vegetables that is not broken down by your body. Whole-grain foods are healthy and provide extra fiber in your diet. Some examples of whole-grain foods are whole-wheat breads and pastas, oatmeal, brown rice, and bulgur. Eat a variety of vegetables every day. Include dark, leafy greens such as spinach, kale, zaynab greens, and mustard greens. Eat yellow and orange vegetables such as carrots, sweet potatoes, and winter squash. Eat a variety of fruits every day. Choose fresh or canned fruit (canned in its own juice or light syrup) instead of juice. Fruit juice has very little or no fiber. Eat low-fat dairy foods. Drink fat-free (skim) milk or 1% milk. Eat fat-free yogurt and low-fat cottage cheese. Try low-fat cheeses such as mozzarella and other reduced-fat cheeses. Choose meat and other protein foods that are low in fat. Choose beans or other legumes such as split peas or lentils. Choose fish, skinless poultry (chicken or turkey), or lean cuts of red meat (beef or pork). Before you cook meat or poultry, cut off any visible fat. Use less fat and oil. Try baking foods instead of frying them. Add less fat, such as margarine, sour cream, regular salad dressing and mayonnaise to foods.  Eat fewer high-fat foods. Some examples of high-fat foods include french fries, doughnuts, ice cream, and cakes. Eat fewer sweets. Limit foods and drinks that are high in sugar. This includes candy, cookies, regular soda, and sweetened drinks. Exercise:  Exercise at least 30 minutes per day on most days of the week. Some examples of exercise include walking, biking, dancing, and swimming. You can also fit in more physical activity by taking the stairs instead of the elevator or parking farther away from stores. Ask your healthcare provider about the best exercise plan for you. © Copyright [employer] 2018 Information is for End User's use only and may not be sold, redistributed or otherwise used for commercial purposes.  All illustrations and images included in CareNotes® are the copyrighted property of [employer] Inc. or [address] [place] [address]

## 2023-10-30 ENCOUNTER — APPOINTMENT (OUTPATIENT)
Dept: LAB | Facility: CLINIC | Age: 88
End: 2023-10-30
Payer: MEDICARE

## 2023-10-30 DIAGNOSIS — Z86.711 HISTORY OF PULMONARY EMBOLISM: ICD-10-CM

## 2023-10-30 DIAGNOSIS — N18.30 STAGE 3 CHRONIC KIDNEY DISEASE, UNSPECIFIED WHETHER STAGE 3A OR 3B CKD (HCC): ICD-10-CM

## 2023-10-30 DIAGNOSIS — E78.5 DYSLIPIDEMIA: ICD-10-CM

## 2023-10-30 DIAGNOSIS — Z79.899 ENCOUNTER FOR LONG-TERM (CURRENT) USE OF MEDICATIONS: ICD-10-CM

## 2023-10-30 LAB
ALBUMIN SERPL BCP-MCNC: 3.9 G/DL (ref 3.5–5)
ALP SERPL-CCNC: 81 U/L (ref 34–104)
ALT SERPL W P-5'-P-CCNC: 18 U/L (ref 7–52)
ANION GAP SERPL CALCULATED.3IONS-SCNC: 9 MMOL/L
AST SERPL W P-5'-P-CCNC: 24 U/L (ref 13–39)
BASOPHILS # BLD AUTO: 0.03 THOUSANDS/ÂΜL (ref 0–0.1)
BASOPHILS NFR BLD AUTO: 1 % (ref 0–1)
BILIRUB SERPL-MCNC: 0.57 MG/DL (ref 0.2–1)
BUN SERPL-MCNC: 17 MG/DL (ref 5–25)
CALCIUM SERPL-MCNC: 9.6 MG/DL (ref 8.4–10.2)
CHLORIDE SERPL-SCNC: 102 MMOL/L (ref 96–108)
CHOLEST SERPL-MCNC: 212 MG/DL
CO2 SERPL-SCNC: 23 MMOL/L (ref 21–32)
CREAT SERPL-MCNC: 0.88 MG/DL (ref 0.6–1.3)
EOSINOPHIL # BLD AUTO: 0.15 THOUSAND/ÂΜL (ref 0–0.61)
EOSINOPHIL NFR BLD AUTO: 3 % (ref 0–6)
ERYTHROCYTE [DISTWIDTH] IN BLOOD BY AUTOMATED COUNT: 13.2 % (ref 11.6–15.1)
GFR SERPL CREATININE-BSD FRML MDRD: 74 ML/MIN/1.73SQ M
GLUCOSE P FAST SERPL-MCNC: 105 MG/DL (ref 65–99)
HCT VFR BLD AUTO: 39.9 % (ref 36.5–49.3)
HDLC SERPL-MCNC: 92 MG/DL
HGB BLD-MCNC: 13.2 G/DL (ref 12–17)
IMM GRANULOCYTES # BLD AUTO: 0.01 THOUSAND/UL (ref 0–0.2)
IMM GRANULOCYTES NFR BLD AUTO: 0 % (ref 0–2)
INR PPP: 2.58 (ref 0.84–1.19)
LDLC SERPL CALC-MCNC: 105 MG/DL (ref 0–100)
LYMPHOCYTES # BLD AUTO: 2.46 THOUSANDS/ÂΜL (ref 0.6–4.47)
LYMPHOCYTES NFR BLD AUTO: 40 % (ref 14–44)
MCH RBC QN AUTO: 30.4 PG (ref 26.8–34.3)
MCHC RBC AUTO-ENTMCNC: 33.1 G/DL (ref 31.4–37.4)
MCV RBC AUTO: 92 FL (ref 82–98)
MONOCYTES # BLD AUTO: 0.71 THOUSAND/ÂΜL (ref 0.17–1.22)
MONOCYTES NFR BLD AUTO: 12 % (ref 4–12)
NEUTROPHILS # BLD AUTO: 2.76 THOUSANDS/ÂΜL (ref 1.85–7.62)
NEUTS SEG NFR BLD AUTO: 44 % (ref 43–75)
NONHDLC SERPL-MCNC: 120 MG/DL
NRBC BLD AUTO-RTO: 0 /100 WBCS
PLATELET # BLD AUTO: 242 THOUSANDS/UL (ref 149–390)
PMV BLD AUTO: 9.8 FL (ref 8.9–12.7)
POTASSIUM SERPL-SCNC: 4.3 MMOL/L (ref 3.5–5.3)
PROT SERPL-MCNC: 7 G/DL (ref 6.4–8.4)
PROTHROMBIN TIME: 27.2 SECONDS (ref 11.6–14.5)
RBC # BLD AUTO: 4.34 MILLION/UL (ref 3.88–5.62)
SODIUM SERPL-SCNC: 134 MMOL/L (ref 135–147)
TRIGL SERPL-MCNC: 74 MG/DL
WBC # BLD AUTO: 6.12 THOUSAND/UL (ref 4.31–10.16)

## 2023-10-30 PROCEDURE — 36415 COLL VENOUS BLD VENIPUNCTURE: CPT

## 2023-10-30 PROCEDURE — 80061 LIPID PANEL: CPT

## 2023-10-30 PROCEDURE — 80053 COMPREHEN METABOLIC PANEL: CPT

## 2023-10-30 PROCEDURE — 85610 PROTHROMBIN TIME: CPT

## 2023-10-30 PROCEDURE — 85025 COMPLETE CBC W/AUTO DIFF WBC: CPT

## 2023-10-31 ENCOUNTER — ANTICOAG VISIT (OUTPATIENT)
Dept: FAMILY MEDICINE CLINIC | Facility: CLINIC | Age: 88
End: 2023-10-31

## 2023-11-20 ENCOUNTER — APPOINTMENT (OUTPATIENT)
Dept: LAB | Facility: CLINIC | Age: 88
End: 2023-11-20
Payer: MEDICARE

## 2023-11-20 DIAGNOSIS — Z86.711 HISTORY OF PULMONARY EMBOLISM: ICD-10-CM

## 2023-11-20 LAB
INR PPP: 2.68 (ref 0.84–1.19)
PROTHROMBIN TIME: 28 SECONDS (ref 11.6–14.5)

## 2023-11-20 PROCEDURE — 36415 COLL VENOUS BLD VENIPUNCTURE: CPT

## 2023-11-20 PROCEDURE — 85610 PROTHROMBIN TIME: CPT

## 2023-11-21 ENCOUNTER — ANTICOAG VISIT (OUTPATIENT)
Dept: FAMILY MEDICINE CLINIC | Facility: CLINIC | Age: 88
End: 2023-11-21

## 2023-12-18 ENCOUNTER — APPOINTMENT (OUTPATIENT)
Dept: LAB | Facility: CLINIC | Age: 88
End: 2023-12-18
Payer: MEDICARE

## 2023-12-18 DIAGNOSIS — Z86.711 HISTORY OF PULMONARY EMBOLISM: ICD-10-CM

## 2023-12-18 LAB
INR PPP: 2.62 (ref 0.84–1.19)
PROTHROMBIN TIME: 27.5 SECONDS (ref 11.6–14.5)

## 2023-12-18 PROCEDURE — 85610 PROTHROMBIN TIME: CPT

## 2023-12-18 PROCEDURE — 36415 COLL VENOUS BLD VENIPUNCTURE: CPT

## 2023-12-19 ENCOUNTER — ANTICOAG VISIT (OUTPATIENT)
Dept: FAMILY MEDICINE CLINIC | Facility: CLINIC | Age: 88
End: 2023-12-19

## 2023-12-22 PROBLEM — Z00.00 MEDICARE ANNUAL WELLNESS VISIT, SUBSEQUENT: Status: RESOLVED | Noted: 2023-10-23 | Resolved: 2023-12-22

## 2024-01-22 ENCOUNTER — OFFICE VISIT (OUTPATIENT)
Dept: FAMILY MEDICINE CLINIC | Facility: CLINIC | Age: 89
End: 2024-01-22
Payer: MEDICARE

## 2024-01-22 ENCOUNTER — APPOINTMENT (OUTPATIENT)
Dept: LAB | Facility: CLINIC | Age: 89
End: 2024-01-22
Payer: MEDICARE

## 2024-01-22 VITALS
HEIGHT: 64 IN | OXYGEN SATURATION: 97 % | TEMPERATURE: 99 F | WEIGHT: 178.2 LBS | SYSTOLIC BLOOD PRESSURE: 142 MMHG | BODY MASS INDEX: 30.42 KG/M2 | DIASTOLIC BLOOD PRESSURE: 70 MMHG | HEART RATE: 116 BPM

## 2024-01-22 DIAGNOSIS — I10 ESSENTIAL HYPERTENSION, BENIGN: ICD-10-CM

## 2024-01-22 DIAGNOSIS — D68.52 PROTHROMBIN GENE MUTATION (HCC): ICD-10-CM

## 2024-01-22 DIAGNOSIS — Z86.711 HISTORY OF PULMONARY EMBOLISM: ICD-10-CM

## 2024-01-22 DIAGNOSIS — J44.89 ASTHMA-COPD OVERLAP SYNDROME: Primary | ICD-10-CM

## 2024-01-22 DIAGNOSIS — D68.2: ICD-10-CM

## 2024-01-22 PROBLEM — J44.9 CHRONIC OBSTRUCTIVE PULMONARY DISEASE, UNSPECIFIED COPD TYPE (HCC): Status: ACTIVE | Noted: 2024-01-22

## 2024-01-22 PROBLEM — N18.30 STAGE 3 CHRONIC KIDNEY DISEASE, UNSPECIFIED WHETHER STAGE 3A OR 3B CKD (HCC): Status: RESOLVED | Noted: 2021-08-19 | Resolved: 2024-01-22

## 2024-01-22 LAB
INR PPP: 2.17 (ref 0.84–1.19)
PROTHROMBIN TIME: 23.7 SECONDS (ref 11.6–14.5)

## 2024-01-22 PROCEDURE — 85610 PROTHROMBIN TIME: CPT

## 2024-01-22 PROCEDURE — 36415 COLL VENOUS BLD VENIPUNCTURE: CPT

## 2024-01-22 PROCEDURE — 99214 OFFICE O/P EST MOD 30 MIN: CPT | Performed by: FAMILY MEDICINE

## 2024-01-22 RX ORDER — FLUTICASONE FUROATE AND VILANTEROL 200; 25 UG/1; UG/1
1 POWDER RESPIRATORY (INHALATION) DAILY
Start: 2024-01-22 | End: 2025-01-16

## 2024-01-22 NOTE — PROGRESS NOTES
Name: Enrico Chavira      : 1930      MRN: [id number]  Encounter Provider: Brodie Anne DO  Encounter Date: 2024   Encounter department: [facility]    Assessment & Plan     1. Asthma-COPD overlap syndrome  Assessment & Plan:  Patient has asthma/COPD overlap.  I reviewed the patient's list that he brought me.  When he needs a refill, I am going to switch him to Breo Ellipta inhaler 200/25 daily.  Patient is currently stable and will continue albuterol as needed.    Orders:  -     fluticasone-vilanterol 200-25 mcg/actuation inhaler; Inhale 1 puff daily Rinse mouth after use.    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 Prothrombin gene mutation (HCC)    4. Essential hypertension, benign  Assessment & Plan:  Patient has hypertension.  I checked his blood pressure today myself and found his blood pressure to be 142/70.  The patient will continue amlodipine and losartan.  It should be noted that renal function was normal.  Patient did suffer an acute kidney injury when he had COVID-19 infection and developed dehydration.  However, he improved with hydration.  Renal function is now remarkably normal for a 89-year-old.  Patient will continue his current blood pressure regimen.  Unfortunately, he does have lower extremity edema as a result of the amlodipine.  I do not think that I could lower the dose with his current blood pressure.             Subjective      89-year-old white male who presents to the office today for his routine checkup.  He is accompanied to the office today by his daughter.  Patient is doing well.  He is eating well.  He still prefers to make his own food rather than eat what his daughters bring him.  He tells me he stays active and he is still exercising daily.  He reports that I need to change his Symbicort inhaler.  Fortunately, he brought with him a list of other preferred inhalers.  He tells me he has enough of his  Symbicort inhaler to last until March.  He does check his blood pressures regularly at home, utilizing a wrist cuff.  He tells me at home his blood pressures seem to be running around 120/70.  Patient finds he is using his rescue inhaler approximately twice a day.      Review of Systems   Constitutional:  Negative for activity change, appetite change, chills and fever.   Respiratory:  Positive for shortness of breath.    Cardiovascular:  Positive for leg swelling. Negative for chest pain and palpitations.        Notes edema began when his hydrochlorothiazide was discontinued and when he was started on amlodipine   Gastrointestinal:  Negative for abdominal distention, abdominal pain, blood in stool, constipation, diarrhea and nausea.       Current Outpatient Medications on File Prior to Visit   Medication Sig    albuterol (2.5 mg/3 mL) 0.083 % nebulizer solution Take 3 mL (2.5 mg total) by nebulization every 4 (four) hours as needed for wheezing or shortness of breath    albuterol (PROVENTIL HFA,VENTOLIN HFA) 90 mcg/act inhaler Inhale 2 puffs every 4 (four) hours as needed for wheezing    amLODIPine (NORVASC) 10 mg tablet Take 1 tablet (10 mg total) by mouth daily    desonide (DESOWEN) 0.05 % cream Apply to face twice a day    Difluprednate 0.05 % EMUL INSTILL 1 DROP INTO LEFT EYE TWICE A DAY    docusate sodium (COLACE) 100 mg capsule Take 100 mg by mouth in the morning Taking as needed    dorzolamide (TRUSOPT) 2 % ophthalmic solution INSTILL 1 DROP INTO LEFT EYE TWICE A DAY    latanoprost (XALATAN) 0.005 % ophthalmic solution Administer 1 drop to both eyes daily at bedtime    losartan (COZAAR) 50 mg tablet TAKE 1 TABLET BY MOUTH EVERY DAY    montelukast (SINGULAIR) 10 mg tablet Take 1 tablet (10 mg total) by mouth daily    multivitamin (THERAGRAN) TABS Take 1 tablet by mouth daily    omeprazole (PriLOSEC OTC) 20 MG tablet Take 20 mg by mouth    Polyethyl Glycol-Propyl Glycol (SYSTANE OP) Administer 1 drop to both  "eyes 4 (four) times a day Taking as needed    polyethylene glycol (GLYCOLAX) 17 GM/SCOOP powder Take 17 g by mouth as needed Taking as needed    sodium chloride (ARIEL 128) 2 % hypertonic ophthalmic solution Administer 1 drop into the left eye 4 (four) times a day    Symbicort 160-4.5 MCG/ACT inhaler INHALE 2 PUFFS TWICE A DAY    warfarin (COUMADIN) 4 mg tablet Take 2 daily or as directed    triamcinolone (KENALOG) 0.1 % cream Apply topically 2 (two) times a day as needed for rash (Patient not taking: Reported on 1/22/2024)       Objective     /70 (BP Location: Left arm, Patient Position: Sitting, Cuff Size: Standard)   Pulse (!) 116   Temp 99 °F (37.2 °C) (Tympanic)   Ht 5' 4\" (1.626 m)   Wt 80.8 kg (178 lb 3.2 oz)   SpO2 97%   BMI 30.59 kg/m²     Physical Exam  Vitals reviewed.   Constitutional:       Comments: This patient is a 89-year-old white male who appears his stated age.  The patient is pleasant, cooperative, and in no distress.   HENT:      Head: Normocephalic and atraumatic.      Right Ear: Tympanic membrane, ear canal and external ear normal. There is no impacted cerumen.      Left Ear: Tympanic membrane, ear canal and external ear normal. There is no impacted cerumen.      Mouth/Throat:      Mouth: Mucous membranes are moist.      Pharynx: Oropharynx is clear. No oropharyngeal exudate or posterior oropharyngeal erythema.   Eyes:      General: No scleral icterus.        Right eye: No discharge.         Left eye: No discharge.      Conjunctiva/sclera: Conjunctivae normal.      Pupils: Pupils are equal, round, and reactive to light.   Neck:      Comments: There was no thyromegaly noted  Cardiovascular:      Rate and Rhythm: Normal rate and regular rhythm.      Heart sounds: Normal heart sounds. No murmur heard.     No friction rub. No gallop.   Pulmonary:      Effort: Pulmonary effort is normal. No respiratory distress.      Breath sounds: Normal breath sounds. No stridor. No wheezing, rhonchi " or rales.   Abdominal:      General: Bowel sounds are normal. There is no distension.      Palpations: Abdomen is soft. There is no mass.      Tenderness: There is no abdominal tenderness. There is no guarding.   Musculoskeletal:      Cervical back: Neck supple.   Lymphadenopathy:      Cervical: No cervical adenopathy.   Psychiatric:         Mood and Affect: Mood normal.         Behavior: Behavior normal.         Thought Content: Thought content normal.         Judgment: Judgment normal.     Extremities: Without cyanosis clubbing.  The patient does have pitting edema noted to the knees bilaterally.  He was wearing venous compression stockings  Brodie Anne DO

## 2024-01-23 ENCOUNTER — ANTICOAG VISIT (OUTPATIENT)
Dept: FAMILY MEDICINE CLINIC | Facility: CLINIC | Age: 89
End: 2024-01-23

## 2024-01-23 NOTE — ASSESSMENT & PLAN NOTE
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  Incidentally, he was also found to have factor V Leiden mutation.  patient will continue warfarin indefinitely.  He had a prothrombin time drawn today.  I will contact him with the results when it becomes available and make further recommendations regarding his warfarin.

## 2024-01-23 NOTE — ASSESSMENT & PLAN NOTE
Patient has hypertension.  I checked his blood pressure today myself and found his blood pressure to be 142/70.  The patient will continue amlodipine and losartan.  It should be noted that renal function was normal.  Patient did suffer an acute kidney injury when he had COVID-19 infection and developed dehydration.  However, he improved with hydration.  Renal function is now remarkably normal for a 89-year-old.  Patient will continue his current blood pressure regimen.  Unfortunately, he does have lower extremity edema as a result of the amlodipine.  I do not think that I could lower the dose with his current blood pressure.

## 2024-01-23 NOTE — ASSESSMENT & PLAN NOTE
Patient has asthma/COPD overlap.  I reviewed the patient's list that he brought me.  When he needs a refill, I am going to switch him to Breo Ellipta inhaler 200/25 daily.  Patient is currently stable and will continue albuterol as needed.

## 2024-02-01 ENCOUNTER — OFFICE VISIT (OUTPATIENT)
Dept: NEPHROLOGY | Facility: CLINIC | Age: 89
End: 2024-02-01
Payer: MEDICARE

## 2024-02-01 VITALS
SYSTOLIC BLOOD PRESSURE: 120 MMHG | OXYGEN SATURATION: 97 % | HEART RATE: 95 BPM | WEIGHT: 178 LBS | BODY MASS INDEX: 30.55 KG/M2 | DIASTOLIC BLOOD PRESSURE: 70 MMHG

## 2024-02-01 DIAGNOSIS — R60.0 LOCALIZED EDEMA: ICD-10-CM

## 2024-02-01 DIAGNOSIS — I10 ESSENTIAL HYPERTENSION, BENIGN: Primary | ICD-10-CM

## 2024-02-01 DIAGNOSIS — E87.1 HYPONATREMIA: ICD-10-CM

## 2024-02-01 DIAGNOSIS — R80.9 MICROALBUMINURIA: ICD-10-CM

## 2024-02-01 PROCEDURE — 99214 OFFICE O/P EST MOD 30 MIN: CPT | Performed by: INTERNAL MEDICINE

## 2024-02-01 NOTE — PATIENT INSTRUCTIONS
Your most recent kidney function is 74%, this is normal aging.   We discussed adjusting your amlodipine but you have elected to continue the same dose and have Dr Anne adjust.   Take the amlodipine at night to help with swelling, it wont make it all go away but it may help.   You can return to follow up with Dr Anne. No need to follow up at this time unless needed.   Keep your fluid intake 2 quart per day.

## 2024-02-01 NOTE — PROGRESS NOTES
Assessment & Plan:    1. Essential hypertension, benign    2. Hyponatremia    3. Localized edema    4. Microalbuminuria      Essential HTN, benign.  He does have chronic moderate edema but volume status otherwise compensated and does not appear to be in acute CHF. Edema is chronic and an ongoing issue.  Discussed options of reducing amlodipine and continued nephrology monitoring or amlodipine reduction to 5mg/day to help with swelling.   Patient and family decided to have PCP adjust amlodipine dose.   Maintain 2-3 gram Na diet.  Keep fluid intake 2 quart per day or less.    2. Hyponatremia, Na 134.   Avoid thiaizde diuretics.    3. Localized edema   He does have chronic moderate edema but volume status otherwise compensated and does not appear to be in acute CHF.  Discussed options of reducing amlodipine and continued nephrology monitoring or amlodipine reduction to 5mg/day to help with swelling.   Patient and family decided to have PCP adjust amlodipine dose.   Maintain 2-3 gram Na diet.  Keep fluid intake 2 quart per day or less.    4.microalbuminuria--A2  Alb/cr ratio 102 mg/g cr 8/22.   No need to trend at present. Avoid SGLT-2I with infection risks/dehydration issues.  Continue losartan.       Summary:  Creatinine trends 0.8-0.9 and given age, reflect age related eGFR loss. Patient's goal is to simplify care and see less providers. Given his advanced age this makes sense. I agree with patient and have TT primary to recommend amlodipine reduction to help with swelling. Nephrology will not titrate medications at this time as he would need ongoing follow up.  Patient will be discharged to capable hands of PCP. IF nephrology services are needed, patient should not hesitate to contact office for ongoing management.   Daughter and patient were satisfied with explanation and plan.    The benefits, risks and alternatives to the treatment plan were discussed at this visit. Patient was advised of common adverse effects  of any medical therapies prescribed. All questions were answered and discussed with the patient and any accompanying family members or caretakers.      Subjective:      Patient ID: Enrico Chavira is a 89 y.o. male presents for CKD follow up in the [place] office. Patient last seen by nephrology on 2/23/23 by Katlin Zamora. Patient was said to have CKD 3 in the past. Cr baseline 0.8-0.9. Last labs from 10/30 show Cr 0.88 with eGFR 74 ml/min and Na 134.    HPI      In the interim since last visit, denies any new medical conditions or hospitalizations. Previously on HCTZ and this caused hyponatremia. He was seen by PCP several days ago and thought about adjusting antihypertensive to help with edema.    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    Reports mild SOB and attributed this to asthma. Denies KHALIL or orthopnea. His weight is stable and he has chronic leg swelling since amlodipine addition.    /70. Currently on losartan 50mg/day and amlodipine 10mg/day.    He is unclear on fluid intake and drinks to thirst.     He is on a low sodium diet.    The following portions of the patient's history were reviewed and updated as appropriate: allergies, current medications, past family history, past medical history, past social history, past surgical history, and problem list.    Review of Systems   Respiratory:  Positive for shortness of breath.    Cardiovascular:  Positive for leg swelling.   Gastrointestinal: Negative.    Genitourinary: Negative.    Neurological: Negative.    All other systems reviewed and are negative.        Objective:      /70 (BP Location: Left arm, Patient Position: Sitting, Cuff Size: Standard)   Pulse 95   Wt 80.7 kg (178 lb)   SpO2 97%   BMI 30.55 kg/m²          Physical Exam  Vitals reviewed.   Constitutional:       General: He is not in acute distress.     Appearance: He is not ill-appearing.   HENT:      Head: Normocephalic and atraumatic.       Nose: Nose normal. No congestion.      Mouth/Throat:      Mouth: Mucous membranes are moist.      Pharynx: Oropharynx is clear.   Eyes:      Extraocular Movements: Extraocular movements intact.      Conjunctiva/sclera: Conjunctivae normal.   Cardiovascular:      Rate and Rhythm: Normal rate and regular rhythm.      Heart sounds:      No friction rub.   Pulmonary:      Breath sounds: No wheezing, rhonchi or rales.   Abdominal:      General: Bowel sounds are normal.      Tenderness: There is no abdominal tenderness. There is no guarding.   Musculoskeletal:      Right lower leg: Edema present.      Left lower leg: Edema present.   Skin:     General: Skin is warm and dry.      Coloration: Skin is not jaundiced.      Findings: No bruising or rash.   Neurological:      General: No focal deficit present.      Mental Status: He is alert and oriented to person, place, and time.      Cranial Nerves: No cranial nerve deficit.             Lab Results   Component Value Date    SODIUM 134 (L) 10/30/2023    K 4.3 10/30/2023     10/30/2023    CO2 23 10/30/2023    AGAP 9 10/30/2023    BUN 17 10/30/2023    CREATININE 0.88 10/30/2023    GLUC 99 08/16/2022    GLUF 105 (H) 10/30/2023    CALCIUM 9.6 10/30/2023    AST 24 10/30/2023    ALT 18 10/30/2023    ALKPHOS 81 10/30/2023    TP 7.0 10/30/2023    TBILI 0.57 10/30/2023    EGFR 74 10/30/2023      Lab Results   Component Value Date    CREATININE 0.88 10/30/2023    CREATININE 0.88 07/03/2023    CREATININE 0.97 12/27/2022    CREATININE 0.91 10/17/2022    CREATININE 0.86 08/16/2022    CREATININE 0.91 08/05/2022    CREATININE 0.94 08/04/2022    CREATININE 0.93 08/04/2022    CREATININE 0.84 08/04/2022    CREATININE 0.88 08/04/2022    CREATININE 0.86 08/04/2022    CREATININE 0.88 08/03/2022    CREATININE 0.90 08/02/2022    CREATININE 1.04 08/01/2022    CREATININE 0.88 08/01/2022    CREATININE 0.86 08/01/2022      Lab Results   Component Value Date    COLORU Light Yellow 12/27/2022     "CLARITYU Clear 12/27/2022    SPECGRAV 1.011 12/27/2022    PHUR 7.5 12/27/2022    LEUKOCYTESUR Negative 12/27/2022    NITRITE Negative 12/27/2022    PROTEIN UA Negative 12/27/2022    GLUCOSEU Negative 12/27/2022    KETONESU Negative 12/27/2022    UROBILINOGEN <2.0 12/27/2022    BILIRUBINUR Negative 12/27/2022    BLOODU Negative 12/27/2022    RBCUA None Seen 12/27/2022    WBCUA None Seen 12/27/2022    EPIS None Seen 12/27/2022    BACTERIA None Seen 12/27/2022      No results found for: \"LABPROT\"  No results found for: \"MICROALBUR\", \"DDBE31QBW\"  Lab Results   Component Value Date    WBC 6.12 10/30/2023    HGB 13.2 10/30/2023    HCT 39.9 10/30/2023    MCV 92 10/30/2023     10/30/2023      Lab Results   Component Value Date    HGB 13.2 10/30/2023    HGB 12.5 07/03/2023    HGB 11.7 (L) 08/05/2022    HGB 11.9 (L) 08/03/2022    HGB 12.2 08/02/2022      No results found for: \"IRON\", \"TIBC\", \"FERRITIN\"   No results found for: \"PTHCALCIUM\", \"[id number]\", \"PHOSPHORUS\"   Lab Results   Component Value Date    CHOLESTEROL 212 (H) 10/30/2023    HDL 92 10/30/2023    LDLCALC 105 (H) 10/30/2023    TRIG 74 10/30/2023      No results found for: \"URICACID\"   No results found for: \"HGBA1C\"   No results found for: \"TSHANTIBODY\", \"V6TFGNR\", \"FREET4\"   No results found for: \"JOE\", \"DSDNAAB\", \"RFIGM\"   No results found for: \"PROT\", \"UPEP\", \"IMMUNOFIX\", \"KAPPALAMBDA\", \"KAPPALIGHT\"     Portions of the record may have been created with voice recognition software. Occasional wrong word or \"sound a like\" substitutions may have occurred due to the inherent limitations of voice recognition software. Read the chart carefully and recognize, using context, where substitutions have occurred. If you have any questions, please contact the dictating provider.      "

## 2024-02-16 DIAGNOSIS — J45.909 UNCOMPLICATED ASTHMA, UNSPECIFIED ASTHMA SEVERITY, UNSPECIFIED WHETHER PERSISTENT: ICD-10-CM

## 2024-02-16 RX ORDER — MONTELUKAST SODIUM 10 MG/1
10 TABLET ORAL DAILY
Qty: 90 TABLET | Refills: 3 | Status: SHIPPED | OUTPATIENT
Start: 2024-02-16

## 2024-02-19 ENCOUNTER — APPOINTMENT (OUTPATIENT)
Dept: LAB | Facility: CLINIC | Age: 89
End: 2024-02-19
Payer: MEDICARE

## 2024-02-19 DIAGNOSIS — Z86.711 HISTORY OF PULMONARY EMBOLISM: ICD-10-CM

## 2024-02-19 LAB
INR PPP: 2.94 (ref 0.84–1.19)
PROTHROMBIN TIME: 30 SECONDS (ref 11.6–14.5)

## 2024-02-19 PROCEDURE — 36415 COLL VENOUS BLD VENIPUNCTURE: CPT

## 2024-02-19 PROCEDURE — 85610 PROTHROMBIN TIME: CPT

## 2024-02-20 ENCOUNTER — ANTICOAG VISIT (OUTPATIENT)
Dept: FAMILY MEDICINE CLINIC | Facility: CLINIC | Age: 89
End: 2024-02-20

## 2024-03-18 ENCOUNTER — ANTICOAG VISIT (OUTPATIENT)
Dept: FAMILY MEDICINE CLINIC | Facility: CLINIC | Age: 89
End: 2024-03-18

## 2024-03-18 ENCOUNTER — APPOINTMENT (OUTPATIENT)
Dept: LAB | Facility: CLINIC | Age: 89
End: 2024-03-18
Payer: MEDICARE

## 2024-03-18 DIAGNOSIS — Z86.711 HISTORY OF PULMONARY EMBOLISM: ICD-10-CM

## 2024-03-18 LAB
INR PPP: 3.21 (ref 0.84–1.19)
PROTHROMBIN TIME: 32.1 SECONDS (ref 11.6–14.5)

## 2024-03-18 PROCEDURE — 36415 COLL VENOUS BLD VENIPUNCTURE: CPT

## 2024-03-18 PROCEDURE — 85610 PROTHROMBIN TIME: CPT

## 2024-03-29 ENCOUNTER — APPOINTMENT (OUTPATIENT)
Dept: LAB | Facility: CLINIC | Age: 89
End: 2024-03-29
Payer: MEDICARE

## 2024-03-29 DIAGNOSIS — Z86.711 HISTORY OF PULMONARY EMBOLISM: ICD-10-CM

## 2024-03-29 LAB
INR PPP: 1.73 (ref 0.84–1.19)
PROTHROMBIN TIME: 20 SECONDS (ref 11.6–14.5)

## 2024-03-29 PROCEDURE — 36415 COLL VENOUS BLD VENIPUNCTURE: CPT

## 2024-03-29 PROCEDURE — 85610 PROTHROMBIN TIME: CPT

## 2024-04-01 ENCOUNTER — ANTICOAG VISIT (OUTPATIENT)
Dept: FAMILY MEDICINE CLINIC | Facility: CLINIC | Age: 89
End: 2024-04-01

## 2024-04-15 ENCOUNTER — APPOINTMENT (OUTPATIENT)
Dept: LAB | Facility: CLINIC | Age: 89
End: 2024-04-15
Payer: MEDICARE

## 2024-04-15 DIAGNOSIS — Z86.711 HISTORY OF PULMONARY EMBOLISM: ICD-10-CM

## 2024-04-15 LAB
INR PPP: 2.25 (ref 0.84–1.19)
PROTHROMBIN TIME: 24.5 SECONDS (ref 11.6–14.5)

## 2024-04-15 PROCEDURE — 85610 PROTHROMBIN TIME: CPT

## 2024-04-15 PROCEDURE — 36415 COLL VENOUS BLD VENIPUNCTURE: CPT

## 2024-04-17 ENCOUNTER — ANTICOAG VISIT (OUTPATIENT)
Dept: FAMILY MEDICINE CLINIC | Facility: CLINIC | Age: 89
End: 2024-04-17

## 2024-04-20 DIAGNOSIS — I10 ESSENTIAL HYPERTENSION, BENIGN: ICD-10-CM

## 2024-04-22 RX ORDER — LOSARTAN POTASSIUM 50 MG/1
TABLET ORAL
Qty: 90 TABLET | Refills: 2 | Status: SHIPPED | OUTPATIENT
Start: 2024-04-22

## 2024-04-29 ENCOUNTER — OFFICE VISIT (OUTPATIENT)
Dept: FAMILY MEDICINE CLINIC | Facility: CLINIC | Age: 89
End: 2024-04-29
Payer: MEDICARE

## 2024-04-29 VITALS
SYSTOLIC BLOOD PRESSURE: 165 MMHG | WEIGHT: 178.4 LBS | BODY MASS INDEX: 30.46 KG/M2 | OXYGEN SATURATION: 97 % | TEMPERATURE: 97.9 F | HEART RATE: 113 BPM | HEIGHT: 64 IN | DIASTOLIC BLOOD PRESSURE: 85 MMHG

## 2024-04-29 DIAGNOSIS — I27.82 CHRONIC PULMONARY EMBOLISM WITHOUT ACUTE COR PULMONALE, UNSPECIFIED PULMONARY EMBOLISM TYPE (HCC): ICD-10-CM

## 2024-04-29 DIAGNOSIS — I10 ESSENTIAL HYPERTENSION, BENIGN: Primary | ICD-10-CM

## 2024-04-29 DIAGNOSIS — E78.5 DYSLIPIDEMIA: ICD-10-CM

## 2024-04-29 DIAGNOSIS — J44.89 ASTHMA-COPD OVERLAP SYNDROME: ICD-10-CM

## 2024-04-29 DIAGNOSIS — R53.83 OTHER FATIGUE: ICD-10-CM

## 2024-04-29 PROCEDURE — G2211 COMPLEX E/M VISIT ADD ON: HCPCS | Performed by: FAMILY MEDICINE

## 2024-04-29 PROCEDURE — 99214 OFFICE O/P EST MOD 30 MIN: CPT | Performed by: FAMILY MEDICINE

## 2024-04-29 RX ORDER — AMLODIPINE BESYLATE 5 MG/1
5 TABLET ORAL DAILY
Qty: 90 TABLET | Refills: 3 | Status: SHIPPED | OUTPATIENT
Start: 2024-04-29

## 2024-04-29 RX ORDER — FLUTICASONE FUROATE AND VILANTEROL 200; 25 UG/1; UG/1
1 POWDER RESPIRATORY (INHALATION) DAILY
Qty: 180 BLISTER | Refills: 3 | Status: SHIPPED | OUTPATIENT
Start: 2024-04-29 | End: 2025-04-24

## 2024-04-29 RX ORDER — PREDNISOLONE ACETATE 10 MG/ML
1 SUSPENSION/ DROPS OPHTHALMIC 2 TIMES DAILY
COMMUNITY
Start: 2024-04-15

## 2024-04-29 NOTE — ASSESSMENT & PLAN NOTE
Patient has asthma/COPD overlap syndrome.  His insurance will no longer cover Symbicort, which she has used for many years.  They will cover Breo Ellipta inhaler.  As such, I switched the patient to the Breo Ellipta inhaler 200/25.  He will use this once a day.  I advised the patient he must continue to rinse his mouth out after each use.

## 2024-04-29 NOTE — ASSESSMENT & PLAN NOTE
I rechecked the patient's blood pressure myself today.  I found his blood pressure to be 146/64 in the left arm with him sitting.  I reminded the daughter that I always check the patient's blood pressure myself after the patient has been sitting in the room for a bit and has a chance to relax.  Blood pressure are always less than optimal when I checked them.  The daughter does argue that his blood pressures are normal at home.  I reviewed his records.  When he saw nephrology they have his blood pressure list that is 120/70.  I decreased the patient's amlodipine dose to 5 mg/day.  We will need to follow the patient's blood pressure carefully.  I asked the patient to bring in his home blood pressure cuff so that I could assess the accuracy of his blood pressure cuff at home.  I also do not have a log of the patient's blood pressures.  That would be helpful.

## 2024-04-29 NOTE — PROGRESS NOTES
Name: Enrico Chavira      : 1930      MRN: [id number]  Encounter Provider: Brodie Anne DO  Encounter Date: 2024   Encounter department: [facility]    Assessment & Plan     1. Essential hypertension, benign  Assessment & Plan:  I rechecked the patient's blood pressure myself today.  I found his blood pressure to be 146/64 in the left arm with him sitting.  I reminded the daughter that I always check the patient's blood pressure myself after the patient has been sitting in the room for a bit and has a chance to relax.  Blood pressure are always less than optimal when I checked them.  The daughter does argue that his blood pressures are normal at home.  I reviewed his records.  When he saw nephrology they have his blood pressure list that is 120/70.  I decreased the patient's amlodipine dose to 5 mg/day.  We will need to follow the patient's blood pressure carefully.  I asked the patient to bring in his home blood pressure cuff so that I could assess the accuracy of his blood pressure cuff at home.  I also do not have a log of the patient's blood pressures.  That would be helpful.    Orders:  -     amLODIPine (NORVASC) 5 mg tablet; Take 1 tablet (5 mg total) by mouth daily    2. Asthma-COPD overlap syndrome  Assessment & Plan:  Patient has asthma/COPD overlap syndrome.  His insurance will no longer cover Symbicort, which she has used for many years.  They will cover Breo Ellipta inhaler.  As such, I switched the patient to the Breo Ellipta inhaler 200/25.  He will use this once a day.  I advised the patient he must continue to rinse his mouth out after each use.    Orders:  -     fluticasone-vilanterol 200-25 mcg/actuation inhaler; Inhale 1 puff daily Rinse mouth after use.    3. Dyslipidemia  -     LDL cholesterol, direct; Future  -     Comprehensive metabolic panel; Future    4. Other fatigue  -     CBC and differential; Future  -     TSH, 3rd generation with Free T4 reflex;  Future    5. Chronic pulmonary embolism without acute cor pulmonale, unspecified pulmonary embolism type (HCC)  Assessment & Plan:  Patient has history of pulmonary embolism.  He will continue monthly prothrombin time monitoring.  I will make further recommendations to the patient when I get his next prothrombin time.             Subjective      Patient is a 89-year-old white male who presents to the office today for his routine checkup.  The patient is present with one of his daughters.  Patient is doing well.  He tells me that he was discharged from the care of nephrology.  The patient and his daughter state that the nephrologist recommended that I decrease the dose of his amlodipine to try to help with his lower extremity edema.  The patient and his daughter tell me that his blood pressure is always normal at home.  He uses a home blood pressure cuff.  His daughter tells me that his blood pressures are often in the low at home.  She argues that they are only elevated here because his blood pressure is checked by my medical assistant as soon as he walks in.      Review of Systems   Constitutional:  Negative for activity change, appetite change and unexpected weight change.   HENT:  Negative for hearing loss.    Eyes:  Positive for visual disturbance.   Respiratory:  Negative for cough, shortness of breath and wheezing.    Cardiovascular:  Positive for leg swelling. Negative for chest pain and palpitations.        He denies orthopnea and paroxysmal nocturnal dyspnea   Gastrointestinal:  Negative for abdominal distention, abdominal pain, blood in stool, constipation, diarrhea and nausea.   Musculoskeletal:  Positive for gait problem.        Denies any falls       Current Outpatient Medications on File Prior to Visit   Medication Sig    albuterol (2.5 mg/3 mL) 0.083 % nebulizer solution Take 3 mL (2.5 mg total) by nebulization every 4 (four) hours as needed for wheezing or shortness of breath    albuterol (PROVENTIL  "HFA,VENTOLIN HFA) 90 mcg/act inhaler Inhale 2 puffs every 4 (four) hours as needed for wheezing    desonide (DESOWEN) 0.05 % cream Apply to face twice a day    docusate sodium (COLACE) 100 mg capsule Take 100 mg by mouth in the morning Taking as needed    dorzolamide (TRUSOPT) 2 % ophthalmic solution INSTILL 1 DROP INTO LEFT EYE TWICE A DAY    latanoprost (XALATAN) 0.005 % ophthalmic solution Administer 1 drop to both eyes daily at bedtime    losartan (COZAAR) 50 mg tablet TAKE 1 TABLET BY MOUTH EVERY DAY    montelukast (SINGULAIR) 10 mg tablet TAKE 1 TABLET BY MOUTH EVERY DAY    multivitamin (THERAGRAN) TABS Take 1 tablet by mouth daily    omeprazole (PriLOSEC OTC) 20 MG tablet Take 20 mg by mouth    Polyethyl Glycol-Propyl Glycol (SYSTANE OP) Administer 1 drop to both eyes 4 (four) times a day Taking as needed    polyethylene glycol (GLYCOLAX) 17 GM/SCOOP powder Take 17 g by mouth as needed Taking as needed    prednisoLONE acetate (PRED FORTE) 1 % ophthalmic suspension Apply 1 drop to eye 2 (two) times a day    sodium chloride (ARIEL 128) 2 % hypertonic ophthalmic solution Administer 1 drop into the left eye 4 (four) times a day    triamcinolone (KENALOG) 0.1 % cream Apply topically 2 (two) times a day as needed for rash    warfarin (COUMADIN) 4 mg tablet Take 2 daily or as directed    [DISCONTINUED] amLODIPine (NORVASC) 10 mg tablet Take 1 tablet (10 mg total) by mouth daily    [DISCONTINUED] fluticasone-vilanterol 200-25 mcg/actuation inhaler Inhale 1 puff daily Rinse mouth after use.    [DISCONTINUED] Symbicort 160-4.5 MCG/ACT inhaler INHALE 2 PUFFS TWICE A DAY    Difluprednate 0.05 % EMUL INSTILL 1 DROP INTO LEFT EYE TWICE A DAY (Patient not taking: Reported on 4/29/2024)       Objective     /85   Pulse (!) 113   Temp 97.9 °F (36.6 °C) (Tympanic)   Ht 5' 4\" (1.626 m)   Wt 80.9 kg (178 lb 6.4 oz)   SpO2 97%   BMI 30.62 kg/m²     Physical Exam  Vitals reviewed.   Constitutional:       Comments: This " patient is a 89-year-old white male who appears his stated age.  The patient is pleasant, cooperative, and in no distress.   HENT:      Head: Normocephalic and atraumatic.      Right Ear: Tympanic membrane, ear canal and external ear normal. There is no impacted cerumen.      Left Ear: Tympanic membrane, ear canal and external ear normal. There is no impacted cerumen.      Mouth/Throat:      Mouth: Mucous membranes are moist.      Pharynx: Oropharynx is clear. No oropharyngeal exudate or posterior oropharyngeal erythema.   Eyes:      General: No scleral icterus.        Right eye: No discharge.         Left eye: No discharge.      Conjunctiva/sclera: Conjunctivae normal.      Pupils: Pupils are equal, round, and reactive to light.   Cardiovascular:      Rate and Rhythm: Normal rate and regular rhythm.      Heart sounds: Normal heart sounds. No murmur heard.     No friction rub. No gallop.   Pulmonary:      Effort: Pulmonary effort is normal. No respiratory distress.      Breath sounds: Normal breath sounds. No stridor. No wheezing, rhonchi or rales.   Abdominal:      General: Bowel sounds are normal. There is no distension.      Palpations: Abdomen is soft. There is no mass.      Tenderness: There is no abdominal tenderness. There is no guarding.   Musculoskeletal:      Cervical back: Neck supple.      Comments: Scoliosis is present unchanged   Lymphadenopathy:      Cervical: No cervical adenopathy.   Psychiatric:         Mood and Affect: Mood normal.         Behavior: Behavior normal.         Thought Content: Thought content normal.         Judgment: Judgment normal.     Extremities: There is no cyanosis or clubbing.  There is pitting edema noted to the knees bilaterally, right more so than left.  I am also able to appreciate some pitting edema noted about one quarter the way up the posterior calf.    Brodie Anne, DO

## 2024-04-29 NOTE — ASSESSMENT & PLAN NOTE
Patient has history of pulmonary embolism.  He will continue monthly prothrombin time monitoring.  I will make further recommendations to the patient when I get his next prothrombin time.

## 2024-05-02 DIAGNOSIS — L21.8 OTHER SEBORRHEIC DERMATITIS: ICD-10-CM

## 2024-05-02 NOTE — TELEPHONE ENCOUNTER
Reason for call:   [x] Refill   [] Prior Auth  [] Other:     Office:   [x] PCP/Provider -   [] Specialty/Provider -     Medication: desonide (DESOWEN) 0.05 % cream     Dose/Frequency:     Apply to face twice a day       Quantity: 60g    Pharmacy: [facility]/pharmacy #[id number] - [place], [place] - [address] [phone]     Does the patient have enough for 3 days?   [x] Yes   [] No - Send as HP to POD

## 2024-05-06 RX ORDER — DESONIDE 0.5 MG/G
CREAM TOPICAL
Qty: 60 G | Refills: 0 | Status: SHIPPED | OUTPATIENT
Start: 2024-05-06

## 2024-05-13 ENCOUNTER — APPOINTMENT (OUTPATIENT)
Dept: LAB | Facility: CLINIC | Age: 89
End: 2024-05-13
Payer: MEDICARE

## 2024-05-13 DIAGNOSIS — Z86.711 HISTORY OF PULMONARY EMBOLISM: ICD-10-CM

## 2024-05-13 DIAGNOSIS — R53.83 OTHER FATIGUE: ICD-10-CM

## 2024-05-13 DIAGNOSIS — E78.5 DYSLIPIDEMIA: ICD-10-CM

## 2024-05-13 LAB
ALBUMIN SERPL BCP-MCNC: 3.9 G/DL (ref 3.5–5)
ALP SERPL-CCNC: 75 U/L (ref 34–104)
ALT SERPL W P-5'-P-CCNC: 20 U/L (ref 7–52)
ANION GAP SERPL CALCULATED.3IONS-SCNC: 10 MMOL/L (ref 4–13)
AST SERPL W P-5'-P-CCNC: 24 U/L (ref 13–39)
BASOPHILS # BLD AUTO: 0.05 THOUSANDS/ÂΜL (ref 0–0.1)
BASOPHILS NFR BLD AUTO: 1 % (ref 0–1)
BILIRUB SERPL-MCNC: 0.71 MG/DL (ref 0.2–1)
BUN SERPL-MCNC: 23 MG/DL (ref 5–25)
CALCIUM SERPL-MCNC: 9.4 MG/DL (ref 8.4–10.2)
CHLORIDE SERPL-SCNC: 102 MMOL/L (ref 96–108)
CO2 SERPL-SCNC: 24 MMOL/L (ref 21–32)
CREAT SERPL-MCNC: 0.86 MG/DL (ref 0.6–1.3)
EOSINOPHIL # BLD AUTO: 0.25 THOUSAND/ÂΜL (ref 0–0.61)
EOSINOPHIL NFR BLD AUTO: 4 % (ref 0–6)
ERYTHROCYTE [DISTWIDTH] IN BLOOD BY AUTOMATED COUNT: 13.4 % (ref 11.6–15.1)
GFR SERPL CREATININE-BSD FRML MDRD: 74 ML/MIN/1.73SQ M
GLUCOSE P FAST SERPL-MCNC: 114 MG/DL (ref 65–99)
HCT VFR BLD AUTO: 38.8 % (ref 36.5–49.3)
HGB BLD-MCNC: 13.4 G/DL (ref 12–17)
IMM GRANULOCYTES # BLD AUTO: 0.01 THOUSAND/UL (ref 0–0.2)
IMM GRANULOCYTES NFR BLD AUTO: 0 % (ref 0–2)
INR PPP: 1.97 (ref 0.84–1.19)
LDLC SERPL DIRECT ASSAY-MCNC: 105 MG/DL (ref 0–100)
LYMPHOCYTES # BLD AUTO: 2.6 THOUSANDS/ÂΜL (ref 0.6–4.47)
LYMPHOCYTES NFR BLD AUTO: 42 % (ref 14–44)
MCH RBC QN AUTO: 31.2 PG (ref 26.8–34.3)
MCHC RBC AUTO-ENTMCNC: 34.5 G/DL (ref 31.4–37.4)
MCV RBC AUTO: 90 FL (ref 82–98)
MONOCYTES # BLD AUTO: 0.7 THOUSAND/ÂΜL (ref 0.17–1.22)
MONOCYTES NFR BLD AUTO: 11 % (ref 4–12)
NEUTROPHILS # BLD AUTO: 2.65 THOUSANDS/ÂΜL (ref 1.85–7.62)
NEUTS SEG NFR BLD AUTO: 42 % (ref 43–75)
NRBC BLD AUTO-RTO: 0 /100 WBCS
PLATELET # BLD AUTO: 279 THOUSANDS/UL (ref 149–390)
PMV BLD AUTO: 10.1 FL (ref 8.9–12.7)
POTASSIUM SERPL-SCNC: 4.3 MMOL/L (ref 3.5–5.3)
PROT SERPL-MCNC: 6.8 G/DL (ref 6.4–8.4)
PROTHROMBIN TIME: 22 SECONDS (ref 11.6–14.5)
RBC # BLD AUTO: 4.3 MILLION/UL (ref 3.88–5.62)
SODIUM SERPL-SCNC: 136 MMOL/L (ref 135–147)
T4 FREE SERPL-MCNC: 0.9 NG/DL (ref 0.61–1.12)
TSH SERPL DL<=0.05 MIU/L-ACNC: 5.77 UIU/ML (ref 0.45–4.5)
WBC # BLD AUTO: 6.26 THOUSAND/UL (ref 4.31–10.16)

## 2024-05-13 PROCEDURE — 80053 COMPREHEN METABOLIC PANEL: CPT

## 2024-05-13 PROCEDURE — 36415 COLL VENOUS BLD VENIPUNCTURE: CPT

## 2024-05-13 PROCEDURE — 84443 ASSAY THYROID STIM HORMONE: CPT

## 2024-05-13 PROCEDURE — 85025 COMPLETE CBC W/AUTO DIFF WBC: CPT

## 2024-05-13 PROCEDURE — 83721 ASSAY OF BLOOD LIPOPROTEIN: CPT

## 2024-05-13 PROCEDURE — 85610 PROTHROMBIN TIME: CPT

## 2024-05-13 PROCEDURE — 84439 ASSAY OF FREE THYROXINE: CPT

## 2024-05-14 ENCOUNTER — ANTICOAG VISIT (OUTPATIENT)
Dept: FAMILY MEDICINE CLINIC | Facility: CLINIC | Age: 89
End: 2024-05-14

## 2024-05-17 ENCOUNTER — TELEPHONE (OUTPATIENT)
Dept: FAMILY MEDICINE CLINIC | Facility: CLINIC | Age: 89
End: 2024-05-17

## 2024-05-17 NOTE — TELEPHONE ENCOUNTER
Called patient with his blood work results. Patient is aware and in agreement to the 3 month recheck.

## 2024-05-17 NOTE — TELEPHONE ENCOUNTER
----- Message from Brodie Anne DO sent at 5/17/2024  8:37 AM EDT -----  Thyroid function was borderline. It should be repeated in 3 months. Blood sugar was 114 (<100), Kidney function was completely normal. LDL Chol 105 (<100)

## 2024-06-08 DIAGNOSIS — D68.52 PROTHROMBIN GENE MUTATION (HCC): ICD-10-CM

## 2024-06-08 RX ORDER — WARFARIN SODIUM 4 MG/1
TABLET ORAL
Qty: 180 TABLET | Refills: 2 | Status: SHIPPED | OUTPATIENT
Start: 2024-06-08

## 2024-06-10 ENCOUNTER — APPOINTMENT (OUTPATIENT)
Dept: LAB | Facility: CLINIC | Age: 89
End: 2024-06-10
Payer: MEDICARE

## 2024-06-10 DIAGNOSIS — Z86.711 HISTORY OF PULMONARY EMBOLISM: Primary | ICD-10-CM

## 2024-06-10 DIAGNOSIS — Z86.711 HISTORY OF PULMONARY EMBOLISM: ICD-10-CM

## 2024-06-11 ENCOUNTER — ANTICOAG VISIT (OUTPATIENT)
Dept: FAMILY MEDICINE CLINIC | Facility: CLINIC | Age: 89
End: 2024-06-11

## 2024-07-10 ENCOUNTER — APPOINTMENT (OUTPATIENT)
Dept: LAB | Facility: CLINIC | Age: 89
End: 2024-07-10
Payer: MEDICARE

## 2024-07-10 DIAGNOSIS — Z86.711 HISTORY OF PULMONARY EMBOLISM: ICD-10-CM

## 2024-07-10 LAB
INR PPP: 2.07 (ref 0.84–1.19)
PROTHROMBIN TIME: 22.9 SECONDS (ref 11.6–14.5)

## 2024-07-10 PROCEDURE — 85610 PROTHROMBIN TIME: CPT

## 2024-07-10 PROCEDURE — 36415 COLL VENOUS BLD VENIPUNCTURE: CPT

## 2024-07-11 ENCOUNTER — ANTICOAG VISIT (OUTPATIENT)
Dept: FAMILY MEDICINE CLINIC | Facility: CLINIC | Age: 89
End: 2024-07-11

## 2024-07-29 ENCOUNTER — OFFICE VISIT (OUTPATIENT)
Dept: FAMILY MEDICINE CLINIC | Facility: CLINIC | Age: 89
End: 2024-07-29
Payer: MEDICARE

## 2024-07-29 VITALS
HEART RATE: 112 BPM | WEIGHT: 179.8 LBS | BODY MASS INDEX: 30.7 KG/M2 | HEIGHT: 64 IN | OXYGEN SATURATION: 97 % | SYSTOLIC BLOOD PRESSURE: 156 MMHG | DIASTOLIC BLOOD PRESSURE: 70 MMHG | TEMPERATURE: 98.6 F

## 2024-07-29 DIAGNOSIS — J44.89 ASTHMA-COPD OVERLAP SYNDROME: ICD-10-CM

## 2024-07-29 DIAGNOSIS — E03.8 SUBCLINICAL HYPOTHYROIDISM: Primary | ICD-10-CM

## 2024-07-29 DIAGNOSIS — K21.9 GERD WITHOUT ESOPHAGITIS: ICD-10-CM

## 2024-07-29 DIAGNOSIS — I10 ESSENTIAL HYPERTENSION, BENIGN: ICD-10-CM

## 2024-07-29 PROCEDURE — 99214 OFFICE O/P EST MOD 30 MIN: CPT | Performed by: FAMILY MEDICINE

## 2024-07-29 PROCEDURE — G2211 COMPLEX E/M VISIT ADD ON: HCPCS | Performed by: FAMILY MEDICINE

## 2024-07-29 NOTE — ASSESSMENT & PLAN NOTE
Patient has gastroesophageal reflux disease which is well-controlled.  He will continue omeprazole 20 mg daily

## 2024-07-29 NOTE — ASSESSMENT & PLAN NOTE
Patient has subclinical hypothyroid is him.  I reviewed his labs from 3 months ago.  At that time, his TSH was slightly elevated but T4 was normal.  It was recommended to have a repeat thyroid function test in 3 months.  I ordered a repeat TSH with T4 but also added and antimicrosomal antibody.  I will make further recommendations to the patient when I get this result.

## 2024-07-29 NOTE — ASSESSMENT & PLAN NOTE
Blood pressure today in the office was elevated.  I rechecked his blood pressure and found him to be 156/70.  Patient will continue amlodipine 5 mg daily and losartan 50 mg daily.  Most recent renal function was normal.  Patient states that his blood pressures at home have been well-controlled.  As such, I did not make any change with this medication.  I asked him to continue to check his blood pressures regularly at home and call if he starts seeing systolic blood pressures of 150 or more.

## 2024-07-29 NOTE — PROGRESS NOTES
Ambulatory Visit  Name: Enrico Chavira      : 1930      MRN: [id number]  Encounter Provider: Brodie Anne DO  Encounter Date: 2024   Encounter department: [facility]    Assessment & Plan   1. Subclinical hypothyroidism  Assessment & Plan:  Patient has subclinical hypothyroid is him.  I reviewed his labs from 3 months ago.  At that time, his TSH was slightly elevated but T4 was normal.  It was recommended to have a repeat thyroid function test in 3 months.  I ordered a repeat TSH with T4 but also added and antimicrosomal antibody.  I will make further recommendations to the patient when I get this result.  Orders:  -     TSH, 3rd generation; Future  -     T4; Future  -     Anti-microsomal antibody; Future  2. Essential hypertension, benign  Assessment & Plan:  Blood pressure today in the office was elevated.  I rechecked his blood pressure and found him to be 156/70.  Patient will continue amlodipine 5 mg daily and losartan 50 mg daily.  Most recent renal function was normal.  Patient states that his blood pressures at home have been well-controlled.  As such, I did not make any change with this medication.  I asked him to continue to check his blood pressures regularly at home and call if he starts seeing systolic blood pressures of 150 or more.  3. Asthma-COPD overlap syndrome  Assessment & Plan:  Patient has asthma-COPD overlap syndrome which is currently stable.  He has found that the Symbicort worked better for him than the Breo Ellipta inhaler.  However, his insurance now only covers the Breo Ellipta inhaler.  I will have him continue this.  Continue albuterol as needed.  Patient does get out of breath with exertion although this seems to be more deconditioning.  I also think that his scoliosis is likely causing a restrictive lung deficit which contributes to his breathlessness.  Patient will continue his current inhaled bronchodilators.  4. GERD without  "esophagitis  Assessment & Plan:  Patient has gastroesophageal reflux disease which is well-controlled.  He will continue omeprazole 20 mg daily       History of Present Illness     Patient is a 89-year-old white male who presents to the office today for his routine checkup.  The patient is accompanied to the office today by his daughter.  Patient has been doing well.  He tells me he still exercises.  He has been compliant with use of his medications.  He has been checking his blood pressures at home.  He was going to bring the log book with him of his blood pressures today.  However, he spilled a glass of water on them and he tells me that the ankle ran and it is now illegible.  He tells me his systolic blood pressures were running between 130 and 140.        Review of Systems   HENT:  Positive for voice change.    Eyes:  Positive for visual disturbance.   Respiratory:  Positive for shortness of breath. Negative for cough and wheezing.         Reports sputum   Cardiovascular:  Positive for leg swelling. Negative for chest pain and palpitations.        Denies orthopnea   Gastrointestinal:  Negative for abdominal distention, abdominal pain, blood in stool, constipation, diarrhea and nausea.   Musculoskeletal:  Positive for back pain.       Objective     /70 (BP Location: Left arm, Patient Position: Sitting, Cuff Size: Standard)   Pulse (!) 112   Temp 98.6 °F (37 °C) (Tympanic)   Ht 5' 4\" (1.626 m)   Wt 81.6 kg (179 lb 12.8 oz)   SpO2 97%   BMI 30.86 kg/m²     Physical Exam  Vitals reviewed.   Constitutional:       Comments: The patient is a 89-year-old white male who appears his stated age.  Patient is pleasant, cooperative, and in no distress.   HENT:      Head: Normocephalic and atraumatic.      Right Ear: Tympanic membrane, ear canal and external ear normal. There is no impacted cerumen.      Left Ear: Tympanic membrane, ear canal and external ear normal. There is no impacted cerumen.      Mouth/Throat: "      Mouth: Mucous membranes are moist.      Pharynx: Oropharynx is clear. No oropharyngeal exudate or posterior oropharyngeal erythema.   Eyes:      General: No scleral icterus.        Right eye: No discharge.         Left eye: No discharge.      Conjunctiva/sclera: Conjunctivae normal.      Pupils: Pupils are equal, round, and reactive to light.   Cardiovascular:      Rate and Rhythm: Normal rate and regular rhythm.      Heart sounds: Normal heart sounds. No murmur heard.     No friction rub. No gallop.   Pulmonary:      Effort: Pulmonary effort is normal. No respiratory distress.      Breath sounds: Normal breath sounds. No stridor. No wheezing, rhonchi or rales.   Musculoskeletal:      Cervical back: Neck supple.      Comments: Patient does have a scoliosis present which is unchanged.   Lymphadenopathy:      Cervical: No cervical adenopathy.   Psychiatric:         Mood and Affect: Mood normal.         Behavior: Behavior normal.         Thought Content: Thought content normal.         Judgment: Judgment normal.     Extremities: Without cyanosis or clubbing.  There is +1-2/4 lower extremity edema noted bilaterally, right more so than left.  Patient was wearing compression stockings.    Administrative Statements

## 2024-07-29 NOTE — ASSESSMENT & PLAN NOTE
Patient has asthma-COPD overlap syndrome which is currently stable.  He has found that the Symbicort worked better for him than the Breo Ellipta inhaler.  However, his insurance now only covers the Breo Ellipta inhaler.  I will have him continue this.  Continue albuterol as needed.  Patient does get out of breath with exertion although this seems to be more deconditioning.  I also think that his scoliosis is likely causing a restrictive lung deficit which contributes to his breathlessness.  Patient will continue his current inhaled bronchodilators.

## 2024-08-07 ENCOUNTER — ANTICOAG VISIT (OUTPATIENT)
Dept: FAMILY MEDICINE CLINIC | Facility: CLINIC | Age: 89
End: 2024-08-07

## 2024-08-07 ENCOUNTER — APPOINTMENT (OUTPATIENT)
Dept: LAB | Facility: CLINIC | Age: 89
End: 2024-08-07
Payer: MEDICARE

## 2024-08-07 DIAGNOSIS — E03.8 SUBCLINICAL HYPOTHYROIDISM: ICD-10-CM

## 2024-08-07 DIAGNOSIS — Z86.711 HISTORY OF PULMONARY EMBOLISM: ICD-10-CM

## 2024-08-07 LAB
INR PPP: 2.59 (ref 0.85–1.19)
PROTHROMBIN TIME: 27.6 SECONDS (ref 12.3–15)
T4 SERPL-MCNC: 7.62 UG/DL (ref 6.09–12.23)
TSH SERPL DL<=0.05 MIU/L-ACNC: 6.02 UIU/ML (ref 0.45–4.5)

## 2024-08-07 PROCEDURE — 86376 MICROSOMAL ANTIBODY EACH: CPT

## 2024-08-07 PROCEDURE — 85610 PROTHROMBIN TIME: CPT

## 2024-08-07 PROCEDURE — 84443 ASSAY THYROID STIM HORMONE: CPT

## 2024-08-07 PROCEDURE — 36415 COLL VENOUS BLD VENIPUNCTURE: CPT

## 2024-08-07 PROCEDURE — 84436 ASSAY OF TOTAL THYROXINE: CPT

## 2024-08-08 ENCOUNTER — TELEPHONE (OUTPATIENT)
Dept: FAMILY MEDICINE CLINIC | Facility: CLINIC | Age: 89
End: 2024-08-08

## 2024-08-08 DIAGNOSIS — E06.3 HASHIMOTO'S THYROIDITIS: Primary | ICD-10-CM

## 2024-08-08 LAB — THYROPEROXIDASE AB SERPL-ACNC: 197 IU/ML (ref 0–34)

## 2024-08-08 RX ORDER — LEVOTHYROXINE SODIUM 0.03 MG/1
25 TABLET ORAL DAILY
Qty: 30 TABLET | Refills: 2 | Status: SHIPPED | OUTPATIENT
Start: 2024-08-08

## 2024-08-08 NOTE — TELEPHONE ENCOUNTER
----- Message from Brodie Anne DO sent at 8/8/2024  8:34 AM EDT -----  TSH is again mildly elevated. T4 is normal. Anti-microsomal antibodies are elevated, indicating that he has Hashimoto's thyroiditis. It means he needs to take thyroid medication. I will start him on a low dose of synthroid. Repeat thyroid blood work   in 8 weeks. Dose will be gradually increased, based upon the blood tests.

## 2024-09-05 ENCOUNTER — APPOINTMENT (OUTPATIENT)
Dept: LAB | Facility: CLINIC | Age: 89
End: 2024-09-05
Payer: MEDICARE

## 2024-09-05 ENCOUNTER — ANTICOAG VISIT (OUTPATIENT)
Dept: FAMILY MEDICINE CLINIC | Facility: CLINIC | Age: 89
End: 2024-09-05

## 2024-09-05 DIAGNOSIS — Z86.711 HISTORY OF PULMONARY EMBOLISM: ICD-10-CM

## 2024-09-05 LAB
INR PPP: 1.69 (ref 0.85–1.19)
PROTHROMBIN TIME: 20 SECONDS (ref 12.3–15)

## 2024-09-05 PROCEDURE — 85610 PROTHROMBIN TIME: CPT

## 2024-09-05 PROCEDURE — 36415 COLL VENOUS BLD VENIPUNCTURE: CPT

## 2024-09-16 ENCOUNTER — APPOINTMENT (OUTPATIENT)
Dept: LAB | Facility: CLINIC | Age: 89
End: 2024-09-16
Payer: MEDICARE

## 2024-09-16 DIAGNOSIS — Z86.711 HISTORY OF PULMONARY EMBOLISM: ICD-10-CM

## 2024-09-16 LAB
INR PPP: 2.02 (ref 0.85–1.19)
PROTHROMBIN TIME: 22.9 SECONDS (ref 12.3–15)

## 2024-09-16 PROCEDURE — 85610 PROTHROMBIN TIME: CPT

## 2024-09-16 PROCEDURE — 36415 COLL VENOUS BLD VENIPUNCTURE: CPT

## 2024-09-17 ENCOUNTER — ANTICOAG VISIT (OUTPATIENT)
Dept: FAMILY MEDICINE CLINIC | Facility: CLINIC | Age: 89
End: 2024-09-17

## 2024-10-14 ENCOUNTER — APPOINTMENT (OUTPATIENT)
Dept: LAB | Facility: CLINIC | Age: 89
End: 2024-10-14
Payer: MEDICARE

## 2024-10-14 DIAGNOSIS — E06.3 HASHIMOTO'S THYROIDITIS: ICD-10-CM

## 2024-10-14 DIAGNOSIS — Z86.711 HISTORY OF PULMONARY EMBOLISM: ICD-10-CM

## 2024-10-14 LAB
INR PPP: 2.14 (ref 0.85–1.19)
PROTHROMBIN TIME: 23.9 SECONDS (ref 12.3–15)
TSH SERPL DL<=0.05 MIU/L-ACNC: 4 UIU/ML (ref 0.45–4.5)

## 2024-10-14 PROCEDURE — 85610 PROTHROMBIN TIME: CPT

## 2024-10-14 PROCEDURE — 84443 ASSAY THYROID STIM HORMONE: CPT

## 2024-10-14 PROCEDURE — 36415 COLL VENOUS BLD VENIPUNCTURE: CPT

## 2024-10-15 ENCOUNTER — ANTICOAG VISIT (OUTPATIENT)
Dept: FAMILY MEDICINE CLINIC | Facility: CLINIC | Age: 89
End: 2024-10-15

## 2024-10-23 DIAGNOSIS — J44.89 ASTHMA-COPD OVERLAP SYNDROME (HCC): ICD-10-CM

## 2024-10-24 RX ORDER — FLUTICASONE FUROATE AND VILANTEROL TRIFENATATE 200; 25 UG/1; UG/1
POWDER RESPIRATORY (INHALATION)
Qty: 180 EACH | Refills: 1 | Status: SHIPPED | OUTPATIENT
Start: 2024-10-24

## 2024-10-28 ENCOUNTER — OFFICE VISIT (OUTPATIENT)
Dept: FAMILY MEDICINE CLINIC | Facility: CLINIC | Age: 89
End: 2024-10-28
Payer: MEDICARE

## 2024-10-28 VITALS
TEMPERATURE: 98.4 F | OXYGEN SATURATION: 96 % | WEIGHT: 180.1 LBS | HEIGHT: 64 IN | DIASTOLIC BLOOD PRESSURE: 90 MMHG | HEART RATE: 114 BPM | SYSTOLIC BLOOD PRESSURE: 152 MMHG | BODY MASS INDEX: 30.75 KG/M2

## 2024-10-28 DIAGNOSIS — Z00.00 ENCOUNTER FOR ANNUAL WELLNESS VISIT (AWV) IN MEDICARE PATIENT: ICD-10-CM

## 2024-10-28 DIAGNOSIS — E78.5 DYSLIPIDEMIA: ICD-10-CM

## 2024-10-28 DIAGNOSIS — I27.82 CHRONIC PULMONARY EMBOLISM WITHOUT ACUTE COR PULMONALE, UNSPECIFIED PULMONARY EMBOLISM TYPE (HCC): ICD-10-CM

## 2024-10-28 DIAGNOSIS — J44.89 ASTHMA-COPD OVERLAP SYNDROME (HCC): ICD-10-CM

## 2024-10-28 DIAGNOSIS — Z79.899 ENCOUNTER FOR LONG-TERM (CURRENT) USE OF MEDICATIONS: ICD-10-CM

## 2024-10-28 DIAGNOSIS — I10 ESSENTIAL HYPERTENSION, BENIGN: Primary | ICD-10-CM

## 2024-10-28 DIAGNOSIS — Z28.21 INFLUENZA VACCINE REFUSED: ICD-10-CM

## 2024-10-28 DIAGNOSIS — E03.9 ACQUIRED HYPOTHYROIDISM: ICD-10-CM

## 2024-10-28 PROCEDURE — G0439 PPPS, SUBSEQ VISIT: HCPCS | Performed by: FAMILY MEDICINE

## 2024-10-28 PROCEDURE — 99214 OFFICE O/P EST MOD 30 MIN: CPT | Performed by: FAMILY MEDICINE

## 2024-10-28 NOTE — ASSESSMENT & PLAN NOTE
Patient has asthma-COPD overlap syndrome.  He will continue Breo Ellipta inhaler daily.  I explained that in January, his formulary may change and may need to be changed again.  For now, continue the Breo Ellipta inhaler and albuterol as needed.

## 2024-10-28 NOTE — ASSESSMENT & PLAN NOTE
I ordered a CMP and a fasting lipid panel for his next office visit.  Orders:    Lipid Panel with Direct LDL reflex; Future    Comprehensive metabolic panel; Future

## 2024-10-28 NOTE — ASSESSMENT & PLAN NOTE
Patient has history of pulmonary embolism which was unprovoked.  A workup did reveal hypercoagulability.  Patient was positive for factor II prothrombin gene.  He will continue warfarin therapy indefinitely.  He has not had any falls.

## 2024-10-28 NOTE — ASSESSMENT & PLAN NOTE
Patient has hypertension.  His blood pressure today in the office was elevated.  I checked his blood pressure myself and found it to be 156/78.  He states his blood pressures are controlled at home.  Given his advanced age, I did not make any change with his medication.  He has lower extremity edema on the amlodipine already.  It would certainly worsen with an increased dose.  We will continue amlodipine 5 mg daily and losartan 50 mg daily.

## 2024-10-28 NOTE — PATIENT INSTRUCTIONS
Medicare Preventive Visit Patient Instructions  Thank you for completing your Welcome to Medicare Visit or Medicare Annual Wellness Visit today. Your next wellness visit will be due in one year (10/29/2025).  The screening/preventive services that you may require over the next 5-10 years are detailed below. Some tests may not apply to you based off risk factors and/or age. Screening tests ordered at today's visit but not completed yet may show as past due. Also, please note that scanned in results may not display below.  Preventive Screenings:  Service Recommendations Previous Testing/Comments   Colorectal Cancer Screening  Colonoscopy    Fecal Occult Blood Test (FOBT)/Fecal Immunochemical Test (FIT)  Fecal DNA/Cologuard Test  Flexible Sigmoidoscopy Age: 45-75 years old   Colonoscopy: every 10 years (May be performed more frequently if at higher risk)  OR  FOBT/FIT: every 1 year  OR  Cologuard: every 3 years  OR  Sigmoidoscopy: every 5 years  Screening may be recommended earlier than age 45 if at higher risk for colorectal cancer. Also, an individualized decision between you and your healthcare provider will decide whether screening between the ages of 76-85 would be appropriate. Colonoscopy: 10/07/2008  FOBT/FIT: Not on file  Cologuard: Not on file  Sigmoidoscopy: Not on file    Screening Not Indicated     Prostate Cancer Screening Individualized decision between patient and health care provider in men between ages of 55-69   Medicare will cover every 12 months beginning on the day after your 50th birthday PSA: No results in last 5 years     Screening Not Indicated     Hepatitis C Screening Once for adults born between 1945 and 1965  More frequently in patients at high risk for Hepatitis C Hep C Antibody: Not on file        Diabetes Screening 1-2 times per year if you're at risk for diabetes or have pre-diabetes Fasting glucose: 114 mg/dL (5/13/2024)  A1C: No results in last 5 years (No results in last 5  years)  Screening Current   Cholesterol Screening Once every 5 years if you don't have a lipid disorder. May order more often based on risk factors. Lipid panel: 10/30/2023  Screening Current      Other Preventive Screenings Covered by Medicare:  Abdominal Aortic Aneurysm (AAA) Screening: covered once if your at risk. You're considered to be at risk if you have a family history of AAA or a male between the age of 65-75 who smoking at least 100 cigarettes in your lifetime.  Lung Cancer Screening: covers low dose CT scan once per year if you meet all of the following conditions: (1) Age 55-77; (2) No signs or symptoms of lung cancer; (3) Current smoker or have quit smoking within the last 15 years; (4) You have a tobacco smoking history of at least 20 pack years (packs per day x number of years you smoked); (5) You get a written order from a healthcare provider.  Glaucoma Screening: covered annually if you're considered high risk: (1) You have diabetes OR (2) Family history of glaucoma OR (3)  aged 50 and older OR (4)  American aged 65 and older  Osteoporosis Screening: covered every 2 years if you meet one of the following conditions: (1) Have a vertebral abnormality; (2) On glucocorticoid therapy for more than 3 months; (3) Have primary hyperparathyroidism; (4) On osteoporosis medications and need to assess response to drug therapy.  HIV Screening: covered annually if you're between the age of 15-65. Also covered annually if you are younger than 15 and older than 65 with risk factors for HIV infection. For pregnant patients, it is covered up to 3 times per pregnancy.    Immunizations:  Immunization Recommendations   Influenza Vaccine Annual influenza vaccination during flu season is recommended for all persons aged >= 6 months who do not have contraindications   Pneumococcal Vaccine   * Pneumococcal conjugate vaccine = PCV13 (Prevnar 13), PCV15 (Vaxneuvance), PCV20 (Prevnar 20)  *  Pneumococcal polysaccharide vaccine = PPSV23 (Pneumovax) Adults 19-65 yo with certain risk factors or if 65+ yo  If never received any pneumonia vaccine: recommend Prevnar 20 (PCV20)  Give PCV20 if previously received 1 dose of PCV13 or PPSV23   Hepatitis B Vaccine 3 dose series if at intermediate or high risk (ex: diabetes, end stage renal disease, liver disease)   Respiratory syncytial virus (RSV) Vaccine - COVERED BY MEDICARE PART D  * RSVPreF3 (Arexvy) CDC recommends that adults 60 years of age and older may receive a single dose of RSV vaccine using shared clinical decision-making (SCDM)   Tetanus (Td) Vaccine - COST NOT COVERED BY MEDICARE PART B Following completion of primary series, a booster dose should be given every 10 years to maintain immunity against tetanus. Td may also be given as tetanus wound prophylaxis.   Tdap Vaccine - COST NOT COVERED BY MEDICARE PART B Recommended at least once for all adults. For pregnant patients, recommended with each pregnancy.   Shingles Vaccine (Shingrix) - COST NOT COVERED BY MEDICARE PART B  2 shot series recommended in those 19 years and older who have or will have weakened immune systems or those 50 years and older     Health Maintenance Due:  There are no preventive care reminders to display for this patient.  Immunizations Due:      Topic Date Due   • Pneumococcal Vaccine: 65+ Years (1 of 2 - PCV) Never done   • Influenza Vaccine (1) 09/01/2024   • COVID-19 Vaccine (1 - 2023-24 season) Never done     Advance Directives   What are advance directives?  Advance directives are legal documents that state your wishes and plans for medical care. These plans are made ahead of time in case you lose your ability to make decisions for yourself. Advance directives can apply to any medical decision, such as the treatments you want, and if you want to donate organs.   What are the types of advance directives?  There are many types of advance directives, and each state has rules  about how to use them. You may choose a combination of any of the following:  Living will:  This is a written record of the treatment you want. You can also choose which treatments you do not want, which to limit, and which to stop at a certain time. This includes surgery, medicine, IV fluid, and tube feedings.   Durable power of  for healthcare (DPAHC):  This is a written record that states who you want to make healthcare choices for you when you are unable to make them for yourself. This person, called a proxy, is usually a family member or a friend. You may choose more than 1 proxy.  Do not resuscitate (DNR) order:  A DNR order is used in case your heart stops beating or you stop breathing. It is a request not to have certain forms of treatment, such as CPR. A DNR order may be included in other types of advance directives.  Medical directive:  This covers the care that you want if you are in a coma, near death, or unable to make decisions for yourself. You can list the treatments you want for each condition. Treatment may include pain medicine, surgery, blood transfusions, dialysis, IV or tube feedings, and a ventilator (breathing machine).  Values history:  This document has questions about your views, beliefs, and how you feel and think about life. This information can help others choose the care that you would choose.  Why are advance directives important?  An advance directive helps you control your care. Although spoken wishes may be used, it is better to have your wishes written down. Spoken wishes can be misunderstood, or not followed. Treatments may be given even if you do not want them. An advance directive may make it easier for your family to make difficult choices about your care.   Weight Management   Why it is important to manage your weight:  Being overweight increases your risk of health conditions such as heart disease, high blood pressure, type 2 diabetes, and certain types of cancer. It  can also increase your risk for osteoarthritis, sleep apnea, and other respiratory problems. Aim for a slow, steady weight loss. Even a small amount of weight loss can lower your risk of health problems.  How to lose weight safely:  A safe and healthy way to lose weight is to eat fewer calories and get regular exercise. You can lose up about 1 pound a week by decreasing the number of calories you eat by 500 calories each day.   Healthy meal plan for weight management:  A healthy meal plan includes a variety of foods, contains fewer calories, and helps you stay healthy. A healthy meal plan includes the following:  Eat whole-grain foods more often.  A healthy meal plan should contain fiber. Fiber is the part of grains, fruits, and vegetables that is not broken down by your body. Whole-grain foods are healthy and provide extra fiber in your diet. Some examples of whole-grain foods are whole-wheat breads and pastas, oatmeal, brown rice, and bulgur.  Eat a variety of vegetables every day.  Include dark, leafy greens such as spinach, kale, zaynab greens, and mustard greens. Eat yellow and orange vegetables such as carrots, sweet potatoes, and winter squash.   Eat a variety of fruits every day.  Choose fresh or canned fruit (canned in its own juice or light syrup) instead of juice. Fruit juice has very little or no fiber.  Eat low-fat dairy foods.  Drink fat-free (skim) milk or 1% milk. Eat fat-free yogurt and low-fat cottage cheese. Try low-fat cheeses such as mozzarella and other reduced-fat cheeses.  Choose meat and other protein foods that are low in fat.  Choose beans or other legumes such as split peas or lentils. Choose fish, skinless poultry (chicken or turkey), or lean cuts of red meat (beef or pork). Before you cook meat or poultry, cut off any visible fat.   Use less fat and oil.  Try baking foods instead of frying them. Add less fat, such as margarine, sour cream, regular salad dressing and mayonnaise to  foods. Eat fewer high-fat foods. Some examples of high-fat foods include french fries, doughnuts, ice cream, and cakes.  Eat fewer sweets.  Limit foods and drinks that are high in sugar. This includes candy, cookies, regular soda, and sweetened drinks.  Exercise:  Exercise at least 30 minutes per day on most days of the week. Some examples of exercise include walking, biking, dancing, and swimming. You can also fit in more physical activity by taking the stairs instead of the elevator or parking farther away from stores. Ask your healthcare provider about the best exercise plan for you.      © Copyright [employer] 2018 Information is for End User's use only and may not be sold, redistributed or otherwise used for commercial purposes. All illustrations and images included in CareNotes® are the copyrighted property of [employer] or [employer]

## 2024-10-28 NOTE — PROGRESS NOTES
Ambulatory Visit  Name: Enrico Chavira      : 1930      MRN: [id number]  Encounter Provider: Brodie Anne DO  Encounter Date: 10/28/2024   Encounter department: [facility] PRIMARY CARE    Assessment & Plan  Essential hypertension, benign  Patient has hypertension.  His blood pressure today in the office was elevated.  I checked his blood pressure myself and found it to be 156/78.  He states his blood pressures are controlled at home.  Given his advanced age, I did not make any change with his medication.  He has lower extremity edema on the amlodipine already.  It would certainly worsen with an increased dose.  We will continue amlodipine 5 mg daily and losartan 50 mg daily.         Dyslipidemia  I ordered a CMP and a fasting lipid panel for his next office visit.  Orders:    Lipid Panel with Direct LDL reflex; Future    Comprehensive metabolic panel; Future    Encounter for long-term (current) use of medications    Orders:    CBC and differential; Future    Chronic pulmonary embolism without acute cor pulmonale, unspecified pulmonary embolism type (HCC)  Patient has history of pulmonary embolism which was unprovoked.  A workup did reveal hypercoagulability.  Patient was positive for factor II prothrombin gene.  He will continue warfarin therapy indefinitely.  He has not had any falls.         Asthma-COPD overlap syndrome (HCC)  Patient has asthma-COPD overlap syndrome.  He will continue Breo Ellipta inhaler daily.  I explained that in January, his formulary may change and may need to be changed again.  For now, continue the Breo Ellipta inhaler and albuterol as needed.         Acquired hypothyroidism  Patient has acquired hypothyroidism.  Last thyroid function testing was normal.  Continue Synthroid 25 mcg daily.         Encounter for annual wellness visit (AWV) in Medicare patient         Influenza vaccine refused            Preventive health issues were discussed with patient, and age  appropriate screening tests were ordered as noted in patient's After Visit Summary. Personalized health advice and appropriate referrals for health education or preventive services given if needed, as noted in patient's After Visit Summary.    History of Present Illness     Patient is a 89-year-old white male who presents to the office today for his routine checkup.  He is also here today for his annual Medicare wellness exam.  Patient is accompanied to the office today by one of his daughters.  His daughter tells me that the patient's been checking his blood pressures at home.  She tells me his systolic blood pressures typically run 110-120.  The patient tells me he will sometimes have blood pressures with a systolic blood pressure in the 140s.  His daughter states that they are generally much lower than that.  The patient still exercises regularly, despite his age.  He has not had any falls.  His asthma has been controlled.  He tells me he liked the Symbicort better than the Breo but the Breo is covered by his insurance.       Patient Care Team:  Brodie Anne DO as PCP - General (Family Medicine)    Review of Systems   Respiratory:  Negative for cough, shortness of breath and wheezing.    Cardiovascular:  Positive for leg swelling. Negative for chest pain and palpitations.   Gastrointestinal:  Negative for abdominal distention, abdominal pain, blood in stool, constipation, diarrhea and nausea.   Musculoskeletal:  Positive for arthralgias and gait problem.     Medical History Reviewed by provider this encounter:       Annual Wellness Visit Questionnaire   Enrico is here for his Subsequent Wellness visit. Last Medicare Wellness visit information reviewed, patient interviewed and updates made to the record today.      Health Risk Assessment:   Patient rates overall health as good. Patient feels that their physical health rating is same. Patient is very satisfied with their life. Eyesight was rated as slightly worse.  Hearing was rated as same. Patient feels that their emotional and mental health rating is slightly better. Patients states they are never, rarely angry. Patient states they are sometimes unusually tired/fatigued. Pain experienced in the last 7 days has been none. Patient states that he has experienced no weight loss or gain in last 6 months.     Depression Screening:   PHQ-2 Score: 0      Fall Risk Screening:   In the past year, patient has experienced: no history of falling in past year      Home Safety:  Patient has trouble with stairs inside or outside of their home. Patient has working smoke alarms and has no working carbon monoxide detector. Home safety hazards include: none.     Nutrition:   Current diet is No Added Salt.     Medications:   Patient is currently taking over-the-counter supplements. OTC medications include: see medication list. Patient is able to manage medications.     Activities of Daily Living (ADLs)/Instrumental Activities of Daily Living (IADLs):   Walk and transfer into and out of bed and chair?: Yes  Dress and groom yourself?: Yes    Bathe or shower yourself?: Yes    Feed yourself? Yes  Do your laundry/housekeeping?: Yes  Manage your money, pay your bills and track your expenses?: Yes  Make your own meals?: Yes    Do your own shopping?: Yes    Previous Hospitalizations:   Any hospitalizations or ED visits within the last 12 months?: No      Advance Care Planning:   Living will: Yes    Durable POA for healthcare: Yes    Advanced directive: Yes      Cognitive Screening:   Provider or family/friend/caregiver concerned regarding cognition?: No    PREVENTIVE SCREENINGS      Cardiovascular Screening:    General: Screening Current      Diabetes Screening:     General: Screening Current      Colorectal Cancer Screening:     General: Screening Not Indicated      Prostate Cancer Screening:    General: Screening Not Indicated      Osteoporosis Screening:    General: Screening Not Indicated       Abdominal Aortic Aneurysm (AAA) Screening:    Risk factors include: tobacco use        General: Screening Not Indicated      Lung Cancer Screening:     General: Screening Not Indicated      Hepatitis C Screening:    General: Screening Not Indicated    Screening, Brief Intervention, and Referral to Treatment (SBIRT)    Screening  Typical number of drinks in a day: 0  Typical number of drinks in a week: 0  Interpretation: Low risk drinking behavior.    AUDIT-C Screenin) How often did you have a drink containing alcohol in the past year? never  2) How many drinks did you have on a typical day when you were drinking in the past year? 0  3) How often did you have 6 or more drinks on one occasion in the past year? never    AUDIT-C Score: 0  Interpretation: Score 0-3 (male): Negative screen for alcohol misuse    Single Item Drug Screening:  How often have you used an illegal drug (including marijuana) or a prescription medication for non-medical reasons in the past year? never    Single Item Drug Screen Score: 0  Interpretation: Negative screen for possible drug use disorder    Social Determinants of Health     Financial Resource Strain: Low Risk  (10/23/2023)    Overall Financial Resource Strain (CARDIA)     Difficulty of Paying Living Expenses: Not very hard   Food Insecurity: No Food Insecurity (10/28/2024)    Hunger Vital Sign     Worried About Running Out of Food in the Last Year: Never true     Ran Out of Food in the Last Year: Never true   Transportation Needs: No Transportation Needs (10/28/2024)    PRAPARE - Transportation     Lack of Transportation (Medical): No     Lack of Transportation (Non-Medical): No   Housing Stability: Low Risk  (10/28/2024)    Housing Stability Vital Sign     Unable to Pay for Housing in the Last Year: No     Number of Times Moved in the Last Year: 0     Homeless in the Last Year: No   Utilities: Not At Risk (10/28/2024)    [facility] Utilities     Threatened with loss of utilities: No  "    No results found.    Objective     /90   Pulse (!) 114   Temp 98.4 °F (36.9 °C) (Tympanic)   Ht 5' 4\" (1.626 m)   Wt 81.7 kg (180 lb 1.6 oz)   SpO2 96%   BMI 30.91 kg/m²     Physical Exam  Vitals reviewed.   Constitutional:       Comments: Patient is a 89-year-old white male who appears his stated age.  The patient is nonseptic in appearance and in no apparent distress   HENT:      Head: Normocephalic and atraumatic.      Right Ear: Tympanic membrane, ear canal and external ear normal.      Left Ear: Tympanic membrane, ear canal and external ear normal.      Mouth/Throat:      Mouth: Mucous membranes are moist.      Pharynx: Oropharynx is clear. No oropharyngeal exudate or posterior oropharyngeal erythema.   Eyes:      General: No scleral icterus.        Right eye: No discharge.         Left eye: No discharge.      Conjunctiva/sclera: Conjunctivae normal.      Pupils: Pupils are equal, round, and reactive to light.   Neck:      Comments: There is no thyromegaly  Cardiovascular:      Rate and Rhythm: Normal rate and regular rhythm.      Heart sounds: No murmur heard.     No friction rub. Gallop (An S4 gallop was noted) present.   Pulmonary:      Effort: Pulmonary effort is normal. No respiratory distress.      Breath sounds: Normal breath sounds. No stridor. No wheezing, rhonchi or rales.   Abdominal:      General: Bowel sounds are normal. There is no distension.      Palpations: Abdomen is soft. There is no mass.      Tenderness: There is no abdominal tenderness.      Comments: Due to his ambulatory dysfunction he was examined seated in the exam room chair.   Musculoskeletal:      Cervical back: Neck supple.   Lymphadenopathy:      Cervical: No cervical adenopathy.   Psychiatric:         Mood and Affect: Mood normal.         Behavior: Behavior normal.         Thought Content: Thought content normal.         Judgment: Judgment normal.     Extremities: Without cyanosis or clubbing.  There is lower " extremity edema present, right more so than left.  Edema was +1-2/4.

## 2024-10-28 NOTE — ASSESSMENT & PLAN NOTE
Patient has acquired hypothyroidism.  Last thyroid function testing was normal.  Continue Synthroid 25 mcg daily.

## 2024-10-29 DIAGNOSIS — E06.3 HASHIMOTO'S THYROIDITIS: ICD-10-CM

## 2024-10-29 RX ORDER — LEVOTHYROXINE SODIUM 25 UG/1
25 TABLET ORAL DAILY
Qty: 30 TABLET | Refills: 2 | Status: SHIPPED | OUTPATIENT
Start: 2024-10-29

## 2024-11-11 ENCOUNTER — APPOINTMENT (OUTPATIENT)
Dept: LAB | Facility: CLINIC | Age: 89
End: 2024-11-11
Payer: MEDICARE

## 2024-11-11 DIAGNOSIS — Z86.711 HISTORY OF PULMONARY EMBOLISM: ICD-10-CM

## 2024-11-11 DIAGNOSIS — Z79.899 ENCOUNTER FOR LONG-TERM (CURRENT) USE OF MEDICATIONS: ICD-10-CM

## 2024-11-11 DIAGNOSIS — E78.5 DYSLIPIDEMIA: ICD-10-CM

## 2024-11-11 LAB
ALBUMIN SERPL BCG-MCNC: 3.4 G/DL (ref 3.5–5)
ALP SERPL-CCNC: 72 U/L (ref 34–104)
ALT SERPL W P-5'-P-CCNC: 32 U/L (ref 7–52)
ANION GAP SERPL CALCULATED.3IONS-SCNC: 6 MMOL/L (ref 4–13)
AST SERPL W P-5'-P-CCNC: 49 U/L (ref 13–39)
BASOPHILS # BLD AUTO: 0.02 THOUSANDS/ÂΜL (ref 0–0.1)
BASOPHILS NFR BLD AUTO: 0 % (ref 0–1)
BILIRUB SERPL-MCNC: 0.56 MG/DL (ref 0.2–1)
BUN SERPL-MCNC: 13 MG/DL (ref 5–25)
CALCIUM ALBUM COR SERPL-MCNC: 9.4 MG/DL (ref 8.3–10.1)
CALCIUM SERPL-MCNC: 8.9 MG/DL (ref 8.4–10.2)
CHLORIDE SERPL-SCNC: 93 MMOL/L (ref 96–108)
CHOLEST SERPL-MCNC: 139 MG/DL
CO2 SERPL-SCNC: 25 MMOL/L (ref 21–32)
CREAT SERPL-MCNC: 0.81 MG/DL (ref 0.6–1.3)
EOSINOPHIL # BLD AUTO: 0.11 THOUSAND/ÂΜL (ref 0–0.61)
EOSINOPHIL NFR BLD AUTO: 2 % (ref 0–6)
ERYTHROCYTE [DISTWIDTH] IN BLOOD BY AUTOMATED COUNT: 12.9 % (ref 11.6–15.1)
GFR SERPL CREATININE-BSD FRML MDRD: 76 ML/MIN/1.73SQ M
GLUCOSE P FAST SERPL-MCNC: 94 MG/DL (ref 65–99)
HCT VFR BLD AUTO: 36.7 % (ref 36.5–49.3)
HDLC SERPL-MCNC: 67 MG/DL
HGB BLD-MCNC: 12.3 G/DL (ref 12–17)
IMM GRANULOCYTES # BLD AUTO: 0.01 THOUSAND/UL (ref 0–0.2)
IMM GRANULOCYTES NFR BLD AUTO: 0 % (ref 0–2)
INR PPP: 3.25 (ref 0.85–1.19)
LDLC SERPL CALC-MCNC: 56 MG/DL (ref 0–100)
LYMPHOCYTES # BLD AUTO: 1.94 THOUSANDS/ÂΜL (ref 0.6–4.47)
LYMPHOCYTES NFR BLD AUTO: 30 % (ref 14–44)
MCH RBC QN AUTO: 30.8 PG (ref 26.8–34.3)
MCHC RBC AUTO-ENTMCNC: 33.5 G/DL (ref 31.4–37.4)
MCV RBC AUTO: 92 FL (ref 82–98)
MONOCYTES # BLD AUTO: 0.75 THOUSAND/ÂΜL (ref 0.17–1.22)
MONOCYTES NFR BLD AUTO: 12 % (ref 4–12)
NEUTROPHILS # BLD AUTO: 3.69 THOUSANDS/ÂΜL (ref 1.85–7.62)
NEUTS SEG NFR BLD AUTO: 56 % (ref 43–75)
NRBC BLD AUTO-RTO: 0 /100 WBCS
PLATELET # BLD AUTO: 283 THOUSANDS/UL (ref 149–390)
PMV BLD AUTO: 9.5 FL (ref 8.9–12.7)
POTASSIUM SERPL-SCNC: 4.1 MMOL/L (ref 3.5–5.3)
PROT SERPL-MCNC: 6.9 G/DL (ref 6.4–8.4)
PROTHROMBIN TIME: 32.8 SECONDS (ref 12.3–15)
RBC # BLD AUTO: 3.99 MILLION/UL (ref 3.88–5.62)
SODIUM SERPL-SCNC: 124 MMOL/L (ref 135–147)
TRIGL SERPL-MCNC: 82 MG/DL
WBC # BLD AUTO: 6.52 THOUSAND/UL (ref 4.31–10.16)

## 2024-11-11 PROCEDURE — 36415 COLL VENOUS BLD VENIPUNCTURE: CPT

## 2024-11-11 PROCEDURE — 80061 LIPID PANEL: CPT

## 2024-11-11 PROCEDURE — 85610 PROTHROMBIN TIME: CPT

## 2024-11-11 PROCEDURE — 85025 COMPLETE CBC W/AUTO DIFF WBC: CPT

## 2024-11-11 PROCEDURE — 80053 COMPREHEN METABOLIC PANEL: CPT

## 2024-11-12 ENCOUNTER — ANTICOAG VISIT (OUTPATIENT)
Dept: FAMILY MEDICINE CLINIC | Facility: CLINIC | Age: 89
End: 2024-11-12

## 2024-11-22 DIAGNOSIS — J44.89 ASTHMA-COPD OVERLAP SYNDROME (HCC): ICD-10-CM

## 2024-11-23 DIAGNOSIS — J45.909 UNCOMPLICATED ASTHMA, UNSPECIFIED ASTHMA SEVERITY, UNSPECIFIED WHETHER PERSISTENT: ICD-10-CM

## 2024-11-25 RX ORDER — MONTELUKAST SODIUM 10 MG/1
10 TABLET ORAL DAILY
Qty: 90 TABLET | Refills: 1 | Status: SHIPPED | OUTPATIENT
Start: 2024-11-25

## 2024-11-25 RX ORDER — ALBUTEROL SULFATE 90 UG/1
INHALANT RESPIRATORY (INHALATION)
Qty: 18 G | Refills: 1 | Status: SHIPPED | OUTPATIENT
Start: 2024-11-25

## 2024-11-26 ENCOUNTER — APPOINTMENT (OUTPATIENT)
Dept: LAB | Facility: CLINIC | Age: 89
End: 2024-11-26
Payer: MEDICARE

## 2024-11-26 DIAGNOSIS — Z86.711 HISTORY OF PULMONARY EMBOLISM: ICD-10-CM

## 2024-11-26 LAB
INR PPP: 4.97 (ref 0.85–1.19)
PROTHROMBIN TIME: 45.1 SECONDS (ref 12.3–15)

## 2024-11-26 PROCEDURE — 36415 COLL VENOUS BLD VENIPUNCTURE: CPT

## 2024-11-26 PROCEDURE — 85610 PROTHROMBIN TIME: CPT

## 2024-11-27 ENCOUNTER — ANTICOAG VISIT (OUTPATIENT)
Dept: FAMILY MEDICINE CLINIC | Facility: CLINIC | Age: 89
End: 2024-11-27

## 2024-11-27 PROBLEM — Z00.00 ENCOUNTER FOR ANNUAL WELLNESS VISIT (AWV) IN MEDICARE PATIENT: Status: RESOLVED | Noted: 2023-10-23 | Resolved: 2024-11-27

## 2024-12-02 ENCOUNTER — APPOINTMENT (OUTPATIENT)
Dept: LAB | Facility: CLINIC | Age: 89
End: 2024-12-02
Payer: MEDICARE

## 2024-12-02 DIAGNOSIS — Z86.711 HISTORY OF PULMONARY EMBOLISM: ICD-10-CM

## 2024-12-02 LAB
INR PPP: 2.96 (ref 0.85–1.19)
PROTHROMBIN TIME: 30.5 SECONDS (ref 12.3–15)

## 2024-12-02 PROCEDURE — 36415 COLL VENOUS BLD VENIPUNCTURE: CPT

## 2024-12-02 PROCEDURE — 85610 PROTHROMBIN TIME: CPT

## 2024-12-03 ENCOUNTER — ANTICOAG VISIT (OUTPATIENT)
Dept: FAMILY MEDICINE CLINIC | Facility: CLINIC | Age: 89
End: 2024-12-03

## 2024-12-09 ENCOUNTER — OFFICE VISIT (OUTPATIENT)
Dept: FAMILY MEDICINE CLINIC | Facility: CLINIC | Age: 89
End: 2024-12-09
Payer: MEDICARE

## 2024-12-09 VITALS
DIASTOLIC BLOOD PRESSURE: 70 MMHG | BODY MASS INDEX: 29.28 KG/M2 | HEART RATE: 102 BPM | OXYGEN SATURATION: 97 % | TEMPERATURE: 96.9 F | WEIGHT: 171.5 LBS | SYSTOLIC BLOOD PRESSURE: 150 MMHG | HEIGHT: 64 IN

## 2024-12-09 DIAGNOSIS — L97.211 VENOUS STASIS ULCER OF RIGHT CALF LIMITED TO BREAKDOWN OF SKIN WITHOUT VARICOSE VEINS (HCC): Primary | ICD-10-CM

## 2024-12-09 DIAGNOSIS — E06.3 HASHIMOTO'S THYROIDITIS: ICD-10-CM

## 2024-12-09 DIAGNOSIS — I87.2 VENOUS STASIS ULCER OF RIGHT CALF LIMITED TO BREAKDOWN OF SKIN WITHOUT VARICOSE VEINS (HCC): Primary | ICD-10-CM

## 2024-12-09 PROBLEM — L97.221 VENOUS STASIS ULCER OF LEFT CALF LIMITED TO BREAKDOWN OF SKIN WITHOUT VARICOSE VEINS (HCC): Status: ACTIVE | Noted: 2024-12-09

## 2024-12-09 PROCEDURE — 99214 OFFICE O/P EST MOD 30 MIN: CPT | Performed by: FAMILY MEDICINE

## 2024-12-09 PROCEDURE — 29580 STRAPPING UNNA BOOT: CPT | Performed by: FAMILY MEDICINE

## 2024-12-09 RX ORDER — FUROSEMIDE 20 MG/1
1 TABLET ORAL DAILY
COMMUNITY
Start: 2024-11-01

## 2024-12-09 NOTE — PROGRESS NOTES
Name: Enrico Chavira      : 1930      MRN: [id number]  Encounter Provider: Brodie Anne DO  Encounter Date: 2024   Encounter department: [facility] PRIMARY CARE  :  Assessment & Plan  Venous stasis ulcer of right calf limited to breakdown of skin without varicose veins (HCC)  Patient has a venous stasis ulcer of the right lower extremity.  He actually has multiple ulcerations present.  There is no erythema or sign of infection.  At this point, I recommend this be treated with an Unna boot.  I wrapped his right leg in the usual fashion from the foot up to just below the knee.  I then applied Ace wraps.  I advised the patient that this will stay in place for 1 week.  I want him to try to stay off his foot is much as possible and keep the leg elevated.  I reassured the patient that antibiotics are not indicated.  Patient will follow-up with me in 1 week.  I explained to the patient and his daughters that this may take months for it to heal.         Hashimoto's thyroiditis  I had a long conversation with the patient regarding his Synthroid.  I told him that Synthroid is synthetic T4.  His body makes T4.  I think it is simply a coincidence that he was experiencing certain symptoms while taking Synthroid.  Patient insists that his problems were due to Synthroid and he will not take it.  I reviewed his labs and fortunately, his TSH was only mildly elevated.  In fact, if it was not for the fact that he had a positive antimicrosomal antibody, I would not have suggested treatment.  However, the positive antimicrosomal antibody does indicate Hashimoto's thyroiditis and treatment is indicated.  Since the patient refuses I am going to recommend we follow his TSH.  He only stopped the medicine 2 weeks ago so at this point, would be too early to check a repeat TSH with a T4.                History of Present Illness     This patient is a 89-year-old white male who presents to the office today  "accompanied by 2 of his daughters.  He has not been experiencing swelling of his legs.  He saw cardiology recently.  His right leg has been weeping.  Cardiology started the patient on Lasix 20 mg daily.  Since then, left leg edema has markedly improved.  Right leg is still edematous and draining.  Of note, patient stopped his Synthroid on approximately Thanksgiving.  He reported numerous side effects including leg cramps and many other things.  Unfortunately, he spoke so quickly that I was unable to write them down.  He is afraid to take the medication and no longer.      Review of Systems   Constitutional:  Negative for chills and fever.   Cardiovascular:  Positive for leg swelling.        Denies orthopnea   Skin:  Positive for wound.          Objective   /70 (BP Location: Left arm, Patient Position: Sitting, Cuff Size: Standard)   Pulse 102   Temp (!) 96.9 °F (36.1 °C) (Tympanic)   Ht 5' 4\" (1.626 m)   Wt 77.8 kg (171 lb 8 oz)   SpO2 97%   BMI 29.44 kg/m²      Physical Exam    "

## 2024-12-10 NOTE — ASSESSMENT & PLAN NOTE
I had a long conversation with the patient regarding his Synthroid.  I told him that Synthroid is synthetic T4.  His body makes T4.  I think it is simply a coincidence that he was experiencing certain symptoms while taking Synthroid.  Patient insists that his problems were due to Synthroid and he will not take it.  I reviewed his labs and fortunately, his TSH was only mildly elevated.  In fact, if it was not for the fact that he had a positive antimicrosomal antibody, I would not have suggested treatment.  However, the positive antimicrosomal antibody does indicate Hashimoto's thyroiditis and treatment is indicated.  Since the patient refuses I am going to recommend we follow his TSH.  He only stopped the medicine 2 weeks ago so at this point, would be too early to check a repeat TSH with a T4.

## 2024-12-10 NOTE — ASSESSMENT & PLAN NOTE
Patient has a venous stasis ulcer of the right lower extremity.  He actually has multiple ulcerations present.  There is no erythema or sign of infection.  At this point, I recommend this be treated with an Unna boot.  I wrapped his right leg in the usual fashion from the foot up to just below the knee.  I then applied Ace wraps.  I advised the patient that this will stay in place for 1 week.  I want him to try to stay off his foot is much as possible and keep the leg elevated.  I reassured the patient that antibiotics are not indicated.  Patient will follow-up with me in 1 week.  I explained to the patient and his daughters that this may take months for it to heal.

## 2024-12-17 ENCOUNTER — OFFICE VISIT (OUTPATIENT)
Dept: FAMILY MEDICINE CLINIC | Facility: CLINIC | Age: 89
End: 2024-12-17
Payer: MEDICARE

## 2024-12-17 ENCOUNTER — APPOINTMENT (OUTPATIENT)
Dept: LAB | Facility: CLINIC | Age: 89
End: 2024-12-17
Payer: MEDICARE

## 2024-12-17 VITALS
DIASTOLIC BLOOD PRESSURE: 60 MMHG | OXYGEN SATURATION: 97 % | BODY MASS INDEX: 29.44 KG/M2 | HEART RATE: 95 BPM | SYSTOLIC BLOOD PRESSURE: 128 MMHG | TEMPERATURE: 97.4 F | HEIGHT: 64 IN

## 2024-12-17 DIAGNOSIS — I10 ESSENTIAL HYPERTENSION, BENIGN: ICD-10-CM

## 2024-12-17 DIAGNOSIS — L97.211 VENOUS STASIS ULCER OF RIGHT CALF LIMITED TO BREAKDOWN OF SKIN WITHOUT VARICOSE VEINS (HCC): Primary | ICD-10-CM

## 2024-12-17 DIAGNOSIS — I87.2 VENOUS STASIS ULCER OF RIGHT CALF LIMITED TO BREAKDOWN OF SKIN WITHOUT VARICOSE VEINS (HCC): Primary | ICD-10-CM

## 2024-12-17 DIAGNOSIS — Z86.711 HISTORY OF PULMONARY EMBOLISM: ICD-10-CM

## 2024-12-17 LAB
INR PPP: 4.48 (ref 0.85–1.19)
PROTHROMBIN TIME: 41.7 SECONDS (ref 12.3–15)

## 2024-12-17 PROCEDURE — 36415 COLL VENOUS BLD VENIPUNCTURE: CPT

## 2024-12-17 PROCEDURE — 99214 OFFICE O/P EST MOD 30 MIN: CPT | Performed by: FAMILY MEDICINE

## 2024-12-17 PROCEDURE — 85610 PROTHROMBIN TIME: CPT

## 2024-12-17 PROCEDURE — 29580 STRAPPING UNNA BOOT: CPT | Performed by: FAMILY MEDICINE

## 2024-12-17 RX ORDER — AMLODIPINE BESYLATE 2.5 MG/1
2.5 TABLET ORAL DAILY
Qty: 100 TABLET | Refills: 3 | Status: SHIPPED | OUTPATIENT
Start: 2024-12-17

## 2024-12-17 NOTE — PROGRESS NOTES
Name: Enrico Chavira      : 1930      MRN: [id number]  Encounter Provider: Brodie Anne DO  Encounter Date: 2024   Encounter department: [facility] PRIMARY CARE  :  Assessment & Plan  Venous stasis ulcer of right calf limited to breakdown of skin without varicose veins (HCC)  Patient has a venous stasis ulcer of the right lower extremity.  His previous Unna boot was removed.  I had my medical assistant remove it and then wash his leg.  She pat it dry.  I then applied a new Unna boot to the leg in the usual fashion up to just below the knee.  I then wrapped his leg with an Ace wrap.  He will keep this in place.  He will try to elevate his legs.  I placed a referral for home health care for wound care.  If they cannot see him to change his Unna boot weekly, beginning next week, then he will have to come in for me to change it next Tuesday.  His daughter was agreeable.  Orders:    EXTERNAL Referral to Home Health; Future    Essential hypertension, benign  Patient has had problems with edema ever since the addition of amlodipine to his regiment by nephrology.  I decreased the dose to 2.5 mg once a day.  Blood pressure will be followed.  I would like to ultimately wean the patient off of the amlodipine.  He has been previously fairly well-controlled on losartan and hydrochlorothiazide.  Orders:    amLODIPine (NORVASC) 2.5 mg tablet; Take 1 tablet (2.5 mg total) by mouth daily           History of Present Illness     This patient is a 89-year-old white male who presents to the office today accompanied by one of his daughters.  He is here today for follow-up regarding his venous stasis ulcer.  The patient's daughter asked if I can place a referral for home health care.  She tells me that she is concerned about trying to get her father out during the winter months.  In addition to this, she has to drive him and she tells me that she is dealing with a family member who has stage IV  "cancer.      Review of Systems   Constitutional:  Negative for chills and fever.   Respiratory:  Negative for cough, shortness of breath and wheezing.    Cardiovascular:  Positive for leg swelling. Negative for chest pain and palpitations.   Skin:  Positive for wound.       Objective   /60   Pulse 95   Temp (!) 97.4 °F (36.3 °C) (Tympanic)   Ht 5' 4\" (1.626 m)   SpO2 97%   BMI 29.44 kg/m²      Physical Exam  Vitals reviewed.   Constitutional:       Comments: Patient is a 89-year-old white male who appears his stated age.  He is nonseptic in appearance and in no apparent distress   Cardiovascular:      Rate and Rhythm: Normal rate and regular rhythm.      Heart sounds: Normal heart sounds. No murmur heard.     No friction rub. No gallop.   Pulmonary:      Effort: Pulmonary effort is normal. No respiratory distress.      Breath sounds: Normal breath sounds. No stridor. No wheezing, rhonchi or rales.   Skin:     Comments: There is a large venous stasis ulcer present on the anterior aspect of the right tibia.  He had 3 previous ulcers which were all adjacent to each other this form 1 larger ulcer now.  However, it is clean and it definitely appears to be much shallower than  they were previously.  There is no erythema of the skin.  There was no foul odor.  There was no pus or drainage.  No toxic striations.         "

## 2024-12-17 NOTE — ASSESSMENT & PLAN NOTE
Patient has had problems with edema ever since the addition of amlodipine to his regiment by nephrology.  I decreased the dose to 2.5 mg once a day.  Blood pressure will be followed.  I would like to ultimately wean the patient off of the amlodipine.  He has been previously fairly well-controlled on losartan and hydrochlorothiazide.  Orders:    amLODIPine (NORVASC) 2.5 mg tablet; Take 1 tablet (2.5 mg total) by mouth daily

## 2024-12-18 ENCOUNTER — ANTICOAG VISIT (OUTPATIENT)
Dept: FAMILY MEDICINE CLINIC | Facility: CLINIC | Age: 89
End: 2024-12-18

## 2024-12-18 NOTE — ASSESSMENT & PLAN NOTE
Patient has a venous stasis ulcer of the right lower extremity.  His previous Unna boot was removed.  I had my medical assistant remove it and then wash his leg.  She pat it dry.  I then applied a new Unna boot to the leg in the usual fashion up to just below the knee.  I then wrapped his leg with an Ace wrap.  He will keep this in place.  He will try to elevate his legs.  I placed a referral for home health care for wound care.  If they cannot see him to change his Unna boot weekly, beginning next week, then he will have to come in for me to change it next Tuesday.  His daughter was agreeable.  Orders:    EXTERNAL Referral to Home Health; Future

## 2024-12-23 ENCOUNTER — OFFICE VISIT (OUTPATIENT)
Dept: FAMILY MEDICINE CLINIC | Facility: CLINIC | Age: 89
End: 2024-12-23
Payer: MEDICARE

## 2024-12-23 VITALS
HEART RATE: 110 BPM | SYSTOLIC BLOOD PRESSURE: 142 MMHG | OXYGEN SATURATION: 98 % | DIASTOLIC BLOOD PRESSURE: 74 MMHG | TEMPERATURE: 96.9 F

## 2024-12-23 DIAGNOSIS — I87.2 VENOUS STASIS ULCER OF RIGHT CALF LIMITED TO BREAKDOWN OF SKIN WITHOUT VARICOSE VEINS (HCC): Primary | ICD-10-CM

## 2024-12-23 DIAGNOSIS — L97.211 VENOUS STASIS ULCER OF RIGHT CALF LIMITED TO BREAKDOWN OF SKIN WITHOUT VARICOSE VEINS (HCC): Primary | ICD-10-CM

## 2024-12-23 PROCEDURE — 29580 STRAPPING UNNA BOOT: CPT | Performed by: FAMILY MEDICINE

## 2024-12-23 PROCEDURE — 99213 OFFICE O/P EST LOW 20 MIN: CPT | Performed by: FAMILY MEDICINE

## 2024-12-23 NOTE — ASSESSMENT & PLAN NOTE
I placed another referral for home health care.  Patient will require wound care.  He requires an Unna boot changed weekly until healed.  Today, after my medical assistant removed his bandages and washed his leg, I reevaluated the wound.  I feel that a repeat Unna boot is our best course of therapy.  I rewrapped his leg with a new Unna boot from the foot up to just below the knee in the usual fashion.  I then applied Ace wraps.  Patient will follow-up with me in a week and home health care is unable to see him to remove his Unna boot.  Orders:    EXTERNAL Referral to Home Health; Future

## 2024-12-23 NOTE — PROGRESS NOTES
Name: Enrico Chavira      : 1930      MRN: [id number]  Encounter Provider: Brodie Anne DO  Encounter Date: 2024   Encounter department: [facility] PRIMARY CARE  :  Assessment & Plan  Venous stasis ulcer of right calf limited to breakdown of skin without varicose veins (HCC)  I placed another referral for home health care.  Patient will require wound care.  He requires an Unna boot changed weekly until healed.  Today, after my medical assistant removed his bandages and washed his leg, I reevaluated the wound.  I feel that a repeat Unna boot is our best course of therapy.  I rewrapped his leg with a new Unna boot from the foot up to just below the knee in the usual fashion.  I then applied Ace wraps.  Patient will follow-up with me in a week and home health care is unable to see him to remove his Unna boot.  Orders:    EXTERNAL Referral to Home Health; Future           History of Present Illness     Patient is a 89-year-old white male who presents to the office today for a dressing change in his leg.  He was brought to the office today by one of his daughters.  I had placed a referral for home health care last time.  When they received a phone call of a thought it was for hospice so apparently they declined.  They apparently did have a visit but the patient and the family declined.  When they call back, they were told they needed a new referral placed for home health care.      Review of Systems   Constitutional:  Negative for chills and fever.   Cardiovascular:  Positive for leg swelling.   Skin:  Positive for wound.       Objective   /74   Pulse (!) 110   Temp (!) 96.9 °F (36.1 °C) (Tympanic)   SpO2 98%      Physical Exam  Vitals reviewed.   Constitutional:       Comments: Patient is a 89-year-old white male being who appears his stated age.  He was seated in a wheelchair.  He was in no apparent distress   Skin:     Comments: After removing his Unna boot, I was able to  evaluate his leg.  The wound is definitely smaller and it is definitely shallower than previously.  There was no pus or drainage present.  There was no erythema or heat to the touch.

## 2024-12-23 NOTE — PROGRESS NOTES
Patients daughter Lora called stating home care could not come out to change the Davina boot until next week sometime. She went on to say she has called the number she was given and they are just hospice. She wanted our office to find an external home health to take care of the davina boot. I stated I would need to know the name of the home health that can go out before Wednesday. She responded with it is the offices job to get that information. I offered an appointment here in the office today but was told she would get back to me.

## 2024-12-24 ENCOUNTER — HOME HEALTH ADMISSION (OUTPATIENT)
Dept: HOME HEALTH SERVICES | Facility: HOME HEALTHCARE | Age: 89
End: 2024-12-24
Payer: MEDICARE

## 2024-12-26 ENCOUNTER — OFFICE VISIT (OUTPATIENT)
Facility: HOSPITAL | Age: 89
End: 2024-12-26
Payer: MEDICARE

## 2024-12-26 VITALS
TEMPERATURE: 97.4 F | HEART RATE: 77 BPM | HEIGHT: 66 IN | BODY MASS INDEX: 28.61 KG/M2 | WEIGHT: 178 LBS | SYSTOLIC BLOOD PRESSURE: 169 MMHG | RESPIRATION RATE: 18 BRPM | DIASTOLIC BLOOD PRESSURE: 84 MMHG

## 2024-12-26 DIAGNOSIS — L97.812 VENOUS STASIS ULCER OF OTHER PART OF RIGHT LOWER LEG WITH FAT LAYER EXPOSED WITH VARICOSE VEINS (HCC): Primary | ICD-10-CM

## 2024-12-26 DIAGNOSIS — M62.81 MUSCLE WEAKNESS (GENERALIZED): ICD-10-CM

## 2024-12-26 DIAGNOSIS — R26.9 ABNORMALITY OF GAIT: ICD-10-CM

## 2024-12-26 DIAGNOSIS — I83.018 VENOUS STASIS ULCER OF OTHER PART OF RIGHT LOWER LEG WITH FAT LAYER EXPOSED WITH VARICOSE VEINS (HCC): Primary | ICD-10-CM

## 2024-12-26 PROCEDURE — 99213 OFFICE O/P EST LOW 20 MIN: CPT | Performed by: STUDENT IN AN ORGANIZED HEALTH CARE EDUCATION/TRAINING PROGRAM

## 2024-12-26 PROCEDURE — 11042 DBRDMT SUBQ TIS 1ST 20SQCM/<: CPT | Performed by: STUDENT IN AN ORGANIZED HEALTH CARE EDUCATION/TRAINING PROGRAM

## 2024-12-26 PROCEDURE — 99204 OFFICE O/P NEW MOD 45 MIN: CPT | Performed by: STUDENT IN AN ORGANIZED HEALTH CARE EDUCATION/TRAINING PROGRAM

## 2024-12-26 RX ORDER — LIDOCAINE 40 MG/G
CREAM TOPICAL ONCE
Status: COMPLETED | OUTPATIENT
Start: 2024-12-26 | End: 2024-12-26

## 2024-12-26 RX ADMIN — LIDOCAINE 1 APPLICATION: 40 CREAM TOPICAL at 14:15

## 2024-12-26 NOTE — PATIENT INSTRUCTIONS
Orders Placed This Encounter   Procedures    Wound cleansing and dressings Right;Anterior Pretibial     Wound location Right leg     Change dressing 3x/ week  The dressing must stay dry and intact. You may shower with dressing protected by cast cover or bag. Or you may sponge bathe. Call wound center or home health nurse if dressing becomes wet.   On dressing change days, cleanse the wound with wound cleanser or mild soap and water, rinse, pat dry.   Apply silver alginate to wound  Cover with ABD   Secure with rolled gauze and tape if needed.     Unna Boot Instructions:    Keep compression wrap/wraps clean and dry. If wraps are too tight and you experience numbness/tingling, call the wound center. If after hours, remove wraps or proceed to nearest E.R. and call wound center in AM.  Wrap will be changed 3x weekly  Avoid prolonged standing in one place.  Elevate leg(s) above the level of the heart when sitting or as much as possible.       Watch for signs of infection these include a fever of 100.4°F (38°C) or higher, chills  Signs of wound infection include increasing swelling, redness, warmth, pain around the wound. Foul smell coming from wound  Go to the closest Emergency Room with signs of infection to be evaluated.    Continue SLVNA to assist with dressing changes.    Follow up with [facility] in 1 week.     Standing Status:   Future     Expected Date:   12/26/2024     Expiration Date:   1/2/2025    Wound Procedure Treatment     This order was created via procedure documentation

## 2024-12-26 NOTE — PROGRESS NOTES
Wound Procedure Treatment Right;Anterior Pretibial    Performed by: Melina Miles RN  Authorized by: Cornelia Goldsmith PA-C    Associated wounds:   Wound 12/26/24 Pretibial Right;Anterior  Wound cleansed with:  Soap and water  Applied primary dressing:  Calcium alginate and Silver  Applied secondary dressing:  ABD  Dressing secured with:  Compression wrap  Other Assisting Provider: No    Time Out:     Time out: Immediately prior to the procedure a time out was called      Time out performed at:  12/26/2024 2:56 PM  Patient states understanding of procedure being performed: Yes    Patient identity confirmed:  Verbally with patient  Compression -Pre-procedure details:     Sensation:  Normal    Skin color:  WNL    Laterality:  Right    Cast type:  Unna boot    Supplies used: unna boot, coban, tubifast blue, silver alginate, ABD pad.    Pain:  Unchanged    Sensation:  Normal    Skin color:  WNL    Patient tolerance of procedure:  Tolerated well, no immediate complications

## 2024-12-26 NOTE — PROGRESS NOTES
Patient ID: Enrico Chavira is a 89 y.o. male Date of Birth 8/12/1930       Chief Complaint   Patient presents with    New Patient Visit     Arrives with an unna boot with Ace wrap, that was applied at Dr. Anne's office on 12/23/24  Wounds started sometime before Thanksgiving       Allergies:  Alphagan p [brimonidine tartrate]    Diagnosis:   Diagnosis ICD-10-CM Associated Orders   1. Venous stasis ulcer of other part of right lower leg with fat layer exposed with varicose veins (HCC)  I83.018 lidocaine (LMX) 4 % cream    L97.812 Wound cleansing and dressings Right;Anterior Pretibial     Wound Procedure Treatment Right;Anterior Pretibial     Debridement      2. Abnormality of gait  R26.9       3. Muscle weakness (generalized)  M62.81            Assessment  & Plan:    Initial evaluation of venous ulcerations of the R anterior lower leg. There is slough present overlying ulcerations with a fibrinous base and a multitude of granular buds.  Drainage is heavy.  The lower extremity has stasis changes with hyperpigmentation.  No lymphangitic streaking or luther erythema present.  Surgical debridement, as below.  Would benefit from absorptive dressing.  Recommending silver alginate to the ulcerations with an overlying Unna boot change 3 times weekly.  Right ANAHY noncompressible.  Discussed with patient if he experiences any pain, numbness, weakness, breathing changes with use of compression he should notify office immediately and it should be removed.  Do not use Ace bandage over the Unna boot.  Lung bases without rales.  Patient without history of heart failure.  Elevate legs when resting.  Avoid prolonged standing 1 place.  Take diuretic as prescribed.  Instructed to monitor for any changes including redness or swelling surrounding the wound, increased drainage or pain as well as fevers or chills.    Pt with limited mobility, would benefit from transfer wheelchair, order placed.   Should there be a delay in healing with  standard of care will order insufficiency testing and refer to vascular surgery pending results.  F/u in one week. Instructed to call if any questions or concerns arise in meantime.        Subjective:   12/26/24: 89 y/o M with PMHx of HTN, PE, asthma/COPD, hypothyroidism, skin cancer, presents for evaluation of wounds on his RLE with two of his daughters. His RLE wounds have been present for approximately one month. Pt has been seeing his PCP whom is currently applying Unna boots with overlying ACE wraps. There is quite a bit of drainage from ulceration and drainage has been leaking through the Unna boot. Pt has been elevating his leg. Denies hx of heart failure or increased SOB (aside from baseline with COPD).           The following portions of the patient's history were reviewed and updated as appropriate:   Patient Active Problem List   Diagnosis    Prothrombin gene mutation (HCC)    Primary generalized (osteo)arthritis    Essential hypertension, benign    Dyslipidemia    Intrinsic eczema    Other seborrheic dermatitis    Abnormality of gait    Osteoarthritis of both knees    Chronic pulmonary embolism without acute cor pulmonale (HCC)    Age-related cataract of both eyes    Premature beats    Primary osteoarthritis of both knees    Neoplasm of uncertain behavior of skin    Encounter for long-term (current) use of medications    GERD without esophagitis    Influenza vaccine refused    Hypercalcemia    COVID-19 virus infection    Asthma-COPD overlap syndrome (HCC)    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)    Endothelial corneal dystrophy of both eyes    Heterozygous factor V Leiden mutation (HCC)    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)    History of pulmonary embolism    Mature cataract    Primary open-angle glaucoma, bilateral, mild stage    Pseudophakic bullous keratopathy of left eye    TB lung, latent    Hyponatremia    Shortness of breath    Other fatigue     Acquired hypothyroidism    Venous stasis ulcer of right calf limited to breakdown of skin without varicose veins ([facility])    Hashimoto's thyroiditis     Past Medical History:   Diagnosis Date    Arthritis     Coagulation defect ([facility])     last assessed: 11/28/2018    COPD (chronic obstructive pulmonary disease) ([facility])     last assessed: 5/14/2019, 12/6/2017    Generalized osteoarthritis     last assessed: 1/28/2019    GERD without esophagitis     last assessed: 1/28/2019    Glaucoma     last assessed: 8/4/2011    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[facility])     last assessed: 10/16/2018    Factor II Prothrombin Gene per [employer]    History of kidney stones 1985    Hx of blood clots     Hypertension     last assessed: 5/14/2019    Impaired fasting glucose     last assessed: 8/26/2013    Mild persistent asthma, uncomplicated     last assessed: 11/28/2018    Nephrolithiasis     Nondisplaced intertrochanteric fracture of right femur, initial encounter for closed fracture ([facility])     last assessed: 1/28/2019    Premature ventricular contractions     last assessed: 8/4/2011    Pulmonary nodule     Scoliosis (and kyphoscoliosis), idiopathic     Wears dentures      Past Surgical History:   Procedure Laterality Date    CARPAL TUNNEL RELEASE Left     ENDO 8/801    CATARACT EXTRACTION Left     COLONOSCOPY  10/07/2008    last assessed: 3/29/2016    EYE SURGERY Right 01/13/2022    HERNIA REPAIR      HIP SURGERY      HIP SURGERY Left 03/05/1999    ORIF    MULTIPLE TOOTH EXTRACTIONS Bilateral     OTHER SURGICAL HISTORY Right 10/30/2001    [facility]    TONSILLECTOMY Bilateral     WISDOM TOOTH EXTRACTION Bilateral      Family History   Problem Relation Age of Onset    Hypertension Mother     Stroke Mother     Heart attack Father      Social History     Socioeconomic History    Marital status:      Spouse name: None    Number of children: None    Years of education: None    Highest education level: None   Occupational  History    None   Tobacco Use    Smoking status: Former     Types: Pipe     Start date:      Quit date: 1970     Years since quittin.0     Passive exposure: Past    Smokeless tobacco: Never   Vaping Use    Vaping status: Never Used   Substance and Sexual Activity    Alcohol use: Yes     Comment: social    Drug use: Never    Sexual activity: Not Currently   Other Topics Concern    None   Social History Narrative    None     Social Drivers of Health     Financial Resource Strain: Low Risk  (10/23/2023)    Overall Financial Resource Strain (CARDIA)     Difficulty of Paying Living Expenses: Not very hard   Food Insecurity: No Food Insecurity (10/28/2024)    Hunger Vital Sign     Worried About Running Out of Food in the Last Year: Never true     Ran Out of Food in the Last Year: Never true   Transportation Needs: No Transportation Needs (10/28/2024)    PRAPARE - Transportation     Lack of Transportation (Medical): No     Lack of Transportation (Non-Medical): No   Physical Activity: Not on file   Stress: Not on file   Social Connections: Not on file   Intimate Partner Violence: Not on file   Housing Stability: Low Risk  (10/28/2024)    Housing Stability Vital Sign     Unable to Pay for Housing in the Last Year: No     Number of Times Moved in the Last Year: 0     Homeless in the Last Year: No       Current Outpatient Medications:     albuterol (2.5 mg/3 mL) 0.083 % nebulizer solution, Take 3 mL (2.5 mg total) by nebulization every 4 (four) hours as needed for wheezing or shortness of breath, Disp: 100 mL, Rfl: 0    albuterol (PROVENTIL HFA,VENTOLIN HFA) 90 mcg/act inhaler, TAKE 2 PUFFS BY MOUTH EVERY 4 HOURS AS NEEDED FOR WHEEZE, Disp: 18 g, Rfl: 1    amLODIPine (NORVASC) 2.5 mg tablet, Take 1 tablet (2.5 mg total) by mouth daily, Disp: 100 tablet, Rfl: 3    desonide (DESOWEN) 0.05 % cream, Apply to face twice a day, Disp: 60 g, Rfl: 0    Difluprednate 0.05 % EMUL, , Disp: , Rfl:     docusate sodium (COLACE) 100  "mg capsule, Take 100 mg by mouth in the morning Taking as needed, Disp: , Rfl:     dorzolamide (TRUSOPT) 2 % ophthalmic solution, INSTILL 1 DROP INTO LEFT EYE TWICE A DAY, Disp: , Rfl:     fluticasone-vilanterol (Breo Ellipta) 200-25 mcg/actuation inhaler, INHALE 1 PUFF DAILY RINSE MOUTH AFTER USE., Disp: 180 each, Rfl: 1    furosemide (LASIX) 20 mg tablet, Take 1 tablet by mouth in the morning, Disp: , Rfl:     latanoprost (XALATAN) 0.005 % ophthalmic solution, Administer 1 drop to both eyes daily at bedtime, Disp: , Rfl:     losartan (COZAAR) 50 mg tablet, TAKE 1 TABLET BY MOUTH EVERY DAY, Disp: 90 tablet, Rfl: 2    montelukast (SINGULAIR) 10 mg tablet, TAKE 1 TABLET BY MOUTH EVERY DAY, Disp: 90 tablet, Rfl: 1    multivitamin (THERAGRAN) TABS, Take 1 tablet by mouth daily, Disp: , Rfl:     omeprazole (PriLOSEC OTC) 20 MG tablet, Take 20 mg by mouth, Disp: , Rfl:     Polyethyl Glycol-Propyl Glycol (SYSTANE OP), Administer 1 drop to both eyes 4 (four) times a day Taking as needed, Disp: , Rfl:     polyethylene glycol (GLYCOLAX) 17 GM/SCOOP powder, Take 17 g by mouth as needed Taking as needed, Disp: , Rfl:     prednisoLONE acetate (PRED FORTE) 1 % ophthalmic suspension, Apply 1 drop to eye 2 (two) times a day, Disp: , Rfl:     sodium chloride (ARIEL 128) 2 % hypertonic ophthalmic solution, Administer 1 drop into the left eye 4 (four) times a day, Disp: , Rfl:     triamcinolone (KENALOG) 0.1 % cream, Apply topically 2 (two) times a day as needed for rash, Disp: 80 g, Rfl: 2    warfarin (COUMADIN) 4 mg tablet, TAKE 2 DAILY OR AS DIRECTED, Disp: 180 tablet, Rfl: 2    Review of Systems   Constitutional:  Negative for fever.   Cardiovascular:  Positive for leg swelling.   Musculoskeletal:  Positive for gait problem.   Skin:  Positive for wound.   Neurological:  Positive for weakness.         Objective:  /84   Pulse 77   Temp (!) 97.4 °F (36.3 °C)   Resp 18   Ht 5' 6\" (1.676 m)   Wt 80.7 kg (178 lb)   BMI 28.73 " "kg/m²         Physical Exam  Vitals reviewed.   Constitutional:       Appearance: He is normal weight.   Cardiovascular:      Rate and Rhythm: Normal rate.   Pulmonary:      Effort: Pulmonary effort is normal.      Breath sounds: No rhonchi or rales.   Skin:     Findings: Wound present.             Comments: See full wound assessment   Neurological:      Mental Status: He is alert.      Motor: Weakness present.      Gait: Gait abnormal.                Wound 12/26/24 Pretibial Right;Anterior (Active)   Wound Image   12/26/24 1442   Wound Description Yellow;Slough;Granulation tissue 12/26/24 1411   Marisol-wound Assessment Pink 12/26/24 1411   Wound Length (cm) 5.5 cm 12/26/24 1411   Wound Width (cm) 2.4 cm 12/26/24 1411   Wound Depth (cm) 0.1 cm 12/26/24 1411   Wound Surface Area (cm^2) 13.2 cm^2 12/26/24 1411   Wound Volume (cm^3) 1.32 cm^3 12/26/24 1411   Calculated Wound Volume (cm^3) 1.32 cm^3 12/26/24 1411   Drainage Amount Large 12/26/24 1411   Drainage Description Serosanguineous;Tan 12/26/24 1411   Non-staged Wound Description Full thickness 12/26/24 1411   Treatments Cleansed 12/26/24 1411                       Debridement   Wound 12/26/24 Pretibial Right;Anterior    Universal Protocol:  procedure performed by consultantConsent: Verbal consent obtained.  Consent given by: patient  Time out: Immediately prior to procedure a \"time out\" was called to verify the correct patient, procedure, equipment, support staff and site/side marked as required.  Patient understanding: patient states understanding of the procedure being performed  Patient identity confirmed: verbally with patient    Debridement Details  Performed by: PA  Debridement type: surgical  Level of debridement: subcutaneous tissue  Pain control: lidocaine 4%      Post-debridement measurements  Length (cm): 5.5  Width (cm): 2.4  Depth (cm): 0.2  Percent debrided: 90%  Surface Area (cm^2): 13.2  Area Debrided (cm^2): 11.88  Volume (cm^3): 2.64    Tissue " and other material debrided: dermis and subcutaneous tissue  Devitalized tissue debrided: slough  Instrument(s) utilized: curette  Bleeding: small  Hemostasis obtained with: pressure  Response to treatment: procedure was tolerated well                   Wound Instructions:  Orders Placed This Encounter   Procedures    Wound cleansing and dressings Right;Anterior Pretibial     Wound location Right leg     Change dressing 3x/ week  The dressing must stay dry and intact. You may shower with dressing protected by cast cover or bag. Or you may sponge bathe. Call wound center or home health nurse if dressing becomes wet.   On dressing change days, cleanse the wound with wound cleanser or mild soap and water, rinse, pat dry.   Apply silver alginate to wound  Cover with ABD   Secure with rolled gauze and tape if needed.     Unna Boot Instructions:    Keep compression wrap/wraps clean and dry. If wraps are too tight and you experience numbness/tingling, call the wound center. If after hours, remove wraps or proceed to nearest E.R. and call wound center in AM.  Wrap will be changed 3x weekly  Avoid prolonged standing in one place.  Elevate leg(s) above the level of the heart when sitting or as much as possible.       Watch for signs of infection these include a fever of 100.4°F (38°C) or higher, chills  Signs of wound infection include increasing swelling, redness, warmth, pain around the wound. Foul smell coming from wound  Go to the closest Emergency Room with signs of infection to be evaluated.    Continue SLVNA to assist with dressing changes.    Follow up with [facility] Management Center in 1 week.     Standing Status:   Future     Expected Date:   12/26/2024     Expiration Date:   1/2/2025    Wound Procedure Treatment Right;Anterior Pretibial     This order was created via procedure documentation    Debridement     This order was created via procedure documentation    Lightweight (LW) Wheelchair Package       Cornelia  "PRATIMA Goldsmith    Portions of the record may have been created with voice recognition software. Occasional wrong word or \"sound alike\" substitutions may have occurred due to the inherent limitations of voice recognition software. Read the chart carefully and recognize, using context, where substitutions have occurred.      "

## 2024-12-29 LAB

## 2024-12-30 ENCOUNTER — ANTICOAG VISIT (OUTPATIENT)
Dept: FAMILY MEDICINE CLINIC | Facility: CLINIC | Age: 89
End: 2024-12-30

## 2024-12-30 ENCOUNTER — TELEPHONE (OUTPATIENT)
Age: 89
End: 2024-12-30

## 2024-12-30 ENCOUNTER — HOME CARE VISIT (OUTPATIENT)
Dept: HOME HEALTH SERVICES | Facility: HOME HEALTHCARE | Age: 89
End: 2024-12-30
Payer: MEDICARE

## 2024-12-30 VITALS
SYSTOLIC BLOOD PRESSURE: 146 MMHG | DIASTOLIC BLOOD PRESSURE: 70 MMHG | TEMPERATURE: 96.9 F | RESPIRATION RATE: 20 BRPM | HEART RATE: 80 BPM | OXYGEN SATURATION: 96 %

## 2024-12-30 LAB — INR PPP: 2.8 (ref 0.85–1.19)

## 2024-12-30 PROCEDURE — 10330081 VN NO-PAY CLAIM PROCEDURE

## 2024-12-30 PROCEDURE — G0299 HHS/HOSPICE OF RN EA 15 MIN: HCPCS

## 2024-12-30 PROCEDURE — 400013 VN SOC

## 2024-12-30 NOTE — TELEPHONE ENCOUNTER
Karen from  service admitted the patient to her care today, his INR is 2.8, she will be at the home for approx another 30mins if you would like to call her back with adjustments for his coumadin or you can call Enrico woodruff

## 2025-01-02 ENCOUNTER — OFFICE VISIT (OUTPATIENT)
Facility: HOSPITAL | Age: OVER 89
End: 2025-01-02
Payer: MEDICARE

## 2025-01-02 VITALS
HEART RATE: 104 BPM | SYSTOLIC BLOOD PRESSURE: 139 MMHG | RESPIRATION RATE: 18 BRPM | TEMPERATURE: 98 F | DIASTOLIC BLOOD PRESSURE: 74 MMHG

## 2025-01-02 DIAGNOSIS — L97.812 VENOUS STASIS ULCER OF OTHER PART OF RIGHT LOWER LEG WITH FAT LAYER EXPOSED WITH VARICOSE VEINS (HCC): Primary | ICD-10-CM

## 2025-01-02 DIAGNOSIS — I83.018 VENOUS STASIS ULCER OF OTHER PART OF RIGHT LOWER LEG WITH FAT LAYER EXPOSED WITH VARICOSE VEINS (HCC): Primary | ICD-10-CM

## 2025-01-02 PROCEDURE — 11042 DBRDMT SUBQ TIS 1ST 20SQCM/<: CPT | Performed by: STUDENT IN AN ORGANIZED HEALTH CARE EDUCATION/TRAINING PROGRAM

## 2025-01-02 RX ORDER — LIDOCAINE 40 MG/G
CREAM TOPICAL ONCE
Status: COMPLETED | OUTPATIENT
Start: 2025-01-02 | End: 2025-01-02

## 2025-01-02 RX ADMIN — LIDOCAINE: 40 CREAM TOPICAL at 14:39

## 2025-01-02 NOTE — PROGRESS NOTES
Wound Procedure Treatment Right;Anterior Pretibial    Performed by: Naima Mendoza RN  Authorized by: Cornelia Goldsmith PA-C    Associated wounds:   Wound 12/26/24 Pretibial Right;Anterior  Wound cleansed with:  Wound aggrssively cleansed with NSS and gauze and Hibiclens  Applied primary dressing:  Silver and Calcium alginate  Applied secondary dressing:  ABD  Dressing secured with:  Cain, Tape, Compression wrap, Tubifast and Coban  Comments:  UNNA BOOT

## 2025-01-02 NOTE — PATIENT INSTRUCTIONS
Orders Placed This Encounter   Procedures    Wound cleansing and dressings Right;Anterior Pretibial     Wound location Right leg      Change dressing 3x/ week  The dressing must stay dry and intact. You may shower with dressing protected by cast cover or bag. Or you may sponge bathe. Call wound center or home health nurse if dressing becomes wet.   On dressing change days, cleanse the wound with wound cleanser or mild soap and water, rinse, pat dry.   Apply silver alginate to wound  Cover with ABD   Secure with rolled gauze and tape if needed.      Unna Boot Instructions:     Keep compression wrap/wraps clean and dry. If wraps are too tight and you experience numbness/tingling, call the wound center. If after hours, remove wraps or proceed to nearest E.R. and call wound center in AM.  Wrap will be changed 3x weekly  Avoid prolonged standing in one place.  Elevate leg(s) above the level of the heart when sitting or as much as possible.         Watch for signs of infection these include a fever of 100.4°F (38°C) or higher, chills  Signs of wound infection include increasing swelling, redness, warmth, pain around the wound. Foul smell coming from wound  Go to the closest Emergency Room with signs of infection to be evaluated.     Continue SLVNA to assist with dressing changes.     Follow up with Wound Management Center in 1 week.     Standing Status:   Future     Expiration Date:   1/9/2025

## 2025-01-02 NOTE — PROGRESS NOTES
Patient ID: Enrico Chavira is a 89 y.o. male Date of Birth 8/12/1930       Chief Complaint   Patient presents with    Follow Up Wound Care Visit     RLE       Allergies:  Alphagan p [brimonidine tartrate]    Diagnosis:   Diagnosis ICD-10-CM Associated Orders   1. Venous stasis ulcer of other part of right lower leg with fat layer exposed with varicose veins ([facility])  I83.018 Wound cleansing and dressings Right;Anterior Pretibial    L97.812 lidocaine (LMX) 4 % cream     Wound Procedure Treatment Right;Anterior Pretibial     Debridement           Assessment  & Plan:    F/u VSU of the R anterior lower leg. Ulceration measuring approximately the same in size. Wound bed with a mix of slough and healthy appearing granulation tissue. Drainage remains moderate-heavy. No periwound erythema to indicate soft tissue infection.   Surgical debridement, as below.  Continue with silver alginate and Unna boot for compression change 3 times weekly.Discussed with patient if he experiences any pain, numbness, weakness, breathing changes with use of compression he should notify office immediately and it should be removed. Do not use Ace bandage over the Unna boot.   Elevate legs when resting.  Avoid prolonged standing 1 place.  Take diuretic as prescribed.  F/u in one week. Instructed to call if any questions or concerns arise in meantime.            Subjective:   12/26/24: 89 y/o M with PMHx of HTN, PE, asthma/COPD, hypothyroidism, skin cancer, presents for evaluation of wounds on his RLE with two of his daughters. His RLE wounds have been present for approximately one month. Pt has been seeing his PCP whom is currently applying Unna boots with overlying ACE wraps. There is quite a bit of drainage from ulceration and drainage has been leaking through the Unna boot. Pt has been elevating his leg. Denies hx of heart failure or increased SOB (aside from baseline with COPD).     01/02/2025: Patient presents for follow-up of right lower  extremity venous ulcerations.  Home health has been performing dressing changes.  Presently silver alginate and Unna boot is being used.  Patient reports that he is tolerating the Unna boot well and denies any significant discomfort or changes in his breathing with use of multilayer compression.          The following portions of the patient's history were reviewed and updated as appropriate:   Patient Active Problem List   Diagnosis    Prothrombin gene mutation (HCC)    Primary generalized (osteo)arthritis    Essential hypertension, benign    Dyslipidemia    Intrinsic eczema    Other seborrheic dermatitis    Abnormality of gait    Osteoarthritis of both knees    Chronic pulmonary embolism without acute cor pulmonale (HCC)    Age-related cataract of both eyes    Premature beats    Primary osteoarthritis of both knees    Neoplasm of uncertain behavior of skin    Encounter for long-term (current) use of medications    GERD without esophagitis    Influenza vaccine refused    Hypercalcemia    COVID-19 virus infection    Asthma-COPD overlap syndrome (HCC)    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)    Endothelial corneal dystrophy of both eyes    Heterozygous factor V Leiden mutation (HCC)    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 ([facility])    History of pulmonary embolism    Mature cataract    Primary open-angle glaucoma, bilateral, mild stage    Pseudophakic bullous keratopathy of left eye    TB lung, latent    Hyponatremia    Shortness of breath    Other fatigue    Acquired hypothyroidism    Venous stasis ulcer of right calf limited to breakdown of skin without varicose veins (HCC)    Hashimoto's thyroiditis     Past Medical History:   Diagnosis Date    Arthritis     Coagulation defect ([facility])     last assessed: 11/28/2018    COPD (chronic obstructive pulmonary disease) ([facility])     last assessed: 5/14/2019, 12/6/2017    Generalized osteoarthritis     last assessed: 1/28/2019    GERD  without esophagitis     last assessed: 2019    Glaucoma     last assessed: 2011    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HCC)     last assessed: 10/16/2018    Factor II Prothrombin Gene per [employer]    History of kidney stones 1985    Hx of blood clots     Hypertension     last assessed: 2019    Impaired fasting glucose     last assessed: 2013    Mild persistent asthma, uncomplicated     last assessed: 2018    Nephrolithiasis     Nondisplaced intertrochanteric fracture of right femur, initial encounter for closed fracture (HCC)     last assessed: 2019    Premature ventricular contractions     last assessed: 2011    Pulmonary nodule     Scoliosis (and kyphoscoliosis), idiopathic     Wears dentures      Past Surgical History:   Procedure Laterality Date    CARPAL TUNNEL RELEASE Left     ENDO     CATARACT EXTRACTION Left     COLONOSCOPY  10/07/2008    last assessed: 3/29/2016    EYE SURGERY Right 2022    HERNIA REPAIR      HIP SURGERY      HIP SURGERY Left 1999    ORIF    MULTIPLE TOOTH EXTRACTIONS Bilateral     OTHER SURGICAL HISTORY Right 10/30/2001    [facility]    TONSILLECTOMY Bilateral     WISDOM TOOTH EXTRACTION Bilateral      Family History   Problem Relation Age of Onset    Hypertension Mother     Stroke Mother     Heart attack Father      Social History     Socioeconomic History    Marital status:      Spouse name: Not on file    Number of children: Not on file    Years of education: Not on file    Highest education level: Not on file   Occupational History    Not on file   Tobacco Use    Smoking status: Former     Types: Pipe     Start date:      Quit date: 1970     Years since quittin.0     Passive exposure: Past    Smokeless tobacco: Never   Vaping Use    Vaping status: Never Used   Substance and Sexual Activity    Alcohol use: Yes     Comment: social    Drug use: Never    Sexual activity: Not Currently   Other Topics Concern     Not on file   Social History Narrative    Not on file     Social Drivers of Health     Financial Resource Strain: Low Risk  (10/23/2023)    Overall Financial Resource Strain (CARDIA)     Difficulty of Paying Living Expenses: Not very hard   Food Insecurity: No Food Insecurity (10/28/2024)    Hunger Vital Sign     Worried About Running Out of Food in the Last Year: Never true     Ran Out of Food in the Last Year: Never true   Transportation Needs: No Transportation Needs (12/30/2024)    OASIS : Transportation     Lack of Transportation (Medical): No     Lack of Transportation (Non-Medical): No     Patient Unable or Declines to Respond: No   Physical Activity: Not on file   Stress: Not on file   Social Connections: Not on file   Intimate Partner Violence: Not on file   Housing Stability: Low Risk  (10/28/2024)    Housing Stability Vital Sign     Unable to Pay for Housing in the Last Year: No     Number of Times Moved in the Last Year: 0     Homeless in the Last Year: No       Current Outpatient Medications:     albuterol (2.5 mg/3 mL) 0.083 % nebulizer solution, Take 3 mL (2.5 mg total) by nebulization every 4 (four) hours as needed for wheezing or shortness of breath, Disp: 100 mL, Rfl: 0    albuterol (PROVENTIL HFA,VENTOLIN HFA) 90 mcg/act inhaler, TAKE 2 PUFFS BY MOUTH EVERY 4 HOURS AS NEEDED FOR WHEEZE, Disp: 18 g, Rfl: 1    amLODIPine (NORVASC) 2.5 mg tablet, Take 1 tablet (2.5 mg total) by mouth daily, Disp: 100 tablet, Rfl: 3    Cholecalciferol (Vitamin D3) 125 MCG (5000 UT) CAPS, Take 1 capsule by mouth daily. Indications: Vitamin D Deficiency, Disp: , Rfl:     desonide (DESOWEN) 0.05 % cream, Apply to face twice a day (Patient taking differently: Apply 1 Application topically 2 (two) times a day. Apply topically to face twice a day), Disp: 60 g, Rfl: 0    Difluprednate 0.05 % EMUL, , Disp: , Rfl:     docusate sodium (COLACE) 100 mg capsule, Take 100 mg by mouth in the morning. Indications: Constipation,  Disp: , Rfl:     dorzolamide (TRUSOPT) 2 % ophthalmic solution, INSTILL 1 DROP INTO LEFT EYE TWICE A DAY, Disp: , Rfl:     fluticasone-vilanterol (Breo Ellipta) 200-25 mcg/actuation inhaler, INHALE 1 PUFF DAILY RINSE MOUTH AFTER USE. (Patient taking differently: INHALE 1 PUFF DAILY BY MOUTH;  RINSE MOUTH AFTER USE.), Disp: 180 each, Rfl: 1    furosemide (LASIX) 20 mg tablet, Take 1 tablet by mouth in the morning, Disp: , Rfl:     latanoprost (XALATAN) 0.005 % ophthalmic solution, Administer 1 drop to both eyes daily at bedtime, Disp: , Rfl:     losartan (COZAAR) 50 mg tablet, TAKE 1 TABLET BY MOUTH EVERY DAY, Disp: 90 tablet, Rfl: 2    montelukast (SINGULAIR) 10 mg tablet, TAKE 1 TABLET BY MOUTH EVERY DAY, Disp: 90 tablet, Rfl: 1    multivitamin (THERAGRAN) TABS, Take 1 tablet by mouth daily, Disp: , Rfl:     omeprazole (PriLOSEC OTC) 20 MG tablet, Take 20 mg by mouth daily, Disp: , Rfl:     Polyethyl Glycol-Propyl Glycol (SYSTANE OP), Administer 1 drop to both eyes 4 (four) times a day, Disp: , Rfl:     polyethylene glycol (GLYCOLAX) 17 GM/SCOOP powder, Take 17 g by mouth daily as needed (Constipation) Stir into 8 oz of liquid of choice., Disp: , Rfl:     prednisoLONE acetate (PRED FORTE) 1 % ophthalmic suspension, Administer 1 drop into the left eye 2 (two) times a day, Disp: , Rfl:     sodium chloride (ARIEL 128) 2 % hypertonic ophthalmic solution, Administer 1 drop into the left eye 4 (four) times a day, Disp: , Rfl:     triamcinolone (KENALOG) 0.1 % cream, Apply topically 2 (two) times a day as needed for rash (Patient taking differently: Apply 1 Application topically 2 (two) times a day as needed for rash. APPLY TOPICALLY TO RASH ON FACE AND EARS BID PRN.), Disp: 80 g, Rfl: 2    warfarin (COUMADIN) 4 mg tablet, TAKE 2 DAILY OR AS DIRECTED (Patient taking differently: TAKE 2 TABLETS DAILY BY MOUTH, OR AS DIRECTED. AS PER LAST INR, PATIENT TO TAKE 4 MG EVERY MONDAY AND FRIDAY; AND 8 MG EVERY OTHER DAY), Disp: 180  "tablet, Rfl: 2  No current facility-administered medications for this visit.    Review of Systems   Constitutional:  Negative for fever.   Cardiovascular:  Positive for leg swelling.   Musculoskeletal:  Positive for gait problem.   Skin:  Positive for wound.   Neurological:  Positive for weakness.         Objective:  /74   Pulse 104   Temp 98 °F (36.7 °C)   Resp 18   Pain Score: 0-No pain     Physical Exam  Vitals reviewed.   Constitutional:       Appearance: He is normal weight.   Cardiovascular:      Rate and Rhythm: Normal rate.   Pulmonary:      Effort: Pulmonary effort is normal.      Breath sounds: No rhonchi or rales.   Skin:     Findings: Wound present.             Comments: See full wound assessment   Neurological:      Mental Status: He is alert.      Motor: Weakness present.      Gait: Gait abnormal.           Wound 12/26/24 Pretibial Right;Anterior (Active)   Wound Image   01/02/25 1451   Wound Description Yellow;Slough;Granulation tissue;Epithelialization 01/02/25 1431   Marisol-wound Assessment Pink;Swelling 01/02/25 1431   Wound Length (cm) 5.3 cm 01/02/25 1431   Wound Width (cm) 3 cm 01/02/25 1431   Wound Depth (cm) 0.1 cm 01/02/25 1431   Wound Surface Area (cm^2) 15.9 cm^2 01/02/25 1431   Wound Volume (cm^3) 1.59 cm^3 01/02/25 1431   Calculated Wound Volume (cm^3) 1.59 cm^3 01/02/25 1431   Change in Wound Size % -20.45 01/02/25 1431   Drainage Amount Large 01/02/25 1431   Drainage Description Serosanguineous;Tan 01/02/25 1431   Non-staged Wound Description Full thickness 01/02/25 1431   Treatments Cleansed;Irrigation with NSS 01/02/25 1431   Patient Tolerance Tolerated well 01/02/25 1431   Dressing Status Intact;Old drainage 01/02/25 1431              Debridement   Wound 12/26/24 Pretibial Right;Anterior    Universal Protocol:  procedure performed by consultantConsent: Verbal consent obtained.  Consent given by: patient  Time out: Immediately prior to procedure a \"time out\" was called to " verify the correct patient, procedure, equipment, support staff and site/side marked as required.  Patient understanding: patient states understanding of the procedure being performed  Patient identity confirmed: verbally with patient    Debridement Details  Performed by: PA  Debridement type: surgical  Level of debridement: subcutaneous tissue  Pain control: lidocaine 4%      Post-debridement measurements  Length (cm): 5.3  Width (cm): 3  Depth (cm): 0.2  Percent debrided: 80%  Surface Area (cm^2): 15.9  Area Debrided (cm^2): 12.72  Volume (cm^3): 3.18    Tissue and other material debrided: dermis and subcutaneous tissue  Devitalized tissue debrided: slough  Instrument(s) utilized: curette  Bleeding: small  Hemostasis obtained with: pressure  Response to treatment: procedure was tolerated well                   Wound Instructions:  Orders Placed This Encounter   Procedures    Wound cleansing and dressings Right;Anterior Pretibial     Wound location Right leg      Change dressing 3x/ week  The dressing must stay dry and intact. You may shower with dressing protected by cast cover or bag. Or you may sponge bathe. Call wound center or home health nurse if dressing becomes wet.   On dressing change days, cleanse the wound with wound cleanser or mild soap and water, rinse, pat dry.   Apply silver alginate to wound  Cover with ABD   Secure with rolled gauze and tape if needed.      Unna Boot Instructions:     Keep compression wrap/wraps clean and dry. If wraps are too tight and you experience numbness/tingling, call the wound center. If after hours, remove wraps or proceed to nearest E.R. and call wound center in AM.  Wrap will be changed 3x weekly  Avoid prolonged standing in one place.  Elevate leg(s) above the level of the heart when sitting or as much as possible.         Watch for signs of infection these include a fever of 100.4°F (38°C) or higher, chills  Signs of wound infection include increasing swelling,  "redness, warmth, pain around the wound. Foul smell coming from wound  Go to the closest Emergency Room with signs of infection to be evaluated.     Continue SL[facility]NA to assist with dressing changes.     Follow up with [facility] in 1 week.     Standing Status:   Future     Expiration Date:   1/9/2025    Wound Procedure Treatment Right;Anterior Pretibial     This order was created via procedure documentation    Debridement     This order was created via procedure documentation       Cally Goldsmith, PA-C    Portions of the record may have been created with voice recognition software. Occasional wrong word or \"sound alike\" substitutions may have occurred due to the inherent limitations of voice recognition software. Read the chart carefully and recognize, using context, where substitutions have occurred.      "

## 2025-01-04 ENCOUNTER — HOME CARE VISIT (OUTPATIENT)
Dept: HOME HEALTH SERVICES | Facility: HOME HEALTHCARE | Age: OVER 89
End: 2025-01-04
Payer: MEDICARE

## 2025-01-04 VITALS
OXYGEN SATURATION: 97 % | DIASTOLIC BLOOD PRESSURE: 62 MMHG | TEMPERATURE: 97.8 F | SYSTOLIC BLOOD PRESSURE: 138 MMHG | RESPIRATION RATE: 18 BRPM | HEART RATE: 84 BPM

## 2025-01-04 PROCEDURE — G0299 HHS/HOSPICE OF RN EA 15 MIN: HCPCS

## 2025-01-04 NOTE — CASE COMMUNICATION
[facility] to Pt. Home  Ordering MD Cornelia Goldsmith PAC    Wound 1 RLE       Saline 100ml [id number] __1  Gauze 4x4 N/S 200 4x4s per unit  [id number] __1     Gauze Kerlix (fluff roll) 4inch sterile 796856 __4  ABD 5x9 932143 __6    Transpore white 1 in 841483 __1  Alginate with Silver 531654 __1    Unna Boot 4in 851644 __6    Coban 4 in  [id number] __6

## 2025-01-04 NOTE — CASE COMMUNICATION
Patient/Daughter requesting CMP due to current lasix use without potassium supplement and sodium of 124 on 11/11/24. [facility] can draw this at home visit on 01/07 with INR if lab order is placed in [facility]    Thank you  Amira Akers RN

## 2025-01-06 PROCEDURE — 10330064 SALINE, IRR SOL STR 100ML (48/CS) MGM37

## 2025-01-06 PROCEDURE — 10330064 SPONGE, GAUZE 8PLY N/S 4X4" (200/PK 20P"

## 2025-01-06 PROCEDURE — 10330064 PAD, ABD 5X9 STR LF (1/PK 20PK/BX) MGM1"

## 2025-01-06 PROCEDURE — 10330064

## 2025-01-06 PROCEDURE — 10330064 BANDAGE, GAUZE COTTON  6PLY STR 4X4YDS"

## 2025-01-06 PROCEDURE — 10330064 DRESSING, CALCIUM ALGINATE AG SHEET 4X8"

## 2025-01-06 PROCEDURE — 10330064 UNNA BOOT, 4 (1/BX 12BX/CS)"

## 2025-01-06 PROCEDURE — 10330064 TAPE, ADHSV TRANSPORE WHT 1 (12RL/BX 10"

## 2025-01-06 PROCEDURE — G0180 MD CERTIFICATION HHA PATIENT: HCPCS | Performed by: FAMILY MEDICINE

## 2025-01-07 ENCOUNTER — HOME CARE VISIT (OUTPATIENT)
Dept: HOME HEALTH SERVICES | Facility: HOME HEALTHCARE | Age: OVER 89
End: 2025-01-07
Payer: MEDICARE

## 2025-01-07 ENCOUNTER — TELEPHONE (OUTPATIENT)
Age: OVER 89
End: 2025-01-07

## 2025-01-07 ENCOUNTER — ANTICOAG VISIT (OUTPATIENT)
Dept: FAMILY MEDICINE CLINIC | Facility: CLINIC | Age: OVER 89
End: 2025-01-07

## 2025-01-07 VITALS
RESPIRATION RATE: 16 BRPM | SYSTOLIC BLOOD PRESSURE: 156 MMHG | HEART RATE: 94 BPM | TEMPERATURE: 96.8 F | OXYGEN SATURATION: 97 % | DIASTOLIC BLOOD PRESSURE: 80 MMHG

## 2025-01-07 LAB — INR PPP: 3.8 (ref 0.85–1.19)

## 2025-01-07 PROCEDURE — G0299 HHS/HOSPICE OF RN EA 15 MIN: HCPCS

## 2025-01-07 NOTE — TELEPHONE ENCOUNTER
Vivi from Home health called asking about the message below, she will be more than happy to draw blood if dr would like, please place orders and call [phone]

## 2025-01-07 NOTE — TELEPHONE ENCOUNTER
Amira Akers RN to DO Karen Easley RN       1/4/25 11:01 AM  Patient/Daughter requesting CMP due to current lasix use without potassium supplement and sodium of 124 on 11/11/24. [facility] can draw this at home visit on 01/07 with INR if lab order is placed in [facility]    Thank you  Amira Akers RN

## 2025-01-08 PROCEDURE — 10330064 UNNA BOOT, 4 (1/BX 12BX/CS)"

## 2025-01-08 PROCEDURE — 10330064

## 2025-01-09 ENCOUNTER — OFFICE VISIT (OUTPATIENT)
Facility: HOSPITAL | Age: OVER 89
End: 2025-01-09
Payer: MEDICARE

## 2025-01-09 VITALS
DIASTOLIC BLOOD PRESSURE: 84 MMHG | SYSTOLIC BLOOD PRESSURE: 172 MMHG | RESPIRATION RATE: 20 BRPM | HEART RATE: 88 BPM | TEMPERATURE: 97.5 F

## 2025-01-09 DIAGNOSIS — L85.3 DRY SKIN DERMATITIS: ICD-10-CM

## 2025-01-09 DIAGNOSIS — L97.812 VENOUS STASIS ULCER OF OTHER PART OF RIGHT LOWER LEG WITH FAT LAYER EXPOSED WITH VARICOSE VEINS (HCC): Primary | ICD-10-CM

## 2025-01-09 DIAGNOSIS — I83.018 VENOUS STASIS ULCER OF OTHER PART OF RIGHT LOWER LEG WITH FAT LAYER EXPOSED WITH VARICOSE VEINS (HCC): Primary | ICD-10-CM

## 2025-01-09 PROCEDURE — 11042 DBRDMT SUBQ TIS 1ST 20SQCM/<: CPT | Performed by: STUDENT IN AN ORGANIZED HEALTH CARE EDUCATION/TRAINING PROGRAM

## 2025-01-09 RX ORDER — LIDOCAINE 40 MG/G
CREAM TOPICAL ONCE
Status: COMPLETED | OUTPATIENT
Start: 2025-01-09 | End: 2025-01-09

## 2025-01-09 RX ORDER — TRIAMCINOLONE ACETONIDE 1 MG/G
OINTMENT TOPICAL 2 TIMES DAILY
Qty: 30 G | Refills: 1 | Status: SHIPPED | OUTPATIENT
Start: 2025-01-09 | End: 2025-01-23

## 2025-01-09 RX ADMIN — LIDOCAINE 1 APPLICATION: 40 CREAM TOPICAL at 15:07

## 2025-01-09 NOTE — PROGRESS NOTES
Patient ID: Enrico Chavira is a 89 y.o. male Date of Birth 8/12/1930       Chief Complaint   Patient presents with    Follow Up Wound Care Visit     Right leg       Allergies:  Alphagan p [brimonidine tartrate]    Diagnosis:   Diagnosis ICD-10-CM Associated Orders   1. Venous stasis ulcer of other part of right lower leg with fat layer exposed with varicose veins ([facility])  I83.018 lidocaine (LMX) 4 % cream    L97.812 Wound cleansing and dressings Right;Anterior Pretibial      2. Dry skin dermatitis  L85.3 triamcinolone (KENALOG) 0.1 % ointment           Assessment  & Plan:    F/u VSU of the R anterior lower leg. Improved. Is measuring smaller in size with evidence of new epithelization. Ulcerations with slough present overlying healthy appearing granulation tissue. Drainage is decreasing and is now small-moderate. No periwound erythema to indicate soft tissue infection.   Surgical debridement, as below.    to Polymem foam given decreased drainage. Continue with Unna boot for compression. Will reduce frequency of changes to twice weekly. Notify office or home health should there be any strike-though drainage or pain, color or sensation change of the feet with use of multilayer compression.   Elevate legs when resting.  Avoid prolonged standing 1 place.  Take diuretic as prescribed.  Dermatitis with fissured skin present on R upper anterior thigh.   Apply triamcinolone to affected skin twice daily.   F/u in one week. Instructed to call if any questions or concerns arise in meantime.            Subjective:   12/26/24: 89 y/o M with PMHx of HTN, PE, asthma/COPD, hypothyroidism, skin cancer, presents for evaluation of wounds on his RLE with two of his daughters. His RLE wounds have been present for approximately one month. Pt has been seeing his PCP whom is currently applying Unna boots with overlying ACE wraps. There is quite a bit of drainage from ulceration and drainage has been leaking through the  Unna boot. Pt has been elevating his leg. Denies hx of heart failure or increased SOB (aside from baseline with COPD).     01/02/2025: Patient presents for follow-up of right lower extremity venous ulcerations.  Home health has been performing dressing changes.  Presently silver alginate and Unna boot is being used.  Patient reports that he is tolerating the Unna boot well and denies any significant discomfort or changes in his breathing with use of multilayer compression.    01/09/25:           The following portions of the patient's history were reviewed and updated as appropriate:   Patient Active Problem List   Diagnosis    Prothrombin gene mutation (HCC)    Primary generalized (osteo)arthritis    Essential hypertension, benign    Dyslipidemia    Intrinsic eczema    Other seborrheic dermatitis    Abnormality of gait    Osteoarthritis of both knees    Chronic pulmonary embolism without acute cor pulmonale (HCC)    Age-related cataract of both eyes    Premature beats    Primary osteoarthritis of both knees    Neoplasm of uncertain behavior of skin    Encounter for long-term (current) use of medications    GERD without esophagitis    Influenza vaccine refused    Hypercalcemia    COVID-19 virus infection    Asthma-COPD overlap syndrome (HCC)    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)    Endothelial corneal dystrophy of both eyes    Heterozygous factor V Leiden mutation (HCC)    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)    History of pulmonary embolism    Mature cataract    Primary open-angle glaucoma, bilateral, mild stage    Pseudophakic bullous keratopathy of left eye    TB lung, latent    Hyponatremia    Shortness of breath    Other fatigue    Acquired hypothyroidism    Venous stasis ulcer of right calf limited to breakdown of skin without varicose veins (HCC)    Hashimoto's thyroiditis     Past Medical History:   Diagnosis Date    Arthritis     Coagulation defect (HCC)      last assessed: 2018    COPD (chronic obstructive pulmonary disease) (HCC)     last assessed: 2019, 2017    Generalized osteoarthritis     last assessed: 2019    GERD without esophagitis     last assessed: 2019    Glaucoma     last assessed: 2011    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HCC)     last assessed: 10/16/2018    Factor II Prothrombin Gene per [employer]    History of kidney stones 1985    Hx of blood clots     Hypertension     last assessed: 2019    Impaired fasting glucose     last assessed: 2013    Mild persistent asthma, uncomplicated     last assessed: 2018    Nephrolithiasis     Nondisplaced intertrochanteric fracture of right femur, initial encounter for closed fracture ([facility])     last assessed: 2019    Premature ventricular contractions     last assessed: 2011    Pulmonary nodule     Scoliosis (and kyphoscoliosis), idiopathic     Wears dentures      Past Surgical History:   Procedure Laterality Date    CARPAL TUNNEL RELEASE Left     ENDO     CATARACT EXTRACTION Left     COLONOSCOPY  10/07/2008    last assessed: 3/29/2016    EYE SURGERY Right 2022    HERNIA REPAIR      HIP SURGERY      HIP SURGERY Left 1999    ORIF    MULTIPLE TOOTH EXTRACTIONS Bilateral     OTHER SURGICAL HISTORY Right 10/30/2001    [facility]    TONSILLECTOMY Bilateral     WISDOM TOOTH EXTRACTION Bilateral      Family History   Problem Relation Age of Onset    Hypertension Mother     Stroke Mother     Heart attack Father      Social History     Socioeconomic History    Marital status:      Spouse name: Not on file    Number of children: Not on file    Years of education: Not on file    Highest education level: Not on file   Occupational History    Not on file   Tobacco Use    Smoking status: Former     Types: Pipe     Start date:      Quit date: 1970     Years since quittin.0     Passive exposure: Past    Smokeless tobacco: Never    Vaping Use    Vaping status: Never Used   Substance and Sexual Activity    Alcohol use: Yes     Comment: social    Drug use: Never    Sexual activity: Not Currently   Other Topics Concern    Not on file   Social History Narrative    Not on file     Social Drivers of Health     Financial Resource Strain: Low Risk  (10/23/2023)    Overall Financial Resource Strain (CARDIA)     Difficulty of Paying Living Expenses: Not very hard   Food Insecurity: No Food Insecurity (10/28/2024)    Hunger Vital Sign     Worried About Running Out of Food in the Last Year: Never true     Ran Out of Food in the Last Year: Never true   Transportation Needs: No Transportation Needs (12/30/2024)    OASIS : Transportation     Lack of Transportation (Medical): No     Lack of Transportation (Non-Medical): No     Patient Unable or Declines to Respond: No   Physical Activity: Not on file   Stress: Not on file   Social Connections: Not on file   Intimate Partner Violence: Not on file   Housing Stability: Low Risk  (10/28/2024)    Housing Stability Vital Sign     Unable to Pay for Housing in the Last Year: No     Number of Times Moved in the Last Year: 0     Homeless in the Last Year: No       Current Outpatient Medications:     triamcinolone (KENALOG) 0.1 % ointment, Apply topically 2 (two) times a day for 14 days To skin of R thigh, Disp: 30 g, Rfl: 1    albuterol (2.5 mg/3 mL) 0.083 % nebulizer solution, Take 3 mL (2.5 mg total) by nebulization every 4 (four) hours as needed for wheezing or shortness of breath, Disp: 100 mL, Rfl: 0    albuterol (PROVENTIL HFA,VENTOLIN HFA) 90 mcg/act inhaler, TAKE 2 PUFFS BY MOUTH EVERY 4 HOURS AS NEEDED FOR WHEEZE, Disp: 18 g, Rfl: 1    amLODIPine (NORVASC) 2.5 mg tablet, Take 1 tablet (2.5 mg total) by mouth daily, Disp: 100 tablet, Rfl: 3    Cholecalciferol (Vitamin D3) 125 MCG (5000 UT) CAPS, Take 1 capsule by mouth daily. Indications: Vitamin D Deficiency, Disp: , Rfl:     desonide (DESOWEN) 0.05 %  cream, Apply to face twice a day (Patient taking differently: Apply 1 Application topically 2 (two) times a day. Apply topically to face twice a day), Disp: 60 g, Rfl: 0    Difluprednate 0.05 % EMUL, , Disp: , Rfl:     docusate sodium (COLACE) 100 mg capsule, Take 100 mg by mouth in the morning. Indications: Constipation, Disp: , Rfl:     dorzolamide (TRUSOPT) 2 % ophthalmic solution, INSTILL 1 DROP INTO LEFT EYE TWICE A DAY, Disp: , Rfl:     fluticasone-vilanterol (Breo Ellipta) 200-25 mcg/actuation inhaler, INHALE 1 PUFF DAILY RINSE MOUTH AFTER USE. (Patient taking differently: INHALE 1 PUFF DAILY BY MOUTH;  RINSE MOUTH AFTER USE.), Disp: 180 each, Rfl: 1    furosemide (LASIX) 20 mg tablet, Take 1 tablet by mouth in the morning, Disp: , Rfl:     latanoprost (XALATAN) 0.005 % ophthalmic solution, Administer 1 drop to both eyes daily at bedtime, Disp: , Rfl:     losartan (COZAAR) 50 mg tablet, TAKE 1 TABLET BY MOUTH EVERY DAY, Disp: 90 tablet, Rfl: 2    montelukast (SINGULAIR) 10 mg tablet, TAKE 1 TABLET BY MOUTH EVERY DAY, Disp: 90 tablet, Rfl: 1    multivitamin (THERAGRAN) TABS, Take 1 tablet by mouth daily, Disp: , Rfl:     omeprazole (PriLOSEC OTC) 20 MG tablet, Take 20 mg by mouth daily, Disp: , Rfl:     Oral Electrolytes (ELECTROLYTE SR PO), Take 2 tablets by mouth daily., Disp: , Rfl:     Polyethyl Glycol-Propyl Glycol (SYSTANE OP), Administer 1 drop to both eyes 4 (four) times a day, Disp: , Rfl:     polyethylene glycol (GLYCOLAX) 17 GM/SCOOP powder, Take 17 g by mouth daily as needed (Constipation) Stir into 8 oz of liquid of choice., Disp: , Rfl:     prednisoLONE acetate (PRED FORTE) 1 % ophthalmic suspension, Administer 1 drop into the left eye 2 (two) times a day, Disp: , Rfl:     sodium chloride (ARIEL 128) 2 % hypertonic ophthalmic solution, Administer 1 drop into the left eye 4 (four) times a day, Disp: , Rfl:     triamcinolone (KENALOG) 0.1 % cream, Apply topically 2 (two) times a day as needed for  rash (Patient taking differently: Apply 1 Application topically 2 (two) times a day as needed for rash. APPLY TOPICALLY TO RASH ON FACE AND EARS BID PRN.), Disp: 80 g, Rfl: 2    warfarin (COUMADIN) 4 mg tablet, TAKE 2 DAILY OR AS DIRECTED (Patient taking differently: TAKE 2 TABLETS DAILY BY MOUTH, OR AS DIRECTED. AS PER LAST INR, PATIENT TO TAKE 4 MG EVERY MONDAY AND FRIDAY; AND 8 MG EVERY OTHER DAY), Disp: 180 tablet, Rfl: 2  No current facility-administered medications for this visit.    Review of Systems   Constitutional:  Negative for fever.   Cardiovascular:  Positive for leg swelling.   Musculoskeletal:  Positive for gait problem.   Skin:  Positive for wound.   Neurological:  Positive for weakness.         Objective:  BP (!) 172/84   Pulse 88   Temp 97.5 °F (36.4 °C)   Resp 20   Pain Score: 0-No pain     Physical Exam  Vitals reviewed.   Constitutional:       Appearance: He is normal weight.   Cardiovascular:      Rate and Rhythm: Normal rate.   Pulmonary:      Effort: Pulmonary effort is normal.      Breath sounds: No rhonchi or rales.   Skin:     Findings: Wound present.             Comments: VSU of the R anterior lower leg. Improved. Is measuring smaller in size with evidence of new epithelization. Ulcerations with slough present overlying healthy appearing granulation tissue. Drainage is decreasing and is now small-moderate. No periwound erythema to indicate soft tissue infection.      Neurological:      Mental Status: He is alert.      Motor: Weakness present.      Gait: Gait abnormal.         Wound 12/26/24 Pretibial Right;Anterior (Active)   Wound Image   01/09/25 1526   Wound Description Yellow;Granulation tissue;Epithelialization;Slough 01/09/25 1459   Marisol-wound Assessment Dry;Edema;Erythema;Scar Tissue 01/09/25 1459   Wound Length (cm) 4.4 cm 01/09/25 1459   Wound Width (cm) 1.5 cm 01/09/25 1459   Wound Depth (cm) 0.1 cm 01/09/25 1459   Wound Surface Area (cm^2) 6.6 cm^2 01/09/25 1459   Wound  "Volume (cm^3) 0.66 cm^3 01/09/25 1459   Calculated Wound Volume (cm^3) 0.66 cm^3 01/09/25 1459   Change in Wound Size % 50 01/09/25 1459   Drainage Amount Small 01/09/25 1459   Drainage Description Brown;Serosanguineous 01/09/25 1459   Non-staged Wound Description Full thickness 01/09/25 1459   Treatments Cleansed;Irrigation with NSS 01/02/25 1431   Patient Tolerance Tolerated well 01/02/25 1431   Dressing Status Intact 01/09/25 1459             Debridement   Wound 12/26/24 Pretibial Right;Anterior    Universal Protocol:  procedure performed by consultantConsent: Verbal consent obtained.  Consent given by: patient  Time out: Immediately prior to procedure a \"time out\" was called to verify the correct patient, procedure, equipment, support staff and site/side marked as required.  Patient understanding: patient states understanding of the procedure being performed  Patient identity confirmed: verbally with patient    Debridement Details  Performed by: PA  Debridement type: surgical  Level of debridement: subcutaneous tissue  Pain control: lidocaine 4%      Post-debridement measurements  Length (cm): 4.4  Width (cm): 1.5  Depth (cm): 0.2  Percent debrided: 65%  Surface Area (cm^2): 6.6  Area Debrided (cm^2): 4.29  Volume (cm^3): 1.32    Tissue and other material debrided: dermis and subcutaneous tissue  Devitalized tissue debrided: slough  Instrument(s) utilized: curette  Bleeding: small  Hemostasis obtained with: pressure  Response to treatment: procedure was tolerated well                   Wound Instructions:  Orders Placed This Encounter   Procedures    Wound cleansing and dressings Right;Anterior Pretibial     Right lower leg    Wash your hands with soap and water.  Remove old dressing, discard into plastic bag and place in trash.    Cleanse the wound with NSS prior to applying a clean dressing. Do not use tissue or cotton balls.   Do not scrub the wound. Pat dry using gauze.  Shower no. May use a cast cover to " "shower to keep dressings dry.      Apply pink Polymem to the wound.  Cover with gauze or ABD pad  Secure with unna boot then coban and tubifast. ----Do not use spandigrip or tubigrip with unna boot. -----  Change dressing two times a week.     Unna Boot wrap Instructions    Keep compression wrap/wraps clean and dry. If wraps are too tight and you experience numbness/tingling, call the wound center. If after hours, remove wraps or proceed to nearest E.R. and call wound center in AM.    Wrap will be changed 2x weekly- one time weekly with TRACIE on Monday 1/13/25 and Thursday as wound management center.    Avoid prolonged standing in one place.    Elevate leg(s) above the level of the heart when sitting or as much as possible.     Standing Status:   Future     Expiration Date:   1/16/2025       Cornelia Goldsmith PA-C    Portions of the record may have been created with voice recognition software. Occasional wrong word or \"sound alike\" substitutions may have occurred due to the inherent limitations of voice recognition software. Read the chart carefully and recognize, using context, where substitutions have occurred.    "

## 2025-01-09 NOTE — PATIENT INSTRUCTIONS
Orders Placed This Encounter   Procedures    Wound cleansing and dressings Right;Anterior Pretibial     Right lower leg    Wash your hands with soap and water.  Remove old dressing, discard into plastic bag and place in trash.    Cleanse the wound with NSS prior to applying a clean dressing. Do not use tissue or cotton balls.   Do not scrub the wound. Pat dry using gauze.  Shower no. May use a cast cover to shower to keep dressings dry.      Apply pink Polymem to the wound.  Cover with gauze or ABD pad  Secure with unna boot then coban and tubifast. ----Do not use spandigrip or tubigrip with unna boot. -----  Change dressing two times a week.     Unna Boot wrap Instructions    Keep compression wrap/wraps clean and dry. If wraps are too tight and you experience numbness/tingling, call the wound center. If after hours, remove wraps or proceed to nearest E.R. and call wound center in AM.    Wrap will be changed 2x weekly- one time weekly with TRACIE on Monday 1/13/25 and Thursday as wound management center.    Avoid prolonged standing in one place.    Elevate leg(s) above the level of the heart when sitting or as much as possible.     Standing Status:   Future     Expiration Date:   1/16/2025

## 2025-01-09 NOTE — PROGRESS NOTES
Wound Procedure Treatment Right;Anterior Pretibial    Performed by: vIon Shaw LPN  Authorized by: Cornelia Goldsmith PA-C    Associated wounds:   Wound 12/26/24 Pretibial Right;Anterior  Wound cleansed with:  Soap and water  Applied to periwound:  Moisture lotion  Applied primary dressing:  Polymem foam  Applied secondary dressing:  ABD  Dressing secured with:  Cain, Tape, Compression wrap, Coban and Tubifast  Comments:  Gilbert ash

## 2025-01-11 ENCOUNTER — HOME CARE VISIT (OUTPATIENT)
Dept: HOME HEALTH SERVICES | Facility: HOME HEALTHCARE | Age: OVER 89
End: 2025-01-11
Payer: MEDICARE

## 2025-01-11 NOTE — CASE COMMUNICATION
SN ULLOA 01/11/2024. Per WC note from 01/09/2024 patient is now to have WC 2x weekly once with VNA and once at WC. Next VNA vs is ordered for 01/13/2024

## 2025-01-12 DIAGNOSIS — I10 ESSENTIAL HYPERTENSION, BENIGN: ICD-10-CM

## 2025-01-13 ENCOUNTER — HOME CARE VISIT (OUTPATIENT)
Dept: HOME HEALTH SERVICES | Facility: HOME HEALTHCARE | Age: OVER 89
End: 2025-01-13
Payer: MEDICARE

## 2025-01-13 ENCOUNTER — TELEPHONE (OUTPATIENT)
Age: OVER 89
End: 2025-01-13

## 2025-01-13 ENCOUNTER — ANTICOAG VISIT (OUTPATIENT)
Dept: FAMILY MEDICINE CLINIC | Facility: CLINIC | Age: OVER 89
End: 2025-01-13

## 2025-01-13 VITALS
DIASTOLIC BLOOD PRESSURE: 80 MMHG | TEMPERATURE: 97 F | HEART RATE: 74 BPM | OXYGEN SATURATION: 99 % | SYSTOLIC BLOOD PRESSURE: 148 MMHG | RESPIRATION RATE: 18 BRPM

## 2025-01-13 LAB — INR PPP: 1.4 (ref 0.85–1.19)

## 2025-01-13 PROCEDURE — G0299 HHS/HOSPICE OF RN EA 15 MIN: HCPCS

## 2025-01-13 RX ORDER — LOSARTAN POTASSIUM 50 MG/1
50 TABLET ORAL DAILY
Qty: 90 TABLET | Refills: 1 | Status: SHIPPED | OUTPATIENT
Start: 2025-01-13

## 2025-01-13 NOTE — TELEPHONE ENCOUNTER
Karen, Nurse, [facility], reports she checked Enrico's INR at home today, result 1.4. This is a decrease from 3.8 on 1/7/2025.    She requests that Enrico is called directly with PCP recommendation.

## 2025-01-16 ENCOUNTER — HOME CARE VISIT (OUTPATIENT)
Dept: HOME HEALTH SERVICES | Facility: HOME HEALTHCARE | Age: OVER 89
End: 2025-01-16
Payer: MEDICARE

## 2025-01-16 ENCOUNTER — OFFICE VISIT (OUTPATIENT)
Facility: HOSPITAL | Age: OVER 89
End: 2025-01-16
Payer: MEDICARE

## 2025-01-16 VITALS
RESPIRATION RATE: 18 BRPM | DIASTOLIC BLOOD PRESSURE: 72 MMHG | SYSTOLIC BLOOD PRESSURE: 141 MMHG | TEMPERATURE: 97.1 F | HEART RATE: 92 BPM

## 2025-01-16 DIAGNOSIS — L97.812 VENOUS STASIS ULCER OF OTHER PART OF RIGHT LOWER LEG WITH FAT LAYER EXPOSED WITH VARICOSE VEINS (HCC): Primary | ICD-10-CM

## 2025-01-16 DIAGNOSIS — I87.2 EDEMA OF BOTH LOWER EXTREMITIES DUE TO PERIPHERAL VENOUS INSUFFICIENCY: ICD-10-CM

## 2025-01-16 DIAGNOSIS — I83.018 VENOUS STASIS ULCER OF OTHER PART OF RIGHT LOWER LEG WITH FAT LAYER EXPOSED WITH VARICOSE VEINS (HCC): Primary | ICD-10-CM

## 2025-01-16 PROCEDURE — 97597 DBRDMT OPN WND 1ST 20 CM/<: CPT | Performed by: STUDENT IN AN ORGANIZED HEALTH CARE EDUCATION/TRAINING PROGRAM

## 2025-01-16 RX ORDER — LIDOCAINE 40 MG/G
CREAM TOPICAL ONCE
Status: COMPLETED | OUTPATIENT
Start: 2025-01-16 | End: 2025-01-16

## 2025-01-16 RX ADMIN — LIDOCAINE 1 APPLICATION: 40 CREAM TOPICAL at 12:58

## 2025-01-16 NOTE — PROGRESS NOTES
Patient ID: Enrico Chavira is a 89 y.o. male Date of Birth 8/12/1930       Chief Complaint   Patient presents with    Follow Up Wound Care Visit     Right lower leg       Allergies:  Alphagan p [brimonidine tartrate]    Diagnosis:   Diagnosis ICD-10-CM Associated Orders   1. Venous stasis ulcer of other part of right lower leg with fat layer exposed with varicose veins (Formerly [facility])  I83.018 Wound cleansing and dressings Right;Anterior Pretibial    L97.812 lidocaine (LMX) 4 % cream     Durable Medical Equipment     Wound Procedure Treatment Right;Anterior Pretibial      2. Edema of both lower extremities due to peripheral venous insufficiency  I87.2 Durable Medical Equipment           Assessment  & Plan:    F/u VSU of the R anterior lower leg.  Continues to improve.  There is new edge epithelization present.  Ulcerations with a mix of healthy granulation tissue and a small amount of exudate.  Drainage is moderate.  There is mild periwound maceration present.  No erythema to indicate acute soft tissue infection.  Selective debridement, as below.  PolyMem Max Ag.  Zinc to periwound.  Unna boot for compression.  Change twice weekly. Notify office or home health should there be any strike-though drainage or pain, color or sensation change of the feet with use of multilayer compression.   Elevate legs when resting.  Avoid prolonged standing one place.  Prescription provided today for 20 to 30 mmHg thigh-high compression stockings to be utilized for daily for edema control.   Continue with diuretic as prescribed.  F/u in one week. Instructed to call if any questions or concerns arise in meantime.            Subjective:   12/26/24: 89 y/o M with PMHx of HTN, PE, asthma/COPD, hypothyroidism, skin cancer, presents for evaluation of wounds on his RLE with two of his daughters. His RLE wounds have been present for approximately one month. Pt has been seeing his PCP whom is currently applying Unna boots with overlying ACE wraps.  There is quite a bit of drainage from ulceration and drainage has been leaking through the Unna boot. Pt has been elevating his leg. Denies hx of heart failure or increased SOB (aside from baseline with COPD).     01/02/2025: Patient presents for follow-up of right lower extremity venous ulcerations.  Home health has been performing dressing changes.  Presently silver alginate and Unna boot is being used.  Patient reports that he is tolerating the Unna boot well and denies any significant discomfort or changes in his breathing with use of multilayer compression.    01/09/25: Pt presents for f/u VSU of the RLE. Has been tolerating Unna boot very well. Offers no complaints today.           The following portions of the patient's history were reviewed and updated as appropriate:   Patient Active Problem List   Diagnosis    Prothrombin gene mutation (HCC)    Primary generalized (osteo)arthritis    Essential hypertension, benign    Dyslipidemia    Intrinsic eczema    Other seborrheic dermatitis    Abnormality of gait    Osteoarthritis of both knees    Chronic pulmonary embolism without acute cor pulmonale (HCC)    Age-related cataract of both eyes    Premature beats    Primary osteoarthritis of both knees    Neoplasm of uncertain behavior of skin    Encounter for long-term (current) use of medications    GERD without esophagitis    Influenza vaccine refused    Hypercalcemia    COVID-19 virus infection    Asthma-COPD overlap syndrome (HCC)    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)    Endothelial corneal dystrophy of both eyes    Heterozygous factor V Leiden mutation (HCC)    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)    History of pulmonary embolism    Mature cataract    Primary open-angle glaucoma, bilateral, mild stage    Pseudophakic bullous keratopathy of left eye    TB lung, latent    Hyponatremia    Shortness of breath    Other fatigue    Acquired hypothyroidism    Venous  stasis ulcer of right calf limited to breakdown of skin without varicose veins (HCC)    Hashimoto's thyroiditis    Venous stasis ulcer of other part of right lower leg with fat layer exposed with varicose veins (HCC)    Edema of both lower extremities due to peripheral venous insufficiency     Past Medical History:   Diagnosis Date    Arthritis     Coagulation defect (HCC)     last assessed: 11/28/2018    COPD (chronic obstructive pulmonary disease) (Formerly [facility])     last assessed: 5/14/2019, 12/6/2017    Generalized osteoarthritis     last assessed: 1/28/2019    GERD without esophagitis     last assessed: 1/28/2019    Glaucoma     last assessed: 8/4/2011    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Formerly [facility])     last assessed: 10/16/2018    Factor II Prothrombin Gene per [employer]    History of kidney stones 1985    Hx of blood clots     Hypertension     last assessed: 5/14/2019    Impaired fasting glucose     last assessed: 8/26/2013    Mild persistent asthma, uncomplicated     last assessed: 11/28/2018    Nephrolithiasis     Nondisplaced intertrochanteric fracture of right femur, initial encounter for closed fracture (Formerly [facility])     last assessed: 1/28/2019    Premature ventricular contractions     last assessed: 8/4/2011    Pulmonary nodule     Scoliosis (and kyphoscoliosis), idiopathic     Wears dentures      Past Surgical History:   Procedure Laterality Date    CARPAL TUNNEL RELEASE Left     ENDO 8/801    CATARACT EXTRACTION Left     COLONOSCOPY  10/07/2008    last assessed: 3/29/2016    EYE SURGERY Right 01/13/2022    HERNIA REPAIR      HIP SURGERY      HIP SURGERY Left 03/05/1999    ORIF    MULTIPLE TOOTH EXTRACTIONS Bilateral     OTHER SURGICAL HISTORY Right 10/30/2001    [facility]    TONSILLECTOMY Bilateral     WISDOM TOOTH EXTRACTION Bilateral      Family History   Problem Relation Age of Onset    Hypertension Mother     Stroke Mother     Heart attack Father      Social History     Socioeconomic History    Marital  status:      Spouse name: Not on file    Number of children: Not on file    Years of education: Not on file    Highest education level: Not on file   Occupational History    Not on file   Tobacco Use    Smoking status: Former     Types: Pipe     Start date:      Quit date: 1970     Years since quittin.0     Passive exposure: Past    Smokeless tobacco: Never   Vaping Use    Vaping status: Never Used   Substance and Sexual Activity    Alcohol use: Yes     Comment: social    Drug use: Never    Sexual activity: Not Currently   Other Topics Concern    Not on file   Social History Narrative    Not on file     Social Drivers of Health     Financial Resource Strain: Low Risk  (10/23/2023)    Overall Financial Resource Strain (CARDIA)     Difficulty of Paying Living Expenses: Not very hard   Food Insecurity: No Food Insecurity (10/28/2024)    Hunger Vital Sign     Worried About Running Out of Food in the Last Year: Never true     Ran Out of Food in the Last Year: Never true   Transportation Needs: No Transportation Needs (2024)    OASIS : Transportation     Lack of Transportation (Medical): No     Lack of Transportation (Non-Medical): No     Patient Unable or Declines to Respond: No   Physical Activity: Not on file   Stress: Not on file   Social Connections: Not on file   Intimate Partner Violence: Not on file   Housing Stability: Low Risk  (10/28/2024)    Housing Stability Vital Sign     Unable to Pay for Housing in the Last Year: No     Number of Times Moved in the Last Year: 0     Homeless in the Last Year: No       Current Outpatient Medications:     albuterol (2.5 mg/3 mL) 0.083 % nebulizer solution, Take 3 mL (2.5 mg total) by nebulization every 4 (four) hours as needed for wheezing or shortness of breath, Disp: 100 mL, Rfl: 0    albuterol (PROVENTIL HFA,VENTOLIN HFA) 90 mcg/act inhaler, TAKE 2 PUFFS BY MOUTH EVERY 4 HOURS AS NEEDED FOR WHEEZE, Disp: 18 g, Rfl: 1    amLODIPine (NORVASC) 2.5  mg tablet, Take 1 tablet (2.5 mg total) by mouth daily, Disp: 100 tablet, Rfl: 3    Cholecalciferol (Vitamin D3) 125 MCG (5000 UT) CAPS, Take 1 capsule by mouth daily. Indications: Vitamin D Deficiency, Disp: , Rfl:     desonide (DESOWEN) 0.05 % cream, Apply to face twice a day (Patient taking differently: Apply 1 Application topically 2 (two) times a day. Apply topically to face twice a day), Disp: 60 g, Rfl: 0    Difluprednate 0.05 % EMUL, , Disp: , Rfl:     docusate sodium (COLACE) 100 mg capsule, Take 100 mg by mouth in the morning. Indications: Constipation, Disp: , Rfl:     dorzolamide (TRUSOPT) 2 % ophthalmic solution, INSTILL 1 DROP INTO LEFT EYE TWICE A DAY, Disp: , Rfl:     fluticasone-vilanterol (Breo Ellipta) 200-25 mcg/actuation inhaler, INHALE 1 PUFF DAILY RINSE MOUTH AFTER USE. (Patient taking differently: INHALE 1 PUFF DAILY BY MOUTH;  RINSE MOUTH AFTER USE.), Disp: 180 each, Rfl: 1    furosemide (LASIX) 20 mg tablet, Take 1 tablet by mouth in the morning, Disp: , Rfl:     latanoprost (XALATAN) 0.005 % ophthalmic solution, Administer 1 drop to both eyes daily at bedtime, Disp: , Rfl:     losartan (COZAAR) 50 mg tablet, TAKE 1 TABLET BY MOUTH EVERY DAY, Disp: 90 tablet, Rfl: 1    montelukast (SINGULAIR) 10 mg tablet, TAKE 1 TABLET BY MOUTH EVERY DAY, Disp: 90 tablet, Rfl: 1    multivitamin (THERAGRAN) TABS, Take 1 tablet by mouth daily, Disp: , Rfl:     omeprazole (PriLOSEC OTC) 20 MG tablet, Take 20 mg by mouth daily, Disp: , Rfl:     Oral Electrolytes (ELECTROLYTE SR PO), Take 2 tablets by mouth daily., Disp: , Rfl:     Polyethyl Glycol-Propyl Glycol (SYSTANE OP), Administer 1 drop to both eyes 4 (four) times a day, Disp: , Rfl:     polyethylene glycol (GLYCOLAX) 17 GM/SCOOP powder, Take 17 g by mouth daily as needed (Constipation) Stir into 8 oz of liquid of choice., Disp: , Rfl:     prednisoLONE acetate (PRED FORTE) 1 % ophthalmic suspension, Administer 1 drop into the left eye 2 (two) times a  day, Disp: , Rfl:     sodium chloride (ARIEL 128) 2 % hypertonic ophthalmic solution, Administer 1 drop into the left eye 4 (four) times a day, Disp: , Rfl:     triamcinolone (KENALOG) 0.1 % cream, Apply topically 2 (two) times a day as needed for rash (Patient taking differently: Apply 1 Application topically 2 (two) times a day as needed for rash. APPLY TOPICALLY TO RASH ON FACE AND EARS BID PRN.), Disp: 80 g, Rfl: 2    triamcinolone (KENALOG) 0.1 % ointment, Apply topically 2 (two) times a day for 14 days To skin of R thigh, Disp: 30 g, Rfl: 1    warfarin (COUMADIN) 4 mg tablet, TAKE 2 DAILY OR AS DIRECTED (Patient taking differently: TAKE 2 TABLETS DAILY BY MOUTH, OR AS DIRECTED. AS PER LAST INR, PATIENT TO TAKE 4 MG EVERY MONDAY AND FRIDAY; AND 8 MG EVERY OTHER DAY), Disp: 180 tablet, Rfl: 2  No current facility-administered medications for this visit.    Review of Systems   Constitutional:  Negative for fever.   Cardiovascular:  Positive for leg swelling.   Musculoskeletal:  Positive for gait problem.   Skin:  Positive for wound.   Neurological:  Positive for weakness.         Objective:  /72   Pulse 92   Temp (!) 97.1 °F (36.2 °C)   Resp 18   Pain Score: 0-No pain     Physical Exam  Vitals reviewed.   Constitutional:       Appearance: He is normal weight.   Cardiovascular:      Rate and Rhythm: Normal rate.   Pulmonary:      Effort: Pulmonary effort is normal.      Breath sounds: No rhonchi or rales.   Skin:     Findings: Wound present.             Comments: VSU of the R anterior lower leg.  Continues to improve.  There is new edge epithelization present.  Ulcerations with a mix of healthy granulation tissue and a small amount of exudate.  Drainage is moderate.  There is mild periwound maceration present.  No erythema to indicate acute soft tissue infection.     Neurological:      Mental Status: He is alert.      Motor: Weakness present.      Gait: Gait abnormal.         Wound 12/26/24 Pretibial  "Right;Anterior (Active)   Wound Image   01/16/25 1253   Wound Description Granulation tissue;Yellow;Epithelialization;Slough 01/16/25 1250   Marisol-wound Assessment Maceration;Dry;Scaly 01/16/25 1250   Wound Length (cm) 4.3 cm 01/16/25 1250   Wound Width (cm) 2.5 cm 01/16/25 1250   Wound Depth (cm) 0.01 cm 01/16/25 1250   Wound Surface Area (cm^2) 10.75 cm^2 01/16/25 1250   Wound Volume (cm^3) 0.1075 cm^3 01/16/25 1250   Calculated Wound Volume (cm^3) 0.11 cm^3 01/16/25 1250   Change in Wound Size % 91.67 01/16/25 1250   Drainage Amount Moderate 01/16/25 1250   Drainage Description Serosanguineous 01/16/25 1250   Non-staged Wound Description Full thickness 01/16/25 1250   Treatments Cleansed;Irrigation with NSS 01/02/25 1431   Patient Tolerance Tolerated well 01/02/25 1431   Dressing Status Intact 01/16/25 1250                Debridement   Wound 12/26/24 Pretibial Right;Anterior    Universal Protocol:  procedure performed by consultantConsent: Verbal consent obtained.  Consent given by: patient  Time out: Immediately prior to procedure a \"time out\" was called to verify the correct patient, procedure, equipment, support staff and site/side marked as required.  Patient understanding: patient states understanding of the procedure being performed  Patient identity confirmed: verbally with patient    Debridement Details  Performed by: PA  Debridement type: selective  Pain control: lidocaine 4%      Post-debridement measurements  Length (cm): 4.3  Width (cm): 2.5  Depth (cm): 0.1  Percent debrided: 15%  Surface Area (cm^2): 10.75  Area Debrided (cm^2): 1.61  Volume (cm^3): 1.08    Devitalized tissue debrided: exudate  Instrument(s) utilized: curette  Bleeding: small  Hemostasis obtained with: pressure  Response to treatment: procedure was tolerated well                   Wound Instructions:  Orders Placed This Encounter   Procedures    Wound cleansing and dressings Right;Anterior Pretibial     Right lower leg     Wash your " "hands with soap and water.  Remove old dressing, discard into plastic bag and place in trash.    Cleanse the wound with NSS prior to applying a clean dressing. Do not use tissue or cotton balls.   Do not scrub the wound. Pat dry using gauze.  Shower no. May use a cast cover to shower to keep dressings dry.        Apply Polymem silver to the wound.  Cover with gauze or ABD pad  Secure with unna boot then coban and tubifast. ----Do not use spandigrip or tubigrip with unna boot. -----  Change dressing two times a week.     Unna Boot wrap Instructions     Keep compression wrap/wraps clean and dry. If wraps are too tight and you experience numbness/tingling, call the wound center. If after hours, remove wraps or proceed to nearest E.R. and call wound center in AM.     Wrap will be changed 2x weekly- one time weekly with TRAICE on Monday 1/20/25 and Thursday as wound management center.     Avoid prolonged standing in one place.     Elevate leg(s) above the level of the heart when sitting or as much as possible.    Measurements for compression stockings as follows:  Calf- 35 cm  Ankle- 23.5 cm  Foot- 23.1 cm     Standing Status:   Future     Expiration Date:   1/23/2025    Wound Procedure Treatment Right;Anterior Pretibial     This order was created via procedure documentation    Durable Medical Equipment     20-30 mmHg thigh-high compression stockings for bilateral lower legs     This patient has an active home care or hospice episode. Should this order be COMPLETED by [facility]?:   No       Cornelia Goldsmith PA-C      Portions of the record may have been created with voice recognition software. Occasional wrong word or \"sound alike\" substitutions may have occurred due to the inherent limitations of voice recognition software. Read the chart carefully and recognize, using context, where substitutions have occurred.      "

## 2025-01-16 NOTE — PATIENT INSTRUCTIONS
Orders Placed This Encounter   Procedures    Wound cleansing and dressings Right;Anterior Pretibial     Right lower leg     Wash your hands with soap and water.  Remove old dressing, discard into plastic bag and place in trash.    Cleanse the wound with NSS prior to applying a clean dressing. Do not use tissue or cotton balls.   Do not scrub the wound. Pat dry using gauze.  Shower no. May use a cast cover to shower to keep dressings dry.        Apply Polymem silver to the wound.  Cover with gauze or ABD pad  Secure with unna boot then coban and tubifast. ----Do not use spandigrip or tubigrip with unna boot. -----  Change dressing two times a week.     Unna Boot wrap Instructions     Keep compression wrap/wraps clean and dry. If wraps are too tight and you experience numbness/tingling, call the wound center. If after hours, remove wraps or proceed to nearest E.R. and call wound center in AM.     Wrap will be changed 2x weekly- one time weekly with TRACIE on Monday 1/20/25 and Thursday as wound management center.     Avoid prolonged standing in one place.     Elevate leg(s) above the level of the heart when sitting or as much as possible.    Measurements for compression stockings as follows:  Calf- 35 cm  Ankle- 23.5 cm  Foot- 23.1 cm     Standing Status:   Future     Expiration Date:   1/23/2025

## 2025-01-16 NOTE — PROGRESS NOTES
Wound Procedure Treatment Right;Anterior Pretibial    Performed by: Ivon Shaw LPN  Authorized by: Cornelia Goldsmith PA-C    Associated wounds:   Wound 12/26/24 Pretibial Right;Anterior  Wound cleansed with:  Soap and water  Applied to periwound:  Zinc oxide paste  Applied primary dressing:  Polymem foam and Silver  Applied secondary dressing:  ABD  Dressing secured with:  Compression wrap, Coban and Tubifast  Comments:  Gilbert ash

## 2025-01-20 ENCOUNTER — HOME CARE VISIT (OUTPATIENT)
Dept: HOME HEALTH SERVICES | Facility: HOME HEALTHCARE | Age: OVER 89
End: 2025-01-20
Payer: MEDICARE

## 2025-01-20 ENCOUNTER — TELEPHONE (OUTPATIENT)
Age: OVER 89
End: 2025-01-20

## 2025-01-20 ENCOUNTER — ANTICOAG VISIT (OUTPATIENT)
Dept: FAMILY MEDICINE CLINIC | Facility: CLINIC | Age: OVER 89
End: 2025-01-20

## 2025-01-20 VITALS
DIASTOLIC BLOOD PRESSURE: 66 MMHG | TEMPERATURE: 97.2 F | OXYGEN SATURATION: 98 % | HEART RATE: 80 BPM | SYSTOLIC BLOOD PRESSURE: 132 MMHG | RESPIRATION RATE: 20 BRPM

## 2025-01-20 LAB — INR PPP: 2.2 (ref 0.85–1.19)

## 2025-01-20 PROCEDURE — G0299 HHS/HOSPICE OF RN EA 15 MIN: HCPCS

## 2025-01-20 NOTE — TELEPHONE ENCOUNTER
Karen from [facility] to report that she took patient's INR and it is 2.2. Please advise.

## 2025-01-23 ENCOUNTER — HOME CARE VISIT (OUTPATIENT)
Dept: HOME HEALTH SERVICES | Facility: HOME HEALTHCARE | Age: OVER 89
End: 2025-01-23
Payer: MEDICARE

## 2025-01-23 ENCOUNTER — OFFICE VISIT (OUTPATIENT)
Facility: HOSPITAL | Age: OVER 89
End: 2025-01-23
Payer: MEDICARE

## 2025-01-23 VITALS
HEART RATE: 84 BPM | SYSTOLIC BLOOD PRESSURE: 128 MMHG | TEMPERATURE: 97.1 F | DIASTOLIC BLOOD PRESSURE: 70 MMHG | RESPIRATION RATE: 20 BRPM

## 2025-01-23 DIAGNOSIS — L97.812 VENOUS STASIS ULCER OF OTHER PART OF RIGHT LOWER LEG WITH FAT LAYER EXPOSED WITH VARICOSE VEINS (HCC): Primary | ICD-10-CM

## 2025-01-23 DIAGNOSIS — I83.018 VENOUS STASIS ULCER OF OTHER PART OF RIGHT LOWER LEG WITH FAT LAYER EXPOSED WITH VARICOSE VEINS (HCC): Primary | ICD-10-CM

## 2025-01-23 PROCEDURE — 97597 DBRDMT OPN WND 1ST 20 CM/<: CPT | Performed by: STUDENT IN AN ORGANIZED HEALTH CARE EDUCATION/TRAINING PROGRAM

## 2025-01-23 RX ORDER — LIDOCAINE 40 MG/G
CREAM TOPICAL ONCE
Status: COMPLETED | OUTPATIENT
Start: 2025-01-23 | End: 2025-01-23

## 2025-01-23 RX ADMIN — LIDOCAINE 1 APPLICATION: 40 CREAM TOPICAL at 14:33

## 2025-01-23 NOTE — PROGRESS NOTES
Patient ID: Enrico Chavira is a 89 y.o. male Date of Birth 8/12/1930       Chief Complaint   Patient presents with    Follow Up Wound Care Visit     Right lower leg       Allergies:  Alphagan p [brimonidine tartrate]    Diagnosis:   Diagnosis ICD-10-CM Associated Orders   1. Venous stasis ulcer of other part of right lower leg with fat layer exposed with varicose veins ([facility])  I83.018 Wound cleansing and dressings Right;Anterior Pretibial    L97.812 lidocaine (LMX) 4 % cream     Wound Procedure Treatment Right;Anterior Pretibial     Debridement           Assessment  & Plan:    Follow-up venous ulceration of the right anterior lower extremity.  Continues to do very well.  There is new edge epithelization.  Measurements are getting smaller.  There is a thin layer of exudate present overlying very healthy appearing granulation tissue.  Drainage is moderate.  There is no periwound erythema to indicate soft tissue infection.  There is some indenting of the skin related to the foam dressing.  Selective debridement, as below.  Continue with current plan of care which includes PolyMem Max Ag and Unna boot for compression change twice weekly.  Notify office of any discomfort with use of compression or color, sensation or temperature changes of the toes with use of compression.  Elevate legs when resting.  Avoid prolonged standing in 1 place.  Continue with diuretic as prescribed.  F/u in one week. Instructed to call if any questions or concerns arise in meantime.            Subjective:   12/26/24: 89 y/o M with PMHx of HTN, PE, asthma/COPD, hypothyroidism, skin cancer, presents for evaluation of wounds on his RLE with two of his daughters. His RLE wounds have been present for approximately one month. Pt has been seeing his PCP whom is currently applying Unna boots with overlying ACE wraps. There is quite a bit of drainage from ulceration and drainage has been leaking through the Unna boot. Pt has been elevating his leg.  Denies hx of heart failure or increased SOB (aside from baseline with COPD).     01/02/2025: Patient presents for follow-up of right lower extremity venous ulcerations.  Home health has been performing dressing changes.  Presently silver alginate and Unna boot is being used.  Patient reports that he is tolerating the Unna boot well and denies any significant discomfort or changes in his breathing with use of multilayer compression.    01/09/25: Pt presents for f/u VSU of the RLE. Has been tolerating Unna boot very well. Offers no complaints today.     01/23/25: Patient presents for follow-up of right lower extremity venous ulceration at continues to be tolerating the Unna boot very well.  Offers no complaints today.  Unfortunately he was unable to get a transfer wheelchair because the company was requesting go to card information despite the insurance covering the cost. Has an upcoming appointment with his PCP on Monday.           The following portions of the patient's history were reviewed and updated as appropriate:   Patient Active Problem List   Diagnosis    Prothrombin gene mutation (HCC)    Primary generalized (osteo)arthritis    Essential hypertension, benign    Dyslipidemia    Intrinsic eczema    Other seborrheic dermatitis    Abnormality of gait    Osteoarthritis of both knees    Chronic pulmonary embolism without acute cor pulmonale (HCC)    Age-related cataract of both eyes    Premature beats    Primary osteoarthritis of both knees    Neoplasm of uncertain behavior of skin    Encounter for long-term (current) use of medications    GERD without esophagitis    Influenza vaccine refused    Hypercalcemia    COVID-19 virus infection    Asthma-COPD overlap syndrome (HCC)    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)    Endothelial corneal dystrophy of both eyes    Heterozygous factor V Leiden mutation (HCC)    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[facility])    History of  pulmonary embolism    Mature cataract    Primary open-angle glaucoma, bilateral, mild stage    Pseudophakic bullous keratopathy of left eye    TB lung, latent    Hyponatremia    Shortness of breath    Other fatigue    Acquired hypothyroidism    Venous stasis ulcer of right calf limited to breakdown of skin without varicose veins ([facility])    Hashimoto's thyroiditis    Venous stasis ulcer of other part of right lower leg with fat layer exposed with varicose veins ([facility])    Edema of both lower extremities due to peripheral venous insufficiency     Past Medical History:   Diagnosis Date    Arthritis     Coagulation defect ([facility])     last assessed: 11/28/2018    COPD (chronic obstructive pulmonary disease) ([facility])     last assessed: 5/14/2019, 12/6/2017    Generalized osteoarthritis     last assessed: 1/28/2019    GERD without esophagitis     last assessed: 1/28/2019    Glaucoma     last assessed: 8/4/2011    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[facility])     last assessed: 10/16/2018    Factor II Prothrombin Gene per [employer]    History of kidney stones 1985    Hx of blood clots     Hypertension     last assessed: 5/14/2019    Impaired fasting glucose     last assessed: 8/26/2013    Mild persistent asthma, uncomplicated     last assessed: 11/28/2018    Nephrolithiasis     Nondisplaced intertrochanteric fracture of right femur, initial encounter for closed fracture ([facility])     last assessed: 1/28/2019    Premature ventricular contractions     last assessed: 8/4/2011    Pulmonary nodule     Scoliosis (and kyphoscoliosis), idiopathic     Wears dentures      Past Surgical History:   Procedure Laterality Date    CARPAL TUNNEL RELEASE Left     ENDO 8/801    CATARACT EXTRACTION Left     COLONOSCOPY  10/07/2008    last assessed: 3/29/2016    EYE SURGERY Right 01/13/2022    HERNIA REPAIR      HIP SURGERY      HIP SURGERY Left 03/05/1999    ORIF    MULTIPLE TOOTH EXTRACTIONS Bilateral     OTHER SURGICAL HISTORY Right 10/30/2001     [facility]    TONSILLECTOMY Bilateral     WISDOM TOOTH EXTRACTION Bilateral      Family History   Problem Relation Age of Onset    Hypertension Mother     Stroke Mother     Heart attack Father      Social History     Socioeconomic History    Marital status:      Spouse name: Not on file    Number of children: Not on file    Years of education: Not on file    Highest education level: Not on file   Occupational History    Not on file   Tobacco Use    Smoking status: Former     Types: Pipe     Start date:      Quit date: 1970     Years since quittin.0     Passive exposure: Past    Smokeless tobacco: Never   Vaping Use    Vaping status: Never Used   Substance and Sexual Activity    Alcohol use: Yes     Comment: social    Drug use: Never    Sexual activity: Not Currently   Other Topics Concern    Not on file   Social History Narrative    Not on file     Social Drivers of Health     Financial Resource Strain: Low Risk  (10/23/2023)    Overall Financial Resource Strain (CARDIA)     Difficulty of Paying Living Expenses: Not very hard   Food Insecurity: No Food Insecurity (10/28/2024)    Hunger Vital Sign     Worried About Running Out of Food in the Last Year: Never true     Ran Out of Food in the Last Year: Never true   Transportation Needs: No Transportation Needs (2024)    OASIS : Transportation     Lack of Transportation (Medical): No     Lack of Transportation (Non-Medical): No     Patient Unable or Declines to Respond: No   Physical Activity: Not on file   Stress: Not on file   Social Connections: Not on file   Intimate Partner Violence: Not on file   Housing Stability: Low Risk  (10/28/2024)    Housing Stability Vital Sign     Unable to Pay for Housing in the Last Year: No     Number of Times Moved in the Last Year: 0     Homeless in the Last Year: No       Current Outpatient Medications:     albuterol (2.5 mg/3 mL) 0.083 % nebulizer solution, Take 3 mL (2.5 mg total) by nebulization every  4 (four) hours as needed for wheezing or shortness of breath, Disp: 100 mL, Rfl: 0    albuterol (PROVENTIL HFA,VENTOLIN HFA) 90 mcg/act inhaler, TAKE 2 PUFFS BY MOUTH EVERY 4 HOURS AS NEEDED FOR WHEEZE, Disp: 18 g, Rfl: 1    amLODIPine (NORVASC) 2.5 mg tablet, Take 1 tablet (2.5 mg total) by mouth daily, Disp: 100 tablet, Rfl: 3    Cholecalciferol (Vitamin D3) 125 MCG (5000 UT) CAPS, Take 1 capsule by mouth daily. Indications: Vitamin D Deficiency, Disp: , Rfl:     desonide (DESOWEN) 0.05 % cream, Apply to face twice a day (Patient taking differently: Apply 1 Application topically 2 (two) times a day. Apply topically to face twice a day), Disp: 60 g, Rfl: 0    Difluprednate 0.05 % EMUL, , Disp: , Rfl:     docusate sodium (COLACE) 100 mg capsule, Take 100 mg by mouth in the morning. Indications: Constipation, Disp: , Rfl:     dorzolamide (TRUSOPT) 2 % ophthalmic solution, INSTILL 1 DROP INTO LEFT EYE TWICE A DAY, Disp: , Rfl:     fluticasone-vilanterol (Breo Ellipta) 200-25 mcg/actuation inhaler, INHALE 1 PUFF DAILY RINSE MOUTH AFTER USE. (Patient taking differently: INHALE 1 PUFF DAILY BY MOUTH;  RINSE MOUTH AFTER USE.), Disp: 180 each, Rfl: 1    furosemide (LASIX) 20 mg tablet, Take 1 tablet by mouth in the morning, Disp: , Rfl:     latanoprost (XALATAN) 0.005 % ophthalmic solution, Administer 1 drop to both eyes daily at bedtime, Disp: , Rfl:     losartan (COZAAR) 50 mg tablet, TAKE 1 TABLET BY MOUTH EVERY DAY, Disp: 90 tablet, Rfl: 1    montelukast (SINGULAIR) 10 mg tablet, TAKE 1 TABLET BY MOUTH EVERY DAY, Disp: 90 tablet, Rfl: 1    multivitamin (THERAGRAN) TABS, Take 1 tablet by mouth daily, Disp: , Rfl:     omeprazole (PriLOSEC OTC) 20 MG tablet, Take 20 mg by mouth daily, Disp: , Rfl:     Oral Electrolytes (ELECTROLYTE SR PO), Take 2 tablets by mouth daily., Disp: , Rfl:     Polyethyl Glycol-Propyl Glycol (SYSTANE OP), Administer 1 drop to both eyes 4 (four) times a day, Disp: , Rfl:     polyethylene glycol  (GLYCOLAX) 17 GM/SCOOP powder, Take 17 g by mouth daily as needed (Constipation) Stir into 8 oz of liquid of choice., Disp: , Rfl:     prednisoLONE acetate (PRED FORTE) 1 % ophthalmic suspension, Administer 1 drop into the left eye 2 (two) times a day, Disp: , Rfl:     sodium chloride (ARIEL 128) 2 % hypertonic ophthalmic solution, Administer 1 drop into the left eye 4 (four) times a day, Disp: , Rfl:     triamcinolone (KENALOG) 0.1 % cream, Apply topically 2 (two) times a day as needed for rash (Patient taking differently: Apply 1 Application topically 2 (two) times a day as needed for rash. APPLY TOPICALLY TO RASH ON FACE AND EARS BID PRN.), Disp: 80 g, Rfl: 2    triamcinolone (KENALOG) 0.1 % ointment, Apply topically 2 (two) times a day for 14 days To skin of R thigh, Disp: 30 g, Rfl: 1    warfarin (COUMADIN) 4 mg tablet, TAKE 2 DAILY OR AS DIRECTED (Patient taking differently: TAKE 2 TABLETS DAILY BY MOUTH, OR AS DIRECTED. AS PER LAST INR, PATIENT TO TAKE 4 MG EVERY MONDAY AND FRIDAY; AND 8 MG EVERY OTHER DAY), Disp: 180 tablet, Rfl: 2  No current facility-administered medications for this visit.    Review of Systems   Constitutional:  Negative for fever.   Cardiovascular:  Positive for leg swelling.   Musculoskeletal:  Positive for gait problem.   Skin:  Positive for wound.   Neurological:  Positive for weakness.         Objective:  /70   Pulse 84   Temp (!) 97.1 °F (36.2 °C)   Resp 20   Pain Score: 0-No pain     Physical Exam  Vitals reviewed.   Constitutional:       Appearance: He is normal weight.   Cardiovascular:      Rate and Rhythm: Normal rate.   Pulmonary:      Effort: Pulmonary effort is normal.      Breath sounds: No rhonchi or rales.   Skin:     Findings: Wound present.             Comments: Venous ulceration of the right anterior lower extremity continues to do very well.  There is new edge epithelization.  Measurements are getting smaller.  There is a thin layer of exudate present  "overlying very healthy appearing granulation tissue.  Drainage is moderate.  There is no periwound erythema to indicate soft tissue infection.  There is some indenting of the skin related to the foam dressing.     Neurological:      Mental Status: He is alert.      Motor: Weakness present.      Gait: Gait abnormal.         Wound 12/26/24 Pretibial Right;Anterior (Active)   Wound Image Images linked 01/23/25 1427   Wound Description Pink;Epithelialization;Granulation tissue;Yellow;Slough 01/23/25 1422   Marisol-wound Assessment Dry;Pink;Edema 01/23/25 1422   Wound Length (cm) 3.1 cm 01/23/25 1422   Wound Width (cm) 2 cm 01/23/25 1422   Wound Depth (cm) 0.1 cm 01/23/25 1422   Wound Surface Area (cm^2) 6.2 cm^2 01/23/25 1422   Wound Volume (cm^3) 0.62 cm^3 01/23/25 1422   Calculated Wound Volume (cm^3) 0.62 cm^3 01/23/25 1422   Change in Wound Size % 53.03 01/23/25 1422   Drainage Amount Moderate 01/23/25 1422   Drainage Description Serosanguineous;Yellow 01/23/25 1422   Non-staged Wound Description Full thickness 01/23/25 1422   Dressing Status Intact 01/23/25 1422       Debridement   Wound 12/26/24 Pretibial Right;Anterior    Universal Protocol:  procedure performed by consultantConsent: Verbal consent obtained.  Consent given by: patient  Time out: Immediately prior to procedure a \"time out\" was called to verify the correct patient, procedure, equipment, support staff and site/side marked as required.  Patient understanding: patient states understanding of the procedure being performed  Patient identity confirmed: verbally with patient    Debridement Details  Performed by: PA  Debridement type: selective  Pain control: lidocaine 4%      Post-debridement measurements  Length (cm): 3.1  Width (cm): 2  Depth (cm): 0.1  Percent debrided: 20%  Surface Area (cm^2): 6.2  Area Debrided (cm^2): 1.24  Volume (cm^3): 0.62    Devitalized tissue debrided: exudate  Instrument(s) utilized: curette  Bleeding: small  Hemostasis " "obtained with: pressure  Response to treatment: procedure was tolerated well                   Wound Instructions:  Orders Placed This Encounter   Procedures    Wound cleansing and dressings Right;Anterior Pretibial     Wound cleansing and dressings        Right lower leg     Wash your hands with soap and water.  Remove old dressing, discard into plastic bag and place in trash.    Cleanse the wound with NSS prior to applying a clean dressing. Do not use tissue or cotton balls.   Do not scrub the wound. Pat dry using gauze.  Shower no. May use a cast cover to shower to keep dressings dry.        Apply Polymem silver to the wound.  Cover with gauze or ABD pad  Secure with unna boot then coban and tubifast. ----Do not use spandigrip or tubigrip with unna boot. -----  Change dressing two times a week.     Unna Boot wrap Instructions     Keep compression wrap/wraps clean and dry. If wraps are too tight and you experience numbness/tingling, call the wound center. If after hours, remove wraps or proceed to nearest E.R. and call wound center in AM.     Wrap will be changed 2x weekly- one time weekly with ALYCEVNA on Monday 1/27/25 and Thursday at wound management center.     Avoid prolonged standing in one place.     Elevate leg(s) above the level of the heart when sitting or as much as possible.     **Measurements for compression stockings as follows:         Calf- 35.2 cm         Ankle- 22 cm         Foot- 23.2 cm     Standing Status:   Future     Expiration Date:   1/30/2025    Wound Procedure Treatment Right;Anterior Pretibial     This order was created via procedure documentation    Debridement     This order was created via procedure documentation       Cally Goldsmith, PA-C      Portions of the record may have been created with voice recognition software. Occasional wrong word or \"sound alike\" substitutions may have occurred due to the inherent limitations of voice recognition software. Read the chart carefully and " recognize, using context, where substitutions have occurred.

## 2025-01-23 NOTE — PROGRESS NOTES
Wound Procedure Treatment Right;Anterior Pretibial    Performed by: Ivon Shaw LPN  Authorized by: Cornelia Goldsmith PA-C    Associated wounds:   Wound 12/26/24 Pretibial Right;Anterior  Wound cleansed with:  Soap and water  Applied to periwound:  Zinc oxide paste  Applied primary dressing:  Polymem foam and Silver  Applied secondary dressing:  Gauze  Dressing secured with:  Compression wrap, Tape, Coban and Tubifast  Comments:  Gilbert ash

## 2025-01-23 NOTE — PATIENT INSTRUCTIONS
Orders Placed This Encounter   Procedures    Wound cleansing and dressings Right;Anterior Pretibial     Wound cleansing and dressings        Right lower leg     Wash your hands with soap and water.  Remove old dressing, discard into plastic bag and place in trash.    Cleanse the wound with NSS prior to applying a clean dressing. Do not use tissue or cotton balls.   Do not scrub the wound. Pat dry using gauze.  Shower no. May use a cast cover to shower to keep dressings dry.        Apply Polymem silver to the wound.  Cover with gauze or ABD pad  Secure with unna boot then coban and tubifast. ----Do not use spandigrip or tubigrip with unna boot. -----  Change dressing two times a week.     Unna Boot wrap Instructions     Keep compression wrap/wraps clean and dry. If wraps are too tight and you experience numbness/tingling, call the wound center. If after hours, remove wraps or proceed to nearest E.R. and call wound center in AM.     Wrap will be changed 2x weekly- one time weekly with TRACIE on Monday 1/27/25 and Thursday at wound management center.     Avoid prolonged standing in one place.     Elevate leg(s) above the level of the heart when sitting or as much as possible.     **Measurements for compression stockings as follows:         Calf- 35.2 cm         Ankle- 22 cm         Foot- 23.2 cm     Standing Status:   Future     Expiration Date:   1/30/2025

## 2025-01-27 ENCOUNTER — ANTICOAG VISIT (OUTPATIENT)
Dept: FAMILY MEDICINE CLINIC | Facility: CLINIC | Age: OVER 89
End: 2025-01-27

## 2025-01-27 ENCOUNTER — OFFICE VISIT (OUTPATIENT)
Dept: FAMILY MEDICINE CLINIC | Facility: CLINIC | Age: OVER 89
End: 2025-01-27
Payer: MEDICARE

## 2025-01-27 ENCOUNTER — TELEPHONE (OUTPATIENT)
Age: OVER 89
End: 2025-01-27

## 2025-01-27 ENCOUNTER — HOME CARE VISIT (OUTPATIENT)
Dept: HOME HEALTH SERVICES | Facility: HOME HEALTHCARE | Age: OVER 89
End: 2025-01-27
Payer: MEDICARE

## 2025-01-27 VITALS
HEIGHT: 64 IN | DIASTOLIC BLOOD PRESSURE: 76 MMHG | SYSTOLIC BLOOD PRESSURE: 152 MMHG | WEIGHT: 172.5 LBS | HEART RATE: 85 BPM | BODY MASS INDEX: 29.45 KG/M2 | OXYGEN SATURATION: 97 % | TEMPERATURE: 96.5 F

## 2025-01-27 VITALS
HEART RATE: 74 BPM | OXYGEN SATURATION: 98 % | RESPIRATION RATE: 20 BRPM | SYSTOLIC BLOOD PRESSURE: 138 MMHG | DIASTOLIC BLOOD PRESSURE: 70 MMHG | TEMPERATURE: 97.3 F

## 2025-01-27 DIAGNOSIS — D68.51 HETEROZYGOUS FACTOR V LEIDEN MUTATION (HCC): ICD-10-CM

## 2025-01-27 DIAGNOSIS — I10 ESSENTIAL HYPERTENSION, BENIGN: Primary | ICD-10-CM

## 2025-01-27 DIAGNOSIS — D68.52 PROTHROMBIN GENE MUTATION (HCC): ICD-10-CM

## 2025-01-27 DIAGNOSIS — I87.2 EDEMA OF BOTH LOWER EXTREMITIES DUE TO PERIPHERAL VENOUS INSUFFICIENCY: ICD-10-CM

## 2025-01-27 DIAGNOSIS — J44.89 ASTHMA-COPD OVERLAP SYNDROME (HCC): ICD-10-CM

## 2025-01-27 PROBLEM — D68.2: Status: RESOLVED | Noted: 2018-11-01 | Resolved: 2025-01-27

## 2025-01-27 LAB — INR PPP: 2.5 (ref 0.85–1.19)

## 2025-01-27 PROCEDURE — G2211 COMPLEX E/M VISIT ADD ON: HCPCS | Performed by: FAMILY MEDICINE

## 2025-01-27 PROCEDURE — G0299 HHS/HOSPICE OF RN EA 15 MIN: HCPCS

## 2025-01-27 PROCEDURE — 99214 OFFICE O/P EST MOD 30 MIN: CPT | Performed by: FAMILY MEDICINE

## 2025-01-27 RX ORDER — WARFARIN SODIUM 5 MG/1
TABLET ORAL
Qty: 135 TABLET | Refills: 1 | Status: SHIPPED | OUTPATIENT
Start: 2025-01-27

## 2025-01-27 RX ORDER — FUROSEMIDE 20 MG/1
20 TABLET ORAL DAILY
Qty: 90 TABLET | Refills: 1 | Status: SHIPPED | OUTPATIENT
Start: 2025-01-27

## 2025-01-27 NOTE — ASSESSMENT & PLAN NOTE
Patient's blood pressure is much improved.  When he first came in his blood pressure was found to be 152/76.  I rechecked his blood pressure and found it to be 126/74.  As such, I did not make any change with his medication.  For now, we will continue amlodipine 2.5 mg daily in addition to the losartan and the furosemide.  I am going to recommend a follow-up visit in 3 months.

## 2025-01-27 NOTE — ASSESSMENT & PLAN NOTE
Patient continues to need INR monitoring.  It is becoming increasingly difficult to get the patient out of the house per the family.  I ordered a home PT/INR monitor.  I advised the patient of his prothrombin time results.  No change with his current medication.  Repeat prothrombin time ordered in 2 weeks.  Once he gets his monitor I recommended he use it weekly and call me with the results.  He can then stop going to the lab for prothrombin time.  Today, I renewed his warfarin and the dose of 7.5 mg daily.  Orders:    warfarin (COUMADIN) 5 mg tablet; Take 1.5 tablets daily or as directed

## 2025-01-27 NOTE — PROGRESS NOTES
Name: Enrico Chavira      : 1930      MRN: [id number]  Encounter Provider: Brodie Anne DO  Encounter Date: 2025   Encounter department: [facility]  :  Assessment & Plan  Essential hypertension, benign  Patient's blood pressure is much improved.  When he first came in his blood pressure was found to be 152/76.  I rechecked his blood pressure and found it to be 126/74.  As such, I did not make any change with his medication.  For now, we will continue amlodipine 2.5 mg daily in addition to the losartan and the furosemide.  I am going to recommend a follow-up visit in 3 months.         Edema of both lower extremities due to peripheral venous insufficiency  I renewed the patient's furosemide 20 mg tablets daily.  I reviewed the patient's CMP from .  I plan repeat labs when he returns in 3 months.  Orders:    furosemide (LASIX) 20 mg tablet; Take 1 tablet (20 mg total) by mouth in the morning    Asthma-COPD overlap syndrome (HCC)  Patient has asthma-COPD overlap syndrome.  The patient is currently well-controlled.  He will continue Breo Ellipta inhaler daily and albuterol as needed.       Prothrombin gene mutation (HCC)    Orders:    warfarin (COUMADIN) 5 mg tablet; Take 1.5 tablets daily or as directed    Heterozygous factor V Leiden mutation (HCC)  Patient continues to need INR monitoring.  It is becoming increasingly difficult to get the patient out of the house per the family.  I ordered a home PT/INR monitor.  I advised the patient of his prothrombin time results.  No change with his current medication.  Repeat prothrombin time ordered in 2 weeks.  Once he gets his monitor I recommended he use it weekly and call me with the results.  He can then stop going to the lab for prothrombin time.  Today, I renewed his warfarin and the dose of 7.5 mg daily.  Orders:    warfarin (COUMADIN) 5 mg tablet; Take 1.5 tablets daily or as directed           History of Present  "Illness   This is a 89-year-old white male who presents to the office today for his routine checkup.  The patient is now seeing wound care.  They are continuing Unna boot treatment for him.  He is also being seen and followed by home health care.  He denies any wound pain.  His daughter accompanied the patient to the office today.  She asked if I can order the patient a home protime/INR monitor.  He tells me he has been trying to elevate his legs and stay off them is much as possible.  He has been compliant with his medication.  He does note a decrease in his edema with the reduced dose of amlodipine.      Review of Systems   Constitutional:  Negative for chills and fever.   Cardiovascular:  Positive for leg swelling. Negative for chest pain and palpitations.   Gastrointestinal:  Negative for abdominal distention, abdominal pain, blood in stool, constipation, diarrhea and nausea.   Musculoskeletal:  Positive for gait problem.        Denies any falls   Skin:  Positive for wound.       Objective   /76   Pulse 85   Temp (!) 96.5 °F (35.8 °C) (Tympanic)   Ht 5' 4\" (1.626 m)   Wt 78.2 kg (172 lb 8 oz)   SpO2 97%   BMI 29.61 kg/m²      Physical Exam  Vitals reviewed.   Constitutional:       Comments: The patient is a 89-year-old white male who appears his stated age.  The patient is nonseptic in appearance and in no apparent distress   HENT:      Head: Normocephalic and atraumatic.      Right Ear: Tympanic membrane, ear canal and external ear normal. There is no impacted cerumen.      Left Ear: Tympanic membrane, ear canal and external ear normal. There is no impacted cerumen.      Mouth/Throat:      Mouth: Mucous membranes are moist.      Pharynx: Oropharynx is clear. No oropharyngeal exudate or posterior oropharyngeal erythema.   Eyes:      General: No scleral icterus.        Right eye: No discharge.         Left eye: No discharge.      Conjunctiva/sclera: Conjunctivae normal.      Pupils: Pupils are equal, " round, and reactive to light.   Cardiovascular:      Rate and Rhythm: Normal rate and regular rhythm.      Heart sounds: Normal heart sounds. No murmur heard.     No friction rub. No gallop.   Pulmonary:      Effort: Pulmonary effort is normal. No respiratory distress.      Breath sounds: Normal breath sounds. No stridor. No wheezing, rhonchi or rales.   Musculoskeletal:      Cervical back: Neck supple.      Comments: Positive for scoliosis   Lymphadenopathy:      Cervical: No cervical adenopathy.   Psychiatric:         Mood and Affect: Mood normal.         Behavior: Behavior normal.         Thought Content: Thought content normal.         Judgment: Judgment normal.     Extremities: The right leg was wrapped with an Unna boot.  I did not remove the Unna boot to check the wound.  The left lower extremity shows +1/4 lower extremity edema.

## 2025-01-27 NOTE — TELEPHONE ENCOUNTER
Karen, Nurse, [facility], reports she checked Enrico's INR today, result 2.5. She asks that PCP provide recommendation regarding medication dosage for this week.    Karen states the value of 1.9 that is in Enrico chart, under the Labs tab is incorrect. She asks that it is corrected.

## 2025-01-27 NOTE — TELEPHONE ENCOUNTER
Pt's daughter called stating they were told his INR today was 1.9, however patient did not have his INR checked today.    Warm transfer to office clinical for further assistance.

## 2025-01-27 NOTE — ASSESSMENT & PLAN NOTE
I renewed the patient's furosemide 20 mg tablets daily.  I reviewed the patient's CMP from November 11.  I plan repeat labs when he returns in 3 months.  Orders:    furosemide (LASIX) 20 mg tablet; Take 1 tablet (20 mg total) by mouth in the morning

## 2025-01-30 ENCOUNTER — OFFICE VISIT (OUTPATIENT)
Facility: HOSPITAL | Age: OVER 89
End: 2025-01-30
Payer: MEDICARE

## 2025-01-30 VITALS
SYSTOLIC BLOOD PRESSURE: 150 MMHG | TEMPERATURE: 97.1 F | RESPIRATION RATE: 20 BRPM | DIASTOLIC BLOOD PRESSURE: 84 MMHG | HEART RATE: 88 BPM

## 2025-01-30 DIAGNOSIS — I87.2 EDEMA OF BOTH LOWER EXTREMITIES DUE TO PERIPHERAL VENOUS INSUFFICIENCY: ICD-10-CM

## 2025-01-30 DIAGNOSIS — L97.812 VENOUS STASIS ULCER OF OTHER PART OF RIGHT LOWER LEG WITH FAT LAYER EXPOSED WITH VARICOSE VEINS (HCC): Primary | ICD-10-CM

## 2025-01-30 DIAGNOSIS — I83.018 VENOUS STASIS ULCER OF OTHER PART OF RIGHT LOWER LEG WITH FAT LAYER EXPOSED WITH VARICOSE VEINS (HCC): Primary | ICD-10-CM

## 2025-01-30 PROCEDURE — 97597 DBRDMT OPN WND 1ST 20 CM/<: CPT | Performed by: STUDENT IN AN ORGANIZED HEALTH CARE EDUCATION/TRAINING PROGRAM

## 2025-01-30 RX ORDER — LIDOCAINE 40 MG/G
CREAM TOPICAL ONCE
Status: COMPLETED | OUTPATIENT
Start: 2025-01-30 | End: 2025-01-30

## 2025-01-30 RX ADMIN — LIDOCAINE 1 APPLICATION: 40 CREAM TOPICAL at 14:31

## 2025-01-30 NOTE — PROGRESS NOTES
Patient ID: Enrico Chavira is a 89 y.o. male Date of Birth 8/12/1930       Chief Complaint   Patient presents with    Follow Up Wound Care Visit     RLE--Unna Boot intact       Allergies:  Alphagan p [brimonidine tartrate]    Diagnosis:   Diagnosis ICD-10-CM Associated Orders   1. Venous stasis ulcer of other part of right lower leg with fat layer exposed with varicose veins (HCC)  I83.018 lidocaine (LMX) 4 % cream    L97.812 Wound cleansing and dressings Right;Anterior Pretibial     Wound Procedure Treatment Right;Anterior Pretibial      2. Edema of both lower extremities due to peripheral venous insufficiency  I87.2            Assessment  & Plan:    F/u VSU of the R anterior lower leg.  Continues to improve.  Is measuring smaller in size.  There is evidence of new edge epithelization present.  There is a rim of hyperkeratotic buildup surrounding the ulcerations.  Drainage is decreasing.  There is no periwound erythema to indicate surrounding cellulitis.  Selective debridement, as below.  Will  to endoform with overlying dry dressing given decrease in drainage.  Unna boot for compression.  Change twice weekly.  ANAHY noncompressible with palpable pulses.  Notify office of any significant discomfort with use of compression or color, sensation, temperature change of the toes.  Elevate legs when resting.  Avoid prolonged standing 1 place.  Continue with diuretic as prescribed.  F/u in one week. Instructed to call if any questions or concerns arise in meantime.          Subjective:   12/26/24: 89 y/o M with PMHx of HTN, PE, asthma/COPD, hypothyroidism, skin cancer, presents for evaluation of wounds on his RLE with two of his daughters. His RLE wounds have been present for approximately one month. Pt has been seeing his PCP whom is currently applying Unna boots with overlying ACE wraps. There is quite a bit of drainage from ulceration and drainage has been leaking through the Unna boot. Pt has been  elevating his leg. Denies hx of heart failure or increased SOB (aside from baseline with COPD).     01/02/2025: Patient presents for follow-up of right lower extremity venous ulcerations.  Home health has been performing dressing changes.  Presently silver alginate and Unna boot is being used.  Patient reports that he is tolerating the Unna boot well and denies any significant discomfort or changes in his breathing with use of multilayer compression.    01/09/25: Pt presents for f/u VSU of the RLE. Has been tolerating Unna boot very well. Offers no complaints today.     01/23/25: Patient presents for follow-up of right lower extremity venous ulceration at continues to be tolerating the Unna boot very well.  Offers no complaints today.  Unfortunately he was unable to get a transfer wheelchair because the company was requesting go to card information despite the insurance covering the cost. Has an upcoming appointment with his PCP on Monday.     01/30/25: Pt presents for f/u RLE venous ulcerations. Continues to tolerate Unna boot very well. Offers no complaints today.           The following portions of the patient's history were reviewed and updated as appropriate:   Patient Active Problem List   Diagnosis    Prothrombin gene mutation (HCC)    Primary generalized (osteo)arthritis    Essential hypertension, benign    Dyslipidemia    Intrinsic eczema    Other seborrheic dermatitis    Abnormality of gait    Osteoarthritis of both knees    Chronic pulmonary embolism without acute cor pulmonale (HCC)    Age-related cataract of both eyes    Premature beats    Primary osteoarthritis of both knees    Neoplasm of uncertain behavior of skin    Encounter for long-term (current) use of medications    GERD without esophagitis    Influenza vaccine refused    Hypercalcemia    COVID-19 virus infection    Asthma-COPD overlap syndrome (HCC)    Endothelial corneal dystrophy of both eyes    Heterozygous factor V Leiden mutation ([facility])     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)    History of pulmonary embolism    Mature cataract    Primary open-angle glaucoma, bilateral, mild stage    Pseudophakic bullous keratopathy of left eye    TB lung, latent    Hyponatremia    Shortness of breath    Other fatigue    Acquired hypothyroidism    Venous stasis ulcer of right calf limited to breakdown of skin without varicose veins (HCC)    Hashimoto's thyroiditis    Venous stasis ulcer of other part of right lower leg with fat layer exposed with varicose veins (HCC)    Edema of both lower extremities due to peripheral venous insufficiency     Past Medical History:   Diagnosis Date    Arthritis     Coagulation defect ([facility])     last assessed: 11/28/2018    COPD (chronic obstructive pulmonary disease) ([facility])     last assessed: 5/14/2019, 12/6/2017    Generalized osteoarthritis     last assessed: 1/28/2019    GERD without esophagitis     last assessed: 1/28/2019    Glaucoma     last assessed: 8/4/2011    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[facility])     last assessed: 10/16/2018    Factor II Prothrombin Gene per [employer]    History of kidney stones 1985    Hx of blood clots     Hypertension     last assessed: 5/14/2019    Impaired fasting glucose     last assessed: 8/26/2013    Mild persistent asthma, uncomplicated     last assessed: 11/28/2018    Nephrolithiasis     Nondisplaced intertrochanteric fracture of right femur, initial encounter for closed fracture ([facility])     last assessed: 1/28/2019    Premature ventricular contractions     last assessed: 8/4/2011    Pulmonary nodule     Scoliosis (and kyphoscoliosis), idiopathic     Wears dentures      Past Surgical History:   Procedure Laterality Date    CARPAL TUNNEL RELEASE Left     ENDO 8/801    CATARACT EXTRACTION Left     COLONOSCOPY  10/07/2008    last assessed: 3/29/2016    EYE SURGERY Right 01/13/2022    HERNIA REPAIR      HIP SURGERY      HIP SURGERY Left 03/05/1999    ORIF    MULTIPLE TOOTH  EXTRACTIONS Bilateral     OTHER SURGICAL HISTORY Right 10/30/2001    [facility]    TONSILLECTOMY Bilateral     WISDOM TOOTH EXTRACTION Bilateral      Family History   Problem Relation Age of Onset    Hypertension Mother     Stroke Mother     Heart attack Father      Social History     Socioeconomic History    Marital status:      Spouse name: Not on file    Number of children: Not on file    Years of education: Not on file    Highest education level: Not on file   Occupational History    Not on file   Tobacco Use    Smoking status: Former     Types: Pipe     Start date:      Quit date: 1970     Years since quittin.1     Passive exposure: Past    Smokeless tobacco: Never   Vaping Use    Vaping status: Never Used   Substance and Sexual Activity    Alcohol use: Yes     Comment: social    Drug use: Never    Sexual activity: Not Currently   Other Topics Concern    Not on file   Social History Narrative    Not on file     Social Drivers of Health     Financial Resource Strain: Low Risk  (10/23/2023)    Overall Financial Resource Strain (CARDIA)     Difficulty of Paying Living Expenses: Not very hard   Food Insecurity: No Food Insecurity (10/28/2024)    Hunger Vital Sign     Worried About Running Out of Food in the Last Year: Never true     Ran Out of Food in the Last Year: Never true   Transportation Needs: No Transportation Needs (2024)    OASIS : Transportation     Lack of Transportation (Medical): No     Lack of Transportation (Non-Medical): No     Patient Unable or Declines to Respond: No   Physical Activity: Not on file   Stress: Not on file   Social Connections: Not on file   Intimate Partner Violence: Not on file   Housing Stability: Low Risk  (10/28/2024)    Housing Stability Vital Sign     Unable to Pay for Housing in the Last Year: No     Number of Times Moved in the Last Year: 0     Homeless in the Last Year: No       Current Outpatient Medications:     albuterol (2.5 mg/3 mL) 0.083 %  nebulizer solution, Take 3 mL (2.5 mg total) by nebulization every 4 (four) hours as needed for wheezing or shortness of breath, Disp: 100 mL, Rfl: 0    albuterol (PROVENTIL HFA,VENTOLIN HFA) 90 mcg/act inhaler, TAKE 2 PUFFS BY MOUTH EVERY 4 HOURS AS NEEDED FOR WHEEZE, Disp: 18 g, Rfl: 1    amLODIPine (NORVASC) 2.5 mg tablet, Take 1 tablet (2.5 mg total) by mouth daily, Disp: 100 tablet, Rfl: 3    Cholecalciferol (Vitamin D3) 125 MCG (5000 UT) CAPS, Take 1 capsule by mouth daily. Indications: Vitamin D Deficiency, Disp: , Rfl:     desonide (DESOWEN) 0.05 % cream, Apply to face twice a day (Patient taking differently: Apply 1 Application topically 2 (two) times a day Apply topically to face twice a day), Disp: 60 g, Rfl: 0    Difluprednate 0.05 % EMUL, , Disp: , Rfl:     docusate sodium (COLACE) 100 mg capsule, Take 100 mg by mouth in the morning. Indications: Constipation, Disp: , Rfl:     dorzolamide (TRUSOPT) 2 % ophthalmic solution, INSTILL 1 DROP INTO LEFT EYE TWICE A DAY, Disp: , Rfl:     fluticasone-vilanterol (Breo Ellipta) 200-25 mcg/actuation inhaler, INHALE 1 PUFF DAILY RINSE MOUTH AFTER USE. (Patient taking differently: No sig reported), Disp: 180 each, Rfl: 1    furosemide (LASIX) 20 mg tablet, Take 1 tablet (20 mg total) by mouth in the morning, Disp: 90 tablet, Rfl: 1    latanoprost (XALATAN) 0.005 % ophthalmic solution, Administer 1 drop to both eyes daily at bedtime, Disp: , Rfl:     losartan (COZAAR) 50 mg tablet, TAKE 1 TABLET BY MOUTH EVERY DAY, Disp: 90 tablet, Rfl: 1    montelukast (SINGULAIR) 10 mg tablet, TAKE 1 TABLET BY MOUTH EVERY DAY, Disp: 90 tablet, Rfl: 1    multivitamin (THERAGRAN) TABS, Take 1 tablet by mouth daily, Disp: , Rfl:     omeprazole (PriLOSEC OTC) 20 MG tablet, Take 20 mg by mouth daily, Disp: , Rfl:     Oral Electrolytes (ELECTROLYTE SR PO), Take 2 tablets by mouth daily., Disp: , Rfl:     Polyethyl Glycol-Propyl Glycol (SYSTANE OP), Administer 1 drop to both eyes 4 (four)  times a day, Disp: , Rfl:     polyethylene glycol (GLYCOLAX) 17 GM/SCOOP powder, Take 17 g by mouth daily as needed (Constipation) Stir into 8 oz of liquid of choice., Disp: , Rfl:     prednisoLONE acetate (PRED FORTE) 1 % ophthalmic suspension, Administer 1 drop into the left eye 2 (two) times a day, Disp: , Rfl:     sodium chloride (ARIEL 128) 2 % hypertonic ophthalmic solution, Administer 1 drop into the left eye 4 (four) times a day, Disp: , Rfl:     triamcinolone (KENALOG) 0.1 % cream, Apply topically 2 (two) times a day as needed for rash (Patient taking differently: Apply 1 Application topically 2 (two) times a day as needed for rash. APPLY TOPICALLY TO RASH ON FACE AND EARS BID PRN.), Disp: 80 g, Rfl: 2    triamcinolone (KENALOG) 0.1 % ointment, Apply topically 2 (two) times a day for 14 days To skin of R thigh, Disp: 30 g, Rfl: 1    warfarin (COUMADIN) 5 mg tablet, Take 1.5 tablets daily or as directed, Disp: 135 tablet, Rfl: 1  No current facility-administered medications for this visit.    Review of Systems   Constitutional:  Negative for fever.   Cardiovascular:  Positive for leg swelling.   Musculoskeletal:  Positive for gait problem.   Skin:  Positive for wound.   Neurological:  Positive for weakness.         Objective:  /84   Pulse 88   Temp (!) 97.1 °F (36.2 °C)   Resp 20   Pain Score: 0-No pain     Physical Exam  Vitals reviewed.   Constitutional:       Appearance: He is normal weight.   Cardiovascular:      Rate and Rhythm: Normal rate.   Pulmonary:      Effort: Pulmonary effort is normal.      Breath sounds: No rhonchi or rales.   Skin:     Findings: Wound present.             Comments: VSU of the R anterior lower leg.  Continues to improve.  Is measuring smaller in size.  There is evidence of new edge epithelization present.  There is a rim of hyperkeratotic buildup surrounding the ulcerations.  Drainage is decreasing.  There is no periwound erythema to indicate surrounding cellulitis.    "  Neurological:      Mental Status: He is alert.      Motor: Weakness present.      Gait: Gait abnormal.         Wound 12/26/24 Pretibial Right;Anterior (Active)   Wound Image Images linked 01/30/25 1427   Wound Description Epithelialization;Pink;Granulation tissue 01/30/25 1429   Marisol-wound Assessment Hyperpigmented;Edema;Pink 01/30/25 1429   Wound Length (cm) 2.5 cm 01/30/25 1429   Wound Width (cm) 0.4 cm 01/30/25 1429   Wound Depth (cm) 0.1 cm 01/30/25 1429   Wound Surface Area (cm^2) 1 cm^2 01/30/25 1429   Wound Volume (cm^3) 0.1 cm^3 01/30/25 1429   Calculated Wound Volume (cm^3) 0.1 cm^3 01/30/25 1429   Change in Wound Size % 92.42 01/30/25 1429   Drainage Amount Scant 01/30/25 1429   Drainage Description Serosanguineous 01/30/25 1429   Non-staged Wound Description Full thickness 01/30/25 1429   Treatments Cleansed 01/30/25 1429   Dressing Status Intact 01/30/25 1429             Debridement   Wound 12/26/24 Pretibial Right;Anterior    Universal Protocol:  procedure performed by consultantConsent: Verbal consent obtained.  Consent given by: patient  Time out: Immediately prior to procedure a \"time out\" was called to verify the correct patient, procedure, equipment, support staff and site/side marked as required.  Patient understanding: patient states understanding of the procedure being performed  Patient identity confirmed: verbally with patient    Debridement Details  Performed by: PA  Debridement type: selective  Pain control: lidocaine 4%      Post-debridement measurements  Length (cm): 2.5  Width (cm): 0.4  Depth (cm): 0.1  Percent debrided: 50%  Surface Area (cm^2): 1  Area Debrided (cm^2): 0.5  Volume (cm^3): 0.1    Devitalized tissue debrided: exudate and hypekeratotic rim  Instrument(s) utilized: curette  Bleeding: small  Hemostasis obtained with: pressure  Procedural pain (0-10): 0  Post-procedural pain: 0   Response to treatment: procedure was tolerated well          Wound Instructions:  Orders " "Placed This Encounter   Procedures    Wound cleansing and dressings Right;Anterior Pretibial     Right lower leg     Wash your hands with soap and water.  Remove old dressing, discard into plastic bag and place in trash.    Cleanse the wound with NSS prior to applying a clean dressing. Do not use tissue or cotton balls.   Do not scrub the wound. Pat dry using gauze.  Shower no. May use a cast cover to shower to keep dressings dry.        Apply Endoform Natural  to the wound.  Cover with gauze   Secure with unna boot then coban and tubifast. ----Do not use spandigrip or tubigrip with unna boot. -----  Change dressing two times a week.         Unna Boot wrap Instructions     Keep compression wrap/wraps clean and dry. If wraps are too tight and you experience numbness/tingling, call the wound center. If after hours, remove wraps or proceed to nearest E.R. and call wound center in AM.     Wrap will be changed 2x weekly- one time weekly with TRACIE on Monday 1/27/25 and Thursday at wound management center.     Avoid prolonged standing in one place.     Elevate leg(s) above the level of the heart when sitting or as much as possible.     Standing Status:   Future     Expiration Date:   2/6/2025    Wound Procedure Treatment Right;Anterior Pretibial     This order was created via procedure documentation       Cally Goldsmith, PA-C      Portions of the record may have been created with voice recognition software. Occasional wrong word or \"sound alike\" substitutions may have occurred due to the inherent limitations of voice recognition software. Read the chart carefully and recognize, using context, where substitutions have occurred.    "

## 2025-01-30 NOTE — PROGRESS NOTES
Wound Procedure Treatment Right;Anterior Pretibial    Performed by: Gladys Chapa RN  Authorized by: Cornelia Goldsmith PA-C    Associated wounds:   Wound 12/26/24 Pretibial Right;Anterior  Wound cleansed with:  NSS  Applied primary dressing:  Collagen dressing  Applied secondary dressing:  Gauze  Dressing secured with:  Compression wrap, Coban and Tubifast  Comments:  Endoform natural   UNNA boot   Tubifast blue

## 2025-01-30 NOTE — PATIENT INSTRUCTIONS
Orders Placed This Encounter   Procedures    Wound cleansing and dressings Right;Anterior Pretibial     Right lower leg     Wash your hands with soap and water.  Remove old dressing, discard into plastic bag and place in trash.    Cleanse the wound with NSS prior to applying a clean dressing. Do not use tissue or cotton balls.   Do not scrub the wound. Pat dry using gauze.  Shower no. May use a cast cover to shower to keep dressings dry.        Apply Endoform Natural  to the wound.  Cover with gauze   Secure with unna boot then coban and tubifast. ----Do not use spandigrip or tubigrip with unna boot. -----  Change dressing two times a week.         Unna Boot wrap Instructions     Keep compression wrap/wraps clean and dry. If wraps are too tight and you experience numbness/tingling, call the wound center. If after hours, remove wraps or proceed to nearest E.R. and call wound center in AM.     Wrap will be changed 2x weekly- one time weekly with TRACIE on Monday 1/27/25 and Thursday at wound management center.     Avoid prolonged standing in one place.     Elevate leg(s) above the level of the heart when sitting or as much as possible.     Standing Status:   Future     Expiration Date:   2/6/2025

## 2025-02-03 ENCOUNTER — TELEPHONE (OUTPATIENT)
Age: OVER 89
End: 2025-02-03

## 2025-02-03 ENCOUNTER — HOME CARE VISIT (OUTPATIENT)
Dept: HOME HEALTH SERVICES | Facility: HOME HEALTHCARE | Age: OVER 89
End: 2025-02-03
Payer: MEDICARE

## 2025-02-03 ENCOUNTER — ANTICOAG VISIT (OUTPATIENT)
Dept: FAMILY MEDICINE CLINIC | Facility: CLINIC | Age: OVER 89
End: 2025-02-03

## 2025-02-03 VITALS
RESPIRATION RATE: 18 BRPM | SYSTOLIC BLOOD PRESSURE: 128 MMHG | HEART RATE: 78 BPM | OXYGEN SATURATION: 96 % | TEMPERATURE: 96.9 F | DIASTOLIC BLOOD PRESSURE: 62 MMHG

## 2025-02-03 LAB — INR PPP: 2.1 (ref 0.85–1.19)

## 2025-02-03 PROCEDURE — G0299 HHS/HOSPICE OF RN EA 15 MIN: HCPCS

## 2025-02-04 ENCOUNTER — TELEPHONE (OUTPATIENT)
Age: OVER 89
End: 2025-02-04

## 2025-02-04 NOTE — TELEPHONE ENCOUNTER
Kiesha from [facility] service called regarding patient dropping off order form for at home INR meter.If we could fax this form back to Kiesha at [phone]. Her phone number is [phone] if we have any questions. She also asked if we could fax a copy of the insurance as well

## 2025-02-05 ENCOUNTER — OFFICE VISIT (OUTPATIENT)
Facility: HOSPITAL | Age: OVER 89
End: 2025-02-05
Payer: MEDICARE

## 2025-02-05 VITALS
RESPIRATION RATE: 16 BRPM | TEMPERATURE: 96 F | DIASTOLIC BLOOD PRESSURE: 86 MMHG | HEART RATE: 81 BPM | SYSTOLIC BLOOD PRESSURE: 171 MMHG

## 2025-02-05 DIAGNOSIS — I83.018 VENOUS STASIS ULCER OF OTHER PART OF RIGHT LOWER LEG WITH FAT LAYER EXPOSED WITH VARICOSE VEINS (HCC): Primary | ICD-10-CM

## 2025-02-05 DIAGNOSIS — I87.2 EDEMA OF BOTH LOWER EXTREMITIES DUE TO PERIPHERAL VENOUS INSUFFICIENCY: ICD-10-CM

## 2025-02-05 DIAGNOSIS — L97.812 VENOUS STASIS ULCER OF OTHER PART OF RIGHT LOWER LEG WITH FAT LAYER EXPOSED WITH VARICOSE VEINS (HCC): Primary | ICD-10-CM

## 2025-02-05 DIAGNOSIS — S81.811A NONINFECTED SKIN TEAR OF RIGHT LOWER EXTREMITY, INITIAL ENCOUNTER: ICD-10-CM

## 2025-02-05 PROCEDURE — 97597 DBRDMT OPN WND 1ST 20 CM/<: CPT | Performed by: STUDENT IN AN ORGANIZED HEALTH CARE EDUCATION/TRAINING PROGRAM

## 2025-02-05 PROCEDURE — 99214 OFFICE O/P EST MOD 30 MIN: CPT | Performed by: STUDENT IN AN ORGANIZED HEALTH CARE EDUCATION/TRAINING PROGRAM

## 2025-02-05 RX ORDER — LIDOCAINE 40 MG/G
CREAM TOPICAL ONCE
Status: COMPLETED | OUTPATIENT
Start: 2025-02-05 | End: 2025-02-05

## 2025-02-05 RX ADMIN — LIDOCAINE: 40 CREAM TOPICAL at 11:59

## 2025-02-05 NOTE — PROGRESS NOTES
Wound Procedure Treatment    Performed by: Naima Mendoza RN  Authorized by: Cornelia Goldsmith PA-C    Associated wounds:   Wound 02/05/25 Skin Tear Leg Right;Lower;Upper  Wound 12/26/24 Pretibial Right;Anterior  Wound 02/05/25 Skin Tear Leg Distal;Right;Lateral  Wound cleansed with:  Wound aggrssively cleansed with NSS and gauze and Soap and water  Applied Topical: Other    Applied primary dressing:  Non adherent contact layer  Applied secondary dressing:  Gauze  Dressing secured with:  Kerlix, Tape, Compression wrap and Tubifast  Comments:  Bacitractin then adaptic then unna boot

## 2025-02-05 NOTE — PATIENT INSTRUCTIONS
Orders Placed This Encounter   Procedures    Wound cleansing and dressings Right;Anterior Pretibial     Right lower leg     Wash your hands with soap and water.  Remove old dressing, discard into plastic bag and place in trash.    Cleanse the wound with NSS prior to applying a clean dressing. Do not use tissue or cotton balls.   Do not scrub the wound. Pat dry using gauze.  Shower no. May use a cast cover to shower to keep dressings dry.        Apply Bacitracin and cut to fit adaptic or equivalent oil emulsion dressing to all open areas.  Secure with unna boot then coban and tubifast. ----Do not use spandigrip or tubigrip with unna boot. -----  Change dressing two times a week.           Unna Boot wrap Instructions     Keep compression wrap/wraps clean and dry. If wraps are too tight and you experience numbness/tingling, call the wound center. If after hours, remove wraps or proceed to nearest E.R. and call wound center in AM.     Wrap will be changed 2x weekly- one time weekly with TRACIE and one time weekly at wound management center.     Avoid prolonged standing in one place.     Elevate leg(s) above the level of the heart when sitting or as much as possible.     Standing Status:   Future     Expiration Date:   2/12/2025

## 2025-02-05 NOTE — PROGRESS NOTES
Patient ID: Enrico Chavira is a 89 y.o. male Date of Birth 8/12/1930       Chief Complaint   Patient presents with    Follow Up Wound Care Visit     RLE MLW       Allergies:  Alphagan p [brimonidine tartrate]    Diagnosis:   Diagnosis ICD-10-CM Associated Orders   1. Venous stasis ulcer of other part of right lower leg with fat layer exposed with varicose veins (HCC)  I83.018 Wound cleansing and dressings Right;Anterior Pretibial    L97.812 lidocaine (LMX) 4 % cream     Debridement      2. Noninfected skin tear of right lower extremity, initial encounter  S81.812U       3. Edema of both lower extremities due to peripheral venous insufficiency  I87.2            Assessment  & Plan:    F/u VSU of the R anterior lower leg. Continues to improve. There is now dried crusted exudate present overlying the ulcerations. Post debridement there are small ulcers remaining with granulation tissue and scant serous drainage. Two new small skin tears are present on the lower leg with exposed dermis and minimal serous drainage. Leg with hemosiderin deposition/stasis changes but no diffuse erythema to indicate cellulitis.   Selective debridement, as below.   Bacitracin with overlying adaptic and Unna boot for compression.   Elevate legs when resting. Avoid prolonged standing in one place. Continue diuretic, as prescribed.   F/u in one week. Instructed to call if any questions or concerns arise in meantime.            Subjective:   12/26/24: 89 y/o M with PMHx of HTN, PE, asthma/COPD, hypothyroidism, skin cancer, presents for evaluation of wounds on his RLE with two of his daughters. His RLE wounds have been present for approximately one month. Pt has been seeing his PCP whom is currently applying Unna boots with overlying ACE wraps. There is quite a bit of drainage from ulceration and drainage has been leaking through the Unna boot. Pt has been elevating his leg. Denies hx of heart failure or increased SOB (aside from baseline  with COPD).     01/02/2025: Patient presents for follow-up of right lower extremity venous ulcerations.  Home health has been performing dressing changes.  Presently silver alginate and Unna boot is being used.  Patient reports that he is tolerating the Unna boot well and denies any significant discomfort or changes in his breathing with use of multilayer compression.    01/09/25: Pt presents for f/u VSU of the RLE. Has been tolerating Unna boot very well. Offers no complaints today.     01/23/25: Patient presents for follow-up of right lower extremity venous ulceration at continues to be tolerating the Unna boot very well.  Offers no complaints today.  Unfortunately he was unable to get a transfer wheelchair because the company was requesting go to card information despite the insurance covering the cost. Has an upcoming appointment with his PCP on Monday.     01/30/25: Pt presents for f/u RLE venous ulcerations. Continues to tolerate Unna boot very well. Offers no complaints today.       02/05/25: Pt presents for f/u RLE venous ulcerations. Has continued to tolerate the Unna boot well. Offers no complaints today.           The following portions of the patient's history were reviewed and updated as appropriate:   Patient Active Problem List   Diagnosis    Prothrombin gene mutation (HCC)    Primary generalized (osteo)arthritis    Essential hypertension, benign    Dyslipidemia    Intrinsic eczema    Other seborrheic dermatitis    Abnormality of gait    Osteoarthritis of both knees    Chronic pulmonary embolism without acute cor pulmonale (HCC)    Age-related cataract of both eyes    Premature beats    Primary osteoarthritis of both knees    Neoplasm of uncertain behavior of skin    Encounter for long-term (current) use of medications    GERD without esophagitis    Influenza vaccine refused    Hypercalcemia    COVID-19 virus infection    Asthma-COPD overlap syndrome (HCC)    Endothelial corneal dystrophy of both eyes     Heterozygous factor V Leiden mutation (HCC)    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)    History of pulmonary embolism    Mature cataract    Primary open-angle glaucoma, bilateral, mild stage    Pseudophakic bullous keratopathy of left eye    TB lung, latent    Hyponatremia    Shortness of breath    Other fatigue    Acquired hypothyroidism    Venous stasis ulcer of right calf limited to breakdown of skin without varicose veins (HCC)    Hashimoto's thyroiditis    Venous stasis ulcer of other part of right lower leg with fat layer exposed with varicose veins (HCC)    Edema of both lower extremities due to peripheral venous insufficiency     Past Medical History:   Diagnosis Date    Arthritis     Coagulation defect (HCC)     last assessed: 11/28/2018    COPD (chronic obstructive pulmonary disease) ([facility])     last assessed: 5/14/2019, 12/6/2017    Generalized osteoarthritis     last assessed: 1/28/2019    GERD without esophagitis     last assessed: 1/28/2019    Glaucoma     last assessed: 8/4/2011    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[facility])     last assessed: 10/16/2018    Factor II Prothrombin Gene per [employer]    History of kidney stones 1985    Hx of blood clots     Hypertension     last assessed: 5/14/2019    Impaired fasting glucose     last assessed: 8/26/2013    Mild persistent asthma, uncomplicated     last assessed: 11/28/2018    Nephrolithiasis     Nondisplaced intertrochanteric fracture of right femur, initial encounter for closed fracture ([facility])     last assessed: 1/28/2019    Premature ventricular contractions     last assessed: 8/4/2011    Pulmonary nodule     Scoliosis (and kyphoscoliosis), idiopathic     Wears dentures      Past Surgical History:   Procedure Laterality Date    CARPAL TUNNEL RELEASE Left     ENDO 8/801    CATARACT EXTRACTION Left     COLONOSCOPY  10/07/2008    last assessed: 3/29/2016    EYE SURGERY Right 01/13/2022    HERNIA REPAIR      HIP SURGERY      HIP  SURGERY Left 1999    ORIF    MULTIPLE TOOTH EXTRACTIONS Bilateral     OTHER SURGICAL HISTORY Right 10/30/2001    [facility]    TONSILLECTOMY Bilateral     WISDOM TOOTH EXTRACTION Bilateral      Family History   Problem Relation Age of Onset    Hypertension Mother     Stroke Mother     Heart attack Father      Social History     Socioeconomic History    Marital status:      Spouse name: Not on file    Number of children: Not on file    Years of education: Not on file    Highest education level: Not on file   Occupational History    Not on file   Tobacco Use    Smoking status: Former     Types: Pipe     Start date:      Quit date: 1970     Years since quittin.1     Passive exposure: Past    Smokeless tobacco: Never   Vaping Use    Vaping status: Never Used   Substance and Sexual Activity    Alcohol use: Yes     Comment: social    Drug use: Never    Sexual activity: Not Currently   Other Topics Concern    Not on file   Social History Narrative    Not on file     Social Drivers of Health     Financial Resource Strain: Low Risk  (10/23/2023)    Overall Financial Resource Strain (CARDIA)     Difficulty of Paying Living Expenses: Not very hard   Food Insecurity: No Food Insecurity (10/28/2024)    Hunger Vital Sign     Worried About Running Out of Food in the Last Year: Never true     Ran Out of Food in the Last Year: Never true   Transportation Needs: No Transportation Needs (2024)    OASIS : Transportation     Lack of Transportation (Medical): No     Lack of Transportation (Non-Medical): No     Patient Unable or Declines to Respond: No   Physical Activity: Not on file   Stress: Not on file   Social Connections: Not on file   Intimate Partner Violence: Not on file   Housing Stability: Low Risk  (10/28/2024)    Housing Stability Vital Sign     Unable to Pay for Housing in the Last Year: No     Number of Times Moved in the Last Year: 0     Homeless in the Last Year: No       Current Outpatient  Medications:     albuterol (2.5 mg/3 mL) 0.083 % nebulizer solution, Take 3 mL (2.5 mg total) by nebulization every 4 (four) hours as needed for wheezing or shortness of breath, Disp: 100 mL, Rfl: 0    albuterol (PROVENTIL HFA,VENTOLIN HFA) 90 mcg/act inhaler, TAKE 2 PUFFS BY MOUTH EVERY 4 HOURS AS NEEDED FOR WHEEZE, Disp: 18 g, Rfl: 1    amLODIPine (NORVASC) 2.5 mg tablet, Take 1 tablet (2.5 mg total) by mouth daily, Disp: 100 tablet, Rfl: 3    Cholecalciferol (Vitamin D3) 125 MCG (5000 UT) CAPS, Take 1 capsule by mouth daily. Indications: Vitamin D Deficiency, Disp: , Rfl:     desonide (DESOWEN) 0.05 % cream, Apply to face twice a day (Patient taking differently: Apply 1 Application topically 2 (two) times a day Apply topically to face twice a day), Disp: 60 g, Rfl: 0    Difluprednate 0.05 % EMUL, , Disp: , Rfl:     docusate sodium (COLACE) 100 mg capsule, Take 100 mg by mouth in the morning. Indications: Constipation, Disp: , Rfl:     dorzolamide (TRUSOPT) 2 % ophthalmic solution, INSTILL 1 DROP INTO LEFT EYE TWICE A DAY, Disp: , Rfl:     fluticasone-vilanterol (Breo Ellipta) 200-25 mcg/actuation inhaler, INHALE 1 PUFF DAILY RINSE MOUTH AFTER USE. (Patient taking differently: No sig reported), Disp: 180 each, Rfl: 1    furosemide (LASIX) 20 mg tablet, Take 1 tablet (20 mg total) by mouth in the morning, Disp: 90 tablet, Rfl: 1    latanoprost (XALATAN) 0.005 % ophthalmic solution, Administer 1 drop to both eyes daily at bedtime, Disp: , Rfl:     losartan (COZAAR) 50 mg tablet, TAKE 1 TABLET BY MOUTH EVERY DAY, Disp: 90 tablet, Rfl: 1    montelukast (SINGULAIR) 10 mg tablet, TAKE 1 TABLET BY MOUTH EVERY DAY, Disp: 90 tablet, Rfl: 1    multivitamin (THERAGRAN) TABS, Take 1 tablet by mouth daily, Disp: , Rfl:     omeprazole (PriLOSEC OTC) 20 MG tablet, Take 20 mg by mouth daily, Disp: , Rfl:     Oral Electrolytes (ELECTROLYTE SR PO), Take 2 tablets by mouth daily., Disp: , Rfl:     Polyethyl Glycol-Propyl Glycol  (SYSTANE OP), Administer 1 drop to both eyes 4 (four) times a day, Disp: , Rfl:     polyethylene glycol (GLYCOLAX) 17 GM/SCOOP powder, Take 17 g by mouth daily as needed (Constipation) Stir into 8 oz of liquid of choice., Disp: , Rfl:     prednisoLONE acetate (PRED FORTE) 1 % ophthalmic suspension, Administer 1 drop into the left eye 2 (two) times a day, Disp: , Rfl:     sodium chloride (ARIEL 128) 2 % hypertonic ophthalmic solution, Administer 1 drop into the left eye 4 (four) times a day, Disp: , Rfl:     triamcinolone (KENALOG) 0.1 % cream, Apply topically 2 (two) times a day as needed for rash (Patient taking differently: Apply 1 Application topically 2 (two) times a day as needed for rash. APPLY TOPICALLY TO RASH ON FACE AND EARS BID PRN.), Disp: 80 g, Rfl: 2    triamcinolone (KENALOG) 0.1 % ointment, Apply topically 2 (two) times a day for 14 days To skin of R thigh, Disp: 30 g, Rfl: 1    warfarin (COUMADIN) 5 mg tablet, Take 1.5 tablets daily or as directed, Disp: 135 tablet, Rfl: 1  No current facility-administered medications for this visit.    Review of Systems   Constitutional:  Negative for fever.   Cardiovascular:  Positive for leg swelling.   Musculoskeletal:  Positive for gait problem.   Skin:  Positive for wound.   Neurological:  Positive for weakness.         Objective:  BP (!) 171/86   Pulse 81   Temp (!) 96 °F (35.6 °C)   Resp 16         Physical Exam  Vitals reviewed.   Constitutional:       Appearance: He is normal weight.   Cardiovascular:      Rate and Rhythm: Normal rate.   Pulmonary:      Effort: Pulmonary effort is normal.      Breath sounds: No rhonchi or rales.   Skin:     Findings: Wound present.             Comments: VSU of the R anterior lower leg. Continues to improve. There is now dried crusted exudate present overlying the ulcerations. Post debridement there is a small ulcer remaining with granulation tissue and scant serous drainage. Two new small skin tears are present on the  lower leg with exposed dermis and minimal serous drainage. Leg with hemosiderin deposition/stasis changes but no diffuse erythema to indicate cellulitis.      Neurological:      Mental Status: He is alert.      Motor: Weakness present.      Gait: Gait abnormal.         Wound 12/26/24 Pretibial Right;Anterior (Active)   Wound Image   02/05/25 1152   Wound Description Epithelialization;Pink;Granulation tissue 02/05/25 1152   Marisol-wound Assessment Hyperpigmented;Edema;[place] 02/05/25 1152   Wound Length (cm) 2.5 cm 02/05/25 1152   Wound Width (cm) 0.5 cm 02/05/25 1152   Wound Depth (cm) 0.1 cm 02/05/25 1152   Wound Surface Area (cm^2) 1.25 cm^2 02/05/25 1152   Wound Volume (cm^3) 0.125 cm^3 02/05/25 1152   Calculated Wound Volume (cm^3) 0.13 cm^3 02/05/25 1152   Change in Wound Size % 90.15 02/05/25 1152   Drainage Amount Scant 02/05/25 1152   Drainage Description Serosanguineous 02/05/25 1152   Non-staged Wound Description Full thickness 02/05/25 1152   Treatments Cleansed 02/05/25 1152   Patient Tolerance Tolerated well 01/02/25 1431   Dressing Status Intact 02/05/25 1152       Wound 02/05/25 Skin Tear Leg Right;Lower;Upper (Active)   Wound Image   02/05/25 1153   Wound Description Pink 02/05/25 1153   Marisol-wound Assessment Pink;Scaly;Dry 02/05/25 1153   Wound Length (cm) 0.5 cm 02/05/25 1153   Wound Width (cm) 0.3 cm 02/05/25 1153   Wound Depth (cm) 0.1 cm 02/05/25 1153   Wound Surface Area (cm^2) 0.15 cm^2 02/05/25 1153   Wound Volume (cm^3) 0.015 cm^3 02/05/25 1153   Calculated Wound Volume (cm^3) 0.02 cm^3 02/05/25 1153   Drainage Amount Small 02/05/25 1153   Drainage Description Bloody 02/05/25 1153   Non-staged Wound Description Partial thickness 02/05/25 1153   Treatments Cleansed 02/05/25 1153       Wound 02/05/25 Skin Tear Leg Distal;Right;Lateral (Active)   Wound Image   02/05/25 1153   Wound Description Pink 02/05/25 1153   Marisol-wound Assessment Scaly;Edema 02/05/25 1153   Wound Length (cm) 0.2 cm 02/05/25  "1153   Wound Width (cm) 0.4 cm 02/05/25 1153   Wound Depth (cm) 0.1 cm 02/05/25 1153   Wound Surface Area (cm^2) 0.08 cm^2 02/05/25 1153   Wound Volume (cm^3) 0.008 cm^3 02/05/25 1153   Calculated Wound Volume (cm^3) 0.01 cm^3 02/05/25 1153                Debridement   Wound 12/26/24 Pretibial Right;Anterior    Universal Protocol:  procedure performed by consultantConsent: Verbal consent obtained.  Consent given by: patient  Time out: Immediately prior to procedure a \"time out\" was called to verify the correct patient, procedure, equipment, support staff and site/side marked as required.  Patient understanding: patient states understanding of the procedure being performed  Patient identity confirmed: verbally with patient    Debridement Details  Performed by: PA  Debridement type: selective  Pain control: lidocaine 4%      Post-debridement measurements  Length (cm): 2.5  Width (cm): 0.5  Depth (cm): 0.1  Percent debrided: 10%  Surface Area (cm^2): 1.25  Area Debrided (cm^2): 0.13  Volume (cm^3): 0.13    Devitalized tissue debrided: exudate  Instrument(s) utilized: curette  Bleeding: small  Hemostasis obtained with: pressure  Response to treatment: procedure was tolerated well                   Wound Instructions:  Orders Placed This Encounter   Procedures    Wound cleansing and dressings Right;Anterior Pretibial     Right lower leg     Wash your hands with soap and water.  Remove old dressing, discard into plastic bag and place in trash.    Cleanse the wound with NSS prior to applying a clean dressing. Do not use tissue or cotton balls.   Do not scrub the wound. Pat dry using gauze.  Shower no. May use a cast cover to shower to keep dressings dry.        Apply Bacitracin and cut to fit adaptic or equivalent oil emulsion dressing to all open areas.  Secure with unna boot then coban and tubifast. ----Do not use spandigrip or tubigrip with unna boot. -----  Change dressing two times a week.           Unna Boot wrap " "Instructions     Keep compression wrap/wraps clean and dry. If wraps are too tight and you experience numbness/tingling, call the wound center. If after hours, remove wraps or proceed to nearest E.R. and call wound center in AM.     Wrap will be changed 2x weekly- one time weekly with [facility] and one time weekly at wound management center.     Avoid prolonged standing in one place.     Elevate leg(s) above the level of the heart when sitting or as much as possible.     Standing Status:   Future     Expiration Date:   2/12/2025    Debridement     This order was created via procedure documentation       Cally Goldsmith, PA-C      Portions of the record may have been created with voice recognition software. Occasional wrong word or \"sound alike\" substitutions may have occurred due to the inherent limitations of voice recognition software. Read the chart carefully and recognize, using context, where substitutions have occurred.      "

## 2025-02-10 ENCOUNTER — HOME CARE VISIT (OUTPATIENT)
Dept: HOME HEALTH SERVICES | Facility: HOME HEALTHCARE | Age: OVER 89
End: 2025-02-10
Payer: MEDICARE

## 2025-02-10 ENCOUNTER — ANTICOAG VISIT (OUTPATIENT)
Dept: FAMILY MEDICINE CLINIC | Facility: CLINIC | Age: OVER 89
End: 2025-02-10

## 2025-02-10 VITALS
RESPIRATION RATE: 20 BRPM | OXYGEN SATURATION: 97 % | TEMPERATURE: 96.6 F | DIASTOLIC BLOOD PRESSURE: 68 MMHG | SYSTOLIC BLOOD PRESSURE: 128 MMHG | HEART RATE: 88 BPM

## 2025-02-10 LAB — INR PPP: 2.6 (ref 0.85–1.19)

## 2025-02-10 PROCEDURE — G0299 HHS/HOSPICE OF RN EA 15 MIN: HCPCS

## 2025-02-10 NOTE — TELEPHONE ENCOUNTER
Kiesha from [facility] calling back to check if the INR paper work was faxed they have not received anything as of today 2/10/2025. She is asking if the office could please re-faxed it to her attn Kiesha at fax #[phone]

## 2025-02-13 ENCOUNTER — HOME CARE VISIT (OUTPATIENT)
Dept: HOME HEALTH SERVICES | Facility: HOME HEALTHCARE | Age: OVER 89
End: 2025-02-13
Payer: MEDICARE

## 2025-02-13 ENCOUNTER — OFFICE VISIT (OUTPATIENT)
Facility: HOSPITAL | Age: OVER 89
End: 2025-02-13
Payer: MEDICARE

## 2025-02-13 VITALS
DIASTOLIC BLOOD PRESSURE: 74 MMHG | TEMPERATURE: 97.3 F | SYSTOLIC BLOOD PRESSURE: 163 MMHG | HEART RATE: 81 BPM | RESPIRATION RATE: 20 BRPM

## 2025-02-13 DIAGNOSIS — B36.9 FUNGAL DERMATITIS: Primary | ICD-10-CM

## 2025-02-13 DIAGNOSIS — I87.2 STASIS DERMATITIS: ICD-10-CM

## 2025-02-13 DIAGNOSIS — L97.812 VENOUS STASIS ULCER OF OTHER PART OF RIGHT LOWER LEG WITH FAT LAYER EXPOSED WITH VARICOSE VEINS (HCC): ICD-10-CM

## 2025-02-13 DIAGNOSIS — I83.018 VENOUS STASIS ULCER OF OTHER PART OF RIGHT LOWER LEG WITH FAT LAYER EXPOSED WITH VARICOSE VEINS (HCC): ICD-10-CM

## 2025-02-13 PROCEDURE — 99214 OFFICE O/P EST MOD 30 MIN: CPT | Performed by: STUDENT IN AN ORGANIZED HEALTH CARE EDUCATION/TRAINING PROGRAM

## 2025-02-13 RX ORDER — NYSTATIN AND TRIAMCINOLONE ACETONIDE 100000; 1 [USP'U]/G; MG/G
OINTMENT TOPICAL 2 TIMES DAILY
Qty: 30 G | Refills: 1 | Status: SHIPPED | OUTPATIENT
Start: 2025-02-13 | End: 2025-02-20

## 2025-02-13 NOTE — PROGRESS NOTES
Patient ID: Enrico Chavira is a 89 y.o. male Date of Birth 8/12/1930       Chief Complaint   Patient presents with    Follow Up Wound Care Visit     Rightl lower leg ulcers        Allergies:  Alphagan p [brimonidine tartrate]    Diagnosis:   Diagnosis ICD-10-CM Associated Orders   1. Fungal dermatitis  B36.9 Wound Procedure Treatment Other (comment) (rash) Right;Lower Leg     nystatin-triamcinolone (MYCOLOG-II) ointment      2. Venous stasis ulcer of other part of right lower leg with fat layer exposed with varicose veins ([facility])  I83.018 Wound cleansing and dressings Other (comment) (rash) Right;Lower Leg    L97.812 Wound Procedure Treatment Other (comment) (rash) Right;Lower Leg     nystatin-triamcinolone (MYCOLOG-II) ointment      3. Stasis dermatitis  I87.2 Wound Procedure Treatment Other (comment) (rash) Right;Lower Leg     nystatin-triamcinolone (MYCOLOG-II) ointment           Assessment  & Plan:    Follow-up venous ulcerations of the right anterior lower leg.  The original ulcerations have completely closed without drainage.  There is hyperpigmented scar tissue present where the previous ulcerations were.  More distal to these ulcerations is an area of irritated skin with mild patchy erythema and a raised edge on the border of the affected skin. There are small areas of weeping from the irritated skin. Fungal dermatitis versus irritation from Unna boot. No diffuse erythema to suggest cellulitis.   Nystatin and zinc oxide barrier cream mix applied to the edge of affected skin today in the office.  Mix of triamcinolone and nystatin ointment was prescribed to be applied to the affected skin.  Overlying dry dressing for drainage control.  Will discontinue Unna boot given concern that Unna boot is causing irritation.  Will change to Setopress at 30 to 40 mmHg of pressure.  Will increase frequency of changes to every other day.  Should the skin rash be completely resolved upon the next home health care visit  would recommend refraining from application of the ointment and just utilizing a dry dressing and continue with Setopress until the next follow-up at the wound care center.  Clarified and wrote an order set home health should contact us should there be any concern or worsening appearance of the skin at the next visit.  Prescription compression garment has been ordered from local supplier.  Delivery pending.  Elevate legs when resting. Avoid prolonged standing in one place. Continue diuretic, as prescribed.   F/u in one week. Instructed to call if any questions or concerns arise in meantime.          Subjective:   12/26/24: 89 y/o M with PMHx of HTN, PE, asthma/COPD, hypothyroidism, skin cancer, presents for evaluation of wounds on his RLE with two of his daughters. His RLE wounds have been present for approximately one month. Pt has been seeing his PCP whom is currently applying Unna boots with overlying ACE wraps. There is quite a bit of drainage from ulceration and drainage has been leaking through the Unna boot. Pt has been elevating his leg. Denies hx of heart failure or increased SOB (aside from baseline with COPD).     01/02/2025: Patient presents for follow-up of right lower extremity venous ulcerations.  Home health has been performing dressing changes.  Presently silver alginate and Unna boot is being used.  Patient reports that he is tolerating the Unna boot well and denies any significant discomfort or changes in his breathing with use of multilayer compression.    01/09/25: Pt presents for f/u VSU of the RLE. Has been tolerating Unna boot very well. Offers no complaints today.     01/23/25: Patient presents for follow-up of right lower extremity venous ulceration at continues to be tolerating the Unna boot very well.  Offers no complaints today.  Unfortunately he was unable to get a transfer wheelchair because the company was requesting go to card information despite the insurance covering the cost. Has  an upcoming appointment with his PCP on Monday.     01/30/25: Pt presents for f/u RLE venous ulcerations. Continues to tolerate Unna boot very well. Offers no complaints today.       02/05/25: Pt presents for f/u RLE venous ulcerations. Has continued to tolerate the Unna boot well. Offers no complaints today.     02/13/25: Patient presents for follow-up of right lower extremity venous ulcerations.  He presents with his daughter who notes that they [facility] the [facility] off [facility] prescription compression [facility].  They were ordered but have not yet come in.  Presently he has continue with an Unna boot.  On Monday during the dressing change there was quite a bit of redness and irritation of the right lower leg. Pt denies any significant pain or itch.           The following portions of the patient's history were reviewed and updated as appropriate:   Patient Active Problem List   Diagnosis    Prothrombin gene mutation (HCC)    Primary generalized (osteo)arthritis    Essential hypertension, benign    Dyslipidemia    Intrinsic eczema    Other seborrheic dermatitis    Abnormality of gait    Osteoarthritis of both knees    Chronic pulmonary embolism without acute cor pulmonale (HCC)    Age-related cataract of both eyes    Premature beats    Primary osteoarthritis of both knees    Neoplasm of uncertain behavior of skin    Encounter for long-term (current) use of medications    GERD without esophagitis    Influenza vaccine refused    Hypercalcemia    COVID-19 virus infection    Asthma-COPD overlap syndrome (HCC)    Endothelial corneal dystrophy of both eyes    Heterozygous factor V Leiden mutation (HCC)    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)    History of pulmonary embolism    Mature cataract    Primary open-angle glaucoma, bilateral, mild stage    Pseudophakic bullous keratopathy of left eye    TB lung, latent    Hyponatremia    Shortness of breath    Other fatigue     Acquired hypothyroidism    Venous stasis ulcer of right calf limited to breakdown of skin without varicose veins (HCC)    Hashimoto's thyroiditis    Venous stasis ulcer of other part of right lower leg with fat layer exposed with varicose veins (HCC)    Edema of both lower extremities due to peripheral venous insufficiency     Past Medical History:   Diagnosis Date    Arthritis     Coagulation defect (HCC)     last assessed: 11/28/2018    COPD (chronic obstructive pulmonary disease) ([facility])     last assessed: 5/14/2019, 12/6/2017    Generalized osteoarthritis     last assessed: 1/28/2019    GERD without esophagitis     last assessed: 1/28/2019    Glaucoma     last assessed: 8/4/2011    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[facility])     last assessed: 10/16/2018    Factor II Prothrombin Gene per [employer]    History of kidney stones 1985    Hx of blood clots     Hypertension     last assessed: 5/14/2019    Impaired fasting glucose     last assessed: 8/26/2013    Mild persistent asthma, uncomplicated     last assessed: 11/28/2018    Nephrolithiasis     Nondisplaced intertrochanteric fracture of right femur, initial encounter for closed fracture ([facility])     last assessed: 1/28/2019    Premature ventricular contractions     last assessed: 8/4/2011    Pulmonary nodule     Scoliosis (and kyphoscoliosis), idiopathic     Wears dentures      Past Surgical History:   Procedure Laterality Date    CARPAL TUNNEL RELEASE Left     ENDO 8/801    CATARACT EXTRACTION Left     COLONOSCOPY  10/07/2008    last assessed: 3/29/2016    EYE SURGERY Right 01/13/2022    HERNIA REPAIR      HIP SURGERY      HIP SURGERY Left 03/05/1999    ORIF    MULTIPLE TOOTH EXTRACTIONS Bilateral     OTHER SURGICAL HISTORY Right 10/30/2001    [facility]    TONSILLECTOMY Bilateral     WISDOM TOOTH EXTRACTION Bilateral      Family History   Problem Relation Age of Onset    Hypertension Mother     Stroke Mother     Heart attack Father      Social History      Socioeconomic History    Marital status:      Spouse name: Not on file    Number of children: Not on file    Years of education: Not on file    Highest education level: Not on file   Occupational History    Not on file   Tobacco Use    Smoking status: Former     Types: Pipe     Start date:      Quit date: 1970     Years since quittin.1     Passive exposure: Past    Smokeless tobacco: Never   Vaping Use    Vaping status: Never Used   Substance and Sexual Activity    Alcohol use: Yes     Comment: social    Drug use: Never    Sexual activity: Not Currently   Other Topics Concern    Not on file   Social History Narrative    Not on file     Social Drivers of Health     Financial Resource Strain: Low Risk  (10/23/2023)    Overall Financial Resource Strain (CARDIA)     Difficulty of Paying Living Expenses: Not very hard   Food Insecurity: No Food Insecurity (10/28/2024)    Hunger Vital Sign     Worried About Running Out of Food in the Last Year: Never true     Ran Out of Food in the Last Year: Never true   Transportation Needs: No Transportation Needs (2024)    OASIS : Transportation     Lack of Transportation (Medical): No     Lack of Transportation (Non-Medical): No     Patient Unable or Declines to Respond: No   Physical Activity: Not on file   Stress: Not on file   Social Connections: Not on file   Intimate Partner Violence: Not on file   Housing Stability: Low Risk  (10/28/2024)    Housing Stability Vital Sign     Unable to Pay for Housing in the Last Year: No     Number of Times Moved in the Last Year: 0     Homeless in the Last Year: No       Current Outpatient Medications:     nystatin-triamcinolone (MYCOLOG-II) ointment, Apply topically 2 (two) times a day for 7 days To affected skin of leg, Disp: 30 g, Rfl: 1    albuterol (2.5 mg/3 mL) 0.083 % nebulizer solution, Take 3 mL (2.5 mg total) by nebulization every 4 (four) hours as needed for wheezing or shortness of breath, Disp: 100  mL, Rfl: 0    albuterol (PROVENTIL HFA,VENTOLIN HFA) 90 mcg/act inhaler, TAKE 2 PUFFS BY MOUTH EVERY 4 HOURS AS NEEDED FOR WHEEZE, Disp: 18 g, Rfl: 1    amLODIPine (NORVASC) 2.5 mg tablet, Take 1 tablet (2.5 mg total) by mouth daily, Disp: 100 tablet, Rfl: 3    Cholecalciferol (Vitamin D3) 125 MCG (5000 UT) CAPS, Take 1 capsule by mouth daily. Indications: Vitamin D Deficiency, Disp: , Rfl:     desonide (DESOWEN) 0.05 % cream, Apply to face twice a day (Patient taking differently: Apply 1 Application topically 2 (two) times a day Apply topically to face twice a day), Disp: 60 g, Rfl: 0    Difluprednate 0.05 % EMUL, , Disp: , Rfl:     docusate sodium (COLACE) 100 mg capsule, Take 100 mg by mouth in the morning. Indications: Constipation, Disp: , Rfl:     dorzolamide (TRUSOPT) 2 % ophthalmic solution, INSTILL 1 DROP INTO LEFT EYE TWICE A DAY, Disp: , Rfl:     fluticasone-vilanterol (Breo Ellipta) 200-25 mcg/actuation inhaler, INHALE 1 PUFF DAILY RINSE MOUTH AFTER USE. (Patient taking differently: No sig reported), Disp: 180 each, Rfl: 1    furosemide (LASIX) 20 mg tablet, Take 1 tablet (20 mg total) by mouth in the morning, Disp: 90 tablet, Rfl: 1    latanoprost (XALATAN) 0.005 % ophthalmic solution, Administer 1 drop to both eyes daily at bedtime, Disp: , Rfl:     losartan (COZAAR) 50 mg tablet, TAKE 1 TABLET BY MOUTH EVERY DAY, Disp: 90 tablet, Rfl: 1    montelukast (SINGULAIR) 10 mg tablet, TAKE 1 TABLET BY MOUTH EVERY DAY, Disp: 90 tablet, Rfl: 1    multivitamin (THERAGRAN) TABS, Take 1 tablet by mouth daily, Disp: , Rfl:     omeprazole (PriLOSEC OTC) 20 MG tablet, Take 20 mg by mouth daily, Disp: , Rfl:     Oral Electrolytes (ELECTROLYTE SR PO), Take 2 tablets by mouth daily., Disp: , Rfl:     Polyethyl Glycol-Propyl Glycol (SYSTANE OP), Administer 1 drop to both eyes 4 (four) times a day, Disp: , Rfl:     polyethylene glycol (GLYCOLAX) 17 GM/SCOOP powder, Take 17 g by mouth daily as needed (Constipation) Stir  into 8 oz of liquid of choice., Disp: , Rfl:     prednisoLONE acetate (PRED FORTE) 1 % ophthalmic suspension, Administer 1 drop into the left eye 2 (two) times a day, Disp: , Rfl:     sodium chloride (ARIEL 128) 2 % hypertonic ophthalmic solution, Administer 1 drop into the left eye 4 (four) times a day, Disp: , Rfl:     triamcinolone (KENALOG) 0.1 % cream, Apply topically 2 (two) times a day as needed for rash (Patient taking differently: Apply 1 Application topically 2 (two) times a day as needed for rash. APPLY TOPICALLY TO RASH ON FACE AND EARS BID PRN.), Disp: 80 g, Rfl: 2    triamcinolone (KENALOG) 0.1 % ointment, Apply topically 2 (two) times a day for 14 days To skin of R thigh, Disp: 30 g, Rfl: 1    warfarin (COUMADIN) 5 mg tablet, Take 1.5 tablets daily or as directed, Disp: 135 tablet, Rfl: 1    Review of Systems   Constitutional:  Negative for fever.   Cardiovascular:  Positive for leg swelling.   Musculoskeletal:  Positive for gait problem.   Skin:  Positive for color change (redness of skin on Monday, improved from  Monday), rash and wound.   Neurological:  Positive for weakness.         Objective:  /74   Pulse 81   Temp (!) 97.3 °F (36.3 °C) (Temporal)   Resp 20   Pain Score: 0-No pain     Physical Exam  Vitals reviewed.   Constitutional:       Appearance: He is normal weight.   Cardiovascular:      Rate and Rhythm: Normal rate.   Pulmonary:      Effort: Pulmonary effort is normal.      Breath sounds: No rhonchi or rales.   Skin:     Findings: Erythema (mild associated w/ rash), rash and wound present. Rash is scaling.             Comments: Venous ulcerations of the right anterior lower leg.  The original ulcerations have completely closed without drainage.  There is hyperpigmented scar tissue present where the previous ulcerations were.  More distal to these ulcerations is an area of irritated skin with mild erythema and a raised edge on the border of the affected skin. There are small areas  of weeping from the irritated skin.   Neurological:      Mental Status: He is alert.      Motor: Weakness present.      Gait: Gait abnormal.                Wound 12/26/24 Pretibial Right;Anterior (Active)   Wound Image   02/13/25 1422   Wound Description Epithelialization 02/13/25 1432   Marisol-wound Assessment Hyperpigmented;Edema;Pink 02/13/25 1432   Wound Length (cm) 0 cm 02/13/25 1432   Wound Width (cm) 0 cm 02/13/25 1432   Wound Depth (cm) 0 cm 02/13/25 1432   Wound Surface Area (cm^2) 0 cm^2 02/13/25 1432   Wound Volume (cm^3) 0 cm^3 02/13/25 1432   Calculated Wound Volume (cm^3) 0 cm^3 02/13/25 1432   Change in Wound Size % 100 02/13/25 1432   Drainage Amount None 02/13/25 1432   Drainage Description Serosanguineous 02/05/25 1152   Non-staged Wound Description Full thickness 02/05/25 1152   Treatments Cleansed 02/13/25 1432   Patient Tolerance Tolerated well 01/02/25 1431   Dressing Status Intact 02/13/25 1432       Wound 02/05/25 Skin Tear Leg Right;Lower;Upper (Active)   Wound Image   02/13/25 1424   Wound Description Epithelialization 02/13/25 1433   Marisol-wound Assessment Pink;Intact 02/13/25 1433   Wound Length (cm) 0 cm 02/13/25 1433   Wound Width (cm) 0 cm 02/13/25 1433   Wound Depth (cm) 0 cm 02/13/25 1433   Wound Surface Area (cm^2) 0 cm^2 02/13/25 1433   Wound Volume (cm^3) 0 cm^3 02/13/25 1433   Calculated Wound Volume (cm^3) 0 cm^3 02/13/25 1433   Change in Wound Size % 100 02/13/25 1433   Drainage Amount None 02/13/25 1433   Drainage Description Bloody 02/05/25 1153   Non-staged Wound Description Partial thickness 02/05/25 1153   Treatments Cleansed 02/13/25 1433   Dressing Status Intact 02/13/25 1433       Wound 02/05/25 Skin Tear Leg Distal;Right;Lateral (Active)   Wound Image   02/13/25 1422   Wound Description Epithelialization 02/13/25 1433   Marisol-wound Assessment Pink 02/13/25 1433   Wound Length (cm) 0 cm 02/13/25 1433   Wound Width (cm) 0 cm 02/13/25 1433   Wound Depth (cm) 0 cm 02/13/25  1433   Wound Surface Area (cm^2) 0 cm^2 02/13/25 1433   Wound Volume (cm^3) 0 cm^3 02/13/25 1433   Calculated Wound Volume (cm^3) 0 cm^3 02/13/25 1433   Change in Wound Size % 100 02/13/25 1433   Drainage Amount None 02/13/25 1433   Dressing Status Intact 02/13/25 1433       Wound 02/13/25 Other (comment) Leg Right;Lower (Active)   Wound Description Pink;Epithelialization;Other (Comment) 02/13/25 1434   Marisol-wound Assessment Pink;Erythema;Edema 02/13/25 1434   Wound Length (cm) 4.5 cm 02/13/25 1434   Wound Width (cm) 12 cm 02/13/25 1434   Wound Depth (cm) 0.1 cm 02/13/25 1434   Wound Surface Area (cm^2) 54 cm^2 02/13/25 1434   Wound Volume (cm^3) 5.4 cm^3 02/13/25 1434   Calculated Wound Volume (cm^3) 5.4 cm^3 02/13/25 1434   Drainage Amount Moderate 02/13/25 1434   Drainage Description Brown;Yellow;Serous 02/13/25 1434   Non-staged Wound Description Partial thickness 02/13/25 1434   Treatments Cleansed 02/13/25 1434   Dressing Status Other (Comment) 02/13/25 1434                     Wound Instructions:  Orders Placed This Encounter   Procedures    Wound cleansing and dressings Other (comment) (rash) Right;Lower Leg     Right lower leg     Wash your hands with soap and water.  Remove old dressing, discard into plastic bag and place in trash.    Cleanse the wound with NSS prior to applying a clean dressing. Do not use tissue or cotton balls.   Do not scrub the wound. Pat dry using gauze.  Shower YES  on day of home health care visit. Wash wound area last, rinse well. Pat to dry.         Apply thin layer Triamcinolone nystatin ointment to the wound.  Cover with ABD pad.   Secure with Cain and tape. Layer of cast padding under Setopress. Apply Setopress (brown square). Apply  tubifast blue . ----Do not use spandigrip/tubigrip/unna boot. -----  Change dressing EVERY OTHER DAY this week. Home Health please change dressing on Saturday, Monday and Wednesday       If no drainage and No rash on Saturday 2-15-25 please apply  "dry dressing and Setopress - no ointment needed      * [facility] please see changes to wound care and call wound care center for any increased redness, rash or drainage. Please call for new orders         Setopress     Apply compression wrap to your affected Leg(s) from mid-foot to knee making sure to cover the heel. Apply in the morning and re-wrap as needed during the day if wrap becomes loose. Remove at bedtime and elevate legs or lie down.    Avoid prolonged standing in one place.    Elevate leg(s) above the level of the heart when sitting or as much as possible.          prescription at pharmacy     Follow up in 1 week at [facility]     Standing Status:   Future     Expiration Date:   2/20/2025    Wound Procedure Treatment Other (comment) (rash) Right;Lower Leg     This order was created via procedure documentation       Cally Goldsmith, PA-C      Portions of the record may have been created with voice recognition software. Occasional wrong word or \"sound alike\" substitutions may have occurred due to the inherent limitations of voice recognition software. Read the chart carefully and recognize, using context, where substitutions have occurred.    "

## 2025-02-13 NOTE — PATIENT INSTRUCTIONS
Orders Placed This Encounter   Procedures    Wound cleansing and dressings Other (comment) (rash) Right;Lower Leg     Right lower leg     Wash your hands with soap and water.  Remove old dressing, discard into plastic bag and place in trash.    Cleanse the wound with NSS prior to applying a clean dressing. Do not use tissue or cotton balls.   Do not scrub the wound. Pat dry using gauze.  Shower YES  on day of home health care visit. Wash wound area last, rinse well. Pat to dry.         Apply thin layer Triamcinolone nystatin ointment to the wound.  Cover with ABD pad.   Secure with Cain and tape. Layer of cast padding under Setopress. Apply Setopress (brown square). Apply  tubifast blue . ----Do not use spandigrip/tubigrip/unna boot. -----  Change dressing EVERY OTHER DAY this week. Home Health please change dressing on Saturday, Monday and Wednesday       If no drainage and No rash on Saturday 2-15-25 please apply dry dressing and Setopress - no ointment needed      * [facility] please see changes to wound care and call wound care center for any increased redness, rash or drainage. Please call for new orders         Setopress     Apply compression wrap to your affected Leg(s) from mid-foot to knee making sure to cover the heel. Apply in the morning and re-wrap as needed during the day if wrap becomes loose. Remove at bedtime and elevate legs or lie down.    Avoid prolonged standing in one place.    Elevate leg(s) above the level of the heart when sitting or as much as possible.          prescription at pharmacy     Follow up in 1 week at [facility]     Standing Status:   Future     Expiration Date:   2/20/2025

## 2025-02-13 NOTE — PROGRESS NOTES
Wound Procedure Treatment Other (comment) (rash) Right;Lower Leg    Performed by: Gladys Chapa RN  Authorized by: Cornelia Goldsmith PA-C    Associated wounds:   Wound 02/13/25 Other (comment) Leg Right;Lower  Wound cleansed with:  Soap and water and NSS  Applied Topical: Other    Applied secondary dressing:  ABD and Cast padding  Dressing secured with:  Cain and Tape  Comments:  Nystatin Zinc 50/50 mixture   Setopress ([place])

## 2025-02-14 ENCOUNTER — TELEPHONE (OUTPATIENT)
Age: OVER 89
End: 2025-02-14

## 2025-02-14 ENCOUNTER — HOME CARE VISIT (OUTPATIENT)
Dept: HOME HEALTH SERVICES | Facility: HOME HEALTHCARE | Age: OVER 89
End: 2025-02-14
Payer: MEDICARE

## 2025-02-14 VITALS
HEART RATE: 84 BPM | DIASTOLIC BLOOD PRESSURE: 68 MMHG | RESPIRATION RATE: 20 BRPM | OXYGEN SATURATION: 97 % | TEMPERATURE: 97.3 F | SYSTOLIC BLOOD PRESSURE: 142 MMHG

## 2025-02-14 PROCEDURE — G0299 HHS/HOSPICE OF RN EA 15 MIN: HCPCS

## 2025-02-14 NOTE — TELEPHONE ENCOUNTER
Lilian from [facility] calling to get patient set up for Home INR they're looking for some information if someone could give them a call back at [phone] extension [phone]

## 2025-02-17 ENCOUNTER — OFFICE VISIT (OUTPATIENT)
Facility: HOSPITAL | Age: OVER 89
End: 2025-02-17
Payer: MEDICARE

## 2025-02-17 VITALS
SYSTOLIC BLOOD PRESSURE: 154 MMHG | RESPIRATION RATE: 18 BRPM | DIASTOLIC BLOOD PRESSURE: 74 MMHG | TEMPERATURE: 96.5 F | HEART RATE: 82 BPM

## 2025-02-17 DIAGNOSIS — L97.811 VENOUS STASIS ULCER OF OTHER PART OF RIGHT LOWER LEG LIMITED TO BREAKDOWN OF SKIN WITH VARICOSE VEINS (HCC): Primary | ICD-10-CM

## 2025-02-17 DIAGNOSIS — I83.018 VENOUS STASIS ULCER OF OTHER PART OF RIGHT LOWER LEG LIMITED TO BREAKDOWN OF SKIN WITH VARICOSE VEINS (HCC): Primary | ICD-10-CM

## 2025-02-17 DIAGNOSIS — R60.0 EDEMA OF RIGHT LOWER LEG DUE TO PERIPHERAL VENOUS INSUFFICIENCY: ICD-10-CM

## 2025-02-17 DIAGNOSIS — I87.2 EDEMA OF RIGHT LOWER LEG DUE TO PERIPHERAL VENOUS INSUFFICIENCY: ICD-10-CM

## 2025-02-17 PROCEDURE — 29581 APPL MULTLAYER CMPRN SYS LEG: CPT

## 2025-02-17 PROCEDURE — 99213 OFFICE O/P EST LOW 20 MIN: CPT | Performed by: STUDENT IN AN ORGANIZED HEALTH CARE EDUCATION/TRAINING PROGRAM

## 2025-02-17 NOTE — PROGRESS NOTES
Wound Procedure Treatment Other (comment) (rash) Right;Lower Leg    Performed by: Naima Mendoza RN  Authorized by: Cornelia Goldsmith PA-C    Associated wounds:   Wound 02/13/25 Other (comment) Leg Right;Lower  Wound cleansed with:  Wound aggrssively cleansed with NSS and gauze  Applied Topical: Medihoney gel    Applied secondary dressing:  Gauze  Dressing secured with:  Compression wrap and Tape  Comments:  Coflex TLC  Cast Application    Date/Time: 2/17/2025 1:15 PM    Performed by: Naima Mendoza RN  Authorized by: Cornelia Goldsmith PA-C    Other Assisting Provider: No    Verbal consent obtained?: Yes    Consent given by:  Patient  Time Out:     Time out: Immediately prior to the procedure a time out was called      Time out performed at:  2/17/2025 2:12 PM  Patient states understanding of procedure being performed: Yes    Patient's understanding of procedure matches consent: Yes    Patient identity confirmed:  Verbally with patient  Pre-procedure details:     Sensation:  Normal  Procedure details:     Laterality:  Right    Location:  Leg    Leg:  R lower leg    Strapping: No      Cast type:  Multi-layer compression short leg    Supplies:  2 layer wrap  Post-procedure details:     Pain:  Improved    Sensation:  Normal    Patient tolerance of procedure:  Tolerated well, no immediate complications  Comments:      Multilayer wrap procedure     Before application, ANAHY and/or TBI determined to be adequate for healing and application of compression. Lower extremity washed prior to application of compression wrap. With the foot in dorsiflexed position, coflex was applied as per physician orders without complications or complaint of pain.  The procedure was tolerated well. Toes warm & pink post application.  Patient provided education & reinforced to observe toes for any discoloration, swelling or tingling and instructed when to report to the Wound Center or to remove compression. Wound care as per providers orders, patient  tolerated well.

## 2025-02-17 NOTE — PROGRESS NOTES
Patient ID: Enrico Chavira is a 89 y.o. male Date of Birth 8/12/1930       Chief Complaint   Patient presents with    Follow Up Wound Care Visit     Shavon press wrap removed and was reported uncomfortable and patient did not tolerate.        Allergies:  Alphagan p [brimonidine tartrate]    Diagnosis:   Diagnosis ICD-10-CM Associated Orders   1. Venous stasis ulcer of other part of right lower leg limited to breakdown of skin with varicose veins ([facility])  I83.018 Wound cleansing and dressings Other (comment) (rash) Right;Lower Leg    L97.811 Wound Procedure Treatment Other (comment) (rash) Right;Lower Leg     Cast Application      2. Edema of right lower leg due to peripheral venous insufficiency  I87.2     R60.0            Assessment  & Plan:    Follow-up venous ulceration of the right lower extremity.  The original ulcerations remain completely closed.  There is a small area of weeping of the medial distal lower leg. Rash present at previous visit appears to have resolved. No obvious residual rash on exam today.  Leg with significant edema.  Medihoney to remaining weeping areas of the right medial lower leg.  Cover with ABD pad.  Given significant edema of the lower extremity with less compression would benefit from increased compression for an additional week to allow for complete healing.  Unna boot was beginning to cause skin irritation and Setopress was not well tolerated therefore will change to use of Coflex for compression. ANAHY non  compressible with palpable pulses. Notify office of any discomfort with use of Coflex for compression. May shower if utilizes a cast cover.   Prescription compression garment has been ordered from local supplier.  Delivery pending.  Elevate legs when resting. Avoid prolonged standing in one place. Continue diuretic, as prescribed.   May resume physical activities.   F/u in one week. Instructed to call if any questions or concerns arise in meantime.          Subjective:    12/26/24: 89 y/o M with PMHx of HTN, PE, asthma/COPD, hypothyroidism, skin cancer, presents for evaluation of wounds on his RLE with two of his daughters. His RLE wounds have been present for approximately one month. Pt has been seeing his PCP whom is currently applying Unna boots with overlying ACE wraps. There is quite a bit of drainage from ulceration and drainage has been leaking through the Unna boot. Pt has been elevating his leg. Denies hx of heart failure or increased SOB (aside from baseline with COPD).     01/02/2025: Patient presents for follow-up of right lower extremity venous ulcerations.  Home health has been performing dressing changes.  Presently silver alginate and Unna boot is being used.  Patient reports that he is tolerating the Unna boot well and denies any significant discomfort or changes in his breathing with use of multilayer compression.    01/09/25: Pt presents for f/u VSU of the RLE. Has been tolerating Unna boot very well. Offers no complaints today.     01/23/25: Patient presents for follow-up of right lower extremity venous ulceration at continues to be tolerating the Unna boot very well.  Offers no complaints today.  Unfortunately he was unable to get a transfer wheelchair because the company was requesting go to card information despite the insurance covering the cost. Has an upcoming appointment with his PCP on Monday.     01/30/25: Pt presents for f/u RLE venous ulcerations. Continues to tolerate Unna boot very well. Offers no complaints today.       02/05/25: Pt presents for f/u RLE venous ulcerations. Has continued to tolerate the Unna boot well. Offers no complaints today.     02/13/25: Patient presents for follow-up of right lower extremity venous ulcerations.  He presents with his daughter who notes that they [facility] the prescription off [facility] prescription compression garments.  They were ordered but have not yet come in.  Presently he has  continue with an Unna boot.  On Monday during the dressing change there was quite a bit of redness and irritation of the right lower leg. Pt denies any significant pain or itch.     02/17/25: Pt presents for f/u RLE venous ulcerations. He was having significant discomfort with use of the Setopress placed at his most recent visit therefore he changed to using a compression sock instead. Has not yet received his new compression socks from local supply company.           The following portions of the patient's history were reviewed and updated as appropriate:   Patient Active Problem List   Diagnosis    Prothrombin gene mutation (HCC)    Primary generalized (osteo)arthritis    Essential hypertension, benign    Dyslipidemia    Intrinsic eczema    Other seborrheic dermatitis    Abnormality of gait    Osteoarthritis of both knees    Chronic pulmonary embolism without acute cor pulmonale (HCC)    Age-related cataract of both eyes    Premature beats    Primary osteoarthritis of both knees    Neoplasm of uncertain behavior of skin    Encounter for long-term (current) use of medications    GERD without esophagitis    Influenza vaccine refused    Hypercalcemia    COVID-19 virus infection    Asthma-COPD overlap syndrome (HCC)    Endothelial corneal dystrophy of both eyes    Heterozygous factor V Leiden mutation (HCC)    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)    History of pulmonary embolism    Mature cataract    Primary open-angle glaucoma, bilateral, mild stage    Pseudophakic bullous keratopathy of left eye    TB lung, latent    Hyponatremia    Shortness of breath    Other fatigue    Acquired hypothyroidism    Venous stasis ulcer of right calf limited to breakdown of skin without varicose veins (HCC)    Hashimoto's thyroiditis    Venous stasis ulcer of other part of right lower leg with fat layer exposed with varicose veins (HCC)    Edema of both lower extremities due to peripheral venous insufficiency     Past  Medical History:   Diagnosis Date    Arthritis     Coagulation defect (HCC)     last assessed: 11/28/2018    COPD (chronic obstructive pulmonary disease) (HCC)     last assessed: 5/14/2019, 12/6/2017    Generalized osteoarthritis     last assessed: 1/28/2019    GERD without esophagitis     last assessed: 1/28/2019    Glaucoma     last assessed: 8/4/2011    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HCC)     last assessed: 10/16/2018    Factor II Prothrombin Gene per [employer]    History of kidney stones 1985    Hx of blood clots     Hypertension     last assessed: 5/14/2019    Impaired fasting glucose     last assessed: 8/26/2013    Mild persistent asthma, uncomplicated     last assessed: 11/28/2018    Nephrolithiasis     Nondisplaced intertrochanteric fracture of right femur, initial encounter for closed fracture ([facility])     last assessed: 1/28/2019    Premature ventricular contractions     last assessed: 8/4/2011    Pulmonary nodule     Scoliosis (and kyphoscoliosis), idiopathic     Wears dentures      Past Surgical History:   Procedure Laterality Date    CARPAL TUNNEL RELEASE Left     ENDO 8/801    CATARACT EXTRACTION Left     COLONOSCOPY  10/07/2008    last assessed: 3/29/2016    EYE SURGERY Right 01/13/2022    HERNIA REPAIR      HIP SURGERY      HIP SURGERY Left 03/05/1999    ORIF    MULTIPLE TOOTH EXTRACTIONS Bilateral     OTHER SURGICAL HISTORY Right 10/30/2001    [facility]    TONSILLECTOMY Bilateral     WISDOM TOOTH EXTRACTION Bilateral      Family History   Problem Relation Age of Onset    Hypertension Mother     Stroke Mother     Heart attack Father      Social History     Socioeconomic History    Marital status:      Spouse name: Not on file    Number of children: Not on file    Years of education: Not on file    Highest education level: Not on file   Occupational History    Not on file   Tobacco Use    Smoking status: Former     Types: Pipe     Start date: 1960     Quit date: 1970     Years  since quittin.1     Passive exposure: Past    Smokeless tobacco: Never   Vaping Use    Vaping status: Never Used   Substance and Sexual Activity    Alcohol use: Yes     Comment: social    Drug use: Never    Sexual activity: Not Currently   Other Topics Concern    Not on file   Social History Narrative    Not on file     Social Drivers of Health     Financial Resource Strain: Low Risk  (10/23/2023)    Overall Financial Resource Strain (CARDIA)     Difficulty of Paying Living Expenses: Not very hard   Food Insecurity: No Food Insecurity (10/28/2024)    Hunger Vital Sign     Worried About Running Out of Food in the Last Year: Never true     Ran Out of Food in the Last Year: Never true   Transportation Needs: No Transportation Needs (2024)    OASIS : Transportation     Lack of Transportation (Medical): No     Lack of Transportation (Non-Medical): No     Patient Unable or Declines to Respond: No   Physical Activity: Not on file   Stress: Not on file   Social Connections: Not on file   Intimate Partner Violence: Not on file   Housing Stability: Low Risk  (10/28/2024)    Housing Stability Vital Sign     Unable to Pay for Housing in the Last Year: No     Number of Times Moved in the Last Year: 0     Homeless in the Last Year: No       Current Outpatient Medications:     albuterol (2.5 mg/3 mL) 0.083 % nebulizer solution, Take 3 mL (2.5 mg total) by nebulization every 4 (four) hours as needed for wheezing or shortness of breath, Disp: 100 mL, Rfl: 0    albuterol (PROVENTIL HFA,VENTOLIN HFA) 90 mcg/act inhaler, TAKE 2 PUFFS BY MOUTH EVERY 4 HOURS AS NEEDED FOR WHEEZE, Disp: 18 g, Rfl: 1    amLODIPine (NORVASC) 2.5 mg tablet, Take 1 tablet (2.5 mg total) by mouth daily, Disp: 100 tablet, Rfl: 3    Cholecalciferol (Vitamin D3) 125 MCG (5000 UT) CAPS, Take 1 capsule by mouth daily. Indications: Vitamin D Deficiency, Disp: , Rfl:     desonide (DESOWEN) 0.05 % cream, Apply to face twice a day (Patient taking  differently: Apply 1 Application topically 2 (two) times a day Apply topically to face twice a day), Disp: 60 g, Rfl: 0    Difluprednate 0.05 % EMUL, , Disp: , Rfl:     docusate sodium (COLACE) 100 mg capsule, Take 100 mg by mouth in the morning. Indications: Constipation, Disp: , Rfl:     dorzolamide (TRUSOPT) 2 % ophthalmic solution, INSTILL 1 DROP INTO LEFT EYE TWICE A DAY, Disp: , Rfl:     fluticasone-vilanterol (Breo Ellipta) 200-25 mcg/actuation inhaler, INHALE 1 PUFF DAILY RINSE MOUTH AFTER USE. (Patient taking differently: No sig reported), Disp: 180 each, Rfl: 1    furosemide (LASIX) 20 mg tablet, Take 1 tablet (20 mg total) by mouth in the morning, Disp: 90 tablet, Rfl: 1    latanoprost (XALATAN) 0.005 % ophthalmic solution, Administer 1 drop to both eyes daily at bedtime, Disp: , Rfl:     losartan (COZAAR) 50 mg tablet, TAKE 1 TABLET BY MOUTH EVERY DAY, Disp: 90 tablet, Rfl: 1    montelukast (SINGULAIR) 10 mg tablet, TAKE 1 TABLET BY MOUTH EVERY DAY, Disp: 90 tablet, Rfl: 1    multivitamin (THERAGRAN) TABS, Take 1 tablet by mouth daily, Disp: , Rfl:     nystatin-triamcinolone (MYCOLOG-II) ointment, Apply topically 2 (two) times a day for 7 days To affected skin of leg, Disp: 30 g, Rfl: 1    omeprazole (PriLOSEC OTC) 20 MG tablet, Take 20 mg by mouth daily, Disp: , Rfl:     Oral Electrolytes (ELECTROLYTE SR PO), Take 2 tablets by mouth daily., Disp: , Rfl:     Polyethyl Glycol-Propyl Glycol (SYSTANE OP), Administer 1 drop to both eyes 4 (four) times a day, Disp: , Rfl:     polyethylene glycol (GLYCOLAX) 17 GM/SCOOP powder, Take 17 g by mouth daily as needed (Constipation) Stir into 8 oz of liquid of choice., Disp: , Rfl:     prednisoLONE acetate (PRED FORTE) 1 % ophthalmic suspension, Administer 1 drop into the left eye 2 (two) times a day, Disp: , Rfl:     sodium chloride (ARIEL 128) 2 % hypertonic ophthalmic solution, Administer 1 drop into the left eye 4 (four) times a day, Disp: , Rfl:     triamcinolone  (KENALOG) 0.1 % cream, Apply topically 2 (two) times a day as needed for rash (Patient taking differently: Apply 1 Application topically 2 (two) times a day as needed for rash. APPLY TOPICALLY TO RASH ON FACE AND EARS BID PRN.), Disp: 80 g, Rfl: 2    triamcinolone (KENALOG) 0.1 % ointment, Apply topically 2 (two) times a day for 14 days To skin of R thigh, Disp: 30 g, Rfl: 1    warfarin (COUMADIN) 5 mg tablet, Take 1.5 tablets daily or as directed, Disp: 135 tablet, Rfl: 1    Review of Systems   Constitutional:  Negative for fever.   Cardiovascular:  Positive for leg swelling.   Musculoskeletal:  Positive for gait problem.   Skin:  Positive for wound.   Neurological:  Positive for weakness.         Objective:  /74   Pulse 82   Temp (!) 96.5 °F (35.8 °C)   Resp 18         Physical Exam  Vitals reviewed.   Constitutional:       Appearance: He is normal weight.   Cardiovascular:      Rate and Rhythm: Normal rate.      Pulses:           Dorsalis pedis pulses are 2+ on the right side.        Posterior tibial pulses are 2+ on the right side.   Pulmonary:      Effort: Pulmonary effort is normal.      Breath sounds: No rhonchi or rales.   Musculoskeletal:      Right lower leg: Edema present.   Skin:     Findings: Erythema (much improved) and wound present. No rash.             Comments: See full wound assessment.    Neurological:      Mental Status: He is alert.      Motor: Weakness present.      Gait: Gait abnormal.         Wound 12/26/24 Pretibial Right;Anterior (Active)   Wound Image   02/13/25 1422   Wound Description Epithelialization 02/13/25 1432   Marisol-wound Assessment Hyperpigmented;Edema;Pink 02/13/25 1432   Wound Length (cm) 0 cm 02/13/25 1432   Wound Width (cm) 0 cm 02/13/25 1432   Wound Depth (cm) 0 cm 02/13/25 1432   Wound Surface Area (cm^2) 0 cm^2 02/13/25 1432   Wound Volume (cm^3) 0 cm^3 02/13/25 1432   Calculated Wound Volume (cm^3) 0 cm^3 02/13/25 1432   Change in Wound Size % 100 02/13/25 1432    Drainage Amount None 02/13/25 1432   Drainage Description Serosanguineous 02/05/25 1152   Non-staged Wound Description Full thickness 02/05/25 1152   Treatments Cleansed 02/13/25 1432   Patient Tolerance Tolerated well 01/02/25 1431   Dressing Status Intact 02/13/25 1432       Wound 02/13/25 Other (comment) Leg Right;Lower (Active)   Wound Image   02/17/25 1346   Wound Description Pink;Epithelialization 02/17/25 1344   Marisol-wound Assessment Pink;Erythema;Edema 02/17/25 1344   Wound Length (cm) 0.1 cm 02/17/25 1344   Wound Width (cm) 0.1 cm 02/17/25 1344   Wound Depth (cm) 0.1 cm 02/17/25 1344   Wound Surface Area (cm^2) 0.01 cm^2 02/17/25 1344   Wound Volume (cm^3) 0.001 cm^3 02/17/25 1344   Calculated Wound Volume (cm^3) 0 cm^3 02/17/25 1344   Change in Wound Size % 100 02/17/25 1344   Drainage Amount Small 02/17/25 1344   Drainage Description Serous 02/17/25 1344   Non-staged Wound Description Full thickness 02/17/25 1344   Treatments Cleansed 02/17/25 1344   Patient Tolerance Tolerated well 02/17/25 1344   Dressing Status Intact;Old drainage 02/17/25 1344               Wound Instructions:  Orders Placed This Encounter   Procedures    Wound cleansing and dressings Other (comment) (rash) Right;Lower Leg     *Please hold [facility] services for one week for wound care*        Coflex TLC  wrap Instructions    Keep compression wrap/wraps clean and dry. If wraps are too tight and you experience numbness/tingling, call the wound center. If after hours, remove wraps or proceed to nearest E.R. and call wound center in AM.    Wrap will be changed 1 x weekly    Avoid prolonged standing in one place.    Elevate leg(s) above the level of the heart when sitting or as much as possible.         (Medihoney applied to your leg wound at the wound center, then Coflex TLC)        Please call the Wound Healing Center Monday through Friday between the hours of 8:00 AM and 4:30 PM at [phone] if you have any questions, if you  "experience any major changes in your wound(s) or for any signs or symptoms of infection such as fever; changes in the redness, swelling, drainage, or odor of your wound. After hours, weekends or holidays please contact your primary care physician or go to the hospital emergency room.          Standing Status:   Future     Expiration Date:   2/24/2025    Wound Procedure Treatment Other (comment) (rash) Right;Lower Leg     This order was created via procedure documentation    Cast Application     This order was created via procedure documentation       Cally Goldsmith, PA-C    Portions of the record may have been created with voice recognition software. Occasional wrong word or \"sound alike\" substitutions may have occurred due to the inherent limitations of voice recognition software. Read the chart carefully and recognize, using context, where substitutions have occurred.      "

## 2025-02-19 ENCOUNTER — HOME CARE VISIT (OUTPATIENT)
Dept: HOME HEALTH SERVICES | Facility: HOME HEALTHCARE | Age: OVER 89
End: 2025-02-19
Payer: MEDICARE

## 2025-02-24 ENCOUNTER — OFFICE VISIT (OUTPATIENT)
Facility: HOSPITAL | Age: OVER 89
End: 2025-02-24
Payer: MEDICARE

## 2025-02-24 ENCOUNTER — HOME CARE VISIT (OUTPATIENT)
Dept: HOME HEALTH SERVICES | Facility: HOME HEALTHCARE | Age: OVER 89
End: 2025-02-24
Payer: MEDICARE

## 2025-02-24 VITALS
HEART RATE: 80 BPM | DIASTOLIC BLOOD PRESSURE: 82 MMHG | RESPIRATION RATE: 18 BRPM | SYSTOLIC BLOOD PRESSURE: 150 MMHG | TEMPERATURE: 97.5 F

## 2025-02-24 DIAGNOSIS — I83.018 VENOUS STASIS ULCER OF OTHER PART OF RIGHT LOWER LEG LIMITED TO BREAKDOWN OF SKIN WITH VARICOSE VEINS (HCC): Primary | ICD-10-CM

## 2025-02-24 DIAGNOSIS — L97.811 VENOUS STASIS ULCER OF OTHER PART OF RIGHT LOWER LEG LIMITED TO BREAKDOWN OF SKIN WITH VARICOSE VEINS (HCC): Primary | ICD-10-CM

## 2025-02-24 DIAGNOSIS — L85.3 XEROSIS OF SKIN: ICD-10-CM

## 2025-02-24 PROCEDURE — 99212 OFFICE O/P EST SF 10 MIN: CPT | Performed by: STUDENT IN AN ORGANIZED HEALTH CARE EDUCATION/TRAINING PROGRAM

## 2025-02-24 PROCEDURE — 99213 OFFICE O/P EST LOW 20 MIN: CPT | Performed by: STUDENT IN AN ORGANIZED HEALTH CARE EDUCATION/TRAINING PROGRAM

## 2025-02-24 RX ORDER — AMMONIUM LACTATE 12 G/100G
LOTION TOPICAL DAILY
Qty: 225 G | Refills: 0 | Status: SHIPPED | OUTPATIENT
Start: 2025-02-24 | End: 2025-03-26

## 2025-02-24 NOTE — PATIENT INSTRUCTIONS
Orders Placed This Encounter   Procedures    Wound cleansing and dressings     Your wound is healed today!!    Continue to wear your compression socks.      prescription moisturizer at your pharmacy.  Apply Lachydrin lotion daily to prevent dryness and cracking.    Monitor for open areas and drainage and call if you need us at anytime.     Standing Status:   Future     Expiration Date:   3/3/2025

## 2025-02-24 NOTE — PROGRESS NOTES
Patient ID: Enrico Chavira is a 89 y.o. male Date of Birth 8/12/1930       Chief Complaint   Patient presents with    Follow Up Wound Care Visit     Right lower leg       Allergies:  Alphagan p [brimonidine tartrate]    Diagnosis:   Diagnosis ICD-10-CM Associated Orders   1. Venous stasis ulcer of other part of right lower leg limited to breakdown of skin with varicose veins ([facility])  I83.018 Wound cleansing and dressings    L97.811 Wound Procedure Treatment      2. Xerosis of skin  L85.3 ammonium lactate (LAC-HYDRIN) 12 % lotion           Assessment  & Plan:    F/u RLE venous ulceration. Is healed. No open ulcerations remain. No drainage is present. Skin of lower leg is dry and scaling.   No further dressing required given ulcerations have closed.   Lac hydrin prescribed for dry scaling skin. D/c use if causes irritation.   Utilize 20-30 mmHg compression stockings daily. May remove at night while sleeping flat and to shower.   Continue with leg elevation when resting.   D/c from wound care center. Call in future with any questions, concerns, future wounds.          Subjective:   12/26/24: 89 y/o M with PMHx of HTN, PE, asthma/COPD, hypothyroidism, skin cancer, presents for evaluation of wounds on his RLE with two of his daughters. His RLE wounds have been present for approximately one month. Pt has been seeing his PCP whom is currently applying Unna boots with overlying ACE wraps. There is quite a bit of drainage from ulceration and drainage has been leaking through the Unna boot. Pt has been elevating his leg. Denies hx of heart failure or increased SOB (aside from baseline with COPD).     01/02/2025: Patient presents for follow-up of right lower extremity venous ulcerations.  Home health has been performing dressing changes.  Presently silver alginate and Unna boot is being used.  Patient reports that he is tolerating the Unna boot well and denies any significant discomfort or changes in his breathing with use  of multilayer compression.    01/09/25: Pt presents for f/u VSU of the RLE. Has been tolerating Unna boot very well. Offers no complaints today.     01/23/25: Patient presents for follow-up of right lower extremity venous ulceration at continues to be tolerating the Unna boot very well.  Offers no complaints today.  Unfortunately he was unable to get a transfer wheelchair because the company was requesting go to card information despite the insurance covering the cost. Has an upcoming appointment with his PCP on Monday.     01/30/25: Pt presents for f/u RLE venous ulcerations. Continues to tolerate Unna boot very well. Offers no complaints today.       02/05/25: Pt presents for f/u RLE venous ulcerations. Has continued to tolerate the Unna boot well. Offers no complaints today.     02/13/25: Patient presents for follow-up of right lower extremity venous ulcerations.  He presents with his daughter who notes that they dropped the prescription off [facility] prescription compression garments.  They were ordered but have not yet come in.  Presently he has continue with an Unna boot.  On Monday during the dressing change there was quite a bit of redness and irritation of the right lower leg. Pt denies any significant pain or itch.     02/17/25: Pt presents for f/u RLE venous ulcerations. He was having significant discomfort with use of the Setopress placed at his most recent visit therefore he changed to using a compression sock instead. Has not yet received his new compression socks from local supply [employer].     02/24/25: Pt presents for f/u RLE venous ulcerations. Was placed in Coflex wrap at previous visit. Has received his 20-30 mmHg compression stockings and brought them along today.           The following portions of the patient's history were reviewed and updated as appropriate:   Patient Active Problem List   Diagnosis    Prothrombin gene mutation (HCC)    Primary generalized  (osteo)arthritis    Essential hypertension, benign    Dyslipidemia    Intrinsic eczema    Other seborrheic dermatitis    Abnormality of gait    Osteoarthritis of both knees    Chronic pulmonary embolism without acute cor pulmonale (HCC)    Age-related cataract of both eyes    Premature beats    Primary osteoarthritis of both knees    Neoplasm of uncertain behavior of skin    Encounter for long-term (current) use of medications    GERD without esophagitis    Influenza vaccine refused    Hypercalcemia    COVID-19 virus infection    Asthma-COPD overlap syndrome (HCC)    Endothelial corneal dystrophy of both eyes    Heterozygous factor V Leiden mutation (HCC)    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)    History of pulmonary embolism    Mature cataract    Primary open-angle glaucoma, bilateral, mild stage    Pseudophakic bullous keratopathy of left eye    TB lung, latent    Hyponatremia    Shortness of breath    Other fatigue    Acquired hypothyroidism    Venous stasis ulcer of right calf limited to breakdown of skin without varicose veins (HCC)    Hashimoto's thyroiditis    Venous stasis ulcer of other part of right lower leg with fat layer exposed with varicose veins (HCC)    Edema of both lower extremities due to peripheral venous insufficiency     Past Medical History:   Diagnosis Date    Arthritis     Coagulation defect (HCC)     last assessed: 11/28/2018    COPD (chronic obstructive pulmonary disease) (HCC)     last assessed: 5/14/2019, 12/6/2017    Generalized osteoarthritis     last assessed: 1/28/2019    GERD without esophagitis     last assessed: 1/28/2019    Glaucoma     last assessed: 8/4/2011    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HCC)     last assessed: 10/16/2018    Factor II Prothrombin Gene per [employer]    History of kidney stones 1985    Hx of blood clots     Hypertension     last assessed: 5/14/2019    Impaired fasting glucose     last assessed: 8/26/2013    Mild persistent  asthma, uncomplicated     last assessed: 2018    Nephrolithiasis     Nondisplaced intertrochanteric fracture of right femur, initial encounter for closed fracture (HCC)     last assessed: 2019    Premature ventricular contractions     last assessed: 2011    Pulmonary nodule     Scoliosis (and kyphoscoliosis), idiopathic     Wears dentures      Past Surgical History:   Procedure Laterality Date    CARPAL TUNNEL RELEASE Left     ENDO     CATARACT EXTRACTION Left     COLONOSCOPY  10/07/2008    last assessed: 3/29/2016    EYE SURGERY Right 2022    HERNIA REPAIR      HIP SURGERY      HIP SURGERY Left 1999    ORIF    MULTIPLE TOOTH EXTRACTIONS Bilateral     OTHER SURGICAL HISTORY Right 10/30/2001    [facility]    TONSILLECTOMY Bilateral     WISDOM TOOTH EXTRACTION Bilateral      Family History   Problem Relation Age of Onset    Hypertension Mother     Stroke Mother     Heart attack Father      Social History     Socioeconomic History    Marital status:      Spouse name: Not on file    Number of children: Not on file    Years of education: Not on file    Highest education level: Not on file   Occupational History    Not on file   Tobacco Use    Smoking status: Former     Types: Pipe     Start date:      Quit date:      Years since quittin.1     Passive exposure: Past    Smokeless tobacco: Never   Vaping Use    Vaping status: Never Used   Substance and Sexual Activity    Alcohol use: Yes     Comment: social    Drug use: Never    Sexual activity: Not Currently   Other Topics Concern    Not on file   Social History Narrative    Not on file     Social Drivers of Health     Financial Resource Strain: Low Risk  (10/23/2023)    Overall Financial Resource Strain (CARDIA)     Difficulty of Paying Living Expenses: Not very hard   Food Insecurity: No Food Insecurity (10/28/2024)    Hunger Vital Sign     Worried About Running Out of Food in the Last Year: Never true     Ran Out of Food  in the Last Year: Never true   Transportation Needs: No Transportation Needs (12/30/2024)    OASIS : Transportation     Lack of Transportation (Medical): No     Lack of Transportation (Non-Medical): No     Patient Unable or Declines to Respond: No   Physical Activity: Not on file   Stress: Not on file   Social Connections: Not on file   Intimate Partner Violence: Not on file   Housing Stability: Low Risk  (10/28/2024)    Housing Stability Vital Sign     Unable to Pay for Housing in the Last Year: No     Number of Times Moved in the Last Year: 0     Homeless in the Last Year: No       Current Outpatient Medications:     ammonium lactate (LAC-HYDRIN) 12 % lotion, Apply topically daily To dry scaling skin, Disp: 225 g, Rfl: 0    albuterol (2.5 mg/3 mL) 0.083 % nebulizer solution, Take 3 mL (2.5 mg total) by nebulization every 4 (four) hours as needed for wheezing or shortness of breath, Disp: 100 mL, Rfl: 0    albuterol (PROVENTIL HFA,VENTOLIN HFA) 90 mcg/act inhaler, TAKE 2 PUFFS BY MOUTH EVERY 4 HOURS AS NEEDED FOR WHEEZE, Disp: 18 g, Rfl: 1    amLODIPine (NORVASC) 2.5 mg tablet, Take 1 tablet (2.5 mg total) by mouth daily, Disp: 100 tablet, Rfl: 3    Cholecalciferol (Vitamin D3) 125 MCG (5000 UT) CAPS, Take 1 capsule by mouth daily. Indications: Vitamin D Deficiency, Disp: , Rfl:     desonide (DESOWEN) 0.05 % cream, Apply to face twice a day (Patient taking differently: Apply 1 Application topically 2 (two) times a day Apply topically to face twice a day), Disp: 60 g, Rfl: 0    Difluprednate 0.05 % EMUL, , Disp: , Rfl:     docusate sodium (COLACE) 100 mg capsule, Take 100 mg by mouth in the morning. Indications: Constipation, Disp: , Rfl:     dorzolamide (TRUSOPT) 2 % ophthalmic solution, INSTILL 1 DROP INTO LEFT EYE TWICE A DAY, Disp: , Rfl:     fluticasone-vilanterol (Breo Ellipta) 200-25 mcg/actuation inhaler, INHALE 1 PUFF DAILY RINSE MOUTH AFTER USE. (Patient taking differently: No sig reported), Disp: 180  each, Rfl: 1    furosemide (LASIX) 20 mg tablet, Take 1 tablet (20 mg total) by mouth in the morning, Disp: 90 tablet, Rfl: 1    latanoprost (XALATAN) 0.005 % ophthalmic solution, Administer 1 drop to both eyes daily at bedtime, Disp: , Rfl:     losartan (COZAAR) 50 mg tablet, TAKE 1 TABLET BY MOUTH EVERY DAY, Disp: 90 tablet, Rfl: 1    montelukast (SINGULAIR) 10 mg tablet, TAKE 1 TABLET BY MOUTH EVERY DAY, Disp: 90 tablet, Rfl: 1    multivitamin (THERAGRAN) TABS, Take 1 tablet by mouth daily, Disp: , Rfl:     nystatin-triamcinolone (MYCOLOG-II) ointment, Apply topically 2 (two) times a day for 7 days To affected skin of leg, Disp: 30 g, Rfl: 1    omeprazole (PriLOSEC OTC) 20 MG tablet, Take 20 mg by mouth daily, Disp: , Rfl:     Oral Electrolytes (ELECTROLYTE SR PO), Take 2 tablets by mouth daily., Disp: , Rfl:     Polyethyl Glycol-Propyl Glycol (SYSTANE OP), Administer 1 drop to both eyes 4 (four) times a day, Disp: , Rfl:     polyethylene glycol (GLYCOLAX) 17 GM/SCOOP powder, Take 17 g by mouth daily as needed (Constipation) Stir into 8 oz of liquid of choice., Disp: , Rfl:     prednisoLONE acetate (PRED FORTE) 1 % ophthalmic suspension, Administer 1 drop into the left eye 2 (two) times a day, Disp: , Rfl:     sodium chloride (ARIEL 128) 2 % hypertonic ophthalmic solution, Administer 1 drop into the left eye 4 (four) times a day, Disp: , Rfl:     triamcinolone (KENALOG) 0.1 % cream, Apply topically 2 (two) times a day as needed for rash (Patient taking differently: Apply 1 Application topically 2 (two) times a day as needed for rash. APPLY TOPICALLY TO RASH ON FACE AND EARS BID PRN.), Disp: 80 g, Rfl: 2    triamcinolone (KENALOG) 0.1 % ointment, Apply topically 2 (two) times a day for 14 days To skin of R thigh, Disp: 30 g, Rfl: 1    warfarin (COUMADIN) 5 mg tablet, Take 1.5 tablets daily or as directed, Disp: 135 tablet, Rfl: 1    Review of Systems      Objective:  /82   Pulse 80   Temp 97.5 °F (36.4 °C)    Resp 18         Physical Exam  Vitals reviewed.   Constitutional:       Appearance: He is normal weight.   Cardiovascular:      Rate and Rhythm: Normal rate.      Pulses:           Dorsalis pedis pulses are 2+ on the right side.        Posterior tibial pulses are 2+ on the right side.   Pulmonary:      Effort: Pulmonary effort is normal.      Breath sounds: No rhonchi or rales.   Musculoskeletal:      Right lower leg: Edema present.   Skin:     Findings: No erythema, rash or wound.             Comments: RLE VSU is now closed without drainage. Hyperpigmented scar tissue present.    Neurological:      Mental Status: He is alert.      Motor: Weakness present.      Gait: Gait abnormal.               Wound 12/26/24 Pretibial Right;Anterior (Active)   Wound Image   02/24/25 1324   Wound Description Epithelialization 02/24/25 1341   Marisol-wound Assessment Dry;Scaly 02/24/25 1341   Wound Length (cm) 0 cm 02/24/25 1341   Wound Width (cm) 0 cm 02/24/25 1341   Wound Depth (cm) 0 cm 02/24/25 1341   Wound Surface Area (cm^2) 0 cm^2 02/24/25 1341   Wound Volume (cm^3) 0 cm^3 02/24/25 1341   Calculated Wound Volume (cm^3) 0 cm^3 02/24/25 1341   Change in Wound Size % 100 02/24/25 1341   Drainage Amount None 02/24/25 1341   Drainage Description Serosanguineous 02/05/25 1152   Non-staged Wound Description Not applicable 02/24/25 1341   Treatments Cleansed 02/13/25 1432   Patient Tolerance Tolerated well 01/02/25 1431   Dressing Status Intact 02/13/25 1432       Wound 02/13/25 Other (comment) Leg Right;Lower (Active)   Wound Image   02/24/25 1323   Wound Description Epithelialization 02/24/25 1340   Marisol-wound Assessment Dry;Scaly 02/24/25 1340   Wound Length (cm) 0 cm 02/24/25 1340   Wound Width (cm) 0 cm 02/24/25 1340   Wound Depth (cm) 0 cm 02/24/25 1340   Wound Surface Area (cm^2) 0 cm^2 02/24/25 1340   Wound Volume (cm^3) 0 cm^3 02/24/25 1340   Calculated Wound Volume (cm^3) 0 cm^3 02/24/25 1340   Change in Wound Size % 100  "02/24/25 1340   Drainage Amount None 02/24/25 1340   Drainage Description Serous 02/17/25 1344   Non-staged Wound Description Not applicable 02/24/25 1340   Treatments Cleansed 02/17/25 1344   Patient Tolerance Tolerated well 02/17/25 1344   Dressing Status Intact;Old drainage 02/17/25 1344                                   Wound Instructions:  Orders Placed This Encounter   Procedures    Wound cleansing and dressings     Your wound is healed today!!    Continue to wear your compression socks.      prescription moisturizer at your pharmacy.  Apply Lachydrin lotion daily to prevent dryness and cracking.    Monitor for open areas and drainage and call if you need us at anytime.     Standing Status:   Future     Expiration Date:   3/3/2025    Wound Procedure Treatment     This order was created via procedure documentation       Cally Goldsmith, PA-C        Portions of the record may have been created with voice recognition software. Occasional wrong word or \"sound alike\" substitutions may have occurred due to the inherent limitations of voice recognition software. Read the chart carefully and recognize, using context, where substitutions have occurred.    "

## 2025-02-24 NOTE — PROGRESS NOTES
Wound Procedure Treatment    Performed by: Ivon Shaw LPN  Authorized by: Cornelia Goldsmith PA-C  Associated wounds:   Wound 12/26/24 Pretibial Right;Anterior  Wound 02/13/25 Other (comment) Leg Right;Lower    Wound cleansed with:  Soap and water   Applied to periwound:  Ammonium lactate   Applied primary dressing:  Other   Comments:  Personal compression stocking

## 2025-02-25 ENCOUNTER — HOME CARE VISIT (OUTPATIENT)
Dept: HOME HEALTH SERVICES | Facility: HOME HEALTHCARE | Age: OVER 89
End: 2025-02-25
Payer: MEDICARE

## 2025-03-02 ENCOUNTER — HOME CARE VISIT (OUTPATIENT)
Dept: HOME HEALTH SERVICES | Facility: HOME HEALTHCARE | Age: OVER 89
End: 2025-03-02

## 2025-03-03 ENCOUNTER — APPOINTMENT (OUTPATIENT)
Dept: LAB | Facility: CLINIC | Age: OVER 89
End: 2025-03-03
Payer: MEDICARE

## 2025-03-03 DIAGNOSIS — Z86.711 HISTORY OF PULMONARY EMBOLISM: ICD-10-CM

## 2025-03-03 LAB
INR PPP: 2.54 (ref 0.85–1.19)
PROTHROMBIN TIME: 27.2 SECONDS (ref 12.3–15)

## 2025-03-03 PROCEDURE — 85610 PROTHROMBIN TIME: CPT

## 2025-03-03 PROCEDURE — 36415 COLL VENOUS BLD VENIPUNCTURE: CPT

## 2025-03-04 ENCOUNTER — ANTICOAG VISIT (OUTPATIENT)
Dept: FAMILY MEDICINE CLINIC | Facility: CLINIC | Age: OVER 89
End: 2025-03-04

## 2025-03-21 ENCOUNTER — ANTICOAG VISIT (OUTPATIENT)
Dept: FAMILY MEDICINE CLINIC | Facility: CLINIC | Age: OVER 89
End: 2025-03-21

## 2025-03-21 LAB — INR PPP: 3.3 (ref 0.85–1.19)

## 2025-03-28 ENCOUNTER — ANTICOAG VISIT (OUTPATIENT)
Dept: FAMILY MEDICINE CLINIC | Facility: CLINIC | Age: OVER 89
End: 2025-03-28

## 2025-03-28 LAB — INR PPP: 2.4 (ref 0.85–1.19)

## 2025-04-11 ENCOUNTER — ANTICOAG VISIT (OUTPATIENT)
Dept: FAMILY MEDICINE CLINIC | Facility: CLINIC | Age: OVER 89
End: 2025-04-11

## 2025-04-11 LAB — INR PPP: 3.8 (ref 0.85–1.19)

## 2025-04-14 ENCOUNTER — APPOINTMENT (EMERGENCY)
Dept: CT IMAGING | Facility: HOSPITAL | Age: OVER 89
DRG: 872 | End: 2025-04-14
Payer: MEDICARE

## 2025-04-14 ENCOUNTER — APPOINTMENT (EMERGENCY)
Dept: NON INVASIVE DIAGNOSTICS | Facility: HOSPITAL | Age: OVER 89
DRG: 872 | End: 2025-04-14
Payer: MEDICARE

## 2025-04-14 ENCOUNTER — HOSPITAL ENCOUNTER (INPATIENT)
Facility: HOSPITAL | Age: OVER 89
LOS: 5 days | DRG: 872 | End: 2025-04-19
Attending: EMERGENCY MEDICINE | Admitting: INTERNAL MEDICINE
Payer: MEDICARE

## 2025-04-14 ENCOUNTER — TELEPHONE (OUTPATIENT)
Age: OVER 89
End: 2025-04-14

## 2025-04-14 ENCOUNTER — APPOINTMENT (EMERGENCY)
Dept: RADIOLOGY | Facility: HOSPITAL | Age: OVER 89
DRG: 872 | End: 2025-04-14
Payer: MEDICARE

## 2025-04-14 DIAGNOSIS — L03.115 CELLULITIS OF RIGHT LEG: ICD-10-CM

## 2025-04-14 DIAGNOSIS — A41.9 SEPTIC SHOCK (HCC): Primary | ICD-10-CM

## 2025-04-14 DIAGNOSIS — I48.91 ATRIAL FIBRILLATION WITH RAPID VENTRICULAR RESPONSE (HCC): ICD-10-CM

## 2025-04-14 DIAGNOSIS — J44.89 OBSTRUCTIVE CHRONIC BRONCHITIS WITHOUT EXACERBATION (HCC): ICD-10-CM

## 2025-04-14 DIAGNOSIS — L03.115 CELLULITIS OF RIGHT LOWER EXTREMITY: ICD-10-CM

## 2025-04-14 DIAGNOSIS — N17.9 ACUTE KIDNEY INJURY (HCC): ICD-10-CM

## 2025-04-14 DIAGNOSIS — M54.50 LOW BACK PAIN: ICD-10-CM

## 2025-04-14 DIAGNOSIS — L02.419 CELLULITIS AND ABSCESS OF LEG: ICD-10-CM

## 2025-04-14 DIAGNOSIS — R05.9 COUGH: ICD-10-CM

## 2025-04-14 DIAGNOSIS — I48.91 ATRIAL FIBRILLATION WITH RVR (HCC): ICD-10-CM

## 2025-04-14 DIAGNOSIS — R65.21 SEPTIC SHOCK (HCC): Primary | ICD-10-CM

## 2025-04-14 DIAGNOSIS — L03.119 CELLULITIS AND ABSCESS OF LEG: ICD-10-CM

## 2025-04-14 PROBLEM — R79.1 SUPRATHERAPEUTIC INR: Status: ACTIVE | Noted: 2025-04-14

## 2025-04-14 LAB
ALBUMIN SERPL BCG-MCNC: 2.8 G/DL (ref 3.5–5)
ALP SERPL-CCNC: 109 U/L (ref 34–104)
ALT SERPL W P-5'-P-CCNC: 59 U/L (ref 7–52)
ANION GAP SERPL CALCULATED.3IONS-SCNC: 9 MMOL/L (ref 4–13)
APTT PPP: 78 SECONDS (ref 23–34)
AST SERPL W P-5'-P-CCNC: 75 U/L (ref 13–39)
BASOPHILS # BLD MANUAL: 0 THOUSAND/UL (ref 0–0.1)
BASOPHILS NFR MAR MANUAL: 0 % (ref 0–1)
BILIRUB SERPL-MCNC: 0.57 MG/DL (ref 0.2–1)
BUN SERPL-MCNC: 64 MG/DL (ref 5–25)
BURR CELLS BLD QL SMEAR: PRESENT
CALCIUM ALBUM COR SERPL-MCNC: 10.5 MG/DL (ref 8.3–10.1)
CALCIUM SERPL-MCNC: 9.5 MG/DL (ref 8.4–10.2)
CHLORIDE SERPL-SCNC: 95 MMOL/L (ref 96–108)
CO2 SERPL-SCNC: 22 MMOL/L (ref 21–32)
CREAT SERPL-MCNC: 1.62 MG/DL (ref 0.6–1.3)
EOSINOPHIL # BLD MANUAL: 0 THOUSAND/UL (ref 0–0.4)
EOSINOPHIL NFR BLD MANUAL: 0 % (ref 0–6)
ERYTHROCYTE [DISTWIDTH] IN BLOOD BY AUTOMATED COUNT: 13.7 % (ref 11.6–15.1)
GFR SERPL CREATININE-BSD FRML MDRD: 35 ML/MIN/1.73SQ M
GLUCOSE SERPL-MCNC: 150 MG/DL (ref 65–140)
HCT VFR BLD AUTO: 40.4 % (ref 36.5–49.3)
HGB BLD-MCNC: 13.7 G/DL (ref 12–17)
INR PPP: 6.04 (ref 0.85–1.19)
LACTATE SERPL-SCNC: 1.7 MMOL/L (ref 0.5–2)
LYMPHOCYTES # BLD AUTO: 0.59 THOUSAND/UL (ref 0.6–4.47)
LYMPHOCYTES # BLD AUTO: 2 % (ref 14–44)
MCH RBC QN AUTO: 30.2 PG (ref 26.8–34.3)
MCHC RBC AUTO-ENTMCNC: 33.9 G/DL (ref 31.4–37.4)
MCV RBC AUTO: 89 FL (ref 82–98)
MONOCYTES # BLD AUTO: 0.88 THOUSAND/UL (ref 0–1.22)
MONOCYTES NFR BLD: 3 % (ref 4–12)
NEUTROPHILS # BLD MANUAL: 27.88 THOUSAND/UL (ref 1.85–7.62)
NEUTS BAND NFR BLD MANUAL: 4 % (ref 0–8)
NEUTS SEG NFR BLD AUTO: 91 % (ref 43–75)
PLATELET # BLD AUTO: 267 THOUSANDS/UL (ref 149–390)
PLATELET BLD QL SMEAR: ADEQUATE
PMV BLD AUTO: 10.1 FL (ref 8.9–12.7)
POTASSIUM SERPL-SCNC: 4.2 MMOL/L (ref 3.5–5.3)
PROCALCITONIN SERPL-MCNC: 15.61 NG/ML
PROT SERPL-MCNC: 6.5 G/DL (ref 6.4–8.4)
PROTHROMBIN TIME: 53 SECONDS (ref 12.3–15)
RBC # BLD AUTO: 4.53 MILLION/UL (ref 3.88–5.62)
RBC MORPH BLD: PRESENT
SODIUM SERPL-SCNC: 126 MMOL/L (ref 135–147)
TOXIC GRANULES BLD QL SMEAR: PRESENT
WBC # BLD AUTO: 29.35 THOUSAND/UL (ref 4.31–10.16)

## 2025-04-14 PROCEDURE — 94760 N-INVAS EAR/PLS OXIMETRY 1: CPT

## 2025-04-14 PROCEDURE — 96366 THER/PROPH/DIAG IV INF ADDON: CPT

## 2025-04-14 PROCEDURE — 85730 THROMBOPLASTIN TIME PARTIAL: CPT | Performed by: EMERGENCY MEDICINE

## 2025-04-14 PROCEDURE — 93971 EXTREMITY STUDY: CPT

## 2025-04-14 PROCEDURE — 93971 EXTREMITY STUDY: CPT | Performed by: SURGERY

## 2025-04-14 PROCEDURE — 84145 PROCALCITONIN (PCT): CPT | Performed by: EMERGENCY MEDICINE

## 2025-04-14 PROCEDURE — 85027 COMPLETE CBC AUTOMATED: CPT | Performed by: EMERGENCY MEDICINE

## 2025-04-14 PROCEDURE — 83605 ASSAY OF LACTIC ACID: CPT | Performed by: EMERGENCY MEDICINE

## 2025-04-14 PROCEDURE — 99223 1ST HOSP IP/OBS HIGH 75: CPT | Performed by: INTERNAL MEDICINE

## 2025-04-14 PROCEDURE — 99292 CRITICAL CARE ADDL 30 MIN: CPT | Performed by: EMERGENCY MEDICINE

## 2025-04-14 PROCEDURE — 96368 THER/DIAG CONCURRENT INF: CPT

## 2025-04-14 PROCEDURE — 80053 COMPREHEN METABOLIC PANEL: CPT | Performed by: EMERGENCY MEDICINE

## 2025-04-14 PROCEDURE — 99285 EMERGENCY DEPT VISIT HI MDM: CPT

## 2025-04-14 PROCEDURE — 73700 CT LOWER EXTREMITY W/O DYE: CPT

## 2025-04-14 PROCEDURE — 73564 X-RAY EXAM KNEE 4 OR MORE: CPT

## 2025-04-14 PROCEDURE — 70450 CT HEAD/BRAIN W/O DYE: CPT

## 2025-04-14 PROCEDURE — 36415 COLL VENOUS BLD VENIPUNCTURE: CPT | Performed by: EMERGENCY MEDICINE

## 2025-04-14 PROCEDURE — 72131 CT LUMBAR SPINE W/O DYE: CPT

## 2025-04-14 PROCEDURE — 85610 PROTHROMBIN TIME: CPT | Performed by: EMERGENCY MEDICINE

## 2025-04-14 PROCEDURE — 99291 CRITICAL CARE FIRST HOUR: CPT | Performed by: EMERGENCY MEDICINE

## 2025-04-14 PROCEDURE — 85007 BL SMEAR W/DIFF WBC COUNT: CPT | Performed by: EMERGENCY MEDICINE

## 2025-04-14 PROCEDURE — 87040 BLOOD CULTURE FOR BACTERIA: CPT | Performed by: EMERGENCY MEDICINE

## 2025-04-14 PROCEDURE — 96365 THER/PROPH/DIAG IV INF INIT: CPT

## 2025-04-14 PROCEDURE — 93005 ELECTROCARDIOGRAM TRACING: CPT

## 2025-04-14 RX ORDER — MONTELUKAST SODIUM 10 MG/1
10 TABLET ORAL DAILY
Status: DISCONTINUED | OUTPATIENT
Start: 2025-04-15 | End: 2025-04-19 | Stop reason: HOSPADM

## 2025-04-14 RX ORDER — DORZOLAMIDE HCL 20 MG/ML
1 SOLUTION/ DROPS OPHTHALMIC 2 TIMES DAILY
Status: DISCONTINUED | OUTPATIENT
Start: 2025-04-14 | End: 2025-04-19 | Stop reason: HOSPADM

## 2025-04-14 RX ORDER — PANTOPRAZOLE SODIUM 20 MG/1
20 TABLET, DELAYED RELEASE ORAL
Status: DISCONTINUED | OUTPATIENT
Start: 2025-04-15 | End: 2025-04-19 | Stop reason: HOSPADM

## 2025-04-14 RX ORDER — METOPROLOL TARTRATE 25 MG/1
25 TABLET, FILM COATED ORAL EVERY 12 HOURS SCHEDULED
Status: DISCONTINUED | OUTPATIENT
Start: 2025-04-14 | End: 2025-04-19 | Stop reason: HOSPADM

## 2025-04-14 RX ORDER — BUDESONIDE AND FORMOTEROL FUMARATE DIHYDRATE 160; 4.5 UG/1; UG/1
2 AEROSOL RESPIRATORY (INHALATION) 2 TIMES DAILY
Status: DISCONTINUED | OUTPATIENT
Start: 2025-04-14 | End: 2025-04-19 | Stop reason: HOSPADM

## 2025-04-14 RX ORDER — SODIUM CHLORIDE, SODIUM GLUCONATE, SODIUM ACETATE, POTASSIUM CHLORIDE, MAGNESIUM CHLORIDE, SODIUM PHOSPHATE, DIBASIC, AND POTASSIUM PHOSPHATE .53; .5; .37; .037; .03; .012; .00082 G/100ML; G/100ML; G/100ML; G/100ML; G/100ML; G/100ML; G/100ML
1000 INJECTION, SOLUTION INTRAVENOUS ONCE
Status: COMPLETED | OUTPATIENT
Start: 2025-04-14 | End: 2025-04-14

## 2025-04-14 RX ORDER — ACETAMINOPHEN 10 MG/ML
1000 INJECTION, SOLUTION INTRAVENOUS ONCE
Status: COMPLETED | OUTPATIENT
Start: 2025-04-14 | End: 2025-04-14

## 2025-04-14 RX ORDER — CEFAZOLIN SODIUM 2 G/50ML
2000 SOLUTION INTRAVENOUS EVERY 8 HOURS
Status: DISCONTINUED | OUTPATIENT
Start: 2025-04-14 | End: 2025-04-14

## 2025-04-14 RX ORDER — ACETAMINOPHEN 325 MG/1
650 TABLET ORAL EVERY 6 HOURS PRN
Status: DISCONTINUED | OUTPATIENT
Start: 2025-04-14 | End: 2025-04-19 | Stop reason: HOSPADM

## 2025-04-14 RX ORDER — ONDANSETRON 2 MG/ML
4 INJECTION INTRAMUSCULAR; INTRAVENOUS EVERY 6 HOURS PRN
Status: DISCONTINUED | OUTPATIENT
Start: 2025-04-14 | End: 2025-04-19 | Stop reason: HOSPADM

## 2025-04-14 RX ORDER — LATANOPROST 50 UG/ML
1 SOLUTION/ DROPS OPHTHALMIC
Status: DISCONTINUED | OUTPATIENT
Start: 2025-04-14 | End: 2025-04-19 | Stop reason: HOSPADM

## 2025-04-14 RX ORDER — ALBUTEROL SULFATE 0.83 MG/ML
2.5 SOLUTION RESPIRATORY (INHALATION) EVERY 6 HOURS PRN
Status: DISCONTINUED | OUTPATIENT
Start: 2025-04-14 | End: 2025-04-19 | Stop reason: HOSPADM

## 2025-04-14 RX ORDER — PREDNISOLONE ACETATE 10 MG/ML
1 SUSPENSION/ DROPS OPHTHALMIC 2 TIMES DAILY
Status: DISCONTINUED | OUTPATIENT
Start: 2025-04-14 | End: 2025-04-19 | Stop reason: HOSPADM

## 2025-04-14 RX ORDER — ALBUTEROL SULFATE 90 UG/1
2 INHALANT RESPIRATORY (INHALATION) EVERY 4 HOURS PRN
Status: DISCONTINUED | OUTPATIENT
Start: 2025-04-14 | End: 2025-04-17

## 2025-04-14 RX ORDER — CEFAZOLIN SODIUM 1 G/50ML
1000 SOLUTION INTRAVENOUS EVERY 8 HOURS
Status: DISCONTINUED | OUTPATIENT
Start: 2025-04-15 | End: 2025-04-16

## 2025-04-14 RX ADMIN — VANCOMYCIN HYDROCHLORIDE 1750 MG: 1 INJECTION, POWDER, LYOPHILIZED, FOR SOLUTION INTRAVENOUS at 14:05

## 2025-04-14 RX ADMIN — ACETAMINOPHEN 1000 MG: 10 INJECTION INTRAVENOUS at 13:41

## 2025-04-14 RX ADMIN — LATANOPROST 1 DROP: 50 SOLUTION OPHTHALMIC at 21:01

## 2025-04-14 RX ADMIN — DILTIAZEM HYDROCHLORIDE 2.5 MG/HR: 5 INJECTION, SOLUTION INTRAVENOUS at 14:04

## 2025-04-14 RX ADMIN — DILTIAZEM HYDROCHLORIDE 7.5 MG/HR: 5 INJECTION, SOLUTION INTRAVENOUS at 15:43

## 2025-04-14 RX ADMIN — DILTIAZEM HYDROCHLORIDE 5 MG/HR: 5 INJECTION, SOLUTION INTRAVENOUS at 15:07

## 2025-04-14 RX ADMIN — METOPROLOL TARTRATE 25 MG: 25 TABLET, FILM COATED ORAL at 21:08

## 2025-04-14 RX ADMIN — BUDESONIDE AND FORMOTEROL FUMARATE DIHYDRATE 2 PUFF: 160; 4.5 AEROSOL RESPIRATORY (INHALATION) at 21:00

## 2025-04-14 RX ADMIN — DORZOLAMIDE HCL 1 DROP: 20 SOLUTION/ DROPS OPHTHALMIC at 21:01

## 2025-04-14 RX ADMIN — SODIUM CHLORIDE, SODIUM GLUCONATE, SODIUM ACETATE, POTASSIUM CHLORIDE, MAGNESIUM CHLORIDE, SODIUM PHOSPHATE, DIBASIC, AND POTASSIUM PHOSPHATE 1000 ML: .53; .5; .37; .037; .03; .012; .00082 INJECTION, SOLUTION INTRAVENOUS at 15:05

## 2025-04-14 RX ADMIN — CEFTRIAXONE SODIUM 2000 MG: 10 INJECTION, POWDER, FOR SOLUTION INTRAVENOUS at 13:51

## 2025-04-14 RX ADMIN — PREDNISOLONE ACETATE 1 DROP: 10 SUSPENSION/ DROPS OPHTHALMIC at 21:01

## 2025-04-14 NOTE — PLAN OF CARE
Problem: PAIN - ADULT  Goal: Verbalizes/displays adequate comfort level or baseline comfort level  Description: Interventions:- Encourage patient to monitor pain and request assistance- Assess pain using appropriate pain scale- Administer analgesics based on type and severity of pain and evaluate response- Implement non-pharmacological measures as appropriate and evaluate response- Consider cultural and social influences on pain and pain management- Notify physician/advanced practitioner if interventions unsuccessful or patient reports new pain  Outcome: Progressing     Problem: INFECTION - ADULT  Goal: Absence or prevention of progression during hospitalization  Description: INTERVENTIONS:- Assess and monitor for signs and symptoms of infection- Monitor lab/diagnostic results- Monitor all insertion sites, i.e. indwelling lines, tubes, and drains- Monitor endotracheal if appropriate and nasal secretions for changes in amount and color- [place] appropriate cooling/warming therapies per order- Administer medications as ordered- Instruct and encourage patient and family to use good hand hygiene technique- Identify and instruct in appropriate isolation precautions for identified infection/condition  Outcome: Progressing  Goal: Absence of fever/infection during neutropenic period  Description: INTERVENTIONS:- Monitor WBC  Outcome: Progressing     Problem: SAFETY ADULT  Goal: Patient will remain free of falls  Description: INTERVENTIONS:- Educate patient/family on patient safety including physical limitations- Instruct patient to call for assistance with activity - Consult OT/PT to assist with strengthening/mobility - Keep Call bell within reach- Keep bed low and locked with side rails adjusted as appropriate- Keep care items and personal belongings within reach- Initiate and maintain comfort rounds- Make Fall Risk Sign visible to staff- Offer Toileting every 2 Hours, in advance of need- Initiate/Maintain bed alarm-  Obtain necessary fall risk management equipment:- Apply yellow socks and bracelet for high fall risk patients- Consider moving patient to room near nurses station  Outcome: Progressing  Goal: Maintain or return to baseline ADL function  Description: INTERVENTIONS:-  Assess patient's ability to carry out ADLs; assess patient's baseline for ADL function and identify physical deficits which impact ability to perform ADLs (bathing, care of mouth/teeth, toileting, grooming, dressing, etc.)- Assess/evaluate cause of self-care deficits - Assess range of motion- Assess patient's mobility; develop plan if impaired- Assess patient's need for assistive devices and provide as appropriate- Encourage maximum independence but intervene and supervise when necessary- Involve family in performance of ADLs- Assess for home care needs following discharge - Consider OT consult to assist with ADL evaluation and planning for discharge- Provide patient education as appropriate  Outcome: Progressing  Goal: Maintains/Returns to pre admission functional level  Description: INTERVENTIONS:- Perform AM-PAC 6 Click Basic Mobility/ Daily Activity assessment daily.- Set and communicate daily mobility goal to care team and patient/family/caregiver. - Collaborate with rehabilitation services on mobility goals if consulted- Perform Range of Motion 3 times a day.- Reposition patient every 2 hours.- Dangle patient 3 times a day- Stand patient 3 times a day- Ambulate patient 3 times a day- Out of bed to chair 3 times a day - Out of bed for meals 3 times a day- Out of bed for toileting- Record patient progress and toleration of activity level   Outcome: Progressing     Problem: DISCHARGE PLANNING  Goal: Discharge to home or other facility with appropriate resources  Description: INTERVENTIONS:- Identify barriers to discharge w/patient and caregiver- Arrange for needed discharge resources and transportation as appropriate- Identify discharge learning needs  (meds, wound care, etc.)- Arrange for interpretive services to assist at discharge as needed- Refer to Case Management Department for coordinating discharge planning if the patient needs post-hospital services based on physician/advanced practitioner order or complex needs related to functional status, cognitive ability, or social support system  Outcome: Progressing     Problem: Knowledge Deficit  Goal: Patient/family/caregiver demonstrates understanding of disease process, treatment plan, medications, and discharge instructions  Description: Complete learning assessment and assess knowledge base.Interventions:- Provide teaching at level of understanding- Provide teaching via preferred learning methods  Outcome: Progressing     Problem: CARDIOVASCULAR - ADULT  Goal: Maintains optimal cardiac output and hemodynamic stability  Description: INTERVENTIONS:- Monitor I/O, vital signs and rhythm- Monitor for S/S and trends of decreased cardiac output- Administer and titrate ordered vasoactive medications to optimize hemodynamic stability- Assess quality of pulses, skin color and temperature- Assess for signs of decreased coronary artery perfusion- Instruct patient to report change in severity of symptoms  Outcome: Progressing  Goal: Absence of cardiac dysrhythmias or at baseline rhythm  Description: INTERVENTIONS:- Continuous cardiac monitoring, vital signs, obtain 12 lead EKG if ordered- Administer antiarrhythmic and heart rate control medications as ordered- Monitor electrolytes and administer replacement therapy as ordered  Outcome: Progressing

## 2025-04-14 NOTE — TELEPHONE ENCOUNTER
Patient says that Breo is not helping him and would like to switch to Advair.  Please contact patient's daughter, Eden and advise.  Thank you.

## 2025-04-14 NOTE — ED PROVIDER NOTES
Time reflects when diagnosis was documented in both MDM as applicable and the Disposition within this note       Time User Action Codes Description Comment    4/14/2025  4:23 PM David Nava [A41.9,  R65.21] Septic shock (HCC)     4/14/2025  4:23 PM David Nava [L03.119,  L02.419] Cellulitis and abscess of leg     4/14/2025  4:23 PM David Nava [I48.91] Atrial fibrillation with RVR (HCC)           ED Disposition       ED Disposition   Admit    Condition   Stable    Date/Time   Mon Apr 14, 2025  4:24 PM    Comment   Case was discussed with RICH and the patient's admission status was agreed to be Admission Status: inpatient status to the service of Dr. Bunn.               Assessment & Plan       Medical Decision Making  89 yo male jules ocampon for worsening generalized weakness and concern for cellulitis to the right lower extremity.  Patient initially noted to be in A-fib RVR with a heart rate in the 170s.  Also borderline hypotensive.  Septic workup started and noted to have a leukocytosis elevated Pro-Ildefonso, hyponatremia, SONYA CT scan showing no concern for gas in the tissues and no crepitus on exam of the right lower extremity.  Given vancomycin and Rocephin for broad-spectrum antibiotics.  While on Cardizem patient with spontaneous conversion to normal sinus rhythm with resolution of hypotension.  Cardizem drip lowered.  Patient admitted to medicine stepdown level 2.      Critical Care Time Statement: Upon my evaluation, this patient had a high probability of imminent or life-threatening deterioration due to septic shock/afib rvr, which required my direct attention, intervention, and personal management.  I spent a total of 120 minutes directly providing critical care services, including interpretation of complex medical databases, evaluating for the presence of life-threatening injuries or illnesses, management of organ system failure(s) , complex medical decision making (to  support/prevent further life-threatening deterioration)., interpretation of hemodynamic data, titration of vasoactive medications, and titration of continuous IV medications (drips). This time is exclusive of procedures, teaching, treating other patients, family meetings, and any prior time recorded by providers other than myself.        Amount and/or Complexity of Data Reviewed  Labs: ordered. Decision-making details documented in ED Course.  Radiology: ordered.    Risk  Prescription drug management.  Decision regarding hospitalization.        ED Course as of 04/14/25 1644   Mon Apr 14, 2025   1407 Blood Pressure: 96/54  Getting IV hydration, cardizem infusion started without bolus as concern for dropping pressure however there is likely a component of tachycardia contributing to the hypotension.    1408 Duplex negative for DVT per vascular technician.    1447 Sodium(!): 126  Appears to be similar to last Na value in chart from November.     Patient meeting need for 30cc/kg secondary to blood pressure but as patient appears to have a more chronic hyponatremia will give 1L NS and then 1L isolyte so as not to raise his sodium too quickly.    1503 POCT INR(!!): 6.04   1503 Procalcitonin(!): 15.61   1503 WBC(!): 29.35   1503 Creatinine(!): 1.62   1519 Reached out to Critical Care for evaluation.    1538 Case discussed with Critical Care and after review of chart as patient appears to be responding to fluids and slow up-titration of cardizem can go SD2 at this time.    1640 HR converting to NSR with HR in the 80s, cardizem decreased.        Medications   diltiazem (CARDIZEM) 125 mg in sodium chloride 0.9 % 125 mL infusion (7.5 mg/hr Intravenous New Bag 4/14/25 1543)   acetaminophen (Ofirmev) injection 1,000 mg (1,000 mg Intravenous New Bag 4/14/25 1341)   vancomycin (VANCOCIN) 1,750 mg in sodium chloride 0.9 % 500 mL IVPB (1,750 mg Intravenous New Bag 4/14/25 1405)   ceftriaxone (ROCEPHIN) 2 g/50 mL in dextrose IVPB  (2,000 mg Intravenous New Bag 4/14/25 1351)   multi-electrolyte (Plasmalyte-A/Isolyte-S PH 7.4/Normosol-R) IV bolus 1,000 mL (1,000 mL Intravenous New Bag 4/14/25 1505)       ED Risk Strat Scores                    No data recorded        SBIRT 22yo+      Flowsheet Row Most Recent Value   Initial Alcohol Screen: US AUDIT-C     1. How often do you have a drink containing alcohol? 0 Filed at: 04/14/2025 0202   2. How many drinks containing alcohol do you have on a typical day you are drinking?  0 Filed at: 04/14/2025 1333   3b. FEMALE Any Age, or MALE 65+: How often do you have 4 or more drinks on one occassion? 0 Filed at: 04/14/2025 4174   Audit-C Score 0 Filed at: 04/14/2025 0172   SPIKE: How many times in the past year have you...    Used an illegal drug or used a prescription medication for non-medical reasons? Never Filed at: 04/14/2025 8838                            History of Present Illness       Chief Complaint   Patient presents with    Fall     Patient presenting to ED via EMS from home. Reports a fall on Friday while attempting to get in to bed. Takes thinners, no head strike, no LOC. Family reports decline over the past few days since fall. Redness, swelling, and blisters to R leg.        Past Medical History:   Diagnosis Date    Arthritis     Coagulation defect (HCC)     last assessed: 11/28/2018    COPD (chronic obstructive pulmonary disease) (HCC)     last assessed: 5/14/2019, 12/6/2017    Generalized osteoarthritis     last assessed: 1/28/2019    GERD without esophagitis     last assessed: 1/28/2019    Glaucoma     last assessed: 8/4/2011    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HCC)     last assessed: 10/16/2018    Factor II Prothrombin Gene per [employer]    History of kidney stones 1985    Hx of blood clots     Hypertension     last assessed: 5/14/2019    Impaired fasting glucose     last assessed: 8/26/2013    Mild persistent asthma, uncomplicated     last assessed: 11/28/2018     Nephrolithiasis     Nondisplaced intertrochanteric fracture of right femur, initial encounter for closed fracture (HCC)     last assessed: 2019    Premature ventricular contractions     last assessed: 2011    Pulmonary nodule     Scoliosis (and kyphoscoliosis), idiopathic     Wears dentures       Past Surgical History:   Procedure Laterality Date    CARPAL TUNNEL RELEASE Left     ENDO     CATARACT EXTRACTION Left     COLONOSCOPY  10/07/2008    last assessed: 3/29/2016    EYE SURGERY Right 2022    HERNIA REPAIR      HIP SURGERY      HIP SURGERY Left 1999    ORIF    MULTIPLE TOOTH EXTRACTIONS Bilateral     OTHER SURGICAL HISTORY Right 10/30/2001    endo [facility]    TONSILLECTOMY Bilateral     WISDOM TOOTH EXTRACTION Bilateral       Family History   Problem Relation Age of Onset    Hypertension Mother     Stroke Mother     Heart attack Father       Social History     Tobacco Use    Smoking status: Former     Types: Pipe     Start date:      Quit date:      Years since quittin.3     Passive exposure: Past    Smokeless tobacco: Never   Vaping Use    Vaping status: Never Used   Substance Use Topics    Alcohol use: Yes     Comment: social    Drug use: Never      E-Cigarette/Vaping    E-Cigarette Use Never User       E-Cigarette/Vaping Substances    Nicotine No     THC No     CBD No     Flavoring No     Other No     Unknown No       I have reviewed and agree with the history as documented.     89 yo male presenting to the ed for reports of worsening generalized weakness and redness to the RLE. Family state that about 4 days ago he had some leftover chinese food and became quite sick from that. He then sort of slid off his bed because of how weak he was. Did not strike his head or lose consciousness. However since then has been having increasing weakness/difficulty with ambulation and lower back pain, also with decreased urinary output.  Denies CP, SOB, abdominal pain,  N/V/D.      Fall  Associated symptoms: back pain        Review of Systems   Musculoskeletal:  Positive for back pain.   Skin:  Positive for color change.   Neurological:  Positive for weakness.   All other systems reviewed and are negative.          Objective       ED Triage Vitals   Temperature Pulse Blood Pressure Respirations SpO2 Patient Position - Orthostatic VS   04/14/25 1325 04/14/25 1325 04/14/25 1325 04/14/25 1325 04/14/25 1325 04/14/25 1325   98.1 °F (36.7 °C) (!) 175 141/71 22 97 % Lying      Temp Source Heart Rate Source BP Location FiO2 (%) Pain Score    04/14/25 1325 04/14/25 1325 04/14/25 1400 -- 04/14/25 1325    Temporal Monitor Left arm  6      Vitals      Date and Time Temp Pulse SpO2 Resp BP Pain Score FACES Pain Rating User   04/14/25 1639 98 °F (36.7 °C) 83 96 % 20 152/63 -- --    04/14/25 1630 98 °F (36.7 °C) 100 96 % 21 89/58 -- --    04/14/25 1621 -- 128 -- -- 102/53 -- -- DH   04/14/25 1600 -- 151 97 % 18 91/54 -- --    04/14/25 1557 98 °F (36.7 °C) 126 98 % -- 118/56 -- --    04/14/25 1520 98 °F (36.7 °C) 151 98 % 24 135/60 -- --    04/14/25 1507 -- 151 -- -- 99/55 -- --    04/14/25 1459 98 °F (36.7 °C) 164 96 % 26 98/57 -- --    04/14/25 1432 -- 159 97 % 21 93/51 -- --    04/14/25 1420 98 °F (36.7 °C) 159 99 % 24 93/59 -- --    04/14/25 1404 -- 159 -- -- 96/54 -- --    04/14/25 1400 98 °F (36.7 °C) 158 97 % 19 96/54 -- --    04/14/25 1325 98.1 °F (36.7 °C) 175 97 % 22 141/71 6 -- SG            Physical Exam  Vitals and nursing note reviewed.   Constitutional:       General: He is not in acute distress.     Appearance: He is well-developed. He is ill-appearing. He is not toxic-appearing or diaphoretic.   HENT:      Head: Normocephalic and atraumatic.      Right Ear: External ear normal.      Left Ear: External ear normal.      Nose: Nose normal.   Eyes:      General: No scleral icterus.        Right eye: No discharge.         Left eye: No discharge.       Conjunctiva/sclera: Conjunctivae normal.   Cardiovascular:      Rate and Rhythm: Tachycardia present. Rhythm irregular.      Heart sounds: Normal heart sounds. No murmur heard.     No friction rub. No gallop.   Pulmonary:      Effort: Pulmonary effort is normal. No respiratory distress.      Breath sounds: Normal breath sounds. No wheezing or rales.   Abdominal:      General: Bowel sounds are normal. There is no distension.      Palpations: Abdomen is soft. There is no mass.      Tenderness: There is no abdominal tenderness. There is no guarding.   Musculoskeletal:         General: No tenderness or deformity. Normal range of motion.      Cervical back: Normal range of motion and neck supple.      Right lower leg: Edema present.   Skin:     General: Skin is warm and dry.      Coloration: Skin is not pale.      Findings: Erythema present. No rash.      Comments: Entire RLE erythematous/warm with blister noted at R inguinal crease  De-roofed blister noted to posterior R thigh  Sensation reportedly intact with contralateral leg  DP/PT pulses 2+ in RLE   Neurological:      Mental Status: He is alert and oriented to person, place, and time.   Psychiatric:         Behavior: Behavior normal.         Thought Content: Thought content normal.         Judgment: Judgment normal.         Results Reviewed       Procedure Component Value Units Date/Time    RBC Morphology Reflex Test [[id number]] Collected: 04/14/25 1410    Lab Status: Final result Specimen: Blood from Arm, Right Updated: 04/14/25 1501    CBC and differential [[id number]]  (Abnormal) Collected: 04/14/25 1410    Lab Status: Final result Specimen: Blood from Arm, Right Updated: 04/14/25 1445     WBC 29.35 Thousand/uL      RBC 4.53 Million/uL      Hemoglobin 13.7 g/dL      Hematocrit 40.4 %      MCV 89 fL      MCH 30.2 pg      MCHC 33.9 g/dL      RDW 13.7 %      MPV 10.1 fL      Platelets 267 Thousands/uL     Narrative:      This is an appended report.  These results  have been appended to a previously verified report.    Manual Differential(PHLEBS Do Not Order) [[id number]]  (Abnormal) Collected: 04/14/25 1410    Lab Status: Final result Specimen: Blood from Arm, Right Updated: 04/14/25 1445     Segmented % 91 %      Bands % 4 %      Lymphocytes % 2 %      Monocytes % 3 %      Eosinophils % 0 %      Basophils % 0 %      Absolute Neutrophils 27.88 Thousand/uL      Absolute Lymphocytes 0.59 Thousand/uL      Absolute Monocytes 0.88 Thousand/uL      Absolute Eosinophils 0.00 Thousand/uL      Absolute Basophils 0.00 Thousand/uL      Total Counted --     Toxic Granulation Present     RBC Morphology Present     Platelet Estimate Adequate     Jax Cells Present    Procalcitonin [[id number]]  (Abnormal) Collected: 04/14/25 1410    Lab Status: Final result Specimen: Blood from Arm, Right Updated: 04/14/25 1444     Procalcitonin 15.61 ng/ml     Protime-INR [[id number]]  (Abnormal) Collected: 04/14/25 1410    Lab Status: Final result Specimen: Blood from Arm, Right Updated: 04/14/25 1441     Protime 53.0 seconds      INR 6.04    Narrative:      INR Therapeutic Range    Indication                                             INR Range      Atrial Fibrillation                                               2.0-3.0  Hypercoagulable State                                    2.0.2.3  Left Ventricular Asist Device                            2.0-3.0  Mechanical Heart Valve                                  -    Aortic(with afib, MI, embolism, HF, LA enlargement,    and/or coagulopathy)                                     2.0-3.0 (2.5-3.5)     Mitral                                                             2.5-3.5  Prosthetic/Bioprosthetic Heart Valve               2.0-3.0  Venous thromboembolism (VTE: VT, PE        2.0-3.0    APTT [[id number]]  (Abnormal) Collected: 04/14/25 1410    Lab Status: Final result Specimen: Blood from Arm, Right Updated: 04/14/25 1441     PTT 78 seconds     Lactic acid  [[id number]]  (Normal) Collected: 04/14/25 1410    Lab Status: Final result Specimen: Blood from Arm, Right Updated: 04/14/25 1434     LACTIC ACID 1.7 mmol/L     Narrative:      Result may be elevated if tourniquet was used during collection.    Comprehensive metabolic panel [[id number]]  (Abnormal) Collected: 04/14/25 1410    Lab Status: Final result Specimen: Blood from Arm, Right Updated: 04/14/25 1434     Sodium 126 mmol/L      Potassium 4.2 mmol/L      Chloride 95 mmol/L      CO2 22 mmol/L      ANION GAP 9 mmol/L      BUN 64 mg/dL      Creatinine 1.62 mg/dL      Glucose 150 mg/dL      Calcium 9.5 mg/dL      Corrected Calcium 10.5 mg/dL      AST 75 U/L      ALT 59 U/L      Alkaline Phosphatase 109 U/L      Total Protein 6.5 g/dL      Albumin 2.8 g/dL      Total Bilirubin 0.57 mg/dL      eGFR 35 ml/min/1.73sq m     Narrative:      National Kid[employer] guidelines for Chronic Kidney Disease (CKD):     Stage 1 with normal or high GFR (GFR > 90 mL/min/1.73 square meters)    Stage 2 Mild CKD (GFR = 60-89 mL/min/1.73 square meters)    Stage 3A Moderate CKD (GFR = 45-59 mL/min/1.73 square meters)    Stage 3B Moderate CKD (GFR = 30-44 mL/min/1.73 square meters)    Stage 4 Severe CKD (GFR = 15-29 mL/min/1.73 square meters)    Stage 5 End Stage CKD (GFR <15 mL/min/1.73 square meters)  Note: GFR calculation is accurate only with a steady state creatinine    Blood culture #2 [[id number]] Collected: 04/14/25 1410    Lab Status: In process Specimen: Blood from Arm, Left Updated: 04/14/25 1417    Blood culture #1 [[id number]] Collected: 04/14/25 1410    Lab Status: In process Specimen: Blood from Arm, Right Updated: 04/14/25 1416            CT lower extremity wo contrast right   Final Interpretation by Steve Turk MD (04/14 1535)      Subcutaneous edema and skin thickening extending from the proximal thigh through the ankle, which can be seen with cellulitis. No fluid collection within the limits of an  unenhanced exam. No soft tissue gas.               Workstation performed: [id number]         CT spine lumbar without contrast   Final Interpretation by Dg Bartlett MD (04/14 9474)      Diffuse severe thoracolumbar degenerative change with apex left lumbar scoliosis.   No acute fracture or traumatic malalignment.      Workstation performed: [id number]         CT head without contrast   Final Interpretation by Falguni Saravia MD (04/14 1535)      No acute intracranial hemorrhage seen   No mass effect or midline shift seen                  Workstation performed: [id number]         VAS VENOUS DUPLEX -LOWER LIMB UNILATERAL   Final Interpretation by Bryon Us DO (04/14 1518)      XR knee 4+ vw right injury    (Results Pending)       ECG 12 Lead Documentation Only    Date/Time: 4/14/2025 2:58 PM    Performed by: David Nava DO  Authorized by: David Nava DO        ED Medication and Procedure Management   Prior to Admission Medications   Prescriptions Last Dose Informant Patient Reported? Taking?   Cholecalciferol (Vitamin D3) 125 MCG (5000 UT) CAPS  Self Yes No   Sig: Take 1 capsule by mouth daily. Indications: Vitamin D Deficiency   Difluprednate 0.05 % EMUL  Self Yes No   Oral Electrolytes (ELECTROLYTE SR PO)  Self Yes No   Sig: Take 2 tablets by mouth daily.   Polyethyl Glycol-Propyl Glycol (SYSTANE OP)  Self Yes No   Sig: Administer 1 drop to both eyes 4 (four) times a day   albuterol (2.5 mg/3 mL) 0.083 % nebulizer solution  Self No No   Sig: Take 3 mL (2.5 mg total) by nebulization every 4 (four) hours as needed for wheezing or shortness of breath   albuterol (PROVENTIL HFA,VENTOLIN HFA) 90 mcg/act inhaler  Self No No   Sig: TAKE 2 PUFFS BY MOUTH EVERY 4 HOURS AS NEEDED FOR WHEEZE   amLODIPine (NORVASC) 2.5 mg tablet  Self No No   Sig: Take 1 tablet (2.5 mg total) by mouth daily   ammonium lactate (LAC-HYDRIN) 12 % lotion   No No   Sig: Apply topically daily To dry scaling skin    desonide (DESOWEN) 0.05 % cream  Self No No   Sig: Apply to face twice a day   Patient taking differently: Apply 1 Application topically 2 (two) times a day Apply topically to face twice a day   docusate sodium (COLACE) 100 mg capsule  Self Yes No   Sig: Take 100 mg by mouth in the morning. Indications: Constipation   dorzolamide (TRUSOPT) 2 % ophthalmic solution  Self Yes No   Sig: INSTILL 1 DROP INTO LEFT EYE TWICE A DAY   fluticasone-vilanterol (Breo Ellipta) 200-25 mcg/actuation inhaler  Self No No   Sig: INHALE 1 PUFF DAILY RINSE MOUTH AFTER USE.   Patient taking differently: No sig reported   furosemide (LASIX) 20 mg tablet   No No   Sig: Take 1 tablet (20 mg total) by mouth in the morning   latanoprost (XALATAN) 0.005 % ophthalmic solution  Self Yes No   Sig: Administer 1 drop to both eyes daily at bedtime   losartan (COZAAR) 50 mg tablet  Self No No   Sig: TAKE 1 TABLET BY MOUTH EVERY DAY   montelukast (SINGULAIR) 10 mg tablet  Self No No   Sig: TAKE 1 TABLET BY MOUTH EVERY DAY   multivitamin (THERAGRAN) TABS  Self Yes No   Sig: Take 1 tablet by mouth daily   nystatin-triamcinolone (MYCOLOG-II) ointment   No No   Sig: Apply topically 2 (two) times a day for 7 days To affected skin of leg   omeprazole (PriLOSEC OTC) 20 MG tablet  Self Yes No   Sig: Take 20 mg by mouth daily   polyethylene glycol (GLYCOLAX) 17 GM/SCOOP powder  Self Yes No   Sig: Take 17 g by mouth daily as needed (Constipation) Stir into 8 oz of liquid of choice.   prednisoLONE acetate (PRED FORTE) 1 % ophthalmic suspension  Self Yes No   Sig: Administer 1 drop into the left eye 2 (two) times a day   sodium chloride (ARIEL 128) 2 % hypertonic ophthalmic solution  Self Yes No   Sig: Administer 1 drop into the left eye 4 (four) times a day   triamcinolone (KENALOG) 0.1 % cream  Self No No   Sig: Apply topically 2 (two) times a day as needed for rash   Patient taking differently: Apply 1 Application topically 2 (two) times a day as needed for  rash. APPLY TOPICALLY TO RASH ON FACE AND EARS BID PRN.   triamcinolone (KENALOG) 0.1 % ointment  Self No No   Sig: Apply topically 2 (two) times a day for 14 days To skin of R thigh   warfarin (COUMADIN) 5 mg tablet   No No   Sig: Take 1.5 tablets daily or as directed      Facility-Administered Medications: None     Patient's Medications   Discharge Prescriptions    No medications on file     No discharge procedures on file.  ED SEPSIS DOCUMENTATION   Time reflects when diagnosis was documented in both MDM as applicable and the Disposition within this note       Time User Action Codes Description Comment    4/14/2025  4:23 PM David Nava [A41.9,  R65.21] Septic shock (HCC)     4/14/2025  4:23 PM David Nava [L03.119,  L02.419] Cellulitis and abscess of leg     4/14/2025  4:23 PM David Nava [I48.91] Atrial fibrillation with RVR ([facility])                  David Nava DO  04/14/25 [id number]

## 2025-04-14 NOTE — H&P
H&P - Hospitalist   Name: Enrico Chavira 89 y.o. male I MRN: [id number]  Unit/Bed#: ED 25 I Date of Admission: 4/14/2025   Date of Service: 4/14/2025 I Hospital Day: 0     Assessment & Plan  Sepsis (HCC)  Present on admission secondary to right lower extremity cellulitis with tachycardia, leukocytosis  Continue IV antibiotics with cefazolin 2 g every 8 hours  CT imaging in the ER negative for any underlying abscess  Lower extremity Doppler negative for DVT  Initial lactate normal  Procalcitonin elevated, will continue to trend  IV fluids as needed  Infectious disease consult  Asthma-COPD overlap syndrome (HCC)  No signs of acute exacerbation  Albuterol as needed  Respiratory protocol  Atrial fibrillation with rapid ventricular response (HCC)  Noted to be in rapid A-fib while in the ER  Received IV Cardizem, heart rate has improved.  Patient subsequently converted to normal sinus rhythm  Will titrate Cardizem drip  Initiate metoprolol 25 mg twice daily  Coumadin held given supratherapeutic INR  Monitor on telemetry  Supratherapeutic INR  Secondary to Coumadin as well as severe sepsis  Hold Coumadin for now  Monitor INR level  No signs of acute bleeding  Cellulitis of right lower extremity  With blistering noted  CT imaging negative for underlying abscess  Continue treatment as above  Hyponatremia  Patient with chronic hyponatremia  Monitor sodium level every 8 hours  Patient received IV fluids in the ER  Consult nephrology  Acute renal failure (ARF) ([facility])  Secondary to dehydration and sepsis  Monitor urine output  Hold diuretics for now  Repeat BMP in the morning  Avoid hypotension and nephrotoxic meds  Nephrology consultation      VTE Pharmacologic Prophylaxis:   Moderate Risk (Score 3-4) - Pharmacological DVT Prophylaxis Contraindicated. Sequential Compression Devices Ordered.  Code Status: Level 3 - DNAR and DNI   Discussion with family: Updated  (daughter) at bedside.    Anticipated Length of  Stay: Patient will be admitted on an inpatient basis with an anticipated length of stay of greater than 2 midnights secondary to sepsis.    History of Present Illness   Chief Complaint: Weakness    Enrico Chavira is a 89 y.o. male with a PMH of hypertension, COPD/asthma, GERD who presents with generalized weakness and fall.  Patient states that he is having increasing weakness over the last few days.  Patient had a fall Saturday when coming back from the bathroom and attempting to get back in bed.  Patient denies any head injury.  Patient called daughter to help patient back up.  Since fall patient has had increasing weakness.  Also noted increasing swelling in redness of right lower extremity.  Patient has chronic swelling of right lower extremity however redness and blistering is new.  In the ER patient found to be in rapid A-fib with leukocytosis.  Concern for sepsis, patient received antibiotics, fluids, Cardizem drip.  Admitted for further evaluation/treatment    Review of Systems   Constitutional:  Positive for activity change, appetite change and fatigue.   Genitourinary:  Positive for decreased urine volume.   Neurological:  Positive for weakness.   All other systems reviewed and are negative.      Historical Information   Past Medical History:   Diagnosis Date    Arthritis     Coagulation defect (HCC)     last assessed: 11/28/2018    COPD (chronic obstructive pulmonary disease) (HCC)     last assessed: 5/14/2019, 12/6/2017    Generalized osteoarthritis     last assessed: 1/28/2019    GERD without esophagitis     last assessed: 1/28/2019    Glaucoma     last assessed: 8/4/2011    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HCC)     last assessed: 10/16/2018    Factor II Prothrombin Gene per [employer]    History of kidney stones 1985    Hx of blood clots     Hypertension     last assessed: 5/14/2019    Impaired fasting glucose     last assessed: 8/26/2013    Mild persistent asthma, uncomplicated      last assessed: 2018    Nephrolithiasis     Nondisplaced intertrochanteric fracture of right femur, initial encounter for closed fracture (HCC)     last assessed: 2019    Premature ventricular contractions     last assessed: 2011    Pulmonary nodule     Scoliosis (and kyphoscoliosis), idiopathic     Wears dentures      Past Surgical History:   Procedure Laterality Date    CARPAL TUNNEL RELEASE Left     ENDO     CATARACT EXTRACTION Left     COLONOSCOPY  10/07/2008    last assessed: 3/29/2016    EYE SURGERY Right 2022    HERNIA REPAIR      HIP SURGERY      HIP SURGERY Left 1999    ORIF    MULTIPLE TOOTH EXTRACTIONS Bilateral     OTHER SURGICAL HISTORY Right 10/30/2001    [facility]    TONSILLECTOMY Bilateral     WISDOM TOOTH EXTRACTION Bilateral      Social History     Tobacco Use    Smoking status: Former     Types: Pipe     Start date:      Quit date:      Years since quittin.3     Passive exposure: Past    Smokeless tobacco: Never   Vaping Use    Vaping status: Never Used   Substance and Sexual Activity    Alcohol use: Yes     Comment: social    Drug use: Never    Sexual activity: Not Currently     E-Cigarette/Vaping    E-Cigarette Use Never User      E-Cigarette/Vaping Substances    Nicotine No     THC No     CBD No     Flavoring No     Other No     Unknown No      Family History   Problem Relation Age of Onset    Hypertension Mother     Stroke Mother     Heart attack Father      Social History:  Marital Status:    Occupation: none  Patient Pre-hospital Living Situation: Home  Patient Pre-hospital Level of Mobility: walks with walker  Patient Pre-hospital Diet Restrictions: none    Meds/Allergies   I have reviewed home medications with patient personally.  Prior to Admission medications    Medication Sig Start Date End Date Taking? Authorizing Provider   albuterol (2.5 mg/3 mL) 0.083 % nebulizer solution Take 3 mL (2.5 mg total) by nebulization every 4 (four)  hours as needed for wheezing or shortness of breath 7/28/22   Brodie Anne DO   albuterol (PROVENTIL HFA,VENTOLIN HFA) 90 mcg/act inhaler TAKE 2 PUFFS BY MOUTH EVERY 4 HOURS AS NEEDED FOR WHEEZE 11/25/24   Brodie Anne DO   amLODIPine (NORVASC) 2.5 mg tablet Take 1 tablet (2.5 mg total) by mouth daily 12/17/24   Brodie Anne DO   ammonium lactate (LAC-HYDRIN) 12 % lotion Apply topically daily To dry scaling skin 2/24/25 3/26/25  Cornelia Goldsmith PA-C   Cholecalciferol (Vitamin D3) 125 MCG (5000 UT) CAPS Take 1 capsule by mouth daily. Indications: Vitamin D Deficiency    Historical Provider, MD   desonide (DESOWEN) 0.05 % cream Apply to face twice a day  Patient taking differently: Apply 1 Application topically 2 (two) times a day Apply topically to face twice a day 5/6/24   Brodie Anne DO   Difluprednate 0.05 % EMUL  9/6/22   Historical Provider, MD   docusate sodium (COLACE) 100 mg capsule Take 100 mg by mouth in the morning. Indications: Constipation    Brodie Anne DO   dorzolamide (TRUSOPT) 2 % ophthalmic solution INSTILL 1 DROP INTO LEFT EYE TWICE A DAY 9/15/22   Historical Provider, MD   fluticasone-vilanterol (Breo Ellipta) 200-25 mcg/actuation inhaler INHALE 1 PUFF DAILY RINSE MOUTH AFTER USE.  Patient taking differently: No sig reported 10/24/24   Brodie Anne DO   furosemide (LASIX) 20 mg tablet Take 1 tablet (20 mg total) by mouth in the morning 1/27/25   Brodie Anne DO   latanoprost (XALATAN) 0.005 % ophthalmic solution Administer 1 drop to both eyes daily at bedtime 2/4/21   Historical Provider, MD   losartan (COZAAR) 50 mg tablet TAKE 1 TABLET BY MOUTH EVERY DAY 1/13/25   Brodie Anne DO   montelukast (SINGULAIR) 10 mg tablet TAKE 1 TABLET BY MOUTH EVERY DAY 11/25/24   Brodie Anne DO   multivitamin (THERAGRAN) TABS Take 1 tablet by mouth daily    Historical Provider, MD   nystatin-triamcinolone (MYCOLOG-II) ointment Apply topically 2 (two) times a day for 7 days To affected skin of leg 2/13/25 2/20/25  Cornelia  PRATIMA Goldsmith   omeprazole (PriLOSEC OTC) 20 MG tablet Take 20 mg by mouth daily 6/20/12   Historical Provider, MD   Oral Electrolytes (ELECTROLYTE SR PO) Take 2 tablets by mouth daily.    Historical Provider, MD   Polyethyl Glycol-Propyl Glycol (SYSTANE OP) Administer 1 drop to both eyes 4 (four) times a day    Historical Provider, MD   polyethylene glycol (GLYCOLAX) 17 GM/SCOOP powder Take 17 g by mouth daily as needed (Constipation) Stir into 8 oz of liquid of choice. 11/23/18   Historical Provider, MD   prednisoLONE acetate (PRED FORTE) 1 % ophthalmic suspension Administer 1 drop into the left eye 2 (two) times a day 4/15/24   Historical Provider, MD   sodium chloride (ARIEL 128) 2 % hypertonic ophthalmic solution Administer 1 drop into the left eye 4 (four) times a day    Historical Provider, MD   triamcinolone (KENALOG) 0.1 % cream Apply topically 2 (two) times a day as needed for rash  Patient taking differently: Apply 1 Application topically 2 (two) times a day as needed for rash. APPLY TOPICALLY TO RASH ON FACE AND EARS BID PRN. 12/17/19   Brodie Anne DO   triamcinolone (KENALOG) 0.1 % ointment Apply topically 2 (two) times a day for 14 days To skin of R thigh 1/9/25 1/27/25  Cornelia Goldsmith PA-C   warfarin (COUMADIN) 5 mg tablet Take 1.5 tablets daily or as directed 1/27/25   Brodie Anne DO     Allergies   Allergen Reactions    Alphagan P [Brimonidine Tartrate] Itching       Objective :  Temp:  [98 °F (36.7 °C)-98.1 °F (36.7 °C)] 98 °F (36.7 °C)  HR:  [] 85  BP: ()/(51-71) 103/58  Resp:  [18-26] 21  SpO2:  [93 %-99 %] 94 %  O2 Device: None (Room air)    Physical Exam  Constitutional:       Appearance: He is ill-appearing.      Comments: Frail elderly male   HENT:      Head: Normocephalic and atraumatic.      Nose: Nose normal.      Mouth/Throat:      Mouth: Mucous membranes are dry.   Eyes:      Extraocular Movements: Extraocular movements intact.      Conjunctiva/sclera: Conjunctivae normal.  "  Cardiovascular:      Rate and Rhythm: Tachycardia present.   Pulmonary:      Effort: Pulmonary effort is normal. No respiratory distress.   Abdominal:      Palpations: Abdomen is soft.      Tenderness: There is no abdominal tenderness.   Musculoskeletal:         General: Swelling present. Normal range of motion.      Cervical back: Normal range of motion and neck supple.      Comments: Generalized weakness   Skin:     General: Skin is warm and dry.      Findings: Erythema present.      Comments: Extensive erythema of right lower extremity from groin to foot.  Blistering noted of proximal right lower extremity   Neurological:      General: No focal deficit present.      Mental Status: He is alert. Mental status is at baseline.      Cranial Nerves: No cranial nerve deficit.   Psychiatric:         Mood and Affect: Mood normal.         Behavior: Behavior normal.          Lines/Drains:      Lab Results: I have reviewed the following results:  Results from last 7 days   Lab Units 04/14/25  1410   WBC Thousand/uL 29.35*   HEMOGLOBIN g/dL 13.7   HEMATOCRIT % 40.4   PLATELETS Thousands/uL 267   BANDS PCT % 4   LYMPHO PCT % 2*   MONO PCT % 3*   EOS PCT % 0     Results from last 7 days   Lab Units 04/14/25  1410   SODIUM mmol/L 126*   POTASSIUM mmol/L 4.2   CHLORIDE mmol/L 95*   CO2 mmol/L 22   BUN mg/dL 64*   CREATININE mg/dL 1.62*   ANION GAP mmol/L 9   CALCIUM mg/dL 9.5   ALBUMIN g/dL 2.8*   TOTAL BILIRUBIN mg/dL 0.57   ALK PHOS U/L 109*   ALT U/L 59*   AST U/L 75*   GLUCOSE RANDOM mg/dL 150*     Results from last 7 days   Lab Units 04/14/25  1410   INR  6.04*         No results found for: \"HGBA1C\"  Results from last 7 days   Lab Units 04/14/25  1410   LACTIC ACID mmol/L 1.7   PROCALCITONIN ng/ml 15.61*       Imaging Results Review: I reviewed radiology reports from this admission including: CT head.  Other Study Results Review: No additional pertinent studies reviewed.    Administrative Statements       ** Please Note: " This note has been constructed using a voice recognition system. **

## 2025-04-14 NOTE — ASSESSMENT & PLAN NOTE
Noted to be in rapid A-fib while in the ER  Received IV Cardizem, heart rate has improved.  Patient subsequently converted to normal sinus rhythm  Will titrate Cardizem drip  Initiate metoprolol 25 mg twice daily  Coumadin held given supratherapeutic INR  Monitor on telemetry

## 2025-04-14 NOTE — ASSESSMENT & PLAN NOTE
Secondary to dehydration and sepsis  Monitor urine output  Hold diuretics for now  Repeat BMP in the morning  Avoid hypotension and nephrotoxic meds  Nephrology consultation

## 2025-04-14 NOTE — ASSESSMENT & PLAN NOTE
Patient with chronic hyponatremia  Monitor sodium level every 8 hours  Patient received IV fluids in the ER  Consult nephrology

## 2025-04-14 NOTE — ASSESSMENT & PLAN NOTE
Secondary to Coumadin as well as severe sepsis  Hold Coumadin for now  Monitor INR level  No signs of acute bleeding

## 2025-04-14 NOTE — ASSESSMENT & PLAN NOTE
Present on admission secondary to right lower extremity cellulitis with tachycardia, leukocytosis  Continue IV antibiotics with cefazolin 2 g every 8 hours  CT imaging in the ER negative for any underlying abscess  Lower extremity Doppler negative for DVT  Initial lactate normal  Procalcitonin elevated, will continue to trend  IV fluids as needed  Infectious disease consult

## 2025-04-14 NOTE — TELEPHONE ENCOUNTER
Daughter Lora called stating patient was taken to the ED today and daughter was told he is going to need continuous care from here on.  States patient does have an appointment scheduled in the office on 5/5 but they do not expect him to make it until then.  Daughter states [employer] will be faxing over paperwork to be filled out for her to take care of patient continuously.

## 2025-04-14 NOTE — RESPIRATORY THERAPY NOTE
RT Protocol Note  Enrico Chavira 89 y.o. male MRN: [id number]  Unit/Bed#: ED 25 Encounter: [id number]    Assessment    Principal Problem:    Sepsis (HCC)  Active Problems:    Asthma-COPD overlap syndrome ([facility])    Hyponatremia    Atrial fibrillation with rapid ventricular response (HCC)    Supratherapeutic INR    Cellulitis of right lower extremity    Acute renal failure (ARF) ([facility])      Home Pulmonary Medications:  2 puffs albuterol MDI prn  2.5mg albuterol UDN prn       Past Medical History:   Diagnosis Date    Arthritis     Coagulation defect ([facility])     last assessed: 2018    COPD (chronic obstructive pulmonary disease) ([facility])     last assessed: 2019, 2017    Generalized osteoarthritis     last assessed: 2019    GERD without esophagitis     last assessed: 2019    Glaucoma     last assessed: 2011    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[facility])     last assessed: 10/16/2018    Factor II Prothrombin Gene per [employer]    History of kidney stones 1985    Hx of blood clots     Hypertension     last assessed: 2019    Impaired fasting glucose     last assessed: 2013    Mild persistent asthma, uncomplicated     last assessed: 2018    Nephrolithiasis     Nondisplaced intertrochanteric fracture of right femur, initial encounter for closed fracture ([facility])     last assessed: 2019    Premature ventricular contractions     last assessed: 2011    Pulmonary nodule     Scoliosis (and kyphoscoliosis), idiopathic     Wears dentures      Social History     Socioeconomic History    Marital status:      Spouse name: None    Number of children: None    Years of education: None    Highest education level: None   Occupational History    None   Tobacco Use    Smoking status: Former     Types: Pipe     Start date:      Quit date: 1970     Years since quittin.3     Passive exposure: Past    Smokeless tobacco: Never   Vaping Use    Vaping status: Never Used    Substance and Sexual Activity    Alcohol use: Yes     Comment: social    Drug use: Never    Sexual activity: Not Currently   Other Topics Concern    None   Social History Narrative    None     Social Drivers of Health     Financial Resource Strain: Low Risk  (10/23/2023)    Overall Financial Resource Strain (CARDIA)     Difficulty of Paying Living Expenses: Not very hard   Food Insecurity: No Food Insecurity (10/28/2024)    Hunger Vital Sign     Worried About Running Out of Food in the Last Year: Never true     Ran Out of Food in the Last Year: Never true   Transportation Needs: Unknown (2/24/2025)    [place]AS[place] : Transportation     Lack of Transportation (Medical): Not on file     Lack of Transportation (Non-Medical): Not on file     Patient Unable or Declines to Respond: Yes   Physical Activity: Not on file   Stress: Not on file   Social Connections: Not on file   Intimate Partner Violence: Not on file   Housing Stability: Low Risk  (10/28/2024)    Housing Stability Vital Sign     Unable to Pay for Housing in the Last Year: No     Number of Times Moved in the Last Year: 0     Homeless in the Last Year: No       Subjective         Objective    Physical Exam:   Assessment Type: (P) Assess only  General Appearance: (P) Sleeping  Respiratory Pattern: (P) Normal  Chest Assessment: (P) Chest expansion symmetrical  Bilateral Breath Sounds: (P) Clear    Vitals:  Blood pressure 101/56, pulse 83, temperature 98 °F (36.7 °C), temperature source Temporal, resp. rate 20, weight 83.8 kg (184 lb 11.9 oz), SpO2 95%.          Imaging and other studies: Results Review Statement: I reviewed radiology reports from this admission including: chest xray.          Plan    Respiratory Plan: (P) Home Bronchodilator Patient pathway        Resp Comments: (P) Pt admitted with weakness and recent fall, found to be septic with cellulitus in ED. Pt with hx of asthma/COPD. Home bronchodilators ordered by physician. As pt is not admitted with an  exacerbation, will cont. albuterol prn at this time.

## 2025-04-15 ENCOUNTER — APPOINTMENT (INPATIENT)
Dept: ULTRASOUND IMAGING | Facility: HOSPITAL | Age: OVER 89
DRG: 872 | End: 2025-04-15
Payer: MEDICARE

## 2025-04-15 PROBLEM — M25.461 SWELLING OF RIGHT KNEE: Status: ACTIVE | Noted: 2025-04-15

## 2025-04-15 LAB
ALBUMIN SERPL BCG-MCNC: 2.3 G/DL (ref 3.5–5)
ALP SERPL-CCNC: 76 U/L (ref 34–104)
ALT SERPL W P-5'-P-CCNC: 43 U/L (ref 7–52)
ANION GAP SERPL CALCULATED.3IONS-SCNC: 7 MMOL/L (ref 4–13)
ANION GAP SERPL CALCULATED.3IONS-SCNC: 8 MMOL/L (ref 4–13)
ANION GAP SERPL CALCULATED.3IONS-SCNC: 8 MMOL/L (ref 4–13)
AST SERPL W P-5'-P-CCNC: 41 U/L (ref 13–39)
ATRIAL RATE: 174 BPM
BASOPHILS # BLD AUTO: 0.05 THOUSANDS/ÂΜL (ref 0–0.1)
BASOPHILS NFR BLD AUTO: 0 % (ref 0–1)
BILIRUB SERPL-MCNC: 0.36 MG/DL (ref 0.2–1)
BUN SERPL-MCNC: 57 MG/DL (ref 5–25)
BUN SERPL-MCNC: 59 MG/DL (ref 5–25)
BUN SERPL-MCNC: 61 MG/DL (ref 5–25)
CALCIUM ALBUM COR SERPL-MCNC: 9.9 MG/DL (ref 8.3–10.1)
CALCIUM SERPL-MCNC: 8.5 MG/DL (ref 8.4–10.2)
CALCIUM SERPL-MCNC: 8.7 MG/DL (ref 8.4–10.2)
CALCIUM SERPL-MCNC: 9 MG/DL (ref 8.4–10.2)
CHLORIDE SERPL-SCNC: 100 MMOL/L (ref 96–108)
CHLORIDE SERPL-SCNC: 100 MMOL/L (ref 96–108)
CHLORIDE SERPL-SCNC: 98 MMOL/L (ref 96–108)
CO2 SERPL-SCNC: 20 MMOL/L (ref 21–32)
CO2 SERPL-SCNC: 22 MMOL/L (ref 21–32)
CO2 SERPL-SCNC: 22 MMOL/L (ref 21–32)
CREAT SERPL-MCNC: 1.35 MG/DL (ref 0.6–1.3)
CREAT SERPL-MCNC: 1.36 MG/DL (ref 0.6–1.3)
CREAT SERPL-MCNC: 1.39 MG/DL (ref 0.6–1.3)
CREAT UR-MCNC: 108.3 MG/DL
EOSINOPHIL # BLD AUTO: 0 THOUSAND/ÂΜL (ref 0–0.61)
EOSINOPHIL NFR BLD AUTO: 0 % (ref 0–6)
ERYTHROCYTE [DISTWIDTH] IN BLOOD BY AUTOMATED COUNT: 13.8 % (ref 11.6–15.1)
GFR SERPL CREATININE-BSD FRML MDRD: 43 ML/MIN/1.73SQ M
GFR SERPL CREATININE-BSD FRML MDRD: 44 ML/MIN/1.73SQ M
GFR SERPL CREATININE-BSD FRML MDRD: 44 ML/MIN/1.73SQ M
GLUCOSE SERPL-MCNC: 111 MG/DL (ref 65–140)
GLUCOSE SERPL-MCNC: 122 MG/DL (ref 65–140)
GLUCOSE SERPL-MCNC: 139 MG/DL (ref 65–140)
HCT VFR BLD AUTO: 32.6 % (ref 36.5–49.3)
HGB BLD-MCNC: 11 G/DL (ref 12–17)
IMM GRANULOCYTES # BLD AUTO: 0.21 THOUSAND/UL (ref 0–0.2)
IMM GRANULOCYTES NFR BLD AUTO: 1 % (ref 0–2)
INR PPP: 8.71 (ref 0.85–1.19)
LYMPHOCYTES # BLD AUTO: 0.92 THOUSANDS/ÂΜL (ref 0.6–4.47)
LYMPHOCYTES NFR BLD AUTO: 5 % (ref 14–44)
MAGNESIUM SERPL-MCNC: 1.9 MG/DL (ref 1.9–2.7)
MCH RBC QN AUTO: 30.2 PG (ref 26.8–34.3)
MCHC RBC AUTO-ENTMCNC: 33.7 G/DL (ref 31.4–37.4)
MCV RBC AUTO: 90 FL (ref 82–98)
MONOCYTES # BLD AUTO: 0.97 THOUSAND/ÂΜL (ref 0.17–1.22)
MONOCYTES NFR BLD AUTO: 5 % (ref 4–12)
NEUTROPHILS # BLD AUTO: 15.69 THOUSANDS/ÂΜL (ref 1.85–7.62)
NEUTS SEG NFR BLD AUTO: 89 % (ref 43–75)
NRBC BLD AUTO-RTO: 0 /100 WBCS
PLATELET # BLD AUTO: 222 THOUSANDS/UL (ref 149–390)
PMV BLD AUTO: 9.8 FL (ref 8.9–12.7)
POTASSIUM SERPL-SCNC: 3.8 MMOL/L (ref 3.5–5.3)
POTASSIUM SERPL-SCNC: 4 MMOL/L (ref 3.5–5.3)
POTASSIUM SERPL-SCNC: 4.1 MMOL/L (ref 3.5–5.3)
PROCALCITONIN SERPL-MCNC: 10.82 NG/ML
PROT SERPL-MCNC: 5.3 G/DL (ref 6.4–8.4)
PROTHROMBIN TIME: 69.7 SECONDS (ref 12.3–15)
QRS AXIS: 40 DEGREES
QRSD INTERVAL: 76 MS
QT INTERVAL: 254 MS
QTC INTERVAL: 409 MS
RBC # BLD AUTO: 3.64 MILLION/UL (ref 3.88–5.62)
SODIUM SERPL-SCNC: 128 MMOL/L (ref 135–147)
SODIUM SERPL-SCNC: 128 MMOL/L (ref 135–147)
SODIUM SERPL-SCNC: 129 MMOL/L (ref 135–147)
SODIUM UR-SCNC: <10 MMOL/L
T WAVE AXIS: 34 DEGREES
URATE SERPL-MCNC: 8.8 MG/DL (ref 3.5–8.5)
VENTRICULAR RATE: 156 BPM
WBC # BLD AUTO: 17.84 THOUSAND/UL (ref 4.31–10.16)

## 2025-04-15 PROCEDURE — 85025 COMPLETE CBC W/AUTO DIFF WBC: CPT | Performed by: INTERNAL MEDICINE

## 2025-04-15 PROCEDURE — 84550 ASSAY OF BLOOD/URIC ACID: CPT | Performed by: INTERNAL MEDICINE

## 2025-04-15 PROCEDURE — 84300 ASSAY OF URINE SODIUM: CPT | Performed by: INTERNAL MEDICINE

## 2025-04-15 PROCEDURE — 94760 N-INVAS EAR/PLS OXIMETRY 1: CPT

## 2025-04-15 PROCEDURE — 97167 OT EVAL HIGH COMPLEX 60 MIN: CPT

## 2025-04-15 PROCEDURE — 83930 ASSAY OF BLOOD OSMOLALITY: CPT | Performed by: INTERNAL MEDICINE

## 2025-04-15 PROCEDURE — 76770 US EXAM ABDO BACK WALL COMP: CPT

## 2025-04-15 PROCEDURE — 80048 BASIC METABOLIC PNL TOTAL CA: CPT | Performed by: INTERNAL MEDICINE

## 2025-04-15 PROCEDURE — 80053 COMPREHEN METABOLIC PANEL: CPT | Performed by: INTERNAL MEDICINE

## 2025-04-15 PROCEDURE — 99232 SBSQ HOSP IP/OBS MODERATE 35: CPT | Performed by: PHYSICIAN ASSISTANT

## 2025-04-15 PROCEDURE — 99223 1ST HOSP IP/OBS HIGH 75: CPT | Performed by: INTERNAL MEDICINE

## 2025-04-15 PROCEDURE — 84145 PROCALCITONIN (PCT): CPT | Performed by: INTERNAL MEDICINE

## 2025-04-15 PROCEDURE — 85610 PROTHROMBIN TIME: CPT | Performed by: INTERNAL MEDICINE

## 2025-04-15 PROCEDURE — 82570 ASSAY OF URINE CREATININE: CPT | Performed by: INTERNAL MEDICINE

## 2025-04-15 PROCEDURE — G0545 PR INHERENT VISIT TO INPT: HCPCS | Performed by: STUDENT IN AN ORGANIZED HEALTH CARE EDUCATION/TRAINING PROGRAM

## 2025-04-15 PROCEDURE — 97163 PT EVAL HIGH COMPLEX 45 MIN: CPT

## 2025-04-15 PROCEDURE — 99223 1ST HOSP IP/OBS HIGH 75: CPT | Performed by: STUDENT IN AN ORGANIZED HEALTH CARE EDUCATION/TRAINING PROGRAM

## 2025-04-15 PROCEDURE — 94664 DEMO&/EVAL PT USE INHALER: CPT

## 2025-04-15 PROCEDURE — 93010 ELECTROCARDIOGRAM REPORT: CPT | Performed by: INTERNAL MEDICINE

## 2025-04-15 PROCEDURE — 83935 ASSAY OF URINE OSMOLALITY: CPT | Performed by: INTERNAL MEDICINE

## 2025-04-15 PROCEDURE — 83735 ASSAY OF MAGNESIUM: CPT | Performed by: INTERNAL MEDICINE

## 2025-04-15 RX ORDER — SODIUM CHLORIDE 9 MG/ML
50 INJECTION, SOLUTION INTRAVENOUS CONTINUOUS
Status: DISCONTINUED | OUTPATIENT
Start: 2025-04-15 | End: 2025-04-16

## 2025-04-15 RX ADMIN — ACETAMINOPHEN 650 MG: 325 TABLET ORAL at 17:43

## 2025-04-15 RX ADMIN — LATANOPROST 1 DROP: 50 SOLUTION OPHTHALMIC at 22:57

## 2025-04-15 RX ADMIN — PANTOPRAZOLE SODIUM 20 MG: 20 TABLET, DELAYED RELEASE ORAL at 05:00

## 2025-04-15 RX ADMIN — CEFAZOLIN SODIUM 1000 MG: 1 SOLUTION INTRAVENOUS at 00:48

## 2025-04-15 RX ADMIN — BUDESONIDE AND FORMOTEROL FUMARATE DIHYDRATE 2 PUFF: 160; 4.5 AEROSOL RESPIRATORY (INHALATION) at 08:16

## 2025-04-15 RX ADMIN — PREDNISOLONE ACETATE 1 DROP: 10 SUSPENSION/ DROPS OPHTHALMIC at 08:16

## 2025-04-15 RX ADMIN — CEFAZOLIN SODIUM 1000 MG: 1 SOLUTION INTRAVENOUS at 10:04

## 2025-04-15 RX ADMIN — MONTELUKAST 10 MG: 10 TABLET, FILM COATED ORAL at 08:13

## 2025-04-15 RX ADMIN — METOPROLOL TARTRATE 25 MG: 25 TABLET, FILM COATED ORAL at 08:13

## 2025-04-15 RX ADMIN — BUDESONIDE AND FORMOTEROL FUMARATE DIHYDRATE 2 PUFF: 160; 4.5 AEROSOL RESPIRATORY (INHALATION) at 17:44

## 2025-04-15 RX ADMIN — PREDNISOLONE ACETATE 1 DROP: 10 SUSPENSION/ DROPS OPHTHALMIC at 17:44

## 2025-04-15 RX ADMIN — CEFAZOLIN SODIUM 1000 MG: 1 SOLUTION INTRAVENOUS at 17:43

## 2025-04-15 RX ADMIN — DORZOLAMIDE HCL 1 DROP: 20 SOLUTION/ DROPS OPHTHALMIC at 08:17

## 2025-04-15 RX ADMIN — DORZOLAMIDE HCL 1 DROP: 20 SOLUTION/ DROPS OPHTHALMIC at 20:11

## 2025-04-15 RX ADMIN — SODIUM CHLORIDE 50 ML/HR: 0.9 INJECTION, SOLUTION INTRAVENOUS at 21:14

## 2025-04-15 RX ADMIN — METOPROLOL TARTRATE 25 MG: 25 TABLET, FILM COATED ORAL at 20:11

## 2025-04-15 NOTE — ASSESSMENT & PLAN NOTE
- Chronic hyponatremia  - Monitor BMP Q8hr for hyponatremia, 129 from 126 s/p 500mL NS infusion on 4/14  - Nephrology consulted

## 2025-04-15 NOTE — PROGRESS NOTES
Progress Note - Hospitalist   Name: Enrico Chavira 89 y.o. male I MRN: [id number]  Unit/Bed#: ICU 05-01 I Date of Admission: 4/14/2025   Date of Service: 4/15/2025 I Hospital Day: 1    Assessment & Plan  Sepsis (HCC)  Present on admission with tachycardia, leukocytosis secondary to right lower extremity cellulitis  Continue IV antibiotics with cefazolin 2 g every 8 hours  CT imaging in the ER negative for any underlying abscess  Lower extremity Doppler negative for DVT  Initial lactate normal  Procalcitonin elevated, will continue to trend  IV fluids as needed  Infectious disease consult pending  Asthma-COPD overlap syndrome (HCC)  Patient with mild wheezing today  Albuterol as needed  Respiratory protocol  Atrial fibrillation with rapid ventricular response ([facility])  Noted to be in rapid A-fib while in the ER  Received IV Cardizem, heart rate has improved.  Patient subsequently converted to normal sinus rhythm  Cardizem drip stopped overnight (4/14) as patient converted to NSR  Initiated on metoprolol 25 mg twice daily- will continue  Coumadin held given supratherapeutic INR  Monitor on telemetry  Supratherapeutic INR  Secondary to Coumadin as well as severe sepsis  INR 6.04 -> 8.71 today, no signs of active bleeding  Continue to hold Coumadin for now  Monitor INR level  Cellulitis of right lower extremity  With blistering noted  CT imaging negative for underlying abscess  Continue treatment as above  Hyponatremia  Patient with chronic hyponatremia  Sodium improved from 126 to 129 this morning  Patient received IV fluids in the ER, hold additional IV fluids for now  Nephrology consult pending  Acute renal failure (ARF) ([facility])  Secondary to dehydration and sepsis  Monitor urine output  Hold diuretics for now  Creatinine 1.62 -> 1.35  Avoid hypotension and nephrotoxic meds  Nephrology consult pending    VTE Pharmacologic Prophylaxis: VTE Score: 7 High Risk (Score >/= 5) - Pharmacological DVT Prophylaxis Ordered:  coumadin on hold. Sequential Compression Devices Ordered.    Mobility:   Basic Mobility Inpatient Raw Score: 10  JH-HLM Goal: 4: Move to chair/commode  JH-HLM Achieved: 2: Bed activities/Dependent transfer  JH-HLM Goal NOT achieved. Continue with multidisciplinary rounding and encourage appropriate mobility to improve upon JH-HLM goals.    Patient Centered Rounds: I performed bedside rounds with nursing staff today.   Discussions with Specialists or Other Care Team Provider: nursing, CM    Education and Discussions with Family / Patient: Updated  (daughter) at bedside.    Current Length of Stay: 1 day(s)  Current Patient Status: Inpatient   Certification Statement: The patient will continue to require additional inpatient hospital stay due to continued need for IV abx, monitoring labs, nephrology consult, ID consult  Discharge Plan: Anticipate discharge in >72 hrs to discharge location to be determined pending rehab evaluations.    Code Status: Level 3 - DNAR and DNI    Subjective   The patient was seen and examined. The patient is lying in bed in no acute distress. He denies any pain, reports it as more of a restless leg. He states he is feeling overall improved from yesterday.     Objective :  Temp:  [97.3 °F (36.3 °C)-99.1 °F (37.3 °C)] 99.1 °F (37.3 °C)  HR:  [] 93  BP: ()/(51-86) 121/56  Resp:  [18-37] 22  SpO2:  [89 %-99 %] 95 %  O2 Device: None (Room air)    Body mass index is 32.88 kg/m².     Input and Output Summary (last 24 hours):     Intake/Output Summary (Last 24 hours) at 4/15/2025 1052  Last data filed at 4/15/2025 0901  Gross per 24 hour   Intake 220 ml   Output 325 ml   Net -105 ml       Physical Exam  Vitals and nursing note reviewed.   Constitutional:       General: He is awake.   HENT:      Head: Normocephalic and atraumatic.   Cardiovascular:      Rate and Rhythm: Normal rate and regular rhythm.   Pulmonary:      Effort: Pulmonary effort is normal.      Breath sounds:  Normal breath sounds.   Abdominal:      Palpations: Abdomen is soft.      Tenderness: There is no abdominal tenderness.   Skin:     Comments: RLE with erythema from foot up to thigh. He's noted to have a blister in the right inner thigh. Drainage noted, areas of peeling skin, excoriations    Neurological:      General: No focal deficit present.      Mental Status: He is alert and oriented to person, place, and time.   Psychiatric:         Attention and Perception: Attention normal.         Mood and Affect: Mood normal.         Speech: Speech normal.         Behavior: Behavior is cooperative.         Cognition and Memory: Cognition and memory normal.           Lines/Drains:              Lab Results: I have reviewed the following results:   Results from last 7 days   Lab Units 04/15/25  0459 04/14/25  1410   WBC Thousand/uL 17.84* 29.35*   HEMOGLOBIN g/dL 11.0* 13.7   HEMATOCRIT % 32.6* 40.4   PLATELETS Thousands/uL 222 267   BANDS PCT %  --  4   SEGS PCT % 89*  --    LYMPHO PCT % 5* 2*   MONO PCT % 5 3*   EOS PCT % 0 0     Results from last 7 days   Lab Units 04/15/25  0459   SODIUM mmol/L 129*   POTASSIUM mmol/L 3.8   CHLORIDE mmol/L 100   CO2 mmol/L 22   BUN mg/dL 59*   CREATININE mg/dL 1.35*   ANION GAP mmol/L 7   CALCIUM mg/dL 8.5   ALBUMIN g/dL 2.3*   TOTAL BILIRUBIN mg/dL 0.36   ALK PHOS U/L 76   ALT U/L 43   AST U/L 41*   GLUCOSE RANDOM mg/dL 111     Results from last 7 days   Lab Units 04/15/25  0459   INR  8.71*             Results from last 7 days   Lab Units 04/15/25  0459 04/14/25  1410   LACTIC ACID mmol/L  --  1.7   PROCALCITONIN ng/ml 10.82* 15.61*       Recent Cultures (last 7 days):   Results from last 7 days   Lab Units 04/14/25  1410   BLOOD CULTURE  Received in Microbiology Lab. Culture in Progress.  Received in Microbiology Lab. Culture in Progress.       Imaging Results Review: No pertinent imaging studies reviewed.  Other Study Results Review: No additional pertinent studies reviewed.    Last  24 Hours Medication List:     Current Facility-Administered Medications:     acetaminophen (TYLENOL) tablet 650 mg, Q6H PRN    albuterol (PROVENTIL HFA,VENTOLIN HFA) inhaler 2 puff, Q4H PRN    albuterol inhalation solution 2.5 mg, Q6H PRN    budesonide-formoterol (SYMBICORT) 160-4.5 mcg/act inhaler 2 puff, BID    ceFAZolin (ANCEF) IVPB (premix in dextrose) 1,000 mg 50 mL, Q8H, Last Rate: 1,000 mg (04/15/25 1004)    dorzolamide (TRUSOPT) ophthalmic solution 1 drop, BID    latanoprost (XALATAN) 0.005 % ophthalmic solution 1 drop, HS    metoprolol tartrate (LOPRESSOR) tablet 25 mg, Q12H CLARK    montelukast (SINGULAIR) tablet 10 mg, Daily    ondansetron (ZOFRAN) injection 4 mg, Q6H PRN    pantoprazole (PROTONIX) EC tablet 20 mg, Early Morning    prednisoLONE acetate (PRED FORTE) 1 % ophthalmic suspension 1 drop, BID    Administrative Statements   Today, Patient Was Seen By: Anselmo Waters PA-C  I have spent a total time of 35 minutes in caring for this patient on the day of the visit/encounter including Patient and family education, Documenting in the medical record, Reviewing/placing orders in the medical record (including tests, medications, and/or procedures), and Obtaining or reviewing history  .    **Please Note: This note may have been constructed using a voice recognition system.**

## 2025-04-15 NOTE — ASSESSMENT & PLAN NOTE
Patient presented following a fall on Saturday.  Reported generalized fatigue and lower extremity weakness.

## 2025-04-15 NOTE — ASSESSMENT & PLAN NOTE
Present on admission with tachycardia, leukocytosis secondary to right lower extremity cellulitis  Continue IV antibiotics with cefazolin 2 g every 8 hours  CT imaging in the ER negative for any underlying abscess  Lower extremity Doppler negative for DVT  Initial lactate normal  Procalcitonin elevated, will continue to trend  IV fluids as needed  Infectious disease consult pending

## 2025-04-15 NOTE — PLAN OF CARE
Problem: PAIN - ADULT  Goal: Verbalizes/displays adequate comfort level or baseline comfort level  Description: Interventions:- Encourage patient to monitor pain and request assistance- Assess pain using appropriate pain scale- Administer analgesics based on type and severity of pain and evaluate response- Implement non-pharmacological measures as appropriate and evaluate response- Consider cultural and social influences on pain and pain management- Notify physician/advanced practitioner if interventions unsuccessful or patient reports new pain  Outcome: Progressing     Problem: INFECTION - ADULT  Goal: Absence or prevention of progression during hospitalization  Description: INTERVENTIONS:- Assess and monitor for signs and symptoms of infection- Monitor lab/diagnostic results- Monitor all insertion sites, i.e. indwelling lines, tubes, and drains- Monitor endotracheal if appropriate and nasal secretions for changes in amount and color- Gilbert appropriate cooling/warming therapies per order- Administer medications as ordered- Instruct and encourage patient and family to use good hand hygiene technique- Identify and instruct in appropriate isolation precautions for identified infection/condition  Outcome: Progressing

## 2025-04-15 NOTE — PROGRESS NOTES
Progress Note - Internal Medicine   Name: Enrico Chavira 89 y.o. male I MRN: [id number]  Unit/Bed#: ICU 05-01 I Date of Admission: 4/14/2025   Date of Service: 4/15/2025 I Hospital Day: 1  { ?Quick Links I Problem List I PORCH I Billing Tip:42815}  Assessment & Plan  Sepsis (HCC)  - Continue IV Vanco and Ancef  - IV fluids PRN  - Procal 15.6 on 4/14, Trend daily  Asthma-COPD overlap syndrome (HCC)  - Continue Singulair daily  - Albuterol PRN for SOB and wheezing  - Respiratory protocol   Hyponatremia  - Chronic hyponatremia  - Monitor BMP Q8hr for hyponatremia, 129 from 126 s/p 500mL NS infusion on 4/14  - Nephrology consulted  Atrial fibrillation with rapid ventricular response (HCC)  - Presented to ED with Afib w RVR, received IV Cardizem, HR improved and converted to sinus rhythm  - Monitor on telemetry  - Metoprolol 25mg BID  - hold Coumadin due to supratherapeutic INR (6.04 today)  Supratherapeutic INR  - hold Coumadin  - Monitor INR daily and assess for signs of acute bleeding  Cellulitis of right lower extremity  - Blood Cultures 2x pending   - Continue Vanco and Cefazolin  - Venous duplex of RLE neg for DVT  Acute renal failure (ARF) ([facility])  - monitor BMP   - hold diuretics and nephrotoxic drugs  - Nephro consulted      Team: {ALYCE IP SOD TEAMS:03462}    Subjective   Patient seen and examined. Patient feeling well, no acute events overnight. Has been transferring from bed to chair with PT assistance. Eating and drinking without issue. Patient reports mild SOB, but has improved since starting Singulair. Has not needed rescue inhaler since admission per patients family members. Denies fevers, chills, HA, dizziness, confusion, chest pain, NVD, dysuria or numbness/tingling in extremities.       Objective :{?Quick Links I ICU Summary I Vitals I I/Os I LDAs I Mobility (PT/OT) I Code Status / ACP   ?Quick Links I Active Meds I Pain Meds I Antibiotics I Anticoagulants:70841}  Temp:  [97.3 °F (36.3 °C)-99.1 °F  (37.3 °C)] 98 °F (36.7 °C)  HR:  [] 85  BP: ()/(51-86) 104/53  Resp:  [18-37] 21  SpO2:  [89 %-99 %] 92 %  O2 Device: None (Room air)    I/O         04/13 0701  04/14 0700 04/14 0701  04/15 0700 04/15 0701  04/16 0700    P.O.   220    Total Intake(mL/kg)   220 (2.4)    Urine (mL/kg/hr)  300 25 (0.1)    Total Output  300 25    Net  -300 +195           Unmeasured Urine Occurrence  1 x           Weights:   IBW (Ideal Body Weight): 63.8 kg    Body mass index is 32.88 kg/m².  Weight (last 2 days)       Date/Time Weight    04/14/25 1841 92.4 (203.71)    04/14/25 1325 83.8 (184.75)            Physical Exam  Constitutional:       General: He is not in acute distress.     Appearance: Normal appearance. He is normal weight. He is not toxic-appearing or diaphoretic.   HENT:      Head: Normocephalic and atraumatic.   Eyes:      General: No scleral icterus.        Right eye: No discharge.         Left eye: No discharge.      Extraocular Movements: Extraocular movements intact.   Cardiovascular:      Rate and Rhythm: Normal rate and regular rhythm.   Pulmonary:      Effort: Pulmonary effort is normal. No respiratory distress or retractions.      Breath sounds: No stridor. Wheezing (mild in/expiratory wheezing) present. No rales.   Abdominal:      General: Abdomen is flat. There is no distension.      Palpations: Abdomen is soft. There is no mass.      Tenderness: There is no abdominal tenderness.   Musculoskeletal:         General: Swelling (Mild RLE swelling) and tenderness (RLE tender to palpation) present.      Cervical back: Normal range of motion. No tenderness.   Skin:     General: Skin is warm and dry.      Findings: Erythema (LLE erythematous from foot up to upper thigh with blistering on medial aspect R thigh) present.      Comments: RLE erythematous and warm to touch. Mild TTP. Full ROM in ankle and 5/5 strength in plantar/dorsiflexion b/l.   Neurological:      Mental Status: He is alert.   Psychiatric:          Mood and Affect: Mood normal.         Behavior: Behavior normal.         Judgment: Judgment normal.       Results for orders placed or performed during the hospital encounter of 04/14/25   Blood culture #1    Specimen: Arm, Right; Blood   Result Value Ref Range    Blood Culture Received in Microbiology Lab. Culture in Progress.    Blood culture #2    Specimen: Arm, Left; Blood   Result Value Ref Range    Blood Culture Received in Microbiology Lab. Culture in Progress.    CBC and differential   Result Value Ref Range    WBC 29.35 (H) 4.31 - 10.16 Thousand/uL    RBC 4.53 3.88 - 5.62 Million/uL    Hemoglobin 13.7 12.0 - 17.0 g/dL    Hematocrit 40.4 36.5 - 49.3 %    MCV 89 82 - 98 fL    MCH 30.2 26.8 - 34.3 pg    MCHC 33.9 31.4 - 37.4 g/dL    RDW 13.7 11.6 - 15.1 %    MPV 10.1 8.9 - 12.7 fL    Platelets 267 149 - 390 Thousands/uL   Comprehensive metabolic panel   Result Value Ref Range    Sodium 126 (L) 135 - 147 mmol/L    Potassium 4.2 3.5 - 5.3 mmol/L    Chloride 95 (L) 96 - 108 mmol/L    CO2 22 21 - 32 mmol/L    ANION GAP 9 4 - 13 mmol/L    BUN 64 (H) 5 - 25 mg/dL    Creatinine 1.62 (H) 0.60 - 1.30 mg/dL    Glucose 150 (H) 65 - 140 mg/dL    Calcium 9.5 8.4 - 10.2 mg/dL    Corrected Calcium 10.5 (H) 8.3 - 10.1 mg/dL    AST 75 (H) 13 - 39 U/L    ALT 59 (H) 7 - 52 U/L    Alkaline Phosphatase 109 (H) 34 - 104 U/L    Total Protein 6.5 6.4 - 8.4 g/dL    Albumin 2.8 (L) 3.5 - 5.0 g/dL    Total Bilirubin 0.57 0.20 - 1.00 mg/dL    eGFR 35 ml/min/1.73sq m   Lactic acid   Result Value Ref Range    LACTIC ACID 1.7 0.5 - 2.0 mmol/L   Procalcitonin   Result Value Ref Range    Procalcitonin 15.61 (H) <=0.25 ng/ml   Protime-INR   Result Value Ref Range    Protime 53.0 (H) 12.3 - 15.0 seconds    INR 6.04 (HH) 0.85 - 1.19   APTT   Result Value Ref Range    PTT 78 (H) 23 - 34 seconds   BMP Q8 hours X 3 (Hyponatremia monitoring)   Result Value Ref Range    Sodium 128 (L) 135 - 147 mmol/L    Potassium 4.0 3.5 - 5.3 mmol/L     Chloride 100 96 - 108 mmol/L    CO2 20 (L) 21 - 32 mmol/L    ANION GAP 8 4 - 13 mmol/L    BUN 61 (H) 5 - 25 mg/dL    Creatinine 1.36 (H) 0.60 - 1.30 mg/dL    Glucose 122 65 - 140 mg/dL    Calcium 8.7 8.4 - 10.2 mg/dL    eGFR 44 ml/min/1.73sq m   Procalcitonin, Next Day AM Collection   Result Value Ref Range    Procalcitonin 10.82 (H) <=0.25 ng/ml   Comprehensive metabolic panel   Result Value Ref Range    Sodium 129 (L) 135 - 147 mmol/L    Potassium 3.8 3.5 - 5.3 mmol/L    Chloride 100 96 - 108 mmol/L    CO2 22 21 - 32 mmol/L    ANION GAP 7 4 - 13 mmol/L    BUN 59 (H) 5 - 25 mg/dL    Creatinine 1.35 (H) 0.60 - 1.30 mg/dL    Glucose 111 65 - 140 mg/dL    Calcium 8.5 8.4 - 10.2 mg/dL    Corrected Calcium 9.9 8.3 - 10.1 mg/dL    AST 41 (H) 13 - 39 U/L    ALT 43 7 - 52 U/L    Alkaline Phosphatase 76 34 - 104 U/L    Total Protein 5.3 (L) 6.4 - 8.4 g/dL    Albumin 2.3 (L) 3.5 - 5.0 g/dL    Total Bilirubin 0.36 0.20 - 1.00 mg/dL    eGFR 44 ml/min/1.73sq m   CBC and differential   Result Value Ref Range    WBC 17.84 (H) 4.31 - 10.16 Thousand/uL    RBC 3.64 (L) 3.88 - 5.62 Million/uL    Hemoglobin 11.0 (L) 12.0 - 17.0 g/dL    Hematocrit 32.6 (L) 36.5 - 49.3 %    MCV 90 82 - 98 fL    MCH 30.2 26.8 - 34.3 pg    MCHC 33.7 31.4 - 37.4 g/dL    RDW 13.8 11.6 - 15.1 %    MPV 9.8 8.9 - 12.7 fL    Platelets 222 149 - 390 Thousands/uL    nRBC 0 /100 WBCs    Segmented % 89 (H) 43 - 75 %    Immature Grans % 1 0 - 2 %    Lymphocytes % 5 (L) 14 - 44 %    Monocytes % 5 4 - 12 %    Eosinophils Relative 0 0 - 6 %    Basophils Relative 0 0 - 1 %    Absolute Neutrophils 15.69 (H) 1.85 - 7.62 Thousands/µL    Absolute Immature Grans 0.21 (H) 0.00 - 0.20 Thousand/uL    Absolute Lymphocytes 0.92 0.60 - 4.47 Thousands/µL    Absolute Monocytes 0.97 0.17 - 1.22 Thousand/µL    Eosinophils Absolute 0.00 0.00 - 0.61 Thousand/µL    Basophils Absolute 0.05 0.00 - 0.10 Thousands/µL   Magnesium   Result Value Ref Range    Magnesium 1.9 1.9 - 2.7 mg/dL  "  Protime-INR   Result Value Ref Range    Protime 69.7 (H) 12.3 - 15.0 seconds    INR 8.71 (HH) 0.85 - 1.19   ECG 12 lead   Result Value Ref Range    Ventricular Rate 156 BPM    Atrial Rate 174 BPM    PA Interval  ms    QRSD Interval 76 ms    QT Interval 254 ms    QTC Interval 409 ms    P Axis  degrees    QRS Axis 40 degrees    T Wave Axis 34 degrees   Manual Differential(PHLEBS Do Not Order)   Result Value Ref Range    Segmented % 91 (H) 43 - 75 %    Bands % 4 0 - 8 %    Lymphocytes % 2 (L) 14 - 44 %    Monocytes % 3 (L) 4 - 12 %    Eosinophils % 0 0 - 6 %    Basophils % 0 0 - 1 %    Absolute Neutrophils 27.88 (H) 1.85 - 7.62 Thousand/uL    Absolute Lymphocytes 0.59 (L) 0.60 - 4.47 Thousand/uL    Absolute Monocytes 0.88 0.00 - 1.22 Thousand/uL    Absolute Eosinophils 0.00 0.00 - 0.40 Thousand/uL    Absolute Basophils 0.00 0.00 - 0.10 Thousand/uL    Total Counted      Toxic Granulation Present     RBC Morphology Present     Platelet Estimate Adequate Adequate    Jax Cells Present         {?Quick Links I Lab Review I Micro Results I Radiology I Cardiology:87066}  Lab Results: I have reviewed the following results:  Recent Labs     04/14/25  1410 04/15/25  0042 04/15/25  0459   WBC 29.35*  --  17.84*   HGB 13.7  --  11.0*   HCT 40.4  --  32.6*     --  222   BANDSPCT 4  --   --    SODIUM 126*   < > 129*   K 4.2   < > 3.8   CL 95*   < > 100   CO2 22   < > 22   BUN 64*   < > 59*   CREATININE 1.62*   < > 1.35*   GLUC 150*   < > 111   MG  --   --  1.9   AST 75*  --  41*   ALT 59*  --  43   ALB 2.8*  --  2.3*   TBILI 0.57  --  0.36   ALKPHOS 109*  --  76   PTT 78*  --   --    INR 6.04*  --  8.71*   LACTICACID 1.7  --   --     < > = values in this interval not displayed.       {Imaging Results Review:30117::\"No pertinent imaging studies reviewed.\"}  {Other Study Results Review:54193::\"No additional pertinent studies reviewed.\"}    Currently Ordered Meds:   Current Facility-Administered Medications:     acetaminophen " (TYLENOL) tablet 650 mg, Q6H PRN    albuterol (PROVENTIL HFA,VENTOLIN HFA) inhaler 2 puff, Q4H PRN    albuterol inhalation solution 2.5 mg, Q6H PRN    budesonide-formoterol (SYMBICORT) 160-4.5 mcg/act inhaler 2 puff, BID    ceFAZolin (ANCEF) IVPB (premix in dextrose) 1,000 mg 50 mL, Q8H, Last Rate: 1,000 mg (04/15/25 1004)    dorzolamide (TRUSOPT) ophthalmic solution 1 drop, BID    latanoprost (XALATAN) 0.005 % ophthalmic solution 1 drop, HS    metoprolol tartrate (LOPRESSOR) tablet 25 mg, Q12H CLARK    montelukast (SINGULAIR) tablet 10 mg, Daily    ondansetron (ZOFRAN) injection 4 mg, Q6H PRN    pantoprazole (PROTONIX) EC tablet 20 mg, Early Morning    prednisoLONE acetate (PRED FORTE) 1 % ophthalmic suspension 1 drop, BID  VTE Pharmacologic Prophylaxis: Hold patient Coumadin. Suproptherapeutic INR at 6.04, continue to monitor until therapeutic and restart patient coumadin  VTE Mechanical Prophylaxis: sequential compression device on LLE.    Administrative Statements   {Time Spent (Optional):31407}  Portions of the record may have been created with voice recognition software.

## 2025-04-15 NOTE — QUICK NOTE
Acquired pt at 2300. Pt resting in bed. Patient offers no complaints at this time. Pt assessed as documented, Vitals stable. Care ongoing

## 2025-04-15 NOTE — PLAN OF CARE
Problem: PHYSICAL THERAPY ADULT  Goal: Performs mobility at highest level of function for planned discharge setting.  See evaluation for individualized goals.  Description: Treatment/Interventions: Functional transfer training, Therapeutic exercise, Endurance training, Gait training, Bed mobility, Equipment eval/education  Equipment Recommended: Walker       See flowsheet documentation for full assessment, interventions and recommendations.  Outcome: Progressing  Note: Prognosis: Fair     Assessment: Pt is 89 y.o. male seen for PT evaluation s/p admit to [facility] on 4/14/2025 w/ Sepsis (HCC). PT consulted to assess pt's functional mobility and d/c needs. Order placed for PT eval and tx, w/ activity as tolerated order. Pt agreeable to PT  session upon arrival, pt found supine in bed.  PTA, pt was independent w/ all functional mobility w/ RW and SPC, ambulates household distances, lives w/ wife in 1 level apartment, and retired.  Pt to benefit from continued PT tx to address deficits and maximize level of functional independent mobility and consistency. Upon conclusion pt  seated in recliner. Complexity: Comorbidities affecting pt's physical performance at time of assessment include: COPD, a fib, and recent fall. Personal factors affecting pt at time of IE include: advanced age, limited mobility, history of falls, ambulating with assistive device, and inability to ambulate household distances. Please find objective findings from PT assessment regarding body systems outlined above with impairments and limitations including weakness, impaired balance, decreased endurance, pain, decreased activity tolerance, decreased functional mobility tolerance, altered sensation, fall risk, and decreased skin integrity.  Pt's clinical presentation is currently unstable/unpredictable seen in pt's presentation of abnormal renal values, abnormal WBC count, abnormal sodium levels, pain, wounds, and recent fall and step down  status. The patient's AM-PAC Basic Mobility Inpatient Short Form Raw Score is 7.  Based on patient presentations and impairments, pt would most appropriately benefit from Level 2 resource intensity upon discharge. A Raw score of less than or equal to 16 suggests the patient may benefit from discharge to post-acute rehabilitation services. Please also refer to the recommendation of the Physical Therapist for safe discharge planning. RN verbalized pt appropriate for PT session. Pt seen as a co-eval with OT due to the patient's co-morbidities, clinically unstable presentation, and present impairments which are a regression from the patient's baseline.  Barriers to Discharge:  (limtied mobility)     Rehab Resource Intensity Level, PT: II (Moderate Resource Intensity)    See flowsheet documentation for full assessment.

## 2025-04-15 NOTE — ASSESSMENT & PLAN NOTE
Creatinine up to 1.6 on admission with baseline creatinine closer to 0.8.  Nephrology is following.  He is also hyponatremic.  Creatinine is improving. Estimated Creatinine Clearance: 35.6 mL/min (A) (by C-G formula based on SCr of 1.35 mg/dL (H)).  Will renally dose antibiotics as needed

## 2025-04-15 NOTE — PHYSICAL THERAPY NOTE
PHYSICAL THERAPY EVALUATION  NAME:  Enrico Chavira  DATE: 04/15/25    AGE:   89 y.o.  Mrn:   [id number]  ADMIT DX:  Cellulitis and abscess of leg [L03.119, L02.419]  Atrial fibrillation with RVR (HCC) [I48.91]  Visit for wound check [Z51.89]  Septic shock (HCC) [A41.9, R65.21]  Fall in elderly patient [R29.6]  Problem List:   Patient Active Problem List   Diagnosis    Prothrombin gene mutation (HCC)    Primary generalized (osteo)arthritis    Essential hypertension, benign    Dyslipidemia    Intrinsic eczema    Other seborrheic dermatitis    Abnormality of gait    Osteoarthritis of both knees    Chronic pulmonary embolism without acute cor pulmonale (HCC)    Age-related cataract of both eyes    Premature beats    Primary osteoarthritis of both knees    Neoplasm of uncertain behavior of skin    Encounter for long-term (current) use of medications    GERD without esophagitis    Influenza vaccine refused    Hypercalcemia    COVID-19 virus infection    Asthma-COPD overlap syndrome (HCC)    Endothelial corneal dystrophy of both eyes    Heterozygous factor V Leiden mutation (HCC)    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)    History of pulmonary embolism    Mature cataract    Primary open-angle glaucoma, bilateral, mild stage    Pseudophakic bullous keratopathy of left eye    TB lung, latent    Hyponatremia    Shortness of breath    Other fatigue    Acquired hypothyroidism    Venous stasis ulcer of right calf limited to breakdown of skin without varicose veins (HCC)    Hashimoto's thyroiditis    Venous stasis ulcer of other part of right lower leg with fat layer exposed with varicose veins (HCC)    Edema of both lower extremities due to peripheral venous insufficiency    Atrial fibrillation with rapid ventricular response (HCC)    Supratherapeutic INR    Sepsis (HCC)    Cellulitis of right lower extremity    Acute kidney injury (HCC)    Swelling of right knee       Past Medical History  Past Medical  History:   Diagnosis Date    Arthritis     Coagulation defect (HCC)     last assessed: 11/28/2018    COPD (chronic obstructive pulmonary disease) ([facility])     last assessed: 5/14/2019, 12/6/2017    Generalized osteoarthritis     last assessed: 1/28/2019    GERD without esophagitis     last assessed: 1/28/2019    Glaucoma     last assessed: 8/4/2011    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[facility])     last assessed: 10/16/2018    Factor II Prothrombin Gene per [employer]    History of kidney stones 1985    Hx of blood clots     Hypertension     last assessed: 5/14/2019    Impaired fasting glucose     last assessed: 8/26/2013    Mild persistent asthma, uncomplicated     last assessed: 11/28/2018    Nephrolithiasis     Nondisplaced intertrochanteric fracture of right femur, initial encounter for closed fracture ([facility])     last assessed: 1/28/2019    Premature ventricular contractions     last assessed: 8/4/2011    Pulmonary nodule     Scoliosis (and kyphoscoliosis), idiopathic     Wears dentures        Past Surgical History  Past Surgical History:   Procedure Laterality Date    CARPAL TUNNEL RELEASE Left     ENDO 8/801    CATARACT EXTRACTION Left     COLONOSCOPY  10/07/2008    last assessed: 3/29/2016    EYE SURGERY Right 01/13/2022    HERNIA REPAIR      HIP SURGERY      HIP SURGERY Left 03/05/1999    ORIF    MULTIPLE TOOTH EXTRACTIONS Bilateral     OTHER SURGICAL HISTORY Right 10/30/2001    [facility]    TONSILLECTOMY Bilateral     WISDOM TOOTH EXTRACTION Bilateral        Length Of Stay: 1  Performed at least 2 patient identifiers during session: Name and ID bracelet       04/15/25 1034   PT Last Visit   PT Visit Date 04/15/25   Note Type   Note type Evaluation   Pain Assessment   Pain Assessment Tool 0-10   Pain Score 5   Pain Location/Orientation Orientation: Lower;Location: Back;Orientation: Bilateral;Location: Leg   Pain Onset/Description Onset: Ongoing   Hospital Pain Intervention(s) Repositioned    Restrictions/Precautions   Weight Bearing Precautions Per Order No   Other Precautions Telemetry;Fall Risk;Pain   Home Living   Type of Home Apartment   Home Layout One level;Access;Performs ADLs on one level   Bathroom Shower/Tub Walk-in shower   Bathroom Toilet Raised   Bathroom Equipment Grab bars in shower;Shower chair;Grab bars around toilet   Home Equipment Walker;Cane  (Pt reports use of SPC and RW at baseline)   Prior Function   Level of [place] Independent with ADLs;Independent with functional mobility;Independent with IADLS  (increased assistance with IADLs this past week)   Lives With Alone   Receives Help From Family   IADLs Family/Friend/Other provides transportation;Independent with medication management;Independent with meal prep  (A with cleaning and laundry)   Falls in the last 6 months 1 to 4  (Pt reports x1 fall recently)   Vocational Retired   General   Family/Caregiver Present Yes   Cognition   Overall Cognitive Status WFL   Arousal/Participation Alert   Attention Within functional limits   Orientation Level Oriented X4   Memory Decreased recall of precautions;Decreased recall of recent events   Following Commands Follows one step commands without difficulty   RUE Assessment   RUE Assessment WFL   RUE Strength   RUE Overall Strength   (3/5)   LUE Assessment   LUE Assessment WFL   LUE Strength   LUE Overall Strength   (3+/5)   RLE Assessment   RLE Assessment X   Strength RLE   RLE Overall Strength 3/5   LLE Assessment   LLE Assessment WFL   Vision-Basic Assessment   Current Vision Wears glasses all the time   Coordination   Sensation   (impaired B hands)   Bed Mobility   Supine to Sit 4  Minimal assistance   Additional items Assist x 2;Bedrails;HOB elevated;Increased time required;Verbal cues   Additional Comments pt denied dizziness with transitional movement   Transfers   Sit to Stand 4  Minimal assistance   Additional items Assist x 2;Increased time required;Verbal cues   Stand to Sit 4   Minimal assistance   Additional items Assist x 2;Increased time required;Verbal cues;Armrests   Stand pivot 4  Minimal assistance   Additional items Assist x 2;Increased time required;Verbal cues  (used RW; required step by steps cues; limited WBing on RLE due to pain)   Additional Comments pt required cues for proper hand placement   Ambulation/Elevation   Ambulation/Elevation Additional Comments fair safety awareness noted   Balance   Static Sitting Good   Dynamic Sitting Fair +   Static Standing Fair   Dynamic Standing Fair -   Ambulatory Fair -   Endurance Deficit   Endurance Deficit Yes   Endurance Deficit Description pt reported fatigue with activity   Activity Tolerance   Activity Tolerance Patient limited by fatigue;Patient limited by pain   Assessment   Prognosis Fair   Assessment Pt is 89 y.o. male seen for PT evaluation s/p admit to [facility] on 4/14/2025 w/ Sepsis (HCC). PT consulted to assess pt's functional mobility and d/c needs. Order placed for PT eval and tx, w/ activity as tolerated order. Pt agreeable to PT  session upon arrival, pt found supine in bed.  PTA, pt was independent w/ all functional mobility w/ RW and SPC, ambulates household distances, lives w/ wife in 1 level apartment, and retired.  Pt to benefit from continued PT tx to address deficits and maximize level of functional independent mobility and consistency. Upon conclusion pt  seated in recliner. Complexity: Comorbidities affecting pt's physical performance at time of assessment include: COPD, a fib, and recent fall. Personal factors affecting pt at time of IE include: advanced age, limited mobility, history of falls, ambulating with assistive device, and inability to ambulate household distances. Please find objective findings from PT assessment regarding body systems outlined above with impairments and limitations including weakness, impaired balance, decreased endurance, pain, decreased activity tolerance, decreased  functional mobility tolerance, altered sensation, fall risk, and decreased skin integrity.  Pt's clinical presentation is currently unstable/unpredictable seen in pt's presentation of abnormal renal values, abnormal WBC count, abnormal sodium levels, pain, wounds, and recent fall and step down status. [facility] Basic [facility] Short Form Raw Score is 7.  Based on patient presentations and impairments, pt would most appropriately benefit from Level 2 resource intensity upon discharge. A Raw score of less than or equal to 16 suggests the patient may benefit from discharge to post-acute rehabilitation services. Please also refer to the recommendation of the Physical Therapist for safe discharge planning. RN verbalized pt appropriate for PT session. Pt seen as a co-eval with OT due to the patient's co-morbidities, clinically unstable presentation, and present impairments which are a regression from the patient's baseline.   Barriers to Discharge   (limtied mobility)   Goals   Patient Goals to feel better   LTG Expiration Date 04/25/25   Long Term Goal #1 Pt will: Perform bed mobility tasks to modified I to improve ease of bed mobility. Perform transfers to modified I to improve ease of transfers. Perform ambulation with MI and RW for 250 feet to increase Indep in home environment. Increase dynamic standing balance to F+ to decrease fall risk. Increase OOB activity tolerance to 10 minutes without s/s of exertion to decrease fall risk.   Plan   Treatment/Interventions Functional transfer training;Therapeutic exercise;Endurance training;Gait training;Bed mobility;Equipment eval/education   PT Frequency 3-5x/wk   Discharge Recommendation   Rehab Resource Intensity Level, PT II (Moderate Resource Intensity)   Equipment Recommended Walker   Walker Package Recommended Wheeled walker   [facility] Basic Mobility Inpatient   Turning in Flat Bed Without Bedrails 2   Lying on Back to Sitting on Edge of Flat Bed  Without Bedrails 1   Moving Bed to Chair 1   Standing Up From Chair Using Arms 1   Walk in Room 1   Climb 3-5 Stairs With Railing 1   Basic Mobility Inpatient Raw Score 7   Turning Head Towards Sound 4   Follow Simple Instructions 4   Low Function Basic Mobility Raw Score  15   Low Function Basic Mobility Standardized Score  23.9   [facility] Highest Level Of Mobility   -[facility] Goal 2: Bed activities/Dependent transfer   -HLM Achieved 4: Move to chair/commode       Time In: 1008  Time Out: 1034  Total Evaluation Minutes: 26    Phuong De Jesus, PT

## 2025-04-15 NOTE — ASSESSMENT & PLAN NOTE
Secondary to Coumadin as well as severe sepsis  INR 6.04 -> 8.71 today, no signs of active bleeding  Continue to hold Coumadin for now  Monitor INR level

## 2025-04-15 NOTE — ASSESSMENT & PLAN NOTE
Right knee swollen with erythema in the setting of cellulitis as above.  Small joint effusion noted on CT of the right lower extremity with advanced degenerative changes.  Consider possibility of developing septic joint.  Will need to monitor closely.  Continue IV antibiotics as above  Serial skin exams and local wound care

## 2025-04-15 NOTE — PLAN OF CARE
Problem: PAIN - ADULT  Goal: Verbalizes/displays adequate comfort level or baseline comfort level  Description: Interventions:- Encourage patient to monitor pain and request assistance- Assess pain using appropriate pain scale- Administer analgesics based on type and severity of pain and evaluate response- Implement non-pharmacological measures as appropriate and evaluate response- Consider cultural and social influences on pain and pain management- Notify physician/advanced practitioner if interventions unsuccessful or patient reports new pain  4/14/2025 2332 by Melanie Mackenzie RN  Outcome: Progressing  4/14/2025 2332 by Melanie Mackenzie RN  Outcome: Progressing     Problem: INFECTION - ADULT  Goal: Absence or prevention of progression during hospitalization  Description: INTERVENTIONS:- Assess and monitor for signs and symptoms of infection- Monitor lab/diagnostic results- Monitor all insertion sites, i.e. indwelling lines, tubes, and drains- Monitor endotracheal if appropriate and nasal secretions for changes in amount and color- [place] appropriate cooling/warming therapies per order- Administer medications as ordered- Instruct and encourage patient and family to use good hand hygiene technique- Identify and instruct in appropriate isolation precautions for identified infection/condition  4/14/2025 2332 by Melanie Mackenzie RN  Outcome: Progressing  4/14/2025 2332 by Melanie Mackenzie RN  Outcome: Progressing  Goal: Absence of fever/infection during neutropenic period  Description: INTERVENTIONS:- Monitor WBC  4/14/2025 2332 by Melanie Mackenzie RN  Outcome: Progressing  4/14/2025 2332 by Melanie Mackenzie RN  Outcome: Progressing

## 2025-04-15 NOTE — ASSESSMENT & PLAN NOTE
ID consulted to guide antibiotic management for LE cellulitis. Continue management per primary team and ID. Fu blood cultures.   Met sepsis criteria with leukocytosis/tachycardia in setting of LE cellultis.

## 2025-04-15 NOTE — ASSESSMENT & PLAN NOTE
Patient presented with a mechanical fall and worsening right lower extremity erythema.  Started to have right lower extremity erythema around Wednesday, fall on Saturday.  Clinical media reviewed with blisters noted on the upper posterior and medial thigh with swelling and erythema of his entire right lower extremity.  Has history of venous stasis ulcerations in the right lower extremity and was recently discharged from wound care.  CT of the right lower extremity without soft tissue gas or abscess.  He does have swollen right knee with minor limitation in range of motion but overall denies knee pain.  Dry, cracked skin of his right lower extremity distally likely portal of entry.  Cellulitis appears streptococcal in nature.  Patient denies history of MRSA.   Discontinue IV vancomycin  Continue IV cefazolin, renally dosed   Consult wound care nurse  Serial skin exams and local wound care

## 2025-04-15 NOTE — OCCUPATIONAL THERAPY NOTE
Occupational Therapy Evaluation     Patient Name: Enrico Chavira  Today's Date: 4/15/2025  Problem List  Principal Problem:    Sepsis (HCC)  Active Problems:    Abnormality of gait    Asthma-COPD overlap syndrome (HCC)    Hyponatremia    Atrial fibrillation with rapid ventricular response (HCC)    Supratherapeutic INR    Cellulitis of right lower extremity    Acute kidney injury (HCC)    Swelling of right knee    Past Medical History  Past Medical History:   Diagnosis Date    Arthritis     Coagulation defect (HCC)     last assessed: 11/28/2018    COPD (chronic obstructive pulmonary disease) (HCC)     last assessed: 5/14/2019, 12/6/2017    Generalized osteoarthritis     last assessed: 1/28/2019    GERD without esophagitis     last assessed: 1/28/2019    Glaucoma     last assessed: 8/4/2011    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[facility])     last assessed: 10/16/2018    Factor II Prothrombin Gene per [employer]    History of kidney stones 1985    Hx of blood clots     Hypertension     last assessed: 5/14/2019    Impaired fasting glucose     last assessed: 8/26/2013    Mild persistent asthma, uncomplicated     last assessed: 11/28/2018    Nephrolithiasis     Nondisplaced intertrochanteric fracture of right femur, initial encounter for closed fracture ([facility])     last assessed: 1/28/2019    Premature ventricular contractions     last assessed: 8/4/2011    Pulmonary nodule     Scoliosis (and kyphoscoliosis), idiopathic     Wears dentures      Past Surgical History  Past Surgical History:   Procedure Laterality Date    CARPAL TUNNEL RELEASE Left     ENDO 8/801    CATARACT EXTRACTION Left     COLONOSCOPY  10/07/2008    last assessed: 3/29/2016    EYE SURGERY Right 01/13/2022    HERNIA REPAIR      HIP SURGERY      HIP SURGERY Left 03/05/1999    ORIF    MULTIPLE TOOTH EXTRACTIONS Bilateral     OTHER SURGICAL HISTORY Right 10/30/2001    [facility]    TONSILLECTOMY Bilateral     WISDOM TOOTH EXTRACTION Bilateral   "       04/15/25 1011   OT Last Visit   OT Visit Date 04/15/25   Note Type   Note type Evaluation   Additional Comments Pt seen as a co-eval with PT due to the patient's co-morbidities, clinically unstable presentation, and present impairments which are a regression from the patient's baseline.   Pain Assessment   Pain Assessment Tool 0-10   Pain Score 5   Pain Location/Orientation Orientation: Lower;Location: Back;Orientation: Bilateral;Location: Leg   Pain Radiating Towards n/a   Pain Onset/Description Onset: Ongoing   Effect of Pain on Daily Activities mobility   Patient's Stated Pain Goal No pain   Hospital Pain Intervention(s) Repositioned   Multiple Pain Sites No   Restrictions/Precautions   Weight Bearing Precautions Per Order No   Other Precautions Telemetry;Fall Risk;Pain   Home Living   Type of Home Apartment   Home Layout One level;Access;Performs ADLs on one level   Bathroom Shower/Tub Walk-in shower   Bathroom Toilet Raised   Bathroom Equipment Grab bars in shower;Shower chair;Grab bars around toilet   Bathroom Accessibility Accessible   Home Equipment Walker;Cane  (Pt reports use of SPC and RW at baseline)   Prior Function   Level of [place] Independent with ADLs;Independent with functional mobility;Independent with IADLS  (increased assistance with IADLs this past week)   Lives With Alone   Receives Help From Family   IADLs Family/Friend/Other provides transportation;Independent with medication management;Independent with meal prep  (A with cleaning and laundry)   Falls in the last 6 months 1 to 4  (Pt reports x1 fall recently)   Vocational Retired   Lifestyle   Autonomy Pt resides in one level apartment - alone; I with ADLs, mobility and IADLs; increased assistance required   Reciprocal Relationships supportive family   Service to Others retired   Intrinsic Gratification watching tv   Subjective   Subjective \"I can't see without my glasses\"   ADL   Where Assessed Edge of bed   Eating Assistance 6 "  Modified independent   Grooming Assistance 6  Modified Independent   UB Bathing Assistance 5  Supervision/Setup   LB Bathing Assistance 4  Minimal Assistance   UB Dressing Assistance 5  Supervision/Setup   LB Dressing Assistance 3  Moderate Assistance   Toileting Assistance  3  Moderate Assistance   Additional Comments Given functional performance skills + medical complexity, therapist suspects via clinical judgement + skilled analysis; pt currently requires stated assist above to perform each area of ADLs d/t limitations including: functional mobility, functional activity tolerance, coordination, balance, decreased equilibrium reactions, functional transfers, pain and overall wounds   Bed Mobility   Supine to Sit 4  Minimal assistance   Additional items Assist x 2;Bedrails;HOB elevated;Increased time required;Verbal cues   Sit to Supine   (DNT; pt seated in recliner upon conclusion of session)   Additional Comments VC for bedrail utilization and proper body mechanics; denied dizziness with transitional movements. Pt tolerated sitting EOB ~4 mins prior to mobilization at S level   Transfers   Sit to Stand 4  Minimal assistance   Additional items Assist x 2;Increased time required;Verbal cues   Stand to Sit 4  Minimal assistance   Additional items Assist x 2;Increased time required;Verbal cues;Armrests   Stand pivot 4  Minimal assistance   Additional items Assist x 2;Increased time required;Verbal cues   Additional Comments RW used; VC for safe hand placement, proper body mechanics and overall RW management during directional turns   Balance   Static Sitting Good   Dynamic Sitting Fair +   Static Standing Fair +   Dynamic Standing Fair   Ambulatory Fair -   Activity Tolerance   Activity Tolerance Patient limited by fatigue   Medical Staff Made Aware Yes, CM made aware of d/c recs   Nurse Made Aware Yes, nursing staff made aware of session outcomes   RUE Assessment   RUE Assessment WFL   RUE Strength   RUE Overall  Strength   (3/5)   LUE Assessment   LUE Assessment WFL   LUE Strength   LUE Overall Strength   (3+/5)   Hand Function   Gross Motor Coordination Functional   Fine Motor Coordination Functional   Sensation   Additional Comments bilateral hand numbness   Vision-Basic Assessment   Current Vision Wears glasses all the time   Psychosocial   Psychosocial (WDL) WDL   Cognition   Overall Cognitive Status WFL   Arousal/Participation Alert;Responsive;Cooperative   Attention Within functional limits   Orientation Level Oriented X4   Memory Decreased recall of precautions;Decreased recall of recent events   Following Commands Follows one step commands without difficulty   Comments Pt agreeable to OT evaluation, pleasant - requires increased response time  for questions   Assessment   Limitation Decreased ADL status;Decreased UE strength;Decreased Safe judgement during ADL;Decreased endurance;Decreased self-care trans;Decreased high-level ADLs   Prognosis Good   Assessment Pt is a 89 y.o. male seen for OT evaluation s/p admit to [facility] on 4/14/2025 w/ Sepsis (HCC).  Comorbidities affecting pt's functional performance at time of assessment include: abnormality of gait, asthma, a-fib, supratherapeutic INR, cellulitis of RLE, SONYA, swelling of R knee, primary generalized OA, hypercalcemia, hx of PE. Personal factors affecting pt at time of IE include:difficulty performing ADLS, difficulty performing IADLS , decreased initiation and engagement , and health management . OT order placed to assess Pt's ADLs, cognitive status, and performance during functional tasks in order to maximize safety and independence while making most appropriate d/c recommendations. Prior to admission, pt was I with mobility, ADLs and IADLs. Upon evaluation: the following deficits impact occupational performance: weakness, decreased strength, decreased balance, decreased tolerance, impaired sequencing, impaired problem solving, decreased safety awareness,  and increased pain. Pt's clinical presentation is currently evolving given new onset deficits that effect Pt's occupational performance and ability to safely return to PLOF including decrease activity tolerance, decrease standing balance, decrease sitting balance, decrease performance during ADL tasks, decrease safety awareness , decrease generalized strength, decrease activity engagement, decrease performance during functional transfers, and high fall risk combined with medical complications of hypertension , poor blood pressure control, pain impacting overall mobility status, abnormal CBC, wounds, decreased skin integrity, and need for input for mobility technique/safety.  Pt to benefit from continued skilled OT tx while in the hospital to address deficits as defined above and maximize level of functional independence w ADL's and functional mobility. Occupational Performance areas to address include: grooming, bathing/shower, toilet hygiene, dressing, functional mobility, community mobility, and clothing management. From OT standpoint, recommendation at time of d/c would with moderate intensity OT resources.   Goals   Patient Goals to feel better   Plan   Treatment Interventions ADL retraining;Functional transfer training;UE strengthening/ROM;Endurance training;Patient/family training;Energy conservation;Activityengagement   Goal Expiration Date 04/25/25   OT Treatment Day 0   OT Frequency 3-5x/wk   Discharge Recommendation   Rehab Resource Intensity Level, OT II (Moderate Resource Intensity)   Additional Comments  The patient's raw score on the AM-PAC Daily Activity inpatient short form is 17, standardized score is 37.26, less than 39.4. Patients at this level are likely to benefit from discharge with minimal intensity OT resources. Please refer to the recommendation of the Occupational Therapist for safe discharge planning.   AM-PAC Daily Activity Inpatient   Lower Body Dressing 2   Bathing 2   Toileting 3    Upper Body Dressing 3   Grooming 3   Eating 4   Daily Activity Raw Score 17   Daily Activity Standardized Score (Calc for Raw Score >=11) 37.26   AM-PAC Applied Cognition Inpatient   Following a Speech/Presentation 3   Understanding Ordinary Conversation 4   Taking Medications 3   Remembering Where Things Are Placed or Put Away 3   Remembering List of 4-5 Errands 3   Taking Care of Complicated Tasks 2   Applied Cognition Raw Score 18   Applied Cognition Standardized Score 38.07     GOALS    Pt will achieve the following within specified time frame: LTG  Pt will achieve the following goals within 10 days    *ADL transfers with (S) for inc'd independence with ADLs/purposeful tasks    *UB ADL with (I) for inc'd independence with self cares    *LB ADL with (S) using AE prn for inc'd independence with self cares    *Toileting with (S) for clothing management and hygiene for return to PLOF with personal care    *Increase static stand balance and dyn stand balance to F+ for inc'd safety with standing purposeful tasks    *Increase stand tolerance x6 m for inc'd tolerance with standing purposeful tasks    *Bed mobility- (S) for inc'd independence to manage own comfort and initiate EOB & OOB purposeful tasks    *Participate in 10-15m UE therex to increase overall stamina/activity tolerance for purposeful tasks      Melanie Martin MS, OTR/L

## 2025-04-15 NOTE — ASSESSMENT & PLAN NOTE
Noted to be in rapid A-fib while in the ER  Received IV Cardizem, heart rate has improved.  Patient subsequently converted to normal sinus rhythm  Cardizem drip stopped overnight (4/14) as patient converted to NSR  Initiated on metoprolol 25 mg twice daily- will continue  Coumadin held given supratherapeutic INR  Monitor on telemetry

## 2025-04-15 NOTE — ASSESSMENT & PLAN NOTE
Patient with chronic hyponatremia  Sodium improved from 126 to 129 this morning  Patient received IV fluids in the ER, hold additional IV fluids for now  Nephrology consult pending

## 2025-04-15 NOTE — ASSESSMENT & PLAN NOTE
- Presented to ED with Afib w RVR, received IV Cardizem, HR improved and converted to sinus rhythm  - Monitor on telemetry  - Metoprolol 25mg BID  - hold Coumadin due to supratherapeutic INR (6.04 today)

## 2025-04-15 NOTE — ASSESSMENT & PLAN NOTE
Tachycardia, leukocytosis, borderline hypotension.  Most likely due to right lower extremity cellulitis.  Consider possibility of bacteremia.  Blood cultures are pending.  He is otherwise hemodynamically stable.   Continue IV antibiotics as below  Follow-up blood cultures  Trend temps and hemodynamics  Daily CBC DIF and CHEM to monitor response to treatment and for developing toxicities

## 2025-04-15 NOTE — CASE MANAGEMENT
Case Management Assessment & Discharge Planning Note    Patient name Enrico Sexton ICU 05/ICU 05- MRN [id number]  : 1930 Date 4/15/2025       Current Admission Date: 2025  Current Admission Diagnosis:Sepsis ([facility])   Patient Active Problem List    Diagnosis Date Noted Date Diagnosed    Atrial fibrillation with rapid ventricular response ([facility]) 2025     Supratherapeutic INR 2025     Sepsis ([facility]) 2025     Cellulitis of right lower extremity 2025     Acute renal failure (ARF) ([facility]) 2025     Venous stasis ulcer of other part of right lower leg with fat layer exposed with varicose veins ([facility]) 2025     Edema of both lower extremities due to peripheral venous insufficiency 2025     Venous stasis ulcer of right calf limited to breakdown of skin without varicose veins ([facility]) 2024     Hashimoto's thyroiditis 2024     Acquired hypothyroidism 2024     Other fatigue 2024     Shortness of breath 2022     Hyponatremia 2022     COVID-19 virus infection 2022     Hypercalcemia 02/10/2022     Influenza vaccine refused 2021     Endothelial corneal dystrophy of both eyes 2021     Mature cataract 2021     Primary open-angle glaucoma, bilateral, mild stage 2021     Pseudophakic bullous keratopathy of left eye 2021     GERD without esophagitis      Neoplasm of uncertain behavior of skin 2020     Encounter for long-term (current) use of medications 2020     Primary osteoarthritis of both knees 2020     Age-related cataract of both eyes 2020     Premature beats 2020     Chronic pulmonary embolism without acute cor pulmonale (HCC) 2020     Osteoarthritis of both knees 2020     Other seborrheic dermatitis 2020     Abnormality of gait 2020     Intrinsic eczema 2019     Prothrombin gene mutation (HCC) 2019     Primary generalized  (osteo)arthritis 09/03/2019     Essential hypertension, benign 09/03/2019     Dyslipidemia 09/03/2019     Heterozygous factor V Leiden mutation (HCC) 11/05/2018     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) 11/05/2018     History of pulmonary embolism 11/01/2018     TB lung, latent 11/01/2018     Asthma-COPD overlap syndrome (HCC) 06/20/2012       LOS (days): 1  Geometric Mean LOS (GMLOS) (days): 3.5  Days to GMLOS:2.7     OBJECTIVE:    Risk of Unplanned Readmission Score: 18.61     Current admission status: Inpatient    Preferred Pharmacy:   [id number] - [place], [place] - [address]  [address] [place] [address]  Phone: [phone] Fax: [phone]    Primary Care Provider: Brodie Anne DO    Primary Insurance: MEDICARE  Secondary Insurance: PayOrPass    ASSESSMENT:  Active Health Care Proxies    There are no active Health Care Proxies on file.       Advance Directives  Does patient have a Health Care POA?: Yes  Does patient have Advance Directives?: Yes  Advance Directives: Living will (Provided copy, scanned into chart)  Primary Contact: Eden Flores daughter    Readmission Root Cause  30 Day Readmission: No    Patient Information  Admitted from:: Home  Mental Status: Alert  During Assessment patient was accompanied by: Daughter ([facility])  Assessment information provided by:: Patient  Primary Caregiver: Self  Support Systems: Family members  County of Residence: [facility] city do you live in?: [place]  Home entry access options. Select all that apply.: Stairs  Number of steps to enter home.: 2  Do the steps have railings?: Yes  Type of Current Residence: [facility]  Living Arrangements: Lives Alone    Activities of Daily Living Prior to Admission  Functional Status: Independent  Completes ADLs independently?: Yes  Ambulates independently?: Yes  Does patient use assisted devices?: Yes  Assisted Devices (DME) used: Straight Cane, Walker  Does patient  currently own DME?: Yes  What DME does the patient currently own?: Straight Cane, Walker, Albertina lift, Shower Chair, Bedside Commode  Does patient have a history of Outpatient Therapy (PT/OT)?: Yes  Does the patient have a history of Short-Term Rehab?: Yes  Does patient have a history of HHC?: Yes  Does patient currently have HHC?: No    Patient Information Continued  Income Source: Pension/FPC  Does patient have prescription coverage?: Yes ([facility])  Can the patient afford their medications and any related supplies (such as glucometers or test strips)?: Yes  Does patient receive dialysis treatments?: No  Does patient have a history of substance abuse?: No  Does patient have a history of Mental Health Diagnosis?: No    Means of Transportation  Means of Transport to Appts:: Family transport    DISCHARGE DETAILS:    Discharge planning discussed with:: Pt and daughter Eden at the bedside    Contacts  Patient Contacts: Eden Flores daughter [phone]  Relationship to Patient:: Family  Contact Method: In Person  Reason/Outcome: Emergency Contact  Evaluated the pt at the bedside with the daughter Eden at the bedside.  Daughter provided the living will papers.  Scanned int the chart.  Daughter states the pt lives alone, however has 10 children.  Many live closeby and can help.  Pt was going to the [facility] and the wound healed one month ago.  Now pt has developed a new wound.  Will follow for care needs.

## 2025-04-15 NOTE — ASSESSMENT & PLAN NOTE
Cr baseline 0.8-0.9 mg/dL. Cr 0.8 on 11/11/24. Cr 1.6 on admission and improving to 1.3 mg/dL.     Suspect etiology 2/2 hypovolemia in setting of septic physiology, afib with RVR and hypovolemia.     Recommend checking urine studies and uric acid.  Maintain normotension.  HR management per cardiology.   Hold Losartan/Lasix.  Check renal US.   Follow I/O as best we can.

## 2025-04-15 NOTE — PLAN OF CARE
Problem: OCCUPATIONAL THERAPY ADULT  Goal: Performs self-care activities at highest level of function for planned discharge setting.  See evaluation for individualized goals.  Description: Treatment Interventions: ADL retraining, Functional transfer training, UE strengthening/ROM, Endurance training, Patient/family training, Energy conservation, Activityengagement    See flowsheet documentation for full assessment, interventions and recommendations.   Note: Limitation: Decreased ADL status, Decreased UE strength, Decreased Safe judgement during ADL, Decreased endurance, Decreased self-care trans, Decreased high-level ADLs  Prognosis: Good  Assessment: Pt is a 89 y.o. male seen for OT evaluation s/p admit to [facility] on 4/14/2025 w/ Sepsis (HCC).  Comorbidities affecting pt's functional performance at time of assessment include: abnormality of gait, asthma, a-fib, supratherapeutic INR, cellulitis of RLE, SONYA, swelling of R knee, primary generalized OA, hypercalcemia, hx of PE. Personal factors affecting pt at time of IE include:difficulty performing ADLS, difficulty performing IADLS , decreased initiation and engagement , and health management . OT order placed to assess Pt's ADLs, cognitive status, and performance during functional tasks in order to maximize safety and independence while making most appropriate d/c recommendations. Prior to admission, pt was I with mobility, ADLs and IADLs. Upon evaluation: the following deficits impact occupational performance: weakness, decreased strength, decreased balance, decreased tolerance, impaired sequencing, impaired problem solving, decreased safety awareness, and increased pain. Pt's clinical presentation is currently evolving given new onset deficits that effect Pt's occupational performance and ability to safely return to OF including decrease activity tolerance, decrease standing balance, decrease sitting balance, decrease performance during ADL tasks, decrease  safety awareness , decrease generalized strength, decrease activity engagement, decrease performance during functional transfers, and high fall risk combined with medical complications of hypertension , poor blood pressure control, pain impacting overall mobility status, abnormal CBC, wounds, decreased skin integrity, and need for input for mobility technique/safety.  Pt to benefit from continued skilled OT tx while in the hospital to address deficits as defined above and maximize level of functional independence w ADL's and functional mobility. Occupational Performance areas to address include: grooming, bathing/shower, toilet hygiene, dressing, functional mobility, community mobility, and clothing management. From OT standpoint, recommendation at time of d/c would with moderate intensity OT resources.     Rehab Resource Intensity Level, OT: II (Moderate Resource Intensity)     Melanie Martin OTR/L

## 2025-04-15 NOTE — ASSESSMENT & PLAN NOTE
Secondary to dehydration and sepsis  Monitor urine output  Hold diuretics for now  Creatinine 1.62 -> 1.35  Avoid hypotension and nephrotoxic meds  Nephrology consult pending

## 2025-04-15 NOTE — ASSESSMENT & PLAN NOTE
Na 124 on 11/11/24 so suspect chronicity.   Na 126 on admission and improved to 129 in 15 hours. Goal Na improvement 4-6 meq/24 hr.   Likely hypovolemic in part due to sepsis/afib RVR.     Check urine electrolytes and uric acid for further diagnosis. Sp 1 L plasmalyte on admission, not currently on IVF.   Start 2 L FR.   May benefit from gentle, time limited trial of IVF with NSS, will await collection of urine studies.    Check serum osm as well. Can decrease BMP to q12.   Check bladder scan.

## 2025-04-15 NOTE — CONSULTS
NEPHROLOGY HOSPITAL CONSULTATION   Enrico Chavira 89 y.o. male MRN: [id number]  Unit/Bed#: ICU 05-01 Encounter: [id number]      Assessment & Plan  Hyponatremia  Na 124 on 11/11/24 so suspect chronicity.   Na 126 on admission and improved to 129 in 15 hours. Goal Na improvement 4-6 meq/24 hr.   Likely hypovolemic in part due to sepsis/afib RVR.     Check urine electrolytes and uric acid for further diagnosis. Sp 1 L plasmalyte on admission, not currently on IVF.   Start 2 L FR.   May benefit from gentle, time limited trial of IVF with NSS, will await collection of urine studies.    Check serum osm as well. Can decrease BMP to q12.   Check bladder scan.   Acute kidney injury (HCC)  Cr baseline 0.8-0.9 mg/dL. Cr 0.8 on 11/11/24. Cr 1.6 on admission and improving to 1.3 mg/dL.     Suspect etiology 2/2 hypovolemia in setting of septic physiology, afib with RVR and hypovolemia.     Recommend checking urine studies and uric acid.  Maintain normotension.  HR management per cardiology.   Hold Losartan/Lasix.  Check renal US.   Follow I/O as best we can.   Sepsis (HCC)  ID consulted to guide antibiotic management for LE cellulitis. Continue management per primary team and ID. Fu blood cultures.   Met sepsis criteria with leukocytosis/tachycardia in setting of LE cellultis.   Asthma-COPD overlap syndrome (HCC)  Wheezing noted on exam, management per primary.   Atrial fibrillation with rapid ventricular response (HCC)  Management per cardiology colleagues.   Received cardizem gtt and metoprolol tartrate.   Supratherapeutic INR  INR 8.7. Hold anticoagulation per primary team.   Cellulitis of right lower extremity  ID service consulted, management at their discretion.   Abnormality of gait    Swelling of right knee    Procal + at 15.61 on admission.     HISTORY OF PRESENT ILLNESS:  Requesting Physician: Mitul Telles DO  Reason for Consult: hyponatremia    Enrico Chavira is a 89 y.o. male who was admitted to [facility]  [place] after presenting with generalized weakness and worsening right lower extremity cellulitis. A renal consultation is requested today for assistance in the management of hyponatremia. In ER found to have afib RVR with HR in 170s and required cardizem gtt. Sepsis workup ordered for cellulitis and Ct scan did not reveal any soft tissue gas. He reported a recent fall at home when ambulating to the bathroom. Patient reports right lower extremity chronically swollen and red . Patient followed with wound care from 12/26/24 to 2/24/25.     PAST MEDICAL HISTORY:  Past Medical History:   Diagnosis Date    Arthritis     Coagulation defect (HCC)     last assessed: 11/28/2018    COPD (chronic obstructive pulmonary disease) (HCC)     last assessed: 5/14/2019, 12/6/2017    Generalized osteoarthritis     last assessed: 1/28/2019    GERD without esophagitis     last assessed: 1/28/2019    Glaucoma     last assessed: 8/4/2011    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HCC)     last assessed: 10/16/2018    Factor II Prothrombin Gene per [employer]    History of kidney stones 1985    Hx of blood clots     Hypertension     last assessed: 5/14/2019    Impaired fasting glucose     last assessed: 8/26/2013    Mild persistent asthma, uncomplicated     last assessed: 11/28/2018    Nephrolithiasis     Nondisplaced intertrochanteric fracture of right femur, initial encounter for closed fracture ([facility])     last assessed: 1/28/2019    Premature ventricular contractions     last assessed: 8/4/2011    Pulmonary nodule     Scoliosis (and kyphoscoliosis), idiopathic     Wears dentures        PAST SURGICAL HISTORY:  Past Surgical History:   Procedure Laterality Date    CARPAL TUNNEL RELEASE Left     ENDO 8/801    CATARACT EXTRACTION Left     COLONOSCOPY  10/07/2008    last assessed: 3/29/2016    EYE SURGERY Right 01/13/2022    HERNIA REPAIR      HIP SURGERY      HIP SURGERY Left 03/05/1999    ORIF    MULTIPLE TOOTH EXTRACTIONS Bilateral      OTHER SURGICAL HISTORY Right 10/30/2001    [facility]    TONSILLECTOMY Bilateral     WISDOM TOOTH EXTRACTION Bilateral        ALLERGIES:  Allergies   Allergen Reactions    Alphagan P [Brimonidine Tartrate] Itching       SOCIAL HISTORY:  Social History     Substance and Sexual Activity   Alcohol Use Yes    Comment: social     Social History     Substance and Sexual Activity   Drug Use Never     Social History     Tobacco Use   Smoking Status Former    Types: Pipe    Start date:     Quit date: 1970    Years since quittin.3    Passive exposure: Past   Smokeless Tobacco Never       FAMILY HISTORY:  Family History   Problem Relation Age of Onset    Hypertension Mother     Stroke Mother     Heart attack Father        MEDICATIONS:    Current Facility-Administered Medications:     acetaminophen (TYLENOL) tablet 650 mg, 650 mg, Oral, Q6H PRN, Jake Bunn MD    albuterol (PROVENTIL HFA,VENTOLIN HFA) inhaler 2 puff, 2 puff, Inhalation, Q4H PRN, Jake Bunn MD    albuterol inhalation solution 2.5 mg, 2.5 mg, Nebulization, Q6H PRN, Jake Bunn MD    budesonide-formoterol (SYMBICORT) 160-4.5 mcg/act inhaler 2 puff, 2 puff, Inhalation, BID, Jake Bunn MD, 2 puff at 04/15/25 0816    ceFAZolin (ANCEF) IVPB (premix in dextrose) 1,000 mg 50 mL, 1,000 mg, Intravenous, Q8H, Jake Bunn MD, Last Rate: 100 mL/hr at 04/15/25 0048, 1,000 mg at 04/15/25 0048    diltiazem (CARDIZEM) 125 mg in sodium chloride 0.9 % 125 mL infusion, 1-15 mg/hr, Intravenous, Titrated, Jake Bunn MD, Stopped at 25 1843    dorzolamide (TRUSOPT) ophthalmic solution 1 drop, 1 drop, Left Eye, BID, Jake Bunn MD, 1 drop at 04/15/25 0817    latanoprost (XALATAN) 0.005 % ophthalmic solution 1 drop, 1 drop, Both Eyes, HS, Jake Bunn MD, 1 drop at 25 2101    metoprolol tartrate (LOPRESSOR) tablet 25 mg, 25 mg, Oral, Q12H CLARK, Jake Bunn,  MD, 25 mg at 04/15/25 0813    montelukast (SINGULAIR) tablet 10 mg, 10 mg, Oral, Daily, Jake Bunn MD, 10 mg at 04/15/25 0813    ondansetron (ZOFRAN) injection 4 mg, 4 mg, Intravenous, Q6H PRN, Jake Bunn MD    pantoprazole (PROTONIX) EC tablet 20 mg, 20 mg, Oral, Early Morning, Jake Bunn MD, 20 mg at 04/15/25 0500    prednisoLONE acetate (PRED FORTE) 1 % ophthalmic suspension 1 drop, 1 drop, Left Eye, BID, Jake Bunn MD, 1 drop at 04/15/25 0816    REVIEW OF SYSTEMS:  Constitutional: Negative for fatigue, anorexia, fever, chills, diaphoresis  HENT: Negative for postnasal drip  Eyes: Negative for visual disturbance.   Respiratory: Negative for cough, shortness of breath and wheezing.   Cardiovascular: Negative for chest pain, palpitations and leg swelling.   Gastrointestinal: Negative for abdominal pain, constipation, diarrhea, nausea and vomiting.   Genitourinary: No dysuria, hematuria  Endocrine: Negative for polyuria.   Musculoskeletal: Negative for arthralgias, back pain and joint swelling.   Skin: Negative for rash.   Neurological: Negative for focal weakness, headaches, dizziness.  Hematological: Negative for easy bruising or bleeding.  Psychiatric/Behavioral: Negative for confusion and sleep disturbance.   All the systems were reviewed and were negative except as documented on the HPI.    PHYSICAL EXAM:  Current Weight: Weight - Scale: 92.4 kg (203 lb 11.3 oz)  First Weight: Weight - Scale: 83.8 kg (184 lb 11.9 oz)  Vitals:    04/15/25 0400 04/15/25 0415 04/15/25 0716 04/15/25 0718   BP: 126/57 148/68  121/56   BP Location: Left arm Left arm  Left arm   Pulse: 95 99  93   Resp: (!) 24 22  22   Temp: 98.3 °F (36.8 °C)   99.1 °F (37.3 °C)   TempSrc: Tympanic   Tympanic   SpO2: 94% 96% 92% 91%   Weight:       Height:           Intake/Output Summary (Last 24 hours) at 4/15/2025 0820  Last data filed at 4/15/2025 0149  Gross per 24 hour   Intake --   Output 300  "ml   Net -300 ml     Physical Exam  Vitals reviewed.   Constitutional:       General: He is not in acute distress.     Appearance: He is not ill-appearing.   HENT:      Head: Atraumatic.      Nose: Nose normal.      Mouth/Throat:      Mouth: Mucous membranes are moist.      Pharynx: Oropharynx is clear.   Eyes:      Extraocular Movements: Extraocular movements intact.      Conjunctiva/sclera: Conjunctivae normal.   Cardiovascular:      Rate and Rhythm: Normal rate and regular rhythm.      Heart sounds:      No friction rub.   Pulmonary:      Breath sounds: Normal breath sounds. No wheezing or rales.   Abdominal:      Palpations: Abdomen is soft.      Tenderness: There is no abdominal tenderness. There is no guarding.   Musculoskeletal:      Right lower leg: No edema.      Left lower leg: No edema.   Skin:     General: Skin is warm and dry.      Coloration: Skin is not jaundiced.      Findings: Erythema present. No bruising.      Comments: RLE erythema    Neurological:      Mental Status: He is alert.           Invasive Devices:      Lab Results:   Results from last 7 days   Lab Units 04/15/25  0459 04/15/25  0042 04/14/25  1410   WBC Thousand/uL 17.84*  --  29.35*   HEMOGLOBIN g/dL 11.0*  --  13.7   HEMATOCRIT % 32.6*  --  40.4   PLATELETS Thousands/uL 222  --  267   POTASSIUM mmol/L 3.8 4.0 4.2   CHLORIDE mmol/L 100 100 95*   CO2 mmol/L 22 20* 22   BUN mg/dL 59* 61* 64*   CREATININE mg/dL 1.35* 1.36* 1.62*   CALCIUM mg/dL 8.5 8.7 9.5   MAGNESIUM mg/dL 1.9  --   --    ALK PHOS U/L 76  --  109*   ALT U/L 43  --  59*   AST U/L 41*  --  75*         Portions of the record may have been created with voice recognition software. Occasional wrong word or \"sound a like\" substitutions may have occurred due to the inherent limitations of voice recognition software. Read the chart carefully and recognize, using context, where substitutions have occurred.If you have any questions, please contact the dictating provider.   "

## 2025-04-15 NOTE — CONSULTS
Consultation - Infectious Disease   Name: Enrico Chavira 89 y.o. male I MRN: [id number]  Unit/Bed#: ICU 05-01 I Date of Admission: 4/14/2025   Date of Service: 4/15/2025 I Hospital Day: 1     Inpatient consult to Infectious Diseases  Consult performed by: Darci Mckeon PA-C  Consult ordered by: Jake Bunn MD        Physician Requesting Evaluation: Mitul Telles DO   Reason for Evaluation / Principal Problem: cellulitis     Assessment & Plan  Sepsis (HCC)  Tachycardia, leukocytosis, borderline hypotension.  Most likely due to right lower extremity cellulitis.  Consider possibility of bacteremia.  Blood cultures are pending.  He is otherwise hemodynamically stable.   Continue IV antibiotics as below  Follow-up blood cultures  Trend temps and hemodynamics  Daily CBC DIF and CHEM to monitor response to treatment and for developing toxicities  Cellulitis of right lower extremity  Patient presented with a mechanical fall and worsening right lower extremity erythema.  Started to have right lower extremity erythema around Wednesday, fall on Saturday.  Clinical media reviewed with blisters noted on the upper posterior and medial thigh with swelling and erythema of his entire right lower extremity.  Has history of venous stasis ulcerations in the right lower extremity and was recently discharged from wound care.  CT of the right lower extremity without soft tissue gas or abscess.  He does have swollen right knee with minor limitation in range of motion but overall denies knee pain.  Dry, cracked skin of his right lower extremity distally likely portal of entry.  Cellulitis appears streptococcal in nature.  Patient denies history of MRSA.   Discontinue IV vancomycin  Continue IV cefazolin, renally dosed   Consult wound care nurse  Serial skin exams and local wound care  Swelling of right knee  Right knee swollen with erythema in the setting of cellulitis as above.  Small joint effusion noted on CT  of the right lower extremity with advanced degenerative changes.  Consider possibility of developing septic joint.  Will need to monitor closely.  Continue IV antibiotics as above  Serial skin exams and local wound care  Acute renal failure (ARF) (HCC)  Creatinine up to 1.6 on admission with baseline creatinine closer to 0.8.  Nephrology is following.  He is also hyponatremic.  Creatinine is improving. Estimated Creatinine Clearance: 35.6 mL/min (A) (by C-G formula based on SCr of 1.35 mg/dL (H)).  Will renally dose antibiotics as needed  Abnormality of gait  Patient presented following a fall on Saturday.  Reported generalized fatigue and lower extremity weakness.  Atrial fibrillation with rapid ventricular response (HCC)  Required Cardizem drip on admission.  Heart rates now improved.    I have discussed the above management plan in detail with the primary service.     Antibiotics:  IV cefazolin day 1  IV antibiotics Day 2    History of Present Illness   Enrico Chavira is a 89 y.o. year old male who initially presented on 4/14 with generalized weakness and possible right lower extremity cellulitis.  While in the ED he was found to have A-fib with RVR with heart rates in the 170s and borderline hypotensive.  Septic workup was initiated.  CT scan done of patient's right lower extremity was negative for soft tissue gas he was started on broad-spectrum IV antibiotics and Cardizem drip and transferred to ICU on medicine service for stepdown admission.  Patient reportedly had a fall at home prior to admission due to increasing generalized weakness over the last few days.  Reportedly fell on Saturday when ambulating from the bathroom and attempting to get back in bed.  He denied any head injury but did have to call family to come help him get back up.  At that time was noted to have increasing swelling and erythema of the right lower extremity with blistering in the right upper thigh.  States he always has right  lower extremity edema.  No recent antibiotic use.  No recent travel.  No known history of MRSA.  Was previously following with wound care since December 2020 for a right lower extremity venous ulcerations that required treatment with Ace wraps and Unna boots, which has since healed and was discharged from wound care center in February 2025.  He has been wearing compression stockings since.    A complete review of systems is negative other than that noted in the HPI.    Medical History Review: I have reviewed the patient's PMH, PSH, Social History, Family History, Meds, and Allergies     Objective :  Temp:  [97.3 °F (36.3 °C)-99.1 °F (37.3 °C)] 99.1 °F (37.3 °C)  HR:  [] 93  BP: ()/(51-86) 121/56  Resp:  [18-37] 22  SpO2:  [89 %-99 %] 91 %  O2 Device: None (Room air)    Physical exam:  General:  No acute distress, sitting upright in bedside recliner eating lunch  Psychiatric:  Awake and alert, pleasant and cooperative  HEENT: Mucous membranes moist, neck supple, head normocephalic, dentures in place.  Cardiovascular: Irregularly irregular rhythm  Pulmonary: Breath sounds decreased however no wheezes or rhonchi noted  Abdomen:  Soft, nontender  Extremities: Right lower extremity swelling in the setting of cellulitis  Skin:  No rashes noted on exposed skin.  Extensive right lower extremity erythema from foot to proximal thigh with draining blister posterior thigh and medial thigh proximally.  Skin thickening and warmth noted with areas of darker erythema and purple.      Lab Results: I have reviewed the following results:  Results from last 7 days   Lab Units 04/15/25  0459 04/14/25  1410   WBC Thousand/uL 17.84* 29.35*   HEMOGLOBIN g/dL 11.0* 13.7   PLATELETS Thousands/uL 222 267     Results from last 7 days   Lab Units 04/15/25  0459 04/15/25  0042 04/14/25  1410   SODIUM mmol/L 129* 128* 126*   POTASSIUM mmol/L 3.8 4.0 4.2   CHLORIDE mmol/L 100 100 95*   CO2 mmol/L 22 20* 22   BUN mg/dL 59* 61* 64*    CREATININE mg/dL 1.35* 1.36* 1.62*   EGFR ml/min/1.73sq m 44 44 35   CALCIUM mg/dL 8.5 8.7 9.5   AST U/L 41*  --  75*   ALT U/L 43  --  59*   ALK PHOS U/L 76  --  109*   ALBUMIN g/dL 2.3*  --  2.8*     Results from last 7 days   Lab Units 04/14/25  1410   BLOOD CULTURE  Received in Microbiology Lab. Culture in Progress.  Received in Microbiology Lab. Culture in Progress.     Results from last 7 days   Lab Units 04/15/25  0459 04/14/25  1410   PROCALCITONIN ng/ml 10.82* 15.61*                   Imaging Results Review: I reviewed radiology reports from this admission including: CT lower extremity.  Other Study Results Review: Other studies reviewed include: Micro

## 2025-04-16 LAB
ALBUMIN SERPL BCG-MCNC: 2.3 G/DL (ref 3.5–5)
ALP SERPL-CCNC: 72 U/L (ref 34–104)
ALT SERPL W P-5'-P-CCNC: 33 U/L (ref 7–52)
ANION GAP SERPL CALCULATED.3IONS-SCNC: 5 MMOL/L (ref 4–13)
AST SERPL W P-5'-P-CCNC: 34 U/L (ref 13–39)
BILIRUB SERPL-MCNC: 0.35 MG/DL (ref 0.2–1)
BUN SERPL-MCNC: 46 MG/DL (ref 5–25)
CALCIUM ALBUM COR SERPL-MCNC: 10 MG/DL (ref 8.3–10.1)
CALCIUM SERPL-MCNC: 8.6 MG/DL (ref 8.4–10.2)
CHLORIDE SERPL-SCNC: 103 MMOL/L (ref 96–108)
CO2 SERPL-SCNC: 22 MMOL/L (ref 21–32)
CREAT SERPL-MCNC: 1.07 MG/DL (ref 0.6–1.3)
ERYTHROCYTE [DISTWIDTH] IN BLOOD BY AUTOMATED COUNT: 14.1 % (ref 11.6–15.1)
GFR SERPL CREATININE-BSD FRML MDRD: 59 ML/MIN/1.73SQ M
GLUCOSE SERPL-MCNC: 110 MG/DL (ref 65–140)
HCT VFR BLD AUTO: 31.9 % (ref 36.5–49.3)
HGB BLD-MCNC: 10.7 G/DL (ref 12–17)
INR PPP: 6.19 (ref 0.85–1.19)
MCH RBC QN AUTO: 30 PG (ref 26.8–34.3)
MCHC RBC AUTO-ENTMCNC: 33.5 G/DL (ref 31.4–37.4)
MCV RBC AUTO: 89 FL (ref 82–98)
OSMOLALITY UR/SERPL-RTO: 295 MMOL/KG (ref 282–298)
OSMOLALITY UR: 682 MMOL/KG (ref 250–900)
PLATELET # BLD AUTO: 261 THOUSANDS/UL (ref 149–390)
PMV BLD AUTO: 9.3 FL (ref 8.9–12.7)
POTASSIUM SERPL-SCNC: 4.1 MMOL/L (ref 3.5–5.3)
PROCALCITONIN SERPL-MCNC: 4.7 NG/ML
PROT SERPL-MCNC: 5.3 G/DL (ref 6.4–8.4)
PROTHROMBIN TIME: 53.9 SECONDS (ref 12.3–15)
RBC # BLD AUTO: 3.57 MILLION/UL (ref 3.88–5.62)
SODIUM SERPL-SCNC: 130 MMOL/L (ref 135–147)
WBC # BLD AUTO: 13.4 THOUSAND/UL (ref 4.31–10.16)

## 2025-04-16 PROCEDURE — 85610 PROTHROMBIN TIME: CPT | Performed by: PHYSICIAN ASSISTANT

## 2025-04-16 PROCEDURE — 97530 THERAPEUTIC ACTIVITIES: CPT

## 2025-04-16 PROCEDURE — 97110 THERAPEUTIC EXERCISES: CPT

## 2025-04-16 PROCEDURE — 99232 SBSQ HOSP IP/OBS MODERATE 35: CPT

## 2025-04-16 PROCEDURE — 99232 SBSQ HOSP IP/OBS MODERATE 35: CPT | Performed by: STUDENT IN AN ORGANIZED HEALTH CARE EDUCATION/TRAINING PROGRAM

## 2025-04-16 PROCEDURE — 80053 COMPREHEN METABOLIC PANEL: CPT | Performed by: PHYSICIAN ASSISTANT

## 2025-04-16 PROCEDURE — G0545 PR INHERENT VISIT TO INPT: HCPCS | Performed by: STUDENT IN AN ORGANIZED HEALTH CARE EDUCATION/TRAINING PROGRAM

## 2025-04-16 PROCEDURE — 94664 DEMO&/EVAL PT USE INHALER: CPT

## 2025-04-16 PROCEDURE — 85027 COMPLETE CBC AUTOMATED: CPT | Performed by: PHYSICIAN ASSISTANT

## 2025-04-16 PROCEDURE — 84145 PROCALCITONIN (PCT): CPT | Performed by: PHYSICIAN ASSISTANT

## 2025-04-16 RX ORDER — CEFAZOLIN SODIUM 2 G/50ML
2000 SOLUTION INTRAVENOUS EVERY 8 HOURS
Status: DISCONTINUED | OUTPATIENT
Start: 2025-04-16 | End: 2025-04-19

## 2025-04-16 RX ORDER — ALBUTEROL SULFATE 0.83 MG/ML
2.5 SOLUTION RESPIRATORY (INHALATION) EVERY 4 HOURS PRN
Qty: 100 ML | Refills: 0 | Status: ON HOLD | OUTPATIENT
Start: 2025-04-16

## 2025-04-16 RX ORDER — LIDOCAINE 50 MG/G
1 PATCH TOPICAL DAILY
Status: DISCONTINUED | OUTPATIENT
Start: 2025-04-17 | End: 2025-04-19 | Stop reason: HOSPADM

## 2025-04-16 RX ORDER — POLYETHYLENE GLYCOL 3350 17 G/17G
17 POWDER, FOR SOLUTION ORAL DAILY PRN
Status: DISCONTINUED | OUTPATIENT
Start: 2025-04-16 | End: 2025-04-19 | Stop reason: HOSPADM

## 2025-04-16 RX ADMIN — CEFAZOLIN SODIUM 1000 MG: 1 SOLUTION INTRAVENOUS at 02:54

## 2025-04-16 RX ADMIN — DORZOLAMIDE HCL 1 DROP: 20 SOLUTION/ DROPS OPHTHALMIC at 21:14

## 2025-04-16 RX ADMIN — BUDESONIDE AND FORMOTEROL FUMARATE DIHYDRATE 2 PUFF: 160; 4.5 AEROSOL RESPIRATORY (INHALATION) at 09:19

## 2025-04-16 RX ADMIN — METOPROLOL TARTRATE 25 MG: 25 TABLET, FILM COATED ORAL at 21:14

## 2025-04-16 RX ADMIN — CEFAZOLIN SODIUM 1000 MG: 1 SOLUTION INTRAVENOUS at 09:14

## 2025-04-16 RX ADMIN — PREDNISOLONE ACETATE 1 DROP: 10 SUSPENSION/ DROPS OPHTHALMIC at 18:27

## 2025-04-16 RX ADMIN — ACETAMINOPHEN 650 MG: 325 TABLET ORAL at 03:16

## 2025-04-16 RX ADMIN — DORZOLAMIDE HCL 1 DROP: 20 SOLUTION/ DROPS OPHTHALMIC at 09:17

## 2025-04-16 RX ADMIN — BUDESONIDE AND FORMOTEROL FUMARATE DIHYDRATE 2 PUFF: 160; 4.5 AEROSOL RESPIRATORY (INHALATION) at 18:26

## 2025-04-16 RX ADMIN — MONTELUKAST 10 MG: 10 TABLET, FILM COATED ORAL at 09:19

## 2025-04-16 RX ADMIN — METOPROLOL TARTRATE 25 MG: 25 TABLET, FILM COATED ORAL at 09:19

## 2025-04-16 RX ADMIN — ACETAMINOPHEN 650 MG: 325 TABLET ORAL at 14:41

## 2025-04-16 RX ADMIN — POLYETHYLENE GLYCOL 3350 17 G: 17 POWDER, FOR SOLUTION ORAL at 17:36

## 2025-04-16 RX ADMIN — PANTOPRAZOLE SODIUM 20 MG: 20 TABLET, DELAYED RELEASE ORAL at 05:17

## 2025-04-16 RX ADMIN — CEFAZOLIN SODIUM 2000 MG: 2 SOLUTION INTRAVENOUS at 17:31

## 2025-04-16 RX ADMIN — SODIUM CHLORIDE 500 ML: 0.9 INJECTION, SOLUTION INTRAVENOUS at 18:30

## 2025-04-16 RX ADMIN — ALBUTEROL SULFATE 2 PUFF: 90 AEROSOL, METERED RESPIRATORY (INHALATION) at 14:41

## 2025-04-16 RX ADMIN — ACETAMINOPHEN 650 MG: 325 TABLET ORAL at 22:17

## 2025-04-16 RX ADMIN — LATANOPROST 1 DROP: 50 SOLUTION OPHTHALMIC at 21:14

## 2025-04-16 RX ADMIN — PREDNISOLONE ACETATE 1 DROP: 10 SUSPENSION/ DROPS OPHTHALMIC at 09:17

## 2025-04-16 NOTE — WOUND OSTOMY CARE
Consult Note - Wound   Enrico Chavira 89 y.o. male MRN: [id number]  Unit/Bed#: ICU 05-01 Encounter: [id number]        History and Present Illness:  Patient is a 89 yo male that was admitted to [facility] for treatment of sepsis. Patient has a PMH of hypertension, COPD/asthma, GERD. Patient is an assist x 2 for turning and repositioning. Patient is incontinent of bowel and bladder. On assessment, patient is lying in bed on critical care low air loss mattress with family at the bedside.     Wound Care was consulted for right lower extremity cellulitis and wounds    Assessment Findings:   Bilateral sacro-buttocks, and heels skin dry intact and blanchable.     Right anterior medial thigh: Wound appears to have been a blister that opened, wound bed is pink fragile partial thickness skin loss with moderate amount of serous drainage. Marisol-wound is dry intact and pink blanchable tissue. Recommend Melgisorb Ag and DSD.  Right anterior lateral thigh: Wound is pink partial thickness skin loss with moderate amount of serous drainage. Marisol-wound is pink intact skin. Recommend Melgisorb Ag and DSD.     Educated patient on importance of frequent offloading of pressure via turning, repositioning and weight-shifting. Patient verbalized understanding of plan of care.     No induration, fluctuance, odor, warmth/temperature differences,  or purulence noted to the above noted wounds and skin areas assessed. New dressings applied per orders listed below. Patient tolerated well- no s/s of non-verbal pain or discomfort observed during the encounter. Bedside nurse aware of plan of care. See flow sheets for more detailed assessment findings.      Orders listed below and wound care will continue to follow, call or Secure Chat Message with questions.     Skin Care Plan:  1-Right thigh: Cleanse with VASHE, pat dry. Apply calcium alginate with silver (Melgisorb Ag) to the wound bed, cover with plain ABD and wrap with kerlix. Chadwick with T  for treatment, date. Change daily and PRN if soiled/displaced.   2-Turn/reposition q2h or when medically stable for pressure re-distribution on skin, utilize offloading wedges.  3-Elevate heels to offload pressure, utilize pillows for offloading  4-Moisturize skin daily with skin nourishing cream  5-Ehob cushion in chair when out of bed.  6- Apply preventative Hydraguard to bilateral sacro-buttocks and bilateral heels BID and PRN  7-Recommend patient follow up with out patient wound care referral for right lower extremity wounds      Wounds:  Wound 04/15/25 Other (Comments) Thigh Anterior;Right (Active)   Wound Image   04/16/25 0918   Wound Description Pink;Fragile 04/16/25 0915   Non-staged Wound Description Partial thickness 04/16/25 0915   Wound Length (cm) 4.5 cm 04/16/25 0915   Wound Width (cm) 6 cm 04/16/25 0915   Wound Depth (cm) 0.1 cm 04/16/25 0915   Wound Surface Area (cm^2) 27 cm^2 04/16/25 0915   Wound Volume (cm^3) 2.7 cm^3 04/16/25 0915   Calculated Wound Volume (cm^3) 2.7 cm^3 04/16/25 0915   Drainage Amount Moderate 04/16/25 0915   Drainage Description Serous 04/16/25 0915   Marisol-wound Assessment Pink 04/16/25 0915   Dressing Calcium Alginate with Silver;ABD;Gauze 04/16/25 0915   Wound packed? No 04/16/25 0915   Dressing Changed New 04/16/25 0915   Patient Tolerance Tolerated well 04/16/25 0915   Dressing Status Dry;Clean;Intact 04/16/25 0915       Wound 04/15/25  Thigh Anterior;Right (Active)   Wound Image   04/16/25 0920   Wound Description Pink 04/16/25 0920   Non-staged Wound Description Partial thickness 04/16/25 0920   Wound Length (cm) 14 cm 04/16/25 0920   Wound Width (cm) 28 cm 04/16/25 0920   Wound Depth (cm) 0.2 cm 04/16/25 0920   Wound Surface Area (cm^2) 392 cm^2 04/16/25 0920   Wound Volume (cm^3) 78.4 cm^3 04/16/25 0920   Calculated Wound Volume (cm^3) 78.4 cm^3 04/16/25 0920   Drainage Amount Moderate 04/16/25 0920   Drainage Description Serous 04/16/25 0920   Marisol-wound Assessment  Pink;Intact 04/16/25 0920   Treatments Cleansed 04/16/25 0920   Dressing Calcium Alginate with Silver;ABD;Gauze 04/16/25 0920   Wound packed? No 04/16/25 0920   Dressing Changed New 04/16/25 0920   Patient Tolerance Tolerated well 04/16/25 0920   Dressing Status Clean;Dry;Intact 04/16/25 0920           Ange Powers BSN, RN

## 2025-04-16 NOTE — ASSESSMENT & PLAN NOTE
POA  Patient is on Coumadin for A-fib-has been on hold  No signs of bleeding  INR peaked at 8.71 now downtrending  Check INR daily

## 2025-04-16 NOTE — ASSESSMENT & PLAN NOTE
Creatinine up to 1.6 on admission with baseline creatinine closer to 0.8.  Nephrology is following.  He is also hyponatremic.  Creatinine is improving. Estimated Creatinine Clearance: 42.8 mL/min (by C-G formula based on SCr of 1.07 mg/dL).  Will renally dose antibiotics as needed

## 2025-04-16 NOTE — ASSESSMENT & PLAN NOTE
Na 124 on 11/11/24 so suspect chronicity.   Na 126 on admission and improved to 129 in 15 hours. Goal Na improvement 4-6 meq/24 hr.   Likely hypovolemic in part due to sepsis/afib RVR.     Check urine electrolytes and uric acid for further diagnosis. Sp 1 L plasmalyte on admission, not currently on IVF.   Start 2 L FR.   May benefit from gentle, time limited trial of IVF with NSS, will await collection of urine studies.    Check serum osm as well. Can decrease BMP to q12.   Check bladder scan.     April 16  Sodium 130 mmol/L. S/P NSS 50 ml/hr 4/15. Continue 2L fluid restriction.    Bladder scan with no hydronephrosis. Multiple bilateral simple cysts. Non obstructive 5 mm calculus and thinly septated 3.8 cm cyst in left kidney, 4/15  Urine osm 682 mmol/kg, serum osmolality 295 mmol/kg. Indicating possible pseudohyponatremia. Glucose levels appropriate. Most recent lipid panel appropriate 11/11/2024.  ml on 4/15, 1.4 L on 4/16. Will repeat NSS 50 ml/hr x 10 hours. Recheck AM labs.

## 2025-04-16 NOTE — ASSESSMENT & PLAN NOTE
Cr baseline 0.8-0.9 mg/dL. Cr 0.8 on 11/11/24. Cr 1.6 on admission and improving to 1.07 mg/dL on 4/16.     Suspect etiology 2/2 hypovolemia in setting of septic physiology, afib with RVR and hypovolemia.     Recommend checking urine studies and uric acid.  Maintain normotension.  HR management per cardiology.   Hold Losartan/Lasix.  Check renal US.   Follow I/O as best we can.     April 16  Creatinine 1.07 mg/dL, decreased from peak of  1.62 on 4/14.   Uric acid 8.8 mg/dL, urine sodium <10.0, urine creatinine 108.3, FeNa 1.35% prerenal. Will give NSS 50 ml/hr x 10 hrs as noted above.

## 2025-04-16 NOTE — [facility] ADMISSION
Patients case reviewed, patient looks appropriate to come to [facility] pending medical stability, tolerance for therapy, and LOF at discharge.  [facility] admissions will continue to follow patient for progression of care and discharge readiness

## 2025-04-16 NOTE — ASSESSMENT & PLAN NOTE
Right knee swollen with erythema in the setting of cellulitis as above.  Small joint effusion noted on CT of the right lower extremity with advanced degenerative changes.  Consider possibility of developing septic joint. Fortunately this is improving. Will need to monitor closely.  Continue IV antibiotics as above  Serial skin exams and local wound care

## 2025-04-16 NOTE — PHYSICAL THERAPY NOTE
PT Treatment Note    NAME:  Enrico Chavira  DATE: 04/16/25    AGE:   89 y.o.  Mrn:   [id number]  ADMIT DX:  Cellulitis and abscess of leg [L03.119, L02.419]  Atrial fibrillation with RVR ([facility]) [I48.91]  Visit for wound check [Z51.89]  Septic shock ([facility]) [A41.9, R65.21]  Fall in elderly patient [R29.6]  Performed at least 2 patient identifiers during session: Name and ID bracelet       04/16/25 1222   PT Last Visit   PT Visit Date 04/16/25   Note Type   Note Type Treatment   Pain Assessment   Pain Assessment Tool 0-10   Pain Score 2   Pain Location/Orientation Location: Back   Pain Onset/Description Descriptor: [facility] Pain Intervention(s) Repositioned   Restrictions/Precautions   Weight Bearing Precautions Per Order No   Other Precautions Telemetry;Multiple lines;Bed Alarm;Chair Alarm;Fall Risk;Pain   General   Chart Reviewed Yes   Family/Caregiver Present Yes   Cognition   Overall Cognitive Status WFL   Arousal/Participation Alert   Attention Within functional limits   Orientation Level Oriented X4   Memory Within functional limits   Following Commands Follows one step commands without difficulty   Bed Mobility   Supine to Sit 3  Moderate assistance   Additional items Assist x 1;HOB elevated;Increased time required;Verbal cues;LE management   Additional Comments pt sat EOB wtih CGA due to fatigue/SOB   Transfers   Sit to Stand 3  Moderate assistance   Additional items Assist x 1;Increased time required;Verbal cues   Stand to Sit 3  Moderate assistance   Additional items Assist x 1;Increased time required;Verbal cues   Stand pivot 3  Moderate assistance   Additional items Assist x 1;Increased time required;Verbal cues  (RW; flexed pattern)   Additional Comments pt denied dizziness with transitional movement   Ambulation/Elevation   Ambulation/Elevation Additional Comments fair safety awareness noted   Balance   Static Sitting Good   Dynamic Sitting Fair +   Static Standing Fair -   Dynamic Standing  Poor +   Endurance Deficit   Endurance Deficit Yes   Endurance Deficit Description SOB/fatigue   Activity Tolerance   Activity Tolerance Patient limited by fatigue   Exercises   Glute Sets Sitting;15 reps;AROM;Bilateral   Hip Flexion Sitting;15 reps;AROM;Bilateral   Hip Adduction Sitting;15 reps;AROM;Bilateral   Knee AROM Long Arc Quad Sitting;15 reps;AROM;Bilateral   Ankle Pumps Sitting;15 reps;AROM;Bilateral   Assessment   Prognosis Fair   Assessment Pt seen for PT treatment session this date with interventions consisting of therapeutic exercise to improve endurance to improve functional mobility and therapeutic activity to improve transfers and increase activity tolerance with functional mobility to decrease fall risk. Pt agreeable to PT treatment session upon arrival, pt found supine in bed, in no apparent distress. Since previous session, pt has made fair progress as evidenced by decreased assistance required with mobility  Barriers during this session include pain, SOB, and fatigue.  Pt continues to be functioning below baseline level, and remains limited 2* factors listed above and including decreased strength, impaired activity tolerance, impaired  balance, pain, decreased endurance, and decreased mobility.  Pt prognosis for achieving goals is fair, pending pt progress with hospitalization/medical status improvements, and indicated by ability to follow cues, supportive family, and continued required assistance. PT will continue to see pt during current hospitalization in order to address the deficits listed above and provide interventions consistent w/ POC in effort to achieve goals. Current goals and POC remain appropriate, pt continues to have rehab potential  Upon conclusion pt seated OOB in recliner. The patient's AM-PAC Basic Mobility Inpatient Short Form Raw Score is 10.  A Raw score of less than or equal to 16 suggests the patient may benefit from discharge to post-acute rehabilitation services. Based  on patient presentations and impairments, pt would most appropriately benefit from Level 2 resource intensity upon discharge.  Please also refer to the recommendation of the Physical Therapist for safe discharge planning. RN verbalized pt appropriate for PT session.   Barriers to Discharge   (limited mobility)   Goals   Patient Goals to get OOB   LTG Expiration Date 04/25/25   PT Treatment Day 1   Plan   Treatment/Interventions Functional transfer training;Therapeutic exercise;Endurance training;Bed mobility;Equipment eval/education   Progress Progressing toward goals   PT Frequency 3-5x/wk   Discharge Recommendation   Rehab Resource Intensity Level, PT II (Moderate Resource Intensity)   Equipment Recommended Walker   Walker Package Recommended Wheeled walker   AM-PAC Basic Mobility Inpatient   Turning in Flat Bed Without Bedrails 2   Lying on Back to Sitting on Edge of Flat Bed Without Bedrails 2   Moving Bed to Chair 2   Standing Up From Chair Using Arms 2   Walk in Room 1   Climb 3-5 Stairs With Railing 1   Basic Mobility Inpatient Raw Score 10   [facility] Highest Level Of Mobility   -HLM Goal 4: Move to chair/commode   -HLM Achieved 4: Move to chair/commode         Time In: 0940  Time Out: 1005  Total Treatment Minutes: 25    Phuong De Jesus, PT

## 2025-04-16 NOTE — ASSESSMENT & PLAN NOTE
Present on admission with tachycardia, leukocytosis secondary to right lower extremity cellulitis  CT right lower extremity suggested cellulitis, no evidence of soft tissue gas  Lower extremity Doppler negative for DVT  Blood cultures x 2 no growth after 24 hours  Procalcitonin 15.61-->10.82-->4.70  Lactic acid 1.7  Appreciate ID who are following-continue Ancef (renally dosed)  Tachycardia resolved, leukocytosis improving  CBC, procalcitonin in am

## 2025-04-16 NOTE — ASSESSMENT & PLAN NOTE
Continue prehospital inhalers, nebulizers  Continue prehospital Singulair  No wheezing on exam today  Respiratory protocol

## 2025-04-16 NOTE — ASSESSMENT & PLAN NOTE
Patient presented with a mechanical fall and worsening right lower extremity erythema.  Started to have right lower extremity erythema around Wednesday, fall on Saturday.  Clinical media reviewed with blisters noted on the upper posterior and medial thigh with swelling and erythema of his entire right lower extremity.  Has history of venous stasis ulcerations in the right lower extremity and was recently discharged from wound care.  CT of the right lower extremity without soft tissue gas or abscess.  He does have swollen right knee with minor limitation in range of motion but overall denies knee pain. This is improving. Dry, cracked skin of his right lower extremity distally likely portal of entry.  Cellulitis appears streptococcal in origin. No hx of MRSA. D/w pt and dtr this may worsen clinically before it improves.  Continue IV cefazolin  Consult wound care nurse  Serial skin exams and local wound care  Encourage LE elevation

## 2025-04-16 NOTE — PLAN OF CARE
Problem: PAIN - ADULT  Goal: Verbalizes/displays adequate comfort level or baseline comfort level  Description: Interventions:- Encourage patient to monitor pain and request assistance- Assess pain using appropriate pain scale- Administer analgesics based on type and severity of pain and evaluate response- Implement non-pharmacological measures as appropriate and evaluate response- Consider cultural and social influences on pain and pain management- Notify physician/advanced practitioner if interventions unsuccessful or patient reports new pain  Outcome: Progressing     Problem: INFECTION - ADULT  Goal: Absence or prevention of progression during hospitalization  Description: INTERVENTIONS:- Assess and monitor for signs and symptoms of infection- Monitor lab/diagnostic results- Monitor all insertion sites, i.e. indwelling lines, tubes, and drains- Monitor endotracheal if appropriate and nasal secretions for changes in amount and color- [place] appropriate cooling/warming therapies per order- Administer medications as ordered- Instruct and encourage patient and family to use good hand hygiene technique- Identify and instruct in appropriate isolation precautions for identified infection/condition  Outcome: Progressing  Goal: Absence of fever/infection during neutropenic period  Description: INTERVENTIONS:- Monitor WBC  Outcome: Progressing

## 2025-04-16 NOTE — PLAN OF CARE
Problem: PAIN - ADULT  Goal: Verbalizes/displays adequate comfort level or baseline comfort level  Description: Interventions:- Encourage patient to monitor pain and request assistance- Assess pain using appropriate pain scale- Administer analgesics based on type and severity of pain and evaluate response- Implement non-pharmacological measures as appropriate and evaluate response- Consider cultural and social influences on pain and pain management- Notify physician/advanced practitioner if interventions unsuccessful or patient reports new pain  Outcome: Progressing     Problem: INFECTION - ADULT  Goal: Absence or prevention of progression during hospitalization  Description: INTERVENTIONS:- Assess and monitor for signs and symptoms of infection- Monitor lab/diagnostic results- Monitor all insertion sites, i.e. indwelling lines, tubes, and drains- Monitor endotracheal if appropriate and nasal secretions for changes in amount and color- [place] appropriate cooling/warming therapies per order- Administer medications as ordered- Instruct and encourage patient and family to use good hand hygiene technique- Identify and instruct in appropriate isolation precautions for identified infection/condition  Outcome: Progressing  Goal: Absence of fever/infection during neutropenic period  Description: INTERVENTIONS:- Monitor WBC  Outcome: Progressing     Problem: SAFETY ADULT  Goal: Patient will remain free of falls  Description: INTERVENTIONS:- Educate patient/family on patient safety including physical limitations- Instruct patient to call for assistance with activity - Consult OT/PT to assist with strengthening/mobility - Keep Call bell within reach- Keep bed low and locked with side rails adjusted as appropriate- Keep care items and personal belongings within reach- Initiate and maintain comfort rounds- Make Fall Risk Sign visible to staff- Offer Toileting every 2 Hours, in advance of need- Initiate/Maintain bed alarm-  Obtain necessary fall risk management equipment:- Apply yellow socks and bracelet for high fall risk patients- Consider moving patient to room near nurses station  Outcome: Progressing  Goal: Maintain or return to baseline ADL function  Description: INTERVENTIONS:-  Assess patient's ability to carry out ADLs; assess patient's baseline for ADL function and identify physical deficits which impact ability to perform ADLs (bathing, care of mouth/teeth, toileting, grooming, dressing, etc.)- Assess/evaluate cause of self-care deficits - Assess range of motion- Assess patient's mobility; develop plan if impaired- Assess patient's need for assistive devices and provide as appropriate- Encourage maximum independence but intervene and supervise when necessary- Involve family in performance of ADLs- Assess for home care needs following discharge - Consider OT consult to assist with ADL evaluation and planning for discharge- Provide patient education as appropriate  Outcome: Progressing  Goal: Maintains/Returns to pre admission functional level  Description: INTERVENTIONS:- Perform AM-PAC 6 Click Basic Mobility/ Daily Activity assessment daily.- Set and communicate daily mobility goal to care team and patient/family/caregiver. - Collaborate with rehabilitation services on mobility goals if consulted- Perform Range of Motion 3 times a day.- Reposition patient every 2 hours.- Dangle patient 3 times a day- Stand patient 3 times a day- Ambulate patient 3 times a day- Out of bed to chair 3 times a day - Out of bed for meals 3 times a day- Out of bed for toileting- Record patient progress and toleration of activity level   Outcome: Progressing     Problem: DISCHARGE PLANNING  Goal: Discharge to home or other facility with appropriate resources  Description: INTERVENTIONS:- Identify barriers to discharge w/patient and caregiver- Arrange for needed discharge resources and transportation as appropriate- Identify discharge learning needs  (meds, wound care, etc.)- Arrange for interpretive services to assist at discharge as needed- Refer to Case Management Department for coordinating discharge planning if the patient needs post-hospital services based on physician/advanced practitioner order or complex needs related to functional status, cognitive ability, or social support system  Outcome: Progressing     Problem: Knowledge Deficit  Goal: Patient/family/caregiver demonstrates understanding of disease process, treatment plan, medications, and discharge instructions  Description: Complete learning assessment and assess knowledge base.Interventions:- Provide teaching at level of understanding- Provide teaching via preferred learning methods  Outcome: Progressing     Problem: CARDIOVASCULAR - ADULT  Goal: Maintains optimal cardiac output and hemodynamic stability  Description: INTERVENTIONS:- Monitor I/O, vital signs and rhythm- Monitor for S/S and trends of decreased cardiac output- Administer and titrate ordered vasoactive medications to optimize hemodynamic stability- Assess quality of pulses, skin color and temperature- Assess for signs of decreased coronary artery perfusion- Instruct patient to report change in severity of symptoms  Outcome: Progressing  Goal: Absence of cardiac dysrhythmias or at baseline rhythm  Description: INTERVENTIONS:- Continuous cardiac monitoring, vital signs, obtain 12 lead EKG if ordered- Administer antiarrhythmic and heart rate control medications as ordered- Monitor electrolytes and administer replacement therapy as ordered  Outcome: Progressing     Problem: Prexisting or High Potential for Compromised Skin Integrity  Goal: Skin integrity is maintained or improved  Description: INTERVENTIONS:- Identify patients at risk for skin breakdown- Assess and monitor skin integrity- Assess and monitor nutrition and hydration status- Monitor labs - Assess for incontinence - Turn and reposition patient- Assist with  mobility/ambulation- Relieve pressure over bony prominences- Avoid friction and shearing- Provide appropriate hygiene as needed including keeping skin clean and dry- Evaluate need for skin moisturizer/barrier cream- Collaborate with interdisciplinary team - Patient/family teaching- Consider wound care consult   Outcome: Progressing

## 2025-04-16 NOTE — ASSESSMENT & PLAN NOTE
Noted to be in rapid A-fib while in the ER  Likely related to sepsis  Received IV Cardizem, heart rate has improved  Was on Cardizem drip for short period, then converted to normal sinus rhythm  Cardizem drip stopped overnight (4/14) as patient converted to NSR  Currently rate controlled  Continue metoprolol at current dosing   Coumadin remains on hold given supratherapeutic INR which is improving

## 2025-04-16 NOTE — ASSESSMENT & PLAN NOTE
See plan for sepsis  CT right lower extremity: Subcutaneous edema and skin thickening extending from the proximal thigh through the ankle, which can be seen with cellulitis. No fluid collection within the limits of an unenhanced exam. No soft tissue gas  Appreciate ID following-feel presentation likely streptococcal in nature  Continues on renally dosed Ancef  Wound care consulted

## 2025-04-16 NOTE — ASSESSMENT & PLAN NOTE
Tachycardia, leukocytosis, borderline hypotension.  Most likely due to right lower extremity cellulitis.  Consider possibility of bacteremia.  Blood cultures are so far negative.  He is otherwise hemodynamically stable.   Continue IV antibiotics as below  Follow-up blood cultures  Trend temps and hemodynamics  Daily CBC DIF and CHEM to monitor response to treatment and for developing toxicities

## 2025-04-16 NOTE — DISCHARGE INSTR - OTHER ORDERS
Skin Care Plan:  1-Right thigh: Cleanse with VASHE, pat dry. Apply calcium alginate with silver (Melgisorb Ag) to the wound bed, cover with plain ABD and wrap with kerlix. [place] with T for treatment, date. Change daily and PRN if soiled/displaced.   2-Turn/reposition q2h or when medically stable for pressure re-distribution on skin, utilize offloading wedges.  3-Elevate heels to offload pressure, utilize pillows for offloading  4-Moisturize skin daily with skin nourishing cream  5-Ehob cushion in chair when out of bed.  6- Apply preventative Hydraguard to bilateral sacro-buttocks and bilateral heels BID and PRN  7-Recommend patient follow up with out patient wound care referral for right lower extremity wounds    Schedule Follow-up Outpatient Wound Appointment.  Call Outpatient Wound and Ostomy Care Team @ [phone]   Option 1: [place], [place], [place], [place]  Option 2: [place], [place]

## 2025-04-16 NOTE — PROGRESS NOTES
Progress Note - Nephrology   Name: Enrico Chavira 89 y.o. male I MRN: [id number]  Unit/Bed#: ICU 05-01 I Date of Admission: 4/14/2025   Date of Service: 4/16/2025 I Hospital Day: 2    Assessment & Plan  Hyponatremia  Na 124 on 11/11/24 so suspect chronicity.   Na 126 on admission and improved to 129 in 15 hours. Goal Na improvement 4-6 meq/24 hr.   Likely hypovolemic in part due to sepsis/afib RVR.     Check urine electrolytes and uric acid for further diagnosis. Sp 1 L plasmalyte on admission, not currently on IVF.   Start 2 L FR.   May benefit from gentle, time limited trial of IVF with NSS, will await collection of urine studies.    Check serum osm as well. Can decrease BMP to q12.   Check bladder scan.     April 16  Sodium 130 mmol/L. S/P NSS 50 ml/hr 4/15. Continue 2L fluid restriction.    Bladder scan with no hydronephrosis. Multiple bilateral simple cysts. Non obstructive 5 mm calculus and thinly septated 3.8 cm cyst in left kidney, 4/15  Urine osm 682 mmol/kg, serum osmolality 295 mmol/kg. Indicating possible pseudohyponatremia. Glucose levels appropriate. Most recent lipid panel appropriate 11/11/2024.  ml on 4/15, 1.4 L on 4/16. Will repeat NSS 50 ml/hr x 10 hours. Recheck AM labs.   Acute kidney injury (HCC)  Cr baseline 0.8-0.9 mg/dL. Cr 0.8 on 11/11/24. Cr 1.6 on admission and improving to 1.07 mg/dL on 4/16.     Suspect etiology 2/2 hypovolemia in setting of septic physiology, afib with RVR and hypovolemia.     Recommend checking urine studies and uric acid.  Maintain normotension.  HR management per cardiology.   Hold Losartan/Lasix.  Check renal US.   Follow I/O as best we can.     April 16  Creatinine 1.07 mg/dL, decreased from peak of  1.62 on 4/14.   Uric acid 8.8 mg/dL, urine sodium <10.0, urine creatinine 108.3, FeNa 1.35% prerenal. Will give NSS 50 ml/hr x 10 hrs as noted above.   Sepsis (HCC)  ID consulted to guide antibiotic management for LE cellulitis. Continue management per  primary team and ID. Fu blood cultures.   Met sepsis criteria with leukocytosis/tachycardia in setting of LE cellultis.   Asthma-COPD overlap syndrome (HCC)  Wheezing noted on exam, management per primary.   Atrial fibrillation with rapid ventricular response (HCC)  Management per cardiology colleagues.   Received cardizem gtt and metoprolol tartrate.   Supratherapeutic INR  INR 6.19. Hold anticoagulation per primary team.   Cellulitis of right lower extremity  ID service consulted, management at their discretion.   Abnormality of gait    Swelling of right knee      I have reviewed the nephrology recommendations including creatinine and sodium trends with critical care and we are in agreement with renal plan including the information outlined above.     Subjective   Brief History of Admission - Enrico Chavira is a 89 y.o. male who was admitted to [facility] after presenting with generalized weakness and worsening right lower extremity cellulitis. In ER found to have afib RVR with HR in 170s and required cardizem gtt. Sepsis workup ordered for cellulitis and Ct scan did not reveal any soft tissue gas. He reported a recent fall at home when ambulating to the bathroom. Patient reports right lower extremity chronically swollen and red. Patient followed with wound care from 12/26/24 to 2/24/25. Nephrology is following for  assistance in the management of hyponatremia.    Patient is examined out of bed to chair with family at bedside. Denies complaints at this time.     Objective :  Temp:  [97.2 °F (36.2 °C)-98.6 °F (37 °C)] 97.2 °F (36.2 °C)  HR:  [] 86  BP: (105-161)/(55-78) 161/78  Resp:  [18-38] 31  SpO2:  [91 %-98 %] 98 %  O2 Device: None (Room air)    Current Weight: Weight - Scale: 83.4 kg (183 lb 13.8 oz)  First Weight: Weight - Scale: 83.8 kg (184 lb 11.9 oz)  I/O         04/14 0701  04/15 0700 04/15 0701  04/16 0700 04/16 0701  04/17 0700    P.O.  470 200    I.V. (mL/kg)  338.3 (4.1)     IV  Piggyback  50     Total Intake(mL/kg)  858.3 (10.3) 200 (2.4)    Urine (mL/kg/hr) 300 725 (0.4) 925 (1.2)    Stool  0     Total Output 300 725 925    Net -300 +133.3 -725           Unmeasured Urine Occurrence 1 x      Unmeasured Stool Occurrence  0 x           Physical Exam  Vitals and nursing note reviewed.   Constitutional:       General: He is not in acute distress.     Appearance: Normal appearance. He is well-developed and normal weight. He is not ill-appearing.   HENT:      Head: Normocephalic and atraumatic.      Right Ear: External ear normal.      Left Ear: External ear normal.      Nose: Nose normal.      Mouth/Throat:      Mouth: Mucous membranes are moist.      Pharynx: Oropharynx is clear.   Eyes:      Extraocular Movements: Extraocular movements intact.      Conjunctiva/sclera: Conjunctivae normal.      Pupils: Pupils are equal, round, and reactive to light.   Cardiovascular:      Rate and Rhythm: Normal rate and regular rhythm.      Heart sounds: Normal heart sounds. No murmur heard.  Pulmonary:      Effort: Pulmonary effort is normal. No respiratory distress.      Breath sounds: Wheezing present.      Comments: Inspiratory wheezes  Abdominal:      Palpations: Abdomen is soft.      Tenderness: There is no abdominal tenderness.   Musculoskeletal:         General: No swelling.      Cervical back: Neck supple.      Right lower leg: No edema.      Left lower leg: No edema.   Skin:     General: Skin is warm and dry.      Capillary Refill: Capillary refill takes less than 2 seconds.   Neurological:      General: No focal deficit present.      Mental Status: He is alert and oriented to person, place, and time.   Psychiatric:         Mood and Affect: Mood normal.         Behavior: Behavior normal.       Medications:    Current Facility-Administered Medications:     acetaminophen (TYLENOL) tablet 650 mg, 650 mg, Oral, Q6H PRN, Jake Bunn MD, 650 mg at 04/16/25 1441    albuterol (PROVENTIL  HFA,VENTOLIN HFA) inhaler 2 puff, 2 puff, Inhalation, Q4H PRN, Jake Bunn MD, 2 puff at 04/16/25 1441    albuterol inhalation solution 2.5 mg, 2.5 mg, Nebulization, Q6H PRN, Jake Bunn MD    budesonide-formoterol (SYMBICORT) 160-4.5 mcg/act inhaler 2 puff, 2 puff, Inhalation, BID, Jake Bunn MD, 2 puff at 04/16/25 0919    ceFAZolin (ANCEF) IVPB (premix in dextrose) 2,000 mg 50 mL, 2,000 mg, Intravenous, Q8H, Darci Mckeon PA-C    dorzolamide (TRUSOPT) ophthalmic solution 1 drop, 1 drop, Left Eye, BID, Jake Bunn MD, 1 drop at 04/16/25 0917    latanoprost (XALATAN) 0.005 % ophthalmic solution 1 drop, 1 drop, Both Eyes, HS, Jake Bunn MD, 1 drop at 04/15/25 2257    metoprolol tartrate (LOPRESSOR) tablet 25 mg, 25 mg, Oral, Q12H CLARK, Jake Bunn MD, 25 mg at 04/16/25 0919    montelukast (SINGULAIR) tablet 10 mg, 10 mg, Oral, Daily, Jake Bunn MD, 10 mg at 04/16/25 0919    ondansetron (ZOFRAN) injection 4 mg, 4 mg, Intravenous, Q6H PRN, Jake Bunn MD    pantoprazole (PROTONIX) EC tablet 20 mg, 20 mg, Oral, Early Morning, Jake Bunn MD, 20 mg at 04/16/25 0517    prednisoLONE acetate (PRED FORTE) 1 % ophthalmic suspension 1 drop, 1 drop, Left Eye, BID, Jake Bunn MD, 1 drop at 04/16/25 0917      Lab Results: I have reviewed the following results:  Results from last 7 days   Lab Units 04/16/25  0426 04/15/25  1805 04/15/25  0459 04/15/25  0042 04/14/25  1410   WBC Thousand/uL 13.40*  --  17.84*  --  29.35*   HEMOGLOBIN g/dL 10.7*  --  11.0*  --  13.7   HEMATOCRIT % 31.9*  --  32.6*  --  40.4   PLATELETS Thousands/uL 261  --  222  --  267   POTASSIUM mmol/L 4.1 4.1 3.8 4.0 4.2   CHLORIDE mmol/L 103 98 100 100 95*   CO2 mmol/L 22 22 22 20* 22   BUN mg/dL 46* 57* 59* 61* 64*   CREATININE mg/dL 1.07 1.39* 1.35* 1.36* 1.62*   CALCIUM mg/dL 8.6 9.0 8.5 8.7 9.5   MAGNESIUM mg/dL  --   --  1.9   "--   --    ALBUMIN g/dL 2.3*  --  2.3*  --  2.8*       Administrative Statements     Portions of the record may have been created with voice recognition software. Occasional wrong word or \"sound a like\" substitutions may have occurred due to the inherent limitations of voice recognition software. Read the chart carefully and recognize, using context, where substitutions have occurred.If you have any questions, please contact the dictating provider.  "

## 2025-04-16 NOTE — ASSESSMENT & PLAN NOTE
POA with serum sodium 126  Patient has history of chronic hyponatremia  S/p IV fluids for sepsis  Appreciate nephrology who are following  Serum sodium improved to 130 today  Continue to monitor fluid status closely  BMP a.m.

## 2025-04-16 NOTE — ASSESSMENT & PLAN NOTE
POA with creatinine 1.62  Baseline creatinine appears to be around 0.8-0.9  Secondary to dehydration and sepsis-see plan for sepsis  Urine sodium less than 10  Appreciate input of nephrology who are following  Home losartan/diuretics on hold  Renal ultrasound results pending  Monitor intake and output   Problem: Patient Centered Care  Goal: Patient preferences are identified and integrated in the patient's plan of care  Description: Interventions:  - What would you like us to know as we care for you?   - Provide timely, complete, and accurate informatio administer replacement therapy as ordered  Outcome: Progressing     Problem: GENITOURINARY - ADULT  Goal: Absence of urinary retention  Description: INTERVENTIONS:  - Assess patient’s ability to void and empty bladder  - Monitor intake/output and perform b restrictions as appropriate  Outcome: Progressing     Problem: SKIN/TISSUE INTEGRITY - ADULT  Goal: Skin integrity remains intact  Description: INTERVENTIONS  - Assess and document risk factors for pressure ulcer development  - Assess and document skin int safest level of function  Description: INTERVENTIONS:  - Assess patient stability and activity tolerance for standing, transferring and ambulating w/ or w/o assistive devices  - Assist with transfers and ambulation using safe patient handling equipment as Administer analgesics based on type and severity of pain and evaluate response  - Implement non-pharmacological measures as appropriate and evaluate response  - Consider cultural and social influences on pain and pain management  - Manage/alleviate anxiety related to functional status, cognitive ability or social support system  Outcome: Cliff Ma is aware of her plan of care,  at bedside overnight.  Patient is alert x1-2, pleastantly confused, easily reoriented but can be impulsive at ti

## 2025-04-16 NOTE — PROGRESS NOTES
Progress Note - Hospitalist   Name: Enrico Chavira 89 y.o. male I MRN: [id number]  Unit/Bed#: ICU 05-01 I Date of Admission: 4/14/2025   Date of Service: 4/16/2025 I Hospital Day: 2    Assessment & Plan  Sepsis (HCC)  Present on admission with tachycardia, leukocytosis secondary to right lower extremity cellulitis  CT right lower extremity suggested cellulitis, no evidence of soft tissue gas  Lower extremity Doppler negative for DVT  Blood cultures x 2 no growth after 24 hours  Procalcitonin 15.61-->10.82-->4.70  Lactic acid 1.7  Appreciate ID who are following-continue Ancef (renally dosed)  Tachycardia resolved, leukocytosis improving  CBC, procalcitonin in am  Asthma-COPD overlap syndrome (HCC)  Continue prehospital inhalers, nebulizers  Continue prehospital Singulair  No wheezing on exam today  Respiratory protocol  Atrial fibrillation with rapid ventricular response (HCC)  Noted to be in rapid A-fib while in the ER  Likely related to sepsis  Received IV Cardizem, heart rate has improved  Was on Cardizem drip for short period, then converted to normal sinus rhythm  Cardizem drip stopped overnight (4/14) as patient converted to NSR  Currently rate controlled  Continue metoprolol at current dosing   Coumadin remains on hold given supratherapeutic INR which is improving    Supratherapeutic INR  POA  Patient is on Coumadin for A-fib-has been on hold  No signs of bleeding  INR peaked at 8.71 now downtrending  Check INR daily  Cellulitis of right lower extremity  See plan for sepsis  CT right lower extremity: Subcutaneous edema and skin thickening extending from the proximal thigh through the ankle, which can be seen with cellulitis. No fluid collection within the limits of an unenhanced exam. No soft tissue gas  Appreciate ID following-feel presentation likely streptococcal in nature  Continues on renally dosed Ancef  Wound care consulted    Hyponatremia  POA with serum sodium 126  Patient has history of chronic  hyponatremia  S/p IV fluids for sepsis  Appreciate nephrology who are following  Serum sodium improved to 130 today  Continue to monitor fluid status closely  BMP a.m.  Acute kidney injury (HCC)  POA with creatinine 1.62  Baseline creatinine appears to be around 0.8-0.9  Secondary to dehydration and sepsis-see plan for sepsis  Urine sodium less than 10  Appreciate input of nephrology who are following  Home losartan/diuretics on hold  Renal ultrasound results pending  Monitor intake and output  Abnormality of gait  Patient had fall at home prehospital  Continue fall precautions  PT OT consults  Case management following for discharge planning  Swelling of right knee  Patient is being treated for sepsis of the right lower extremity see plan for same  Appreciate ID following  Continue to monitor    VTE Pharmacologic Prophylaxis: VTE Score: 7 High Risk (Score >/= 5) - Pharmacological DVT Prophylaxis Contraindicated. Sequential Compression Devices Ordered.    Mobility:   Basic Mobility Inpatient Raw Score: 7  JH-HLM Goal: 2: Bed activities/Dependent transfer  JH-HLM Achieved: 3: Sit at edge of bed  JH-HLM Goal achieved. Continue to encourage appropriate mobility.    Patient Centered Rounds: I performed bedside rounds with nursing staff today.   Discussions with Specialists or Other Care Team Provider: ID, nephrology, PT OT, nursing, case management    Education and Discussions with Family / Patient: Updated  (daughter) at bedside.    Current Length of Stay: 2 day(s)  Current Patient Status: Inpatient   Certification Statement: The patient will continue to require additional inpatient hospital stay due to continue treatment of right lower extremity cellulitis with IV antibiotics, SONYA improving  Discharge Plan:  Continues on IV antibiotics for right lower extremity cellulitis.  ID continues to follow.  SONYA improving, nephrology following.  INR is also improving Coumadin is on hold.  Repeat labs in the  a.m.    Code Status: Level 3 - DNAR and DNI    Subjective   Denies any pain or discomfort when asked.  Still complaining of right lower extremity swelling.  Daughter visiting.    Objective :  Temp:  [97.7 °F (36.5 °C)-98.6 °F (37 °C)] 97.9 °F (36.6 °C)  HR:  [] 99  BP: (104-150)/(53-88) 146/70  Resp:  [18-34] 19  SpO2:  [91 %-98 %] 95 %  O2 Device: None (Room air)    Body mass index is 29.68 kg/m².     Input and Output Summary (last 24 hours):     Intake/Output Summary (Last 24 hours) at 4/16/2025 0935  Last data filed at 4/16/2025 0900  Gross per 24 hour   Intake 838.33 ml   Output 1100 ml   Net -261.67 ml       Physical Exam  Vitals reviewed.   Constitutional:       General: He is not in acute distress.     Appearance: He is well-developed.   HENT:      Head: Normocephalic and atraumatic.   Cardiovascular:      Rate and Rhythm: Normal rate and regular rhythm.      Heart sounds: No murmur heard.  Pulmonary:      Effort: Pulmonary effort is normal. No respiratory distress.      Breath sounds: No wheezing or rales.   Abdominal:      General: Bowel sounds are normal. There is no distension.      Palpations: Abdomen is soft.      Tenderness: There is no abdominal tenderness. There is no guarding.   Musculoskeletal:         General: Swelling present.      Right lower leg: Edema present.   Skin:     General: Skin is warm and dry.      Capillary Refill: Capillary refill takes less than 2 seconds.      Findings: Erythema present.      Comments: Right lower extremity pink discoloration with swelling   Neurological:      General: No focal deficit present.      Mental Status: He is alert and oriented to person, place, and time. Mental status is at baseline.   Psychiatric:         Mood and Affect: Mood normal.         Behavior: Behavior normal.         Thought Content: Thought content normal.         Judgment: Judgment normal.           Lines/Drains:              Lab Results: I have reviewed the following results:    Results from last 7 days   Lab Units 04/16/25  0426 04/15/25  0459 04/14/25  1410   WBC Thousand/uL 13.40* 17.84* 29.35*   HEMOGLOBIN g/dL 10.7* 11.0* 13.7   HEMATOCRIT % 31.9* 32.6* 40.4   PLATELETS Thousands/uL 261 222 267   BANDS PCT %  --   --  4   SEGS PCT %  --  89*  --    LYMPHO PCT %  --  5* 2*   MONO PCT %  --  5 3*   EOS PCT %  --  0 0     Results from last 7 days   Lab Units 04/16/25  0426   SODIUM mmol/L 130*   POTASSIUM mmol/L 4.1   CHLORIDE mmol/L 103   CO2 mmol/L 22   BUN mg/dL 46*   CREATININE mg/dL 1.07   ANION GAP mmol/L 5   CALCIUM mg/dL 8.6   ALBUMIN g/dL 2.3*   TOTAL BILIRUBIN mg/dL 0.35   ALK PHOS U/L 72   ALT U/L 33   AST U/L 34   GLUCOSE RANDOM mg/dL 110     Results from last 7 days   Lab Units 04/16/25  0426   INR  6.19*             Results from last 7 days   Lab Units 04/16/25  0426 04/15/25  0459 04/14/25  1410   LACTIC ACID mmol/L  --   --  1.7   PROCALCITONIN ng/ml 4.70* 10.82* 15.61*       Recent Cultures (last 7 days):   Results from last 7 days   Lab Units 04/14/25  1410   BLOOD CULTURE  No Growth at 24 hrs.  No Growth at 24 hrs.       Imaging Results Review: I reviewed radiology reports from this admission including: CT head, CT lumbar spine, CT right lower extremity.  Other Study Results Review: EKG was reviewed.     Last 24 Hours Medication List:     Current Facility-Administered Medications:     acetaminophen (TYLENOL) tablet 650 mg, Q6H PRN    albuterol (PROVENTIL HFA,VENTOLIN HFA) inhaler 2 puff, Q4H PRN    albuterol inhalation solution 2.5 mg, Q6H PRN    budesonide-formoterol (SYMBICORT) 160-4.5 mcg/act inhaler 2 puff, BID    ceFAZolin (ANCEF) IVPB (premix in dextrose) 1,000 mg 50 mL, Q8H, Last Rate: 1,000 mg (04/16/25 0914)    dorzolamide (TRUSOPT) ophthalmic solution 1 drop, BID    latanoprost (XALATAN) 0.005 % ophthalmic solution 1 drop, HS    metoprolol tartrate (LOPRESSOR) tablet 25 mg, Q12H CLARK    montelukast (SINGULAIR) tablet 10 mg, Daily    ondansetron (ZOFRAN)  injection 4 mg, Q6H PRN    pantoprazole (PROTONIX) EC tablet 20 mg, Early Morning    prednisoLONE acetate (PRED FORTE) 1 % ophthalmic suspension 1 drop, BID    Administrative Statements   Today, Patient Was Seen By: RODY Goodson  I have spent a total time of 38 minutes in caring for this patient on the day of the visit/encounter including Diagnostic results, Patient and family education, Counseling / Coordination of care, Documenting in the medical record, Reviewing/placing orders in the medical record (including tests, medications, and/or procedures), Obtaining or reviewing history  , and Communicating with other healthcare professionals .    **Please Note: This note may have been constructed using a voice recognition system.**

## 2025-04-16 NOTE — ASSESSMENT & PLAN NOTE
Patient is being treated for sepsis of the right lower extremity see plan for same  Appreciate ID following  Continue to monitor

## 2025-04-16 NOTE — PROGRESS NOTES
Progress Note - Infectious Disease   Name: Enrico Chavira 89 y.o. male I MRN: [id number]  Unit/Bed#: ICU 05-01 I Date of Admission: 4/14/2025   Date of Service: 4/16/2025 I Hospital Day: 2    Assessment & Plan  Sepsis (HCC)  Tachycardia, leukocytosis, borderline hypotension.  Most likely due to right lower extremity cellulitis.  Consider possibility of bacteremia.  Blood cultures are so far negative.  He is otherwise hemodynamically stable.   Continue IV antibiotics as below  Follow-up blood cultures  Trend temps and hemodynamics  Daily CBC DIF and CHEM to monitor response to treatment and for developing toxicities  Cellulitis of right lower extremity  Patient presented with a mechanical fall and worsening right lower extremity erythema.  Started to have right lower extremity erythema around Wednesday, fall on Saturday.  Clinical media reviewed with blisters noted on the upper posterior and medial thigh with swelling and erythema of his entire right lower extremity.  Has history of venous stasis ulcerations in the right lower extremity and was recently discharged from wound care.  CT of the right lower extremity without soft tissue gas or abscess.  He does have swollen right knee with minor limitation in range of motion but overall denies knee pain. This is improving. Dry, cracked skin of his right lower extremity distally likely portal of entry.  Cellulitis appears streptococcal in origin. No hx of MRSA. D/w pt and dtr this may worsen clinically before it improves.  Continue IV cefazolin  Consult wound care nurse  Serial skin exams and local wound care  Encourage LE elevation   Swelling of right knee  Right knee swollen with erythema in the setting of cellulitis as above.  Small joint effusion noted on CT of the right lower extremity with advanced degenerative changes.  Consider possibility of developing septic joint. Fortunately this is improving. Will need to monitor closely.  Continue IV antibiotics as  above  Serial skin exams and local wound care  Acute kidney injury (HCC)  Creatinine up to 1.6 on admission with baseline creatinine closer to 0.8.  Nephrology is following.  He is also hyponatremic.  Creatinine is improving. Estimated Creatinine Clearance: 42.8 mL/min (by C-G formula based on SCr of 1.07 mg/dL).  Will renally dose antibiotics as needed  Abnormality of gait  Patient presented following a fall on Saturday.  Reported generalized fatigue and lower extremity weakness.  Atrial fibrillation with rapid ventricular response (HCC)  Required Cardizem drip on admission.  Heart rates now improved.    I have discussed the above management plan in detail with the primary service.     Antibiotics:  IV cefazolin D2   D3 IV abx    Subjective   Patient has no fever, chills, sweats; no nausea, vomiting, diarrhea; no cough, shortness of breath; no knee pain. Has been elevated RLE. No new symptoms. Tolerating the abx.     Objective :  Temp:  [97.7 °F (36.5 °C)-98.6 °F (37 °C)] 97.9 °F (36.6 °C)  HR:  [] 77  BP: (104-150)/(53-88) 138/67  Resp:  [18-34] 19  SpO2:  [91 %-98 %] 95 %  O2 Device: None (Room air)    Physical exam:  General:  No acute distress, sitting upright in bedside recliner, elderly   Psychiatric:  Awake and alert, pleasant and cooperative  HEENT: Mucous membranes moist, neck supple, head normocephalic, dentures in place.  Cardiovascular: Irregularly irregular rhythm  Pulmonary: Breath sounds decreased however no wheezes or rhonchi noted  Abdomen:  Soft, nontender  Extremities: Right lower extremity swelling in the setting of cellulitis  Skin:  No rashes noted on exposed skin.  Extensive right lower extremity erythema from foot to proximal thigh with draining blister posterior thigh and medial thigh proximally.  Skin thickening and warmth noted with areas of darker erythema and purple.      Lab Results: I have reviewed the following results:  Results from last 7 days   Lab Units 04/16/25  1568  04/15/25  0459 04/14/25  1410   WBC Thousand/uL 13.40* 17.84* 29.35*   HEMOGLOBIN g/dL 10.7* 11.0* 13.7   PLATELETS Thousands/uL 261 222 267     Results from last 7 days   Lab Units 04/16/25  0426 04/15/25  1805 04/15/25  0459 04/15/25  0042 04/14/25  1410   SODIUM mmol/L 130* 128* 129*   < > 126*   POTASSIUM mmol/L 4.1 4.1 3.8   < > 4.2   CHLORIDE mmol/L 103 98 100   < > 95*   CO2 mmol/L 22 22 22   < > 22   BUN mg/dL 46* 57* 59*   < > 64*   CREATININE mg/dL 1.07 1.39* 1.35*   < > 1.62*   EGFR ml/min/1.73sq m 59 43 44   < > 35   CALCIUM mg/dL 8.6 9.0 8.5   < > 9.5   AST U/L 34  --  41*  --  75*   ALT U/L 33  --  43  --  59*   ALK PHOS U/L 72  --  76  --  109*   ALBUMIN g/dL 2.3*  --  2.3*  --  2.8*    < > = values in this interval not displayed.     Results from last 7 days   Lab Units 04/14/25  1410   BLOOD CULTURE  No Growth at 24 hrs.  No Growth at 24 hrs.     Results from last 7 days   Lab Units 04/16/25  0426 04/15/25  0459 04/14/25  1410   PROCALCITONIN ng/ml 4.70* 10.82* 15.61*                 Imaging Results Review: No pertinent imaging studies reviewed.  Other Study Results Review: Other studies reviewed include: micro

## 2025-04-16 NOTE — CASE MANAGEMENT
Case Management Discharge Planning Note    Patient name Enrico Chavira  Formerly [facility] ICU 05/ICU - MRN [id number]  : 1930 Date 2025       Current Admission Date: 2025  Current Admission Diagnosis:Sepsis ([facility])   Patient Active Problem List    Diagnosis Date Noted Date Diagnosed    Swelling of right knee 04/15/2025     Atrial fibrillation with rapid ventricular response (HCC) 2025     Supratherapeutic INR 2025     Sepsis ([facility]) 2025     Cellulitis of right lower extremity 2025     Acute kidney injury (HCC) 2025     Venous stasis ulcer of other part of right lower leg with fat layer exposed with varicose veins ([facility]) 2025     Edema of both lower extremities due to peripheral venous insufficiency 2025     Venous stasis ulcer of right calf limited to breakdown of skin without varicose veins ([facility]) 2024     Hashimoto's thyroiditis 2024     Acquired hypothyroidism 2024     Other fatigue 2024     Shortness of breath 2022     Hyponatremia 2022     COVID-19 virus infection 2022     Hypercalcemia 02/10/2022     Influenza vaccine refused 2021     Endothelial corneal dystrophy of both eyes 2021     Mature cataract 2021     Primary open-angle glaucoma, bilateral, mild stage 2021     Pseudophakic bullous keratopathy of left eye 2021     GERD without esophagitis      Neoplasm of uncertain behavior of skin 2020     Encounter for long-term (current) use of medications 2020     Primary osteoarthritis of both knees 2020     Age-related cataract of both eyes 2020     Premature beats 2020     Chronic pulmonary embolism without acute cor pulmonale (HCC) 2020     Osteoarthritis of both knees 2020     Other seborrheic dermatitis 2020     Abnormality of gait 2020     Intrinsic eczema 2019     Prothrombin gene mutation (HCC) 2019     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     Essential hypertension, benign 09/03/2019     Dyslipidemia 09/03/2019     Heterozygous factor V Leiden mutation (HCC) 11/05/2018     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/05/2018     History of pulmonary embolism 11/01/2018     TB lung, latent 11/01/2018     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  Pt is agreeable to a referral to [facility].  Referrals made via [facility].

## 2025-04-16 NOTE — ASSESSMENT & PLAN NOTE
Patient had fall at home prehospital  Continue fall precautions  PT OT consults  Case management following for discharge planning

## 2025-04-17 ENCOUNTER — APPOINTMENT (INPATIENT)
Dept: RADIOLOGY | Facility: HOSPITAL | Age: OVER 89
DRG: 872 | End: 2025-04-17
Payer: MEDICARE

## 2025-04-17 LAB
ANION GAP SERPL CALCULATED.3IONS-SCNC: 4 MMOL/L (ref 4–13)
BUN SERPL-MCNC: 31 MG/DL (ref 5–25)
CALCIUM SERPL-MCNC: 8.6 MG/DL (ref 8.4–10.2)
CHLORIDE SERPL-SCNC: 102 MMOL/L (ref 96–108)
CO2 SERPL-SCNC: 26 MMOL/L (ref 21–32)
CREAT SERPL-MCNC: 0.86 MG/DL (ref 0.6–1.3)
ERYTHROCYTE [DISTWIDTH] IN BLOOD BY AUTOMATED COUNT: 14.2 % (ref 11.6–15.1)
GFR SERPL CREATININE-BSD FRML MDRD: 74 ML/MIN/1.73SQ M
GLUCOSE SERPL-MCNC: 105 MG/DL (ref 65–140)
HCT VFR BLD AUTO: 33 % (ref 36.5–49.3)
HGB BLD-MCNC: 11 G/DL (ref 12–17)
INR PPP: 4.61 (ref 0.85–1.19)
MCH RBC QN AUTO: 30 PG (ref 26.8–34.3)
MCHC RBC AUTO-ENTMCNC: 33.3 G/DL (ref 31.4–37.4)
MCV RBC AUTO: 90 FL (ref 82–98)
PLATELET # BLD AUTO: 347 THOUSANDS/UL (ref 149–390)
PMV BLD AUTO: 9.2 FL (ref 8.9–12.7)
POTASSIUM SERPL-SCNC: 4.5 MMOL/L (ref 3.5–5.3)
PROCALCITONIN SERPL-MCNC: 2.23 NG/ML
PROTHROMBIN TIME: 43.2 SECONDS (ref 12.3–15)
RBC # BLD AUTO: 3.67 MILLION/UL (ref 3.88–5.62)
SODIUM SERPL-SCNC: 132 MMOL/L (ref 135–147)
WBC # BLD AUTO: 13.12 THOUSAND/UL (ref 4.31–10.16)

## 2025-04-17 PROCEDURE — 80048 BASIC METABOLIC PNL TOTAL CA: CPT

## 2025-04-17 PROCEDURE — G0545 PR INHERENT VISIT TO INPT: HCPCS | Performed by: STUDENT IN AN ORGANIZED HEALTH CARE EDUCATION/TRAINING PROGRAM

## 2025-04-17 PROCEDURE — 85610 PROTHROMBIN TIME: CPT

## 2025-04-17 PROCEDURE — 99232 SBSQ HOSP IP/OBS MODERATE 35: CPT

## 2025-04-17 PROCEDURE — 94760 N-INVAS EAR/PLS OXIMETRY 1: CPT

## 2025-04-17 PROCEDURE — 94664 DEMO&/EVAL PT USE INHALER: CPT

## 2025-04-17 PROCEDURE — 71045 X-RAY EXAM CHEST 1 VIEW: CPT

## 2025-04-17 PROCEDURE — 97530 THERAPEUTIC ACTIVITIES: CPT

## 2025-04-17 PROCEDURE — 97110 THERAPEUTIC EXERCISES: CPT

## 2025-04-17 PROCEDURE — 84145 PROCALCITONIN (PCT): CPT

## 2025-04-17 PROCEDURE — 94640 AIRWAY INHALATION TREATMENT: CPT

## 2025-04-17 PROCEDURE — 99232 SBSQ HOSP IP/OBS MODERATE 35: CPT | Performed by: STUDENT IN AN ORGANIZED HEALTH CARE EDUCATION/TRAINING PROGRAM

## 2025-04-17 PROCEDURE — 97116 GAIT TRAINING THERAPY: CPT

## 2025-04-17 PROCEDURE — 85027 COMPLETE CBC AUTOMATED: CPT

## 2025-04-17 RX ORDER — FUROSEMIDE 20 MG/1
20 TABLET ORAL DAILY
Status: DISCONTINUED | OUTPATIENT
Start: 2025-04-17 | End: 2025-04-19 | Stop reason: HOSPADM

## 2025-04-17 RX ORDER — GUAIFENESIN 600 MG/1
600 TABLET, EXTENDED RELEASE ORAL EVERY 12 HOURS SCHEDULED
Status: DISCONTINUED | OUTPATIENT
Start: 2025-04-17 | End: 2025-04-19 | Stop reason: HOSPADM

## 2025-04-17 RX ADMIN — CEFAZOLIN SODIUM 2000 MG: 2 SOLUTION INTRAVENOUS at 01:34

## 2025-04-17 RX ADMIN — CEFAZOLIN SODIUM 2000 MG: 2 SOLUTION INTRAVENOUS at 17:16

## 2025-04-17 RX ADMIN — GUAIFENESIN 600 MG: 600 TABLET ORAL at 20:26

## 2025-04-17 RX ADMIN — BUDESONIDE AND FORMOTEROL FUMARATE DIHYDRATE 2 PUFF: 160; 4.5 AEROSOL RESPIRATORY (INHALATION) at 09:30

## 2025-04-17 RX ADMIN — DORZOLAMIDE HCL 1 DROP: 20 SOLUTION/ DROPS OPHTHALMIC at 20:26

## 2025-04-17 RX ADMIN — ALBUTEROL SULFATE 2.5 MG: 2.5 SOLUTION RESPIRATORY (INHALATION) at 20:32

## 2025-04-17 RX ADMIN — PREDNISOLONE ACETATE 1 DROP: 10 SUSPENSION/ DROPS OPHTHALMIC at 18:03

## 2025-04-17 RX ADMIN — GUAIFENESIN 600 MG: 600 TABLET ORAL at 11:18

## 2025-04-17 RX ADMIN — FUROSEMIDE 20 MG: 20 TABLET ORAL at 13:48

## 2025-04-17 RX ADMIN — CEFAZOLIN SODIUM 2000 MG: 2 SOLUTION INTRAVENOUS at 09:29

## 2025-04-17 RX ADMIN — ALBUTEROL SULFATE 2.5 MG: 2.5 SOLUTION RESPIRATORY (INHALATION) at 10:20

## 2025-04-17 RX ADMIN — METOPROLOL TARTRATE 25 MG: 25 TABLET, FILM COATED ORAL at 20:29

## 2025-04-17 RX ADMIN — PANTOPRAZOLE SODIUM 20 MG: 20 TABLET, DELAYED RELEASE ORAL at 05:05

## 2025-04-17 RX ADMIN — LIDOCAINE 5% 1 PATCH: 700 PATCH TOPICAL at 09:29

## 2025-04-17 RX ADMIN — MONTELUKAST 10 MG: 10 TABLET, FILM COATED ORAL at 09:25

## 2025-04-17 RX ADMIN — PREDNISOLONE ACETATE 1 DROP: 10 SUSPENSION/ DROPS OPHTHALMIC at 09:30

## 2025-04-17 RX ADMIN — BUDESONIDE AND FORMOTEROL FUMARATE DIHYDRATE 2 PUFF: 160; 4.5 AEROSOL RESPIRATORY (INHALATION) at 18:03

## 2025-04-17 RX ADMIN — DORZOLAMIDE HCL 1 DROP: 20 SOLUTION/ DROPS OPHTHALMIC at 09:30

## 2025-04-17 RX ADMIN — METOPROLOL TARTRATE 25 MG: 25 TABLET, FILM COATED ORAL at 09:25

## 2025-04-17 RX ADMIN — LATANOPROST 1 DROP: 50 SOLUTION OPHTHALMIC at 21:17

## 2025-04-17 NOTE — ASSESSMENT & PLAN NOTE
POA  Patient is on Coumadin for A-fib-has been on hold  No signs of bleeding  INR peaked at 8.71 now downtrending, today 4.61  Check INR daily

## 2025-04-17 NOTE — PROGRESS NOTES
Progress Note - Nephrology   Name: Enrico Chavira 89 y.o. male I MRN: [id number]  Unit/Bed#: -01 I Date of Admission: 4/14/2025   Date of Service: 4/17/2025 I Hospital Day: 3    Assessment & Plan  Hyponatremia  Na 124 on 11/11/24 so suspect chronicity.   Na 126 on admission and improved to 129 in 15 hours. Goal Na improvement 4-6 meq/24 hr.   Likely hypovolemic in part due to sepsis/afib RVR.     Check urine electrolytes and uric acid for further diagnosis. Sp 1 L plasmalyte on admission, not currently on IVF.   Start 2 L FR.   May benefit from gentle, time limited trial of IVF with NSS, will await collection of urine studies.    Check serum osm as well. Can decrease BMP to q12.   Check bladder scan.     April 16  Sodium 130 mmol/L. S/P NSS 50 ml/hr 4/15. Continue 2L fluid restriction.    Bladder scan with no hydronephrosis. Multiple bilateral simple cysts. Non obstructive 5 mm calculus and thinly septated 3.8 cm cyst in left kidney, 4/15  Urine osm 682 mmol/kg, serum osmolality 295 mmol/kg. Indicating possible pseudohyponatremia. Glucose levels appropriate. Most recent lipid panel appropriate 11/11/2024.  ml on 4/15, 1.4 L on 4/16. Will repeat NSS 50 ml/hr x 10 hours. Recheck AM labs.     April 17  Sodium 132 mmol/L.    Osmolality studies indicating possible pseudohyponatremia.  Glucose and lipid levels recently appropriate.  UOP 1775 mL 4/16, 200 mL thus far today. Increase fluid restriction to 1.8 L/day.  Volume status examines euvolemic to mildly hypervolemic.  I/O record incomplete but indicating 75% of meals when documented.  Continue to encourage high-protein diet including Ensure protein supplement.    Acute kidney injury (HCC)  Cr baseline 0.8-0.9 mg/dL. Cr 0.8 on 11/11/24. Cr 1.6 on admission and improving to 1.07 mg/dL on 4/16.     Suspect etiology 2/2 hypovolemia in setting of septic physiology, afib with RVR and hypovolemia.     Recommend checking urine studies and uric acid.  Maintain  normotension.  HR management per cardiology.   Hold Losartan/Lasix.  Check renal US.   Follow I/O as best we can.     April 16  Creatinine 1.07 mg/dL, decreased from peak of  1.62 on 4/14.   Uric acid 8.8 mg/dL, urine sodium <10.0, urine creatinine 108.3, FeNa 1.35% prerenal. Will give NSS 50 ml/hr x 10 hrs as noted above.     April 17  Creatinine 0.86 mg/dL, 4/17, at baseline.  UOP 1775 mL on 4/16, 200 mL thus far 4/17.  Continue to encourage adequate oral intake, fluid restriction decreased to 1.8 L/day.  Continue to avoid NSAIDs and nephrotoxins.  Avoid hypotension.  Recommend BMP 3-5 days following discharge, outpatient follow-up with nephrology in 1-2 weeks after discharge.  Sepsis (HCC)  ID consulted to guide antibiotic management for LE cellulitis. Continue management per primary team and ID. Fu blood cultures.   Met sepsis criteria with leukocytosis/tachycardia in setting of LE cellultis.   Asthma-COPD overlap syndrome (HCC)  Wheezing noted on exam, decreased today compared to yesterday.  Continue management per primary.   Atrial fibrillation with rapid ventricular response (HCC)  Management per cardiology colleagues.   Received cardizem gtt and metoprolol tartrate.   Supratherapeutic INR  INR 4.61, trending down from 8.71 on 4/15. Anticoagulation management per primary team.   Cellulitis of right lower extremity  ID service consulted, management at their discretion.   Abnormality of gait    Swelling of right knee      I have reviewed the nephrology recommendations including creatinine and sodium trends with the hospitalist and we are in agreement with renal plan including the information outlined above.     Subjective   Brief History of Admission -  Enrico Chavira is a 89 y.o. male who was admitted to [facility] after presenting with generalized weakness and worsening right lower extremity cellulitis. In ER found to have afib RVR with HR in 170s and required cardizem gtt. Sepsis workup ordered for  cellulitis and Ct scan did not reveal any soft tissue gas. He reported a recent fall at home when ambulating to the bathroom. Patient reports right lower extremity chronically swollen and red. Patient followed with wound care from 12/26/24 to 2/24/25. Nephrology is following for  assistance in the management of hyponatremia.     Patient is examined out of bed to chair with family at bedside. Denies complaints at this time.         Objective :  Temp:  [97 °F (36.1 °C)-98.4 °F (36.9 °C)] 98.4 °F (36.9 °C)  HR:  [78-96] 78  BP: (131-180)/(64-86) 151/85  Resp:  [18-38] 18  SpO2:  [94 %-98 %] 94 %  O2 Device: None (Room air)    Current Weight: Weight - Scale: 83.4 kg (183 lb 13.8 oz)  First Weight: Weight - Scale: 83.8 kg (184 lb 11.9 oz)  I/O         04/15 0701  04/16 0700 04/16 0701  04/17 0700 04/17 0701  04/18 0700    P.O. 470 320     I.V. (mL/kg) 338.3 (4.1)      IV Piggyback 50 550     Total Intake(mL/kg) 858.3 (10.3) 870 (10.4)     Urine (mL/kg/hr) 725 (0.4) 1475 (0.7)     Stool 0 0     Total Output 725 1475     Net +133.3 -605            Unmeasured Stool Occurrence 0 x 0 x           Physical Exam  Vitals and nursing note reviewed.   Constitutional:       General: He is not in acute distress.     Appearance: Normal appearance. He is well-developed and normal weight. He is not ill-appearing.   HENT:      Head: Normocephalic and atraumatic.      Right Ear: External ear normal.      Left Ear: External ear normal.      Nose: Nose normal.      Mouth/Throat:      Mouth: Mucous membranes are moist.      Pharynx: Oropharynx is clear.   Eyes:      Extraocular Movements: Extraocular movements intact.      Conjunctiva/sclera: Conjunctivae normal.      Pupils: Pupils are equal, round, and reactive to light.   Cardiovascular:      Rate and Rhythm: Normal rate and regular rhythm.      Heart sounds: Normal heart sounds. No murmur heard.  Pulmonary:      Effort: Pulmonary effort is normal. No respiratory distress.      Breath  sounds: Normal breath sounds.   Abdominal:      Palpations: Abdomen is soft.      Tenderness: There is no abdominal tenderness.   Musculoskeletal:         General: No swelling.      Cervical back: Neck supple.      Right lower leg: Edema present.      Left lower leg: Edema present.      Comments: 2+ RLE edema with red/scaly skin, no LLE edema.   Skin:     General: Skin is warm and dry.      Capillary Refill: Capillary refill takes less than 2 seconds.      Comments: RLE with red/dry scaly skin.  Patient states erythema, edema and dry scaly skin started about 2 months ago.   Neurological:      General: No focal deficit present.      Mental Status: He is alert and oriented to person, place, and time.   Psychiatric:         Mood and Affect: Mood normal.         Behavior: Behavior normal.       Medications:    Current Facility-Administered Medications:     acetaminophen (TYLENOL) tablet 650 mg, 650 mg, Oral, Q6H PRN, Jake Bunn MD, 650 mg at 04/16/25 2217    albuterol inhalation solution 2.5 mg, 2.5 mg, Nebulization, Q6H PRN, Jake Bunn MD, 2.5 mg at 04/17/25 1020    budesonide-formoterol (SYMBICORT) 160-4.5 mcg/act inhaler 2 puff, 2 puff, Inhalation, BID, Jake Bunn MD, 2 puff at 04/17/25 0930    ceFAZolin (ANCEF) IVPB (premix in dextrose) 2,000 mg 50 mL, 2,000 mg, Intravenous, Q8H, Darci Mckeon PA-C, Last Rate: 100 mL/hr at 04/17/25 0929, 2,000 mg at 04/17/25 0929    dorzolamide (TRUSOPT) ophthalmic solution 1 drop, 1 drop, Left Eye, BID, Jake Bunn MD, 1 drop at 04/17/25 0930    guaiFENesin (MUCINEX) 12 hr tablet 600 mg, 600 mg, Oral, Q12H [facility], RODY Goodson    latanoprost (XALATAN) 0.005 % ophthalmic solution 1 drop, 1 drop, Both Eyes, HS, Jake Bunn MD, 1 drop at 04/16/25 2114    lidocaine (LIDODERM) 5 % patch 1 patch, 1 patch, Topical, Daily, RODY Goodson, 1 patch at 04/17/25 0929    metoprolol tartrate (LOPRESSOR)  "tablet 25 mg, 25 mg, Oral, Q12H [facility], Jake Bunn MD, 25 mg at 04/17/25 0925    montelukast (SINGULAIR) tablet 10 mg, 10 mg, Oral, Daily, Jake Bunn MD, 10 mg at 04/17/25 0925    ondansetron (ZOFRAN) injection 4 mg, 4 mg, Intravenous, Q6H PRN, Jake Bunn MD    pantoprazole (PROTONIX) EC tablet 20 mg, 20 mg, Oral, Early Morning, Jake Bunn MD, 20 mg at 04/17/25 0505    polyethylene glycol (MIRALAX) packet 17 g, 17 g, Oral, Daily PRN, RODY Goodson, 17 g at 04/16/25 1736    prednisoLONE acetate (PRED FORTE) 1 % ophthalmic suspension 1 drop, 1 drop, Left Eye, BID, Jake Bunn MD, 1 drop at 04/17/25 0930      Lab Results: I have reviewed the following results:  Results from last 7 days   Lab Units 04/17/25  0504 04/16/25  0426 04/15/25  1805 04/15/25  0459 04/15/25  0042 04/14/25  1410   WBC Thousand/uL 13.12* 13.40*  --  17.84*  --  29.35*   HEMOGLOBIN g/dL 11.0* 10.7*  --  11.0*  --  13.7   HEMATOCRIT % 33.0* 31.9*  --  32.6*  --  40.4   PLATELETS Thousands/uL 347 261  --  222  --  267   POTASSIUM mmol/L 4.5 4.1 4.1 3.8 4.0 4.2   CHLORIDE mmol/L 102 103 98 100 100 95*   CO2 mmol/L 26 22 22 22 20* 22   BUN mg/dL 31* 46* 57* 59* 61* 64*   CREATININE mg/dL 0.86 1.07 1.39* 1.35* 1.36* 1.62*   CALCIUM mg/dL 8.6 8.6 9.0 8.5 8.7 9.5   MAGNESIUM mg/dL  --   --   --  1.9  --   --    ALBUMIN g/dL  --  2.3*  --  2.3*  --  2.8*       Administrative Statements     Portions of the record may have been created with voice recognition software. Occasional wrong word or \"sound a like\" substitutions may have occurred due to the inherent limitations of voice recognition software. Read the chart carefully and recognize, using context, where substitutions have occurred.If you have any questions, please contact the dictating provider.  "

## 2025-04-17 NOTE — ASSESSMENT & PLAN NOTE
Cr baseline 0.8-0.9 mg/dL. Cr 0.8 on 11/11/24. Cr 1.6 on admission and improving to 1.07 mg/dL on 4/16.     Suspect etiology 2/2 hypovolemia in setting of septic physiology, afib with RVR and hypovolemia.     Recommend checking urine studies and uric acid.  Maintain normotension.  HR management per cardiology.   Hold Losartan/Lasix.  Check renal US.   Follow I/O as best we can.     April 16  Creatinine 1.07 mg/dL, decreased from peak of  1.62 on 4/14.   Uric acid 8.8 mg/dL, urine sodium <10.0, urine creatinine 108.3, FeNa 1.35% prerenal. Will give NSS 50 ml/hr x 10 hrs as noted above.     April 17  Creatinine 0.86 mg/dL, 4/17, at baseline.  UOP 1775 mL on 4/16, 200 mL thus far 4/17.  Continue to encourage adequate oral intake, fluid restriction decreased to 1.8 L/day.  Continue to avoid NSAIDs and nephrotoxins.  Avoid hypotension.  Recommend BMP 3-5 days following discharge, outpatient follow-up with nephrology in 1-2 weeks after discharge.

## 2025-04-17 NOTE — ASSESSMENT & PLAN NOTE
See plan for sepsis  CT right lower extremity: Subcutaneous edema and skin thickening extending from the proximal thigh through the ankle, which can be seen with cellulitis. No fluid collection within the limits of an unenhanced exam. No soft tissue gas  Appreciate ID following-feel presentation likely streptococcal in nature  Continues on renally dosed Ancef  Appreciate wound care who are following

## 2025-04-17 NOTE — PROGRESS NOTES
Progress Note - Infectious Disease   Name: Enrico Chavira 89 y.o. male I MRN: [id number]  Unit/Bed#: -01 I Date of Admission: 4/14/2025   Date of Service: 4/17/2025 I Hospital Day: 3    Assessment & Plan  Sepsis (HCC)  Tachycardia, leukocytosis, borderline hypotension.  Most likely due to right lower extremity cellulitis. Blood cultures are so far negative.  He is otherwise hemodynamically stable.   Continue IV antibiotics as below  Follow-up blood cultures  Trend temps and hemodynamics  Daily CBC DIF and CHEM to monitor response to treatment and for developing toxicities  Cellulitis of right lower extremity  Patient presented with a mechanical fall and worsening right lower extremity erythema.  Started to have right lower extremity erythema around Wednesday, fall on Saturday.  Clinical media reviewed with blisters noted on the upper posterior and medial thigh with swelling and erythema of his entire right lower extremity.  Has history of venous stasis ulcerations in the right lower extremity and was recently discharged from wound care.  CT of the right lower extremity without soft tissue gas or abscess.  He does have swollen right knee with minor limitation in range of motion but overall denies knee pain. This is improving. Dry, cracked skin of his right lower extremity distally likely portal of entry.  Cellulitis appears streptococcal in origin. No hx of MRSA. D/w pt and dtr this may worsen clinically before it improves. Slowly improving.   Continue IV cefazolin  Serial skin exams and local wound care  Encourage LE elevation   Possible transition to PO abx in next 24-48h   Swelling of right knee  Right knee swollen with erythema in the setting of cellulitis as above.  Small joint effusion noted on CT of the right lower extremity with advanced degenerative changes.  Consider possibility of developing septic joint. Fortunately this is improving. Will need to monitor closely.  Continue IV antibiotics as  above  Serial skin exams and local wound care  Acute kidney injury (HCC)  Creatinine up to 1.6 on admission with baseline creatinine closer to 0.8.  Nephrology is following.  He is also hyponatremic.  Creatinine is improving. Estimated Creatinine Clearance: 53.2 mL/min (by C-G formula based on SCr of 0.86 mg/dL).  Will renally dose antibiotics as needed  Abnormality of gait  Patient presented following a fall on Saturday.  Reported generalized fatigue and lower extremity weakness.  Atrial fibrillation with rapid ventricular response (HCC)  Required Cardizem drip on admission.  Heart rates now improved.    I have discussed the above management plan in detail with the primary service.     Antibiotics:  IV cefazolin D3   D4 IV abx     Subjective   Patient has no fever, chills, sweats; no nausea, vomiting, diarrhea; no cough, shortness of breath; no pain. No new symptoms. Feels tired today and didn't sleep well last night. Tolerating the abx. Still alot of weeping from RLE.     Objective :  Temp:  [97 °F (36.1 °C)-98.4 °F (36.9 °C)] 98.4 °F (36.9 °C)  HR:  [78-96] 78  BP: (129-180)/(59-86) 151/85  Resp:  [18-38] 18  SpO2:  [95 %-98 %] 95 %  O2 Device: None (Room air)    Physical exam:  General:  No acute distress, sitting upright in bedside recliner, elderly   Psychiatric:  Awake and alert, pleasant and cooperative  HEENT: Mucous membranes moist, neck supple, head normocephalic, dentures in place.  Cardiovascular: Irregularly irregular rhythm  Pulmonary: Breath sounds decreased however no wheezes or rhonchi noted  Abdomen:  Soft, nontender  Extremities: Right lower extremity swelling in the setting of cellulitis  Skin:  No rashes noted on exposed skin.  Extensive right lower extremity erythema from foot to proximal thigh with draining blister posterior thigh and medial thigh proximally.  Skin thickening and warmth noted with areas of darker erythema and purple.      Lab Results: I have reviewed the following  results:  Results from last 7 days   Lab Units 04/17/25  0504 04/16/25  0426 04/15/25  0459   WBC Thousand/uL 13.12* 13.40* 17.84*   HEMOGLOBIN g/dL 11.0* 10.7* 11.0*   PLATELETS Thousands/uL 347 261 222     Results from last 7 days   Lab Units 04/17/25  0504 04/16/25  0426 04/15/25  1805 04/15/25  0459 04/15/25  0042 04/14/25  1410   SODIUM mmol/L 132* 130* 128* 129*   < > 126*   POTASSIUM mmol/L 4.5 4.1 4.1 3.8   < > 4.2   CHLORIDE mmol/L 102 103 98 100   < > 95*   CO2 mmol/L 26 22 22 22   < > 22   BUN mg/dL 31* 46* 57* 59*   < > 64*   CREATININE mg/dL 0.86 1.07 1.39* 1.35*   < > 1.62*   EGFR ml/min/1.73sq m 74 59 43 44   < > 35   CALCIUM mg/dL 8.6 8.6 9.0 8.5   < > 9.5   AST U/L  --  34  --  41*  --  75*   ALT U/L  --  33  --  43  --  59*   ALK PHOS U/L  --  72  --  76  --  109*   ALBUMIN g/dL  --  2.3*  --  2.3*  --  2.8*    < > = values in this interval not displayed.     Results from last 7 days   Lab Units 04/14/25  1410   BLOOD CULTURE  No Growth at 48 hrs.  No Growth at 48 hrs.     Results from last 7 days   Lab Units 04/17/25  0504 04/16/25  0426 04/15/25  0459 04/14/25  1410   PROCALCITONIN ng/ml 2.23* 4.70* 10.82* 15.61*                 Imaging Results Review: No pertinent imaging studies reviewed.  Other Study Results Review: Other studies reviewed include: micro

## 2025-04-17 NOTE — ARC ADMISSION
[facility]  met with patient and daughter,Eden  at bedside. Also spoke with daughter, Anu via phone.  Introduced self, explained role, reviewed ARC program, services offered, acute rehab criteria, review of referral by [facility] Medical Director, insurance authorization process, three ARC locations and anticipated rehab length of stay. ARC Rehab folder left with patient; all questions answered. Patient's only choice is [facility]. Patient and both daughter's were made aware ARC Reviewer will communicate with their Care Manager who will keep patient updated on referral status.

## 2025-04-17 NOTE — ASSESSMENT & PLAN NOTE
Continue prehospital nebulizer  Continue prehospital Singulair  Initiated Mucinex for cough  Respiratory protocol

## 2025-04-17 NOTE — ASSESSMENT & PLAN NOTE
Present on admission with tachycardia, leukocytosis secondary to right lower extremity cellulitis  CT right lower extremity suggested cellulitis, no evidence of soft tissue gas  Lower extremity Doppler negative for DVT  Blood cultures x 2 no growth after 48 hours  Procalcitonin 15.61-->10.82-->4.70-->2.23  Lactic acid 1.7  Appreciate ID who are following-continue Ancef (renally dosed)  Tachycardia resolved, leukocytosis improving  CBC, procalcitonin in am

## 2025-04-17 NOTE — ASSESSMENT & PLAN NOTE
Tachycardia, leukocytosis, borderline hypotension.  Most likely due to right lower extremity cellulitis. Blood cultures are so far negative.  He is otherwise hemodynamically stable.   Continue IV antibiotics as below  Follow-up blood cultures  Trend temps and hemodynamics  Daily CBC DIF and CHEM to monitor response to treatment and for developing toxicities

## 2025-04-17 NOTE — PLAN OF CARE
Problem: PHYSICAL THERAPY ADULT  Goal: Performs mobility at highest level of function for planned discharge setting.  See evaluation for individualized goals.  Description: Treatment/Interventions: Functional transfer training, Therapeutic exercise, Endurance training, Gait training, Bed mobility, Equipment eval/education  Equipment Recommended: Walker       See flowsheet documentation for full assessment, interventions and recommendations.  Outcome: Progressing  Note: Prognosis: Fair     Assessment: Pt seen for PT treatment session this date with interventions consisting of gait training to normalize gait pattern to decrease fall risk and therapeutic exercise to improve endurance to improve functional mobility. Pt agreeable to PT treatment session upon arrival, pt found seated OOB in recliner, in no apparent distress. Since previous session, pt has made good progress as evidenced by increased distance ambulated  Barriers during this session include pain and fatigue.  Pt continues to be functioning below baseline level, and remains limited 2* factors listed above and including decreased strength, impaired activity tolerance, impaired  balance, pain, decreased endurance, and decreased mobility.  Pt prognosis for achieving goals is fair, pending pt progress with hospitalization/medical status improvements, and indicated by motivated to participate in therapy, ability to follow cues, supportive family, and continued required assistance. PT will continue to see pt during current hospitalization in order to address the deficits listed above and provide interventions consistent w/ POC in effort to achieve goals. Current goals and POC remain appropriate, pt continues to have rehab potential  Upon conclusion pt seated OOB in recliner. The patient's AM-PAC Basic Mobility Inpatient Short Form Raw Score is 8.  A Raw score of less than or equal to 16 suggests the patient may benefit from discharge to post-acute rehabilitation  services. Based on patient presentations and impairments, pt would most appropriately benefit from Level 2 resource intensity upon discharge.  Please also refer to the recommendation of the Physical Therapist for safe discharge planning. RN verbalized pt appropriate for PT session. This session, pt required and most appropriately benefited from skilled OT/PT co-treat due to extensive physical assistance of two therapists to achieve transitional movements and decreased activity tolerance. Session interrupted due to imaging procedure.  Barriers to Discharge:  (limited mobility)     Rehab Resource Intensity Level, PT: II (Moderate Resource Intensity)    See flowsheet documentation for full assessment.

## 2025-04-17 NOTE — OCCUPATIONAL THERAPY NOTE
Occupational Therapy Treatment Note      Enrico Chavira    4/17/2025    Principal Problem:    Sepsis (HCC)  Active Problems:    Abnormality of gait    Asthma-COPD overlap syndrome (HCC)    Hyponatremia    Atrial fibrillation with rapid ventricular response (HCC)    Supratherapeutic INR    Cellulitis of right lower extremity    Acute kidney injury (HCC)    Swelling of right knee      Past Medical History:   Diagnosis Date    Arthritis     Coagulation defect (HCC)     last assessed: 11/28/2018    COPD (chronic obstructive pulmonary disease) (HCC)     last assessed: 5/14/2019, 12/6/2017    Generalized osteoarthritis     last assessed: 1/28/2019    GERD without esophagitis     last assessed: 1/28/2019    Glaucoma     last assessed: 8/4/2011    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HCC)     last assessed: 10/16/2018    Factor II Prothrombin Gene per [employer]    History of kidney stones 1985    Hx of blood clots     Hypertension     last assessed: 5/14/2019    Impaired fasting glucose     last assessed: 8/26/2013    Mild persistent asthma, uncomplicated     last assessed: 11/28/2018    Nephrolithiasis     Nondisplaced intertrochanteric fracture of right femur, initial encounter for closed fracture (HCC)     last assessed: 1/28/2019    Premature ventricular contractions     last assessed: 8/4/2011    Pulmonary nodule     Scoliosis (and kyphoscoliosis), idiopathic     Wears dentures        Past Surgical History:   Procedure Laterality Date    CARPAL TUNNEL RELEASE Left     ENDO 8/801    CATARACT EXTRACTION Left     COLONOSCOPY  10/07/2008    last assessed: 3/29/2016    EYE SURGERY Right 01/13/2022    HERNIA REPAIR      HIP SURGERY      HIP SURGERY Left 03/05/1999    ORIF    MULTIPLE TOOTH EXTRACTIONS Bilateral     OTHER SURGICAL HISTORY Right 10/30/2001    [facility]    TONSILLECTOMY Bilateral     WISDOM TOOTH EXTRACTION Bilateral         04/17/25 1322   OT Last Visit   OT Visit Date 04/17/25   Note Type  "  Note Type Treatment   Pain Assessment   Pain Assessment Tool 0-10   Pain Score No Pain   Restrictions/Precautions   Weight Bearing Precautions Per Order No   Other Precautions Fall Risk;Pain   Lifestyle   Autonomy Pt resides in one level apartment - alone; I with ADLs, mobility and IADLs; increased assistance required   Reciprocal Relationships supportive family   Service to Others retired   Intrinsic Gratification watching tv   Transfers   Sit to Stand 4  Minimal assistance   Additional items Assist x 2;Increased time required;Verbal cues   Stand to Sit 4  Minimal assistance   Additional items Assist x 2;Increased time required;Verbal cues   Additional Comments RW used; VC for safe hand placement, proper body mechanics and overall RW management during directional turns   Functional Mobility   Functional Mobility 3  Moderate assistance   Additional Comments Pt completed short distance ADL mobility within hallway w/ mod A x2 w/ RW. x1 LOB observed with L knee buckeling with second trial of mobility. Moderate instability throughout mobility. Pt required x1 seated rest break between mobility trials  (SpO2 >90% throughout session on RA)   Additional items Rolling walker   Subjective   Subjective \"It feels good to walk around\"   Cognition   Overall Cognitive Status WFL   Arousal/Participation Alert;Responsive   Attention Within functional limits   Orientation Level Oriented X4   Memory Within functional limits   Following Commands Follows one step commands without difficulty   Comments Pt agreeable to OT treatment   Activity Tolerance   Activity Tolerance Patient limited by fatigue   Medical Staff Made Aware Yes, nursing staff made aware of [facility] outcomes   Assessment   Assessment Patient participated in Skilled OT session this date with interventions consisting of ADL re training with the use of correct body mechnaics, safety awareness and fall prevention techniques,  therapeutic activities to: increase activity " tolerance, increase postural control, increase trunk control, and increase OOB/ sitting tolerance . Patient agreeable to OT treatment session, upon arrival patient was found seated OOB to Recliner.  In comparison to previous session, patient with improvements in functional mobility and endurance . Patient requiring verbal cues for correct technique and frequent rest periods. Patient continues to be functioning below baseline level, occupational performance remains limited secondary to factors listed above and increased risk for falls and injury.   From OT standpoint, recommendation at time of d/c would with moderate intensity OT resources.   Patient to benefit from continued Occupational Therapy treatment while in the hospital to address deficits as defined above and maximize level of functional independence with ADLs and functional mobility.   Plan   Treatment Interventions ADL retraining;UE strengthening/ROM;Functional transfer training;Endurance training;Patient/family training;Activityengagement;Energy conservation   Goal Expiration Date 04/25/25   OT Treatment Day 1   OT Frequency 3-5x/wk   Discharge Recommendation   Rehab Resource Intensity Level, OT II (Moderate Resource Intensity)   Additional Comments  The patient's raw score on the AM-PAC Daily Activity inpatient short form is 17, standardized score is 37.26, less than 39.4. Patients at this level are likely to benefit from discharge with moderate intensity OT resources. Please refer to the recommendation of the Occupational Therapist for safe discharge planning.   Additional Comments 2 Co treatment with PT completed secondary to complex medical condition of pt and  A of 2 required to achieve and maintain transitional movements, requiring the need of skilled therapeutic intervention of 2 therapists to achieve delivery of services.   [facility] Daily Activity Inpatient   Lower Body Dressing 2   Bathing 2   Toileting 3   Upper Body Dressing 3   Grooming 3    Eating 4   Daily Activity Raw Score 17   Daily Activity Standardized Score (Calc for Raw Score >=11) 37.26   AM-PAC Applied Cognition Inpatient   Following a Speech/Presentation 3   Understanding Ordinary Conversation 4   Taking Medications 3   Remembering Where Things Are Placed or Put Away 3   Remembering List of 4-5 Errands 2   Taking Care of Complicated Tasks 2   Applied Cognition Raw Score 17   Applied Cognition Standardized Score 36.52     All needs met, call bell within reach  Melanie Martin OTR/L

## 2025-04-17 NOTE — ASSESSMENT & PLAN NOTE
Na 124 on 11/11/24 so suspect chronicity.   Na 126 on admission and improved to 129 in 15 hours. Goal Na improvement 4-6 meq/24 hr.   Likely hypovolemic in part due to sepsis/afib RVR.     Check urine electrolytes and uric acid for further diagnosis. Sp 1 L plasmalyte on admission, not currently on IVF.   Start 2 L FR.   May benefit from gentle, time limited trial of IVF with NSS, will await collection of urine studies.    Check serum osm as well. Can decrease BMP to q12.   Check bladder scan.     April 16  Sodium 130 mmol/L. S/P NSS 50 ml/hr 4/15. Continue 2L fluid restriction.    Bladder scan with no hydronephrosis. Multiple bilateral simple cysts. Non obstructive 5 mm calculus and thinly septated 3.8 cm cyst in left kidney, 4/15  Urine osm 682 mmol/kg, serum osmolality 295 mmol/kg. Indicating possible pseudohyponatremia. Glucose levels appropriate. Most recent lipid panel appropriate 11/11/2024.  ml on 4/15, 1.4 L on 4/16. Will repeat NSS 50 ml/hr x 10 hours. Recheck AM labs.     April 17  Sodium 132 mmol/L.    Osmolality studies indicating possible pseudohyponatremia.  Glucose and lipid levels recently appropriate.  UOP 1775 mL 4/16, 200 mL thus far today. Increase fluid restriction to 1.8 L/day.  Volume status examines euvolemic to mildly hypervolemic.  I/O record incomplete but indicating 75% of meals when documented.  Continue to encourage high-protein diet including Ensure protein supplement.

## 2025-04-17 NOTE — ASSESSMENT & PLAN NOTE
Creatinine up to 1.6 on admission with baseline creatinine closer to 0.8.  Nephrology is following.  He is also hyponatremic.  Creatinine is improving. Estimated Creatinine Clearance: 53.2 mL/min (by C-G formula based on SCr of 0.86 mg/dL).  Will renally dose antibiotics as needed

## 2025-04-17 NOTE — PROGRESS NOTES
Progress Note - Hospitalist   Name: Enrico Chavira 89 y.o. male I MRN: [id number]  Unit/Bed#: -01 I Date of Admission: 4/14/2025   Date of Service: 4/17/2025 I Hospital Day: 3    Assessment & Plan  Sepsis (HCC)  Present on admission with tachycardia, leukocytosis secondary to right lower extremity cellulitis  CT right lower extremity suggested cellulitis, no evidence of soft tissue gas  Lower extremity Doppler negative for DVT  Blood cultures x 2 no growth after 48 hours  Procalcitonin 15.61-->10.82-->4.70-->2.23  Lactic acid 1.7  Appreciate ID who are following-continue Ancef (renally dosed)  Tachycardia resolved, leukocytosis improving  CBC, procalcitonin in am  Asthma-COPD overlap syndrome (HCC)  Continue prehospital nebulizer  Continue prehospital Singulair  Initiated Mucinex for cough  Respiratory protocol  Atrial fibrillation with rapid ventricular response (HCC)  Noted to be in rapid A-fib while in the ER  Likely related to sepsis  Received IV Cardizem in the ED followed by Cardizem drip which was discontinued 4/14   Converted to normal sinus rhythm post Cardizem drip  Continue metoprolol at current dosing   Coumadin remains on hold given supratherapeutic INR which is improving    Supratherapeutic INR  POA  Patient is on Coumadin for A-fib-has been on hold  No signs of bleeding  INR peaked at 8.71 now downtrending, today 4.61  Check INR daily  Cellulitis of right lower extremity  See plan for sepsis  CT right lower extremity: Subcutaneous edema and skin thickening extending from the proximal thigh through the ankle, which can be seen with cellulitis. No fluid collection within the limits of an unenhanced exam. No soft tissue gas  Appreciate ID following-feel presentation likely streptococcal in nature  Continues on renally dosed Ancef  Appreciate wound care who are following    Hyponatremia  POA with serum sodium 126  Patient has history of chronic hyponatremia  Appreciate nephrology who are  following  Patient received gentle IV fluids  Serum sodium improved to 132 today  Continue to monitor fluid status closely  BMP a.m.  Acute kidney injury (HCC)  POA with creatinine 1.62  Baseline creatinine appears to be around 0.8-0.9  Secondary to dehydration and sepsis-see plan for sepsis  Urine sodium less than 10  Appreciate input of nephrology who are following  Home losartan/diuretics on hold  Renal ultrasound negative for hydronephrosis, suggest medical renal disease  Treated with gentle fluids  Monitor intake and output  SONYA has resolved  Abnormality of gait  Patient had fall at home prehospital  Continue fall precautions  PT OT recommend postacute rehab  Case management following making rehab referrals  Swelling of right knee  Patient is being treated for sepsis of the right lower extremity see plan for same  Appreciate ID following  Continue to monitor    VTE Pharmacologic Prophylaxis: VTE Score: 7 High Risk (Score >/= 5) - Pharmacological DVT Prophylaxis Contraindicated. Sequential Compression Devices Ordered.    Mobility:   Basic Mobility Inpatient Raw Score: 10  JH-HLM Goal: 4: Move to chair/commode  JH-HLM Achieved: 7: Walk 25 feet or more  JH-HLM Goal achieved. Continue to encourage appropriate mobility.    Patient Centered Rounds: I performed bedside rounds with nursing staff today.   Discussions with Specialists or Other Care Team Provider: ID, nephrology, PT OT, nursing, case management    Education and Discussions with Family / Patient: Updated  (daughter) at bedside.    Current Length of Stay: 3 day(s)  Current Patient Status: Inpatient   Certification Statement: The patient will continue to require additional inpatient hospital stay due to continue treatment of right lower extremity cellulitis with IV antibiotics, also supratherapeutic INR, monitoring for bleeding  Discharge Plan:  Continues on IV antibiotics for right lower extremity cellulitis.  ID continues to follow.   Nephrology has been following for SONYA which is now resolved.  INR continues to improve, no signs of bleeding, Coumadin remains on hold.  Repeat labs in the AM.  Per therapy recommendations patient requires postacute rehab charge, daughter is in agreement.  Referrals have been made by case management.    Code Status: Level 3 - DNAR and DNI    Subjective   Denies any dizziness, lightheaded, chest pain or palpitations.  Denies pain or discomfort.  Daughter at bedside reports redness right lower extremity improving  Objective :  Temp:  [97 °F (36.1 °C)-98.4 °F (36.9 °C)] 98.4 °F (36.9 °C)  HR:  [78-96] 78  BP: (141-180)/(64-86) 151/85  Resp:  [18-38] 18  SpO2:  [94 %-98 %] 94 %  O2 Device: None (Room air)    Body mass index is 29.68 kg/m².     Input and Output Summary (last 24 hours):     Intake/Output Summary (Last 24 hours) at 4/17/2025 1206  Last data filed at 4/17/2025 1200  Gross per 24 hour   Intake 670 ml   Output 1050 ml   Net -380 ml       Physical Exam  Vitals reviewed.   Constitutional:       General: He is not in acute distress.     Appearance: He is well-developed.   HENT:      Head: Normocephalic and atraumatic.   Cardiovascular:      Rate and Rhythm: Normal rate and regular rhythm.      Heart sounds: No murmur heard.  Pulmonary:      Effort: Pulmonary effort is normal. No respiratory distress.      Breath sounds: No wheezing or rales.   Abdominal:      General: Bowel sounds are normal. There is no distension.      Palpations: Abdomen is soft.      Tenderness: There is no abdominal tenderness. There is no guarding.   Musculoskeletal:         General: Swelling present.      Right lower leg: Edema present.   Skin:     General: Skin is warm and dry.      Capillary Refill: Capillary refill takes less than 2 seconds.      Findings: Erythema present.      Comments: Right lower extremity pink discoloration with swelling, improving   Neurological:      General: No focal deficit present.      Mental Status: He is  alert and oriented to person, place, and time. Mental status is at baseline.   Psychiatric:         Mood and Affect: Mood normal.         Behavior: Behavior normal.         Thought Content: Thought content normal.         Judgment: Judgment normal.           Lines/Drains:              Lab Results: I have reviewed the following results:   Results from last 7 days   Lab Units 04/17/25  0504 04/16/25  0426 04/15/25  0459 04/14/25  1410   WBC Thousand/uL 13.12*   < > 17.84* 29.35*   HEMOGLOBIN g/dL 11.0*   < > 11.0* 13.7   HEMATOCRIT % 33.0*   < > 32.6* 40.4   PLATELETS Thousands/uL 347   < > 222 267   BANDS PCT %  --   --   --  4   SEGS PCT %  --   --  89*  --    LYMPHO PCT %  --   --  5* 2*   MONO PCT %  --   --  5 3*   EOS PCT %  --   --  0 0    < > = values in this interval not displayed.     Results from last 7 days   Lab Units 04/17/25  0504 04/16/25  0426   SODIUM mmol/L 132* 130*   POTASSIUM mmol/L 4.5 4.1   CHLORIDE mmol/L 102 103   CO2 mmol/L 26 22   BUN mg/dL 31* 46*   CREATININE mg/dL 0.86 1.07   ANION GAP mmol/L 4 5   CALCIUM mg/dL 8.6 8.6   ALBUMIN g/dL  --  2.3*   TOTAL BILIRUBIN mg/dL  --  0.35   ALK PHOS U/L  --  72   ALT U/L  --  33   AST U/L  --  34   GLUCOSE RANDOM mg/dL 105 110     Results from last 7 days   Lab Units 04/17/25  0504   INR  4.61*             Results from last 7 days   Lab Units 04/17/25  0504 04/16/25  0426 04/15/25  0459 04/14/25  1410   LACTIC ACID mmol/L  --   --   --  1.7   PROCALCITONIN ng/ml 2.23* 4.70* 10.82* 15.61*       Recent Cultures (last 7 days):   Results from last 7 days   Lab Units 04/14/25  1410   BLOOD CULTURE  No Growth at 48 hrs.  No Growth at 48 hrs.       Imaging Results Review: I reviewed radiology reports from this admission including: CT head, CT lumbar spine, CT right lower extremity.  Other Study Results Review: EKG was reviewed.     Last 24 Hours Medication List:     Current Facility-Administered Medications:     acetaminophen (TYLENOL) tablet 650 mg, Q6H  PRN    albuterol inhalation solution 2.5 mg, Q6H PRN    budesonide-formoterol (SYMBICORT) 160-4.5 mcg/act inhaler 2 puff, BID    ceFAZolin (ANCEF) IVPB (premix in dextrose) 2,000 mg 50 mL, Q8H, Last Rate: 2,000 mg (04/17/25 0929)    dorzolamide (TRUSOPT) ophthalmic solution 1 drop, BID    guaiFENesin (MUCINEX) 12 hr tablet 600 mg, Q12H CLARK    latanoprost (XALATAN) 0.005 % ophthalmic solution 1 drop, HS    lidocaine (LIDODERM) 5 % patch 1 patch, Daily    metoprolol tartrate (LOPRESSOR) tablet 25 mg, Q12H CLARK    montelukast (SINGULAIR) tablet 10 mg, Daily    ondansetron (ZOFRAN) injection 4 mg, Q6H PRN    pantoprazole (PROTONIX) EC tablet 20 mg, Early Morning    polyethylene glycol (MIRALAX) packet 17 g, Daily PRN    prednisoLONE acetate (PRED FORTE) 1 % ophthalmic suspension 1 drop, BID    Administrative Statements   Today, Patient Was Seen By: RODY Goodson  I have spent a total time of 37 minutes in caring for this patient on the day of the visit/encounter including Diagnostic results, Patient and family education, Counseling / Coordination of care, Documenting in the medical record, Reviewing/placing orders in the medical record (including tests, medications, and/or procedures), Obtaining or reviewing history  , and Communicating with other healthcare professionals .    **Please Note: This note may have been constructed using a voice recognition system.**

## 2025-04-17 NOTE — PHYSICAL THERAPY NOTE
PT Treatment Note    NAME:  Enrico Chavira  DATE: 04/17/25    AGE:   89 y.o.  Mrn:   [id number]  ADMIT DX:  Cellulitis and abscess of leg [L03.119, L02.419]  Atrial fibrillation with RVR ([facility]) [I48.91]  Visit for wound check [Z51.89]  Septic shock ([facility]) [A41.9, R65.21]  Fall in elderly patient [R29.6]  Performed at least 2 patient identifiers during session: Name and ID bracelet       04/17/25 1417   PT Last Visit   PT Visit Date 04/17/25   Note Type   Note Type Treatment   Pain Assessment   Pain Assessment Tool 0-10   Pain Score No Pain   Restrictions/Precautions   Weight Bearing Precautions Per Order No   Other Precautions Chair Alarm;Fall Risk   General   Chart Reviewed Yes   Family/Caregiver Present Yes   Cognition   Overall Cognitive Status WFL   Arousal/Participation Alert;Responsive   Attention Within functional limits   Orientation Level Oriented X4   Memory Within functional limits   Following Commands Follows one step commands without difficulty   Transfers   Sit to Stand 4  Minimal assistance   Additional items Assist x 2;Increased time required;Verbal cues   Stand to Sit 4  Minimal assistance   Additional items Assist x 2;Increased time required;Verbal cues   Additional Comments pt denied dizziness with transitional movement   Ambulation/Elevation   Gait pattern Improper Weight shift;Antalgic;Decreased foot clearance;Shuffling;Inconsistent margarito;Short stride;Knees flexed   Gait Assistance 4  Minimal assist   Additional items Assist x 2;Verbal cues   Assistive Device Rolling walker   Distance   (8 ft x 2 with chair follow)   Ambulation/Elevation Additional Comments 2 LOB noted; cues to keep RW closer to DAVID   Balance   Static Sitting Good   Dynamic Sitting Fair +   Static Standing Fair   Dynamic Standing Fair -   Ambulatory Fair -   Endurance Deficit   Endurance Deficit Yes   Endurance Deficit Description KHALIL however SpO2 remained above 90%   Activity Tolerance   Activity Tolerance Patient  limited by fatigue   Exercises   Glute Sets Sitting;15 reps;AROM;Bilateral   Hip Flexion Sitting;15 reps;AROM;Bilateral   Hip Abduction Sitting;15 reps;AROM;Bilateral   Hip Adduction Sitting;15 reps;AROM;Bilateral   Knee AROM Long Arc Quad Sitting;15 reps;AROM;Bilateral   Ankle Pumps Sitting;15 reps;AROM;Bilateral   Assessment   Prognosis Fair   Assessment Pt seen for PT treatment session this date with interventions consisting of gait training to normalize gait pattern to decrease fall risk and therapeutic exercise to improve endurance to improve functional mobility. Pt agreeable to PT treatment session upon arrival, pt found seated OOB in recliner, in no apparent distress. Since previous session, pt has made good progress as evidenced by increased distance ambulated  Barriers during this session include pain and fatigue.  Pt continues to be functioning below baseline level, and remains limited 2* factors listed above and including decreased strength, impaired activity tolerance, impaired  balance, pain, decreased endurance, and decreased mobility.  Pt prognosis for achieving goals is fair, pending pt progress with hospitalization/medical status improvements, and indicated by motivated to participate in therapy, ability to follow cues, supportive family, and continued required assistance. PT will continue to see pt during current hospitalization in order to address the deficits listed above and provide interventions consistent w/ POC in effort to achieve goals. Current goals and POC remain appropriate, pt continues to have rehab potential  Upon conclusion pt seated OOB in recliner. The patient's AM-PAC Basic Mobility Inpatient Short Form Raw Score is 8.  A Raw score of less than or equal to 16 suggests the patient may benefit from discharge to post-acute rehabilitation services. Based on patient presentations and impairments, pt would most appropriately benefit from Level 2 resource intensity upon discharge.   Please also refer to the recommendation of the Physical Therapist for safe discharge planning. RN verbalized pt appropriate for PT session. This session, pt required and most appropriately benefited from skilled OT/PT co-treat due to extensive physical assistance of two therapists to achieve transitional movements and decreased activity tolerance. Session interrupted due to imaging procedure.   Barriers to Discharge   (limited mobility)   Goals   Patient Goals to walk   LTG Expiration Date 04/25/25   PT Treatment Day 2   Plan   Treatment/Interventions Functional transfer training;Gait training;Equipment eval/education;Endurance training;Therapeutic exercise   Progress Progressing toward goals   PT Frequency 3-5x/wk   Discharge Recommendation   Rehab Resource Intensity Level, PT II (Moderate Resource Intensity)   Equipment Recommended Walker   Walker Package Recommended Wheeled walker   AM-PAC Basic Mobility Inpatient   Turning in Flat Bed Without Bedrails 2   Lying on Back to Sitting on Edge of Flat Bed Without Bedrails 2   Moving Bed to Chair 1   Standing Up From Chair Using Arms 1   Walk in Room 1   Climb 3-5 Stairs With Railing 1   Basic Mobility Inpatient Raw Score 8   Turning Head Towards Sound 4   Follow Simple Instructions 4   Low Function Basic Mobility Raw Score  16   Low Function Basic Mobility Standardized Score  25.72   [facility] Highest Level Of Mobility   -HLM Goal 3: Sit at edge of bed   -HLM Achieved 6: Walk 10 steps or more       Time In: 1322  Time Out: 1340  Total Treatment Minutes: 18  Time In: 1407  Time Out: 1417  Total Treatment Minutes: 10    Phuong De Jesus, PT

## 2025-04-17 NOTE — PLAN OF CARE
Problem: PAIN - ADULT  Goal: Verbalizes/displays adequate comfort level or baseline comfort level  Description: Interventions:- Encourage patient to monitor pain and request assistance- Assess pain using appropriate pain scale- Administer analgesics based on type and severity of pain and evaluate response- Implement non-pharmacological measures as appropriate and evaluate response- Consider cultural and social influences on pain and pain management- Notify physician/advanced practitioner if interventions unsuccessful or patient reports new pain  Outcome: Progressing     Problem: INFECTION - ADULT  Goal: Absence or prevention of progression during hospitalization  Description: INTERVENTIONS:- Assess and monitor for signs and symptoms of infection- Monitor lab/diagnostic results- Monitor all insertion sites, i.e. indwelling lines, tubes, and drains- Monitor endotracheal if appropriate and nasal secretions for changes in amount and color- [place] appropriate cooling/warming therapies per order- Administer medications as ordered- Instruct and encourage patient and family to use good hand hygiene technique- Identify and instruct in appropriate isolation precautions for identified infection/condition  Outcome: Progressing  Goal: Absence of fever/infection during neutropenic period  Description: INTERVENTIONS:- Monitor WBC  Outcome: Progressing     Problem: SAFETY ADULT  Goal: Patient will remain free of falls  Description: INTERVENTIONS:- Educate patient/family on patient safety including physical limitations- Instruct patient to call for assistance with activity - Consult OT/PT to assist with strengthening/mobility - Keep Call bell within reach- Keep bed low and locked with side rails adjusted as appropriate- Keep care items and personal belongings within reach- Initiate and maintain comfort rounds- Make Fall Risk Sign visible to staff- Offer Toileting every 2 Hours, in advance of need- Initiate/Maintain bed alarm-  Obtain necessary fall risk management equipment:- Apply yellow socks and bracelet for high fall risk patients- Consider moving patient to room near nurses station  Outcome: Progressing  Goal: Maintain or return to baseline ADL function  Description: INTERVENTIONS:-  Assess patient's ability to carry out ADLs; assess patient's baseline for ADL function and identify physical deficits which impact ability to perform ADLs (bathing, care of mouth/teeth, toileting, grooming, dressing, etc.)- Assess/evaluate cause of self-care deficits - Assess range of motion- Assess patient's mobility; develop plan if impaired- Assess patient's need for assistive devices and provide as appropriate- Encourage maximum independence but intervene and supervise when necessary- Involve family in performance of ADLs- Assess for home care needs following discharge - Consider OT consult to assist with ADL evaluation and planning for discharge- Provide patient education as appropriate  Outcome: Progressing  Goal: Maintains/Returns to pre admission functional level  Description: INTERVENTIONS:- Perform AM-PAC 6 Click Basic Mobility/ Daily Activity assessment daily.- Set and communicate daily mobility goal to care team and patient/family/caregiver. - Collaborate with rehabilitation services on mobility goals if consulted- Perform Range of Motion 3 times a day.- Reposition patient every 2 hours.- Dangle patient 3 times a day- Stand patient 3 times a day- Ambulate patient 3 times a day- Out of bed to chair 3 times a day - Out of bed for meals 3 times a day- Out of bed for toileting- Record patient progress and toleration of activity level   Outcome: Progressing     Problem: DISCHARGE PLANNING  Goal: Discharge to home or other facility with appropriate resources  Description: INTERVENTIONS:- Identify barriers to discharge w/patient and caregiver- Arrange for needed discharge resources and transportation as appropriate- Identify discharge learning needs  (meds, wound care, etc.)- Arrange for interpretive services to assist at discharge as needed- Refer to Case Management Department for coordinating discharge planning if the patient needs post-hospital services based on physician/advanced practitioner order or complex needs related to functional status, cognitive ability, or social support system  Outcome: Progressing     Problem: Knowledge Deficit  Goal: Patient/family/caregiver demonstrates understanding of disease process, treatment plan, medications, and discharge instructions  Description: Complete learning assessment and assess knowledge base.Interventions:- Provide teaching at level of understanding- Provide teaching via preferred learning methods  Outcome: Progressing     Problem: CARDIOVASCULAR - ADULT  Goal: Maintains optimal cardiac output and hemodynamic stability  Description: INTERVENTIONS:- Monitor I/O, vital signs and rhythm- Monitor for S/S and trends of decreased cardiac output- Administer and titrate ordered vasoactive medications to optimize hemodynamic stability- Assess quality of pulses, skin color and temperature- Assess for signs of decreased coronary artery perfusion- Instruct patient to report change in severity of symptoms  Outcome: Progressing  Goal: Absence of cardiac dysrhythmias or at baseline rhythm  Description: INTERVENTIONS:- Continuous cardiac monitoring, vital signs, obtain 12 lead EKG if ordered- Administer antiarrhythmic and heart rate control medications as ordered- Monitor electrolytes and administer replacement therapy as ordered  Outcome: Progressing     Problem: Prexisting or High Potential for Compromised Skin Integrity  Goal: Skin integrity is maintained or improved  Description: INTERVENTIONS:- Identify patients at risk for skin breakdown- Assess and monitor skin integrity- Assess and monitor nutrition and hydration status- Monitor labs - Assess for incontinence - Turn and reposition patient- Assist with  mobility/ambulation- Relieve pressure over bony prominences- Avoid friction and shearing- Provide appropriate hygiene as needed including keeping skin clean and dry- Evaluate need for skin moisturizer/barrier cream- Collaborate with interdisciplinary team - Patient/family teaching- Consider wound care consult   Outcome: Progressing

## 2025-04-17 NOTE — PLAN OF CARE
Problem: OCCUPATIONAL THERAPY ADULT  Goal: Performs self-care activities at highest level of function for planned discharge setting.  See evaluation for individualized goals.  Description: Treatment Interventions: ADL retraining, Functional transfer training, UE strengthening/ROM, Endurance training, Patient/family training, Energy conservation, Activityengagement       See flowsheet documentation for full assessment, interventions and recommendations.   Note: Limitation: Decreased ADL status, Decreased UE strength, Decreased Safe judgement during ADL, Decreased endurance, Decreased self-care trans, Decreased high-level ADLs  Prognosis: Good  Assessment: Patient participated in Skilled OT session this date with interventions consisting of ADL re training with the use of correct body mechnaics, safety awareness and fall prevention techniques,  therapeutic activities to: increase activity tolerance, increase postural control, increase trunk control, and increase OOB/ sitting tolerance . Patient agreeable to OT treatment session, upon arrival patient was found seated OOB to Recliner.  In comparison to previous session, patient with improvements in functional mobility and endurance . Patient requiring verbal cues for correct technique and frequent rest periods. Patient continues to be functioning below baseline level, occupational performance remains limited secondary to factors listed above and increased risk for falls and injury.   From OT standpoint, recommendation at time of d/c would with moderate intensity OT resources.   Patient to benefit from continued Occupational Therapy treatment while in the hospital to address deficits as defined above and maximize level of functional independence with ADLs and functional mobility.     Rehab Resource Intensity Level, OT: II (Moderate Resource Intensity)     Melanie Martin OTR/L

## 2025-04-17 NOTE — ASSESSMENT & PLAN NOTE
POA with creatinine 1.62  Baseline creatinine appears to be around 0.8-0.9  Secondary to dehydration and sepsis-see plan for sepsis  Urine sodium less than 10  Appreciate input of nephrology who are following  Home losartan/diuretics on hold  Renal ultrasound negative for hydronephrosis, suggest medical renal disease  Treated with gentle fluids  Monitor intake and output  SONYA has resolved

## 2025-04-17 NOTE — ASSESSMENT & PLAN NOTE
Patient presented with a mechanical fall and worsening right lower extremity erythema.  Started to have right lower extremity erythema around Wednesday, fall on Saturday.  Clinical media reviewed with blisters noted on the upper posterior and medial thigh with swelling and erythema of his entire right lower extremity.  Has history of venous stasis ulcerations in the right lower extremity and was recently discharged from wound care.  CT of the right lower extremity without soft tissue gas or abscess.  He does have swollen right knee with minor limitation in range of motion but overall denies knee pain. This is improving. Dry, cracked skin of his right lower extremity distally likely portal of entry.  Cellulitis appears streptococcal in origin. No hx of MRSA. D/w pt and dtr this may worsen clinically before it improves. Slowly improving.   Continue IV cefazolin  Serial skin exams and local wound care  Encourage LE elevation   Possible transition to PO abx in next 24-48h

## 2025-04-17 NOTE — ASSESSMENT & PLAN NOTE
Patient had fall at home prehospital  Continue fall precautions  PT OT recommend postacute rehab  Case management following making rehab referrals

## 2025-04-17 NOTE — ASSESSMENT & PLAN NOTE
POA with serum sodium 126  Patient has history of chronic hyponatremia  Appreciate nephrology who are following  Patient received gentle IV fluids  Serum sodium improved to 132 today  Continue to monitor fluid status closely  BMP a.m.

## 2025-04-17 NOTE — PLAN OF CARE
Problem: PAIN - ADULT  Goal: Verbalizes/displays adequate comfort level or baseline comfort level  Description: Interventions:- Encourage patient to monitor pain and request assistance- Assess pain using appropriate pain scale- Administer analgesics based on type and severity of pain and evaluate response- Implement non-pharmacological measures as appropriate and evaluate response- Consider cultural and social influences on pain and pain management- Notify physician/advanced practitioner if interventions unsuccessful or patient reports new pain  Outcome: Progressing     Problem: INFECTION - ADULT  Goal: Absence or prevention of progression during hospitalization  Description: INTERVENTIONS:- Assess and monitor for signs and symptoms of infection- Monitor lab/diagnostic results- Monitor all insertion sites, i.e. indwelling lines, tubes, and drains- Monitor endotracheal if appropriate and nasal secretions for changes in amount and color- [place] appropriate cooling/warming therapies per order- Administer medications as ordered- Instruct and encourage patient and family to use good hand hygiene technique- Identify and instruct in appropriate isolation precautions for identified infection/condition  Outcome: Progressing     Problem: SAFETY ADULT  Goal: Patient will remain free of falls  Description: INTERVENTIONS:- Educate patient/family on patient safety including physical limitations- Instruct patient to call for assistance with activity - Consult OT/PT to assist with strengthening/mobility - Keep Call bell within reach- Keep bed low and locked with side rails adjusted as appropriate- Keep care items and personal belongings within reach- Initiate and maintain comfort rounds- Make Fall Risk Sign visible to staff- Offer Toileting every 2 Hours, in advance of need- Initiate/Maintain bed alarm- Obtain necessary fall risk management equipment:- Apply yellow socks and bracelet for high fall risk patients- Consider  moving patient to room near nurses station  Outcome: Progressing     Problem: DISCHARGE PLANNING  Goal: Discharge to home or other facility with appropriate resources  Description: INTERVENTIONS:- Identify barriers to discharge w/patient and caregiver- Arrange for needed discharge resources and transportation as appropriate- Identify discharge learning needs (meds, wound care, etc.)- Arrange for interpretive services to assist at discharge as needed- Refer to Case Management Department for coordinating discharge planning if the patient needs post-hospital services based on physician/advanced practitioner order or complex needs related to functional status, cognitive ability, or social support system  Outcome: Progressing     Problem: CARDIOVASCULAR - ADULT  Goal: Maintains optimal cardiac output and hemodynamic stability  Description: INTERVENTIONS:- Monitor I/O, vital signs and rhythm- Monitor for S/S and trends of decreased cardiac output- Administer and titrate ordered vasoactive medications to optimize hemodynamic stability- Assess quality of pulses, skin color and temperature- Assess for signs of decreased coronary artery perfusion- Instruct patient to report change in severity of symptoms  Outcome: Progressing     Problem: Prexisting or High Potential for Compromised Skin Integrity  Goal: Skin integrity is maintained or improved  Description: INTERVENTIONS:- Identify patients at risk for skin breakdown- Assess and monitor skin integrity- Assess and monitor nutrition and hydration status- Monitor labs - Assess for incontinence - Turn and reposition patient- Assist with mobility/ambulation- Relieve pressure over bony prominences- Avoid friction and shearing- Provide appropriate hygiene as needed including keeping skin clean and dry- Evaluate need for skin moisturizer/barrier cream- Collaborate with interdisciplinary team - Patient/family teaching- Consider wound care consult   Outcome: Progressing

## 2025-04-17 NOTE — ASSESSMENT & PLAN NOTE
Noted to be in rapid A-fib while in the ER  Likely related to sepsis  Received IV Cardizem in the ED followed by Cardizem drip which was discontinued 4/14   Converted to normal sinus rhythm post Cardizem drip  Continue metoprolol at current dosing   Coumadin remains on hold given supratherapeutic INR which is improving

## 2025-04-18 ENCOUNTER — APPOINTMENT (INPATIENT)
Dept: CT IMAGING | Facility: HOSPITAL | Age: OVER 89
DRG: 872 | End: 2025-04-18
Payer: MEDICARE

## 2025-04-18 LAB
ANION GAP SERPL CALCULATED.3IONS-SCNC: 5 MMOL/L (ref 4–13)
BUN SERPL-MCNC: 24 MG/DL (ref 5–25)
CALCIUM SERPL-MCNC: 8.6 MG/DL (ref 8.4–10.2)
CHLORIDE SERPL-SCNC: 101 MMOL/L (ref 96–108)
CO2 SERPL-SCNC: 25 MMOL/L (ref 21–32)
CREAT SERPL-MCNC: 0.81 MG/DL (ref 0.6–1.3)
ERYTHROCYTE [DISTWIDTH] IN BLOOD BY AUTOMATED COUNT: 14.1 % (ref 11.6–15.1)
GFR SERPL CREATININE-BSD FRML MDRD: 76 ML/MIN/1.73SQ M
GLUCOSE SERPL-MCNC: 98 MG/DL (ref 65–140)
HCT VFR BLD AUTO: 30.6 % (ref 36.5–49.3)
HGB BLD-MCNC: 10.2 G/DL (ref 12–17)
INR PPP: 2.59 (ref 0.85–1.19)
MCH RBC QN AUTO: 29.9 PG (ref 26.8–34.3)
MCHC RBC AUTO-ENTMCNC: 33.3 G/DL (ref 31.4–37.4)
MCV RBC AUTO: 90 FL (ref 82–98)
PLATELET # BLD AUTO: 365 THOUSANDS/UL (ref 149–390)
PMV BLD AUTO: 9 FL (ref 8.9–12.7)
POTASSIUM SERPL-SCNC: 4.4 MMOL/L (ref 3.5–5.3)
PROCALCITONIN SERPL-MCNC: 1.32 NG/ML
PROTHROMBIN TIME: 28 SECONDS (ref 12.3–15)
RBC # BLD AUTO: 3.41 MILLION/UL (ref 3.88–5.62)
SODIUM SERPL-SCNC: 131 MMOL/L (ref 135–147)
WBC # BLD AUTO: 11.06 THOUSAND/UL (ref 4.31–10.16)

## 2025-04-18 PROCEDURE — 94760 N-INVAS EAR/PLS OXIMETRY 1: CPT

## 2025-04-18 PROCEDURE — 71250 CT THORAX DX C-: CPT

## 2025-04-18 PROCEDURE — 84145 PROCALCITONIN (PCT): CPT

## 2025-04-18 PROCEDURE — G0545 PR INHERENT VISIT TO INPT: HCPCS | Performed by: STUDENT IN AN ORGANIZED HEALTH CARE EDUCATION/TRAINING PROGRAM

## 2025-04-18 PROCEDURE — 85610 PROTHROMBIN TIME: CPT

## 2025-04-18 PROCEDURE — 94664 DEMO&/EVAL PT USE INHALER: CPT

## 2025-04-18 PROCEDURE — 80048 BASIC METABOLIC PNL TOTAL CA: CPT

## 2025-04-18 PROCEDURE — 97530 THERAPEUTIC ACTIVITIES: CPT

## 2025-04-18 PROCEDURE — 85027 COMPLETE CBC AUTOMATED: CPT

## 2025-04-18 PROCEDURE — 97110 THERAPEUTIC EXERCISES: CPT

## 2025-04-18 PROCEDURE — 99232 SBSQ HOSP IP/OBS MODERATE 35: CPT | Performed by: STUDENT IN AN ORGANIZED HEALTH CARE EDUCATION/TRAINING PROGRAM

## 2025-04-18 PROCEDURE — 99232 SBSQ HOSP IP/OBS MODERATE 35: CPT

## 2025-04-18 PROCEDURE — 94640 AIRWAY INHALATION TREATMENT: CPT

## 2025-04-18 RX ORDER — SODIUM PHOSPHATE, DIBASIC AND SODIUM PHOSPHATE, MONOBASIC 3.5; 9.5 G/66ML; G/66ML
1 ENEMA RECTAL ONCE
Status: COMPLETED | OUTPATIENT
Start: 2025-04-18 | End: 2025-04-18

## 2025-04-18 RX ORDER — BISACODYL 10 MG
10 SUPPOSITORY, RECTAL RECTAL ONCE
Status: COMPLETED | OUTPATIENT
Start: 2025-04-18 | End: 2025-04-18

## 2025-04-18 RX ORDER — WARFARIN SODIUM 2 MG/1
2 TABLET ORAL
Status: COMPLETED | OUTPATIENT
Start: 2025-04-18 | End: 2025-04-18

## 2025-04-18 RX ORDER — OXYCODONE HYDROCHLORIDE 10 MG/1
10 TABLET ORAL EVERY 4 HOURS PRN
Refills: 0 | Status: DISCONTINUED | OUTPATIENT
Start: 2025-04-18 | End: 2025-04-19 | Stop reason: HOSPADM

## 2025-04-18 RX ORDER — OXYCODONE HYDROCHLORIDE 5 MG/1
5 TABLET ORAL EVERY 4 HOURS PRN
Refills: 0 | Status: DISCONTINUED | OUTPATIENT
Start: 2025-04-18 | End: 2025-04-19 | Stop reason: HOSPADM

## 2025-04-18 RX ORDER — METHYLPREDNISOLONE SODIUM SUCCINATE 125 MG/2ML
80 INJECTION, POWDER, LYOPHILIZED, FOR SOLUTION INTRAMUSCULAR; INTRAVENOUS ONCE
Status: COMPLETED | OUTPATIENT
Start: 2025-04-18 | End: 2025-04-18

## 2025-04-18 RX ADMIN — CEFAZOLIN SODIUM 2000 MG: 2 SOLUTION INTRAVENOUS at 02:13

## 2025-04-18 RX ADMIN — FUROSEMIDE 20 MG: 20 TABLET ORAL at 09:53

## 2025-04-18 RX ADMIN — BISACODYL 10 MG: 10 SUPPOSITORY RECTAL at 14:53

## 2025-04-18 RX ADMIN — CEFAZOLIN SODIUM 2000 MG: 2 SOLUTION INTRAVENOUS at 09:52

## 2025-04-18 RX ADMIN — PANTOPRAZOLE SODIUM 20 MG: 20 TABLET, DELAYED RELEASE ORAL at 05:29

## 2025-04-18 RX ADMIN — METHYLPREDNISOLONE SODIUM SUCCINATE 80 MG: 125 INJECTION, POWDER, FOR SOLUTION INTRAMUSCULAR; INTRAVENOUS at 14:51

## 2025-04-18 RX ADMIN — POLYETHYLENE GLYCOL 3350 17 G: 17 POWDER, FOR SOLUTION ORAL at 05:29

## 2025-04-18 RX ADMIN — GUAIFENESIN 600 MG: 600 TABLET ORAL at 09:53

## 2025-04-18 RX ADMIN — BUDESONIDE AND FORMOTEROL FUMARATE DIHYDRATE 2 PUFF: 160; 4.5 AEROSOL RESPIRATORY (INHALATION) at 09:59

## 2025-04-18 RX ADMIN — LATANOPROST 1 DROP: 50 SOLUTION OPHTHALMIC at 22:19

## 2025-04-18 RX ADMIN — MONTELUKAST 10 MG: 10 TABLET, FILM COATED ORAL at 09:53

## 2025-04-18 RX ADMIN — PREDNISOLONE ACETATE 1 DROP: 10 SUSPENSION/ DROPS OPHTHALMIC at 18:31

## 2025-04-18 RX ADMIN — ALBUTEROL SULFATE 2.5 MG: 2.5 SOLUTION RESPIRATORY (INHALATION) at 13:54

## 2025-04-18 RX ADMIN — CEFAZOLIN SODIUM 2000 MG: 2 SOLUTION INTRAVENOUS at 18:25

## 2025-04-18 RX ADMIN — BUDESONIDE AND FORMOTEROL FUMARATE DIHYDRATE 2 PUFF: 160; 4.5 AEROSOL RESPIRATORY (INHALATION) at 18:25

## 2025-04-18 RX ADMIN — ACETAMINOPHEN 650 MG: 325 TABLET ORAL at 15:02

## 2025-04-18 RX ADMIN — SODIUM PHOSPHATE, DIBASIC AND SODIUM PHOSPHATE, MONOBASIC 1 ENEMA: 3.5; 9.5 ENEMA RECTAL at 19:32

## 2025-04-18 RX ADMIN — GUAIFENESIN 600 MG: 600 TABLET ORAL at 22:19

## 2025-04-18 RX ADMIN — PREDNISOLONE ACETATE 1 DROP: 10 SUSPENSION/ DROPS OPHTHALMIC at 09:59

## 2025-04-18 RX ADMIN — LIDOCAINE 5% 1 PATCH: 700 PATCH TOPICAL at 09:53

## 2025-04-18 RX ADMIN — METOPROLOL TARTRATE 25 MG: 25 TABLET, FILM COATED ORAL at 09:53

## 2025-04-18 RX ADMIN — METOPROLOL TARTRATE 25 MG: 25 TABLET, FILM COATED ORAL at 22:19

## 2025-04-18 RX ADMIN — DORZOLAMIDE HCL 1 DROP: 20 SOLUTION/ DROPS OPHTHALMIC at 22:19

## 2025-04-18 RX ADMIN — WARFARIN SODIUM 2 MG: 2 TABLET ORAL at 18:25

## 2025-04-18 RX ADMIN — DORZOLAMIDE HCL 1 DROP: 20 SOLUTION/ DROPS OPHTHALMIC at 09:54

## 2025-04-18 NOTE — ASSESSMENT & PLAN NOTE
Noted to be in rapid A-fib while in the ER  Likely related to sepsis  S/p Cardizem drip, converted to normal sinus rhythm, discontinued   Rate controlled   Continue metoprolol at current dosing   Coumadin is been on hold given supratherapeutic INR-INR 2.59 today  Will give 2 mg Coumadin today  Repeat INR in a.m.

## 2025-04-18 NOTE — ASSESSMENT & PLAN NOTE
Present on admission with tachycardia, leukocytosis secondary to right lower extremity cellulitis  CT right lower extremity suggested cellulitis, no evidence of soft tissue gas  Lower extremity Doppler negative for DVT  Blood cultures x 2 no growth after 72 hours  Procalcitonin 15.61-->10.82-->4.70-->2.23-->1.32  Lactic acid 1.7  Appreciate ID who are following-continue Ancef (renally dosed) today with plan to transition to oral regimen tomorrow  Chest x-ray yesterday acute pulmonary pathology  Patient reports wheezing and shortness of breath today will obtain CT chest without contrast  Mild leukocytosis today, afebrile  CBC a.m.

## 2025-04-18 NOTE — ASSESSMENT & PLAN NOTE
POA with serum sodium 126  Patient has history of chronic hyponatremia  Appreciate nephrology who are following  Serum sodium stable 131

## 2025-04-18 NOTE — ASSESSMENT & PLAN NOTE
See plan for sepsis  CT right lower extremity: Subcutaneous edema and skin thickening extending from the proximal thigh through the ankle, which can be seen with cellulitis. No fluid collection within the limits of an unenhanced exam. No soft tissue gas  Appreciate ID following-per discussion we will continue Ancef today with plan to transition to oral regimen tomorrow  Appreciate wound care who are following

## 2025-04-18 NOTE — ASSESSMENT & PLAN NOTE
Creatinine up to 1.6 on admission with baseline creatinine closer to 0.8.  Nephrology is following.  He is also hyponatremic.  Creatinine is improving. Estimated Creatinine Clearance: 56.5 mL/min (by C-G formula based on SCr of 0.81 mg/dL).  Will renally dose antibiotics as needed

## 2025-04-18 NOTE — PHYSICAL THERAPY NOTE
PT Treatment Note    NAME:  Enrico Chavira  DATE: 04/18/25    AGE:   89 y.o.  Mrn:   [id number]  ADMIT DX:  Cellulitis and abscess of leg [L03.119, L02.419]  Atrial fibrillation with RVR ([facility]) [I48.91]  Visit for wound check [Z51.89]  Septic shock ([facility]) [A41.9, R65.21]  Fall in elderly patient [R29.6]  Performed at least 2 patient identifiers during session: Name and ID bracelet         04/18/25 1201   PT Last Visit   PT Visit Date 04/18/25   Note Type   Note Type Treatment   Pain Assessment   Pain Assessment Tool 0-10   Pain Score No Pain   Restrictions/Precautions   Weight Bearing Precautions Per Order No   Other Precautions Chair Alarm;Bed Alarm;Fall Risk   General   Chart Reviewed Yes   Family/Caregiver Present Yes   Cognition   Overall Cognitive Status WFL   Arousal/Participation Alert   Attention Within functional limits   Orientation Level Oriented X4   Memory Within functional limits   Following Commands Follows one step commands without difficulty   Bed Mobility   Supine to Sit 3  Moderate assistance   Additional items Assist x 1;Increased time required;HOB elevated;Verbal cues;LE management   Transfers   Sit to Stand 3  Moderate assistance   Additional items Assist x 2;Increased time required;Verbal cues   Stand to Sit 3  Moderate assistance   Additional items Assist x 2;Increased time required;Verbal cues   Stand pivot 3  Moderate assistance   Additional items Assist x 2;Increased time required;Verbal cues  (with RW; knee weakening noted; slow movement)   Additional Comments pt denied dizziness with transitional movement   Balance   Static Sitting Fair +   Dynamic Sitting Fair   Static Standing Fair -   Dynamic Standing Poor +   Endurance Deficit   Endurance Deficit Yes   Endurance Deficit Description KHALIL noted   Activity Tolerance   Activity Tolerance Patient limited by fatigue   Exercises   Heelslides Sitting;15 reps;AROM;Bilateral   Glute Sets Sitting;15 reps;AROM;Bilateral   Hip Flexion  Sitting;15 reps;AROM;Bilateral   Hip Abduction Sitting;15 reps;AROM;Bilateral   Hip Adduction Sitting;15 reps;AROM;Bilateral   Knee AROM Long Arc Quad Sitting;15 reps;AROM;Bilateral   Ankle Pumps Sitting;15 reps;AROM;Bilateral   Assessment   Prognosis Fair   Assessment Pt seen for PT treatment session this date with interventions consisting of therapeutic exercise to improve endurance to improve functional mobility and therapeutic activity to improve transfers and increase activity tolerance with functional mobility to decrease fall risk. Pt agreeable to PT treatment session upon arrival, pt found supine in bed, in no apparent distress. Since previous session, pt has made fair progress as evidenced by requiring increased amount of assistance with mobility  Barriers during this session include SOB and fatigue.  Pt continues to be functioning below baseline level, and remains limited 2* factors listed above and including decreased strength, impaired activity tolerance, impaired  balance, decreased endurance, and decreased mobility.  Pt prognosis for achieving goals is fair, pending pt progress with hospitalization/medical status improvements, and indicated by motivated to participate in therapy, ability to follow cues, supportive family, and continued required assistance. PT will continue to see pt during current hospitalization in order to address the deficits listed above and provide interventions consistent w/ POC in effort to achieve goals. Current goals and POC remain appropriate, pt continues to have rehab potential  Upon conclusion pt seated OOB in recliner. The patient's AM-PAC Basic Mobility Inpatient Short Form Raw Score is 9.  A Raw score of less than or equal to 16 suggests the patient may benefit from discharge to post-acute rehabilitation services. Based on patient presentations and impairments, pt would most appropriately benefit from Level 2 resource intensity upon discharge.  Please also refer to the  recommendation of the Physical Therapist for safe discharge planning. RN verbalized pt appropriate for PT session.   Goals   Patient Goals to get better   LTG Expiration Date 04/25/25   PT Treatment Day 3   Plan   Treatment/Interventions Functional transfer training;Bed mobility;Endurance training;Therapeutic exercise;Equipment eval/education   Progress Progressing toward goals   PT Frequency 3-5x/wk   Discharge Recommendation   Rehab Resource Intensity Level, PT II (Moderate Resource Intensity)   Equipment Recommended Walker   Walker Package Recommended Wheeled walker   AM-PAC Basic Mobility Inpatient   Turning in Flat Bed Without Bedrails 3   Lying on Back to Sitting on Edge of Flat Bed Without Bedrails 2   Moving Bed to Chair 1   Standing Up From Chair Using Arms 1   Walk in Room 1   Climb 3-5 Stairs With Railing 1   Basic Mobility Inpatient Raw Score 9   Turning Head Towards Sound 4   Follow Simple Instructions 4   Low Function Basic Mobility Raw Score  17   Low Function Basic Mobility Standardized Score  27.46   [facility] Highest Level Of Mobility   JH-HLM Goal 3: Sit at edge of bed   JH-HLM Achieved 4: Move to chair/commode       Time In: 1135  Time Out: 1201  Total Treatment Minutes: 26    Phuong De Jesus, PT

## 2025-04-18 NOTE — PLAN OF CARE
Problem: PAIN - ADULT  Goal: Verbalizes/displays adequate comfort level or baseline comfort level  Description: Interventions:- Encourage patient to monitor pain and request assistance- Assess pain using appropriate pain scale- Administer analgesics based on type and severity of pain and evaluate response- Implement non-pharmacological measures as appropriate and evaluate response- Consider cultural and social influences on pain and pain management- Notify physician/advanced practitioner if interventions unsuccessful or patient reports new pain  Outcome: Progressing     Problem: INFECTION - ADULT  Goal: Absence or prevention of progression during hospitalization  Description: INTERVENTIONS:- Assess and monitor for signs and symptoms of infection- Monitor lab/diagnostic results- Monitor all insertion sites, i.e. indwelling lines, tubes, and drains- Monitor endotracheal if appropriate and nasal secretions for changes in amount and color- [place] appropriate cooling/warming therapies per order- Administer medications as ordered- Instruct and encourage patient and family to use good hand hygiene technique- Identify and instruct in appropriate isolation precautions for identified infection/condition  Outcome: Progressing  Goal: Absence of fever/infection during neutropenic period  Description: INTERVENTIONS:- Monitor WBC  Outcome: Progressing     Problem: SAFETY ADULT  Goal: Patient will remain free of falls  Description: INTERVENTIONS:- Educate patient/family on patient safety including physical limitations- Instruct patient to call for assistance with activity - Consult OT/PT to assist with strengthening/mobility - Keep Call bell within reach- Keep bed low and locked with side rails adjusted as appropriate- Keep care items and personal belongings within reach- Initiate and maintain comfort rounds- Make Fall Risk Sign visible to staff- Offer Toileting every 2 Hours, in advance of need- Initiate/Maintain bed alarm-  Obtain necessary fall risk management equipment:- Apply yellow socks and bracelet for high fall risk patients- Consider moving patient to room near nurses station  INTERVENTIONS:- Educate patient/family on patient safety including physical limitations- Instruct patient to call for assistance with activity - Consult OT/PT to assist with strengthening/mobility - Keep Call bell within reach- Keep bed low and locked with side rails adjusted as appropriate- Keep care items and personal belongings within reach- Initiate and maintain comfort rounds- Make Fall Risk Sign visible to staff- Offer Toileting every 2 Hours, in advance of need- Initiate/Maintain bed alarm- Obtain necessary fall risk management equipment: socks- Apply yellow socks and bracelet for high fall risk patients- Consider moving patient to room near nurses station  Outcome: Progressing  Goal: Maintain or return to baseline ADL function  Description: INTERVENTIONS:-  Assess patient's ability to carry out ADLs; assess patient's baseline for ADL function and identify physical deficits which impact ability to perform ADLs (bathing, care of mouth/teeth, toileting, grooming, dressing, etc.)- Assess/evaluate cause of self-care deficits - Assess range of motion- Assess patient's mobility; develop plan if impaired- Assess patient's need for assistive devices and provide as appropriate- Encourage maximum independence but intervene and supervise when necessary- Involve family in performance of ADLs- Assess for home care needs following discharge - Consider OT consult to assist with ADL evaluation and planning for discharge- Provide patient education as appropriate  Outcome: Progressing  Goal: Maintains/Returns to pre admission functional level  Description: INTERVENTIONS:- Perform AM-PAC 6 Click Basic Mobility/ Daily Activity assessment daily.- Set and communicate daily mobility goal to care team and patient/family/caregiver. - Collaborate with rehabilitation  services on mobility goals if consulted- Perform Range of Motion 3 times a day.- Reposition patient every 2 hours.- Dangle patient 3 times a day- Stand patient 3 times a day- Ambulate patient 3 times a day- Out of bed to chair 3 times a day - Out of bed for meals 3 times a day- Out of bed for toileting- Record patient progress and toleration of activity level   Outcome: Progressing     Problem: DISCHARGE PLANNING  Goal: Discharge to home or other facility with appropriate resources  Description: INTERVENTIONS:- Identify barriers to discharge w/patient and caregiver- Arrange for needed discharge resources and transportation as appropriate- Identify discharge learning needs (meds, wound care, etc.)- Arrange for interpretive services to assist at discharge as needed- Refer to Case Management Department for coordinating discharge planning if the patient needs post-hospital services based on physician/advanced practitioner order or complex needs related to functional status, cognitive ability, or social support system  Outcome: Progressing     Problem: Knowledge Deficit  Goal: Patient/family/caregiver demonstrates understanding of disease process, treatment plan, medications, and discharge instructions  Description: Complete learning assessment and assess knowledge base.Interventions:- Provide teaching at level of understanding- Provide teaching via preferred learning methods  Outcome: Progressing     Problem: CARDIOVASCULAR - ADULT  Goal: Maintains optimal cardiac output and hemodynamic stability  Description: INTERVENTIONS:- Monitor I/O, vital signs and rhythm- Monitor for S/S and trends of decreased cardiac output- Administer and titrate ordered vasoactive medications to optimize hemodynamic stability- Assess quality of pulses, skin color and temperature- Assess for signs of decreased coronary artery perfusion- Instruct patient to report change in severity of symptoms  Outcome: Progressing  Goal: Absence of cardiac  dysrhythmias or at baseline rhythm  Description: INTERVENTIONS:- Continuous cardiac monitoring, vital signs, obtain 12 lead EKG if ordered- Administer antiarrhythmic and heart rate control medications as ordered- Monitor electrolytes and administer replacement therapy as ordered  Outcome: Progressing     Problem: Prexisting or High Potential for Compromised Skin Integrity  Goal: Skin integrity is maintained or improved  Description: INTERVENTIONS:- Identify patients at risk for skin breakdown- Assess and monitor skin integrity- Assess and monitor nutrition and hydration status- Monitor labs - Assess for incontinence - Turn and reposition patient- Assist with mobility/ambulation- Relieve pressure over bony prominences- Avoid friction and shearing- Provide appropriate hygiene as needed including keeping skin clean and dry- Evaluate need for skin moisturizer/barrier cream- Collaborate with interdisciplinary team - Patient/family teaching- Consider wound care consult   Outcome: Progressing     Problem: Potential for Falls  Goal: Patient will remain free of falls  Description: INTERVENTIONS:- Educate patient/family on patient safety including physical limitations- Instruct patient to call for assistance with activity - Consult OT/PT to assist with strengthening/mobility - Keep Call bell within reach- Keep bed low and locked with side rails adjusted as appropriate- Keep care items and personal belongings within reach- Initiate and maintain comfort rounds- Make Fall Risk Sign visible to staff- Offer Toileting every 2 Hours, in advance of need- Initiate/Maintain bed alarm- Obtain necessary fall risk management equipment:- Apply yellow socks and bracelet for high fall risk patients- Consider moving patient to room near nurses station  INTERVENTIONS:- Educate patient/family on patient safety including physical limitations- Instruct patient to call for assistance with activity - Consult OT/PT to assist with  strengthening/mobility - Keep Call bell within reach- Keep bed low and locked with side rails adjusted as appropriate- Keep care items and personal belongings within reach- Initiate and maintain comfort rounds- Make Fall Risk

## 2025-04-18 NOTE — PROGRESS NOTES
Progress Note - Hospitalist   Name: Enrico Chavira 89 y.o. male I MRN: [id number]  Unit/Bed#: -01 I Date of Admission: 4/14/2025   Date of Service: 4/18/2025 I Hospital Day: 4    Assessment & Plan  Sepsis (HCC)  Present on admission with tachycardia, leukocytosis secondary to right lower extremity cellulitis  CT right lower extremity suggested cellulitis, no evidence of soft tissue gas  Lower extremity Doppler negative for DVT  Blood cultures x 2 no growth after 72 hours  Procalcitonin 15.61-->10.82-->4.70-->2.23-->1.32  Lactic acid 1.7  Appreciate ID who are following-continue Ancef (renally dosed) today with plan to transition to oral regimen tomorrow  Chest x-ray yesterday acute pulmonary pathology  Patient reports wheezing and shortness of breath today will obtain CT chest without contrast  Mild leukocytosis today, afebrile  CBC a.m.  Asthma-COPD overlap syndrome (HCC)  Continue prehospital nebulizer  Continue prehospital Singulair  Continue Mucinex for cough  Chest x-ray 4/17 no acute pulmonary pathology  Patient noted to have wheezing on exam today, will short of breath  Gave single dose of Solu-Medrol milligrams x 1  Will check CT chest without contrast  Respiratory protocol  Atrial fibrillation with rapid ventricular response (HCC)  Noted to be in rapid A-fib while in the ER  Likely related to sepsis  S/p Cardizem drip, converted to normal sinus rhythm, discontinued   Rate controlled   Continue metoprolol at current dosing   Coumadin is been on hold given supratherapeutic INR-INR 2.59 today  Will give 2 mg Coumadin today  Repeat INR in a.m.    Supratherapeutic INR  POA  Patient is on Coumadin for A-fib-has been on hold  No signs of bleeding  INR peaked at 8.71, today 2.59  Will give Coumadin 2 mg today  Repeat INR a.m.  Cellulitis of right lower extremity  See plan for sepsis  CT right lower extremity: Subcutaneous edema and skin thickening extending from the proximal thigh through the ankle,  which can be seen with cellulitis. No fluid collection within the limits of an unenhanced exam. No soft tissue gas  Appreciate ID following-per discussion we will continue Ancef today with plan to transition to oral regimen tomorrow  Appreciate wound care who are following    Hyponatremia  POA with serum sodium 126  Patient has history of chronic hyponatremia  Appreciate nephrology who are following  Serum sodium stable 131  Acute kidney injury (HCC)  POA with creatinine 1.62  Baseline creatinine appears to be around 0.8-0.9  Secondary to dehydration and sepsis-see plan for sepsis  Urine sodium less than 10  S/p treatment with IV fluids  Appreciate input of nephrology who following  Home losartan/diuretics on hold  Renal ultrasound negative for hydronephrosis, suggest medical renal disease  SONYA now resolved  Abnormality of gait  Patient had fall at home prehospital  Continue fall precautions  PT OT recommend postacute rehab  Case management following-accepted by  ARU when medically stable  Swelling of right knee  Patient is being treated for sepsis of the right lower extremity see plan for same  Appreciate ID following  Continue to monitor    VTE Pharmacologic Prophylaxis: VTE Score: 7 High Risk (Score >/= 5) - Pharmacological DVT Prophylaxis Contraindicated. Sequential Compression Devices Ordered.    Mobility:   Basic Mobility Inpatient Raw Score: 9  JH-HLM Goal: 3: Sit at edge of bed  JH-HLM Achieved: 4: Move to chair/commode  JH-HLM Goal achieved. Continue to encourage appropriate mobility.    Patient Centered Rounds: I performed bedside rounds with nursing staff today.   Discussions with Specialists or Other Care Team Provider: ID, nephrology, PT OT, nursing, case management    Education and Discussions with Family / Patient: Updated  (daughter) at bedside.    Current Length of Stay: 4 day(s)  Current Patient Status: Inpatient   Certification Statement: The patient will continue to require  additional inpatient hospital stay due to continue treatment of right lower extremity cellulitis with IV antibiotics, shortness of breath and wheezing today continue to monitor obtaining CT chest without contrast    Discharge Plan: Patient was accepted at [facility].  Patient complained of shortness of breath this afternoon with wheezing.  No etiology on the chest x-ray.  Will do a CT chest without contrast.  His INR is now in acceptable range will give Coumadin 2 mg today repeat labs in the AM.  Hopeful discharge to ARU tomorrow.    Code Status: Level 3 - DNAR and DNI    Subjective   Denies any dizziness, lightheaded, chest pain or palpitations.  Denies pain or discomfort.  Daughter at bedside reports redness right lower extremity improving  Objective :  Temp:  [98.1 °F (36.7 °C)-99.2 °F (37.3 °C)] 98.1 °F (36.7 °C)  HR:  [] 103  BP: (125-167)/(73-89) 163/84  Resp:  [16-18] 18  SpO2:  [90 %-99 %] 97 %  O2 Device: None (Room air)    Body mass index is 29.68 kg/m².     Input and Output Summary (last 24 hours):     Intake/Output Summary (Last 24 hours) at 4/18/2025 1642  Last data filed at 4/18/2025 1426  Gross per 24 hour   Intake 1035 ml   Output 1575 ml   Net -540 ml       Physical Exam  Vitals reviewed.   Constitutional:       General: He is not in acute distress.     Appearance: He is well-developed.   HENT:      Head: Normocephalic and atraumatic.   Cardiovascular:      Rate and Rhythm: Normal rate and regular rhythm.      Heart sounds: No murmur heard.  Pulmonary:      Effort: Pulmonary effort is normal. No respiratory distress.      Breath sounds: No wheezing or rales.   Abdominal:      General: Bowel sounds are normal. There is no distension.      Palpations: Abdomen is soft.      Tenderness: There is no abdominal tenderness. There is no guarding.   Musculoskeletal:         General: Swelling present.      Right lower leg: Edema present.   Skin:     General: Skin is warm and dry.      Capillary Refill:  Capillary refill takes less than 2 seconds.      Findings: Erythema present.      Comments: Right lower extremity pink discoloration with swelling, improving   Neurological:      General: No focal deficit present.      Mental Status: He is alert and oriented to person, place, and time. Mental status is at baseline.   Psychiatric:         Mood and Affect: Mood normal.         Behavior: Behavior normal.         Thought Content: Thought content normal.         Judgment: Judgment normal.           Lines/Drains:              Lab Results: I have reviewed the following results:   Results from last 7 days   Lab Units 04/18/25  0446 04/16/25  0426 04/15/25  0459 04/14/25  1410   WBC Thousand/uL 11.06*   < > 17.84* 29.35*   HEMOGLOBIN g/dL 10.2*   < > 11.0* 13.7   HEMATOCRIT % 30.6*   < > 32.6* 40.4   PLATELETS Thousands/uL 365   < > 222 267   BANDS PCT %  --   --   --  4   SEGS PCT %  --   --  89*  --    LYMPHO PCT %  --   --  5* 2*   MONO PCT %  --   --  5 3*   EOS PCT %  --   --  0 0    < > = values in this interval not displayed.     Results from last 7 days   Lab Units 04/18/25 0446 04/17/25 0504 04/16/25 0426   SODIUM mmol/L 131*   < > 130*   POTASSIUM mmol/L 4.4   < > 4.1   CHLORIDE mmol/L 101   < > 103   CO2 mmol/L 25   < > 22   BUN mg/dL 24   < > 46*   CREATININE mg/dL 0.81   < > 1.07   ANION GAP mmol/L 5   < > 5   CALCIUM mg/dL 8.6   < > 8.6   ALBUMIN g/dL  --   --  2.3*   TOTAL BILIRUBIN mg/dL  --   --  0.35   ALK PHOS U/L  --   --  72   ALT U/L  --   --  33   AST U/L  --   --  34   GLUCOSE RANDOM mg/dL 98   < > 110    < > = values in this interval not displayed.     Results from last 7 days   Lab Units 04/18/25 0446   INR  2.59*             Results from last 7 days   Lab Units 04/18/25  0446 04/17/25  0504 04/16/25  0426 04/15/25  0459 04/14/25  1410   LACTIC ACID mmol/L  --   --   --   --  1.7   PROCALCITONIN ng/ml 1.32* 2.23* 4.70* 10.82* 15.61*       Recent Cultures (last 7 days):   Results from last 7 days    Lab Units 04/14/25  1410   BLOOD CULTURE  No Growth at 72 hrs.  No Growth at 72 hrs.       Imaging Results Review: I reviewed radiology reports from this admission including: CT head, CT lumbar spine, CT right lower extremity.  Other Study Results Review: EKG was reviewed.     Last 24 Hours Medication List:     Current Facility-Administered Medications:     acetaminophen (TYLENOL) tablet 650 mg, Q6H PRN    albuterol inhalation solution 2.5 mg, Q6H PRN    budesonide-formoterol (SYMBICORT) 160-4.5 mcg/act inhaler 2 puff, BID    ceFAZolin (ANCEF) IVPB (premix in dextrose) 2,000 mg 50 mL, Q8H, Last Rate: 2,000 mg (04/18/25 0952)    dorzolamide (TRUSOPT) ophthalmic solution 1 drop, BID    furosemide (LASIX) tablet 20 mg, Daily    guaiFENesin (MUCINEX) 12 hr tablet 600 mg, Q12H CLARK    latanoprost (XALATAN) 0.005 % ophthalmic solution 1 drop, HS    lidocaine (LIDODERM) 5 % patch 1 patch, Daily    metoprolol tartrate (LOPRESSOR) tablet 25 mg, Q12H CLARK    montelukast (SINGULAIR) tablet 10 mg, Daily    ondansetron (ZOFRAN) injection 4 mg, Q6H PRN    oxyCODONE (ROXICODONE) immediate release tablet 10 mg, Q4H PRN    oxyCODONE (ROXICODONE) IR tablet 5 mg, Q4H PRN    pantoprazole (PROTONIX) EC tablet 20 mg, Early Morning    polyethylene glycol (MIRALAX) packet 17 g, Daily PRN    prednisoLONE acetate (PRED FORTE) 1 % ophthalmic suspension 1 drop, BID    warfarin (COUMADIN) tablet 2 mg, Once (warfarin)    Administrative Statements   Today, Patient Was Seen By: RODY Goodson  I have spent a total time of 40 minutes in caring for this patient on the day of the visit/encounter including Diagnostic results, Patient and family education, Counseling / Coordination of care, Documenting in the medical record, Reviewing/placing orders in the medical record (including tests, medications, and/or procedures), Obtaining or reviewing history  , and Communicating with other healthcare professionals .    **Please Note: This note may  have been constructed using a voice recognition system.**

## 2025-04-18 NOTE — ASSESSMENT & PLAN NOTE
Patient presented with a mechanical fall and worsening right lower extremity erythema.  Started to have right lower extremity erythema around Wednesday, fall on Saturday.  Clinical media reviewed with blisters noted on the upper posterior and medial thigh with swelling and erythema of his entire right lower extremity.  Has history of venous stasis ulcerations in the right lower extremity and was recently discharged from wound care.  CT of the right lower extremity without soft tissue gas or abscess.  He does have swollen right knee with minor limitation in range of motion but overall denies knee pain. This is improving. Dry, cracked skin of his right lower extremity distally likely portal of entry.  Cellulitis appears streptococcal in origin. No hx of MRSA. Slowly improving.   Continue IV cefazolin 2g q8h   Serial skin exams and local wound care  Encourage LE elevation   When otherwise medically stable, can transition to PO cefadroxil 1g BID to complete total 10 day course of abx

## 2025-04-18 NOTE — ASSESSMENT & PLAN NOTE
Continue prehospital nebulizer  Continue prehospital Singulair  Continue Mucinex for cough  Chest x-ray 4/17 no acute pulmonary pathology  Patient noted to have wheezing on exam today, will short of breath  Gave single dose of Solu-Medrol milligrams x 1  Will check CT chest without contrast  Respiratory protocol

## 2025-04-18 NOTE — ASSESSMENT & PLAN NOTE
Patient had fall at home prehospital  Continue fall precautions  PT OT recommend postacute rehab  Case management following-accepted by  ARU when medically stable

## 2025-04-18 NOTE — CASE MANAGEMENT
Case Management Discharge Planning Note    Patient name Enrico Chavira  Location /-01 MRN [id number]  : 1930 Date 2025       Current Admission Date: 2025  Current Admission Diagnosis:Sepsis ([facility])   Patient Active Problem List    Diagnosis Date Noted Date Diagnosed    Swelling of right knee 04/15/2025     Atrial fibrillation with rapid ventricular response ([facility]) 2025     Supratherapeutic INR 2025     Sepsis ([facility]) 2025     Cellulitis of right lower extremity 2025     Acute kidney injury (HCC) 2025     Venous stasis ulcer of other part of right lower leg with fat layer exposed with varicose veins ([facility]) 2025     Edema of both lower extremities due to peripheral venous insufficiency 2025     Venous stasis ulcer of right calf limited to breakdown of skin without varicose veins ([facility]) 2024     Hashimoto's thyroiditis 2024     Acquired hypothyroidism 2024     Other fatigue 2024     Shortness of breath 2022     Hyponatremia 2022     COVID-19 virus infection 2022     Hypercalcemia 02/10/2022     Influenza vaccine refused 2021     Endothelial corneal dystrophy of both eyes 2021     Mature cataract 2021     Primary open-angle glaucoma, bilateral, mild stage 2021     Pseudophakic bullous keratopathy of left eye 2021     GERD without esophagitis      Neoplasm of uncertain behavior of skin 2020     Encounter for long-term (current) use of medications 2020     Primary osteoarthritis of both knees 2020     Age-related cataract of both eyes 2020     Premature beats 2020     Chronic pulmonary embolism without acute cor pulmonale (HCC) 2020     Osteoarthritis of both knees 2020     Other seborrheic dermatitis 2020     Abnormality of gait 2020     Intrinsic eczema 2019     Prothrombin gene mutation (HCC) 2019     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     Essential hypertension, benign 09/03/2019     Dyslipidemia 09/03/2019     Heterozygous factor V Leiden mutation (HCC) 11/05/2018     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/05/2018     History of pulmonary embolism 11/01/2018     TB lung, latent 11/01/2018     Asthma-COPD overlap syndrome (HCC) 06/20/2012       LOS (days): 4  Geometric Mean LOS (GMLOS) (days): 3.5  Days to GMLOS:-0.4     OBJECTIVE:  Risk of Unplanned Readmission Score: 16.02     Current admission status: Inpatient   Preferred Pharmacy:   [id number] - [place], [place] - [address] [facility]  [address] [facility] [address]  Phone: [phone] Fax: [phone]    Primary Care Provider: Brodie Anne DO    Primary Insurance: MEDICARE  Secondary Insurance: [facility]    DISCHARGE DETAILS:  As per SLIM the pt will be finished with IVABX today.  Pt would be medically ready for DC to [facility] tomorrow. [facility]  [facility] at [facility].   Notified the pt, daughter and Isidra LALA of same.  Notified Katlin at [facility] via [place] text and AIDIN.                  Accepting Facility Name, City & State : [facility] [address] [facility]  Receiving Facility/Agency Phone Number: [phone]  Facility/Agency Fax Number: [phone]

## 2025-04-18 NOTE — CASE MANAGEMENT
Case Management Discharge Planning Note    Patient name Enrico Chavira  Location /-01 MRN [id number]  : 1930 Date 2025       Current Admission Date: 2025  Current Admission Diagnosis:Sepsis ([facility])   Patient Active Problem List    Diagnosis Date Noted Date Diagnosed    Swelling of right knee 04/15/2025     Atrial fibrillation with rapid ventricular response ([facility]) 2025     Supratherapeutic INR 2025     Sepsis ([facility]) 2025     Cellulitis of right lower extremity 2025     Acute kidney injury (HCC) 2025     Venous stasis ulcer of other part of right lower leg with fat layer exposed with varicose veins ([facility]) 2025     Edema of both lower extremities due to peripheral venous insufficiency 2025     Venous stasis ulcer of right calf limited to breakdown of skin without varicose veins ([facility]) 2024     Hashimoto's thyroiditis 2024     Acquired hypothyroidism 2024     Other fatigue 2024     Shortness of breath 2022     Hyponatremia 2022     COVID-19 virus infection 2022     Hypercalcemia 02/10/2022     Influenza vaccine refused 2021     Endothelial corneal dystrophy of both eyes 2021     Mature cataract 2021     Primary open-angle glaucoma, bilateral, mild stage 2021     Pseudophakic bullous keratopathy of left eye 2021     GERD without esophagitis      Neoplasm of uncertain behavior of skin 2020     Encounter for long-term (current) use of medications 2020     Primary osteoarthritis of both knees 2020     Age-related cataract of both eyes 2020     Premature beats 2020     Chronic pulmonary embolism without acute cor pulmonale (HCC) 2020     Osteoarthritis of both knees 2020     Other seborrheic dermatitis 2020     Abnormality of gait 2020     Intrinsic eczema 2019     Prothrombin gene mutation (HCC) 2019     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     Essential hypertension, benign 09/03/2019     Dyslipidemia 09/03/2019     Heterozygous factor V Leiden mutation (HCC) 11/05/2018     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/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     History of pulmonary embolism 11/01/2018     TB lung, latent 11/01/2018     Asthma-COPD overlap syndrome (HCC) 06/20/2012       LOS (days): 4  Geometric Mean LOS (GMLOS) (days): 3.5  Days to GMLOS:-0.3     OBJECTIVE:  Risk of Unplanned Readmission Score: 16.02         Current admission status: Inpatient   Preferred Pharmacy:   [id number] [place], [place] - [address]  [address] [facility] [place] [address]  Phone: [phone] Fax: [phone]    Primary Care Provider: Brodie Anne DO    Primary Insurance: MEDICARE  Secondary Insurance: [facility]    DISCHARGE DETAILS:       Freedom of Choice: Yes  Comments - Freedom of Choice: Pt chose [facility] from the accepting providers on choice list from [facility]  Pt is being followed by ID and will need to be transitioned to po.  Awaiting  results.  Pt sitting up in recliner eating.  Provided the STR and ARU lists from [facility] to chose.  Daughter at the bedside, they chose [facility].  Reserved in AIDIN.

## 2025-04-18 NOTE — PROGRESS NOTES
Progress Note - Infectious Disease   Name: Enrico Chavira 89 y.o. male I MRN: [id number]  Unit/Bed#: -01 I Date of Admission: 4/14/2025   Date of Service: 4/18/2025 I Hospital Day: 4    Assessment & Plan  Sepsis (HCC)  Tachycardia, leukocytosis, borderline hypotension.  Most likely due to right lower extremity cellulitis. Blood cultures are so far negative.  He is otherwise hemodynamically stable.   Continue IV antibiotics as below  Follow-up blood cultures  Trend temps and hemodynamics  Daily CBC DIF and CHEM to monitor response to treatment and for developing toxicities  Cellulitis of right lower extremity  Patient presented with a mechanical fall and worsening right lower extremity erythema.  Started to have right lower extremity erythema around Wednesday, fall on Saturday.  Clinical media reviewed with blisters noted on the upper posterior and medial thigh with swelling and erythema of his entire right lower extremity.  Has history of venous stasis ulcerations in the right lower extremity and was recently discharged from wound care.  CT of the right lower extremity without soft tissue gas or abscess.  He does have swollen right knee with minor limitation in range of motion but overall denies knee pain. This is improving. Dry, cracked skin of his right lower extremity distally likely portal of entry.  Cellulitis appears streptococcal in origin. No hx of MRSA. Slowly improving.   Continue IV cefazolin 2g q8h   Serial skin exams and local wound care  Encourage LE elevation   When otherwise medically stable, can transition to PO cefadroxil 1g BID to complete total 10 day course of abx   Swelling of right knee  Right knee swollen with erythema in the setting of cellulitis as above.  Small joint effusion noted on CT of the right lower extremity with advanced degenerative changes.  Consider possibility of developing septic joint. Fortunately this is improving. Will need to monitor closely.  Continue IV  antibiotics as above  Serial skin exams and local wound care  Acute kidney injury (HCC)  Creatinine up to 1.6 on admission with baseline creatinine closer to 0.8.  Nephrology is following.  He is also hyponatremic.  Creatinine is improving. Estimated Creatinine Clearance: 56.5 mL/min (by C-G formula based on SCr of 0.81 mg/dL).  Will renally dose antibiotics as needed  Abnormality of gait  Patient presented following a fall on Saturday.  Reported generalized fatigue and lower extremity weakness.  Atrial fibrillation with rapid ventricular response (HCC)  Required Cardizem drip on admission.  Heart rates now improved.    I have discussed the above management plan in detail with the primary service.     Antibiotics:  D5 IV abx   D4 IV cefazolin     Subjective   Patient has no fever, chills, sweats; no nausea, vomiting, diarrhea; stable chronic cough and shortness of breath; no pain in RLE. Swelling improving. Out of bed and ambulated halls yesterday. Tolerating the abx. No new symptoms.    Objective :  Temp:  [98.5 °F (36.9 °C)-99.2 °F (37.3 °C)] 99.2 °F (37.3 °C)  HR:  [] 109  BP: (125-167)/(73-89) 143/78  Resp:  [16-18] 16  SpO2:  [90 %-100 %] 93 %  O2 Device: None (Room air)    Physical exam:  General:  No acute distress, sitting upright in bedside recliner, elderly   Psychiatric:  Awake and alert, pleasant and cooperative  HEENT: Mucous membranes moist, neck supple, head normocephalic, dentures in place.  Cardiovascular: Irregularly irregular rhythm  Pulmonary: Breath sounds decreased however no wheezes or rhonchi noted  Abdomen:  Soft, nontender  Extremities: Right lower extremity swelling in the setting of cellulitis  Skin:  No rashes noted on exposed skin.  Extensive right lower extremity erythema now more patchy, from foot to proximal thigh with draining blister posterior thigh and medial thigh proximally.        Lab Results: I have reviewed the following results:  Results from last 7 days   Lab Units  04/18/25  0446 04/17/25  0504 04/16/25  0426   WBC Thousand/uL 11.06* 13.12* 13.40*   HEMOGLOBIN g/dL 10.2* 11.0* 10.7*   PLATELETS Thousands/uL 365 347 261     Results from last 7 days   Lab Units 04/18/25  0446 04/17/25  0504 04/16/25  0426 04/15/25  1805 04/15/25  0459 04/15/25  0042 04/14/25  1410   SODIUM mmol/L 131* 132* 130*   < > 129*   < > 126*   POTASSIUM mmol/L 4.4 4.5 4.1   < > 3.8   < > 4.2   CHLORIDE mmol/L 101 102 103   < > 100   < > 95*   CO2 mmol/L 25 26 22   < > 22   < > 22   BUN mg/dL 24 31* 46*   < > 59*   < > 64*   CREATININE mg/dL 0.81 0.86 1.07   < > 1.35*   < > 1.62*   EGFR ml/min/1.73sq m 76 74 59   < > 44   < > 35   CALCIUM mg/dL 8.6 8.6 8.6   < > 8.5   < > 9.5   AST U/L  --   --  34  --  41*  --  75*   ALT U/L  --   --  33  --  43  --  59*   ALK PHOS U/L  --   --  72  --  76  --  109*   ALBUMIN g/dL  --   --  2.3*  --  2.3*  --  2.8*    < > = values in this interval not displayed.     Results from last 7 days   Lab Units 04/14/25  1410   BLOOD CULTURE  No Growth at 72 hrs.  No Growth at 72 hrs.     Results from last 7 days   Lab Units 04/18/25  0446 04/17/25  0504 04/16/25  0426 04/15/25  0459 04/14/25  1410   PROCALCITONIN ng/ml 1.32* 2.23* 4.70* 10.82* 15.61*                 Imaging Results Review: No pertinent imaging studies reviewed.  Other Study Results Review: Other studies reviewed include: micro

## 2025-04-18 NOTE — PLAN OF CARE
Problem: INFECTION - ADULT  Goal: Absence or prevention of progression during hospitalization  Description: INTERVENTIONS:- Assess and monitor for signs and symptoms of infection- Monitor lab/diagnostic results- Monitor all insertion sites, i.e. indwelling lines, tubes, and drains- Monitor endotracheal if appropriate and nasal secretions for changes in amount and color- Heth appropriate cooling/warming therapies per order- Administer medications as ordered- Instruct and encourage patient and family to use good hand hygiene technique- Identify and instruct in appropriate isolation precautions for identified infection/condition  Outcome: Progressing     Problem: PAIN - ADULT  Goal: Verbalizes/displays adequate comfort level or baseline comfort level  Description: Interventions:- Encourage patient to monitor pain and request assistance- Assess pain using appropriate pain scale- Administer analgesics based on type and severity of pain and evaluate response- Implement non-pharmacological measures as appropriate and evaluate response- Consider cultural and social influences on pain and pain management- Notify physician/advanced practitioner if interventions unsuccessful or patient reports new pain  Outcome: Progressing     Problem: DISCHARGE PLANNING  Goal: Discharge to home or other facility with appropriate resources  Description: INTERVENTIONS:- Identify barriers to discharge w/patient and caregiver- Arrange for needed discharge resources and transportation as appropriate- Identify discharge learning needs (meds, wound care, etc.)- Arrange for interpretive services to assist at discharge as needed- Refer to Case Management Department for coordinating discharge planning if the patient needs post-hospital services based on physician/advanced practitioner order or complex needs related to functional status, cognitive ability, or social support system  Outcome: Progressing     Problem: Prexisting or High Potential for  Compromised Skin Integrity  Goal: Skin integrity is maintained or improved  Description: INTERVENTIONS:- Identify patients at risk for skin breakdown- Assess and monitor skin integrity- Assess and monitor nutrition and hydration status- Monitor labs - Assess for incontinence - Turn and reposition patient- Assist with mobility/ambulation- Relieve pressure over bony prominences- Avoid friction and shearing- Provide appropriate hygiene as needed including keeping skin clean and dry- Evaluate need for skin moisturizer/barrier cream- Collaborate with interdisciplinary team - Patient/family teaching- Consider wound care consult   Outcome: Progressing     Problem: Potential for Falls  Goal: Patient will remain free of falls  Description: INTERVENTIONS:- Educate patient/family on patient safety including physical limitations- Instruct patient to call for assistance with activity - Consult OT/PT to assist with strengthening/mobility - Keep Call bell within reach- Keep bed low and locked with side rails adjusted as appropriate- Keep care items and personal belongings within reach- Initiate and maintain comfort rounds- Make Fall Risk Sign visible to staff- Offer Toileting every 2 Hours, in advance of need- Initiate/Maintain bed alarm- Obtain necessary fall risk management equipment:- Apply yellow socks and bracelet for high fall risk patients- Consider moving patient to room near nurses station  INTERVENTIONS:- Educate patient/family on patient safety including physical limitations- Instruct patient to call for assistance with activity - Consult OT/PT to assist with strengthening/mobility - Keep Call bell within reach- Keep bed low and locked with side rails adjusted as appropriate- Keep care items and personal belongings within reach- Initiate and maintain comfort rounds- Make Fall Risk Sign visible to staff- Offer Toileting every 2 Hours, in advance of need- Initiate/Maintain bed/chair alarm- Obtain necessary fall risk  management equipment:- Apply yellow socks and bracelet for high fall risk patients- Consider moving patient to room near nurses station  Outcome: Progressing

## 2025-04-18 NOTE — QUICK NOTE
Patient not seen and examined today, chart reviewed. Na trend overall holding and stable, suspect chronically low. Patient will be DC tomorrow to ARU.Cr at baseline. If Na<130, contact nephrology. Patient back on lasix on 4/17.

## 2025-04-18 NOTE — PROGRESS NOTES
Patient:    MRN:  [id number]    Aidin Request ID:  [id number]    Level of care reserved:  Inpatient Rehab Facility    Partner Reserved:  [facility] ([place]), [place] [id number] [phone]    Clinical needs requested:    Geography searched:  20 miles around [id number]    Start of Service:    Request sent:  12:48pm EDT on 4/16/2025 by Kami Lester    Partner reserved:  12:11pm EDT on 4/18/2025 by Kami Lester    Choice list shared:  12:03pm EDT on 4/18/2025 by Kami Lester

## 2025-04-18 NOTE — PLAN OF CARE
Problem: PHYSICAL THERAPY ADULT  Goal: Performs mobility at highest level of function for planned discharge setting.  See evaluation for individualized goals.  Description: Treatment/Interventions: Functional transfer training, Therapeutic exercise, Endurance training, Gait training, Bed mobility, Equipment eval/education  Equipment Recommended: Walker       See flowsheet documentation for full assessment, interventions and recommendations.  Outcome: Progressing  Note: Prognosis: Fair     Assessment: Pt seen for PT treatment session this date with interventions consisting of therapeutic exercise to improve endurance to improve functional mobility and therapeutic activity to improve transfers and increase activity tolerance with functional mobility to decrease fall risk. Pt agreeable to PT treatment session upon arrival, pt found supine in bed, in no apparent distress. Since previous session, pt has made fair progress as evidenced by requiring increased amount of assistance with mobility  Barriers during this session include SOB and fatigue.  Pt continues to be functioning below baseline level, and remains limited 2* factors listed above and including decreased strength, impaired activity tolerance, impaired  balance, decreased endurance, and decreased mobility.  Pt prognosis for achieving goals is fair, pending pt progress with hospitalization/medical status improvements, and indicated by motivated to participate in therapy, ability to follow cues, supportive family, and continued required assistance. PT will continue to see pt during current hospitalization in order to address the deficits listed above and provide interventions consistent w/ POC in effort to achieve goals. Current goals and POC remain appropriate, pt continues to have rehab potential  Upon conclusion pt seated OOB in recliner. The patient's AM-PAC Basic Mobility Inpatient Short Form Raw Score is 9.  A Raw score of less than or equal to 16  suggests the patient may benefit from discharge to post-acute rehabilitation services. Based on patient presentations and impairments, pt would most appropriately benefit from Level 2 resource intensity upon discharge.  Please also refer to the recommendation of the Physical Therapist for safe discharge planning. RN verbalized pt appropriate for PT session.  Barriers to Discharge:  (limited mobility)     Rehab Resource Intensity Level, PT: II (Moderate Resource Intensity)    See flowsheet documentation for full assessment.

## 2025-04-18 NOTE — ASSESSMENT & PLAN NOTE
POA  Patient is on Coumadin for A-fib-has been on hold  No signs of bleeding  INR peaked at 8.71, today 2.59  Will give Coumadin 2 mg today  Repeat INR a.m.

## 2025-04-18 NOTE — PROGRESS NOTES
PHYSICAL MEDICINE AND REHABILITATION   PREADMISSION ASSESSMENT     Projected IGC and Rehabilitation Diagnoses:  Impairment of mobility, safety and Activities of Daily Living (ADLs) due to Debility:  16  Debility (Non-cardiac/Non-pulmonary)  Etiologic: Sepsis due to R LE cellulitis  Date of Onset: 4/14/25       PATIENT INFORMATION  Name: Enrico Chavira Phone #: [phone] (home)   Address: [address] [facility] [address] [facility]  [place] [address]  YOB: 1930 Age: 89 y.o. #   Marital Status:   Ethnicity: White  Employment Status: retired  Extended Emergency Contact Information  Primary Emergency Contact: Lindsay Greene  Mobile Phone: [phone]  Relation: Daughter  Preferred language: English  Secondary Emergency Contact: NOEMI MATSON  Address: [address] [place] [address]           VICTORIANO Esquivel [address] [facility]  Mobile Phone: [phone]  Relation: Daughter   needed? No  Advance Directive: Level 3 DNAR and DNI (has ACP docs)     INSURANCE/COVERAGE:     Primary Payor: MEDICARE / Plan: MEDICARE A AND B / Product Type: Medicare A & B Fee for Service /   Secondary Payer:[employer]  ID# [id number]   Payer Contact:  Payer Contact:   Contact Phone:  Contact Phone:     MEDICARE #: [id number]  Medicare Days: 60/30/60  Medical Record #: [id number]    REFERRAL SOURCE:   Referring provider: Mitul Telles DO  Referring facility: [facility]  Room: /-01  PCP: Brodie Anne DO PCP phone number: [phone]    MEDICAL INFORMATION  HPI: Pt is a 89 year old male with a PMH of hypertension, COPD/asthma, GERD who presented to [facility] on 4/14 with generalized weakness and fall.  Also noted increasing swelling in redness of right lower extremity.  Patient has chronic swelling of right lower extremity however redness and blistering is new. He reported a recent fall at home when ambulating to the bathroom. In the ER patient found to be in rapid  A-fib with HR in 170s and was started on cardizem gtt for short period, then converted to normal sinus rhythm . Sepsis workup ordered for cellulitis and Ct scan did not reveal any soft tissue gas. He was started on broad-spectrum IV antibiotics.  Lower extremity Doppler negative for DVT. Blood cultures x 2 no growth after 24 hours. ID consulted. Cellulitis appears streptococcal in origin. Right knee swollen in setting on cellulitis. No hx of MRSA. Pt started on IV cefazolin but can transition to PO cefadroxil 1g BID to complete total 10 day course of abx. Creatinine up to 1.6 on admission with baseline creatinine closer to 0.8.  Nephrology is following.  He is also hyponatremic.  Renal ultrasound negative for hydronephrosis. SONYA has resolved.     ? Patient is on Coumadin for A-fib-has been on hold  ? INR peaked at 8.71 now downtrending, today 4.61    PT and OT have been consulted and are recommending post-acute rehab services.   Patient's case has been reviewed and patient meets medical criteria for acute rehab and has demonstrated the ability to tolerate three or more hours of therapy per day. Patient is medically stable and ready for discharge to [facility].      Past Medical History:   Past Surgical History:   Allergies:     Past Medical History:   Diagnosis Date    Arthritis     Coagulation defect (HCC)     last assessed: 11/28/2018    COPD (chronic obstructive pulmonary disease) (HCC)     last assessed: 5/14/2019, 12/6/2017    Generalized osteoarthritis     last assessed: 1/28/2019    GERD without esophagitis     last assessed: 1/28/2019    Glaucoma     last assessed: 8/4/2011    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HCC)     last assessed: 10/16/2018    Factor II Prothrombin Gene per [employer]    History of kidney stones 1985    Hx of blood clots     Hypertension     last assessed: 5/14/2019    Impaired fasting glucose     last assessed: 8/26/2013    Mild persistent asthma, uncomplicated     last  assessed: 11/28/2018    Nephrolithiasis     Nondisplaced intertrochanteric fracture of right femur, initial encounter for closed fracture (HCC)     last assessed: 1/28/2019    Premature ventricular contractions     last assessed: 8/4/2011    Pulmonary nodule     Scoliosis (and kyphoscoliosis), idiopathic     Wears dentures     Past Surgical History:   Procedure Laterality Date    CARPAL TUNNEL RELEASE Left     ENDO 8/801    CATARACT EXTRACTION Left     COLONOSCOPY  10/07/2008    last assessed: 3/29/2016    EYE SURGERY Right 01/13/2022    HERNIA REPAIR      HIP SURGERY      HIP SURGERY Left 03/05/1999    ORIF    MULTIPLE TOOTH EXTRACTIONS Bilateral     OTHER SURGICAL HISTORY Right 10/30/2001    endo CTR    TONSILLECTOMY Bilateral     WISDOM TOOTH EXTRACTION Bilateral      Allergies   Allergen Reactions    Alphagan P [Brimonidine Tartrate] Itching         Medical/functional conditions requiring inpatient rehabilitation: impaired self care and mobility, decreased strength/endurance    Risk for medical/clinical complications: risk for falls, risk for skin breakdown secondary to decreased mobility, risk for hypertensive episodes    Comorbidities:  Swelling of right knee  SONYA  Abnormality of gait  Atrial fibrillation with RVR  HTN    Surgeries in the last 100 days: n/a    CURRENT VITAL SIGNS:   Temp:  [98.5 °F (36.9 °C)-99.2 °F (37.3 °C)] 99.2 °F (37.3 °C)  HR:  [] 109  BP: (125-167)/(73-89) 143/78  Resp:  [16-18] 16  SpO2:  [90 %-100 %] 93 %  O2 Device: None (Room air)   Intake/Output Summary (Last 24 hours) at 4/18/2025 1045  Last data filed at 4/18/2025 0900  Gross per 24 hour   Intake 1215 ml   Output 2125 ml   Net -910 ml        LABORATORY RESULTS:      Lab Results   Component Value Date    HGB 10.2 (L) 04/18/2025    HGB 8.7 (L) 11/07/2018    HCT 30.6 (L) 04/18/2025    HCT 24.8 (L) 11/07/2018    WBC 11.06 (H) 04/18/2025     Lab Results   Component Value Date    BUN 24 04/18/2025    BUN 16 11/22/2018    K 4.4  04/18/2025    K 4.1 11/22/2018     04/18/2025     11/22/2018    CREATININE 0.81 04/18/2025    CREATININE 0.85 11/22/2018     Lab Results   Component Value Date    PROTIME 28.0 (H) 04/18/2025    INR 2.59 (H) 04/18/2025    INR 1.2 11/24/2018        DIAGNOSTIC STUDIES:  US kidney and bladder with pvr  Result Date: 4/16/2025  Impression: No hydronephrosis. Echogenic kidneys with cortical thinning in keeping with medical renal disease. No significant post void residual. Workstation performed: [id number]     XR knee 4+ vw right injury  Result Date: 4/15/2025  Impression: No acute osseous abnormality. IM aleksandr with fractured distal interlocking screw. Computerized Assisted Algorithm (CAA) may have been used to analyze all applicable images. Workstation performed: [id number]     CT spine lumbar without contrast  Result Date: 4/14/2025  Impression: Diffuse severe thoracolumbar degenerative change with apex left lumbar scoliosis. No acute fracture or traumatic malalignment. Workstation performed: [id number]     CT head without contrast  Result Date: 4/14/2025  Impression: No acute intracranial hemorrhage seen No mass effect or midline shift seen Workstation performed: [id number]     CT lower extremity wo contrast right  Result Date: 4/14/2025  Impression: Subcutaneous edema and skin thickening extending from the proximal thigh through the ankle, which can be seen with cellulitis. No fluid collection within the limits of an unenhanced exam. No soft tissue gas. Workstation performed: [id number]       PRECAUTIONS/SPECIAL NEEDS:  Edema Management, Safety Concerns, Aspiration Risk/Precautions, Dietary Restrictions: Dysphagia 2 mechanical soft; Thin liquid, Fluid restriction 1800ML, and Language Preference: English, Fall precautions    MEDICATIONS:     Current Facility-Administered Medications:     acetaminophen (TYLENOL) tablet 650 mg, 650 mg, Oral, Q6H PRN, Jake Bunn MD, 650 mg at 04/16/25 9904     albuterol inhalation solution 2.5 mg, 2.5 mg, Nebulization, Q6H PRN, aJke Bunn MD, 2.5 mg at 04/17/25 2032    budesonide-formoterol (SYMBICORT) 160-4.5 mcg/act inhaler 2 puff, 2 puff, Inhalation, BID, Jake Bunn MD, 2 puff at 04/18/25 0959    ceFAZolin (ANCEF) IVPB (premix in dextrose) 2,000 mg 50 mL, 2,000 mg, Intravenous, Q8H, Darci Mckeon PA-C, Last Rate: 100 mL/hr at 04/18/25 0952, 2,000 mg at 04/18/25 0952    dorzolamide (TRUSOPT) ophthalmic solution 1 drop, 1 drop, Left Eye, BID, Jake Bunn MD, 1 drop at 04/18/25 0954    furosemide (LASIX) tablet 20 mg, 20 mg, Oral, Daily, RODY Goodson, 20 mg at 04/18/25 0953    guaiFENesin (MUCINEX) 12 hr tablet 600 mg, 600 mg, Oral, Q12H CLARK, RODY Goodson, 600 mg at 04/18/25 0953    latanoprost (XALATAN) 0.005 % ophthalmic solution 1 drop, 1 drop, Both Eyes, HS, Jake Bunn MD, 1 drop at 04/17/25 2117    lidocaine (LIDODERM) 5 % patch 1 patch, 1 patch, Topical, Daily, RODY Goodson, 1 patch at 04/18/25 0953    metoprolol tartrate (LOPRESSOR) tablet 25 mg, 25 mg, Oral, Q12H CLARK, Jake Bunn MD, 25 mg at 04/18/25 0953    montelukast (SINGULAIR) tablet 10 mg, 10 mg, Oral, Daily, Jake Bunn MD, 10 mg at 04/18/25 0953    ondansetron (ZOFRAN) injection 4 mg, 4 mg, Intravenous, Q6H PRN, Jake Bunn MD    pantoprazole (PROTONIX) EC tablet 20 mg, 20 mg, Oral, Early Morning, Jake Bunn MD, 20 mg at 04/18/25 0529    polyethylene glycol (MIRALAX) packet 17 g, 17 g, Oral, Daily PRN, RODY Goodson, 17 g at 04/18/25 0529    prednisoLONE acetate (PRED FORTE) 1 % ophthalmic suspension 1 drop, 1 drop, Left Eye, BID, Jake Bunn MD, 1 drop at 04/18/25 0959    SKIN INTEGRITY:   Wound 04/15/25 Other (Comments) Thigh Anterior;Right    Wound 04/15/25 Thigh Anterior;Right      PRIOR LEVEL OF FUNCTION:  He lives in a(n) single family  home  Enrico Chavira is  and lives alone.  Self Care: Independent, Indoor Mobility: Independent, Stairs (in/outdoor): Independent, and Cognition: Independent    FALLS IN THE LAST 6 MONTHS: 1-4    HOME ENVIRONMENT:  The living area: can live on one level  There are 2 steps to enter the home.    The patient will have 24 hour supervision/physical assistance available upon discharge.    PREVIOUS DME:  Equipment in home (previous DME): Shower Chair, Grab Bars, Rolling Walker, and Single Point Cane    FUNCTIONAL STATUS:  Physical Therapy Occupational Therapy Speech Therapy   04/17/25 1417    PT Last Visit   PT Visit Date 04/17/25   Note Type   Note Type Treatment   Pain Assessment   Pain Assessment Tool 0-10   Pain Score No Pain   Restrictions/Precautions   Weight Bearing Precautions Per Order No   Other Precautions Chair Alarm;Fall Risk   General   Chart Reviewed Yes   Family/Caregiver Present Yes   Cognition   Overall Cognitive Status WFL   Arousal/Participation Alert;Responsive   Attention Within functional limits   Orientation Level Oriented X4   Memory Within functional limits   Following Commands Follows one step commands without difficulty   Transfers   Sit to Stand 4  Minimal assistance   Additional items Assist x 2;Increased time required;Verbal cues   Stand to Sit 4  Minimal assistance   Additional items Assist x 2;Increased time required;Verbal cues   Additional Comments pt denied dizziness with transitional movement   Ambulation/Elevation   Gait pattern Improper Weight shift;Antalgic;Decreased foot clearance;Shuffling;Inconsistent margarito;Short stride;Knees flexed   Gait Assistance 4  Minimal assist   Additional items Assist x 2;Verbal cues   Assistive Device Rolling walker   Distance    (8 ft x 2 with chair follow)   Ambulation/Elevation Additional Comments 2 LOB noted; cues to keep RW closer to DAVID   Balance   Static Sitting Good   Dynamic Sitting Fair +   Static Standing Fair   Dynamic Standing  Fair -   Ambulatory Fair -   Endurance Deficit   Endurance Deficit Yes   Endurance Deficit Description KHALIL however SpO2 remained above 90%   Activity Tolerance   Activity Tolerance Patient limited by fatigue   Exercises   Glute Sets Sitting;15 reps;AROM;Bilateral   Hip Flexion Sitting;15 reps;AROM;Bilateral   Hip Abduction Sitting;15 reps;AROM;Bilateral   Hip Adduction Sitting;15 reps;AROM;Bilateral   Knee AROM Long Arc Quad Sitting;15 reps;AROM;Bilateral   Ankle Pumps Sitting;15 reps;AROM;Bilateral   Assessment   Prognosis Fair   Assessment Pt seen for PT treatment session this date with interventions consisting of gait training to normalize gait pattern to decrease fall risk and therapeutic exercise to improve endurance to improve functional mobility. Pt agreeable to PT treatment session upon arrival, pt found seated OOB in recliner, in no apparent distress. Since previous session, pt has made good progress as evidenced by increased distance ambulated  Barriers during this session include pain and fatigue.  Pt continues to be functioning below baseline level, and remains limited 2* factors listed above and including decreased strength, impaired activity tolerance, impaired  balance, pain, decreased endurance, and decreased mobility.  Pt prognosis for achieving goals is fair, pending pt progress with hospitalization/medical status improvements, and indicated by motivated to participate in therapy, ability to follow cues, supportive family, and continued required assistance. PT will continue to see pt during current hospitalization in order to address the deficits listed above and provide interventions consistent w/ POC in effort to achieve goals. Current goals and POC remain appropriate, pt continues to have rehab potential  Upon conclusion pt seated OOB in recliner. The patient's AM-PAC Basic Mobility Inpatient Short Form Raw Score is 8.  A Raw score of less than or equal to 16 suggests the patient may benefit  "from discharge to post-acute rehabilitation services. Based on patient presentations and impairments, pt would most appropriately benefit from Level 2 resource intensity upon discharge.  Please also refer to the recommendation of the Physical Therapist for safe discharge planning. RN verbalized pt appropriate for PT session. This session, pt required and most appropriately benefited from skilled OT/PT co-treat due to extensive physical assistance of two therapists to achieve transitional movements and decreased activity tolerance. Session interrupted due to imaging procedure.    04/17/25 1322    OT Last Visit   OT Visit Date 04/17/25   Note Type   Note Type Treatment   Pain Assessment   Pain Assessment Tool 0-10   Pain Score No Pain   Restrictions/Precautions   Weight Bearing Precautions Per Order No   Other Precautions Fall Risk;Pain   Lifestyle   Autonomy Pt resides in one level apartment - alone; I with ADLs, mobility and IADLs; increased assistance required   Reciprocal Relationships supportive family   Service to Others retired   Intrinsic Gratification watching tv   Transfers   Sit to Stand 4  Minimal assistance   Additional items Assist x 2;Increased time required;Verbal cues   Stand to Sit 4  Minimal assistance   Additional items Assist x 2;Increased time required;Verbal cues   Additional Comments RW used; VC for safe hand placement, proper body mechanics and overall RW management during directional turns   Functional Mobility   Functional Mobility 3  Moderate assistance   Additional Comments Pt completed short distance ADL mobility within hallway w/ mod A x2 w/ RW. x1 LOB observed with L knee buckeling with second trial of mobility. Moderate instability throughout mobility. Pt required x1 seated rest break between mobility trials  (SpO2 >90% throughout session on RA)   Additional items Rolling walker   Subjective   Subjective \"It feels good to walk around\"   Cognition   Overall Cognitive Status WFL "   Arousal/Participation Alert;Responsive   Attention Within functional limits   Orientation Level Oriented X4   Memory Within functional limits   Following Commands Follows one step commands without difficulty   Comments Pt agreeable to OT treatment   Activity Tolerance   Activity Tolerance Patient limited by fatigue   Medical Staff Made Aware Yes, nursing staff made aware of [facility] outcomes   Assessment   Assessment Patient participated in Skilled OT session this date with interventions consisting of ADL re training with the use of correct body mechnaics, safety awareness and fall prevention techniques,  therapeutic activities to: increase activity tolerance, increase postural control, increase trunk control, and increase OOB/ sitting tolerance . Patient agreeable to OT treatment session, upon arrival patient was found seated OOB to Recliner.  In comparison to previous session, patient with improvements in functional mobility and endurance . Patient requiring verbal cues for correct technique and frequent rest periods. Patient continues to be functioning below baseline level, occupational performance remains limited secondary to factors listed above and increased risk for falls and injury.   From OT standpoint, recommendation at time of d/c would with moderate intensity OT resources.   Patient to benefit from continued Occupational Therapy treatment while in the hospital to address deficits as defined above and maximize level of functional independence with ADLs and functional mobility.         CARE SCORES:  Self Care:  Eatin: Independent  Oral hygiene: 06: Independent  Toilet hygiene: 03: Partial/moderate assistance  Shower/bathing self: 03: Partial/moderate assistance  Upper body dressin: Supervision or touching  assistance  Lower body dressin: Partial/moderate assistance  Putting on/taking off footwear: 03: Partial/moderate assistance  Transfers:  Roll left and right: 03: Partial/moderate  assistance  Sit to lyin: Partial/moderate assistance  Lying to sitting on side of bed: 03: Partial/moderate assistance  Sit to stand: 01: Dependent ( Min A x2)  Chair/bed to chair transfer: 01: Dependent (Min A x2)  Toilet transfer: 09: Not applicable  Mobility: (Min A x2 with RW 8')  Walk 10 ft: 88: Not attempted due to medical conditions or safety concerns  Walk 50 ft with two turns: 88: Not attempted due to medical conditions or safety concerns  Walk 150ft: 88: Not attempted due to medical conditions or safety concerns    CURRENT GAP IN FUNCTION  Prior to Admission: Functional Status: Patient was independent with mobility/ambulation, transfers, ADL's, IADL's.    Expected functional outcomes: It is expected that with skilled acute rehabilitation services the patient will progress to Independent for self care and Independent for mobility     Estimated length of stay: 10 to 14 days    Anticipated Post-Discharge Disposition/Treatment  Disposition: Return to previous home/apartment.  Outpatient Services: Physical Therapy (PT) and Occupational Therapy (OT)    BARRIERS TO DISCHARGE  Weakness, Balance Difficulty, Fatigue, Home Accessibility, Caregiver Accessibility, Equipment Needs, and Lives Alone    INTERVENTIONS FOR DISCHARGE  Adaptive equipment, Patient/Family/Caregiver Education, Community Resources, Arrange DME needs, Therapy exercises, and Energy conservation education     REQUIRED THERAPY:  Patient will require PT and OT 90 minutes each per day, five days per week to achieve rehab goals.     REQUIRED FUNCTIONAL AND MEDICAL MANAGEMENT FOR INPATIENT REHABILITATION:  Skin:  monitor skin for breakdown, Deep Vein Thrombosis (DVT) Prophylaxis:  per MD orders , further internal medicine management of additional medical conditions while on ARC, PT/OT intervention, patient/family education and training, and any needed consults PRN.    RECOMMENDED LEVEL OF CARE: ***     Pt was independent PTA with transfers, amb, and  ADLs. Pt lives alone in a 1 story home with 2 NILESH. Pt is currently functioning below baseline needing up to Mod A for ADLs and Min A x2 for transfers/amb. Pt would benefit from ARC admission to have close medical management while participating in 3 hours of therapy per day that will include physical and occupational therapy.  PM&R to maximize function and provide medical oversight. The MD will monitor patient co-morbidities while on the unit as well as order any additional tests, labs, and consults needed. The [facility] specialized interdisciplinary team will meet weekly to discuss patient overall medical status and rehab goals in preparation for D/C home. Inpatient acute rehab is recommended for patient to maximize overall strength, endurance, self care, and mobility for a safe and timely transition back home.

## 2025-04-18 NOTE — ASSESSMENT & PLAN NOTE
POA with creatinine 1.62  Baseline creatinine appears to be around 0.8-0.9  Secondary to dehydration and sepsis-see plan for sepsis  Urine sodium less than 10  S/p treatment with IV fluids  Appreciate input of nephrology who following  Home losartan/diuretics on hold  Renal ultrasound negative for hydronephrosis, suggest medical renal disease  SONYA now resolved

## 2025-04-19 ENCOUNTER — HOSPITAL ENCOUNTER (INPATIENT)
Facility: HOSPITAL | Age: OVER 89
LOS: 14 days | Discharge: HOME HEALTH | DRG: 949 | End: 2025-05-03
Attending: FAMILY MEDICINE | Admitting: FAMILY MEDICINE
Payer: MEDICARE

## 2025-04-19 VITALS
HEART RATE: 75 BPM | SYSTOLIC BLOOD PRESSURE: 133 MMHG | BODY MASS INDEX: 29.55 KG/M2 | HEIGHT: 66 IN | WEIGHT: 183.86 LBS | RESPIRATION RATE: 20 BRPM | TEMPERATURE: 97.4 F | DIASTOLIC BLOOD PRESSURE: 91 MMHG | OXYGEN SATURATION: 96 %

## 2025-04-19 DIAGNOSIS — L97.812 VENOUS STASIS ULCER OF OTHER PART OF RIGHT LOWER LEG WITH FAT LAYER EXPOSED WITH VARICOSE VEINS (HCC): ICD-10-CM

## 2025-04-19 DIAGNOSIS — B36.9 FUNGAL DERMATITIS: ICD-10-CM

## 2025-04-19 DIAGNOSIS — J44.89 ASTHMA-COPD OVERLAP SYNDROME (HCC): ICD-10-CM

## 2025-04-19 DIAGNOSIS — I48.91 ATRIAL FIBRILLATION (HCC): ICD-10-CM

## 2025-04-19 DIAGNOSIS — L03.115 CELLULITIS OF RIGHT LOWER EXTREMITY: Primary | ICD-10-CM

## 2025-04-19 DIAGNOSIS — I83.018 VENOUS STASIS ULCER OF OTHER PART OF RIGHT LOWER LEG WITH FAT LAYER EXPOSED WITH VARICOSE VEINS (HCC): ICD-10-CM

## 2025-04-19 DIAGNOSIS — E87.1 HYPONATREMIA: ICD-10-CM

## 2025-04-19 PROBLEM — R93.89 ABNORMAL CT OF THE CHEST: Status: ACTIVE | Noted: 2025-04-19

## 2025-04-19 PROBLEM — K59.00 CONSTIPATION: Status: ACTIVE | Noted: 2025-04-19
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
## 2025-04-19 PROBLEM — Z78.9 IMPAIRED MOBILITY AND ADLS: Status: ACTIVE | Noted: 2025-04-19

## 2025-04-19 PROBLEM — M54.50 CHRONIC LEFT-SIDED LOW BACK PAIN: Status: ACTIVE | Noted: 2025-04-19

## 2025-04-19 PROBLEM — L03.90 CELLULITIS: Status: ACTIVE | Noted: 2025-04-19

## 2025-04-19 PROBLEM — Z74.09 IMPAIRED MOBILITY AND ADLS: Status: ACTIVE | Noted: 2025-04-19

## 2025-04-19 PROBLEM — G89.29 CHRONIC LEFT-SIDED LOW BACK PAIN: Status: ACTIVE | Noted: 2025-04-19

## 2025-04-19 PROBLEM — E78.5 DYSLIPIDEMIA: Status: RESOLVED | Noted: 2019-09-03 | Resolved: 2025-04-19

## 2025-04-19 PROBLEM — D64.9 ANEMIA: Status: ACTIVE | Noted: 2025-04-19

## 2025-04-19 LAB
ANION GAP SERPL CALCULATED.3IONS-SCNC: 6 MMOL/L (ref 4–13)
BACTERIA BLD CULT: NORMAL
BACTERIA BLD CULT: NORMAL
BUN SERPL-MCNC: 24 MG/DL (ref 5–25)
CALCIUM SERPL-MCNC: 8.5 MG/DL (ref 8.4–10.2)
CHLORIDE SERPL-SCNC: 99 MMOL/L (ref 96–108)
CO2 SERPL-SCNC: 26 MMOL/L (ref 21–32)
CREAT SERPL-MCNC: 0.76 MG/DL (ref 0.6–1.3)
D DIMER PPP FEU-MCNC: 0.84 UG/ML FEU
ERYTHROCYTE [DISTWIDTH] IN BLOOD BY AUTOMATED COUNT: 13.8 % (ref 11.6–15.1)
GFR SERPL CREATININE-BSD FRML MDRD: 78 ML/MIN/1.73SQ M
GLUCOSE SERPL-MCNC: 155 MG/DL (ref 65–140)
HCT VFR BLD AUTO: 31.2 % (ref 36.5–49.3)
HGB BLD-MCNC: 10.3 G/DL (ref 12–17)
INR PPP: 1.56 (ref 0.85–1.19)
MCH RBC QN AUTO: 29.9 PG (ref 26.8–34.3)
MCHC RBC AUTO-ENTMCNC: 33 G/DL (ref 31.4–37.4)
MCV RBC AUTO: 90 FL (ref 82–98)
PLATELET # BLD AUTO: 389 THOUSANDS/UL (ref 149–390)
PMV BLD AUTO: 8.8 FL (ref 8.9–12.7)
POTASSIUM SERPL-SCNC: 4.4 MMOL/L (ref 3.5–5.3)
PROCALCITONIN SERPL-MCNC: 0.71 NG/ML
PROTHROMBIN TIME: 19.2 SECONDS (ref 12.3–15)
RBC # BLD AUTO: 3.45 MILLION/UL (ref 3.88–5.62)
SODIUM SERPL-SCNC: 131 MMOL/L (ref 135–147)
WBC # BLD AUTO: 11.1 THOUSAND/UL (ref 4.31–10.16)

## 2025-04-19 PROCEDURE — 80048 BASIC METABOLIC PNL TOTAL CA: CPT

## 2025-04-19 PROCEDURE — 99239 HOSP IP/OBS DSCHRG MGMT >30: CPT

## 2025-04-19 PROCEDURE — 85027 COMPLETE CBC AUTOMATED: CPT

## 2025-04-19 PROCEDURE — 99223 1ST HOSP IP/OBS HIGH 75: CPT

## 2025-04-19 PROCEDURE — NC001 PR NO CHARGE

## 2025-04-19 PROCEDURE — 94760 N-INVAS EAR/PLS OXIMETRY 1: CPT

## 2025-04-19 PROCEDURE — 84145 PROCALCITONIN (PCT): CPT

## 2025-04-19 PROCEDURE — 94664 DEMO&/EVAL PT USE INHALER: CPT

## 2025-04-19 PROCEDURE — 85379 FIBRIN DEGRADATION QUANT: CPT

## 2025-04-19 PROCEDURE — 85610 PROTHROMBIN TIME: CPT

## 2025-04-19 RX ORDER — OXYCODONE HYDROCHLORIDE 5 MG/1
5 TABLET ORAL EVERY 4 HOURS PRN
Qty: 5 TABLET | Refills: 0 | Status: SHIPPED | OUTPATIENT
Start: 2025-04-19 | End: 2025-05-03

## 2025-04-19 RX ORDER — PANTOPRAZOLE SODIUM 20 MG/1
20 TABLET, DELAYED RELEASE ORAL
Status: DISCONTINUED | OUTPATIENT
Start: 2025-04-20 | End: 2025-05-03 | Stop reason: HOSPADM

## 2025-04-19 RX ORDER — BUDESONIDE AND FORMOTEROL FUMARATE DIHYDRATE 160; 4.5 UG/1; UG/1
2 AEROSOL RESPIRATORY (INHALATION) 2 TIMES DAILY
Status: DISCONTINUED | OUTPATIENT
Start: 2025-04-19 | End: 2025-05-03 | Stop reason: HOSPADM

## 2025-04-19 RX ORDER — CEFADROXIL 500 MG/1
1000 CAPSULE ORAL EVERY 12 HOURS SCHEDULED
Status: DISCONTINUED | OUTPATIENT
Start: 2025-04-19 | End: 2025-04-19 | Stop reason: HOSPADM

## 2025-04-19 RX ORDER — ACETAMINOPHEN 325 MG/1
650 TABLET ORAL EVERY 6 HOURS PRN
Start: 2025-04-19

## 2025-04-19 RX ORDER — METOPROLOL TARTRATE 25 MG/1
25 TABLET, FILM COATED ORAL EVERY 12 HOURS SCHEDULED
Start: 2025-04-19 | End: 2025-05-03

## 2025-04-19 RX ORDER — LIDOCAINE 50 MG/G
1 PATCH TOPICAL DAILY
Status: DISCONTINUED | OUTPATIENT
Start: 2025-04-20 | End: 2025-05-03 | Stop reason: HOSPADM

## 2025-04-19 RX ORDER — GUAIFENESIN 600 MG/1
600 TABLET, EXTENDED RELEASE ORAL EVERY 12 HOURS SCHEDULED
Start: 2025-04-19 | End: 2025-05-03

## 2025-04-19 RX ORDER — PREDNISOLONE ACETATE 10 MG/ML
1 SUSPENSION/ DROPS OPHTHALMIC 2 TIMES DAILY
Status: DISCONTINUED | OUTPATIENT
Start: 2025-04-19 | End: 2025-05-03 | Stop reason: HOSPADM

## 2025-04-19 RX ORDER — FLUTICASONE FUROATE AND VILANTEROL TRIFENATATE 200; 25 UG/1; UG/1
POWDER RESPIRATORY (INHALATION)
Qty: 180 EACH | Refills: 1 | Status: SHIPPED | OUTPATIENT
Start: 2025-04-19

## 2025-04-19 RX ORDER — ALBUTEROL SULFATE 0.83 MG/ML
2.5 SOLUTION RESPIRATORY (INHALATION) EVERY 4 HOURS PRN
Qty: 100 ML | Refills: 0
Start: 2025-04-19 | End: 2025-05-03

## 2025-04-19 RX ORDER — DORZOLAMIDE HCL 20 MG/ML
1 SOLUTION/ DROPS OPHTHALMIC 2 TIMES DAILY
Status: DISCONTINUED | OUTPATIENT
Start: 2025-04-19 | End: 2025-05-03 | Stop reason: HOSPADM

## 2025-04-19 RX ORDER — LIDOCAINE 50 MG/G
1 PATCH TOPICAL DAILY
Start: 2025-04-20

## 2025-04-19 RX ORDER — CEFADROXIL 500 MG/1
1000 CAPSULE ORAL EVERY 12 HOURS SCHEDULED
Qty: 22 CAPSULE | Refills: 0
Start: 2025-04-19 | End: 2025-05-03

## 2025-04-19 RX ORDER — LATANOPROST 50 UG/ML
1 SOLUTION/ DROPS OPHTHALMIC
Status: DISCONTINUED | OUTPATIENT
Start: 2025-04-19 | End: 2025-05-03 | Stop reason: HOSPADM

## 2025-04-19 RX ORDER — ALBUTEROL SULFATE 0.83 MG/ML
2.5 SOLUTION RESPIRATORY (INHALATION) EVERY 6 HOURS PRN
Status: DISCONTINUED | OUTPATIENT
Start: 2025-04-19 | End: 2025-05-03 | Stop reason: HOSPADM

## 2025-04-19 RX ORDER — MONTELUKAST SODIUM 10 MG/1
10 TABLET ORAL DAILY
Status: DISCONTINUED | OUTPATIENT
Start: 2025-04-20 | End: 2025-05-03 | Stop reason: HOSPADM

## 2025-04-19 RX ORDER — ACETAMINOPHEN 325 MG/1
650 TABLET ORAL EVERY 6 HOURS PRN
Status: DISCONTINUED | OUTPATIENT
Start: 2025-04-19 | End: 2025-05-03 | Stop reason: HOSPADM

## 2025-04-19 RX ORDER — CEFADROXIL 500 MG/1
1000 CAPSULE ORAL EVERY 12 HOURS SCHEDULED
Status: COMPLETED | OUTPATIENT
Start: 2025-04-19 | End: 2025-04-25

## 2025-04-19 RX ORDER — AMMONIUM LACTATE 12 G/100G
LOTION TOPICAL 2 TIMES DAILY PRN
Status: DISCONTINUED | OUTPATIENT
Start: 2025-04-19 | End: 2025-04-20

## 2025-04-19 RX ORDER — FUROSEMIDE 20 MG/1
20 TABLET ORAL DAILY
Status: DISCONTINUED | OUTPATIENT
Start: 2025-04-20 | End: 2025-05-03 | Stop reason: HOSPADM

## 2025-04-19 RX ORDER — POLYETHYLENE GLYCOL 3350 17 G/17G
17 POWDER, FOR SOLUTION ORAL DAILY
Status: DISCONTINUED | OUTPATIENT
Start: 2025-04-20 | End: 2025-04-29

## 2025-04-19 RX ORDER — GUAIFENESIN 600 MG/1
600 TABLET, EXTENDED RELEASE ORAL EVERY 12 HOURS SCHEDULED
Status: DISCONTINUED | OUTPATIENT
Start: 2025-04-19 | End: 2025-05-03 | Stop reason: HOSPADM

## 2025-04-19 RX ORDER — OXYCODONE HYDROCHLORIDE 5 MG/1
5 TABLET ORAL EVERY 4 HOURS PRN
Refills: 0 | Status: DISCONTINUED | OUTPATIENT
Start: 2025-04-19 | End: 2025-04-19

## 2025-04-19 RX ORDER — OXYCODONE HYDROCHLORIDE 10 MG/1
10 TABLET ORAL EVERY 4 HOURS PRN
Refills: 0 | Status: DISCONTINUED | OUTPATIENT
Start: 2025-04-19 | End: 2025-04-19

## 2025-04-19 RX ORDER — WARFARIN SODIUM 5 MG/1
5 TABLET ORAL
Status: DISCONTINUED | OUTPATIENT
Start: 2025-04-19 | End: 2025-04-20

## 2025-04-19 RX ORDER — POLYETHYLENE GLYCOL 3350 17 G/17G
17 POWDER, FOR SOLUTION ORAL DAILY PRN
Status: DISCONTINUED | OUTPATIENT
Start: 2025-04-19 | End: 2025-04-19

## 2025-04-19 RX ORDER — METOPROLOL TARTRATE 25 MG/1
25 TABLET, FILM COATED ORAL EVERY 12 HOURS SCHEDULED
Status: DISCONTINUED | OUTPATIENT
Start: 2025-04-19 | End: 2025-05-03 | Stop reason: HOSPADM

## 2025-04-19 RX ORDER — MINERAL OIL AND PETROLATUM 150; 830 MG/G; MG/G
OINTMENT OPHTHALMIC
Status: DISCONTINUED | OUTPATIENT
Start: 2025-04-19 | End: 2025-05-03 | Stop reason: HOSPADM

## 2025-04-19 RX ORDER — NYSTATIN 100000 [USP'U]/G
POWDER TOPICAL 2 TIMES DAILY
Status: DISCONTINUED | OUTPATIENT
Start: 2025-04-19 | End: 2025-05-03 | Stop reason: HOSPADM

## 2025-04-19 RX ADMIN — WARFARIN SODIUM 5 MG: 5 TABLET ORAL at 17:16

## 2025-04-19 RX ADMIN — METOPROLOL TARTRATE 25 MG: 25 TABLET, FILM COATED ORAL at 21:03

## 2025-04-19 RX ADMIN — PANTOPRAZOLE SODIUM 20 MG: 20 TABLET, DELAYED RELEASE ORAL at 05:00

## 2025-04-19 RX ADMIN — ACETAMINOPHEN 650 MG: 325 TABLET ORAL at 09:05

## 2025-04-19 RX ADMIN — GUAIFENESIN 600 MG: 600 TABLET ORAL at 21:03

## 2025-04-19 RX ADMIN — BUDESONIDE AND FORMOTEROL FUMARATE DIHYDRATE 2 PUFF: 160; 4.5 AEROSOL RESPIRATORY (INHALATION) at 08:49

## 2025-04-19 RX ADMIN — ACETAMINOPHEN 650 MG: 325 TABLET ORAL at 01:56

## 2025-04-19 RX ADMIN — WHITE PETROLATUM 57.7 %-MINERAL OIL 31.9 % EYE OINTMENT: at 21:02

## 2025-04-19 RX ADMIN — CEFADROXIL 1000 MG: 500 CAPSULE ORAL at 21:03

## 2025-04-19 RX ADMIN — METOPROLOL TARTRATE 25 MG: 25 TABLET, FILM COATED ORAL at 08:48

## 2025-04-19 RX ADMIN — DORZOLAMIDE HCL 1 DROP: 20 SOLUTION/ DROPS OPHTHALMIC at 21:02

## 2025-04-19 RX ADMIN — BUDESONIDE AND FORMOTEROL FUMARATE DIHYDRATE 2 PUFF: 160; 4.5 AEROSOL RESPIRATORY (INHALATION) at 17:17

## 2025-04-19 RX ADMIN — LATANOPROST 1 DROP: 50 SOLUTION OPHTHALMIC at 21:02

## 2025-04-19 RX ADMIN — PREDNISOLONE ACETATE 1 DROP: 10 SUSPENSION/ DROPS OPHTHALMIC at 17:17

## 2025-04-19 RX ADMIN — NYSTATIN: 100000 POWDER TOPICAL at 17:56

## 2025-04-19 RX ADMIN — CEFAZOLIN SODIUM 2000 MG: 2 SOLUTION INTRAVENOUS at 01:51

## 2025-04-19 RX ADMIN — LIDOCAINE 5% 1 PATCH: 700 PATCH TOPICAL at 08:48

## 2025-04-19 RX ADMIN — DORZOLAMIDE HCL 1 DROP: 20 SOLUTION/ DROPS OPHTHALMIC at 08:49

## 2025-04-19 RX ADMIN — CEFADROXIL 1000 MG: 500 CAPSULE ORAL at 08:48

## 2025-04-19 RX ADMIN — GUAIFENESIN 600 MG: 600 TABLET ORAL at 08:48

## 2025-04-19 RX ADMIN — FUROSEMIDE 20 MG: 20 TABLET ORAL at 08:48

## 2025-04-19 RX ADMIN — PREDNISOLONE ACETATE 1 DROP: 10 SUSPENSION/ DROPS OPHTHALMIC at 08:49

## 2025-04-19 RX ADMIN — MONTELUKAST 10 MG: 10 TABLET, FILM COATED ORAL at 08:48

## 2025-04-19 NOTE — DISCHARGE INSTR - AVS FIRST PAGE
Patient was hospitalized for sepsis due to cellulitis right lower extremity.  ID followed patient throughout.  He received Ancef while inpatient transition to [facility] [place] discharge to complete a total of 10 days of antibiotic treatment per ID recommendations.  Patient also had acute kidney injury on arrival felt likely due to dehydration.  He received IV fluids and SONYA resolved.  He is tolerating his diet.  Nephrology followed him throughout.  They are recommending follow-up labs in 3 to 5 days.  Will continue his 1800 mL fluid restriction on discharge.  He also has a chronic hyponatremia which is stable.  He had an elevated INR on arrival and his Coumadin was on hold.  His INR returned to baseline yesterday and his Coumadin was resumed.  His INR today was 1.56.  I resumed his prehospital Coumadin dosing today.  I would recommend checking INR in 3 to 5 days with the other blood work  I made no other changes to his prehospital medications

## 2025-04-19 NOTE — ASSESSMENT & PLAN NOTE
Patient had fall at home prehospital  Trauma imaging negative  PT OT recommend postacute rehab  Case management following-accepted by [facility], medically stable discharging to ARU today

## 2025-04-19 NOTE — ASSESSMENT & PLAN NOTE
POA  Patient is on Coumadin for A-fib-has been on hold  INR peaked at 8.71  As of 4/18 INR 2.59-received 2 mg Coumadin  INR today 1.56  Resuming prehospital Coumadin dosing on discharge to [facility]  Recommending follow-up labs 3 to 5 days

## 2025-04-19 NOTE — NURSING NOTE
Patient was admitted to ARU earlier today.  Appears to be adjusting well. Does become SOB on exertion. Recovers easily with rest periods. Daughters present and supportive at bedside. OOB to wc with assist of 1 with RW.  Tolerating same well.  Offering no complaints of pain or discomfort. Call bell in reach at bedside.  Safety maintained.

## 2025-04-19 NOTE — ASSESSMENT & PLAN NOTE
"4/18/25 CT: \"3.7 cm anterior right upper lobe solid mass with septated cystic component and calcification. 2.6 cm lateral right upper lobe opacity and 7 mm right lower lobe nodule. These may be infectious/inflammatory but malignancy cannot be excluded. 2.3 x 1.3 cm low-attenuation nodule in the mediastinum adjacent to the hiatal hernia, question benign or malignant lymph node.\"  Per daughter, patient was aware of nodules but RUL mass new. Patient does not want to follow-up on either.   Monitor vitals, oxygen, and potential malignancy related complications   "

## 2025-04-19 NOTE — ASSESSMENT & PLAN NOTE
Continue prehospital nebulizer  Continue prehospital Singulair  Continue Mucinex for intermittent cough  Chest x-ray 4/17 no acute pulmonary pathology  CT chest 4/18 see full report on chart-noted lung nodules.  Patient is aware of this these are not new, and patient and daughters at bedside state that patient would not want further intervention even if there was a possible malignancy

## 2025-04-19 NOTE — DISCHARGE SUMMARY
Discharge Summary - Hospitalist   Name: Enrico Chavira 89 y.o. male I MRN: [id number]  Unit/Bed#: -01 I Date of Admission: 4/14/2025   Date of Service: 4/19/2025 I Hospital Day: 5     Assessment & Plan  Sepsis (HCC)  Present on admission with tachycardia, leukocytosis secondary to right lower extremity cellulitis  CT right lower extremity suggested cellulitis, no evidence of soft tissue gas  Lower extremity Doppler negative for DVT  Blood cultures x 2 no growth after 72 hours  Procalcitonin 15.61-->10.82-->4.70-->2.23-->1.32-->0.71  Lactic acid 1.7  Chest x-ray no acute pulmonary pathology  Appreciate ID who follow-received Ancef while inpatient transitioning to Duricef on discharge per ID recommendations for total of 10 days antibiotic treatment  Medically stable discharging to [facility] today as arranged by case management  Asthma-COPD overlap syndrome (HCC)  Continue prehospital nebulizer  Continue prehospital Singulair  Continue Mucinex for intermittent cough  Chest x-ray 4/17 no acute pulmonary pathology  CT chest 4/18 see full report on chart-noted lung nodules.  Patient is aware of this these are not new, and patient and daughters at bedside state that patient would not want further intervention even if there was a possible malignancy  Atrial fibrillation with rapid ventricular response (HCC)  Noted to be in rapid A-fib while in the ER  Likely related to sepsis  S/p Cardizem drip, converted to normal sinus rhythm, discontinued   Rate controlled   Continue metoprolol at current dosing   Coumadin was on hold been on hold given supratherapeutic INR  4/18 INR 2.59-gave Coumadin 2 mg  INR today 1.56  Resuming prehospital Coumadin on discharge to [facility] today  Supratherapeutic INR  POA  Patient is on Coumadin for A-fib-has been on hold  INR peaked at 8.71  As of 4/18 INR 2.59-received 2 mg Coumadin  INR today 1.56  Resuming prehospital Coumadin dosing on discharge to [facility]  Recommending follow-up labs 3 to 5  days  Cellulitis of right lower extremity  See plan for sepsis  CT right lower extremity: Subcutaneous edema and skin thickening extending from the proximal thigh through the ankle, which can be seen with cellulitis. No fluid collection within the limits of an unenhanced exam. No soft tissue gas  Appearance of right lower extremity erythema much improved, swelling much improved  Appreciate ID who followed-received Ancef while inpatient, transitioning to cefadroxil as recommended by ID on discharge for total of 10 days total antibiotic treatment  Appreciate wound care who followed    Hyponatremia  POA with serum sodium 126  Patient has history of chronic hyponatremia  Appreciate nephrology who followed  Serum sodium is baseline  Placed ambulatory referral to nephrology on discharge  Medically stable discharging to [facility] today as per case management  Recommended follow-up labs in 3 to 5 days as per nephrology recommendations  Acute kidney injury (HCC)  POA with creatinine 1.62  Baseline creatinine appears to be around 0.8-0.9  Secondary to dehydration and sepsis-see plan for sepsis  Urine sodium less than 10  Renal ultrasound negative  S/p treatment with IV fluids  SONYA resolved  Appreciate nephrology who followed-placed ambulatory referral for discharge  Abnormality of gait  Patient had fall at home prehospital  Trauma imaging negative  PT OT recommend postacute rehab  Case management following-accepted by [facility], medically stable discharging to [facility]U today  Swelling of right knee  Patient is being treated for sepsis of the right lower extremity see plan for same  Swelling much improved, no pain       Medical Problems       Resolved Problems  Date Reviewed: 4/19/2025   None       Discharging Physician / Practitioner: RODY Goodson  PCP: Brodie Anne DO  Admission Date:   Admission Orders (From admission, onward)       Ordered        04/14/25 1641  INPATIENT ADMISSION  Once                          Discharge Date:  04/19/25    Consultations During Hospital Stay:  ID, nephrology    Procedures Performed:   None    Significant Findings / Test Results:   4/14 chemistry BUN 64/creatinine 1.62, sodium 126  4/14 procalcitonin 15.61  4/14 WBC 29.35  4/14 PT 53/INR 6.04  4/14 blood cultures x 2 negative after 4 days  4/14 venous Dopplers lower extremities negative DVT  4/14 right knee x-ray: No acute osseous abnormality  4/14 CT head: No acute intracranial hemorrhage, no mass effect or midline shift  4/14 CT lumbar spine: Diffuse severe thoracolumbar degenerative changes with apex left lumbar scoliosis.  No acute fracture or traumatic malalignment  4/14 CT right lower extremity: Subcutaneous edema and skin thickening extending from the proximal thigh through the ankle can be seen with cellulitis.  No fluid collection.  No soft tissue gas  4/15 renal ultrasound: No hydronephrosis.  Echogenic kidneys with cortical thinning in keeping with medical renal disease.  4/17 chest x-ray: No acute pulmonary pathology  4/18 CT chest: 3.7 cm anterior right upper lobe solid mass.  2.6 lateral right upper lobe opacity and 7 mm right lower lobe nodule.  Also low-attenuation nodule in the mediastinum adjacent to hiatal hernia question benign or malignant lymph node.  Moderate hiatal hernia  4/19 BMP: Sodium 131, BUN 24/creatinine 0.76  4/19 procalcitonin 0.71  4/19 WBC 11.10  4/19 PT 19.2/INR 1.56    Incidental Findings:   See CT chest results above    Test Results Pending at Discharge (will require follow up):   None     Outpatient Tests Requested:  None    Complications: None    Reason for Admission: Sepsis    Hospital Course:   For full details regarding patient's hospitalization please refer to the contents of the chart.  In brief, Enrico Chavira is a 89 y.o. male patient who originally presented to the hospital on 4/14/2025 due to weakness for a few days prior to arrival.  Patient had a mechanical fall a few days prehospital and reported  "progressive weakness since the fall, also chronic swelling right lower extremity with erythema and blistering which was new.  In the ED he was noted to be in a rapid A-fib.  He met sepsis criteria with tachycardia and leukocytosis in setting of right lower extremity cellulitis per CT imaging.  Venous Doppler of the lower extremities was negative for DVT.  He also had a supratherapeutic INR on arrival and Coumadin was placed on hold.  Patient was admitted to [facility] service for further management.  He initially required a Cardizem drip, heart rate improved and patient converted to sinus rhythm.  Cardizem drip was discontinued.  He was initiated on metoprolol for rate control.  His prehospital losartan was placed on hold given his SONYA.  Nephrology followed patient throughout, felt SONYA was related to ARB/outpatient diuretic.  Patient was treated with IV fluids and SONYA resolved.  He is euvolemic on exam today.  Will continue metoprolol for rate control on discharge, can continue to hold prehospital losartan.  Patient was resumed on Coumadin yesterday which we will continue on discharge.  Infectious disease also followed patient.  He received Ancef for his cellulitis per their recommendations and is being transition to Duricef on discharge for a total of 10 days of antibiotic treatment.  He is medically stable and being discharged to [facility] today as arranged by case management.  Daughters at bedside aware and in agreement.        Please see above list of diagnoses and related plan for additional information.     Condition at Discharge: good    Discharge Day Visit / Exam:   Subjective: Denies any dizziness, lightness, chest pain or palpitations.  Denies pain or discomfort.  Verbalizing needs.  Daughters at bedside.  Vitals: Blood Pressure: 133/91 (04/19/25 0717)  Pulse: 75 (04/19/25 0717)  Temperature: (!) 97.4 °F (36.3 °C) (04/19/25 0715)  Temp Source: Oral (04/19/25 0715)  Respirations: 20 (04/19/25 0715)  Height: 5' 6\" " (167.6 cm) (04/14/25 1841)  Weight - Scale: 83.4 kg (183 lb 13.8 oz) (04/16/25 0316)  SpO2: 96 % (04/19/25 0717)  Physical Exam  Vitals reviewed.   Constitutional:       General: He is not in acute distress.     Appearance: He is well-developed.   HENT:      Head: Normocephalic and atraumatic.   Cardiovascular:      Rate and Rhythm: Normal rate and regular rhythm.      Heart sounds: No murmur heard.  Pulmonary:      Effort: Pulmonary effort is normal. No respiratory distress.      Breath sounds: Normal breath sounds. No wheezing or rales.   Abdominal:      General: Bowel sounds are normal. There is no distension.      Palpations: Abdomen is soft.      Tenderness: There is no abdominal tenderness. There is no guarding.   Musculoskeletal:         General: No swelling.   Skin:     General: Skin is warm and dry.      Capillary Refill: Capillary refill takes less than 2 seconds.   Neurological:      General: No focal deficit present.      Mental Status: He is alert and oriented to person, place, and time. Mental status is at baseline.   Psychiatric:         Mood and Affect: Mood normal.         Behavior: Behavior normal.         Thought Content: Thought content normal.         Judgment: Judgment normal.          Discussion with Family: Updated  (3 daughters) at bedside.    Discharge instructions/Information to patient and family:   See after visit summary for information provided to patient and family.      Provisions for Follow-Up Care:  See after visit summary for information related to follow-up care and any pertinent home health orders.      Mobility at time of Discharge:   Basic Mobility Inpatient Raw Score: 9  -HLM Goal: 3: Sit at edge of bed  JH-HLM Achieved: 5: Stand (1 or more minutes)  HLM Goal achieved. Continue to encourage appropriate mobility.     Disposition:   Acute Rehab at [facility]    Planned Readmission: No    Discharge Medications:  See after visit summary for reconciled  discharge medications provided to patient and/or family.      Administrative Statements   Discharge Statement:  I have spent a total time of 38 minutes in caring for this patient on the day of the visit/encounter. >30 minutes of time was spent on: Patient and family education, Counseling / Coordination of care, Documenting in the medical record, Reviewing / ordering tests, medicine, procedures  , and Communicating with other healthcare professionals .    **Please Note: This note may have been constructed using a voice recognition system**

## 2025-04-19 NOTE — ASSESSMENT & PLAN NOTE
HPI:   Patient with a history of venous stasis with ulceration, recently discharged from wound care (2/24/25)   Patient presented to the ED on 4/14 due to concern for RLE infection and worsening weakness   Met sepsis criteria with tachycardia, leukocytosis, and RLE cellulitis   2 g cefriaxone given in ED   CT RLE did not reveal soft tissue gas or fluid collections, but showed subcutaneous edema and skin thickening from the proximal thigh through the ankle consistent with cellulitis  Started on IV cefazolin on 4/15 ---> transitioned to 10 day course of cefadroxil on 4/19   Procalcitonin 15.61-->10.82-->4.70-->2.23-->1.32-->0.71  Blood cultures x 2 no growth after 72 hours  Lower extremity Doppler negative for DVT    Plan:   Continue cefadroxil 1 g q12 hours through 4/23  PT and OT for 3 hours a day, 5-6 days a week   Seriel skin checks and monitoring   Encourage leg elevation  Continue local wound care

## 2025-04-19 NOTE — ASSESSMENT & PLAN NOTE
Patient was evaluated by the rehabilitation team MD and an appropriate candidate for acute inpatient rehabilitation program at this time.  The patient will tolerate 3 hours/day 5 to 7 days/week of intensive physical and  occupational therapy   in order to obtain goals for community discharge  Due to the patient's functional Compared to their baseline level of function in addition to their ongoing medical needs, the patient would benefit from daily supervision from a rehabilitation physician as well as rehabilitation nursing to implement and adjust the medical as well as functional plan of care in order to meet the patient's goals.  Bladder: Monitor closely for urinary incontinence and/or retention. Monitor urine output and encourage spontaneous voids. If unable to void spontaneously, will monitor with PVR bladder scans and initiate CIC regimen.  Skin: Monitor for breakdown, frequent turns, pressure offloading  Sleep: Encourage sleep hygiene (routine that promotes healthy circadian rhythm,avoid caffeine later in the day, quiet/dark room at night, reduce electronic before bedtime)  Patient lives alone, previously independent. He has 10 children, many of  whom will be able to assist after discharge.   Discharge: discharge planning with team, anticipate 18-21 day admission, date TBD

## 2025-04-19 NOTE — PROGRESS NOTES
PHYSICAL MEDICINE AND REHABILITATION   PREADMISSION ASSESSMENT     Projected IGC and Rehabilitation Diagnoses:  Impairment of mobility, safety and Activities of Daily Living (ADLs) due to Debility:  16  Debility (Non-cardiac/Non-pulmonary)  Etiologic: Sepsis due to R LE cellulitis  Date of Onset: 4/14/25       PATIENT INFORMATION  Name: Enrico Chavira Phone #: [phone] (home)   Address: [address] [place] [address]  Jojo PASTRANA [address]  YOB: 1930 Age: 89 y.o. #   Marital Status:   Ethnicity: White  Employment Status: retired  Extended Emergency Contact Information  Primary Emergency Contact: Lindsay Greene  Mobile Phone: [phone]  Relation: Daughter  Preferred language: English  Secondary Emergency Contact: NOEMI MATSON  Address: [address] [place] [address]           VICTORIANO Esquivel [address] [facility]  Mobile Phone: [phone]  Relation: Daughter   needed? No  Advance Directive: Level 3 DNAR and DNI (has ACP docs)     INSURANCE/COVERAGE:     Primary Payor: MEDICARE / Plan: MEDICARE A AND B / Product Type: Medicare A & B Fee for Service /   Secondary Payer:[employer]  ID# [id number]   Payer Contact: n/a Payer Contact: n/a   Contact Phone: n/a Contact Phone: n/a      MEDICARE #: [id number]  Medicare Days: 60/30/60  Medical Record #: [id number]    REFERRAL SOURCE:   Referring provider: Mitul Telles DO  Referring facility: [facility]  Room: /-01  PCP: Brodie Anne DO PCP phone number: [phone]    MEDICAL INFORMATION  HPI: Enrico  is a 89 year old male with a PMH of hypertension, COPD/asthma, GERD who presented to [facility] on 4/14 with generalized weakness and fall.  Also noted increasing swelling in redness of right lower extremity.  Patient has chronic swelling of right lower extremity however redness and blistering is new. He reported a recent fall at home when ambulating to the bathroom. In the ER patient  found to be in rapid A-fib with HR in 170s and was started on cardizem gtt for short period, then converted to normal sinus rhythm . Sepsis workup ordered for cellulitis and Ct scan did not reveal any soft tissue gas. He was started on broad-spectrum IV antibiotics.  Lower extremity Doppler negative for DVT. Blood cultures x 2 no growth after 24 hours. ID consulted. Cellulitis appears streptococcal in origin. Right knee swollen in setting on cellulitis. No hx of MRSA. Pt started on IV cefazolin but can transition to PO cefadroxil 1g BID to complete total 10 day course of abx. Creatinine up to 1.6 on admission with baseline creatinine closer to 0.8.  Nephrology is following.  He is also hyponatremic.  Renal ultrasound negative for hydronephrosis. SONYA has resolved.   Patient is on Coumadin for A-fib-has been on hold  INR peaked at 8.71 now downtrending, to 4.61  PT and OT have been consulted and are recommending post-acute rehab services.   Patient's case has been reviewed and patient meets medical criteria for acute rehab and has demonstrated the ability to tolerate three or more hours of therapy per day. Patient is medically stable and ready for discharge to [facility].      Past Medical History:   Past Surgical History:   Allergies:     Past Medical History:   Diagnosis Date    Arthritis     Coagulation defect (HCC)     last assessed: 11/28/2018    COPD (chronic obstructive pulmonary disease) (HCC)     last assessed: 5/14/2019, 12/6/2017    Generalized osteoarthritis     last assessed: 1/28/2019    GERD without esophagitis     last assessed: 1/28/2019    Glaucoma     last assessed: 8/4/2011    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HCC)     last assessed: 10/16/2018    [facility]    History of kidney stones 1985    Hx of blood clots     Hypertension     last assessed: 5/14/2019    Impaired fasting glucose     last assessed: 8/26/2013    Mild persistent asthma, uncomplicated      last assessed: 11/28/2018    Nephrolithiasis     Nondisplaced intertrochanteric fracture of right femur, initial encounter for closed fracture (HCC)     last assessed: 1/28/2019    Premature ventricular contractions     last assessed: 8/4/2011    Pulmonary nodule     Scoliosis (and kyphoscoliosis), idiopathic     Wears dentures     Past Surgical History:   Procedure Laterality Date    CARPAL TUNNEL RELEASE Left     ENDO 8/801    CATARACT EXTRACTION Left     COLONOSCOPY  10/07/2008    last assessed: 3/29/2016    EYE SURGERY Right 01/13/2022    HERNIA REPAIR      HIP SURGERY      HIP SURGERY Left 03/05/1999    ORIF    MULTIPLE TOOTH EXTRACTIONS Bilateral     OTHER SURGICAL HISTORY Right 10/30/2001    [facility]    TONSILLECTOMY Bilateral     WISDOM TOOTH EXTRACTION Bilateral      Allergies   Allergen Reactions    Alphagan P [Brimonidine Tartrate] Itching         Medical/functional conditions requiring inpatient rehabilitation: impaired self care and mobility, decreased strength/endurance    Risk for medical/clinical complications: risk for falls, risk for skin breakdown secondary to decreased mobility, risk for hypertensive episodes    Comorbidities:  Swelling of right knee  SONYA  Abnormality of gait  Atrial fibrillation with RVR  HTN    Surgeries in the last 100 days: n/a    CURRENT VITAL SIGNS:   Temp:  [97.4 °F (36.3 °C)-98.1 °F (36.7 °C)] 97.4 °F (36.3 °C)  HR:  [] 75  BP: (133-163)/(76-91) 133/91  Resp:  [18-20] 20  SpO2:  [93 %-99 %] 96 %  O2 Device: None (Room air)   Intake/Output Summary (Last 24 hours) at 4/19/2025 0910  Last data filed at 4/19/2025 0549  Gross per 24 hour   Intake 880 ml   Output 1500 ml   Net -620 ml        LABORATORY RESULTS:      Lab Results   Component Value Date    HGB 10.3 (L) 04/19/2025    HGB 8.7 (L) 11/07/2018    HCT 31.2 (L) 04/19/2025    HCT 24.8 (L) 11/07/2018    WBC 11.10 (H) 04/19/2025     Lab Results   Component Value Date    BUN 24 04/19/2025    BUN 16 11/22/2018    K 4.4  04/19/2025    K 4.1 11/22/2018    CL 99 04/19/2025     11/22/2018    CREATININE 0.76 04/19/2025    CREATININE 0.85 11/22/2018     Lab Results   Component Value Date    PROTIME 19.2 (H) 04/19/2025    INR 1.56 (H) 04/19/2025    INR 1.2 11/24/2018        DIAGNOSTIC STUDIES:  US kidney and bladder with pvr  Result Date: 4/16/2025  Impression: No hydronephrosis. Echogenic kidneys with cortical thinning in keeping with medical renal disease. No significant post void residual. Workstation performed: [id number]     XR knee 4+ vw right injury  Result Date: 4/15/2025  Impression: No acute osseous abnormality. IM aleksandr with fractured distal interlocking screw. Computerized Assisted Algorithm (CAA) may have been used to analyze all applicable images. Workstation performed: [id number]     CT spine lumbar without contrast  Result Date: 4/14/2025  Impression: Diffuse severe thoracolumbar degenerative change with apex left lumbar scoliosis. No acute fracture or traumatic malalignment. Workstation performed: [id number]     CT head without contrast  Result Date: 4/14/2025  Impression: No acute intracranial hemorrhage seen No mass effect or midline shift seen Workstation performed: [id number]     CT lower extremity wo contrast right  Result Date: 4/14/2025  Impression: Subcutaneous edema and skin thickening extending from the proximal thigh through the ankle, which can be seen with cellulitis. No fluid collection within the limits of an unenhanced exam. No soft tissue gas. Workstation performed: [id number]       PRECAUTIONS/SPECIAL NEEDS:  Edema Management, Safety Concerns, Aspiration Risk/Precautions, Dietary Restrictions: Dysphagia 2 mechanical soft; Thin liquid, Fluid restriction 1800ML, and Language Preference: English, Fall precautions    MEDICATIONS:     Current Facility-Administered Medications:     acetaminophen (TYLENOL) tablet 650 mg, 650 mg, Oral, Q6H PRN, Jake Bunn MD, 650 mg at 04/19/25 0905     albuterol inhalation solution 2.5 mg, 2.5 mg, Nebulization, Q6H PRN, Jake Bunn MD, 2.5 mg at 04/18/25 1354    budesonide-formoterol (SYMBICORT) 160-4.5 mcg/act inhaler 2 puff, 2 puff, Inhalation, BID, Jake Bunn MD, 2 puff at 04/19/25 0849    cefadroxil (DURICEF) capsule 1,000 mg, 1,000 mg, Oral, Q12H CLARK, RODY Goodson, 1,000 mg at 04/19/25 0848    dorzolamide (TRUSOPT) ophthalmic solution 1 drop, 1 drop, Left Eye, BID, Jake Bunn MD, 1 drop at 04/19/25 0849    furosemide (LASIX) tablet 20 mg, 20 mg, Oral, Daily, RODY Goodson, 20 mg at 04/19/25 0848    guaiFENesin (MUCINEX) 12 hr tablet 600 mg, 600 mg, Oral, Q12H CLARK, RODY Goodson, 600 mg at 04/19/25 0848    latanoprost (XALATAN) 0.005 % ophthalmic solution 1 drop, 1 drop, Both Eyes, HS, Jake Bunn MD, 1 drop at 04/18/25 2219    lidocaine (LIDODERM) 5 % patch 1 patch, 1 patch, Topical, Daily, RODY Goodson, 1 patch at 04/19/25 0848    metoprolol tartrate (LOPRESSOR) tablet 25 mg, 25 mg, Oral, Q12H CLARK, Jake Bunn MD, 25 mg at 04/19/25 0848    montelukast (SINGULAIR) tablet 10 mg, 10 mg, Oral, Daily, Jake Bunn MD, 10 mg at 04/19/25 0848    ondansetron (ZOFRAN) injection 4 mg, 4 mg, Intravenous, Q6H PRN, Jake Bunn MD    oxyCODONE (ROXICODONE) immediate release tablet 10 mg, 10 mg, Oral, Q4H PRN, RODY Goodson    oxyCODONE (ROXICODONE) IR tablet 5 mg, 5 mg, Oral, Q4H PRN, RODY Goodson    pantoprazole (PROTONIX) EC tablet 20 mg, 20 mg, Oral, Early Morning, Jake Bunn MD, 20 mg at 04/19/25 0500    polyethylene glycol (MIRALAX) packet 17 g, 17 g, Oral, Daily PRN, RODY Goodson, 17 g at 04/18/25 0529    prednisoLONE acetate (PRED FORTE) 1 % ophthalmic suspension 1 drop, 1 drop, Left Eye, BID, Jake Bunn MD, 1 drop at 04/19/25 0849    SKIN INTEGRITY:   Wound 04/15/25 Other (Comments)  Thigh Anterior;Right    Wound 04/15/25 Thigh Anterior;Right      PRIOR LEVEL OF FUNCTION:  He lives in a(n) single family home  Enrico Chavira is  and lives alone.  Self Care: Independent, Indoor Mobility: Independent, Stairs (in/outdoor): Independent, and Cognition: Independent    FALLS IN THE LAST 6 MONTHS: 1-4    HOME ENVIRONMENT:  The living area: can live on one level  There are 2 steps to enter the home.    The patient will have 24 hour supervision/physical assistance available upon discharge.    PREVIOUS DME:  Equipment in home (previous DME): Shower Chair, Grab Bars, Rolling Walker, and Single Point Cane    FUNCTIONAL STATUS:  Physical Therapy Occupational Therapy Speech Therapy   04/17/25 1417    PT Last Visit   PT Visit Date 04/17/25   Note Type   Note Type Treatment   Pain Assessment   Pain Assessment Tool 0-10   Pain Score No Pain   Restrictions/Precautions   Weight Bearing Precautions Per Order No   Other Precautions Chair Alarm;Fall Risk   General   Chart Reviewed Yes   Family/Caregiver Present Yes   Cognition   Overall Cognitive Status WFL   Arousal/Participation Alert;Responsive   Attention Within functional limits   Orientation Level Oriented X4   Memory Within functional limits   Following Commands Follows one step commands without difficulty   Transfers   Sit to Stand 4  Minimal assistance   Additional items Assist x 2;Increased time required;Verbal cues   Stand to Sit 4  Minimal assistance   Additional items Assist x 2;Increased time required;Verbal cues   Additional Comments pt denied dizziness with transitional movement   Ambulation/Elevation   Gait pattern Improper Weight shift;Antalgic;Decreased foot clearance;Shuffling;Inconsistent margarito;Short stride;Knees flexed   Gait Assistance 4  Minimal assist   Additional items Assist x 2;Verbal cues   Assistive Device Rolling walker   Distance    (8 ft x 2 with chair follow)   Ambulation/Elevation Additional Comments 2 LOB noted; cues to  keep RW closer to DAVID   Balance   Static Sitting Good   Dynamic Sitting Fair +   Static Standing Fair   Dynamic Standing Fair -   Ambulatory Fair -   Endurance Deficit   Endurance Deficit Yes   Endurance Deficit Description KHALIL however SpO2 remained above 90%   Activity Tolerance   Activity Tolerance Patient limited by fatigue   Exercises   Glute Sets Sitting;15 reps;AROM;Bilateral   Hip Flexion Sitting;15 reps;AROM;Bilateral   Hip Abduction Sitting;15 reps;AROM;Bilateral   Hip Adduction Sitting;15 reps;AROM;Bilateral   Knee AROM Long Arc Quad Sitting;15 reps;AROM;Bilateral   Ankle Pumps Sitting;15 reps;AROM;Bilateral   Assessment   Prognosis Fair   Assessment Pt seen for PT treatment session this date with interventions consisting of gait training to normalize gait pattern to decrease fall risk and therapeutic exercise to improve endurance to improve functional mobility. Pt agreeable to PT treatment session upon arrival, pt found seated OOB in recliner, in no apparent distress. Since previous session, pt has made good progress as evidenced by increased distance ambulated  Barriers during this session include pain and fatigue.  Pt continues to be functioning below baseline level, and remains limited 2* factors listed above and including decreased strength, impaired activity tolerance, impaired  balance, pain, decreased endurance, and decreased mobility.  Pt prognosis for achieving goals is fair, pending pt progress with hospitalization/medical status improvements, and indicated by motivated to participate in therapy, ability to follow cues, supportive family, and continued required assistance. PT will continue to see pt during current hospitalization in order to address the deficits listed above and provide interventions consistent w/ POC in effort to achieve goals. Current goals and POC remain appropriate, pt continues to have rehab potential  Upon conclusion pt seated OOB in recliner. The patient's AM-PAC Basic  Mobility Inpatient Short Form Raw Score is 8.  A Raw score of less than or equal to 16 suggests the patient may benefit from discharge to post-acute rehabilitation services. Based on patient presentations and impairments, pt would most appropriately benefit from Level 2 resource intensity upon discharge.  Please also refer to the recommendation of the Physical Therapist for safe discharge planning. RN verbalized pt appropriate for PT session. This session, pt required and most appropriately benefited from skilled OT/PT co-treat due to extensive physical assistance of two therapists to achieve transitional movements and decreased activity tolerance. Session interrupted due to imaging procedure.    04/17/25 1322    OT Last Visit   OT Visit Date 04/17/25   Note Type   Note Type Treatment   Pain Assessment   Pain Assessment Tool 0-10   Pain Score No Pain   Restrictions/Precautions   Weight Bearing Precautions Per Order No   Other Precautions Fall Risk;Pain   Lifestyle   Autonomy Pt resides in one level apartment - alone; I with ADLs, mobility and IADLs; increased assistance required   Reciprocal Relationships supportive family   Service to Others retired   Intrinsic Gratification watching tv   Transfers   Sit to Stand 4  Minimal assistance   Additional items Assist x 2;Increased time required;Verbal cues   Stand to Sit 4  Minimal assistance   Additional items Assist x 2;Increased time required;Verbal cues   Additional Comments RW used; VC for safe hand placement, proper body mechanics and overall RW management during directional turns   Functional Mobility   Functional Mobility 3  Moderate assistance   Additional Comments Pt completed short distance ADL mobility within hallway w/ mod A x2 w/ RW. x1 LOB observed with L knee buckeling with second trial of mobility. Moderate instability throughout mobility. Pt required x1 seated rest break between mobility trials  (SpO2 >90% throughout session on RA)   Additional items  "Rolling walker   Subjective   Subjective \"It feels good to walk around\"   Cognition   Overall Cognitive Status WFL   Arousal/Participation Alert;Responsive   Attention Within functional limits   Orientation Level Oriented X4   Memory Within functional limits   Following Commands Follows one step commands without difficulty   Comments Pt agreeable to OT treatment   Activity Tolerance   Activity Tolerance Patient limited by fatigue   Medical Staff Made Aware Yes, nursing staff [facility]   Assessment   Assessment Patient participated in Skilled OT session this date with interventions consisting of ADL re training with the use of correct body mechnaics, safety awareness and fall prevention techniques,  therapeutic activities to: increase activity tolerance, increase postural control, increase trunk control, and increase OOB/ sitting tolerance . Patient agreeable to OT treatment session, upon arrival patient was found seated OOB to Recliner.  In comparison to previous session, patient with improvements in functional mobility and endurance . Patient requiring verbal cues for correct technique and frequent rest periods. Patient continues to be functioning below baseline level, occupational performance remains limited secondary to factors listed above and increased risk for falls and injury.   From OT standpoint, recommendation at time of d/c would with moderate intensity OT resources.   Patient to benefit from continued Occupational Therapy treatment while in the hospital to address deficits as defined above and maximize level of functional independence with ADLs and functional mobility.         CARE SCORES:  Self Care:  Eatin: Independent  Oral hygiene: 06: Independent  Toilet hygiene: 03: Partial/moderate assistance  Shower/bathing self: 03: Partial/moderate assistance  Upper body dressin: Supervision or touching  assistance  Lower body dressin: Partial/moderate assistance  Putting " on/taking off footwear: 03: Partial/moderate assistance  Transfers:  Roll left and right: 03: Partial/moderate assistance  Sit to lyin: Partial/moderate assistance  Lying to sitting on side of bed: 03: Partial/moderate assistance  Sit to stand: 01: Dependent ( Min A x2)  Chair/bed to chair transfer: 01: Dependent (Min A x2)  Toilet transfer: 09: Not applicable  Mobility: (Min A x2 with RW 8')  Walk 10 ft: 88: Not attempted due to medical conditions or safety concerns  Walk 50 ft with two turns: 88: Not attempted due to medical conditions or safety concerns  Walk 150ft: 88: Not attempted due to medical conditions or safety concerns    CURRENT GAP IN FUNCTION  Prior to Admission: Functional Status: Patient was independent with mobility/ambulation, transfers, ADL's, IADL's.    Expected functional outcomes: It is expected that with skilled acute rehabilitation services the patient will progress to Independent for self care and Independent for mobility     Estimated length of stay: 10 to 14 days    Anticipated Post-Discharge Disposition/Treatment  Disposition: Return to previous home/apartment.  Outpatient Services: Physical Therapy (PT) and Occupational Therapy (OT)    BARRIERS TO DISCHARGE  Weakness, Balance Difficulty, Fatigue, Home Accessibility, Caregiver Accessibility, Equipment Needs, and Lives Alone    INTERVENTIONS FOR DISCHARGE  Adaptive equipment, Patient/Family/Caregiver Education, Community Resources, Arrange DME needs, Therapy exercises, and Energy conservation education     REQUIRED THERAPY:  Patient will require PT and OT 90 minutes each per day, five days per week to achieve rehab goals.     REQUIRED FUNCTIONAL AND MEDICAL MANAGEMENT FOR INPATIENT REHABILITATION:  Skin:  monitor skin for breakdown, Deep Vein Thrombosis (DVT) Prophylaxis:  per MD orders , further internal medicine management of additional medical conditions while on ARC, PT/OT intervention, patient/family education and training, and  any needed consults PRN.    RECOMMENDED LEVEL OF CARE:   Enrico  is a 89 year old male with a PMH of hypertension, COPD/asthma, GERD who presented to [facility] on 4/14 with generalized weakness and fall.  Also noted increasing swelling in redness of right lower extremity.  Patient has chronic swelling of right lower extremity however redness and blistering is new. He reported a recent fall at home when ambulating to the bathroom. In the ER patient found to be in rapid A-fib with HR in 170s and was started on cardizem gtt for short period, then converted to normal sinus rhythm . Sepsis workup ordered for cellulitis and Ct scan did not reveal any soft tissue gas. He was started on broad-spectrum IV antibiotics.  Lower extremity Doppler negative for DVT. Blood cultures x 2 no growth after 24 hours. ID consulted. Cellulitis appears streptococcal in origin. Right knee swollen in setting on cellulitis. No hx of MRSA. Pt started on IV cefazolin but can transition to PO cefadroxil 1g BID to complete total 10 day course of abx. Creatinine up to 1.6 on admission with baseline creatinine closer to 0.8.  Nephrology is following.  He is also hyponatremic.  Renal ultrasound negative for hydronephrosis. SONYA has resolved.   Patient is on Coumadin for A-fib-has been on hold  INR peaked at 8.71 now downtrending, to 4.61  PT and OT have been consulted and are recommending post-acute rehab services.   Patient's case has been reviewed and patient meets medical criteria for acute rehab and has demonstrated the ability to tolerate three or more hours of therapy per day. Patient is medically stable and ready for discharge to [facility].   Pt was independent PTA with transfers, amb, and ADLs. Pt lives alone in a 1 story home with 2 NILESH. Pt is currently functioning below baseline needing up to Mod A for ADLs and Min A x2 for transfers/amb. Pt would benefit from [facility] admission to have close medical management while participating in 3 hours of  therapy per day that will include physical and occupational therapy.  PM&R to maximize function and provide medical oversight. The MD will monitor patient co-morbidities while on the unit as well as order any additional tests, labs, and consults needed. The [facility] specialized interdisciplinary team will meet weekly to discuss patient overall medical status and rehab goals in preparation for D/C home. Inpatient acute rehab is recommended for patient to maximize overall strength, endurance, self care, and mobility for a safe and timely transition back home.

## 2025-04-19 NOTE — ASSESSMENT & PLAN NOTE
Pain levels tolerable, patient relates this pain to soreness from laying   First documented by PCP in 2020  Continue to monitor   PRN tylenol and Lidoderm

## 2025-04-19 NOTE — ASSESSMENT & PLAN NOTE
Present on admission with tachycardia, leukocytosis secondary to right lower extremity cellulitis  CT right lower extremity suggested cellulitis, no evidence of soft tissue gas  Lower extremity Doppler negative for DVT  Blood cultures x 2 no growth after 72 hours  Procalcitonin 15.61-->10.82-->4.70-->2.23-->1.32-->0.71  Lactic acid 1.7  Chest x-ray no acute pulmonary pathology  Appreciate ID who follow-received Ancef while inpatient transitioning to [facility] on discharge per ID recommendations for total of 10 days antibiotic treatment  Medically stable discharging to [facility] today as arranged by case management

## 2025-04-19 NOTE — PLAN OF CARE
Problem: PAIN - ADULT  Goal: Verbalizes/displays adequate comfort level or baseline comfort level  Description: Interventions:- Encourage patient to monitor pain and request assistance- Assess pain using appropriate pain scale- Administer analgesics based on type and severity of pain and evaluate response- Implement non-pharmacological measures as appropriate and evaluate response- Consider cultural and social influences on pain and pain management- Notify physician/advanced practitioner if interventions unsuccessful or patient reports new pain  Outcome: Progressing     Problem: INFECTION - ADULT  Goal: Absence or prevention of progression during hospitalization  Description: INTERVENTIONS:- Assess and monitor for signs and symptoms of infection- Monitor lab/diagnostic results- Monitor all insertion sites, i.e. indwelling lines, tubes, and drains- Monitor endotracheal if appropriate and nasal secretions for changes in amount and color- [place] appropriate cooling/warming therapies per order- Administer medications as ordered- Instruct and encourage patient and family to use good hand hygiene technique- Identify and instruct in appropriate isolation precautions for identified infection/condition  Outcome: Progressing  Goal: Absence of fever/infection during neutropenic period  Description: INTERVENTIONS:- Monitor WBC  Outcome: Progressing     Problem: SAFETY ADULT  Goal: Patient will remain free of falls  Description: INTERVENTIONS:- Educate patient/family on patient safety including physical limitations- Instruct patient to call for assistance with activity - Consult OT/PT to assist with strengthening/mobility - Keep Call bell within reach- Keep bed low and locked with side rails adjusted as appropriate- Keep care items and personal belongings within reach- Initiate and maintain comfort rounds- Make Fall Risk Sign visible to staff- Offer Toileting every 2 Hours, in advance of need- Initiate/Maintain bed alarm-  Obtain necessary fall risk management equipment:- Apply yellow socks and bracelet for high fall risk patients- Consider moving patient to room near nurses station  INTERVENTIONS:- Educate patient/family on patient safety including physical limitations- Instruct patient to call for assistance with activity - Consult OT/PT to assist with strengthening/mobility - Keep Call bell within reach- Keep bed low and locked with side rails adjusted as appropriate- Keep care items and personal belongings within reach- Initiate and maintain comfort rounds- Make Fall Risk Sign visible to staff- Offer Toileting every 2 Hours, in advance of need- Initiate/Maintain bed/chair alarm- Obtain necessary fall risk management equipment: - Apply yellow socks and bracelet for high fall risk patients- Consider moving patient to room near nurses station  Outcome: Progressing  Goal: Maintain or return to baseline ADL function  Description: INTERVENTIONS:-  Assess patient's ability to carry out ADLs; assess patient's baseline for ADL function and identify physical deficits which impact ability to perform ADLs (bathing, care of mouth/teeth, toileting, grooming, dressing, etc.)- Assess/evaluate cause of self-care deficits - Assess range of motion- Assess patient's mobility; develop plan if impaired- Assess patient's need for assistive devices and provide as appropriate- Encourage maximum independence but intervene and supervise when necessary- Involve family in performance of ADLs- Assess for home care needs following discharge - Consider OT consult to assist with ADL evaluation and planning for discharge- Provide patient education as appropriate  Outcome: Progressing  Goal: Maintains/Returns to pre admission functional level  Description: INTERVENTIONS:- Perform AM-PAC 6 Click Basic Mobility/ Daily Activity assessment daily.- Set and communicate daily mobility goal to care team and patient/family/caregiver. - Collaborate with rehabilitation  services on mobility goals if consulted- Perform Range of Motion 3 times a day.- Reposition patient every 2 hours.- Dangle patient 3 times a day- Stand patient 3 times a day- Ambulate patient 3 times a day- Out of bed to chair 3 times a day - Out of bed for meals 3 times a day- Out of bed for toileting- Record patient progress and toleration of activity level   Outcome: Progressing

## 2025-04-19 NOTE — H&P
LINDEN Chavira#  :1930 M  MRN:[id number]    [facility]:[id number]  Adm Date: 2025 1144  11:44 AM   ATT PHY: Jose Gamez Md  [facility]         Chief Complaint: debility due to sepsis      History of Presenting Illness: Enrico Chavira is a(n) 89 y.o. year old male who is admitted to [facility].  Patient originally presented to [facility] ED on 25 for generalized weakness.  He met sepsis criteria with tachycardia and leukocytosis in setting of right lower extremitity cellulitis as noted on CT imaging.  Patient was started on IV cefazolin.  His home warfarin was placed on hold due to supratherapeutic INR.  Patient was also noted to be in atrial fibrillation with RVR and converted to normal sinus rhythm after cardizem infusion.  He was started on metoprolol for rate control.  Nephrology was consulted for SONYA and hyponatremia.  Patient was transitioned to oral Duricef on discharge per infectious disease recommenations to complete total of 10 days of antibitioic treatment.  Patient was elevated by PT OT in consultation who recommended post-acute rehab services.       Patient examined at bedside.  Daughter present.  Patient without complaints at this time.  Reviewed home medications with patient.      Allergies   Allergen Reactions    Alphagan P [Brimonidine Tartrate] Itching     Current Facility-Administered Medications on File Prior to Encounter   Medication Dose Route Frequency Provider Last Rate Last Admin    [DISCONTINUED] acetaminophen (TYLENOL) tablet 650 mg  650 mg Oral Q6H PRN Jake Bunn MD   650 mg at 25 0905    [DISCONTINUED] albuterol inhalation solution 2.5 mg  2.5 mg Nebulization Q6H PRN Jake Bunn MD   2.5 mg at 25 1354    [DISCONTINUED] budesonide-formoterol (SYMBICORT) 160-4.5 mcg/act inhaler 2 puff  2 puff Inhalation BID Jake Bunn MD   2 puff at  04/19/25 0849    [DISCONTINUED] cefadroxil (DURICEF) capsule 1,000 mg  1,000 mg Oral Q12H [facility] RODY Goodson   1,000 mg at 04/19/25 0848    [DISCONTINUED] ceFAZolin (ANCEF) IVPB (premix in dextrose) 2,000 mg 50 mL  2,000 mg Intravenous Q8H Darci Mckeon PA-C   Stopped at 04/19/25 0230    [DISCONTINUED] dorzolamide (TRUSOPT) ophthalmic solution 1 drop  1 drop Left Eye BID Jake Bunn MD   1 drop at 04/19/25 0849    [DISCONTINUED] furosemide (LASIX) tablet 20 mg  20 mg Oral Daily RODY Goodson   20 mg at 04/19/25 0848    [DISCONTINUED] guaiFENesin (MUCINEX) 12 hr tablet 600 mg  600 mg Oral Q12H [facility] RODY Goodson   600 mg at 04/19/25 0848    [DISCONTINUED] latanoprost (XALATAN) 0.005 % ophthalmic solution 1 drop  1 drop Both Eyes HS Jake Bunn MD   1 drop at 04/18/25 2219    [DISCONTINUED] lidocaine (LIDODERM) 5 % patch 1 patch  1 patch Topical Daily RODY Goodson   1 patch at 04/19/25 0848    [DISCONTINUED] metoprolol tartrate (LOPRESSOR) tablet 25 mg  25 mg Oral Q12H [facility] Jake Bunn MD   25 mg at 04/19/25 0848    [DISCONTINUED] montelukast (SINGULAIR) tablet 10 mg  10 mg Oral Daily Jake Bunn MD   10 mg at 04/19/25 0848    [DISCONTINUED] ondansetron (ZOFRAN) injection 4 mg  4 mg Intravenous Q6H PRN Jake Bunn MD        [DISCONTINUED] oxyCODONE (ROXICODONE) immediate release tablet 10 mg  10 mg Oral Q4H PRN RODY Goodson        [DISCONTINUED] oxyCODONE (ROXICODONE) IR tablet 5 mg  5 mg Oral Q4H PRN RODY Goodson        [DISCONTINUED] pantoprazole (PROTONIX) EC tablet 20 mg  20 mg Oral Early Morning Jake Bunn MD   20 mg at 04/19/25 0500    [DISCONTINUED] polyethylene glycol (MIRALAX) packet 17 g  17 g Oral Daily PRN RODY Goodson   17 g at 04/18/25 0529    [DISCONTINUED] prednisoLONE acetate (PRED FORTE) 1 % ophthalmic suspension 1 drop  1 drop Left Eye BID Jake  Tra Bunn MD   1 drop at 04/19/25 0849     Current Outpatient Medications on File Prior to Encounter   Medication Sig Dispense Refill    acetaminophen (TYLENOL) 325 mg tablet Take 2 tablets (650 mg total) by mouth every 6 (six) hours as needed for mild pain, headaches or fever      albuterol (2.5 mg/3 mL) 0.083 % nebulizer solution Take 3 mL (2.5 mg total) by nebulization every 4 (four) hours as needed for wheezing or shortness of breath 100 mL 0    albuterol (2.5 mg/3 mL) 0.083 % nebulizer solution Take 3 mL (2.5 mg total) by nebulization every 4 (four) hours as needed for wheezing or shortness of breath 100 mL 0    albuterol (PROVENTIL HFA,VENTOLIN HFA) 90 mcg/act inhaler TAKE 2 PUFFS BY MOUTH EVERY 4 HOURS AS NEEDED FOR WHEEZE 18 g 1    amLODIPine (NORVASC) 2.5 mg tablet Take 1 tablet (2.5 mg total) by mouth daily 100 tablet 3    ammonium lactate (LAC-HYDRIN) 12 % lotion Apply topically daily To dry scaling skin (Patient taking differently: Apply topically as needed To dry scaling skin) 225 g 0    cefadroxil (DURICEF) 500 mg capsule Take 2 capsules (1,000 mg total) by mouth every 12 (twelve) hours for 11 doses 22 capsule 0    Cholecalciferol (Vitamin D3) 125 MCG (5000 UT) CAPS Take 1 capsule by mouth daily. Indications: Vitamin D Deficiency      desonide (DESOWEN) 0.05 % cream Apply to face twice a day (Patient taking differently: Apply 1 Application topically 2 (two) times a day Apply topically to face twice a day) 60 g 0    Difluprednate 0.05 % EMUL       docusate sodium (COLACE) 100 mg capsule Take 100 mg by mouth as needed      dorzolamide (TRUSOPT) 2 % ophthalmic solution INSTILL 1 DROP INTO LEFT EYE TWICE A DAY      fluticasone-vilanterol (Breo Ellipta) 200-25 mcg/actuation inhaler INHALE 1 PUFF DAILY RINSE MOUTH AFTER USE. (Patient taking differently: No sig reported) 180 each 1    furosemide (LASIX) 20 mg tablet Take 1 tablet (20 mg total) by mouth in the morning 90 tablet 1    guaiFENesin  (MUCINEX) 600 mg 12 hr tablet Take 1 tablet (600 mg total) by mouth every 12 (twelve) hours      latanoprost (XALATAN) 0.005 % ophthalmic solution Administer 1 drop to both eyes daily at bedtime      [START ON 4/20/2025] lidocaine (LIDODERM) 5 % Apply 1 patch topically over 12 hours daily Remove & Discard patch within 12 hours or as directed by MD    Apply to low back      metoprolol tartrate (LOPRESSOR) 25 mg tablet Take 1 tablet (25 mg total) by mouth every 12 (twelve) hours      montelukast (SINGULAIR) 10 mg tablet TAKE 1 TABLET BY MOUTH EVERY DAY 90 tablet 1    multivitamin (THERAGRAN) TABS Take 1 tablet by mouth daily      omeprazole (PriLOSEC OTC) 20 MG tablet Take 20 mg by mouth daily      Oral Electrolytes (ELECTROLYTE SR PO) Take 2 tablets by mouth daily.      oxyCODONE (ROXICODONE) 5 immediate release tablet Take 1 tablet (5 mg total) by mouth every 4 (four) hours as needed for moderate pain for up to 5 doses Max Daily Amount: 30 mg 5 tablet 0    Polyethyl Glycol-Propyl Glycol (SYSTANE OP) Administer 1 drop to both eyes 4 (four) times a day      polyethylene glycol (GLYCOLAX) 17 GM/SCOOP powder Take 17 g by mouth daily as needed (Constipation) Stir into 8 oz of liquid of choice.      prednisoLONE acetate (PRED FORTE) 1 % ophthalmic suspension Administer 1 drop into the left eye 2 (two) times a day      sodium chloride (ARIEL 128) 2 % hypertonic ophthalmic solution Administer 1 drop into the left eye 4 (four) times a day      warfarin (COUMADIN) 5 mg tablet Take 1.5 tablets daily or as directed 135 tablet 1    [DISCONTINUED] losartan (COZAAR) 50 mg tablet TAKE 1 TABLET BY MOUTH EVERY DAY 90 tablet 1    [DISCONTINUED] nystatin-triamcinolone (MYCOLOG-II) ointment Apply topically 2 (two) times a day for 7 days To affected skin of leg 30 g 1    [DISCONTINUED] triamcinolone (KENALOG) 0.1 % cream Apply topically 2 (two) times a day as needed for rash (Patient taking differently: Apply 1 Application topically 2  (two) times a day as needed for rash APPLY TOPICALLY TO RASH ON FACE AND EARS BID PRN.) 80 g 2    [DISCONTINUED] triamcinolone (KENALOG) 0.1 % ointment Apply topically 2 (two) times a day for 14 days To skin of R thigh (Patient not taking: Reported on 4/14/2025) 30 g 1     Active Ambulatory Problems     Diagnosis Date Noted    Prothrombin gene mutation ([facility]) 09/03/2019    Primary generalized (osteo)arthritis 09/03/2019    Hypertension 09/03/2019    Dyslipidemia 09/03/2019    Intrinsic eczema 12/17/2019    Other seborrheic dermatitis 05/19/2020    Abnormality of gait 05/19/2020    Osteoarthritis of both knees 06/25/2020    Chronic pulmonary embolism without acute cor pulmonale ([facility]) 08/19/2020    Age-related cataract of both eyes 09/04/2020    Premature beats 09/04/2020    Primary osteoarthritis of both knees 09/29/2020    Neoplasm of uncertain behavior of skin 11/19/2020    Encounter for long-term (current) use of medications 11/19/2020    GERD without esophagitis     Influenza vaccine refused 11/22/2021    Hypercalcemia 02/10/2022    COVID-19 virus infection 07/28/2022    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    Factor V Leiden ([facility]) 11/05/2018    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    History of pulmonary embolism 11/01/2018    Mature cataract 03/12/2021    Primary open-angle glaucoma, bilateral, mild stage 03/12/2021    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    TB lung, 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/01/2018    Hyponatremia 08/01/2022    Shortness of breath 09/19/2022    Other fatigue 04/29/2024    Acquired hypothyroidism 07/29/2024    Venous stasis ulcer of right calf limited to breakdown of skin without varicose veins ([facility]) 12/09/2024    Hashimoto's thyroiditis 12/09/2024    Venous stasis ulcer of other part of right lower leg with fat layer exposed with varicose veins ([facility]) 01/16/2025    Edema of both lower extremities due  to peripheral venous insufficiency 01/16/2025    Atrial fibrillation (HCC) 04/14/2025    Supratherapeutic INR 04/14/2025    Sepsis (HCC) 04/14/2025    Cellulitis of right lower extremity 04/14/2025    SONYA (acute kidney injury) ([facility]) 04/14/2025    Swelling of right knee 04/15/2025     Resolved Ambulatory Problems     Diagnosis Date Noted    Herpes zoster without complication 09/03/2019    Petechiae 12/17/2019    Dental abscess 03/02/2021    Stage 3 chronic kidney disease, unspecified whether stage 3a or 3b CKD (HCC) 08/19/2021    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    Encounter for Medicare annual wellness exam 10/20/2022    Encounter for annual wellness visit (AWV) in Medicare patient 10/23/2023     Past Medical History:   Diagnosis Date    Arthritis     Coagulation defect (HCC)     COPD (chronic obstructive pulmonary disease) (HCC)     Generalized osteoarthritis     Glaucoma     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[facility])     History of kidney stones 1985    Hx of blood clots     Impaired fasting glucose     Mild persistent asthma, uncomplicated     Nephrolithiasis     Nondisplaced intertrochanteric fracture of right femur, initial encounter for closed fracture ([facility])     Premature ventricular contractions     Pulmonary nodule     Scoliosis (and kyphoscoliosis), idiopathic     Wears dentures      Past Surgical History:   Procedure Laterality Date    CARPAL TUNNEL RELEASE Left     ENDO 8/801    CATARACT EXTRACTION Left     COLONOSCOPY  10/07/2008    last assessed: 3/29/2016    EYE SURGERY Right 01/13/2022    HERNIA REPAIR      HIP SURGERY      HIP SURGERY Left 03/05/1999    ORIF    MULTIPLE TOOTH EXTRACTIONS Bilateral     OTHER SURGICAL HISTORY Right 10/30/2001    [facility]    TONSILLECTOMY Bilateral     WISDOM TOOTH EXTRACTION Bilateral      Social History:   Social History     Socioeconomic History    Marital status:      Spouse name: None    Number of  children: None    Years of education: None    Highest education level: None   Occupational History    None   Tobacco Use    Smoking status: Former     Types: Pipe     Start date:      Quit date: 1970     Years since quittin.3     Passive exposure: Past    Smokeless tobacco: Never   Vaping Use    Vaping status: Never Used   Substance and Sexual Activity    Alcohol use: Yes     Comment: social    Drug use: Never    Sexual activity: Not Currently   Other Topics Concern    None   Social History Narrative    None     Social Drivers of Health     Financial Resource Strain: Low Risk  (10/23/2023)    Overall Financial Resource Strain (CARDIA)     Difficulty of Paying Living Expenses: Not very hard   Food Insecurity: No Food Insecurity (2025)    Nursing - Inadequate Food Risk Classification     Worried About Running Out of Food in the Last Year: Never true     Ran Out of Food in the Last Year: Never true     Ran Out of Food in the Last Year: Never true   Transportation Needs: No Transportation Needs (2025)    PRAPARE - Transportation     Lack of Transportation (Medical): No     Lack of Transportation (Non-Medical): No   Physical Activity: Not on file   Stress: Not on file   Social Connections: Not on file   Intimate Partner Violence: Unknown (2025)    Nursing IPS     Feels Physically and Emotionally Safe: Not on file     Physically Hurt by Someone: Not on file     Humiliated or Emotionally Abused by Someone: Not on file     Physically Hurt by Someone: No     Hurt or Threatened by Someone: No   Housing Stability: Unknown (2025)    Nursing: Inadequate Housing Risk Classification     Has Housing: Not on file     Worried About Losing Housing: Not on file     Unable to Get Utilities: Not on file     Unable to Pay for Housing in the Last Year: No     Has Housin     Family History:   Family History   Problem Relation Age of Onset    Hypertension Mother     Stroke Mother     Heart attack Father   "    Review of Systems   Constitutional:  Negative for chills and fever.   HENT: Negative.     Eyes: Negative.    Respiratory:  Positive for shortness of breath (on exertion, chronic) and wheezing.    Cardiovascular:  Positive for leg swelling. Negative for chest pain.   Gastrointestinal:  Positive for constipation. Negative for abdominal pain, diarrhea, nausea and vomiting.   Genitourinary:  Negative for difficulty urinating, dysuria and hematuria.   Musculoskeletal:  Negative for arthralgias and back pain.   Skin:  Positive for rash and wound.   Neurological:  Negative for dizziness and headaches.   All other systems reviewed and are negative.    Physical Exam   Vitals: Blood pressure 165/81, pulse 86, temperature (!) 97.4 °F (36.3 °C), temperature source Temporal, resp. rate 14, height 5' 7\" (1.702 m), weight 83.9 kg (184 lb 15.5 oz), SpO2 98%.,Body mass index is 28.97 kg/m².  Constitutional: Awake, alert, in no acute distress.  Head: Normocephalic and atraumatic.   Mouth/Throat: Oropharynx is clear and moist.    Eyes: Conjunctivae and EOM are normal.   Neck: Neck supple.   Cardiovascular: Normal rate, regular rhythm.  Pulmonary/Chest: Effort normal.  Expiratory wheezing noted.  Abdominal: Soft. Bowel sounds are normal. There is no tenderness. There is no rebound and no guarding.   Neurological: No focal deficits.   Skin: Skin is warm and dry.     Assessment     Enrico Chavira is a(n) 89 y.o. male with debility due to sepsis.     Cardiac hx with HTN, new onset A-fib.  Patient is on Lasix 20 mg daily and Lopressor 25 mg q12h (new).  Of note, pt was in rapid a-fib while in ER, converted to normal sinus rhythm s/p Cardizem drip.  Home amlodipine on hold since starting Lopressor for a-fib.  Cont to monitor vitals.  Hx of PE.  Home: warfarin 7.5mg daily.  Here: warfarin 5mg daily.  Initially on hold due to supratherapeutic INR and restarted on 4/18.  Goal 2-3.  Given 2mg on 4/18.  INR daily.   Asthma-COPD overlap " syndrome.  Continue Symbicort inhaler, Singulair 10 mg daily, Mucinex 600mg q12h, albuterol neb as needed.  Pt on Breo Ellipta at home however prefers Symbicort.   GERD.  Patient is on Protonix 20 mg daily (Prilosec nonform).   Glaucoma.  Continue home prednisolone, Xalatan, Trusopt eye drops.  Artifical tear drops as needed.  Anemia.  Hgb 10.3 on 4/19.  No s/sx active bleeding.  Cont to trend.   SONYA.  Resolved.  Baseline Cr 0.8-0.9.  Cr 0.76 on 4/19.  Peak Cr 1.62 on 4/14.  Cont to monitor.  Chronic hyponatremia.  Level 126 on 4/14 -> 131 on 4/19.  1800ml/day fluid restriction.  Cont to monitor.   Sepsis secondary to cellulitis of right lower extremity.  Pt treated with IV cefazolin and transitioned to Duricef 1000mg q12h to complete 10-day course of antibiotics per ID recommendations (thru 4/25).  Pain and bowel regimen.  As per physiatry.    Debility due to sepsis.  Patient is receiving intensive PT OT with management as per physiatry.       Prognosis: Fair.    Discharge Plan: In progress.    Advanced Directives: I have discussed in detail with the patient the advanced directives. The patient's POA is his daughter, Eden Flores, whose number is [phone].  He does have a living will with advanced healthcare directives. When discussing cardiac and pulmonary resuscitation efforts with the patient, the patient wishes to be DNAR/DNI.    I have spent more than 50 minutes gathering data, doing physical examination, and discussing the advanced directives, which was witnessed by caring staff.    The patient was discussed with Dr. Gamez and he is in agreement with the above note.

## 2025-04-19 NOTE — NURSING NOTE
Called ARU to give report all questions answered. Transport team took patient out via wheel chair.

## 2025-04-19 NOTE — TREATMENT PLAN
Individualized Plan of Care - [facility] [place]  Enrico Chavira 89 y.o. male MRN: [id number]  Unit/Bed#: [facility] 219-01 Encounter: [id number]     PATIENT INFORMATION  ADMISSION DATE: 4/19/2025 11:44 AM [place] CATEGORY:Debility:  16  Debility (Non-cardiac/Non-pulmonary)    Etiologic: Sepsis due to R LE cellulitis    ADMISSION DIAGNOSIS: Sepsis (HCC) [A41.9]  EXPECTED LOS: 10 to 14 days     MEDICAL/FUNCTIONAL PROGNOSIS  Based on my assessment of the patient's medical conditions and current functional status, the prognosis for attaining medical and functional goals or the IRF stay is:  Fair    Medical Goals: Patient will be medically stable for discharge to [facility] upon completion of rehab program and Patient will be able to manage medical conditions and comorbid conditions with medications and follow up upon completion of rehab program    ANTICIPATED DISCHARGE DISPOSITION AND SERVICES  COMMUNITY SETTING: Home - independent/modified independent      Cardiopulmonary function/Anemia: Ensure cardiopulmonary stability and optimize cardiopulmonary function not only at rest but with activity as patient's activity level significantly increases in acute rehab compared with prior to transfer in preparation for safe discharge from [facility].  Must closely and frequently monitor blood pressure, HR, anemia to ensure adequate cardiac output during ADLs and ambulation as patient is at increased risk for orthostatic hypotension/syncope and potential injury if not monitored for and managed adequately.     Blood pressure management:    Frequent monitoring of blood pressure with appropriate adjustments in blood pressure medication management to optimize blood pressure control and prevent/limit renal complications.   Monitoring impact of blood pressure and side-effects of blood pressure medications at rest and with activity.  Hypoxia prevention: Ensure appropriate level of oxygenation at rest and with activity  to avoid symptomatic hypoxia, maximize functional performance, and decrease risk of atelectasis/pneumonia through close and frequent monitoring, providing appropriate respiratory treatments (such as incentive spirometry), and when necessary provide/adjust respiratory medications.    Pain management:  Pain will improve with frequent evaluation of pain, careful adjustments in medications, frequent re-evaluation of patient's pain and medical/neurologic status to ensure optimal pain control, avoidance of potential serious and even life-threatening side-effects and drug interactions, as well as weaning pain medications as soon as possible to decrease risk of short and long-term use.      Inpatient rehabilitation education/teaching:  To be provided to patient and typically family/caregiver (if able to be identified) by all skilled therapists, rehab nursing, case management, and rehab specialized physician to ensure optimal recovery and decrease risks of complications in both acute rehabilitation setting as well as after discharge.     Electrolyte abnormality: Hyponatremia:  placing patient at risk for additional complications which may impact medical stability and functional recovery.  Frequent measurement and monitoring of labs by with appropriate adjustments in medication and/or fluid management by rehabilitation physician and internal medicine consultant.    Bladder dysfunction:  Appropriate bladder management with appropriate toileting program from rehab nursing and staff with oversight management by rehabilitation physicians which include appropriate monitoring and possible adjustments in medications to with goals to optimize bladder function and decrease risk of bladder retention, incontinence, and urinary tract infection.   Bowel dysfunction: Appropriate bowel management with appropriate toileting program from rehab nursing and staff with oversight management by rehabilitation physicians which include adjustments  in medications to optimize appropriate bowel function and prevent/decrease risk of constipation and bowel obstruction.    Skin wounds: Appropriate skin checks for wound/skin evaluation including evaluation of healing, worsening of wounds, or signs of infection.   Wound care management from rehab nursing, wound care nursing, physicians.  Ensure frequent appropriate turning, positioning in bed, in chair, when mobilizing, and when appropriate with use of appropriate devices to optimize healing and decrease risk of worsening or new skin breakdown.      ANTICIPATED FOLLOW-UP SERVICE:   Outpatient Therapy Services: PT and OT        DISCIPLINE SPECIFIC PLANS:  Required Disciplines & Services: Rehabillitation Nursing, Case Management, and Dietay/Nutrition    REQUIRED THERAPY:  Therapy Hours per Day Days per Week   Physical Therapy 1.5 5-6   Occupational Therapy 1.5 5-6   NOTE: Additional therapy time(s) or changes to allocation of therapies as appropriate to meet patient needs and to achieve functional goals.      Patient will participate in above therapy regimen consisting of PT and OT due to the following medical procedure/condition:Debility:  16  Debility (Non-cardiac/Non-pulmonary)    ANTICIPATED FUNCTIONAL OUTCOMES:  ADL:  Modified independent with least restrictive assistive device    Bladder/Bowel: Patient will be independent with bladder/bowel management with least restrictive device upon completion of rehab program   Transfers:     Modified independent with least restrictive assistive device    Locomotion:  Modified independent with least restrictive assistive device    Cognitive:  Independent with basic ADLs      DISCHARGE PLANNING NEEDS  Equipment needs: Discharge needs to be reviewed with team      REHAB ANTICIPATED PARTICIPATION RESTRICTIONS:  None

## 2025-04-19 NOTE — ASSESSMENT & PLAN NOTE
Patient reports history of constipation   LBM 4/18 (previous 4/14)   Will start on daily Miralax and adjust as needed

## 2025-04-19 NOTE — ASSESSMENT & PLAN NOTE
Patient is being treated for sepsis of the right lower extremity see plan for same  Swelling much improved, no pain

## 2025-04-19 NOTE — CONSULTS
PHYSICAL MEDICINE AND REHABILITATION H&P/CONSULT NOTE  Enrico Chavira 89 y.o. male MRN: [id number]  Unit/Bed#: [facility] 219-01 Encounter: [id number]     Rehab Diagnosis: Impairment of mobility, safety and Activities of Daily Living (ADLs) due to Debility:  16  Debility (Non-cardiac/Non-pulmonary)  Etiologic: Sepsis due to R LE cellulitis     History of Present Illness:   Patient is a 89 year-old male, with a past medical history of lower extremity venous stasis with ulceration of his right lower extremity, hypertension, asthma-COPD, factor V leiden on warfarin, presenting to ARC secondary to debility to recent sepsis. He initially presented to the ED on 4/14 due to RLE cellulitis and worsening weakness. He was noted to be in rapid a-fib with RVR in the ED and met sepsis criteria with tachycardia and leukocytosis. Cardizem infusion started in the ED and he converted back to NSR. Labs were significant for leukocytosis, elevated procalcitonin, hyponatremia and SONYA. CT of the RLE did not reveal soft tissue gas or fluid collections, but showed subcutaneous edema and skin thickening from the proximal thigh through the ankle consistent with cellulitis.  He was subsequently started on  IV cefazolin. Blood cultures negative after 72 hours. Renal and  ID recommendations were appreciated. Per ID, lower extremity skin dry and cracked, which is a likely port of entry. He clinically improved with stabilization of lab work. To complete a 10-day course of antibiotics, he was transitioned to a 6-day course of cefadroxil upon discharge. He was evaluated by PT and OT and deemed an appropriate candidate for [facility] due to functional deficits.     Plan:     Rehabilitation  Functional deficits: impaired mobility, self care  Begin PT/OT/SLP.  Rehabilitation goals are to achieve a modified independent to min A level with mobility and self care.  Prognosis is fair.  ELOS is 10-14 days.  Estimated discharge is home with family support.     DVT  prophylaxis  Chronic warfarin     Pain  PRN Tylenol and Lidoderm    Bladder plan  Continent    Bowel plan  Continent  Daily Miralax    Code Status  Level 3       * Cellulitis of right lower extremity  Assessment & Plan  HPI:   Patient with a history of venous stasis with ulceration, recently discharged from wound care (2/24/25)   Patient presented to the ED on 4/14 due to concern for RLE infection and worsening weakness   Met sepsis criteria with tachycardia, leukocytosis, and RLE cellulitis   2 g cefriaxone given in ED   CT RLE did not reveal soft tissue gas or fluid collections, but showed subcutaneous edema and skin thickening from the proximal thigh through the ankle consistent with cellulitis  Started on IV cefazolin on 4/15 ---> transitioned to 10 day course of cefadroxil on 4/19   Procalcitonin 15.61-->10.82-->4.70-->2.23-->1.32-->0.71  Blood cultures x 2 no growth after 72 hours  Lower extremity Doppler negative for DVT    Plan:   Continue cefadroxil 1 g q12 hours through 4/23  PT and OT for 3 hours a day, 5-6 days a week   Seriel skin checks and monitoring   Consult wound care RN  Encourage leg elevation  Continue local wound care     Anemia  Assessment & Plan  First noted on 4/15, may be dilutional   Most recent hgb 10.3, continue to monitor CBC    Chronic left-sided low back pain  Assessment & Plan  Pain levels tolerable, patient relates this pain to soreness from laying   First documented by PCP in 2020  Continue to monitor   PRN tylenol and Lidoderm     Constipation  Assessment & Plan  Patient reports history of constipation   LBM 4/18 (previous 4/14)   Will start on daily Miralax and adjust as needed     Impaired mobility and ADLs  Assessment & Plan  Patient was evaluated by the rehabilitation team MD and an appropriate candidate for acute inpatient rehabilitation program at this time.  The patient will tolerate 3 hours/day 5 to 7 days/week of intensive physical and  occupational therapy   in order to  "obtain goals for community discharge  Due to the patient's functional Compared to their baseline level of function in addition to their ongoing medical needs, the patient would benefit from daily supervision from a rehabilitation physician as well as rehabilitation nursing to implement and adjust the medical as well as functional plan of care in order to meet the patient's goals.  Bladder: Monitor closely for urinary incontinence and/or retention. Monitor urine output and encourage spontaneous voids. If unable to void spontaneously, will monitor with PVR bladder scans and initiate CIC regimen.  Skin: Monitor for breakdown, frequent turns, pressure offloading  Sleep: Encourage sleep hygiene (routine that promotes healthy circadian rhythm,avoid caffeine later in the day, quiet/dark room at night, reduce electronic before bedtime)  Patient lives alone, previously independent. He has 10 children, many of  whom will be able to assist after discharge.   Discharge: discharge planning with team, anticipate 10-14 day admission, date TBD         Abnormal CT of the chest  Assessment & Plan  4/18/25 CT: \"3.7 cm anterior right upper lobe solid mass with septated cystic component and calcification. 2.6 cm lateral right upper lobe opacity and 7 mm right lower lobe nodule. These may be infectious/inflammatory but malignancy cannot be excluded. 2.3 x 1.3 cm low-attenuation nodule in the mediastinum adjacent to the hiatal hernia, question benign or malignant lymph node.\"  Per daughter, patient was aware of nodules but RUL mass new. Patient does not want to follow-up on either.     Fall  Assessment & Plan  Patient reports only 1 fall in the past year. This fall occurred a few days before his admission. He describes trying to climb into bed, bt being too weak to lift his legs and subsequently falling. Daughter is not aware of any other falls beside this one.   PT and OT       SONYA (acute kidney injury) ([facility])  Assessment & Plan  Baseline " creatinine appears to be around 0.8-0.9. Creatinine on admission 1.62, likely prerenal secondary to sepsis   4/15 ultrasound negative for hydronephrosis   Treated with IVF   Most recent creatinine (4/19) 0.76   Continue to monitor BMP    Atrial fibrillation (HCC)  Assessment & Plan  Follows with cardiologist Dr. Mtz ([facility]), no documented history of a-fib. Patient and daughter also deny a history of dysthymia.   Continue metoprolol 25 mg q12h and chronic warfarin   Continue to monitor vitals and hemodynamic status       Hyponatremia  Assessment & Plan  Most recently 131 on 4/19 (126 in ED)   Nephrology managed in acute care   Na 124 on 11/11/24; suspect chronicity  1800 mL FR daily   Continue to monitor BMP     Factor V Leiden (HCC)  Assessment & Plan  Diagnosed after unprovoked PE in 2018   Continue chronic warfarin (INR recently supratherapeutic in acute care)   Daily INRs while inpatient     Asthma-COPD overlap syndrome (HCC)  Assessment & Plan  Managed at discretion of IM   Continue Symbicort and PRN albuterol   Chest x-ray 4/17: no acute pulmonary pathology    Hypertension  Assessment & Plan  Management at discretion of IM   Continue furosemide and metoprolol               Subjective/Interval Events:   Seen and evaluated patient in room. Daughter present. Patient given overview of ARC facility and does not have any questions or concerns at this time. LBM 4/18, continent of bowel and bladder.     Review of Systems   Constitutional:  Negative for chills, fatigue and fever.   Respiratory:  Negative for cough.         Chronic KHALIL    Cardiovascular:  Positive for leg swelling (RLE). Negative for chest pain and palpitations.   Gastrointestinal:  Positive for constipation. Negative for abdominal distention, diarrhea and nausea.   Genitourinary:  Positive for frequency (chronic). Negative for difficulty urinating and dysuria.   Musculoskeletal:  Positive for back pain. Negative for arthralgias.   Skin:  Positive  "for wound (RLE).   Neurological:  Positive for weakness (generlized) and numbness (BL fingers (chronic)). Negative for dizziness, speech difficulty, light-headedness and headaches.   Hematological:  Bruises/bleeds easily.         Function:  PRIOR LEVEL OF FUNCTION:  He lives in a(n) single family home  Enrico Chavira is  and lives alone.  Self Care: Independent, Indoor Mobility: Independent, Stairs (in/outdoor): Independent, and Cognition: Independent     FALLS IN THE LAST 6 MONTHS: 1     HOME ENVIRONMENT:  The living area: can live on one level  There are 2 steps to enter the home.    The patient will have 24 hour supervision/physical assistance available upon discharge.     PREVIOUS DME:  Equipment in home (previous DME): Shower Chair, Grab Bars, Rolling Walker, and Single Point Cane  Current level of function:  Care scores:   Self Care:  Eatin: Independent  Oral hygiene: 06: Independent  Toilet hygiene: 03: Partial/moderate assistance  Shower/bathing self: 03: Partial/moderate assistance  Upper body dressin: Supervision or touching  assistance  Lower body dressin: Partial/moderate assistance  Putting on/taking off footwear: 03: Partial/moderate assistance  Transfers:  Roll left and right: 03: Partial/moderate assistance  Sit to lyin: Partial/moderate assistance  Lying to sitting on side of bed: 03: Partial/moderate assistance  Sit to stand: 01: Dependent ( Min A x2)  Chair/bed to chair transfer: 01: Dependent (Min A x2)  Toilet transfer: 09: Not applicable  Mobility: (Min A x2 with RW 8')  Walk 10 ft: 88: Not attempted due to medical conditions or safety concerns  Walk 50 ft with two turns: 88: Not attempted due to medical conditions or safety concerns  Walk 150ft: 88: Not attempted due to medical conditions or safety concerns    Physical Exam:  /74 (BP Location: Left arm)   Pulse 72   Temp 97.5 °F (36.4 °C) (Temporal)   Resp 16   Ht 5' 7\" (1.702 m)   Wt 83.9 kg (184 lb " 15.5 oz)   SpO2 97%   BMI 28.97 kg/m²        Intake/Output Summary (Last 24 hours) at 4/19/2025 1743  Last data filed at 4/19/2025 1728  Gross per 24 hour   Intake 440 ml   Output --   Net 440 ml       Body mass index is 28.97 kg/m².      Physical Exam  Vitals and nursing note reviewed.   Constitutional:       General: He is not in acute distress.     Appearance: He is not ill-appearing, toxic-appearing or diaphoretic.   HENT:      Head: Normocephalic and atraumatic.      Nose: Nose normal.      Mouth/Throat:      Mouth: Mucous membranes are moist.   Cardiovascular:      Rate and Rhythm: Normal rate.      Pulses:           Dorsalis pedis pulses are 2+ on the right side and 2+ on the left side.   Pulmonary:      Effort: Pulmonary effort is normal. No respiratory distress.   Abdominal:      General: Bowel sounds are normal. There is no distension.      Palpations: Abdomen is soft.      Tenderness: There is no abdominal tenderness.   Musculoskeletal:      Right lower leg: Edema present.   Skin:     General: Skin is warm and dry.      Comments: RLE: warm, erythematous and edematous (see photo)   Neurological:      Mental Status: He is alert and oriented to person, place, and time.      Comments: Strength equal bilaterally    Psychiatric:         Mood and Affect: Mood normal.         Behavior: Behavior normal.                      Labs, medications, and imaging personally reviewed.    Laboratory:    Lab Results   Component Value Date    SODIUM 131 (L) 04/19/2025    K 4.4 04/19/2025    CL 99 04/19/2025    CO2 26 04/19/2025    BUN 24 04/19/2025    CREATININE 0.76 04/19/2025    GLUC 155 (H) 04/19/2025    CALCIUM 8.5 04/19/2025     Lab Results   Component Value Date    WBC 11.10 (H) 04/19/2025    HGB 10.3 (L) 04/19/2025    HCT 31.2 (L) 04/19/2025    MCV 90 04/19/2025     04/19/2025     Lab Results   Component Value Date    INR 1.56 (H) 04/19/2025    INR 2.59 (H) 04/18/2025    INR 4.61 (H) 04/17/2025    PROTIME 19.2  (H) 04/19/2025    PROTIME 28.0 (H) 04/18/2025    PROTIME 43.2 (H) 04/17/2025         Current Facility-Administered Medications:     acetaminophen (TYLENOL) tablet 650 mg, 650 mg, Oral, Q6H PRN, Nicolasa Shetty PA-C    albuterol inhalation solution 2.5 mg, 2.5 mg, Nebulization, Q6H PRN, Nicolasa Shetty PA-C    ammonium lactate (LAC-HYDRIN) 12 % lotion, , Topical, BID PRN, Liseth Owusu PA-C    budesonide-formoterol (SYMBICORT) 160-4.5 mcg/act inhaler 2 puff, 2 puff, Inhalation, BID, Nicolasa Shetty PA-C, 2 puff at 04/19/25 1717    cefadroxil (DURICEF) capsule 1,000 mg, 1,000 mg, Oral, Q12H CLARK, Nicolasa Shetty PA-C    dorzolamide (TRUSOPT) ophthalmic solution 1 drop, 1 drop, Left Eye, BID, Nicolasa Shetty PA-C    [START ON 4/20/2025] furosemide (LASIX) tablet 20 mg, 20 mg, Oral, Daily, Nicolasa Shetty PA-C    guaiFENesin (MUCINEX) 12 hr tablet 600 mg, 600 mg, Oral, Q12H CLARK, Nicolasa Shetty PA-C    latanoprost (XALATAN) 0.005 % ophthalmic solution 1 drop, 1 drop, Both Eyes, HS, Nicolasa Shetty PA-C    [START ON 4/20/2025] lidocaine (LIDODERM) 5 % patch 1 patch, 1 patch, Topical, Daily, Nicolasa Shetty PA-C    metoprolol tartrate (LOPRESSOR) tablet 25 mg, 25 mg, Oral, Q12H CLARK, Nicolasa Shetty PA-C    [START ON 4/20/2025] montelukast (SINGULAIR) tablet 10 mg, 10 mg, Oral, Daily, Nicolasa Shetty PA-C    nystatin (MYCOSTATIN) powder, , Topical, BID, Liseth Owusu PA-C    [START ON 4/20/2025] pantoprazole (PROTONIX) EC tablet 20 mg, 20 mg, Oral, Early Morning, Nicolasa Shetty PA-C    [START ON 4/20/2025] polyethylene glycol (MIRALAX) packet 17 g, 17 g, Oral, Daily, Liseth Owusu PA-C    prednisoLONE acetate (PRED FORTE) 1 % ophthalmic suspension 1 drop, 1 drop, Left Eye, BID, Nicolasa Shetty PA-C, 1 drop at 04/19/25 1717    warfarin (COUMADIN) tablet 5 mg, 5 mg, Oral, Daily (warfarin), VICTORIANO Diaz-C, 5 mg at 04/19/25 1716  Allergies   Allergen Reactions    Alphagan P  [Brimonidine Tartrate] Itching      Patient Active Problem List    Diagnosis Date Noted    Cellulitis of right lower extremity 04/14/2025    Fall 04/19/2025    Abnormal CT of the chest 04/19/2025    Impaired mobility and ADLs 04/19/2025    Constipation 04/19/2025    Chronic left-sided low back pain 04/19/2025    Anemia 04/19/2025    Swelling of right knee 04/15/2025    Atrial fibrillation ([facility]) 04/14/2025    Supratherapeutic INR 04/14/2025    Sepsis ([facility]) 04/14/2025    SONYA (acute kidney injury) ([facility]) 04/14/2025    Venous stasis ulcer of other part of right lower leg with fat layer exposed with varicose veins ([facility]) 01/16/2025    Edema of both lower extremities due to peripheral venous insufficiency 01/16/2025    Venous stasis ulcer of right calf limited to breakdown of skin without varicose veins ([facility]) 12/09/2024    Hashimoto's thyroiditis 12/09/2024    Acquired hypothyroidism 07/29/2024    Other fatigue 04/29/2024    Shortness of breath 09/19/2022    Hyponatremia 08/01/2022    COVID-19 virus infection 07/28/2022    Hypercalcemia 02/10/2022    Influenza vaccine refused 11/22/2021    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    Mature cataract 03/12/2021    Primary open-angle glaucoma, bilateral, mild stage 03/12/2021    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    GERD without esophagitis     Neoplasm of uncertain behavior of skin 11/19/2020    Encounter for long-term (current) use of medications 11/19/2020    Primary osteoarthritis of both knees 09/29/2020    Age-related cataract of both eyes 09/04/2020    Premature beats 09/04/2020    Chronic pulmonary embolism without acute cor pulmonale (HCC) 08/19/2020    Osteoarthritis of both knees 06/25/2020    Other seborrheic dermatitis 05/19/2020    Abnormality of gait 05/19/2020    Intrinsic eczema 12/17/2019    Prothrombin gene mutation (HCC) 09/03/2019    Primary generalized (osteo)arthritis 09/03/2019    Hypertension 09/03/2019    Dyslipidemia  09/03/2019    Factor V Leiden (HCC) 11/05/2018    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) 11/05/2018    History of pulmonary embolism 11/01/2018    TB lung, latent 11/01/2018    Asthma-COPD overlap syndrome (HCC) 06/20/2012     Past Medical History:   Diagnosis Date    Arthritis     Cellulitis of right lower extremity 04/19/2025    Coagulation defect ([facility])     last assessed: 11/28/2018    COPD (chronic obstructive pulmonary disease) ([facility])     last assessed: 5/14/2019, 12/6/2017    Generalized osteoarthritis     last assessed: 1/28/2019    GERD without esophagitis     last assessed: 1/28/2019    Glaucoma     last assessed: 8/4/2011    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[facility])     last assessed: 10/16/2018    Factor II Prothrombin Gene per [employer]    History of kidney stones 1985    Hx of blood clots     Hypertension     last assessed: 5/14/2019    Impaired fasting glucose     last assessed: 8/26/2013    Mild persistent asthma, uncomplicated     last assessed: 11/28/2018    Nephrolithiasis     Nondisplaced intertrochanteric fracture of right femur, initial encounter for closed fracture ([facility])     last assessed: 1/28/2019    Premature ventricular contractions     last assessed: 8/4/2011    Pulmonary nodule     Scoliosis (and kyphoscoliosis), idiopathic     Wears dentures      Past Surgical History:   Procedure Laterality Date    CARPAL TUNNEL RELEASE Left     ENDO 8/801    CATARACT EXTRACTION Left     COLONOSCOPY  10/07/2008    last assessed: 3/29/2016    EYE SURGERY Right 01/13/2022    HERNIA REPAIR      HIP SURGERY      HIP SURGERY Left 03/05/1999    ORIF    MULTIPLE TOOTH EXTRACTIONS Bilateral     OTHER SURGICAL HISTORY Right 10/30/2001    [facility]    TONSILLECTOMY Bilateral     WISDOM TOOTH EXTRACTION Bilateral      Social History     Socioeconomic History    Marital status:      Spouse name: Not on file    Number of children: Not on file    Years of education: Not on file     Highest education level: Not on file   Occupational History    Not on file   Tobacco Use    Smoking status: Former     Types: Pipe     Start date:      Quit date: 1970     Years since quittin.3     Passive exposure: Past    Smokeless tobacco: Never   Vaping Use    Vaping status: Never Used   Substance and Sexual Activity    Alcohol use: Yes     Comment: social    Drug use: Never    Sexual activity: Not Currently   Other Topics Concern    Not on file   Social History Narrative    Not on file     Social Drivers of Health     Financial Resource Strain: Low Risk  (10/23/2023)    Overall Financial Resource Strain (CARDIA)     Difficulty of Paying Living Expenses: Not very hard   Food Insecurity: No Food Insecurity (2025)    Nursing - Inadequate Food Risk Classification     Worried About Running Out of Food in the Last Year: Never true     Ran Out of Food in the Last Year: Never true     Ran Out of Food in the Last Year: Never true   Transportation Needs: No Transportation Needs (2025)    PRAPARE - Transportation     Lack of Transportation (Medical): No     Lack of Transportation (Non-Medical): No   Physical Activity: Not on file   Stress: Not on file   Social Connections: Not on file   Intimate Partner Violence: Unknown (2025)    Nursing IPS     Feels Physically and Emotionally Safe: Not on file     Physically Hurt by Someone: Not on file     Humiliated or Emotionally Abused by Someone: Not on file     Physically Hurt by Someone: No     Hurt or Threatened by Someone: No   Housing Stability: Unknown (2025)    Nursing: Inadequate Housing Risk Classification     Has Housing: Not on file     Worried About Losing Housing: Not on file     Unable to Get Utilities: Not on file     Unable to Pay for Housing in the Last Year: No     Has Housin     Social History     Tobacco Use   Smoking Status Former    Types: Pipe    Start date:     Quit date: 1970    Years since quittin.3    Passive  exposure: Past   Smokeless Tobacco Never     Social History     Substance and Sexual Activity   Alcohol Use Yes    Comment: social     Family History   Problem Relation Age of Onset    Hypertension Mother     Stroke Mother     Heart attack Father          Medical Necessity Criteria for [facility] Admission: Electrolyte imbalance:  hyponatremia, Acute Kidney Injury, Anemia, with the following plan: monitor H/H, Hypertension, Bowel/Bladder Management, and Incision/Wound care and INR monitoring,. In addition, the preadmission screen, post-admission physical evaluation, overall plan of care and admissions order demonstrate a reasonable expectation that the following criteria were met at the time of admission to the [facility].  The patient requires active and ongoing therapeutic intervention of multiple therapy disciplines (physical therapy, occupational therapy, speech-language pathology, or prosthetics/orthotics), one of which is physical or occupational therapy.    Patient requires an intensive rehabilitation therapy program, as defined in Chapter 1, section 110.2.2 of the CMS Medicare Policy Manual. This intensive rehabilitation therapy program will consist of at least 3 hours of therapy per day at least 5 days per week or at least 15 hours of intensive rehabilitation therapy within a 7 consecutive day period, beginning with the date of admission to the [facility].    The patient is reasonably expected to actively participate in, and benefit significantly from, the intensive rehabilitation therapy program as defined in Chapter 1, section 110.2.2 of the CMS Medicare Policy Manual at this time of admission to the [facility]. He can reasonably be expected to make measurable improvement (that will be of practical value to improve the patient’s functional capacity or adaptation to impairments) as a result of the rehabilitation treatment, as defined in section 110.3, and such improvement can be expected to be made within the prescribed period of  time. As noted in the CMS Medicare Policy Manual, the patient need not be expected to achieve complete independence in the domain of self-care nor be expected to return to his or her prior level of functioning in order to meet this standard.  The patient must require physician supervision by a rehabilitation physician. As such, a rehabilitation physician will conduct face-to-face visits with the patient at least 3 days per week throughout the patient’s stay in the [facility] to assess the patient both medically and functionally, as well as to modify the course of treatment as needed to maximize the patient’s capacity to benefit from the rehabilitation process.  The patient requires an intensive and coordinated interdisciplinary approach to providing rehabilitation, as defined in Chapter 1, section 110.2.5 of the CMS Medicare Policy Manual. This will be achieved through periodic team conferences, conducted at least once in a 7-day period, and comprising of an interdisciplinary team of medical professionals consisting of: a rehabilitation physician, registered nurse,  and/or , and a licensed/certified therapist from each therapy discipline involved in treating the patient.     Changes Since Pre-admission Assessment: None -This patient's participation in rehab continues to be reasonable, necessary and appropriate.    CMS Required Post-Admission Physician Evaluation Elements  History and Physical, including medical history, functional history and active comorbidities as in above text.     Liseth Owusu PA-C  Physical Medicine and Rehabilitation  [facility]

## 2025-04-19 NOTE — ASSESSMENT & PLAN NOTE
Follows with cardiologist Dr. Mtz ([facility]), no documented history of a-fib. Patient and daughter also deny a history of dysthymia.   Continue metoprolol 25 mg q12h and chronic warfarin   Continue to monitor vitals and hemodynamic status

## 2025-04-19 NOTE — ASSESSMENT & PLAN NOTE
POA with serum sodium 126  Patient has history of chronic hyponatremia  Appreciate nephrology who followed  Serum sodium is baseline  Placed ambulatory referral to nephrology on discharge  Medically stable discharging to [facility] today as per case management  Recommended follow-up labs in 3 to 5 days as per nephrology recommendations

## 2025-04-19 NOTE — ASSESSMENT & PLAN NOTE
Managed at discretion of IM   Continue Symbicort and PRN albuterol   Chest x-ray 4/17: no acute pulmonary pathology

## 2025-04-19 NOTE — ASSESSMENT & PLAN NOTE
Patient reports only 1 fall in the past year. This fall occurred a few days before his admission. He describes trying to climb into bed, bt being too weak to lift his legs and subsequently falling. Daughter is not aware of any other falls beside this one.   PT and OT and fall precautions

## 2025-04-19 NOTE — ASSESSMENT & PLAN NOTE
Baseline creatinine appears to be around 0.8-0.9. Creatinine on admission 1.62, likely prerenal secondary to sepsis   4/15 ultrasound negative for hydronephrosis   Treated with IVF   Most recent creatinine (4/19) 0.76   Continue to monitor BMP

## 2025-04-19 NOTE — ASSESSMENT & PLAN NOTE
POA with creatinine 1.62  Baseline creatinine appears to be around 0.8-0.9  Secondary to dehydration and sepsis-see plan for sepsis  Urine sodium less than 10  Renal ultrasound negative  S/p treatment with IV fluids  SONYA resolved  Appreciate nephrology who followed-placed ambulatory referral for discharge

## 2025-04-19 NOTE — ASSESSMENT & PLAN NOTE
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

## 2025-04-19 NOTE — ASSESSMENT & PLAN NOTE
HPI:   Patient with a history of venous stasis with ulceration, recently discharged from wound care (2/24/25)   Patient presented to the ED on 4/14 due to concern for RLE infection and worsening weakness   Met sepsis criteria with tachycardia, leukocytosis, and RLE cellulitis   2 g cefriaxone given in ED   CT RLE did not reveal soft tissue gas or fluid collections, but showed subcutaneous edema and skin thickening from the proximal thigh through the ankle consistent with cellulitis  Started on IV cefazolin on 4/15 ---> transitioned to 10 day course of cefadroxil on 4/19   Procalcitonin 15.61-->10.82-->4.70-->2.23-->1.32-->0.71  Blood cultures x 2 no growth after 72 hours  Lower extremity Doppler negative for DVT    Plan:   Continue cefadroxil 1 g q12 hours through 4/24  PT and OT for 3 hours a day, 5-6 days a week   Seriel skin checks and monitoring   Consult wound care RN  Encourage leg elevation  Continue local wound care

## 2025-04-19 NOTE — ASSESSMENT & PLAN NOTE
See plan for sepsis  CT right lower extremity: Subcutaneous edema and skin thickening extending from the proximal thigh through the ankle, which can be seen with cellulitis. No fluid collection within the limits of an unenhanced exam. No soft tissue gas  Appearance of right lower extremity erythema much improved, swelling much improved  Appreciate ID who followed-received Ancef while inpatient, transitioning to cefadroxil as recommended by ID on discharge for total of 10 days total antibiotic treatment  Appreciate wound care who followed

## 2025-04-19 NOTE — PLAN OF CARE
Problem: INFECTION - ADULT  Goal: Absence or prevention of progression during hospitalization  Description: INTERVENTIONS:- Assess and monitor for signs and symptoms of infection- Monitor lab/diagnostic results- Monitor all insertion sites, i.e. indwelling lines, tubes, and drains- Monitor endotracheal if appropriate and nasal secretions for changes in amount and color- [place] appropriate cooling/warming therapies per order- Administer medications as ordered- Instruct and encourage patient and family to use good hand hygiene technique- Identify and instruct in appropriate isolation precautions for identified infection/condition  Outcome: Progressing     Problem: SAFETY ADULT  Goal: Maintain or return to baseline ADL function  Description: INTERVENTIONS:-  Assess patient's ability to carry out ADLs; assess patient's baseline for ADL function and identify physical deficits which impact ability to perform ADLs (bathing, care of mouth/teeth, toileting, grooming, dressing, etc.)- Assess/evaluate cause of self-care deficits - Assess range of motion- Assess patient's mobility; develop plan if impaired- Assess patient's need for assistive devices and provide as appropriate- Encourage maximum independence but intervene and supervise when necessary- Involve family in performance of ADLs- Assess for home care needs following discharge - Consider OT consult to assist with ADL evaluation and planning for discharge- Provide patient education as appropriate  Outcome: Progressing

## 2025-04-19 NOTE — ASSESSMENT & PLAN NOTE
Most recently 130 on 4/21 (126 in ED)   Nephrology managed in acute care   Na 124 on 11/11/24; suspect chronicity  1800 mL FR daily   Continue to monitor BMP

## 2025-04-19 NOTE — ASSESSMENT & PLAN NOTE
Noted to be in rapid A-fib while in the ER  Likely related to sepsis  S/p Cardizem drip, converted to normal sinus rhythm, discontinued   Rate controlled   Continue metoprolol at current dosing   Coumadin was on hold been on hold given supratherapeutic INR  4/18 INR 2.59-gave Coumadin 2 mg  INR today 1.56  Resuming prehospital Coumadin on discharge to [facility] today

## 2025-04-20 PROBLEM — R29.898 BILATERAL ARM WEAKNESS: Status: ACTIVE | Noted: 2025-04-20

## 2025-04-20 PROBLEM — M48.061 DEGENERATIVE LUMBAR SPINAL STENOSIS: Status: ACTIVE | Noted: 2025-04-20

## 2025-04-20 PROBLEM — Z91.89 POTENTIAL FOR COGNITIVE IMPAIRMENT: Status: ACTIVE | Noted: 2025-04-20

## 2025-04-20 PROBLEM — Z86.19 HISTORY OF SEPSIS: Status: ACTIVE | Noted: 2025-04-20

## 2025-04-20 PROBLEM — Q67.8 CHEST WALL ASYMMETRY: Status: ACTIVE | Noted: 2025-04-20

## 2025-04-20 LAB
ALBUMIN SERPL BCG-MCNC: 2.2 G/DL (ref 3.5–5)
ALP SERPL-CCNC: 82 U/L (ref 34–104)
ALT SERPL W P-5'-P-CCNC: 14 U/L (ref 7–52)
ANION GAP SERPL CALCULATED.3IONS-SCNC: 5 MMOL/L (ref 4–13)
AST SERPL W P-5'-P-CCNC: 34 U/L (ref 13–39)
BASOPHILS # BLD MANUAL: 0 THOUSAND/UL (ref 0–0.1)
BASOPHILS NFR MAR MANUAL: 0 % (ref 0–1)
BILIRUB SERPL-MCNC: 0.35 MG/DL (ref 0.2–1)
BUN SERPL-MCNC: 28 MG/DL (ref 5–25)
BURR CELLS BLD QL SMEAR: PRESENT
CALCIUM ALBUM COR SERPL-MCNC: 10 MG/DL (ref 8.3–10.1)
CALCIUM SERPL-MCNC: 8.6 MG/DL (ref 8.4–10.2)
CHLORIDE SERPL-SCNC: 99 MMOL/L (ref 96–108)
CO2 SERPL-SCNC: 25 MMOL/L (ref 21–32)
CREAT SERPL-MCNC: 0.69 MG/DL (ref 0.6–1.3)
EOSINOPHIL # BLD MANUAL: 0 THOUSAND/UL (ref 0–0.4)
EOSINOPHIL NFR BLD MANUAL: 0 % (ref 0–6)
ERYTHROCYTE [DISTWIDTH] IN BLOOD BY AUTOMATED COUNT: 13.9 % (ref 11.6–15.1)
GFR SERPL CREATININE-BSD FRML MDRD: 81 ML/MIN/1.73SQ M
GLUCOSE P FAST SERPL-MCNC: 79 MG/DL (ref 65–99)
GLUCOSE SERPL-MCNC: 79 MG/DL (ref 65–140)
HCT VFR BLD AUTO: 32.1 % (ref 36.5–49.3)
HGB BLD-MCNC: 10.4 G/DL (ref 12–17)
INR PPP: 1.42 (ref 0.85–1.19)
LYMPHOCYTES # BLD AUTO: 0.83 THOUSAND/UL (ref 0.6–4.47)
LYMPHOCYTES # BLD AUTO: 10 % (ref 14–44)
MCH RBC QN AUTO: 29.7 PG (ref 26.8–34.3)
MCHC RBC AUTO-ENTMCNC: 32.4 G/DL (ref 31.4–37.4)
MCV RBC AUTO: 92 FL (ref 82–98)
METAMYELOCYTE ABSOLUTE CT: 0.08 THOUSAND/UL (ref 0–0.1)
METAMYELOCYTES NFR BLD MANUAL: 1 % (ref 0–1)
MONOCYTES # BLD AUTO: 1.24 THOUSAND/UL (ref 0–1.22)
MONOCYTES NFR BLD: 15 % (ref 4–12)
MYELOCYTE ABSOLUTE CT: 0.25 THOUSAND/UL (ref 0–0.1)
MYELOCYTES NFR BLD MANUAL: 3 % (ref 0–1)
NEUTROPHILS # BLD MANUAL: 5.89 THOUSAND/UL (ref 1.85–7.62)
NEUTS SEG NFR BLD AUTO: 71 % (ref 43–75)
PLATELET # BLD AUTO: 452 THOUSANDS/UL (ref 149–390)
PLATELET BLD QL SMEAR: ABNORMAL
PMV BLD AUTO: 9 FL (ref 8.9–12.7)
POTASSIUM SERPL-SCNC: 4.6 MMOL/L (ref 3.5–5.3)
PROT SERPL-MCNC: 6.1 G/DL (ref 6.4–8.4)
PROTHROMBIN TIME: 17.8 SECONDS (ref 12.3–15)
RBC # BLD AUTO: 3.5 MILLION/UL (ref 3.88–5.62)
RBC MORPH BLD: PRESENT
SODIUM SERPL-SCNC: 129 MMOL/L (ref 135–147)
TOXIC GRANULES BLD QL SMEAR: PRESENT
WBC # BLD AUTO: 8.29 THOUSAND/UL (ref 4.31–10.16)

## 2025-04-20 PROCEDURE — 97162 PT EVAL MOD COMPLEX 30 MIN: CPT

## 2025-04-20 PROCEDURE — 85007 BL SMEAR W/DIFF WBC COUNT: CPT

## 2025-04-20 PROCEDURE — 85027 COMPLETE CBC AUTOMATED: CPT

## 2025-04-20 PROCEDURE — 97542 WHEELCHAIR MNGMENT TRAINING: CPT

## 2025-04-20 PROCEDURE — 97110 THERAPEUTIC EXERCISES: CPT

## 2025-04-20 PROCEDURE — 85610 PROTHROMBIN TIME: CPT

## 2025-04-20 PROCEDURE — 80053 COMPREHEN METABOLIC PANEL: CPT

## 2025-04-20 PROCEDURE — 97116 GAIT TRAINING THERAPY: CPT

## 2025-04-20 PROCEDURE — 97530 THERAPEUTIC ACTIVITIES: CPT

## 2025-04-20 RX ORDER — GUAIFENESIN 200 MG/10ML
LIQUID ORAL 2 TIMES DAILY
Status: DISCONTINUED | OUTPATIENT
Start: 2025-04-20 | End: 2025-05-03 | Stop reason: HOSPADM

## 2025-04-20 RX ADMIN — LATANOPROST 1 DROP: 50 SOLUTION OPHTHALMIC at 21:03

## 2025-04-20 RX ADMIN — DORZOLAMIDE HCL 1 DROP: 20 SOLUTION/ DROPS OPHTHALMIC at 20:44

## 2025-04-20 RX ADMIN — ACETAMINOPHEN 650 MG: 325 TABLET ORAL at 20:55

## 2025-04-20 RX ADMIN — GUAIFENESIN 600 MG: 600 TABLET ORAL at 20:43

## 2025-04-20 RX ADMIN — NYSTATIN: 100000 POWDER TOPICAL at 10:01

## 2025-04-20 RX ADMIN — METOPROLOL TARTRATE 25 MG: 25 TABLET, FILM COATED ORAL at 20:43

## 2025-04-20 RX ADMIN — MONTELUKAST 10 MG: 10 TABLET, FILM COATED ORAL at 09:49

## 2025-04-20 RX ADMIN — BUDESONIDE AND FORMOTEROL FUMARATE DIHYDRATE 2 PUFF: 160; 4.5 AEROSOL RESPIRATORY (INHALATION) at 18:01

## 2025-04-20 RX ADMIN — FUROSEMIDE 20 MG: 20 TABLET ORAL at 09:49

## 2025-04-20 RX ADMIN — LIDOCAINE 5% 1 PATCH: 700 PATCH TOPICAL at 09:51

## 2025-04-20 RX ADMIN — WARFARIN SODIUM 7.5 MG: 5 TABLET ORAL at 18:00

## 2025-04-20 RX ADMIN — NYSTATIN: 100000 POWDER TOPICAL at 18:44

## 2025-04-20 RX ADMIN — PREDNISOLONE ACETATE 1 DROP: 10 SUSPENSION/ DROPS OPHTHALMIC at 09:50

## 2025-04-20 RX ADMIN — DORZOLAMIDE HCL 1 DROP: 20 SOLUTION/ DROPS OPHTHALMIC at 09:50

## 2025-04-20 RX ADMIN — GUAIFENESIN 600 MG: 600 TABLET ORAL at 09:49

## 2025-04-20 RX ADMIN — PANTOPRAZOLE SODIUM 20 MG: 20 TABLET, DELAYED RELEASE ORAL at 05:07

## 2025-04-20 RX ADMIN — CEFADROXIL 1000 MG: 500 CAPSULE ORAL at 20:44

## 2025-04-20 RX ADMIN — BUDESONIDE AND FORMOTEROL FUMARATE DIHYDRATE 2 PUFF: 160; 4.5 AEROSOL RESPIRATORY (INHALATION) at 09:49

## 2025-04-20 RX ADMIN — WHITE PETROLATUM 57.7 %-MINERAL OIL 31.9 % EYE OINTMENT: at 21:03

## 2025-04-20 RX ADMIN — CEFADROXIL 1000 MG: 500 CAPSULE ORAL at 09:50

## 2025-04-20 RX ADMIN — METOPROLOL TARTRATE 25 MG: 25 TABLET, FILM COATED ORAL at 09:49

## 2025-04-20 RX ADMIN — PREDNISOLONE ACETATE 1 DROP: 10 SUSPENSION/ DROPS OPHTHALMIC at 18:01

## 2025-04-20 NOTE — ASSESSMENT & PLAN NOTE
"Reportedly started about 1-2 years ago and has been progressive  - B/L SH 3-/5, R EF 5, EE 4, WE 3 FF 3, L EF 5- We 2 EE 2; +Cuevas on L; brisk knee reflexes, strength fairly intact BLE   - Risk of cervical stenosis with compression and multilevel radiculopathy; hx of very severe deg lumbar stenosis  - Risk of RTC tears or combination - negative Marbella Butler  - Impacts ADL and function; some reports of worsening balance  - Per Dr. Goodwin, \"Discussed potential dx's with pt/family and potential work-up - they would not want sx at this point no matter what the cause but would like to know potential cause for weakness which could help with prognosis and rehab planning; apparently has metal in eye and cannot have MRI; they want to try to avoid contrast with slight risk of kidney toxicity\"  4/21: I discussed obtaining CT scan with patient and his daughter Lora, they stated that they would like to forgo CT scan as patient does would not want intervention regardless of diagnosis. He will continue to work with PT and OT for strengthening    - Monitor neuro exam, balance, symptoms closely   "

## 2025-04-20 NOTE — ASSESSMENT & PLAN NOTE
L superior periscapular chest palpable and observable nontender region apparently there for a few year  - CT Chest did not mention concerns  - Recommend follow-up mgmt by PCP unless additional work-up needed in [facility] at discretion of IM

## 2025-04-20 NOTE — ASSESSMENT & PLAN NOTE
- Recommend MOCA cog screen in rehab later in course when more stable   - Recommend eval of med mgmt and IADLs   - Monitor neuro-exam, wakefulness, mood, cognition, insight into deficits and safety awareness  - Recommend patient (+/- family/caregiver education if applicable) and training as well as compensatory strategies to optimize safety, function, and decrease risk of complications  - Ensure optimal assistance/supervision after d/c   - Optimize sleep-wake cycle  - Limit sedating medications when possible  - Monitor and ensure optimal management electrolytes, nutrition, and hydration  - Monitor for signs or symptoms of infection, medication intolerances, other systemic etiologies  - Fall precautions with frequent rounding; proactive toileting program, patient should not be unattended in bathroom  - If concerns for safety in spite of frequent rounding consider virtual or in person sitter   - OP follow-up PCP and if needed additional specialists (ie neuro/[place]ia/pmr)

## 2025-04-20 NOTE — ASSESSMENT & PLAN NOTE
Possible severe sepsis given Cr increased >0.5 mg/dL from baseline and other criteria met   - INR jumped significantly as well   - Risk for post-sepsis syndrome  - Abx for per ID  - Optimal management of each as listed   - Optimal nutrition, hydration, and medical management  - Monitor vitals closely with and without activity  - Fall precautions   - Recommend acute comprehensive interdisciplinary inpatient rehabilitation to include intensive skilled therapies (PT, OT) as outlined with oversight and management by rehabilitation physician as well as inpatient rehab level nursing, case management and weekly interdisciplinary team meetings.

## 2025-04-20 NOTE — ASSESSMENT & PLAN NOTE
CT L spine - Diffuse severe thoracolumbar degenerative change with apex left lumbar scoliosis. A posteriorly directed vertebral body osteophyte on the right at L1-L2 results in moderate central canal narrowing  - Monitor neuro exam, b/b function, and pain  - Reports chronic pain about stable

## 2025-04-20 NOTE — PLAN OF CARE
Problem: PAIN - ADULT  Goal: Verbalizes/displays adequate comfort level or baseline comfort level  Description: Interventions:- Encourage patient to monitor pain and request assistance- Assess pain using appropriate pain scale- Administer analgesics based on type and severity of pain and evaluate response- Implement non-pharmacological measures as appropriate and evaluate response- Consider cultural and social influences on pain and pain management- Notify physician/advanced practitioner if interventions unsuccessful or patient reports new pain  Outcome: Progressing     Problem: INFECTION - ADULT  Goal: Absence or prevention of progression during hospitalization  Description: INTERVENTIONS:- Assess and monitor for signs and symptoms of infection- Monitor lab/diagnostic results- Monitor all insertion sites, i.e. indwelling lines, tubes, and drains- Monitor endotracheal if appropriate and nasal secretions for changes in amount and color- [place] appropriate cooling/warming therapies per order- Administer medications as ordered- Instruct and encourage patient and family to use good hand hygiene technique- Identify and instruct in appropriate isolation precautions for identified infection/condition  Outcome: Progressing     Problem: SAFETY ADULT  Goal: Patient will remain free of falls  Description: INTERVENTIONS:- Educate patient/family on patient safety including physical limitations- Instruct patient to call for assistance with activity - Consult OT/PT to assist with strengthening/mobility - Keep Call bell within reach- Keep bed low and locked with side rails adjusted as appropriate- Keep care items and personal belongings within reach- Initiate and maintain comfort rounds- Make Fall Risk Sign visible to staff-  Goal: Maintain or return to baseline ADL function  Description: INTERVENTIONS:-  Assess patient's ability to carry out ADLs; assess patient's baseline for ADL function and identify physical deficits which  impact ability to perform ADLs (bathing, care of mouth/teeth, toileting, grooming, dressing, etc.)- Assess/evaluate cause of self-care deficits - Assess range of motion- Assess patient's mobility; develop plan if impaired- Assess patient's need for assistive devices and provide as appropriate- Encourage maximum independence but intervene and supervise when necessary- Involve family in performance of ADLs- Assess for home care needs following discharge - Consider OT consult to assist with ADL evaluation and planning for discharge- Provide patient education as appropriate  Outcome: Progressing       Problem: DISCHARGE PLANNING  Goal: Discharge to home or other facility with appropriate resources  Description: INTERVENTIONS:- Identify barriers to discharge w/patient and caregiver- Arrange for needed discharge resources and transportation as appropriate- Identify discharge learning needs (meds, wound care, etc.)- Arrange for interpretive services to assist at discharge as needed- Refer to Case Management Department for coordinating discharge planning if the patient needs post-hospital services based on physician/advanced practitioner order or complex needs related to functional status, cognitive ability, or social support system  Outcome: Progressing     Problem: Knowledge Deficit  Goal: Patient/family/caregiver demonstrates understanding of disease process, treatment plan, medications, and discharge instructions  Description: Complete learning assessment and assess knowledge base.Interventions:- Provide teaching at level of understanding- Provide teaching via preferred learning methods  Outcome: Progressing     Problem: Nutrition/Hydration-ADULT  Goal: Nutrient/Hydration intake appropriate for improving, restoring or maintaining nutritional needs  Description: Monitor and assess patient's nutrition/hydration status for malnutrition. Collaborate with interdisciplinary team and initiate plan and interventions as ordered.   Monitor patient's weight and dietary intake as ordered or per policy. Utilize nutrition screening tool and intervene as necessary. Determine patient's food preferences and provide high-protein, high-caloric foods as appropriate. INTERVENTIONS:- Monitor oral intake, urinary output, labs, and treatment plans- Assess nutrition and hydration status and recommend course of action- Evaluate amount of meals eaten- Assist patient with eating if necessary - Allow adequate time for meals- Recommend/ encourage appropriate diets, oral nutritional supplements, and vitamin/mineral supplements- Order, calculate, and assess calorie counts as needed- Recommend, monitor, and adjust tube feedings and TPN/PPN based on assessed needs- Assess need for intravenous fluids- Provide specific nutrition/hydration education as appropriate- Include patient/family/caregiver in decisions related to nutrition  Outcome: Progressing     Problem: Prexisting or High Potential for Compromised Skin Integrity  Goal: Skin integrity is maintained or improved  Description: INTERVENTIONS:- Identify patients at risk for skin breakdown- Assess and monitor skin integrity- Assess and monitor nutrition and hydration status- Monitor labs - Assess for incontinence - Turn and reposition patient- Assist with mobility/ambulation- Relieve pressure over bony prominences- Avoid friction and shearing- Provide appropriate hygiene as needed including keeping skin clean and dry- Evaluate need for skin moisturizer/barrier cream- Collaborate with interdisciplinary team - Patient/family teaching- Consider wound care consult   Outcome: Progressing     Problem: MOBILITY - ADULT  Goal: Maintain or return to baseline ADL function  Description: INTERVENTIONS:-  Assess patient's ability to carry out ADLs; assess patient's baseline for ADL function and identify physical deficits which impact ability to perform ADLs (bathing, care of mouth/teeth, toileting, grooming, dressing, etc.)-  Assess/evaluate cause of self-care deficits - Assess range of motion- Assess patient's mobility; develop plan if impaired- Assess patient's need for assistive devices and provide as appropriate- Encourage maximum independence but intervene and supervise when necessary- Involve family in performance of ADLs- Assess for home care needs following discharge - Consider OT consult to assist with ADL evaluation and planning for discharge- Provide patient education as appropriate

## 2025-04-21 ENCOUNTER — PATIENT OUTREACH (OUTPATIENT)
Dept: CASE MANAGEMENT | Facility: OTHER | Age: OVER 89
End: 2025-04-21

## 2025-04-21 ENCOUNTER — TRANSITIONAL CARE MANAGEMENT (OUTPATIENT)
Dept: FAMILY MEDICINE CLINIC | Facility: CLINIC | Age: OVER 89
End: 2025-04-21

## 2025-04-21 PROBLEM — K59.00 CONSTIPATION: Status: RESOLVED | Noted: 2025-04-19 | Resolved: 2025-04-21

## 2025-04-21 LAB
ANION GAP SERPL CALCULATED.3IONS-SCNC: 7 MMOL/L (ref 4–13)
BUN SERPL-MCNC: 30 MG/DL (ref 5–25)
CALCIUM SERPL-MCNC: 8.9 MG/DL (ref 8.4–10.2)
CHLORIDE SERPL-SCNC: 98 MMOL/L (ref 96–108)
CO2 SERPL-SCNC: 25 MMOL/L (ref 21–32)
CREAT SERPL-MCNC: 0.79 MG/DL (ref 0.6–1.3)
GFR SERPL CREATININE-BSD FRML MDRD: 76 ML/MIN/1.73SQ M
GLUCOSE P FAST SERPL-MCNC: 85 MG/DL (ref 65–99)
GLUCOSE SERPL-MCNC: 85 MG/DL (ref 65–140)
INR PPP: 1.43 (ref 0.85–1.19)
POTASSIUM SERPL-SCNC: 4.3 MMOL/L (ref 3.5–5.3)
PROTHROMBIN TIME: 17.9 SECONDS (ref 12.3–15)
SODIUM SERPL-SCNC: 130 MMOL/L (ref 135–147)

## 2025-04-21 PROCEDURE — 99233 SBSQ HOSP IP/OBS HIGH 50: CPT | Performed by: PHYSICAL MEDICINE & REHABILITATION

## 2025-04-21 PROCEDURE — 97116 GAIT TRAINING THERAPY: CPT | Performed by: PHYSICAL THERAPIST

## 2025-04-21 PROCEDURE — 97535 SELF CARE MNGMENT TRAINING: CPT

## 2025-04-21 PROCEDURE — 97530 THERAPEUTIC ACTIVITIES: CPT

## 2025-04-21 PROCEDURE — 97530 THERAPEUTIC ACTIVITIES: CPT | Performed by: PHYSICAL THERAPIST

## 2025-04-21 PROCEDURE — 97110 THERAPEUTIC EXERCISES: CPT | Performed by: PHYSICAL THERAPIST

## 2025-04-21 PROCEDURE — 97112 NEUROMUSCULAR REEDUCATION: CPT | Performed by: PHYSICAL THERAPIST

## 2025-04-21 PROCEDURE — 97167 OT EVAL HIGH COMPLEX 60 MIN: CPT

## 2025-04-21 PROCEDURE — 80048 BASIC METABOLIC PNL TOTAL CA: CPT

## 2025-04-21 PROCEDURE — 85610 PROTHROMBIN TIME: CPT

## 2025-04-21 PROCEDURE — 97110 THERAPEUTIC EXERCISES: CPT

## 2025-04-21 RX ADMIN — GUAIFENESIN 600 MG: 600 TABLET ORAL at 09:02

## 2025-04-21 RX ADMIN — Medication: at 17:08

## 2025-04-21 RX ADMIN — NYSTATIN: 100000 POWDER TOPICAL at 17:08

## 2025-04-21 RX ADMIN — POLYETHYLENE GLYCOL 3350 17 G: 17 POWDER, FOR SOLUTION ORAL at 09:02

## 2025-04-21 RX ADMIN — CEFADROXIL 1000 MG: 500 CAPSULE ORAL at 09:02

## 2025-04-21 RX ADMIN — METOPROLOL TARTRATE 25 MG: 25 TABLET, FILM COATED ORAL at 21:18

## 2025-04-21 RX ADMIN — BUDESONIDE AND FORMOTEROL FUMARATE DIHYDRATE 2 PUFF: 160; 4.5 AEROSOL RESPIRATORY (INHALATION) at 17:08

## 2025-04-21 RX ADMIN — DORZOLAMIDE HCL 1 DROP: 20 SOLUTION/ DROPS OPHTHALMIC at 09:02

## 2025-04-21 RX ADMIN — PREDNISOLONE ACETATE 1 DROP: 10 SUSPENSION/ DROPS OPHTHALMIC at 09:02

## 2025-04-21 RX ADMIN — GUAIFENESIN 600 MG: 600 TABLET ORAL at 21:17

## 2025-04-21 RX ADMIN — NYSTATIN: 100000 POWDER TOPICAL at 09:02

## 2025-04-21 RX ADMIN — ACETAMINOPHEN 650 MG: 325 TABLET ORAL at 21:18

## 2025-04-21 RX ADMIN — LIDOCAINE 5% 1 PATCH: 700 PATCH TOPICAL at 09:02

## 2025-04-21 RX ADMIN — WARFARIN SODIUM 7.5 MG: 5 TABLET ORAL at 17:08

## 2025-04-21 RX ADMIN — PANTOPRAZOLE SODIUM 20 MG: 20 TABLET, DELAYED RELEASE ORAL at 05:03

## 2025-04-21 RX ADMIN — LATANOPROST 1 DROP: 50 SOLUTION OPHTHALMIC at 21:19

## 2025-04-21 RX ADMIN — PREDNISOLONE ACETATE 1 DROP: 10 SUSPENSION/ DROPS OPHTHALMIC at 17:08

## 2025-04-21 RX ADMIN — Medication: at 09:02

## 2025-04-21 RX ADMIN — BUDESONIDE AND FORMOTEROL FUMARATE DIHYDRATE 2 PUFF: 160; 4.5 AEROSOL RESPIRATORY (INHALATION) at 09:02

## 2025-04-21 RX ADMIN — METOPROLOL TARTRATE 25 MG: 25 TABLET, FILM COATED ORAL at 09:02

## 2025-04-21 RX ADMIN — CEFADROXIL 1000 MG: 500 CAPSULE ORAL at 21:18

## 2025-04-21 RX ADMIN — DORZOLAMIDE HCL 1 DROP: 20 SOLUTION/ DROPS OPHTHALMIC at 21:19

## 2025-04-21 RX ADMIN — FUROSEMIDE 20 MG: 20 TABLET ORAL at 09:02

## 2025-04-21 RX ADMIN — MONTELUKAST 10 MG: 10 TABLET, FILM COATED ORAL at 09:02

## 2025-04-21 RX ADMIN — WHITE PETROLATUM 57.7 %-MINERAL OIL 31.9 % EYE OINTMENT: at 21:19

## 2025-04-21 NOTE — PCC PHYSICAL THERAPY
4/22/25:  Patient following with [facility] PT.  Presents following right LE cellulitis with sepsis.  Pt now with decreased ROM/strength, decreased balance and safety, decreased endurance, and pain.   Patient min A bed mobility, min-mod A transfers with RW, min A ambulation up to 15 feet  with wheelchair follow on level and unlevel surfaces with RW, mod A negotiation of 2 steps with b/l handrails.  Patient would benefit from continued inpatient [facility] PT to increase function, safety, and increased independence in prep for safe d/c to home.    4/29/25:  Patient following with [facility] PT.  Presents following right LE cellulitis with sepsis.  Pt now with decreased ROM/strength, decreased balance and safety, decreased endurance, and pain.   Patient supervision bed mobility, CGA transfers with RW, CGA ambulation up to 45 feet on level and unlevel surfaces with RW, CGA negotiation of 9 steps with b/l handrails.  Patient would benefit from continued inpatient [facility] PT to increase function, safety, and increased independence in prep for safe d/c to home.

## 2025-04-21 NOTE — PROGRESS NOTES
"   04/20/25 0935   Patient Data   Rehab Impairment Impairment of mobility, safety, and ADLs due to debility: 16 Debility (non-cardiac/non-pulmonary)   Etiologic Diagnosis Sepsis due to R LE cellulitis   Date of Onset 04/14/25   Support System   Name Eden Burgess daughter   Phone Number [phone]   Health Status \"good\"   Able to provide 24 hour supervision No   Able to provide physical help? Yes   Multiple Support Systems   (seconary contact is Anu Green, dtr [phone]; also has several other children that are no more than and hour away)   Home Setup   Type of Home Apartment  (converted "[employer]   Method of Entry Stairs;Hand Rail Bilateral   Number of Stairs 2  (1+ 1)   Number of Stairs in Home 0   First Floor Bathroom Shower  (walk in with lip and shower seat (non built-in))   Available Equipment Wheelchair;Roller Walker;Rollator;Quad Cane;Shower Chair  (adjustable bed (not a hospital bed); machine to check INR)   Prior Level of Function   Indoor-Mobility (Ambulation) 3. Independent - Patient completed the activities by him/herself, with or without an assistive device, with no assistance from a helper.   Stairs 3. Independent - Patient completed the activities by him/herself, with or without an assistive device, with no assistance from a helper.   Prior Device Used Z. None of the above   Pain Assessment   Pain Assessment Tool 0-10   Pain Score 7   Pain Location/Orientation Orientation: Lower;Location: Back   Toileting Hygiene   Type of Assistance Needed Physical assistance;Verbal cues   Physical Assistance Level Total assistance   Comment dependent   Toileting Hygiene CARE Score 1   Toilet Transfer   Surface Assessed Standard Commode   Transfer Technique Stand Pivot   Limitations Noted In Balance;Endurance;ROM;Safety;UE Strength;LE Strength   Adaptive Equipment Walker;Grab Bar   Type of Assistance Needed Physical assistance;Verbal cues   Physical Assistance Level 51%-75%   Comment mod-max A " using RW/grab bar   Toilet Transfer CARE Score 2   Toileting   Able to Pull Clothing down no, up no.   Manage Hygiene Bladder   Limitations Noted In Balance;ROM;Safety;LE Strength   Transfer Bed/Chair/Wheelchair   Positioning Concerns Skin Integrity   Limitations Noted In Balance;Endurance;Pain Management;UE Strength;LE Strength   Adaptive Equipment Roller Walker   Type of Assistance Needed Physical assistance;Verbal cues   Physical Assistance Level 26%-50%   Comment mod A with RW once standing   Chair/Bed-to-Chair Transfer CARE Score 3   Roll Left and Right   Comment pt declined; remained OOB in recliner   Reason if not Attempted Refused to perform   Roll Left and Right CARE Score 7   Sit to Lying   Comment pt declined; remained OOB in recliner   Reason if not Attempted Refused to perform   Sit to Lying CARE Score 7   Lying to Sitting on Side of Bed   Comment pt declined; remained OOB in recliner   Reason if not Attempted Refused to perform   Lying to Sitting on Side of Bed CARE Score 7   Sit to Stand   Type of Assistance Needed Physical assistance;Verbal cues   Physical Assistance Level 51%-75%   Comment mod-max A with RW from wheelchair and recliner   Sit to Stand CARE Score 2   Picking Up Object   Reason if not Attempted Safety concerns   Picking Up Object CARE Score 88   Car Transfer   Reason if not Attempted Environmental limitations   Car Transfer CARE Score 10   Ambulation   Primary Mode of Locomotion Prior to Admission Walk   Distance Walked (feet) 16 ft  (16' 15' 11')   Assist Device Roller Walker   Gait Pattern Inconsistant Wendi;Slow Wendi;Decreased foot clearance;Antalgic;Narrow DAVID;Forward Flexion;Step to;Improper weight shift   Limitations Noted In Balance;Endurance;Posture;Safety;Speed;Strength   Provided Assistance with: Balance;Trunk Support   Walk Assist Level Moderate Assist   Findings mod A level and unlevel surfaces with RW; second person for wheelchair follow; SOB with minimal exertion; O2  94% on RA   Walk 10 Feet   Type of Assistance Needed Physical assistance;Verbal cues   Physical Assistance Level Total assistance   Comment mod A level and unlevel surfaces with RW; second person for wheelchair follow; SOB with minimal exertion; O2 94% on RA   Walk 10 Feet CARE Score 1   Walk 50 Feet with Two Turns   Reason if not Attempted Safety concerns   Walk 50 Feet with Two Turns CARE Score 88   Walk 150 Feet   Reason if not Attempted Safety concerns   Walk 150 Feet CARE Score 88   Walking 10 Feet on Uneven Surfaces   Type of Assistance Needed Physical assistance;Verbal cues   Physical Assistance Level Total assistance   Comment mod A level and unlevel surfaces with RW; second person for wheelchair follow; SOB with minimal exertion; O2 94% on RA   Walking 10 Feet on Uneven Surfaces CARE Score 1   Wheelchair mobility   Method Right upper extremity;Left upper extremity;Right lower extremity;Left lower extremity   Assistance Provided For Locking Brakes;Obstacles;Remove Leg Rest;Replace Leg Rest   Distance Level Surface (feet) 108 ft  (108' 57')   Distance Wheeled 3% Grade 12 ft   Findings min A level and unlevel surfacers; increased time to complete; SOB with minimal exertion; O2 94% RA   Type of Wheelchair Used 1. Manual   Wheel 50 Feet with Two Turns   Type of Assistance Needed Physical assistance;Verbal cues   Physical Assistance Level 25% or less   Comment min A level and unlevel surfacers; increased time to complete; SOB with minimal exertion; O2 94% RA   Wheel 50 Feet with Two Turns CARE Score 3   Wheel 150 Feet   Reason if not Attempted Safety concerns   Wheel 150 Feet CARE Score 88   Curb or Single Stair   Comment TBA as able   Reason if not Attempted Safety concerns   1 Step (Curb) CARE Score 88   4 Steps   Reason if not Attempted Activity not applicable   4 Steps CARE Score 9   12 Steps   Reason if not Attempted Activity not applicable   12 Steps CARE Score 9   Stairs   Findings TBA as able;  "  Comprehension   QI: Comprehension 4. Undestands: Clear comprehension without cues or repetitions   Comprehension (FIM) 6 - Has only MILD difficulty with complex/abstract info   Expression   QI: Expression 4. Express complex messages without difficulty and with speech that is clear and easy to understan   Expression (FIM) 6 - Expresses complex/abstract but requires:  more time   Social Interaction   Social Interaction (FIM) 5 - Requires redirection but less than 10% of the time.   Problem Solving   Problem solving (FIM) 5 - Solves basic problems 90% of time   Memory   Memory (FIM) 6 - Recognizes with extra time   RLE Assessment   RLE Assessment   (ROM WFL)   Strength RLE   R Hip Flexion 3-/5   R Hip Extension 3-/5   R Hip ABduction 3/5   R Hip ADduction 3/5   R Knee Flexion 3/5   R Knee Extension 3/5   R Ankle Dorsiflexion 3/5   R Ankle Plantar Flexion 3/5   LLE Assessment   LLE Assessment   (ROM WFL)   Strength LLE   L Hip Flexion 3+/5   L Hip Extension 3+/5   L Hip ABduction 3+/5   L Hip ADduction 3+/5   L Knee Flexion 4/5   L Knee Extension 4/5   L Ankle Dorsiflexion 4/5   L Ankle Plantar Flexion 4/5   Coordination   Movements are Fluid and Coordinated 1   Sensation   Light Touch No apparent deficits   Propioception No apparent deficits   Cognition   Overall Cognitive Status WFL   Arousal/Participation Alert;Responsive;Cooperative   Attention Within functional limits   Orientation Level Oriented X4   Memory Within functional limits   Following Commands Follows one step commands without difficulty   Therapeutic Exercise   Therapeutic Exercise/Activity seated ther ex   Discharge Information   Vocational Plan Retired/not working   Patient's Discharge Plan return home   Patient's Rehab Expectations \"move better\"   Barriers to Discharge Home Decreased Strength;Decreased Endurance;Pain;Safety Considerations  (lives alone; has multiple children that live close by as well as a neighbor that has medical background) "   Impressions Patient seen for IE.  Presents, following hospitalization stay for generalized weakness s/p fall and RLE cellulitis and PMH of HTN, COPD, asthma, GERD, with decreased ROM/strength, decreased balance and safety, decreased endurance, and pain; all affecting functional mobility as stated above in respective sections.   Patient would benefit from continued inpatient [facility] PT to increase function, safety, and increased independence in prep for safe d/c to home with family support and continued PT services as needed.   PT Therapy Minutes   PT Time In 0935   PT Time Out 1045   PT Total Time (minutes) 70   PT Mode of treatment - Individual (minutes) 70   PT Mode of treatment - Concurrent (minutes) 0   PT Mode of treatment - Group (minutes) 0   PT Mode of treatment - Co-treat (minutes) 0   PT Mode of Treatment - Total time(minutes) 70 minutes   PT Cumulative Minutes 70   Cumulative Minutes   Cumulative therapy minutes 70

## 2025-04-21 NOTE — NUTRITION
04/21/25 1524   Biochemical Data,Medical Tests, and Procedures   Biochemical Data/Medical Tests/Procedures Lab values reviewed;Meds reviewed   Labs (Comment) 4/21 Na:130, BUN:30. 4/20 H&H:10.4/32.1   Meds (Comment) lasix, lopressor, protonix, coumadin   Nutrition-Focused Physical Exam   Nutrition-Focused Physical Exam Findings RN skin assessment reviewed  (Wound left toe D3, skin tear right elbow, wound right thigh, wound right lower leg per documentation.)   Nutrition-Focused Physical Exam Findings 1 generalized edema, +2 RLE edema, +1 LLE edema, +1 perineal edema, +1 sacral edema. Ill fitting dentures, Upper and lower. Utilized fixodent. LBM 4/20.   Medical-Related Concerns Arthritis     Cellulitis of right lower extremity 04/19/2025    Coagulation defect (HCC)  last assessed: 11/28/2018   COPD (chronic obstructive pulmonary disease) (HCC)  last assessed: 5/14/2019, 12/6/2017   Generalized osteoarthritis  last assessed: 1/28/2019   GERD without esophagitis  last assessed: 1/28/2019   Glaucoma  last assessed: 8/4/2011   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HCC)  last assessed: 10/16/2018    Factor II Prothrombin Gene per [employer]   History of kidney stones 1985    Hx of blood clots     Hypertension  last assessed: 5/14/2019   Impaired fasting glucose  last assessed: 8/26/2013   Mild persistent asthma, uncomplicated  last assessed: 11/28/2018   Nephrolithiasis     Nondisplaced intertrochanteric fracture of right femur, initial encounter for closed fracture (HCC)  last assessed: 1/28/2019   Premature ventricular contractions  last assessed: 8/4/2011   Pulmonary nodule     Scoliosis (and kyphoscoliosis), idiopathic     Wears dentures   Adequacy of Intake   Nutrition Modality PO   Feeding Route   PO Independent   Current PO Intake   Current Diet Order Dysphagia 2, thin liquid 1500mL fluid restriction   Nutrition Supplements Ensure Plus High Protein  (TID)   Current Meal Intake 50-75%;%   Intake  "Supplements 25-50%;50-75%;%   Estimated calorie intake compared to estimated need Nutrient needs met.   PES Statement   Problem Clinical   Functional (1) Biting/Chewing (masticatory) difficulty NC-1.2   Related to Other (Comment)  (ill fitting dentures)   As evidenced by: Per patient/family interview;Other (Comment)  (modified diet)   Recommendations/Interventions   Malnutrition/BMI Present No  (does not meet criteira)   Summary Wound. RN consult. Dysphagia 2, 1500mL fluid restriction. Ensure plus high protein BID. Daughters present during visit. He states his appetite is pretty good. Consumes 2 smoothies per day made of a combination of fruit, peanut butter and whey powder. Consumes 1 meal (non smoothie) per day. Resides alone. Family shops for patient, he prepares his own meals. He states he is provided much more food here than he would consume at home. 4/19/#; 1/27/#; 10/28/#; 4/29/#. Reports his usual weight is ~175#. Weight fluctuates, continue to monitor weight status. Wound left toe D3, skin tear right elbow, wound right thigh, wound right lower leg per documentation. +1 generalized edema, +2 RLE edema, +1 LLE edema, +1 perineal edema, +1 sacral edema. Ill fitting dentures, Upper and lower. Utilized fixodent. LBM 4/20. Patient expressed meals and supplement were \"too much\". Discussed decreasing supplement to once daily and providing small portions; he is agreeable. Recommend speech therapy evaluation.   Interventions/Recommendations Adjust diet order;Supplement adjust;Speech/swallow evaluation   Intervention Comments add small portions; decrease supplement from TID to once daily   Education Assessment   Education Patient/caregiver not appropriate for education at this time   Patient Nutrition Goals   Goal Tolerate PO diet;Meet PO needs   Goal Status Initiated   Timeframe to complete goal by next f/u   Nutrition Complexity Risk   Nutrition complexity level Low risk   Follow up " date 04/28/25

## 2025-04-21 NOTE — CASE MANAGEMENT
CM met with patient and daughter Lora on 2025.  Reviewed rehab process and role of CM in d/c planning.   Pt lives alone in an 1 floor apartment ([facility]) with 2 steps (1 + 1) and B/L handrails to enter.  Has first floor set up that includes full bath with walk-in shower with small lip and shower chair.   Patient was independent PTA using no assistive device.  Daughter reports he has an active 's license but that family prefers to drive.    Has used [facility] in the past for both nursing and therapy.  Reports having inpatient therapy at this [facility] after hip surgery in   or .   Patient has following DME:  RW, SPC, rollator, quad cane, wheelchair, adjustable bed, and machine to check INR.   Patient uses [facility] in [place] for medications.  Verified pts address, , insurance information, PCP, and contact information.   Per family, Eden Flores is POA and primary contact.  Secondary contact is Anu Green dtr.  Pateint also has several other children in the area as well as a neighbor that can assist as needed.   Will continue to follow for all d/c planning needs/concerns.    IMM forms completed and ready to be scanned into pt chart.

## 2025-04-21 NOTE — PROGRESS NOTES
04/21/25 0700   Pain Assessment   Pain Assessment Tool 0-10   Pain Score No Pain   Restrictions/Precautions   Precautions Bed/chair alarms;Supervision on toilet/commode;Fluid restriction;Fall Risk   Weight Bearing Restrictions No   ROM Restrictions No   Cognition   Arousal/Participation Alert;Responsive;Cooperative   Attention Within functional limits   Following Commands Follows one step commands without difficulty   Subjective   Subjective Pt reports that he has no pain and slept okay   Roll Left and Right   Type of Assistance Needed Supervision;Verbal cues   Physical Assistance Level No physical assistance   Comment bed rails   Roll Left and Right CARE Score 4   Lying to Sitting on Side of Bed   Type of Assistance Needed Physical assistance   Physical Assistance Level 25% or less   Comment VC, bed rails   Lying to Sitting on Side of Bed CARE Score 3   Sit to Stand   Type of Assistance Needed Physical assistance   Physical Assistance Level 26%-50%   Comment Mod A, RW, VC hand placement   Sit to Stand CARE Score 3   Bed-Chair Transfer   Type of Assistance Needed Physical assistance   Physical Assistance Level 26%-50%   Comment Mod A   Chair/Bed-to-Chair Transfer CARE Score 3   Car Transfer   Reason if not Attempted Environmental limitations   Car Transfer CARE Score 10   Walk 10 Feet   Type of Assistance Needed Physical assistance;Verbal cues   Physical Assistance Level Total assistance   Comment RW, Min A, w/c follow   Walk 10 Feet CARE Score 1   Walk 50 Feet with Two Turns   Reason if not Attempted Safety concerns   Walk 50 Feet with Two Turns CARE Score 88   Walk 150 Feet   Reason if not Attempted Safety concerns   Walk 150 Feet CARE Score 88   Ambulation   Primary Mode of Locomotion Prior to Admission Walk   Distance Walked (feet) 15 ft  (15 x2)   Assist Device Roller Walker   Gait Pattern Inconsistant Wendi;Slow Wendi;Decreased foot clearance;Forward Flexion;Shuffle;Improper weight shift   Does the  patient walk? 2. Yes   Wheel 50 Feet with Two Turns   Type of Assistance Needed Incidental touching;Verbal cues   Physical Assistance Level 25% or less   Wheel 50 Feet with Two Turns CARE Score 3   Wheel 150 Feet   Reason if not Attempted Safety concerns   Wheel 150 Feet CARE Score 88   Wheelchair mobility   Method Right upper extremity;Left upper extremity;Right lower extremity;Left lower extremity   Assistance Provided For Locking Brakes;Obstacles;Remove Leg Rest;Replace Leg Rest   Distance Level Surface (feet) 120 ft   Type of Wheelchair Used 1. Manual   Curb or Single Stair   Style negotiated Single stair   Type of Assistance Needed Physical assistance   Physical Assistance Level 26%-50%   Comment Mod A, b/l HR   1 Step (Curb) CARE Score 3   4 Steps   Reason if not Attempted Activity not applicable   4 Steps CARE Score 9   12 Steps   Reason if not Attempted Activity not applicable   12 Steps CARE Score 9   Stairs   Type Stairs   # of Steps 2   Weight Bearing Precautions Fall Risk   Assist Devices Bilateral Rail   Picking Up Object   Reason if not Attempted Safety concerns   Picking Up Object CARE Score 88   Toilet Transfer   Comment not required this session   Toilet Transfer   Findings urinal emptied, 350 mL RN notified.   Therapeutic Interventions   Strengthening Supine LE TE   Balance transfer training   Other bed mobility, gait training, stair training, w/c propelling   Assessment   Treatment Assessment Pt presents supine in bed, agreeable to PT. Pt reported that he currently has no pain. 350 mL from urinal emptied, RN notified. Began session with supine LE TE. Pt was able to complete transfer from bed to w/c with Min A and VC for hand placement. Pt noted fatigue after transfer, SpO2 was 96% and HR was 102. Pt completed w/c propelling in order to work on endurance. Pt ascended and descended backwards two steps with b/l HR and Mod A, Spo2 remained at 96% with a HR of 99. Pt completed 6 reps of b/l step ups  with b/l HR. Pt ambulated 15ft x 2 with Mod A and w/c follow. During ambulation pt displayed improper weight shift with compensated Trendelenburg. Following ambulation pt SpO2 remained between 95-97% and HR of 107. Pt returned to room seated in w/c set up for lunch. Cont. POC and progress as able.   Problem List Decreased strength;Decreased range of motion;Decreased endurance;Impaired balance;Decreased mobility;Decreased coordination;Decreased skin integrity   PT Barriers   Physical Impairment Decreased strength;Decreased endurance;Impaired balance;Decreased skin integrity;Decreased mobility;Decreased coordination;Decreased range of motion   Functional Limitation Car transfers;Ramp negotiation;Stair negotiation;Standing;Transfers;Walking   Plan   Treatment/Interventions Functional transfer training;LE strengthening/ROM;Elevations;Therapeutic exercise;Endurance training;Cognitive reorientation;Patient/family training;Equipment eval/education;Bed mobility;Gait training   Progress Progressing toward goals   PT Therapy Minutes   PT Time In 0700   PT Time Out 0830   PT Total Time (minutes) 90   PT Mode of treatment - Individual (minutes) 90   PT Mode of treatment - Concurrent (minutes) 0   PT Mode of treatment - Group (minutes) 0   PT Mode of treatment - Co-treat (minutes) 0   PT Mode of Treatment - Total time(minutes) 90 minutes   PT Cumulative Minutes 160     Supine LE TE:  3x10, B/L LEs  SLR - flexion  GS  QS  APs  SAQ  Hip ABd - hooklying with green t-band  Hip ADd - julissa  Heel slides     Patient remains OOB in chair, all needs in reach.  Alarm in place and activated.  Encouraged use of call bell, patient verbalizes understanding.

## 2025-04-21 NOTE — PROGRESS NOTES
04/21/25 1037   Patient Data   Rehab Impairment Impairment of mobility, safety and Activities of Daily Living (ADLs) due to Debility:  16  Debility (Non-cardiac/Non-pulmonary)   Etiologic Diagnosis Sepsis due to R LE cellulitis, recent fall; PMH of hypertension, COPD/asthma, GERD, chronic swelling of right lower extremity   Date of Onset 04/14/25   Support System   Name Eden Burgess Daughter   Home Setup   Type of Home Apartment  (converted firehouse)   Method of Entry Stairs   Number of Stairs 2  (1+ 1)   First Floor Bathroom Shower   First Floor Bathroom Accessibility Shower chair;Raised toilet seat;Grab bars by toilet;Grab bars in tub/shower   Available Equipment Wheelchair;Roller Walker;Rollator;Quad Cane;Shower Chair  (adjustable bed (not a hospital bed); machine to check INR)   Baseline Information   Transportation Family/friends drive  (has a current license)   Prior IADL Participation   Money Management Identify Money;Estimate Costs;Estimate Change;Combine Bills;Manage Checkbook  (recently family helping with shopping)   Meal Preparation Full Participation   Laundry Full Participation   Home Cleaning Full Participation   Prior Level of Function   Self-Care 3. Independent - Patient completed the activities by him/herself, with or without an assistive device, with no assistance from a helper.   Functional Cognition 3. Independent - Patient completed the activities by him/herself, with or without an assistive device, with no assistance from a helper.   Prior Device Used D. Walker  (pt woul duse RW or cane depending on the day)   Falls in the Last Year   Number of falls in the past 12 months 1   Type of Injury Associated with Fall No injury  (slid from bed)   Patient Preference   Nickname (Patient Preference) Levy   Patient Normally Wakes at 0900   Restrictions/Precautions   Precautions Bed/chair alarms;Supervision on toilet/commode;Fluid restriction;Fall Risk  (1800 ml FR, bleed precautions due to  Coumadin use)   Weight Bearing Restrictions No   ROM Restrictions No   Pain Assessment   Pain Assessment Tool 0-10   Pain Score No Pain   Oral Hygiene   Type of Assistance Needed Set-up / clean-up   Comment w/c level at the sink   Oral Hygiene CARE Score 5   Grooming   Able To Initiate Tasks;Comb/Brush Hair;Wash/Dry Face;Brush/Clean Teeth;Wash/Dry Hands   Limitation Noted In Safety;Strength   Findings setup seated   Tub/Shower Transfer   Reason Not Assessed Sponge Bath   Shower/Bathe Self   Type of Assistance Needed Physical assistance   Physical Assistance Level 26%-50%   Shower/Bathe Self CARE Score 3   Bathing   Assessed Bath Style Sponge Bath   Anticipated D/C Bath Style Shower;Sponge Bath   Able to Gather/Transport No   Able to Adjust Water Temperature No   Able to Wash/Rinse/Dry (body part) Left Arm;Right Arm;L Upper Leg;R Upper Leg;Chest;Abdomen;Perineal Area   Limitations Noted in Balance;Endurance;Problem Solving;ROM;Safety;Strength   Positioning Seated;Standing   Findings  RN available during ADL to complete wound care to R thigh wounds before pulling pants up   Dressing/Undressing Clothing   Remove UB Clothes Pullover Shirt   Don UB Clothes Pullover Shirt   Type of Assistance Needed Physical assistance   Physical Assistance Level 25% or less   Comment due to decerased ROM/ strength BUE   Upper Body Dressing CARE Score 3   Remove LB Clothes Pants;Undergarment;Socks   Don LB Clothes Pants;Undergarment;Socks   Type of Assistance Needed Physical assistance   Physical Assistance Level 51%-75%   Lower Body Dressing CARE Score 2   Limitations Noted In Balance;Endurance;Problem Solving;Safety;Strength;ROM   Positioning Supported Sit;Standing   Putting On/Taking Off Footwear   Type of Assistance Needed Physical assistance   Physical Assistance Level 76% or more   Putting On/Taking Off Footwear CARE Score 2   Toileting Hygiene   Type of Assistance Needed Physical assistance   Physical Assistance Level 26%-50%    Comment pt able to complete hygiene seated with commode removed and riser in place and assist with pants up following toileting   Toileting Hygiene CARE Score 3   Toilet Transfer   Type of Assistance Needed Physical assistance   Physical Assistance Level 25% or less   Comment commode over raised toeilt removed to allow for functional urination   Toilet Transfer CARE Score 3   Toileting   Able to Pull Clothing down no, up yes.   Manage Hygiene Bladder   Limitations Noted In Balance;Problem Solving;ROM;Safety;LE Strength   Adaptive Equipment Grab Bar   Transfer Bed/Chair/Wheelchair   Type of Assistance Needed Physical assistance   Physical Assistance Level 26%-50%   Chair/Bed-to-Chair Transfer CARE Score 3   Sit to Stand   Type of Assistance Needed Physical assistance   Physical Assistance Level 26%-50%   Comment cues for hand placemetn and safety   Sit to Stand CARE Score 3   Picking Up Object   Type of Assistance Needed Physical assistance   Physical Assistance Level 26%-50%   Comment min/ mod A durign toeilting to reaching with one arma nd support with the other arm   Picking Up Object CARE Score 3   Comprehension   Comprehension (FIM) 6 - Understands complex/abstract but requires more time   Expression   Expression (FIM) 6 - Expresses complex/abstract but requires:  more time   Social Interaction   Social Interaction (FIM) 6 - Interacts appropriately with others BUT requires extra  time   Problem Solving   Problem solving (FIM) 5 - Solves basic problems 90% of time   Memory   Memory (FIM) 5 - Recalls/performs request 90% of time   RUE Assessment   RUE Assessment X  (shoulder ROM impaired; 75% full AROM sholder flexion and elbow to hand WFL, MMT 2+/5 sh and elbow to hand 3+/5)   LUE Assessment   LUE Assessment X  (shoulder ROM impaired; 50% full AROM shoulder flexion and elbow to hand WFL, MMT 2+/5 sh and elbow to hand 3+/5)   Coordination   Movements are Fluid and Coordinated 1   Sensation   Light Touch No  "apparent deficits   Propioception No apparent deficits   Cognition   Arousal/Participation Alert;Responsive;Cooperative   Attention Within functional limits   Orientation Level Oriented to person;Oriented to place;Oriented to situation   Memory Within functional limits   Following Commands Follows one step commands without difficulty   Discharge Information   Vocational Plan Retired/not working  (enjoys playing games on the phone, texting children, watching TV, playing organ)   Patient's Discharge Plan return to home   Patient's Rehab Expectations \"I want to get strong and be able to walk around and do my housework and play games.\"   Impressions Pt presents following hospitalization due to Sepsis due to R LE cellulitis, recent fall; PMH of hypertension, COPD/asthma, GERD, chronic swelling of right lower extremity. Pt requires assist due to decreased balance, safety, endurance and generalized strength/ ROM. Pt will benefit from skilled OT services to increase independence with daily tasks.   OT Therapy Minutes   OT Time In 1037   OT Time Out 1205   OT Total Time (minutes) 88   OT Mode of treatment - Individual (minutes) 88     "

## 2025-04-21 NOTE — PCC CARE MANAGEMENT
04/22/2025:  Patient admitted to [facility] on 04/19/2025 after inpatient stay for RLE cellulitis.  Patient lives alone in a 1st floor apartment with 2 (1 +1) NILESH and B/L handrails.  Full bath including walk-in shower with lip and shower seat in main living area.   Patient has multiple children that live within an hour away and has neighbor that could also assist if needed.  Patient retired and was independent PTA using no AD.  Has active 's license but family prefers to drive.   Has all DME.   Has used [facility] for nursing and therapy needs in the past.  4/29/25 planned d/c date is 5/3/25, recommendations for [facility] RN/PT/OT, family training 4/29 10:00 am.

## 2025-04-21 NOTE — PLAN OF CARE
Problem: Prexisting or High Potential for Compromised Skin Integrity  Goal: Skin integrity is maintained or improved  Description: INTERVENTIONS:- Identify patients at risk for skin breakdown- Assess and monitor skin integrity- Assess and monitor nutrition and hydration status- Monitor labs - Assess for incontinence - Turn and reposition patient- Assist with mobility/ambulation- Relieve pressure over bony prominences- Avoid friction and shearing- Provide appropriate hygiene as needed including keeping skin clean and dry- Evaluate need for skin moisturizer/barrier cream- Collaborate with interdisciplinary team - Patient/family teaching- Consider wound care consult   Outcome: Progressing     Problem: Nutrition/Hydration-ADULT  Goal: Nutrient/Hydration intake appropriate for improving, restoring or maintaining nutritional needs  Description: Monitor and assess patient's nutrition/hydration status for malnutrition. Collaborate with interdisciplinary team and initiate plan and interventions as ordered.  Monitor patient's weight and dietary intake as ordered or per policy. Utilize nutrition screening tool and intervene as necessary. Determine patient's food preferences and provide high-protein, high-caloric foods as appropriate. INTERVENTIONS:- Monitor oral intake, urinary output, labs, and treatment plans- Assess nutrition and hydration status and recommend course of action- Evaluate amount of meals eaten- Assist patient with eating if necessary - Allow adequate time for meals- Recommend/ encourage appropriate diets, oral nutritional supplements, and vitamin/mineral supplements- Order, calculate, and assess calorie counts as needed- Recommend, monitor, and adjust tube feedings and TPN/PPN based on assessed needs- Assess need for intravenous fluids- Provide specific nutrition/hydration education as appropriate- Include patient/family/caregiver in decisions related to nutrition  Outcome: Progressing     Problem:  Knowledge Deficit  Goal: Patient/family/caregiver demonstrates understanding of disease process, treatment plan, medications, and discharge instructions  Description: Complete learning assessment and assess knowledge base.Interventions:- Provide teaching at level of understanding- Provide teaching via preferred learning methods  Outcome: Progressing     Problem: PAIN - ADULT  Goal: Verbalizes/displays adequate comfort level or baseline comfort level  Description: Interventions:- Encourage patient to monitor pain and request assistance- Assess pain using appropriate pain scale- Administer analgesics based on type and severity of pain and evaluate response- Implement non-pharmacological measures as appropriate and evaluate response- Consider cultural and social influences on pain and pain management- Notify physician/advanced practitioner if interventions unsuccessful or patient reports new pain  Outcome: Progressing     Problem: INFECTION - ADULT  Goal: Absence or prevention of progression during hospitalization  Description: INTERVENTIONS:- Assess and monitor for signs and symptoms of infection- Monitor lab/diagnostic results- Monitor all insertion sites, i.e. indwelling lines, tubes, and drains- Monitor endotracheal if appropriate and nasal secretions for changes in amount and color- [place] appropriate cooling/warming therapies per order- Administer medications as ordered- Instruct and encourage patient and family to use good hand hygiene technique- Identify and instruct in appropriate isolation precautions for identified infection/condition  Outcome: Progressing

## 2025-04-21 NOTE — PCC OCCUPATIONAL THERAPY
4/21/25: Pt presents following hospitalization due to Sepsis due to R LE cellulitis, recent fall; PMH of hypertension, COPD/asthma, GERD, chronic swelling of right lower extremity. Pt requires assist due to decreased balance, safety, endurance and generalized strength/ ROM. Pt will benefit from skilled OT services to increase independence with daily tasks.     4/28/25: Pt participates in ADLs, transfers/ mobility and BUE therex/ theract. Pt is progressing towards goals with current LOF as listed above. Pt requires assist due to decreased balance, safety, endurance and generalized strength/ ROM. Pt will benefit from skilled OT services to increase independence with daily tasks. FT planned for 4/29 and HHOT/ family support, DME use, and A/E use recommend during transition to home.

## 2025-04-21 NOTE — PROGRESS NOTES
04/21/25 1330   Pain Assessment   Pain Assessment Tool 0-10   Pain Score No Pain   Restrictions/Precautions   Precautions Bed/chair alarms;Supervision on toilet/commode;Fluid restriction;Fall Risk  (1800 ml FR, bleed precautions due to Coumadin use)   Weight Bearing Restrictions No   ROM Restrictions No   Sit to Stand   Type of Assistance Needed Physical assistance   Physical Assistance Level 26%-50%   Comment cues for hand placement and safety   Sit to Stand CARE Score 3   Bed-Chair Transfer   Type of Assistance Needed Physical assistance   Physical Assistance Level 26%-50%   Comment with RW   Chair/Bed-to-Chair Transfer CARE Score 3   Exercise Tools   Exercise Tools Yes   Hand Gripper gripper with pegs B hands   Cognition   Comments MOCA as ordered completed during session scoring 19/30 with delayed recall and language most impaired.   Assessment   Treatment Assessment Pt presents resting in recliner and agreeable to OT session including completion of MOCA, BUE therex and transfers to/ from w/c and recliner. Pt tolerates session without complaints and barriers same as listed durign earlier session. Pt will benefit form continued skilled OT services to increase independence with daily tasks.   Problem List Decreased strength;Decreased range of motion;Decreased endurance;Impaired balance;Decreased mobility;Decreased coordination;Decreased skin integrity   Plan   Treatment/Interventions ADL retraining;Functional transfer training;Therapeutic exercise;Endurance training;Patient/family training;Equipment eval/education;Compensatory technique education   Progress Progressing toward goals   OT Therapy Minutes   OT Time In 1330   OT Time Out 1406   OT Total Time (minutes) 36   OT Mode of treatment - Individual (minutes) 36   Therapy Time missed   Time missed? No

## 2025-04-21 NOTE — PROGRESS NOTES
Progress Note - PMR   Name: Enrico Chavira 89 y.o. male I MRN: [id number]  Unit/Bed#: -01 I Date of Admission: 4/19/2025   Date of Service: 4/21/2025 I Hospital Day: 2     Assessment & Plan  Cellulitis of right lower extremity  HPI:   Patient with a history of venous stasis with ulceration, recently discharged from wound care (2/24/25)   Patient presented to the ED on 4/14 due to concern for RLE infection and worsening weakness   Met sepsis criteria with tachycardia, leukocytosis, and RLE cellulitis   2 g cefriaxone given in ED   CT RLE did not reveal soft tissue gas or fluid collections, but showed subcutaneous edema and skin thickening from the proximal thigh through the ankle consistent with cellulitis  Started on IV cefazolin on 4/15 ---> transitioned to 10 day course of cefadroxil on 4/19   Procalcitonin 15.61-->10.82-->4.70-->2.23-->1.32-->0.71  Blood cultures x 2 no growth after 72 hours  Lower extremity Doppler negative for DVT    Plan:   Continue cefadroxil 1 g q12 hours through 4/24  PT and OT for 3 hours a day, 5-6 days a week   Seriel skin checks and monitoring   Consult wound care RN  Encourage leg elevation  Continue local wound care   Hypertension  Management at discretion of IM   Continue furosemide and metoprolol   Asthma-COPD overlap syndrome (HCC)  Managed at discretion of IM   Continue Symbicort and PRN albuterol   Chest x-ray 4/17: no acute pulmonary pathology  Factor V Leiden (HCC)  Diagnosed after unprovoked PE in 2018   Continue chronic warfarin (INR recently supratherapeutic in acute care)   Daily INRs while inpatient   Hyponatremia  Most recently 130 on 4/21 (126 in ED)   Nephrology managed in acute care   Na 124 on 11/11/24; suspect chronicity  1800 mL FR daily   Continue to monitor BMP   Atrial fibrillation (HCC)  Follows with cardiologist Dr. Mtz ([facility]), no documented history of a-fib. Patient and daughter also deny a history of dysthymia.   Continue metoprolol 25 mg  "q12h and chronic warfarin   Continue to monitor vitals and hemodynamic status     SONYA (acute kidney injury) (HCC)  Baseline creatinine appears to be around 0.8-0.9. Creatinine on admission 1.62, likely prerenal secondary to sepsis   4/15 ultrasound negative for hydronephrosis   Treated with IVF   Most recent creatinine (4/19) 0.76   Continue to monitor BMP  Fall  Patient reports only 1 fall in the past year. This fall occurred a few days before his admission. He describes trying to climb into bed, bt being too weak to lift his legs and subsequently falling. Daughter is not aware of any other falls beside this one.   PT and OT and fall precautions     Abnormal CT of the chest  4/18/25 CT: \"3.7 cm anterior right upper lobe solid mass with septated cystic component and calcification. 2.6 cm lateral right upper lobe opacity and 7 mm right lower lobe nodule. These may be infectious/inflammatory but malignancy cannot be excluded. 2.3 x 1.3 cm low-attenuation nodule in the mediastinum adjacent to the hiatal hernia, question benign or malignant lymph node.\"  Per daughter, patient was aware of nodules but RUL mass new. Patient does not want to follow-up on either.   Monitor vitals, oxygen, and potential malignancy related complications   Impaired mobility and ADLs  Patient was evaluated by the rehabilitation team MD and an appropriate candidate for acute inpatient rehabilitation program at this time.  The patient will tolerate 3 hours/day 5 to 7 days/week of intensive physical and  occupational therapy   in order to obtain goals for community discharge  Due to the patient's functional Compared to their baseline level of function in addition to their ongoing medical needs, the patient would benefit from daily supervision from a rehabilitation physician as well as rehabilitation nursing to implement and adjust the medical as well as functional plan of care in order to meet the patient's goals.  Bladder: Monitor closely for " urinary incontinence and/or retention. Monitor urine output and encourage spontaneous voids. If unable to void spontaneously, will monitor with PVR bladder scans and initiate CIC regimen.  Skin: Monitor for breakdown, frequent turns, pressure offloading  Sleep: Encourage sleep hygiene (routine that promotes healthy circadian rhythm,avoid caffeine later in the day, quiet/dark room at night, reduce electronic before bedtime)  Patient lives alone, previously independent. He has 10 children, many of  whom will be able to assist after discharge.   Discharge: discharge planning with team, anticipate 18-21 day admission, date TBD       Constipation (Resolved: 4/21/2025)  Patient reports history of constipation   LBM 4/18 (previous 4/14)   Will start on daily Miralax and adjust as needed   Chronic left-sided low back pain  Pain levels tolerable, patient relates this pain to soreness from laying   First documented by PCP in 2020  Continue to monitor   PRN tylenol and Lidoderm   Anemia  First noted on 4/15, may be dilutional   Most recent hgb 10.3, continue to monitor CBC  History of sepsis  Possible severe sepsis given Cr increased >0.5 mg/dL from baseline and other criteria met   - INR jumped significantly as well   - Risk for post-sepsis syndrome  - Abx for per ID  - Optimal management of each as listed   - Optimal nutrition, hydration, and medical management  - Monitor vitals closely with and without activity  - Fall precautions   - Recommend acute comprehensive interdisciplinary inpatient rehabilitation to include intensive skilled therapies (PT, OT) as outlined with oversight and management by rehabilitation physician as well as inpatient rehab level nursing, case management and weekly interdisciplinary team meetings.     Potential for cognitive impairment  - Recommend MOCA cog screen in rehab later in course when more stable   - Recommend eval of med mgmt and IADLs   - Monitor neuro-exam, wakefulness, mood, cognition,  "insight into deficits and safety awareness  - Recommend patient (+/- family/caregiver education if applicable) and training as well as compensatory strategies to optimize safety, function, and decrease risk of complications  - Ensure optimal assistance/supervision after d/c   - Optimize sleep-wake cycle  - Limit sedating medications when possible  - Monitor and ensure optimal management electrolytes, nutrition, and hydration  - Monitor for signs or symptoms of infection, medication intolerances, other systemic etiologies  - Fall precautions with frequent rounding; proactive toileting program, patient should not be unattended in bathroom  - If concerns for safety in spite of frequent rounding consider virtual or in person sitter   - OP follow-up PCP and if needed additional specialists (ie neuro/zonia/pmr)    Degenerative lumbar spinal stenosis  CT L spine - Diffuse severe thoracolumbar degenerative change with apex left lumbar scoliosis. A posteriorly directed vertebral body osteophyte on the right at L1-L2 results in moderate central canal narrowing  - Monitor neuro exam, b/b function, and pain  - Reports chronic pain about stable   Bilateral arm weakness  Reportedly started about 1-2 years ago and has been progressive  - B/L SH 3-/5, R EF 5, EE 4, WE 3 FF 3, L EF 5- We 2 EE 2; +Cuevas on L; brisk knee reflexes, strength fairly intact BLE   - Risk of cervical stenosis with compression and multilevel radiculopathy; hx of very severe deg lumbar stenosis  - Risk of RTC tears or combination - negative Marbella Butler  - Impacts ADL and function; some reports of worsening balance  - Per Dr. Goodwin, \"Discussed potential dx's with pt/family and potential work-up - they would not want sx at this point no matter what the cause but would like to know potential cause for weakness which could help with prognosis and rehab planning; apparently has metal in eye and cannot have MRI; they want to try to avoid contrast with slight " "risk of kidney toxicity\"  4/21: I discussed obtaining CT scan with patient and his daughter Lora, they stated that they would like to forgo CT scan as patient does would not want intervention regardless of diagnosis. He will continue to work with PT and OT for strengthening    - Monitor neuro exam, balance, symptoms closely   Chest wall asymmetry  L superior periscapular chest palpable and observable nontender region apparently there for a few year  - CT Chest did not mention concerns  - Recommend follow-up mgmt by PCP unless additional work-up needed in ARC at discretion of IM    Subjective   Patient is a 89 year-old male, with a past medical history of lower extremity venous stasis with ulceration of his right lower extremity, hypertension, asthma-COPD, factor V leiden on warfarin, presenting to ARC secondary to debility to recent sepsis. He initially presented to the ED on 4/14 due to RLE cellulitis and worsening weakness. He was noted to be in rapid a-fib with RVR in the ED and met sepsis criteria with tachycardia and leukocytosis. Cardizem infusion started in the ED and he converted back to NSR. Labs were significant for leukocytosis, elevated procalcitonin, hyponatremia and SONYA. CT of the RLE did not reveal soft tissue gas or fluid collections, but showed subcutaneous edema and skin thickening from the proximal thigh through the ankle consistent with cellulitis.  He was subsequently started on  IV cefazolin. Blood cultures negative after 72 hours. Renal and  ID recommendations were appreciated. Per ID, lower extremity skin dry and cracked, which is a likely port of entry. He clinically improved with stabilization of lab work. To complete a 10-day course of antibiotics, he was transitioned to a 6-day course of cefadroxil upon discharge. He was evaluated by PT and OT and deemed an appropriate candidate for ARC due to functional deficits.     Chief Complaint: increased weakness    Interval:   Seen and evaluated " patient in room. He states he feels much better than he did last week, daughter (Lora) agrees that he looks much better. We discussed cervical CT and they ultimately decided against. Last BM 4/20, continent of bowel and bladder.     Objective :  Temp:  [97.3 °F (36.3 °C)-98 °F (36.7 °C)] 97.3 °F (36.3 °C)  HR:  [87-88] 87  BP: (143-160)/(78) 143/78  Resp:  [18] 18  SpO2:  [96 %-99 %] 96 %  O2 Device: None (Room air)    Functional Update:  Physical Therapy Occupational Therapy   Weight Bearing Status: Weight Bearing as Tolerated  Transfers: Moderate Assistance  Bed Mobility: Minimal Assistance  Amulation Distance (ft): 15 feet  Ambulation: Total Assistance (min A, RW, wheelchair follow)  Assistive Device for Ambulation: Roller Walker  Wheelchair Mobility Distance: 120 ft  Wheelchair Mobility: Incidental Touching  Number of Stairs: 2  Assistive Device for Stairs: Bilateral Hand Rails  Stair Assistance: Moderate Assistance  Ramp:  (NT at this time)  Discharge Recommendations: Home with:  DC Home with:: Family Support, Home Physical Therapy   Eating: Supervision  Grooming: Supervision  Bathing: Minimal Assistance, Moderate Assistance  Bathing: Minimal Assistance, Moderate Assistance  Upper Body Dressing: Minimal Assistance  Lower Body Dressing: Maximum Assistance  Toileting: Moderate Assistance  Tub/Shower Transfer:  (TBA)  Toilet Transfer: Minimal Assistance, Moderate Assistance  Cognition: Within Defined Limits  Orientation: Person, Place, Time, Situation           Physical Exam  Vitals and nursing note reviewed.   Constitutional:       General: He is not in acute distress.     Appearance: He is not ill-appearing, toxic-appearing or diaphoretic.   HENT:      Head: Normocephalic and atraumatic.      Nose: Nose normal. No congestion.      Mouth/Throat:      Mouth: Mucous membranes are moist.   Cardiovascular:      Pulses:           Dorsalis pedis pulses are detected w/ Doppler on the right side.   Pulmonary:      Effort:  Pulmonary effort is normal. No respiratory distress.   Abdominal:      General: There is no distension.   Musculoskeletal:      Right lower leg: Edema present.   Skin:     General: Skin is warm and dry.      Comments: RLE skin indurated secondary to edema    Neurological:      General: No focal deficit present.      Mental Status: He is alert.   Psychiatric:         Mood and Affect: Mood normal.         Behavior: Behavior normal.               Scheduled Meds:  Current Facility-Administered Medications   Medication Dose Route Frequency Provider Last Rate    acetaminophen  650 mg Oral Q6H PRN Nicolasa L Dagnall, PA-C      albuterol  2.5 mg Nebulization Q6H PRN Nicolasa L Dagnall, PA-C      artificial tear   Both Eyes HS Nicolasa L Dagnall, PA-C      Artificial Tears  1 drop Both Eyes 4x Daily PRN Nicolasa L Dagnall, PA-C      budesonide-formoterol  2 puff Inhalation BID Nicolasa L Dagnall, PA-C      cefadroxil  1,000 mg Oral Q12H CLARK Nicolasa L Dagnall, PA-C      dorzolamide  1 drop Left Eye BID Nicolasa L Dagnall, PA-C      furosemide  20 mg Oral Daily Nicolasa L Dagnall, PA-C      guaiFENesin  600 mg Oral Q12H CLARK Nicolasa L Dagnall, PA-C      latanoprost  1 drop Both Eyes HS Nicolasa L Dagnall, PA-C      lidocaine  1 patch Topical Daily Nicolasa L Dagnall, PA-C      metoprolol tartrate  25 mg Oral Q12H CLARK Nicolasa L Dagnall, PA-C      montelukast  10 mg Oral Daily Nicolasa L Dagnall, PA-C      nystatin   Topical BID Liseth Umer, PA-C      pantoprazole  20 mg Oral Early Morning Nicolasa L Dagnall, PA-C      polyethylene glycol  17 g Oral Daily Liseth Maramyak, PA-C      prednisoLONE acetate  1 drop Left Eye BID Nicolasa L Dagnall, PA-C      warfarin  7.5 mg Oral Daily (warfarin) Nicolasa L Dagnall, PA-C      white petrolatum-mineral oil   Topical BID Ta Goodwin MD           Lab Results: I have reviewed the following results:  Results from last 7 days   Lab Units 04/20/25  0507 04/19/25  0457 04/18/25  0446   HEMOGLOBIN  g/dL 10.4* 10.3* 10.2*   HEMATOCRIT % 32.1* 31.2* 30.6*   WBC Thousand/uL 8.29 11.10* 11.06*   PLATELETS Thousands/uL 452* 389 365     Results from last 7 days   Lab Units 04/21/25  0507 04/20/25  0507 04/19/25  0457 04/17/25  0504 04/16/25  0426 04/15/25  1805 04/15/25  0459   BUN mg/dL 30* 28* 24   < > 46*   < > 59*   SODIUM mmol/L 130* 129* 131*   < > 130*   < > 129*   POTASSIUM mmol/L 4.3 4.6 4.4   < > 4.1   < > 3.8   CHLORIDE mmol/L 98 99 99   < > 103   < > 100   CREATININE mg/dL 0.79 0.69 0.76   < > 1.07   < > 1.35*   AST U/L  --  34  --   --  34  --  41*   ALT U/L  --  14  --   --  33  --  43    < > = values in this interval not displayed.       Results from last 7 days   Lab Units 04/21/25 0507 04/20/25 0507 04/19/25 0457   PROTIME seconds 17.9* 17.8* 19.2*   INR  1.43* 1.42* 1.56*      Liseth Owusu PA-C  Physical Medicine and Rehabilitation  [facility]

## 2025-04-21 NOTE — NURSING NOTE
Patient is awake and alert. Pleasant with interactions.  Participated in therapy sessions and tolerated same well.  Right leg remains reddened with blistering of upper thigh. Wound care provided.  Family supportive at bedside.  Denies complaints of pain or discomfort.  Call bell in reach at bedside.  Safety maintained.

## 2025-04-21 NOTE — PROGRESS NOTES
"Progress Note - Enrico Chavira 89 y.o. male MRN: [id number]    Unit/Bed#: [facility] 219-01 Encounter: [id number]        Subjective:   Patient seen and examined at bedside after reviewing the chart and discussing the case with the caring staff.      Patient examined at bedside.  Patient has no acute issues.    On review of patient's labs from 4/21/2025.  Patient's BMP showed sodium 130.  Patient's CBC showed hemoglobin 10.4 and hematocrit 32.1.      Patient's PT 17.9 and INR 1.43 on 4/21/2025.  Patient is on Coumadin 7.5 mg daily    Physical Exam   Vitals: Blood pressure 143/78, pulse 87, temperature (!) 97.3 °F (36.3 °C), temperature source Temporal, resp. rate 18, height 5' 7\" (1.702 m), weight 83.9 kg (184 lb 15.5 oz), SpO2 96%.,Body mass index is 28.97 kg/m².  Constitutional: He appears well-developed.   HEENT: PERR, EOMI, MMM  Cardiovascular: Normal rate and regular rhythm.    Pulmonary/Chest: Effort normal and breath sounds normal.   Abdomen: Soft, + BS, NT    Assessment/Plan:  Enrico Chavira is a(n) 89 y.o. male with debility due to sepsis.      Cardiac hx with HTN, new onset A-fib.  Patient is on Lasix 20 mg daily and Lopressor 25 mg q12h (new).  Of note, pt was in rapid a-fib while in ER, converted to normal sinus rhythm s/p Cardizem drip.  Home amlodipine on hold since starting Lopressor for a-fib.  Cont to monitor vitals.  Hx of PE.  Home: warfarin 7.5mg daily.  Here: warfarin 5mg daily.  Initially on hold due to supratherapeutic INR and restarted on 4/18.  Goal 2-3.  I will increase Coumadin 7.5 mg on 4/20/2025.  INR daily.   Asthma-COPD overlap syndrome.  Continue Symbicort inhaler, Singulair 10 mg daily, Mucinex 600mg q12h, albuterol neb as needed.  Pt on Breo Ellipta at home however prefers Symbicort.   GERD.  Patient is on Protonix 20 mg daily (Prilosec nonform).   Glaucoma.  Continue home prednisolone, Xalatan, Trusopt eye drops.  Artifical tear drops as needed.  Anemia.  Hgb 10.3 -> 10.4 on " 4/21/2025..  No s/sx active bleeding.  Cont to trend.   SONYA.  Resolved.  Baseline Cr 0.8-0.9.  Cr 0.76 -> 0.79 on 4/21/2025.  Peak Cr 1.62 on 4/14.  Cont to monitor.  Chronic hyponatremia.  Level 126 on 4/14 -> 131 -> 130 on 4/21/2025.  I will reduce 1500ml/day fluid restriction 4/21/2025.  Cont to monitor.   Sepsis secondary to cellulitis of right lower extremity.  Pt treated with IV cefazolin and transitioned to Duricef 1000mg q12h to complete 10-day course of antibiotics per ID recommendations (thru 4/25).  Pain and bowel regimen.  As per physiatry.    Debility due to sepsis.  Patient is receiving intensive PT OT with management as per physiatry.      Anticipated date of discharge.  TBD.

## 2025-04-22 LAB
INR PPP: 1.53 (ref 0.85–1.19)
PROTHROMBIN TIME: 18.8 SECONDS (ref 12.3–15)

## 2025-04-22 PROCEDURE — 97110 THERAPEUTIC EXERCISES: CPT | Performed by: PHYSICAL THERAPIST

## 2025-04-22 PROCEDURE — 97112 NEUROMUSCULAR REEDUCATION: CPT | Performed by: PHYSICAL THERAPIST

## 2025-04-22 PROCEDURE — 97116 GAIT TRAINING THERAPY: CPT | Performed by: PHYSICAL THERAPIST

## 2025-04-22 PROCEDURE — 85610 PROTHROMBIN TIME: CPT

## 2025-04-22 PROCEDURE — 97530 THERAPEUTIC ACTIVITIES: CPT

## 2025-04-22 PROCEDURE — 97530 THERAPEUTIC ACTIVITIES: CPT | Performed by: PHYSICAL THERAPIST

## 2025-04-22 PROCEDURE — 97110 THERAPEUTIC EXERCISES: CPT

## 2025-04-22 PROCEDURE — 97535 SELF CARE MNGMENT TRAINING: CPT

## 2025-04-22 PROCEDURE — 99232 SBSQ HOSP IP/OBS MODERATE 35: CPT

## 2025-04-22 RX ADMIN — BUDESONIDE AND FORMOTEROL FUMARATE DIHYDRATE 2 PUFF: 160; 4.5 AEROSOL RESPIRATORY (INHALATION) at 17:26

## 2025-04-22 RX ADMIN — GUAIFENESIN 600 MG: 600 TABLET ORAL at 08:27

## 2025-04-22 RX ADMIN — NYSTATIN: 100000 POWDER TOPICAL at 08:28

## 2025-04-22 RX ADMIN — DORZOLAMIDE HCL 1 DROP: 20 SOLUTION/ DROPS OPHTHALMIC at 08:27

## 2025-04-22 RX ADMIN — METOPROLOL TARTRATE 25 MG: 25 TABLET, FILM COATED ORAL at 20:28

## 2025-04-22 RX ADMIN — ACETAMINOPHEN 650 MG: 325 TABLET ORAL at 06:02

## 2025-04-22 RX ADMIN — PREDNISOLONE ACETATE 1 DROP: 10 SUSPENSION/ DROPS OPHTHALMIC at 17:26

## 2025-04-22 RX ADMIN — MONTELUKAST 10 MG: 10 TABLET, FILM COATED ORAL at 08:27

## 2025-04-22 RX ADMIN — Medication: at 08:28

## 2025-04-22 RX ADMIN — LATANOPROST 1 DROP: 50 SOLUTION OPHTHALMIC at 21:25

## 2025-04-22 RX ADMIN — POLYETHYLENE GLYCOL 3350 17 G: 17 POWDER, FOR SOLUTION ORAL at 08:27

## 2025-04-22 RX ADMIN — LIDOCAINE 5% 1 PATCH: 700 PATCH TOPICAL at 08:27

## 2025-04-22 RX ADMIN — CEFADROXIL 1000 MG: 500 CAPSULE ORAL at 20:28

## 2025-04-22 RX ADMIN — NYSTATIN: 100000 POWDER TOPICAL at 17:27

## 2025-04-22 RX ADMIN — DORZOLAMIDE HCL 1 DROP: 20 SOLUTION/ DROPS OPHTHALMIC at 20:28

## 2025-04-22 RX ADMIN — CEFADROXIL 1000 MG: 500 CAPSULE ORAL at 08:28

## 2025-04-22 RX ADMIN — GUAIFENESIN 600 MG: 600 TABLET ORAL at 20:28

## 2025-04-22 RX ADMIN — BUDESONIDE AND FORMOTEROL FUMARATE DIHYDRATE 2 PUFF: 160; 4.5 AEROSOL RESPIRATORY (INHALATION) at 08:27

## 2025-04-22 RX ADMIN — PANTOPRAZOLE SODIUM 20 MG: 20 TABLET, DELAYED RELEASE ORAL at 06:02

## 2025-04-22 RX ADMIN — WARFARIN SODIUM 8 MG: 5 TABLET ORAL at 17:26

## 2025-04-22 RX ADMIN — FUROSEMIDE 20 MG: 20 TABLET ORAL at 08:27

## 2025-04-22 RX ADMIN — PREDNISOLONE ACETATE 1 DROP: 10 SUSPENSION/ DROPS OPHTHALMIC at 08:27

## 2025-04-22 RX ADMIN — Medication: at 17:27

## 2025-04-22 RX ADMIN — WHITE PETROLATUM 57.7 %-MINERAL OIL 31.9 % EYE OINTMENT: at 21:25

## 2025-04-22 RX ADMIN — METOPROLOL TARTRATE 25 MG: 25 TABLET, FILM COATED ORAL at 08:27

## 2025-04-22 NOTE — PROGRESS NOTES
04/22/25 1330   Pain Assessment   Pain Assessment Tool 0-10   Pain Score 2   Restrictions/Precautions   Precautions Bed/chair alarms;Supervision on toilet/commode;Fluid restriction;Fall Risk  (1500 mL FR)   Weight Bearing Restrictions No   ROM Restrictions No   Sit to Stand   Type of Assistance Needed Physical assistance   Physical Assistance Level 25% or less   Comment Cues for hand placement   Sit to Stand CARE Score 3   Bed-Chair Transfer   Type of Assistance Needed Physical assistance   Physical Assistance Level 26%-50%   Comment with RW   Chair/Bed-to-Chair Transfer CARE Score 3   Exercise Tools   Hand Gripper 5# digiflex 2 sets 30 B hands while seated outside for fresh air   Other Exercise Tool 1 gripper with pegs B hands following retrieval of pegs fromt he floor using the reacher in the R hand   Other Exercise Tool 2 card match reaching with BUE while seated and then removing, sorting into suits   Cognition   Orientation Level Oriented X4   Other Comments   Assessment Pt participates in 30 minutes concurrent treatmetn focusing on BUE therex/ theract with similar goals as another patient to increase strength/ ROM and activity tolerance   Assessment   Treatment Assessment Pt presents seated in recliner agreeable to OT Session including transfers/ mobility, BUE therex/ theract returning back to recliner to visit with daughter when session complete. pt enjoyed sitting outside for a few minutes of fresh air however prefers to return insdie due to windy conditions. Pt requires assist and supervision due to decreased balance, safety, endurance and strength/ ROM eps of BUE shoulders. Pt is progressing towards goals and will benefit from contiuned skilled OT services to increase independence with daily tasks.   Problem List Decreased strength;Decreased range of motion;Decreased endurance;Impaired balance;Decreased safety awareness;Decreased cognition   Plan   Treatment/Interventions ADL retraining;Functional  transfer training;Therapeutic exercise;Endurance training;Patient/family training;Equipment eval/education;Compensatory technique education   Progress Progressing toward goals   OT Therapy Minutes   OT Time In 1330   OT Time Out 1430   OT Total Time (minutes) 60   OT Mode of treatment - Individual (minutes) 30   OT Mode of treatment - Concurrent (minutes) 30   Therapy Time missed   Time missed? No

## 2025-04-22 NOTE — ASSESSMENT & PLAN NOTE
"- Recommend MOCA cog screen in rehab later in course when more stable   - Recommend eval of med mgmt and IADLs   - Monitor neuro-exam, wakefulness, mood, cognition, insight into deficits and safety awareness  - Recommend patient (+/- family/caregiver education if applicable) and training as well as compensatory strategies to optimize safety, function, and decrease risk of complications  - Ensure optimal assistance/supervision after d/c   - Optimize sleep-wake cycle  - Limit sedating medications when possible  - Monitor and ensure optimal management electrolytes, nutrition, and hydration  - Monitor for signs or symptoms of infection, medication intolerances, other systemic etiologies  - Fall precautions with frequent rounding; proactive toileting program, patient should not be unattended in bathroom  - If concerns for safety in spite of frequent rounding consider virtual or in person sitter   - OP follow-up PCP and if needed additional specialists (ie neuro/zonia/pmr)  -4/21: \"MOCA as ordered completed during session scoring 19/30 with delayed recall and language most impaired.\"  "

## 2025-04-22 NOTE — ASSESSMENT & PLAN NOTE
HPI:   Patient with a history of venous stasis with ulceration, recently discharged from wound care (2/24/25)   Patient presented to the ED on 4/14 due to concern for RLE infection and worsening weakness   Met sepsis criteria with tachycardia, leukocytosis, and RLE cellulitis   2 g cefriaxone given in ED   CT RLE did not reveal soft tissue gas or fluid collections, but showed subcutaneous edema and skin thickening from the proximal thigh through the ankle consistent with cellulitis  Started on IV cefazolin on 4/15 ---> transitioned to 10 day course of cefadroxil on 4/19   Procalcitonin 15.61-->10.82-->4.70-->2.23-->1.32-->0.71  Blood cultures x 2 no growth after 72 hours  Lower extremity Doppler negative for DVT    Plan:   Continue cefadroxil 1 g q12 hours through 4/24  PT and OT for 3 hours a day, 5-6 days a week   Seriel skin checks and monitoring   Consult wound care RN (evaluated on 4/22 pending documentation)   Encourage leg elevation  Continue local wound care

## 2025-04-22 NOTE — CASE MANAGEMENT
CM met with patient and daughter, Lora, following team meeting to review team recommendations.   Patient tentatively scheduled for d/c on Saturday, May 3rd to home with family support.  Lora is in the process of getting FMLA so she can assist pt as needed once discharged.  Team recommends C PT/OT/RN and transition to outpatient if needed.  Patient has used [facility] in the past and would like to use again.  Will send referral.    Discussed having family come in for training early next week.  Lora and 3 of her sisters will discuss day and time and therapy will follow up later this week for exact details.  Patient has all DME in place.   Has an appt with PCP on May 5th at 11:15am.    CM to review with pt's dtr, Eden, who is POA later today.   Will continue to follow for all d/c planning needs/concerns.    Spoke with pt's anselmorEden, and reviewed information from team meeting listed above.

## 2025-04-22 NOTE — PROGRESS NOTES
04/22/25 0700   Pain Assessment   Pain Assessment Tool 0-10   Pain Score No Pain   Restrictions/Precautions   Precautions Bed/chair alarms;Supervision on toilet/commode;Fluid restriction;Fall Risk  (1500 mL FR)   Weight Bearing Restrictions No   ROM Restrictions No   Cognition   Arousal/Participation Alert;Responsive;Cooperative   Attention Within functional limits   Following Commands Follows one step commands without difficulty   Subjective   Subjective Pt reports he slept well and is in no pain   Roll Left and Right   Type of Assistance Needed Independent   Physical Assistance Level No physical assistance   Comment bed rails   Roll Left and Right CARE Score 6   Lying to Sitting on Side of Bed   Type of Assistance Needed Physical assistance   Physical Assistance Level 25% or less   Comment VC, bed rails   Lying to Sitting on Side of Bed CARE Score 3   Sit to Stand   Type of Assistance Needed Physical assistance   Physical Assistance Level 26%-50%   Comment Cues for hand placement   Sit to Stand CARE Score 3   Bed-Chair Transfer   Type of Assistance Needed Physical assistance   Physical Assistance Level 26%-50%   Comment RW   Chair/Bed-to-Chair Transfer CARE Score 3   Car Transfer   Reason if not Attempted Environmental limitations   Car Transfer CARE Score 10   Walk 10 Feet   Type of Assistance Needed Physical assistance   Physical Assistance Level Total assistance   Comment RW, Min A, w/c follow   Walk 10 Feet CARE Score 1   Walk 50 Feet with Two Turns   Reason if not Attempted Safety concerns   Walk 50 Feet with Two Turns CARE Score 88   Walk 150 Feet   Reason if not Attempted Safety concerns   Walk 150 Feet CARE Score 88   Ambulation   Primary Mode of Locomotion Prior to Admission Walk   Distance Walked (feet) 20 ft  (20, 15x2)   Assist Device Roller Walker   Gait Pattern Inconsistant Wendi;Slow Wendi;Decreased foot clearance;Forward Flexion;Shuffle;Improper weight shift   Does the patient walk? 2. Yes    Wheel 50 Feet with Two Turns   Type of Assistance Needed Supervision   Physical Assistance Level No physical assistance   Comment CGA, obstacle negotiation   Wheel 50 Feet with Two Turns CARE Score 4   Wheel 150 Feet   Reason if not Attempted Safety concerns   Wheel 150 Feet CARE Score 88   Wheelchair mobility   Method Right upper extremity;Left upper extremity   Assistance Provided For Locking Brakes;Obstacles;Remove Leg Rest;Replace Leg Rest   Type of Wheelchair Used 1. Manual   Curb or Single Stair   Style negotiated Single stair   Type of Assistance Needed Physical assistance   Physical Assistance Level 26%-50%   Comment Min- Mod A, b/l HR   1 Step (Curb) CARE Score 3   4 Steps   Reason if not Attempted Activity not applicable   4 Steps CARE Score 9   12 Steps   Reason if not Attempted Activity not applicable   12 Steps CARE Score 9   Stairs   Type Stairs   # of Steps 3   Weight Bearing Precautions Fall Risk   Assist Devices Single Rail   Toilet Transfer   Comment not required this session   Therapeutic Interventions   Strengthening Supine LE TE and seated LE TE   Balance transfer training, step taps to colors   Other bed mobility, gait training, stair training, w/c propelling   Assessment   Treatment Assessment Pt presents supine in bed, agreeable to PT. Pt reported that he currently has no pain. Began session with supine LE TE. Pt completed bed mobiltiy with Min A and transfered OOB with Min A, followed by a Min A walk 20ft from bed to w/c in hallway. Pt vitals taken immeditly following walk- 95-97% SpO2, HR- 105 and manual BP taken by RN- 172/86. Pt completed w/c propelling in order to work on endurance. Pt ascended and descended backwards three steps x 2 with b/l HR and Min-Mod A, Spo2 remained at 97% with a HR of 107. Pt displayed left hip abductor weakness during negotiation of stairs. Pt completed b/l standing step taps onto corresponding color dot. Pt ambulated 15ft x 2 with Mod A and w/c follow.  During ambulation pt displayed improper weight shift with compensated Trendelenburg. Pt exhibited wheezing during ambulation, RN notified. Pt returned to room seated in recliner set up for breakfast. Cont. POC and progress as able.   Problem List Decreased strength;Decreased range of motion;Decreased endurance;Impaired balance;Decreased mobility;Decreased coordination;Decreased skin integrity   PT Barriers   Physical Impairment Decreased strength;Decreased endurance;Decreased mobility;Impaired balance;Decreased range of motion;Decreased coordination   Functional Limitation Car transfers;Ramp negotiation;Stair negotiation;Standing;Transfers;Walking   Plan   Treatment/Interventions Functional transfer training;LE strengthening/ROM;Elevations;Therapeutic exercise;Endurance training;Cognitive reorientation;Patient/family training;Equipment eval/education;Bed mobility;Gait training   Progress Progressing toward goals   PT Therapy Minutes   PT Time In 0700   PT Time Out 0830   PT Total Time (minutes) 90   PT Mode of treatment - Individual (minutes) 90   PT Mode of treatment - Concurrent (minutes) 0   PT Mode of treatment - Group (minutes) 0   PT Mode of treatment - Co-treat (minutes) 0   PT Mode of Treatment - Total time(minutes) 90 minutes   PT Cumulative Minutes 250     Seated LE TE:  3x10, B/L LEs  March  LAQ  HR  TR    Supine LE TE:  3x10,B/L LEs  SLR - flexion  GS  QS  APs  SAQ  Hip ABd - hooklying with green t-band  Hip ADd - julissa  Heel slides     Patient remains OOB in chair, all needs in reach.  Alarm in place and activated.  Encouraged use of call bell, patient verbalizes understanding.

## 2025-04-22 NOTE — DISCHARGE INSTR - OTHER ORDERS
Skin Care Plan:  1-Right Lateral thigh  : Cleanse with VASHE, pat dry. Apply eucerin cream to the dry scaly intact skin then Apply adaptic then the calcium alginate with silver (Melgisorb Ag) to the wound bed, cover with  ABD and wrap with polly wrap or secure with burn mesh to hold in place . Teresa with T for treatment, date. Change every other day  and PRN if soiled/displaced.   2-Turn/reposition q2h to offload and redistribution of the skin .  3-Elevate heels to offload pressure, utilize pillows for offloading float heels while in bed or chair   4-Moisturize skin daily with skin nourishing cream  5-Ehob cushion in chair when out of bed in chair/ Wheelchair  .  6- Apply  Hydraguard to bilateral sacro-buttocks BID and PRN  7-Recommend patient follow up with out patient for the right leg   8. Apply eucerin cream to dry scaly parts if red or draining do not put on the area . Apply to feet and legs as per the MD order.  9. Right elbow - cleanse with soap and water then apply a silicone foam teresa with a P for prevention and date change every 3 days

## 2025-04-22 NOTE — PROGRESS NOTES
"Progress Note - Enrico Chavira 89 y.o. male MRN: [id number]    Unit/Bed#: [facility] 219-01 Encounter: [id number]        Subjective:   Patient seen and examined at bedside after reviewing the chart and discussing the case with the caring staff.      Patient examined at bedside.  Patient has no acute issues.    Patient's PT 18.8 and INR 1.53 on 4/22/2025.  Patient is on Coumadin 7.5 mg daily    Physical Exam   Vitals: Blood pressure 168/86, pulse 82, temperature 97.8 °F (36.6 °C), temperature source Temporal, resp. rate 18, height 5' 7\" (1.702 m), weight 83.9 kg (184 lb 15.5 oz), SpO2 97%.,Body mass index is 28.97 kg/m².  Constitutional: He appears well-developed.   HEENT: PERR, EOMI, MMM  Cardiovascular: Normal rate and regular rhythm.    Pulmonary/Chest: Effort normal and breath sounds normal.   Abdomen: Soft, + BS, NT    Assessment/Plan:  Enrico Chavira is a(n) 89 y.o. male with debility due to sepsis.      Cardiac hx with HTN, new onset A-fib.  Patient is on Lasix 20 mg daily and Lopressor 25 mg q12h (new).  Of note, pt was in rapid a-fib while in ER, converted to normal sinus rhythm s/p Cardizem drip.  Home amlodipine on hold since starting Lopressor for a-fib.  Cont to monitor vitals.  Hx of PE.  Home: warfarin 7.5mg daily.  Here: warfarin 5mg daily.  Initially on hold due to supratherapeutic INR and restarted on 4/18.  Goal 2-3.  I will increase Coumadin 8 mg on 4/22/2025.  INR daily.   Asthma-COPD overlap syndrome.  Continue Symbicort inhaler, Singulair 10 mg daily, Mucinex 600mg q12h, albuterol neb as needed.  Pt on Breo Ellipta at home however prefers Symbicort.   GERD.  Patient is on Protonix 20 mg daily (Prilosec nonform).   Glaucoma.  Continue home prednisolone, Xalatan, Trusopt eye drops.  Artifical tear drops as needed.  Anemia.  Hgb 10.3 -> 10.4 on 4/21/2025..  No s/sx active bleeding.  Cont to trend.   SONYA.  Resolved.  Baseline Cr 0.8-0.9.  Cr 0.76 -> 0.79 on 4/21/2025.  Peak Cr 1.62 on 4/14.  " Cont to monitor.  Chronic hyponatremia.  Level 126 on 4/14 -> 131 -> 130 on 4/21/2025.  I will reduce 1500ml/day fluid restriction 4/21/2025.  Cont to monitor.   Sepsis secondary to cellulitis of right lower extremity.  Pt treated with IV cefazolin and transitioned to Duricef 1000mg q12h to complete 10-day course of antibiotics per ID recommendations (thru 4/25).  Pain and bowel regimen.  As per physiatry.    Debility due to sepsis.  Patient is receiving intensive PT OT with management as per physiatry.      Anticipated date of discharge.  TBD.

## 2025-04-22 NOTE — PCC NURSING
4/21/2025  pt. Admit from Hospital for Rt leg cellulits leading to sepsis, generalized weakness. Aox4 with forgetfulness, Heart Irregular, lungs decreased, SOB on exertion, needs rest periods, Edema BLE R>L, RLE reddened ,upper/lower dentures, [place],1 assist RW goes slow , tires easily ,Continent BB- occ bowel leakage   1500 ml  Fluid restriction, SKin: scab left 3rd toe- ,Excoriated groin- nystatin powder,Skin tear Right elbow- xeroform/mepilex, Right upper leg- open weeping blisters-  melgisorb, ABD, Kerlix, Wound Care consult. Bed/chair alarm for safety, SCD for LLE. NISH    4/29/2025  Continues with some forgetfulness. Less wheezing with ambulation. Less edema in lower legs. Less weeping from right thigh wounds. Maintains 2000ml fluid restriction with dysphagia 2 soft diet. Has been taking his meds whole with water. Continent of B & B . Wound care continues for left 3rd toe, right arm . Nystatin powder for groin area. Pt on Voltaren rub for his lower back pain and tylenol. Continues with bed and chair alarms for safety. Refuses SCD's at times.

## 2025-04-22 NOTE — TEAM CONFERENCE
Acute RehabilitationTeam Conference Note  Date: 4/22/2025   Time: 10:33 AM       Patient Name:  Enrico Chavira       Medical Record Number: [id number]   YOB: 1930  Sex: Male          Room/Bed:  [facility] 219/[facility] 219-01  Payor Info:  Payor: MEDICARE / Plan: MEDICARE A AND B / Product Type: Medicare A & B Fee for Service /      Admitting Diagnosis: Sepsis ([facility]) [A41.9]   Admit Date/Time:  4/19/2025 11:44 AM  Admission Comments: No comment available     Primary Diagnosis:  Cellulitis of right lower extremity  Principal Problem: Cellulitis of right lower extremity    Patient Active Problem List    Diagnosis Date Noted    History of sepsis 04/20/2025    Potential for cognitive impairment 04/20/2025    Degenerative lumbar spinal stenosis 04/20/2025    Bilateral arm weakness 04/20/2025    Chest wall asymmetry 04/20/2025    Fall 04/19/2025    Abnormal CT of the chest 04/19/2025    Impaired mobility and ADLs 04/19/2025    Chronic left-sided low back pain 04/19/2025    Anemia 04/19/2025    Swelling of right knee 04/15/2025    Atrial fibrillation ([facility]) 04/14/2025    Supratherapeutic INR 04/14/2025    Sepsis ([facility]) 04/14/2025    Cellulitis of right lower extremity 04/14/2025    SONYA (acute kidney injury) ([facility]) 04/14/2025    Venous stasis ulcer of other part of right lower leg with fat layer exposed with varicose veins ([facility]) 01/16/2025    Edema of both lower extremities due to peripheral venous insufficiency 01/16/2025    Venous stasis ulcer of right calf limited to breakdown of skin without varicose veins ([facility]) 12/09/2024    Hashimoto's thyroiditis 12/09/2024    Acquired hypothyroidism 07/29/2024    Other fatigue 04/29/2024    Shortness of breath 09/19/2022    Hyponatremia 08/01/2022    COVID-19 virus infection 07/28/2022    Hypercalcemia 02/10/2022    Influenza vaccine refused 11/22/2021    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    Mature cataract 03/12/2021    Primary open-angle glaucoma,  bilateral, mild stage 03/12/2021    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    GERD without esophagitis     Neoplasm of uncertain behavior of skin 11/19/2020    Encounter for long-term (current) use of medications 11/19/2020    Primary osteoarthritis of both knees 09/29/2020    Age-related cataract of both eyes 09/04/2020    Premature beats 09/04/2020    Chronic pulmonary embolism without acute cor pulmonale (HCC) 08/19/2020    Osteoarthritis of both knees 06/25/2020    Other seborrheic dermatitis 05/19/2020    Abnormality of gait 05/19/2020    Intrinsic eczema 12/17/2019    Prothrombin gene mutation ([facility]) 09/03/2019    Primary generalized (osteo)arthritis 09/03/2019    Hypertension 09/03/2019    Dyslipidemia 09/03/2019    Factor V Leiden ([facility]) 11/05/2018    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    History of pulmonary embolism 11/01/2018    TB lung, latent 11/01/2018    Asthma-COPD overlap syndrome ([facility]) 06/20/2012       Physical Therapy:    Weight Bearing Status: Weight Bearing as Tolerated  Transfers: Moderate Assistance  Bed Mobility: Minimal Assistance  Amulation Distance (ft): 15 feet  Ambulation: Total Assistance (min A, RW, wheelchair follow)  Assistive Device for Ambulation: Roller Walker  Wheelchair Mobility Distance: 120 ft  Wheelchair Mobility: Incidental Touching  Number of Stairs: 2  Assistive Device for Stairs: Bilateral Hand Rails  Stair Assistance: Moderate Assistance  Ramp:  (NT at this time)  Discharge Recommendations: Home with:  DC Home with:: Family Support, Home Physical Therapy    4/22/25:  Patient following with ARU PT.  Presents following right LE cellulitis with sepsis.  Pt now with decreased ROM/strength, decreased balance and safety, decreased endurance, and pain.   Patient min A bed mobility, min-mod A transfers with RW, min A ambulation up to 15 feet  with wheelchair follow on level and unlevel surfaces with RW, mod A negotiation of 2 steps with  b/l handrails.  Patient would benefit from continued inpatient ARC PT to increase function, safety, and increased independence in prep for safe d/c to home.      Occupational Therapy:  Eating: Supervision  Grooming: Supervision  Bathing: Minimal Assistance, Moderate Assistance  Bathing: Minimal Assistance, Moderate Assistance  Upper Body Dressing: Minimal Assistance  Lower Body Dressing: Maximum Assistance  Toileting: Moderate Assistance  Tub/Shower Transfer:  (TBA)  Toilet Transfer: Minimal Assistance, Moderate Assistance  Cognition: Within Defined Limits  Orientation: Person, Place, Time, Situation  Discharge Recommendations: Home with:  DC Home with:: Family Support, First Floor Setup, Home Occupational Therapy       4/21/25: Pt presents following hospitalization due to Sepsis due to R LE cellulitis, recent fall; PMH of hypertension, COPD/asthma, GERD, chronic swelling of right lower extremity. Pt requires assist due to decreased balance, safety, endurance and generalized strength/ ROM. Pt will benefit from skilled OT services to increase independence with daily tasks.     Speech Therapy:           No notes on file    Nursing Notes:  Appetite: Good  Diet Type: Dysphagia II, Other (Specify) (1500 ml fluid restriction)                      Diet Patient/Family Education Complete: Yes    Type of Wound (LDA): Wound                    Type of Wound Patient/Family Education: Yes  Bladder: 3 - Moderate Assistance     Bladder Patient/Family Education: Yes  Bowel: 3 - Moderate Assistance     Bowel Patient/Family Education: Yes  Pain Location/Orientation: Location: Generalized  Pain Score: 0                       Hospital Pain Intervention(s): Declines  Pain Patient/Family Education: Yes       4/21/2025  pt. Admit from Hospital for Rt leg cellulits leading to sepsis, generalized weakness. Aox4 with forgetfulness, Heart Irregular, lungs decreased, SOB on exertion, needs rest periods, Edema BLE R>L, RLE reddened ,upper/lower  dentures, [place],1 assist RW goes slow , tires easily ,Continent BB- occ bowel leakage   1500 ml  Fluid restriction, SKin: scab left 3rd toe- ,Excoriated groin- nystatin powder,Skin tear Right elbow- xeroform/mepilex, Right upper leg- open weeping blisters-  melgisorb, ABD, Kerlix, Wound Care consult. Bed/chair alarm for safety, SCD for LLE. NISH    Case Management:     Discharge Planning  Living Arrangements: Lives Alone  Support Systems: Family members  Assistance Needed: TBD  Type of Current Residence: Private residence  Current Home Care Services: No  04/22/2025:  Patient admitted to [facility] on 04/19/2025 after inpatient stay for RLE cellulitis.  Patient lives alone in a 1st floor apartment with 2 (1 +1) NILESH and B/L handrails.  Full bath including walk-in shower with lip and shower seat in main living area.   Patient has multiple children that live within an hour away and has neighbor that could also assist if needed.  Patient retired and was independent PTA using no AD.  Has active 's license but family prefers to drive.   Has all DME.   Has used [facility] for nursing and therapy needs in the past.    Is the patient actively participating in therapies? yes  List any modifications to the treatment plan:      Barriers Interventions   Cellulitis of RLE; progressively worsening RLE edema/ pain/ redness and difficulty walking; afib with RVR; chronic hyponatremia, wounds RLE thigh and shin, skin tear R elbow Medication management beginning with IV and transitioned to oral medication, medical oversite, pt/ family education, 1500 ml FR, chronic Warfarin with monitoring for therapeutic level, wound care to RLE and RUE   Decreased balance, saefty, endurance, strength/ ROM, increase SOB/ fatigue ADL training, gait/ transfer training, therex/ theract, pt/ family education                 Is the patient making expected progress toward goals? yes  List any update or changes to goals:   Pt will be mod I bed mobility, transfers,  short distance amb with RW and supervision 2 stairs.  Pt will be mod I for ADLs, toileting with use of A/ E and related transfers/ mobility.  Pt will increase activity tolerance to allow for return to routine with home management and checking in with family.    Medical Goals: Patient will be able to manage medical conditions and comorbid conditions with medications and follow up upon completion of rehab program    Weekly Team Goals:   Rehab Team Goals  ADL Team Goal: Patient will be independent with ADLs with least restrictive device upon completion of rehab program  Bowel/Bladder Team Goal: Patient will return to premorbid level for bladder/bowel management upon completion of rehab program  Transfer Team Goal: Patient will be independent with transfers with least restrictive device upon completion of rehab program  Locomotion Team Goal: Patient will be independent with locomotion with least restrictive device upon completion of rehab program  Cognitive Team Goal: Patient will return to premorbid level of cognitive activity upon completion of rehab program    Discussion: Pt is participating in rehab program and making progress towards stated goals. Pt is min A bed mobility, min/ mod A transfers with RW, min A with amb up to 15' with a w/c follow. Pt is min A UB ADLs, mod/ max A toileting and LB dressing. Recommend family support with family training prior to discharge, HHOT/ HHPT/ nursing and DME which is in place.    Anticipated Discharge Date:  Home with family support Saturday, May 3rd, 2025  [facility] Team Members Present:  The following team members are supervising care for this patient and were present during this Weekly Team Conference.    MD Trish Bianchi, OTR/L  Conrad Huang, CARAN, RN  Koby Amato, DPSANDRA Blakely, OTR/L

## 2025-04-22 NOTE — ASSESSMENT & PLAN NOTE
Patient was evaluated by the rehabilitation team MD and an appropriate candidate for acute inpatient rehabilitation program at this time.  The patient will tolerate 3 hours/day 5 to 7 days/week of intensive physical and  occupational therapy   in order to obtain goals for community discharge  Due to the patient's functional Compared to their baseline level of function in addition to their ongoing medical needs, the patient would benefit from daily supervision from a rehabilitation physician as well as rehabilitation nursing to implement and adjust the medical as well as functional plan of care in order to meet the patient's goals.  Bladder: Monitor closely for urinary incontinence and/or retention. Monitor urine output and encourage spontaneous voids. If unable to void spontaneously, will monitor with PVR bladder scans and initiate CIC regimen.  Skin: Monitor for breakdown, frequent turns, pressure offloading  Sleep: Encourage sleep hygiene (routine that promotes healthy circadian rhythm,avoid caffeine later in the day, quiet/dark room at night, reduce electronic before bedtime)  Patient lives alone, previously independent. He has 10 children, many of  whom will be able to assist after discharge.   Anticipated Discharge Date:  Home with family support Saturday, May 3rd, 2025   HHOT/ HHPT/ nursing and DME which is in place.

## 2025-04-22 NOTE — NURSING NOTE
Patient is awake and alert. Pleasant with interactions.  Participated in therapy sessions and tolerated same well.  Right upper leg wounds redressed with wound care this AM.  Right leg remains red and edematous.  Denies complaints of pain or discomfort.  Family present and supportive at bedside. Call bell in reach. Safety maintained.

## 2025-04-22 NOTE — ASSESSMENT & PLAN NOTE
Baseline creatinine appears to be around 0.8-0.9. Creatinine on admission 1.62, likely prerenal secondary to sepsis   4/15 ultrasound negative for hydronephrosis   Treated with IVF   Most recent creatinine (4/21) 0.79   Continue to monitor BMP

## 2025-04-22 NOTE — PROGRESS NOTES
04/22/25 1000   Pain Assessment   Pain Assessment Tool 0-10   Pain Score No Pain   Restrictions/Precautions   Precautions Bed/chair alarms;Supervision on toilet/commode;Fluid restriction;Fall Risk  (1500 mL FR)   Weight Bearing Restrictions No   ROM Restrictions No   Grooming   Able To Initiate Tasks;Wash/Dry Hands   Limitation Noted In Safety;Strength   Findings S/ CGA standing at the sink to wash hands   Sit to Stand   Type of Assistance Needed Physical assistance   Physical Assistance Level 25% or less   Comment Cues for hand placement   Sit to Stand CARE Score 3   Bed-Chair Transfer   Type of Assistance Needed Physical assistance   Physical Assistance Level 26%-50%   Comment with RW   Chair/Bed-to-Chair Transfer CARE Score 3   Toileting Hygiene   Type of Assistance Needed Physical assistance   Physical Assistance Level 51%-75%   Toileting Hygiene CARE Score 2   Toileting   Able to Pull Clothing down no, up no.   Manage Hygiene Bladder;Bowel  (+ large BM)   Limitations Noted In Balance;Problem Solving;ROM;Safety;LE Strength   Adaptive Equipment Grab Bar   Toilet Transfer   Type of Assistance Needed Physical assistance   Physical Assistance Level 26%-50%   Comment min/ mod A with RW   Toilet Transfer CARE Score 3   Toilet Transfer   Surface Assessed Raised Toilet   Transfer Technique Standard   Limitations Noted In Balance;Endurance;Problem Solving;Safety;ROM;LE Strength   Adaptive Equipment Grab Bar;Walker   Exercise Tools   Other Exercise Tool 1 Sanderbox 2 sets 15 all directions with BUE and no resistance allowing stretch of B shoulders with gravity eliminated   Cognition   Orientation Level Oriented X4   Additional Activities   Additional Activities Other (Comment)   Additional Activities Comments fxl mobility short distances in room with RW and min A   Other Comments   Assessment Pt participates in 30 minutes concurrent treatmetn focusing on BUE therex/ theract with similar goals as another patient to  increase strength/ ROM and activity tolerance   Assessment   Treatment Assessment Pt presents seated in BR toileting and agreeable to OT Session including transfers/ standing/ mobilty and BUE therex/ theract. Pt tolerates session without complaints and is scheduled for completion of session following lunch.   Problem List Decreased strength;Decreased range of motion;Decreased endurance;Impaired balance;Decreased safety awareness;Decreased cognition   Plan   Treatment/Interventions ADL retraining;Functional transfer training;Therapeutic exercise;Endurance training;Patient/family training;Equipment eval/education;Compensatory technique education   Progress Progressing toward goals   OT Therapy Minutes   OT Time In 1000   OT Time Out 1030   OT Total Time (minutes) 30   OT Mode of treatment - Concurrent (minutes) 30   Therapy Time missed   Time missed? No

## 2025-04-22 NOTE — PLAN OF CARE
Problem: PAIN - ADULT  Goal: Verbalizes/displays adequate comfort level or baseline comfort level  Description: Interventions:- Encourage patient to monitor pain and request assistance- Assess pain using appropriate pain scale- Administer analgesics based on type and severity of pain and evaluate response- Implement non-pharmacological measures as appropriate and evaluate response- Consider cultural and social influences on pain and pain management- Notify physician/advanced practitioner if interventions unsuccessful or patient reports new pain  Outcome: Progressing     Problem: INFECTION - ADULT  Goal: Absence or prevention of progression during hospitalization  Description: INTERVENTIONS:- Assess and monitor for signs and symptoms of infection- Monitor lab/diagnostic results- Monitor all insertion sites, i.e. indwelling lines, tubes, and drains- Monitor endotracheal if appropriate and nasal secretions for changes in amount and color- [place] appropriate cooling/warming therapies per order- Administer medications as ordered- Instruct and encourage patient and family to use good hand hygiene technique- Identify and instruct in appropriate isolation precautions for identified infection/condition  Outcome: Progressing     Problem: DISCHARGE PLANNING  Goal: Discharge to home or other facility with appropriate resources  Description: INTERVENTIONS:- Identify barriers to discharge w/patient and caregiver- Arrange for needed discharge resources and transportation as appropriate- Identify discharge learning needs (meds, wound care, etc.)- Arrange for interpretive services to assist at discharge as needed- Refer to Case Management Department for coordinating discharge planning if the patient needs post-hospital services based on physician/advanced practitioner order or complex needs related to functional status, cognitive ability, or social support system  Outcome: Progressing     Problem:  Nutrition/Hydration-ADULT  Goal: Nutrient/Hydration intake appropriate for improving, restoring or maintaining nutritional needs  Description: Monitor and assess patient's nutrition/hydration status for malnutrition. Collaborate with interdisciplinary team and initiate plan and interventions as ordered.  Monitor patient's weight and dietary intake as ordered or per policy. Utilize nutrition screening tool and intervene as necessary. Determine patient's food preferences and provide high-protein, high-caloric foods as appropriate. INTERVENTIONS:- Monitor oral intake, urinary output, labs, and treatment plans- Assess nutrition and hydration status and recommend course of action- Evaluate amount of meals eaten- Assist patient with eating if necessary - Allow adequate time for meals- Recommend/ encourage appropriate diets, oral nutritional supplements, and vitamin/mineral supplements- Order, calculate, and assess calorie counts as needed- Recommend, monitor, and adjust tube feedings and TPN/PPN based on assessed needs- Assess need for intravenous fluids- Provide specific nutrition/hydration education as appropriate- Include patient/family/caregiver in decisions related to nutrition  Outcome: Progressing     Problem: Knowledge Deficit  Goal: Patient/family/caregiver demonstrates understanding of disease process, treatment plan, medications, and discharge instructions  Description: Complete learning assessment and assess knowledge base.Interventions:- Provide teaching at level of understanding- Provide teaching via preferred learning methods  Outcome: Progressing

## 2025-04-22 NOTE — WOUND OSTOMY CARE
Consult Note - Wound   Enrico Chavira 89 y.o. male MRN: [id number]  Unit/Bed#: [facility] 219-01 Encounter: [id number]      History and Present Illness: Patient is seen for wound care consult today . He is a 89 year old male that is admitted to the [facility] for rehabilitation . He was at the [facility] for generalized weakness and a fall . Daughter at the bedside today and said he pulled himself on the floor after the fall . Patient with a PMH of sepsis for the right leg cellulitis , asthma - COPD , GERD  A- fib with rapid ventricular response , hyponatremia , and acute renal failure secondary to dehydration. He is alert and pleasant for the assessment of the skin . Transferred to bed Min A with the walker . Continent of bowel and bladder . EHOB cushion on the chair .Patient and daughter report he went to the wound center for the right lower leg however this was healed .           Assessment Findings:   Bilateral heels dry and intact   Right lower leg - dry and intact healed   Sacral buttocks dry and intact   Right lateral anterior thigh - partial thickness wounds with noted dry peeling skin of the gt wound area . Right leg has edema and redness noted. Moderate serous drainage noted . Gt wound area with dry peeling loose skin .    Right anterior medial thigh - partial thickness wound with small serous drainage and appearance is round as to present as a blister that unroofed . Daughter confirmed this was a blistered area .   Right lower leg and foot dry peeling areas .   Right elbow - partial thickness wound with 90% white biofilm on the wound and 10%  pink edges related to a skin tear . Noted dry scabbed area on the tip of the elbow.   Left 3rd toe - intact dry scabbed area . No drainage and no fluctuance . Daughter reports when patient was clipping his nails the clipper slipped      Discussed importance of elevation of the leg to reduce edema when sitting in the recliner to elevate the legs .  Discussed to offload and weight shifting .     Treatment and assessment of the skin completed with the bedside nurse and aware of the treatment plan .       Skin Care Plan:  1-Right Lateral thigh and right posterior right thigh : Cleanse with VASHE, pat dry. Apply eucerin cream to the dry scaly intact skin then Apply adaptic then the calcium alginate with silver (Melgisorb Ag) to the wound bed, cover with  ABD and wrap with polly wrap or secure with burn mesh to hold in place . [place] with T for treatment, date. Change daily and PRN if soiled/displaced.   2-Turn/reposition q2h to offload and redistribution of the skin .  3-Elevate heels to offload pressure, utilize pillows for offloading float heels while in bed or chair   4-Moisturize skin daily with skin nourishing cream  5-Ehob cushion in chair when out of bed in chair/ Wheelchair  .  6- Apply  Hydraguard to bilateral sacro-buttocks BID and PRN  7-Recommend patient follow up with out patient for the right leg   8. Left third toe dry scab apply 3 M no sting daily   9. Left arm skin tear - flush with Normal saline pat dry apply xeroform then a mepilex change every other day   10. Apply eucerin cream to dry scaly parts if red or draining do not put on the area . Apply to feet and legs as per the MD order.                Wound 12/26/24 Pretibial Right;Anterior (Active)       Wound 02/13/25 Other (comment) Leg Right;Lower (Active)   Wound Image   04/22/25 0914   Wound Description Clean;Dry;Intact 04/22/25 0914   Non-staged Wound Description Not applicable 04/22/25 0914   Wound Length (cm) 0 cm 04/22/25 0914   Wound Width (cm) 0 cm 04/22/25 0914   Wound Depth (cm) 0 cm 04/22/25 0914   Wound Surface Area (cm^2) 0 cm^2 04/22/25 0914   Wound Volume (cm^3) 0 cm^3 04/22/25 0914   Calculated Wound Volume (cm^3) 0 cm^3 04/22/25 0914   Change in Wound Size % 100 04/22/25 0914   Drainage Amount None 04/22/25 0914   Treatments Other (Comment) 04/22/25 0914   Dressing Open to air  04/22/25 0914   Patient Tolerance Tolerated well 04/22/25 0914       Wound 04/15/25 Other (Comments) Thigh Anterior;Right;Lateral (Active)   Wound Image   04/22/25 0911   Wound Description Fragile;Pink;Dry 04/22/25 0911   Non-staged Wound Description Partial thickness 04/22/25 0911   Wound Length (cm) 9 cm 04/22/25 0911   Wound Width (cm) 10 cm 04/22/25 0911   Wound Depth (cm) 0.1 cm 04/22/25 0911   Wound Surface Area (cm^2) 90 cm^2 04/22/25 0911   Wound Volume (cm^3) 9 cm^3 04/22/25 0911   Calculated Wound Volume (cm^3) 9 cm^3 04/22/25 0911   Change in Wound Size % -233.33 04/22/25 0911   Drainage Amount Moderate 04/22/25 0911   Drainage Description Serous 04/22/25 0911   Amrisol-wound Assessment Fragile;Scaly 04/22/25 0911   Treatments Cleansed;Site care 04/22/25 0911   Dressing Non adherent;Calcium Alginate with Silver;ABD 04/22/25 0911   Wound packed? No 04/18/25 1400   Dressing Changed Changed 04/22/25 0911   Patient Tolerance Tolerated well 04/22/25 0911   Dressing Status Clean;Dry;Intact 04/21/25 1930       Wound 04/15/25  Thigh Anterior;Right;Medial (Active)   Wound Image   04/22/25 0909   Wound Description Fragile;Pink;White 04/22/25 0909   Non-staged Wound Description Partial thickness 04/22/25 0909   Wound Length (cm) 3.5 cm 04/22/25 0909   Wound Width (cm) 5 cm 04/22/25 0909   Wound Depth (cm) 0.1 cm 04/22/25 0909   Wound Surface Area (cm^2) 17.5 cm^2 04/22/25 0909   Wound Volume (cm^3) 1.75 cm^3 04/22/25 0909   Calculated Wound Volume (cm^3) 1.75 cm^3 04/22/25 0909   Change in Wound Size % 97.77 04/22/25 0909   Drainage Amount Small 04/22/25 0909   Drainage Description Serous 04/22/25 0909   Marisol-wound Assessment Dry;Intact;Edema;Erythema 04/22/25 0909   Treatments Cleansed;Irrigation with NSS;Site care 04/22/25 0909   Dressing Calcium Alginate with Silver;Non adherent;ABD 04/22/25 0909   Wound packed? No 04/18/25 1400   Dressing Changed Changed 04/22/25 0909   Patient Tolerance Tolerated well 04/22/25  0909   Dressing Status Clean;Dry;Intact 04/21/25 1930       Wound 04/19/25 Other (Comments) Toe D3, third Anterior;Left (Active)   Wound Image   04/22/25 0922   Wound Description Clean;Dry;Intact;Brown 04/22/25 0922   Wound Length (cm) 0.8 cm 04/22/25 0922   Wound Width (cm) 0.5 cm 04/22/25 0922   Wound Depth (cm) 0 cm 04/22/25 0922   Wound Surface Area (cm^2) 0.4 cm^2 04/22/25 0922   Wound Volume (cm^3) 0 cm^3 04/22/25 0922   Calculated Wound Volume (cm^3) 0 cm^3 04/22/25 0922   Drainage Amount None 04/22/25 0922   Marisol-wound Assessment Clean;Dry;Intact 04/22/25 0922   Treatments Other (Comment) 04/22/25 0922   Dressing Open to air 04/22/25 0922   Patient Tolerance Tolerated well 04/22/25 0922       Wound 04/19/25 Traumatic Skin Tear Elbow Posterior;Right (Active)   Wound Image   04/22/25 0923   Wound Description Fragile;White;Pink 04/22/25 0923   Non-staged Wound Description Partial thickness 04/22/25 0923   Wound Length (cm) 1.5 cm 04/22/25 0923   Wound Width (cm) 2 cm 04/22/25 0923   Wound Depth (cm) 0.1 cm 04/22/25 0923   Wound Surface Area (cm^2) 3 cm^2 04/22/25 0923   Wound Volume (cm^3) 0.3 cm^3 04/22/25 0923   Calculated Wound Volume (cm^3) 0.3 cm^3 04/22/25 0923   Drainage Amount Small 04/22/25 0923   Drainage Description Serosanguineous 04/22/25 0923   Marisol-wound Assessment Clean;Dry;Intact 04/22/25 0923   Treatments Cleansed;Irrigation with NSS;Site care 04/22/25 0923   Dressing Xeroform;Foam, Silicon (eg. Allevyn, etc) 04/22/25 0923   Dressing Changed Changed 04/22/25 0923   Patient Tolerance Tolerated well 04/22/25 0923   Dressing Status Clean;Dry;Intact 04/21/25 1930       Wound care will follow weekly secure chat with questions or concerns     Christal MARROQUINN RN CWOCN

## 2025-04-22 NOTE — ASSESSMENT & PLAN NOTE
Follows with cardiologist Dr. Mtz ([facility]), no documented history of a-fib. Patient and daughter also deny a history of dysthymia.   Continue metoprolol 25 mg q12h and chronic warfarin   Continue to monitor vitals and hemodynamic status

## 2025-04-23 PROBLEM — I95.1 ORTHOSTATIC HYPOTENSION: Status: ACTIVE | Noted: 2025-04-23

## 2025-04-23 PROBLEM — Z51.89 ENCOUNTER FOR WOUND CARE: Status: ACTIVE | Noted: 2023-10-23

## 2025-04-23 LAB
INR PPP: 1.78 (ref 0.85–1.19)
PROTHROMBIN TIME: 21.1 SECONDS (ref 12.3–15)

## 2025-04-23 PROCEDURE — 97110 THERAPEUTIC EXERCISES: CPT

## 2025-04-23 PROCEDURE — 85610 PROTHROMBIN TIME: CPT

## 2025-04-23 PROCEDURE — 97110 THERAPEUTIC EXERCISES: CPT | Performed by: PHYSICAL THERAPIST

## 2025-04-23 PROCEDURE — 97530 THERAPEUTIC ACTIVITIES: CPT | Performed by: PHYSICAL THERAPIST

## 2025-04-23 PROCEDURE — 97530 THERAPEUTIC ACTIVITIES: CPT

## 2025-04-23 PROCEDURE — 99232 SBSQ HOSP IP/OBS MODERATE 35: CPT | Performed by: PHYSICAL MEDICINE & REHABILITATION

## 2025-04-23 PROCEDURE — 97116 GAIT TRAINING THERAPY: CPT | Performed by: PHYSICAL THERAPIST

## 2025-04-23 PROCEDURE — 97535 SELF CARE MNGMENT TRAINING: CPT

## 2025-04-23 RX ADMIN — Medication: at 21:01

## 2025-04-23 RX ADMIN — NYSTATIN: 100000 POWDER TOPICAL at 09:29

## 2025-04-23 RX ADMIN — WHITE PETROLATUM 57.7 %-MINERAL OIL 31.9 % EYE OINTMENT: at 21:10

## 2025-04-23 RX ADMIN — POLYETHYLENE GLYCOL 3350 17 G: 17 POWDER, FOR SOLUTION ORAL at 09:24

## 2025-04-23 RX ADMIN — METOPROLOL TARTRATE 25 MG: 25 TABLET, FILM COATED ORAL at 09:26

## 2025-04-23 RX ADMIN — PANTOPRAZOLE SODIUM 20 MG: 20 TABLET, DELAYED RELEASE ORAL at 05:37

## 2025-04-23 RX ADMIN — DORZOLAMIDE HCL 1 DROP: 20 SOLUTION/ DROPS OPHTHALMIC at 21:10

## 2025-04-23 RX ADMIN — NYSTATIN: 100000 POWDER TOPICAL at 21:01

## 2025-04-23 RX ADMIN — CEFADROXIL 1000 MG: 500 CAPSULE ORAL at 09:25

## 2025-04-23 RX ADMIN — BUDESONIDE AND FORMOTEROL FUMARATE DIHYDRATE 2 PUFF: 160; 4.5 AEROSOL RESPIRATORY (INHALATION) at 17:41

## 2025-04-23 RX ADMIN — PREDNISOLONE ACETATE 1 DROP: 10 SUSPENSION/ DROPS OPHTHALMIC at 09:26

## 2025-04-23 RX ADMIN — PREDNISOLONE ACETATE 1 DROP: 10 SUSPENSION/ DROPS OPHTHALMIC at 17:41

## 2025-04-23 RX ADMIN — BUDESONIDE AND FORMOTEROL FUMARATE DIHYDRATE 2 PUFF: 160; 4.5 AEROSOL RESPIRATORY (INHALATION) at 09:25

## 2025-04-23 RX ADMIN — Medication: at 09:31

## 2025-04-23 RX ADMIN — LATANOPROST 1 DROP: 50 SOLUTION OPHTHALMIC at 21:10

## 2025-04-23 RX ADMIN — DORZOLAMIDE HCL 1 DROP: 20 SOLUTION/ DROPS OPHTHALMIC at 09:25

## 2025-04-23 RX ADMIN — GUAIFENESIN 600 MG: 600 TABLET ORAL at 21:06

## 2025-04-23 RX ADMIN — CEFADROXIL 1000 MG: 500 CAPSULE ORAL at 21:06

## 2025-04-23 RX ADMIN — MONTELUKAST 10 MG: 10 TABLET, FILM COATED ORAL at 09:24

## 2025-04-23 RX ADMIN — ACETAMINOPHEN 650 MG: 325 TABLET ORAL at 05:39

## 2025-04-23 RX ADMIN — LIDOCAINE 5% 1 PATCH: 700 PATCH TOPICAL at 09:26

## 2025-04-23 RX ADMIN — METOPROLOL TARTRATE 25 MG: 25 TABLET, FILM COATED ORAL at 21:06

## 2025-04-23 RX ADMIN — FUROSEMIDE 20 MG: 20 TABLET ORAL at 09:24

## 2025-04-23 RX ADMIN — GUAIFENESIN 600 MG: 600 TABLET ORAL at 09:24

## 2025-04-23 RX ADMIN — WARFARIN SODIUM 8 MG: 5 TABLET ORAL at 17:41

## 2025-04-23 RX ADMIN — ACETAMINOPHEN 650 MG: 325 TABLET ORAL at 21:07

## 2025-04-23 NOTE — ASSESSMENT & PLAN NOTE
Follows with cardiologist Dr. Mtz [facility]), no documented history of a-fib. Patient and daughter also deny a history of dysthymia.   Continue metoprolol 25 mg q12h and chronic warfarin   Continue to monitor vitals and hemodynamic status   Per daughter, follow-up scheduled for after discharge

## 2025-04-23 NOTE — ASSESSMENT & PLAN NOTE
HPI:   Patient with a history of venous stasis with ulceration, recently discharged from wound care (2/24/25)   Patient presented to the ED on 4/14 due to concern for RLE infection and worsening weakness   Met sepsis criteria with tachycardia, leukocytosis, and RLE cellulitis   2 g cefriaxone given in ED   CT RLE did not reveal soft tissue gas or fluid collections, but showed subcutaneous edema and skin thickening from the proximal thigh through the ankle consistent with cellulitis  Started on IV cefazolin on 4/15 ---> transitioned to 10 day course of cefadroxil on 4/19   Procalcitonin 15.61-->10.82-->4.70-->2.23-->1.32-->0.71  Blood cultures x 2 no growth after 72 hours  Lower extremity Doppler negative for DVT    Plan:   Continue cefadroxil 1 g q12 hours through 4/24  PT and OT for 3 hours a day, 5-6 days a week   Seriel skin checks and monitoring   Consult wound care RN (evaluated on 4/22)   Encourage leg elevation  Continue local wound care

## 2025-04-23 NOTE — PROGRESS NOTES
"Progress Note - Enrico Chavira 89 y.o. male MRN: [id number]    Unit/Bed#: [facility] 219-01 Encounter: [id number]        Subjective:   Patient seen and examined at bedside after reviewing the chart and discussing the case with the caring staff.      Patient examined at bedside.  Patient had episode of dizziness this morning during PT.   BP does show orthostatic drop in BP.  Symptoms have since resolved.  Patient with occasional cough.  Denies chest pain, shortness of breath, fever/chills.     INR 1.78 on 4/23.  Patient is on Coumadin 8 mg daily    Physical Exam   Vitals: Blood pressure 165/79, pulse 97, temperature 98.1 °F (36.7 °C), temperature source Temporal, resp. rate 18, height 5' 7\" (1.702 m), weight 83.9 kg (184 lb 15.5 oz), SpO2 96%.,Body mass index is 28.97 kg/m².  Constitutional: He appears well-developed.   HEENT: PERR, EOMI, MMM  Cardiovascular: Normal rate and regular rhythm.    Pulmonary/Chest: Effort normal and breath sounds normal.   Abdomen: Soft, + BS, NT    Assessment/Plan:  Enrico Chavira is a(n) 89 y.o. male with debility due to sepsis.      Cardiac hx with HTN, new onset A-fib.  Patient is on Lasix 20 mg daily and Lopressor 25 mg q12h (new).  Of note, pt was in rapid a-fib while in ER, converted to normal sinus rhythm s/p Cardizem drip.  Home amlodipine on hold since starting Lopressor for a-fib.  Cont to monitor vitals.  Hx of PE.  Home: warfarin 7.5mg daily.  Here: warfarin 8 mg daily (incr from 7.5 on 4/22).  Initially on hold due to supratherapeutic INR and restarted on 4/18 at 2 mg.  Goal 2-3.    Asthma-COPD overlap syndrome.  Continue Symbicort inhaler, Singulair 10 mg daily, Mucinex 600mg q12h, albuterol neb as needed.  Pt on Breo Ellipta at home however prefers Symbicort.   GERD.  Patient is on Protonix 20 mg daily (Prilosec nonform).   Glaucoma.  Continue home prednisolone, Xalatan, Trusopt eye drops.  Artifical tear drops as needed.  Anemia.  Hgb 10.3 -> 10.4 on 4/21/2025..  No " s/sx active bleeding.  Cont to trend.   SONYA.  Resolved.  Baseline Cr 0.8-0.9.  Cr 0.76 -> 0.79 on 4/21/2025.  Peak Cr 1.62 on 4/14.  Cont to monitor.  Chronic hyponatremia.  Level 126 on 4/14 -> 131 -> 130 on 4/21/2025.  Fluid restriction 2L (decr from 1500 mL 4/23).  Cont to monitor.   Sepsis secondary to cellulitis of right lower extremity.  Pt treated with IV cefazolin and transitioned to Duricef 1000mg q12h to complete 10-day course of antibiotics per ID recommendations (thru 4/25).  Pain and bowel regimen.  As per physiatry.    Debility due to sepsis.  Patient is receiving intensive PT OT with management as per physiatry.      Anticipated date of discharge.  5/3.    The patient was discussed with Dr. Gamez and he is in agreement with the above note.

## 2025-04-23 NOTE — PLAN OF CARE
Problem: PAIN - ADULT  Goal: Verbalizes/displays adequate comfort level or baseline comfort level  Description: Interventions:- Encourage patient to monitor pain and request assistance- Assess pain using appropriate pain scale- Administer analgesics based on type and severity of pain and evaluate response- Implement non-pharmacological measures as appropriate and evaluate response- Consider cultural and social influences on pain and pain management- Notify physician/advanced practitioner if interventions unsuccessful or patient reports new pain  Outcome: Progressing     Problem: INFECTION - ADULT  Goal: Absence or prevention of progression during hospitalization  Description: INTERVENTIONS:- Assess and monitor for signs and symptoms of infection- Monitor lab/diagnostic results- Monitor all insertion sites, i.e. indwelling lines, tubes, and drains- Monitor endotracheal if appropriate and nasal secretions for changes in amount and color- [place] appropriate cooling/warming therapies per order- Administer medications as ordered- Instruct and encourage patient and family to use good hand hygiene technique- Identify and instruct in appropriate isolation precautions for identified infection/condition  Outcome: Progressing     Problem: SAFETY ADULT  Goal: Patient will remain free of falls  Description: INTERVENTIONS:- Educate patient/family on patient safety including physical limitations- Instruct patient to call for assistance with activity - Consult OT/PT to assist with strengthening/mobility - Keep Call bell within reach- Keep bed low and locked with side rails adjusted as appropriate- Keep care items and personal belongings within reach- Initiate and maintain comfort rounds- Make Fall Risk Sign visible to staff- Offer Toileting every 2 Hours, in advance of need- Initiate/Maintain bed/chair alarm- Apply yellow socks and bracelet for high fall risk patients- Consider moving patient to room near nurses  station  Outcome: Progressing     Problem: Nutrition/Hydration-ADULT  Goal: Nutrient/Hydration intake appropriate for improving, restoring or maintaining nutritional needs  Description: Monitor and assess patient's nutrition/hydration status for malnutrition. Collaborate with interdisciplinary team and initiate plan and interventions as ordered.  Monitor patient's weight and dietary intake as ordered or per policy. Utilize nutrition screening tool and intervene as necessary. Determine patient's food preferences and provide high-protein, high-caloric foods as appropriate. INTERVENTIONS:- Monitor oral intake, urinary output, labs, and treatment plans- Assess nutrition and hydration status and recommend course of action- Evaluate amount of meals eaten- Assist patient with eating if necessary - Allow adequate time for meals- Recommend/ encourage appropriate diets, oral nutritional supplements, and vitamin/mineral supplements- Order, calculate, and assess calorie counts as needed- Recommend, monitor, and adjust tube feedings and TPN/PPN based on assessed needs- Assess need for intravenous fluids- Provide specific nutrition/hydration education as appropriate- Include patient/family/caregiver in decisions related to nutrition  Outcome: Progressing     Problem: Prexisting or High Potential for Compromised Skin Integrity  Goal: Skin integrity is maintained or improved  Description: INTERVENTIONS:- Identify patients at risk for skin breakdown- Assess and monitor skin integrity- Assess and monitor nutrition and hydration status- Monitor labs - Assess for incontinence - Turn and reposition patient- Assist with mobility/ambulation- Relieve pressure over bony prominences- Avoid friction and shearing- Provide appropriate hygiene as needed including keeping skin clean and dry- Evaluate need for skin moisturizer/barrier cream- Collaborate with interdisciplinary team - Patient/family teaching- Consider wound care consult   Outcome:  Progressing

## 2025-04-23 NOTE — PLAN OF CARE
Problem: Prexisting or High Potential for Compromised Skin Integrity  Goal: Skin integrity is maintained or improved  Description: INTERVENTIONS:- Identify patients at risk for skin breakdown- Assess and monitor skin integrity- Assess and monitor nutrition and hydration status- Monitor labs - Assess for incontinence - Turn and reposition patient- Assist with mobility/ambulation- Relieve pressure over bony prominences- Avoid friction and shearing- Provide appropriate hygiene as needed including keeping skin clean and dry- Evaluate need for skin moisturizer/barrier cream- Collaborate with interdisciplinary team - Patient/family teaching- Consider wound care consult   Outcome: Progressing     Problem: MOBILITY - ADULT  Goal: Maintain or return to baseline ADL function  Description: INTERVENTIONS:-  Assess patient's ability to carry out ADLs; assess patient's baseline for ADL function and identify physical deficits which impact ability to perform ADLs (bathing, care of mouth/teeth, toileting, grooming, dressing, etc.)- Assess/evaluate cause of self-care deficits - Assess range of motion- Assess patient's mobility; develop plan if impaired- Assess patient's need for assistive devices and provide as appropriate- Encourage maximum independence but intervene and supervise when necessary- Involve family in performance of ADLs- Assess for home care needs following discharge - Consider OT consult to assist with ADL evaluation and planning for discharge- Provide patient education as appropriate  Outcome: Progressing

## 2025-04-23 NOTE — ASSESSMENT & PLAN NOTE
-4/23: episode of othostatic hypotention during PT (165/79 ---> 113/65), resolved after sitting  -FR advanced to 2L daily

## 2025-04-23 NOTE — ASSESSMENT & PLAN NOTE
Most recently 130 on 4/21 (126 in ED)   Nephrology managed in acute care   Na 124 on 11/11/24; suspect chronicity  2000 mL FR daily   Continue to monitor BMP   Repeat BMP 4/25

## 2025-04-23 NOTE — PROGRESS NOTES
04/23/25 1330   Pain Assessment   Pain Assessment Tool 0-10   Pain Score No Pain   Restrictions/Precautions   Precautions Bed/chair alarms;Fall Risk;Fluid restriction;Supervision on toilet/commode  (1500 mL FR)   Weight Bearing Restrictions No   ROM Restrictions No   Grooming   Findings stood at sink to wash hands with supervision   Sit to Stand   Type of Assistance Needed Incidental touching   Comment CG   Sit to Stand CARE Score 4   Bed-Chair Transfer   Type of Assistance Needed Incidental touching   Comment CG w/c>recliner   Chair/Bed-to-Chair Transfer CARE Score 4   Transfer Bed/Chair/Wheelchair   Limitations Noted In Balance;Endurance;LE Strength;UE Strength   Toilet Transfer   Type of Assistance Needed Physical assistance   Physical Assistance Level 25% or less   Comment assist to smooth back of pants when pulled up   Toilet Transfer CARE Score 3   Functional Standing Tolerance   Time 4m15s, 3m10s   Activity popsicle stick activity   Comments stood with unilateral support at table with LUE and then completed activity with RUE, reported bilat LE fatigue and requested seated break during activity   Exercise Tools   Other Exercise Tool 1 Yuepu Sifang x30-50 all planes of motion using bilat UE   Other Exercise Tool 2 UE strengthening in bilat UE using 1# or 2# DB, 2x20: elbow flexion/extension, protraction/retraction, internal/external rotation, pronation/supination   Cognition   Overall Cognitive Status WFL   Arousal/Participation Alert;Responsive;Cooperative   Attention Within functional limits   Orientation Level Oriented X4   Memory Within functional limits   Following Commands Follows one step commands without difficulty   Assessment   Treatment Assessment Pt agreeable to OT session this PM. Received  on toilet . Pt completed toileting with CG/supervision. Pt completed UE ROM/strength and standing/activity tolerance activities; see respective sections for details. Pt reported no pain during session, did  report occasional fatigue during session and required rest breaks as a result. Sit>stand and SPT transfers during session completed with CG. Pt's daughter present during session and provided good encouragement and support. Pt's barriers to d/c include decreased strength t/o but especially RLE s/p cellulitis, decreased ROM, decreased balance, decreased activity tolerance, and decreased safety awareness; all affect independence in self care and fxl transfers. Pt would benefit from continued skilled OT services in order to address listed barriers and prepare for safe d/c.   Prognosis Good   Problem List Decreased strength;Decreased range of motion;Decreased endurance;Impaired balance;Decreased safety awareness   Plan   Treatment/Interventions ADL retraining;Functional transfer training;LE strengthening/ROM;Therapeutic exercise;Endurance training;Equipment eval/education;Patient/family training;Compensatory technique education;OT   Progress Progressing toward goals   OT Therapy Minutes   OT Time In 1330   OT Time Out 1430   OT Total Time (minutes) 60   OT Mode of treatment - Individual (minutes) 17   OT Mode of treatment - Concurrent (minutes) 43

## 2025-04-23 NOTE — ASSESSMENT & PLAN NOTE
Assessment Findings:   Bilateral heels dry and intact   Right lower leg - dry and intact healed   Sacral buttocks dry and intact   Right lateral anterior thigh - partial thickness wounds with noted dry peeling skin of the gt wound area . Right leg has edema and redness noted. Moderate serous drainage noted . Gt wound area with dry peeling loose skin .    Right anterior medial thigh - partial thickness wound with small serous drainage and appearance is round as to present as a blister that unroofed . Daughter confirmed this was a blistered area .   Right lower leg and foot dry peeling areas .   Right elbow - partial thickness wound with 90% white biofilm on the wound and 10%  pink edges related to a skin tear . Noted dry scabbed area on the tip of the elbow.   Left 3rd toe - intact dry scabbed area . No drainage and no fluctuance . Daughter reports when patient was clipping his nails the clipper slipped

## 2025-04-23 NOTE — NURSING NOTE
Patient bathed this morning.  Clean clothes on.  Wound care completed to R thigh and lower leg.  Nystatin to rash in groin.  Eucerine cream to BL legs after washing/drying.  Patient repositioned self throughout the night.  Able to roll completely side to side and to sit up to dress upper body.  Administered acetaminophen 650mg PO for 8/10 chronic L back pain.  Patient resting in bed SCD to LLE, call bell at side, bed in low locked position and bed alarm on.

## 2025-04-23 NOTE — PROGRESS NOTES
Progress Note - PMR   Name: Enrico Chavira 89 y.o. male I MRN: [id number]  Unit/Bed#: -01 I Date of Admission: 4/19/2025   Date of Service: 4/23/2025 I Hospital Day: 4     Assessment & Plan  Cellulitis of right lower extremity  HPI:   Patient with a history of venous stasis with ulceration, recently discharged from wound care (2/24/25)   Patient presented to the ED on 4/14 due to concern for RLE infection and worsening weakness   Met sepsis criteria with tachycardia, leukocytosis, and RLE cellulitis   2 g cefriaxone given in ED   CT RLE did not reveal soft tissue gas or fluid collections, but showed subcutaneous edema and skin thickening from the proximal thigh through the ankle consistent with cellulitis  Started on IV cefazolin on 4/15 ---> transitioned to 10 day course of cefadroxil on 4/19   Procalcitonin 15.61-->10.82-->4.70-->2.23-->1.32-->0.71  Blood cultures x 2 no growth after 72 hours  Lower extremity Doppler negative for DVT    Plan:   Continue cefadroxil 1 g q12 hours through 4/24  PT and OT for 3 hours a day, 5-6 days a week   Seriel skin checks and monitoring   Consult wound care RN (evaluated on 4/22)   Encourage leg elevation  Continue local wound care   Hypertension  Management at discretion of IM   Continue furosemide and metoprolol   Asthma-COPD overlap syndrome (HCC)  Managed at discretion of IM   Continue Symbicort and PRN albuterol   Chest x-ray 4/17: no acute pulmonary pathology  Factor V Leiden (HCC)  Diagnosed after unprovoked PE in 2018   Continue chronic warfarin (INR recently supratherapeutic in acute care)   Daily INRs while inpatient   Hyponatremia  Most recently 130 on 4/21 (126 in ED)   Nephrology managed in acute care   Na 124 on 11/11/24; suspect chronicity  2000 mL FR daily   Continue to monitor BMP   Repeat BMP 4/25   Atrial fibrillation (HCC)  Follows with cardiologist Dr. Mtz ([facility]), no documented history of a-fib. Patient and daughter also deny a history of  "dysthymia.   Continue metoprolol 25 mg q12h and chronic warfarin   Continue to monitor vitals and hemodynamic status   Per daughter, follow-up scheduled for after discharge     SONYA (acute kidney injury) (HCC)  Baseline creatinine appears to be around 0.8-0.9. Creatinine on admission 1.62, likely prerenal secondary to sepsis   4/15 ultrasound negative for hydronephrosis   Treated with IVF   Most recent creatinine (4/21) 0.79   Continue to monitor BMP  Fall  Patient reports only 1 fall in the past year. This fall occurred a few days before his admission. He describes trying to climb into bed, bt being too weak to lift his legs and subsequently falling. Daughter is not aware of any other falls beside this one.   PT and OT and fall precautions     Abnormal CT of the chest  4/18/25 CT: \"3.7 cm anterior right upper lobe solid mass with septated cystic component and calcification. 2.6 cm lateral right upper lobe opacity and 7 mm right lower lobe nodule. These may be infectious/inflammatory but malignancy cannot be excluded. 2.3 x 1.3 cm low-attenuation nodule in the mediastinum adjacent to the hiatal hernia, question benign or malignant lymph node.\"  Per daughter, patient was aware of nodules but RUL mass new. Patient does not want to follow-up on either.   Monitor vitals, oxygen, and potential malignancy related complications   Impaired mobility and ADLs  Patient was evaluated by the rehabilitation team MD and an appropriate candidate for acute inpatient rehabilitation program at this time.  The patient will tolerate 3 hours/day 5 to 7 days/week of intensive physical and  occupational therapy   in order to obtain goals for community discharge  Due to the patient's functional Compared to their baseline level of function in addition to their ongoing medical needs, the patient would benefit from daily supervision from a rehabilitation physician as well as rehabilitation nursing to implement and adjust the medical as well as " functional plan of care in order to meet the patient's goals.  Bladder: Monitor closely for urinary incontinence and/or retention. Monitor urine output and encourage spontaneous voids. If unable to void spontaneously, will monitor with PVR bladder scans and initiate CIC regimen.  Skin: Monitor for breakdown, frequent turns, pressure offloading  Sleep: Encourage sleep hygiene (routine that promotes healthy circadian rhythm,avoid caffeine later in the day, quiet/dark room at night, reduce electronic before bedtime)  Patient lives alone, previously independent. He has 10 children, many of  whom will be able to assist after discharge.   Anticipated Discharge Date:  Home with family support Saturday, May 3rd, 2025   HHOT/ HHPT/ nursing and DME which is in place.       Chronic left-sided low back pain  Pain levels tolerable, patient relates this pain to soreness from laying   First documented by PCP in 2020  Continue to monitor   PRN tylenol and Lidoderm   Anemia  First noted on 4/15, may be dilutional   Most recent hgb 10.4, continue to monitor CBC  History of sepsis  Possible severe sepsis given Cr increased >0.5 mg/dL from baseline and other criteria met   - INR jumped significantly as well   - Risk for post-sepsis syndrome  - Abx for per ID  - Optimal management of each as listed   - Optimal nutrition, hydration, and medical management  - Monitor vitals closely with and without activity  - Fall precautions   - Recommend acute comprehensive interdisciplinary inpatient rehabilitation to include intensive skilled therapies (PT, OT) as outlined with oversight and management by rehabilitation physician as well as inpatient rehab level nursing, case management and weekly interdisciplinary team meetings.     Potential for cognitive impairment  - Recommend MOCA cog screen in rehab later in course when more stable   - Recommend eval of med mgmt and IADLs   - Monitor neuro-exam, wakefulness, mood, cognition, insight into  "deficits and safety awareness  - Recommend patient (+/- family/caregiver education if applicable) and training as well as compensatory strategies to optimize safety, function, and decrease risk of complications  - Ensure optimal assistance/supervision after d/c   - Optimize sleep-wake cycle  - Limit sedating medications when possible  - Monitor and ensure optimal management electrolytes, nutrition, and hydration  - Monitor for signs or symptoms of infection, medication intolerances, other systemic etiologies  - Fall precautions with frequent rounding; proactive toileting program, patient should not be unattended in bathroom  - If concerns for safety in spite of frequent rounding consider virtual or in person sitter   - OP follow-up PCP and if needed additional specialists (ie neuro/zonia/pmr)  -4/21: \"MOCA as ordered completed during session scoring 19/30 with delayed recall and language most impaired.\"  Degenerative lumbar spinal stenosis  CT L spine - Diffuse severe thoracolumbar degenerative change with apex left lumbar scoliosis. A posteriorly directed vertebral body osteophyte on the right at L1-L2 results in moderate central canal narrowing  - Monitor neuro exam, b/b function, and pain  - Reports chronic pain about stable   Bilateral arm weakness  Reportedly started about 1-2 years ago and has been progressive  - B/L SH 3-/5, R EF 5, EE 4, WE 3 FF 3, L EF 5- We 2 EE 2; +Cuevas on L; brisk knee reflexes, strength fairly intact BLE   - Risk of cervical stenosis with compression and multilevel radiculopathy; hx of very severe deg lumbar stenosis  - Risk of RTC tears or combination - negative Marbella Butler  - Impacts ADL and function; some reports of worsening balance  - Per Dr. Goodwin, \"Discussed potential dx's with pt/family and potential work-up - they would not want sx at this point no matter what the cause but would like to know potential cause for weakness which could help with prognosis and rehab planning; " "apparently has metal in eye and cannot have MRI; they want to try to avoid contrast with slight risk of kidney toxicity\"  4/21: I discussed obtaining CT scan with patient and his daughter Lora, they stated that they would like to forgo CT scan as patient does would not want intervention regardless of diagnosis. He will continue to work with PT and OT for strengthening    - Monitor neuro exam, balance, symptoms closely   Chest wall asymmetry  L superior periscapular chest palpable and observable nontender region apparently there for a few year  - CT Chest did not mention concerns  - Recommend follow-up mgmt by PCP unless additional work-up needed in ARC at discretion of IM  Orthostatic hypotension  -4/23: episode of othostatic hypotention during PT (165/79 ---> 113/65), resolved after sitting  -FR advanced to 2L daily   Encounter for wound care  Assessment Findings:   Bilateral heels dry and intact   Right lower leg - dry and intact healed   Sacral buttocks dry and intact   Right lateral anterior thigh - partial thickness wounds with noted dry peeling skin of the gt wound area . Right leg has edema and redness noted. Moderate serous drainage noted . Gt wound area with dry peeling loose skin .    Right anterior medial thigh - partial thickness wound with small serous drainage and appearance is round as to present as a blister that unroofed . Daughter confirmed this was a blistered area .   Right lower leg and foot dry peeling areas .   Right elbow - partial thickness wound with 90% white biofilm on the wound and 10%  pink edges related to a skin tear . Noted dry scabbed area on the tip of the elbow.   Left 3rd toe - intact dry scabbed area . No drainage and no fluctuance . Daughter reports when patient was clipping his nails the clipper slipped      Subjective   Patient is a 89 year-old male, with a past medical history of lower extremity venous stasis with ulceration of his right lower extremity, hypertension, " asthma-COPD, factor V leiden on warfarin, presenting to ARC secondary to debility to recent sepsis. He initially presented to the ED on 4/14 due to RLE cellulitis and worsening weakness. He was noted to be in rapid a-fib with RVR in the ED and met sepsis criteria with tachycardia and leukocytosis. Cardizem infusion started in the ED and he converted back to NSR. Labs were significant for leukocytosis, elevated procalcitonin, hyponatremia and SONYA. CT of the RLE did not reveal soft tissue gas or fluid collections, but showed subcutaneous edema and skin thickening from the proximal thigh through the ankle consistent with cellulitis. He was subsequently started on IV cefazolin. Blood cultures negative after 72 hours. Renal and ID recommendations were appreciated. Per ID, lower extremity skin dry and cracked, which is a likely port of entry. He clinically improved with stabilization of lab work. To complete a 10-day course of antibiotics, he was transitioned to a 6-day course of cefadroxil upon discharge. He was evaluated by PT and OT and deemed an appropriate candidate for ARC due to functional deficits.     Chief Complaint: increased weakness    Interval:  Seen and evaluated patient in room. Daughter Lora present. He does not have any acute concerns, lightheadedness resolved. Last BM 4/23, continent of bowel and bladde     Objective :  Temp:  [98.1 °F (36.7 °C)] 98.1 °F (36.7 °C)  HR:  [80-97] 97  BP: (163-165)/(79-82) 165/79  Resp:  [16-18] 18  SpO2:  [96 %-99 %] 96 %  O2 Device: None (Room air)    Functional Update:  Physical Therapy Occupational Therapy   Weight Bearing Status: Weight Bearing as Tolerated  Transfers: Moderate Assistance  Bed Mobility: Minimal Assistance  Amulation Distance (ft): 15 feet  Ambulation: Total Assistance (min A, RW, wheelchair follow)  Assistive Device for Ambulation: Roller Walker  Wheelchair Mobility Distance: 120 ft  Wheelchair Mobility: Incidental Touching  Number of Stairs:  2  Assistive Device for Stairs: Bilateral Hand Rails  Stair Assistance: Moderate Assistance  Ramp:  (NT at this time)  Discharge Recommendations: Home with:  DC Home with:: Family Support, Home Physical Therapy   Eating: Supervision  Grooming: Supervision  Bathing: Minimal Assistance, Moderate Assistance  Bathing: Minimal Assistance, Moderate Assistance  Upper Body Dressing: Minimal Assistance  Lower Body Dressing: Maximum Assistance  Toileting: Moderate Assistance  Tub/Shower Transfer:  (TBA)  Toilet Transfer: Minimal Assistance, Moderate Assistance  Cognition: Within Defined Limits  Orientation: Person, Place, Time, Situation           Physical Exam  Vitals and nursing note reviewed.   Constitutional:       General: He is not in acute distress.     Appearance: He is not ill-appearing, toxic-appearing or diaphoretic.   HENT:      Head: Normocephalic and atraumatic.      Nose: Nose normal. No congestion.      Mouth/Throat:      Mouth: Mucous membranes are moist.   Pulmonary:      Effort: Pulmonary effort is normal. No respiratory distress.   Abdominal:      General: There is no distension.   Musculoskeletal:      Right lower leg: Edema present.   Skin:     General: Skin is warm and dry.   Neurological:      General: No focal deficit present.      Mental Status: He is alert.   Psychiatric:         Mood and Affect: Mood normal.         Behavior: Behavior normal.           Scheduled Meds:  Current Facility-Administered Medications   Medication Dose Route Frequency Provider Last Rate    acetaminophen  650 mg Oral Q6H PRN Nicolasa L Dagnall, PA-C      albuterol  2.5 mg Nebulization Q6H PRN Nicolasa L Dagnall, PA-C      artificial tear   Both Eyes HS Nicolasa L Dagnall, PA-C      Artificial Tears  1 drop Both Eyes 4x Daily PRN Nicolasa L Dagnall, PA-C      budesonide-formoterol  2 puff Inhalation BID Nicolasa L Dagnall, PA-C      cefadroxil  1,000 mg Oral Q12H CLARK Nicolasa L Dagnall, PA-C      dorzolamide  1 drop Left Eye BID  Nicolasa L Dagnall, PA-C      furosemide  20 mg Oral Daily Nicolasa L Dagnall, PA-C      guaiFENesin  600 mg Oral Q12H CLARK Nicolasa L Dagnall, PA-C      latanoprost  1 drop Both Eyes HS Nicolasa L Dagnall, PA-C      lidocaine  1 patch Topical Daily Nicolasa L Dagnall, PA-C      metoprolol tartrate  25 mg Oral Q12H CLARK Nicolasa L Dagnall, PA-C      montelukast  10 mg Oral Daily Nicolasa L Dagnall, PA-C      nystatin   Topical BID Listeh Owusu PA-C      pantoprazole  20 mg Oral Early Morning Nicolasa L Dagnall, PA-C      polyethylene glycol  17 g Oral Daily Liseth Owusu PA-C      prednisoLONE acetate  1 drop Left Eye BID Nicolasa L Dagnall, PA-C      warfarin  8 mg Oral Daily (warfarin) Jose Gamez MD      white petrolatum-mineral oil   Topical BID Ta Goodwin MD           Lab Results: I have reviewed the following results:  Results from last 7 days   Lab Units 04/20/25  0507 04/19/25  0457 04/18/25  0446   HEMOGLOBIN g/dL 10.4* 10.3* 10.2*   HEMATOCRIT % 32.1* 31.2* 30.6*   WBC Thousand/uL 8.29 11.10* 11.06*   PLATELETS Thousands/uL 452* 389 365     Results from last 7 days   Lab Units 04/21/25  0507 04/20/25  0507 04/19/25  0457   BUN mg/dL 30* 28* 24   SODIUM mmol/L 130* 129* 131*   POTASSIUM mmol/L 4.3 4.6 4.4   CHLORIDE mmol/L 98 99 99   CREATININE mg/dL 0.79 0.69 0.76   AST U/L  --  34  --    ALT U/L  --  14  --        Results from last 7 days   Lab Units 04/23/25  0509 04/22/25  0612 04/21/25  0507   PROTIME seconds 21.1* 18.8* 17.9*   INR  1.78* 1.53* 1.43*      Liseth Owusu PA-C  Physical Medicine and Rehabilitation  [facility]

## 2025-04-23 NOTE — PROGRESS NOTES
04/23/25 0700   Pain Assessment   Pain Assessment Tool 0-10   Pain Score No Pain   Restrictions/Precautions   Precautions Bed/chair alarms;Fluid restriction;Supervision on toilet/commode;Fall Risk  (1500 mL FR)   Weight Bearing Restrictions No   ROM Restrictions No   Cognition   Arousal/Participation Alert;Responsive;Cooperative   Attention Within functional limits   Following Commands Follows one step commands without difficulty   Subjective   Subjective Pt reports that he took some tylenol so he has no pain   Roll Left and Right   Type of Assistance Needed Independent   Physical Assistance Level No physical assistance   Roll Left and Right CARE Score 6   Lying to Sitting on Side of Bed   Type of Assistance Needed Physical assistance   Physical Assistance Level 25% or less   Comment bed rails   Lying to Sitting on Side of Bed CARE Score 3   Sit to Stand   Type of Assistance Needed Physical assistance   Physical Assistance Level 25% or less   Comment VC hand placement and sequencing   Sit to Stand CARE Score 3   Bed-Chair Transfer   Type of Assistance Needed Physical assistance   Physical Assistance Level 25% or less   Comment RW   Chair/Bed-to-Chair Transfer CARE Score 3   Car Transfer   Reason if not Attempted Environmental limitations   Car Transfer CARE Score 10   Walk 10 Feet   Type of Assistance Needed Physical assistance   Physical Assistance Level Total assistance   Comment RW, Min A, w/c follow   Walk 10 Feet CARE Score 1   Walk 50 Feet with Two Turns   Reason if not Attempted Safety concerns   Walk 50 Feet with Two Turns CARE Score 88   Walk 150 Feet   Reason if not Attempted Safety concerns   Walk 150 Feet CARE Score 88   Walking 10 Feet on Uneven Surfaces   Type of Assistance Needed Physical assistance   Physical Assistance Level Total assistance   Comment RW, Min A, w/c follow, ramp   Walking 10 Feet on Uneven Surfaces CARE Score 1   Ambulation   Primary Mode of Locomotion Prior to Admission Walk    Distance Walked (feet) 22 ft  (22, 10)   Assist Device Roller Walker   Gait Pattern Inconsistant Wendi;Slow Wendi;Decreased foot clearance;Shuffle;Improper weight shift   Does the patient walk? 2. Yes   Wheel 50 Feet with Two Turns   Type of Assistance Needed Supervision   Physical Assistance Level No physical assistance   Wheel 50 Feet with Two Turns CARE Score 4   Wheel 150 Feet   Type of Assistance Needed Supervision   Physical Assistance Level No physical assistance   Wheel 150 Feet CARE Score 4   Wheelchair mobility   Method Right upper extremity;Left upper extremity   Assistance Provided For Locking Brakes;Obstacles;Remove Leg Rest;Replace Leg Rest   Distance Level Surface (feet) 225 ft   Type of Wheelchair Used 1. Manual   Curb or Single Stair   Style negotiated Single stair   Type of Assistance Needed Physical assistance   Physical Assistance Level 25% or less   Comment Min A, b/l HR   1 Step (Curb) CARE Score 3   4 Steps   Type of Assistance Needed Physical assistance   Physical Assistance Level 25% or less   Comment Min A, b/l HR   4 Steps CARE Score 3   12 Steps   Reason if not Attempted Activity not applicable   12 Steps CARE Score 9   Stairs   Type Stairs   # of Steps 6   Weight Bearing Precautions Fall Risk   Assist Devices Bilateral Rail   Toilet Transfer   Type of Assistance Needed Physical assistance   Physical Assistance Level 26%-50%   Comment min/ mod A with RW and grab bars   Toilet Transfer CARE Score 3   Toilet Transfer   Findings bowel movement, RN notified   Therapeutic Interventions   Strengthening Supine LE TE and seated LE TE   Balance transfer training, ramp   Other bed mobility, gait training, stair training, w/c propelling   Assessment   Treatment Assessment Pt presents supine in bed, agreeable to PT. Pt reported that he currently has no pain. Pt reported that he felt that he was improving and all movements were getting easier. Began session with supine LE TE. Pt completed bed  "mobiltiy with Min A and transfered OOB with Min A, followed by a Min A walk 20ft from bed to w/c in hallway. Pt vitals taken immeditly following walk- 94% SpO2, HR- 101 and BP- 165/79. Pt completed w/c propelling in order to work on endurance and showed improved endurance as seen through increased distance. Pt ascended and descended backwards three steps x 2 with no rest with b/l HR and Min A. Pt displayed left hip abductor weakness during negotiation of stairs. Towards end of session pt reported that he felt \"woozy\". Vitals taken in seated position SpO2- 97%, HR- 88 and BP- 113/65. Vitals then taken in standing SpO2- 95%, HR-101 and /79. Pt reported that he felt better after a few minuets. Pt ascended ramp with RW and Mod A, seated in w/c to descend ramp. Poor tolerance to negotiation of ramp.  Pt required toileting at conclusion of session, had bowel movement and RN notfied. Pt returned to room seated in recliner set up for breakfast. Cont. POC and progress as able.   Problem List Decreased strength;Decreased range of motion;Decreased endurance;Impaired balance;Decreased safety awareness;Decreased cognition   PT Barriers   Physical Impairment Decreased strength;Decreased endurance;Decreased mobility;Impaired balance;Decreased range of motion;Decreased coordination   Functional Limitation Car transfers;Ramp negotiation;Stair negotiation;Standing;Transfers;Walking   Plan   Treatment/Interventions Functional transfer training;LE strengthening/ROM;Elevations;Therapeutic exercise;Endurance training;Cognitive reorientation;Patient/family training;Equipment eval/education;Bed mobility;Gait training   Progress Progressing toward goals   PT Therapy Minutes   PT Time In 0700   PT Time Out 0830   PT Total Time (minutes) 90   PT Mode of treatment - Individual (minutes) 90   PT Mode of treatment - Concurrent (minutes) 0   PT Mode of treatment - Group (minutes) 0   PT Mode of treatment - Co-treat (minutes) 0   PT Mode of " Treatment - Total time(minutes) 90 minutes   PT Cumulative Minutes 340   Supine LE TE:  3x10, B/L LEs  SLR - flexion  GS  QS  APs  SAQ  Hip ABd - hooklying with green t-band  Hip ADd - julissa  Heel slides     Seated LE TE:  3x10, B/L LEs, 1#  March  LAQ  Heel slides    Patient remains OOB in chair, all needs in reach.  Alarm in place and activated.  Encouraged use of call bell, patient verbalizes understanding.

## 2025-04-23 NOTE — PROGRESS NOTES
04/23/25 1130   Pain Assessment   Pain Assessment Tool 0-10   Pain Score No Pain   Restrictions/Precautions   Precautions Bed/chair alarms;Fall Risk;Fluid restriction;Supervision on toilet/commode  (1500 ml FR)   Weight Bearing Restrictions No   ROM Restrictions No   Eating   Type of Assistance Needed Set-up / clean-up   Physical Assistance Level No physical assistance   Eating CARE Score 5   Grooming   Findings stood at sink to wash hands with supervision   Sit to Stand   Type of Assistance Needed Incidental touching   Comment CG with RW   Sit to Stand CARE Score 4   Bed-Chair Transfer   Type of Assistance Needed Incidental touching   Comment CG w/c>recliner   Chair/Bed-to-Chair Transfer CARE Score 4   Transfer Bed/Chair/Wheelchair   Limitations Noted In Balance;Endurance;UE Strength;LE Strength   Adaptive Equipment Roller Walker   Toileting Hygiene   Type of Assistance Needed Physical assistance   Physical Assistance Level 25% or less   Comment assist to adjust pants over buttocks   Toileting Hygiene CARE Score 3   Toileting   Able to Pull Clothing down yes, up yes.  (incidental assist from OT)   Manage Hygiene Bladder   Limitations Noted In Balance;ROM;Safety;LE Strength   Adaptive Equipment Grab Bar   Toilet Transfer   Type of Assistance Needed Physical assistance   Physical Assistance Level 25% or less   Toilet Transfer CARE Score 3   Toilet Transfer   Surface Assessed Raised Toilet   Transfer Technique Standard   Limitations Noted In Balance;Endurance;ROM;Safety;LE Strength;UE Strength   Adaptive Equipment Grab Bar;Walker   Functional Standing Tolerance   Time 3m   Activity clothespin pinch activity   Comments stood unsupported at table to hang and remove clothespins from rods, no c/o increased fatigue or SOB, overall good static standing balance   Exercise Tools   Other Exercise Tool 1 fxl reacher activity using reacher in RUE to retrieve clothespins from floor and place in basket on table   Cognition    Overall Cognitive Status WFL   Arousal/Participation Alert;Responsive;Cooperative   Attention Within functional limits   Orientation Level Oriented X4   Memory Within functional limits   Following Commands Follows one step commands without difficulty   Assessment   Treatment Assessment Pt seen for brief OT session this AM focusing on fxl mobility/transfers, toileting/transfer, UE ROM/strength, and standing/activity tolerance; see respective sections for details. Pt reported no pain, fatigue, or SOB during session. Fxl mobility to and from bathroom during session completed with CG and RW. Pt is an active participant in therapy and eager to continue progressing. Pt's barriers to d/c include decreased strength t/o but especially RLE s/p cellulitis, decreased ROM, decreased balance, decreased activity tolerance, and decreased safety awareness; all affect independence in self care and fxl transfers. Pt would benefit from continued skilled OT services in order to address listed barriers and prepare for safe d/c.   Prognosis Good   Problem List Decreased strength;Decreased range of motion;Decreased endurance;Impaired balance;Decreased safety awareness   Plan   Treatment/Interventions ADL retraining;Functional transfer training;LE strengthening/ROM;Therapeutic exercise;Endurance training;Patient/family training;Equipment eval/education;Compensatory technique education;OT   Progress Progressing toward goals   OT Therapy Minutes   OT Time In 1130   OT Time Out 1200   OT Total Time (minutes) 30   OT Mode of treatment - Individual (minutes) 30

## 2025-04-24 LAB
INR PPP: 2.18 (ref 0.85–1.19)
PROTHROMBIN TIME: 24.6 SECONDS (ref 12.3–15)

## 2025-04-24 PROCEDURE — 99232 SBSQ HOSP IP/OBS MODERATE 35: CPT | Performed by: PHYSICAL MEDICINE & REHABILITATION

## 2025-04-24 PROCEDURE — 85610 PROTHROMBIN TIME: CPT

## 2025-04-24 PROCEDURE — 97116 GAIT TRAINING THERAPY: CPT | Performed by: PHYSICAL THERAPIST

## 2025-04-24 PROCEDURE — 97535 SELF CARE MNGMENT TRAINING: CPT

## 2025-04-24 PROCEDURE — 97530 THERAPEUTIC ACTIVITIES: CPT

## 2025-04-24 PROCEDURE — 97110 THERAPEUTIC EXERCISES: CPT | Performed by: PHYSICAL THERAPIST

## 2025-04-24 PROCEDURE — 97112 NEUROMUSCULAR REEDUCATION: CPT | Performed by: PHYSICAL THERAPIST

## 2025-04-24 PROCEDURE — 97530 THERAPEUTIC ACTIVITIES: CPT | Performed by: PHYSICAL THERAPIST

## 2025-04-24 PROCEDURE — 97110 THERAPEUTIC EXERCISES: CPT

## 2025-04-24 RX ADMIN — METOPROLOL TARTRATE 25 MG: 25 TABLET, FILM COATED ORAL at 08:34

## 2025-04-24 RX ADMIN — WARFARIN SODIUM 8 MG: 5 TABLET ORAL at 17:21

## 2025-04-24 RX ADMIN — GUAIFENESIN 600 MG: 600 TABLET ORAL at 08:34

## 2025-04-24 RX ADMIN — PANTOPRAZOLE SODIUM 20 MG: 20 TABLET, DELAYED RELEASE ORAL at 05:34

## 2025-04-24 RX ADMIN — CEFADROXIL 1000 MG: 500 CAPSULE ORAL at 21:35

## 2025-04-24 RX ADMIN — ACETAMINOPHEN 650 MG: 325 TABLET ORAL at 21:35

## 2025-04-24 RX ADMIN — PREDNISOLONE ACETATE 1 DROP: 10 SUSPENSION/ DROPS OPHTHALMIC at 08:35

## 2025-04-24 RX ADMIN — NYSTATIN: 100000 POWDER TOPICAL at 21:36

## 2025-04-24 RX ADMIN — LIDOCAINE 5% 1 PATCH: 700 PATCH TOPICAL at 08:33

## 2025-04-24 RX ADMIN — GUAIFENESIN 600 MG: 600 TABLET ORAL at 21:36

## 2025-04-24 RX ADMIN — METOPROLOL TARTRATE 25 MG: 25 TABLET, FILM COATED ORAL at 21:36

## 2025-04-24 RX ADMIN — NYSTATIN 1 APPLICATION: 100000 POWDER TOPICAL at 08:36

## 2025-04-24 RX ADMIN — PREDNISOLONE ACETATE 1 DROP: 10 SUSPENSION/ DROPS OPHTHALMIC at 17:22

## 2025-04-24 RX ADMIN — Medication 1 APPLICATION: at 08:36

## 2025-04-24 RX ADMIN — POLYETHYLENE GLYCOL 3350 17 G: 17 POWDER, FOR SOLUTION ORAL at 08:32

## 2025-04-24 RX ADMIN — DORZOLAMIDE HCL 1 DROP: 20 SOLUTION/ DROPS OPHTHALMIC at 08:33

## 2025-04-24 RX ADMIN — DORZOLAMIDE HCL 1 DROP: 20 SOLUTION/ DROPS OPHTHALMIC at 21:37

## 2025-04-24 RX ADMIN — FUROSEMIDE 20 MG: 20 TABLET ORAL at 08:34

## 2025-04-24 RX ADMIN — CEFADROXIL 1000 MG: 500 CAPSULE ORAL at 08:34

## 2025-04-24 RX ADMIN — MONTELUKAST 10 MG: 10 TABLET, FILM COATED ORAL at 08:34

## 2025-04-24 RX ADMIN — WHITE PETROLATUM 57.7 %-MINERAL OIL 31.9 % EYE OINTMENT: at 21:37

## 2025-04-24 RX ADMIN — BUDESONIDE AND FORMOTEROL FUMARATE DIHYDRATE 2 PUFF: 160; 4.5 AEROSOL RESPIRATORY (INHALATION) at 17:22

## 2025-04-24 RX ADMIN — BUDESONIDE AND FORMOTEROL FUMARATE DIHYDRATE 2 PUFF: 160; 4.5 AEROSOL RESPIRATORY (INHALATION) at 08:34

## 2025-04-24 RX ADMIN — LATANOPROST 1 DROP: 50 SOLUTION OPHTHALMIC at 21:37

## 2025-04-24 RX ADMIN — Medication: at 21:36

## 2025-04-24 NOTE — PROGRESS NOTES
04/24/25 1233   Pain Assessment   Pain Assessment Tool 0-10   Pain Score No Pain   Restrictions/Precautions   Precautions Bed/chair alarms;Fall Risk;Fluid restriction;Supervision on toilet/commode  (2000 mL FR)   Weight Bearing Restrictions No   ROM Restrictions No   Grooming   Able To Initiate Tasks;Wash/Dry Hands   Limitation Noted In Safety;Strength   Findings s/u seated at the sink   Sit to Stand   Type of Assistance Needed Physical assistance   Physical Assistance Level 25% or less   Comment increased effort from lower surface versus CGA from higher surface   Sit to Stand CARE Score 3   Bed-Chair Transfer   Type of Assistance Needed Incidental touching   Chair/Bed-to-Chair Transfer CARE Score 4   Toileting Hygiene   Type of Assistance Needed Physical assistance   Physical Assistance Level 25% or less   Toileting Hygiene CARE Score 3   Toileting   Able to Pull Clothing down yes, up no.   Manage Hygiene Bladder   Limitations Noted In Balance;Problem Solving;ROM;Safety;LE Strength   Adaptive Equipment Grab Bar   Toilet Transfer   Type of Assistance Needed Incidental touching   Toilet Transfer CARE Score 4   Toilet Transfer   Surface Assessed Raised Toilet   Transfer Technique Standard   Limitations Noted In Balance;Endurance;Problem Solving;ROM;Safety;LE Strength   Adaptive Equipment Grab Bar;Walker   Exercise Tools   Hand Gripper Sanderbox 2 sets 15 all directions with BUE and 1# wt   Other Exercise Tool 1 yellow flex therabar 2 sets 15 upa nd down with BUE   Coordination   Fine Motor knots out of tubing using B hands and small object retrieval from red putty using B hands   Cognition   Orientation Level Oriented X4   Additional Activities   Additional Activities Other (Comment)   Additional Activities Comments fxl mobility to and from BR with min A and RW   Other Comments   Assessment Pt participates in 30 minutes concurrent treatment focusing on BUE therex/ theract with similar goals as another pt to  increase stregnth/ ROm and activity tolerance   Assessment   Treatment Assessment Pt presents seated in recliner agreeable to OT session including toileting, transfers/ mobility and BEU therex/ theract. Pt tolerates session without complaints and is progressing towards goals. Pt requires assist and supervision due to decreased balance, safety, endurance and strength/ ROM with RLE edema. Pt will benefit from continued skilled OT Services to increase independence with daily tasks.   Problem List Decreased strength;Decreased range of motion;Decreased endurance;Impaired balance;Decreased safety awareness;Decreased skin integrity   Plan   Treatment/Interventions ADL retraining;Functional transfer training;Therapeutic exercise;Endurance training;Patient/family training;Equipment eval/education;Compensatory technique education   Progress Improving as expected   OT Therapy Minutes   OT Time In 1233   OT Time Out 1400   OT Total Time (minutes) 87   OT Mode of treatment - Individual (minutes) 57   OT Mode of treatment - Concurrent (minutes) 30   Therapy Time missed   Time missed? No

## 2025-04-24 NOTE — PROGRESS NOTES
"Progress Note - Enrico Chavira 89 y.o. male MRN: [id number]    Unit/Bed#: [facility] 219-01 Encounter: [id number]        Subjective:   Patient seen and examined at bedside after reviewing the chart and discussing the case with the caring staff.      Patient examined at bedside.  Patient denies any acute symptoms.  Continues to feel short of breath with exertion at times.  Reported no dizziness today.     INR 2.18 on 4/24.  Patient is on Coumadin 8 mg daily  Repeat BMP Friday AM.     Physical Exam   Vitals: Blood pressure 131/71, pulse 89, temperature (!) 97.1 °F (36.2 °C), temperature source Temporal, resp. rate 18, height 5' 7\" (1.702 m), weight 83.9 kg (184 lb 15.5 oz), SpO2 96%.,Body mass index is 28.97 kg/m².  Constitutional: He appears well-developed.   HEENT: PERR, EOMI, MMM  Cardiovascular: Normal rate and regular rhythm.    Pulmonary/Chest: Effort normal and breath sounds normal.   Abdomen: Soft, + BS, NT    Assessment/Plan:  Enrico Chavira is a(n) 89 y.o. male with debility due to sepsis.      Cardiac hx with HTN, new onset A-fib.  Patient is on Lasix 20 mg daily and Lopressor 25 mg q12h (new).  Of note, pt was in rapid a-fib while in ER, converted to normal sinus rhythm s/p Cardizem drip.  Home amlodipine on hold since starting Lopressor for a-fib.  Cont to monitor vitals.  Hx of PE.  Home: warfarin 7.5mg daily.  Here: warfarin 8 mg daily (incr from 7.5 on 4/22).  Initially on hold due to supratherapeutic INR and restarted on 4/18 at 2 mg.  Goal 2-3.    Asthma-COPD overlap syndrome.  Continue Symbicort inhaler, Singulair 10 mg daily, Mucinex 600mg q12h, albuterol neb as needed.  Pt on Breo Ellipta at home however prefers Symbicort.   GERD.  Patient is on Protonix 20 mg daily (Prilosec nonform).   Glaucoma.  Continue home prednisolone, Xalatan, Trusopt eye drops.  Artifical tear drops as needed.  Anemia.  Hgb 10.3 -> 10.4 on 4/21/2025..  No s/sx active bleeding.  Cont to trend.   SONYA.  Resolved.  " Baseline Cr 0.8-0.9.  Cr 0.76 -> 0.79 on 4/21/2025.  Peak Cr 1.62 on 4/14.  Cont to monitor.  Chronic hyponatremia.  Level 126 on 4/14 -> 131 -> 130 on 4/21/2025.  Fluid restriction 2L (decr from 1500 mL 4/23).  Cont to monitor.   Sepsis secondary to cellulitis of right lower extremity.  Pt treated with IV cefazolin and transitioned to Duricef 1000mg q12h to complete 10-day course of antibiotics per ID recommendations (thru 4/25).  Pain and bowel regimen.  As per physiatry.    Debility due to sepsis.  Patient is receiving intensive PT OT with management as per physiatry.      Anticipated date of discharge.  5/3.    The patient was discussed with Dr. Gamez and he is in agreement with the above note.

## 2025-04-24 NOTE — PROGRESS NOTES
04/24/25 1032   Pain Assessment   Pain Assessment Tool 0-10   Pain Score No Pain   Restrictions/Precautions   Precautions Bed/chair alarms;Fall Risk;Fluid restriction;Supervision on toilet/commode  (2000 mL FR)   Weight Bearing Restrictions No   ROM Restrictions No   Exercise Tools   Hand Gripper gripper with pegs B hands following retrieval of pegs 2 times, once with each hand   Cognition   Orientation Level Oriented X4   Other Comments   Assessment Pt participates in 28 minutes concurrent treatment focusing on BUE therex/ theract with similar goals as another pt to increase stregnth/ ROm and activity tolerance   Assessment   Treatment Assessment Pt agreeable to brief session includign BUE therex/ theract. Pt tolerates session and is scheduled for completion of OT session after lunch. Pt will benefit from continued skilled OT services to increase independence with daily tasks.   Problem List Decreased strength;Decreased range of motion;Decreased endurance;Impaired balance;Decreased safety awareness;Decreased skin integrity   Plan   Treatment/Interventions ADL retraining;Functional transfer training;Therapeutic exercise;Endurance training;Patient/family training;Equipment eval/education;Compensatory technique education   Progress Progressing toward goals   OT Therapy Minutes   OT Time In 1032   OT Time Out 1110   OT Total Time (minutes) 38   OT Mode of treatment - Individual (minutes) 10   OT Mode of treatment - Concurrent (minutes) 28   Therapy Time missed   Time missed? No

## 2025-04-24 NOTE — PROGRESS NOTES
Progress Note - PMR   Name: Enrico Chavira 89 y.o. male I MRN: [id number]  Unit/Bed#: -01 I Date of Admission: 4/19/2025   Date of Service: 4/24/2025 I Hospital Day: 5     Assessment & Plan  Cellulitis of right lower extremity  HPI:   Patient with a history of venous stasis with ulceration, recently discharged from wound care (2/24/25)   Patient presented to the ED on 4/14 due to concern for RLE infection and worsening weakness   Met sepsis criteria with tachycardia, leukocytosis, and RLE cellulitis   2 g cefriaxone given in ED   CT RLE did not reveal soft tissue gas or fluid collections, but showed subcutaneous edema and skin thickening from the proximal thigh through the ankle consistent with cellulitis  Started on IV cefazolin on 4/15 ---> transitioned to 10 day course of cefadroxil on 4/19   Procalcitonin 15.61-->10.82-->4.70-->2.23-->1.32-->0.71  Blood cultures x 2 no growth after 72 hours  Lower extremity Doppler negative for DVT    Plan:   Continue cefadroxil 1 g q12 hours through 4/24  PT and OT for 3 hours a day, 5-6 days a week   Seriel skin checks and monitoring   Consult wound care RN (evaluated on 4/22)   Encourage leg elevation  Continue local wound care   Hypertension  Management at discretion of IM   Continue furosemide and metoprolol   Asthma-COPD overlap syndrome (HCC)  Managed at discretion of IM   Continue Symbicort and PRN albuterol   Chest x-ray 4/17: no acute pulmonary pathology  Factor V Leiden (HCC)  Diagnosed after unprovoked PE in 2018   Continue chronic warfarin (INR recently supratherapeutic in acute care)   Daily INRs while inpatient   Hyponatremia  Most recently 130 on 4/21 (126 in ED)   Nephrology managed in acute care   Na 124 on 11/11/24; suspect chronicity  2000 mL FR daily   Continue to monitor BMP   Repeat BMP 4/25   Atrial fibrillation (HCC)  Follows with cardiologist Dr. Mtz ([facility]), no documented history of a-fib. Patient and daughter also deny a history of  "dysthymia.   Continue metoprolol 25 mg q12h and chronic warfarin   Continue to monitor vitals and hemodynamic status   Per daughter, follow-up scheduled for after discharge     SONYA (acute kidney injury) (HCC)  Baseline creatinine appears to be around 0.8-0.9. Creatinine on admission 1.62, likely prerenal secondary to sepsis   4/15 ultrasound negative for hydronephrosis   Treated with IVF   Most recent creatinine (4/21) 0.79   Continue to monitor BMP  Fall  Patient reports only 1 fall in the past year. This fall occurred a few days before his admission. He describes trying to climb into bed, bt being too weak to lift his legs and subsequently falling. Daughter is not aware of any other falls beside this one.   PT and OT and fall precautions     Abnormal CT of the chest  4/18/25 CT: \"3.7 cm anterior right upper lobe solid mass with septated cystic component and calcification. 2.6 cm lateral right upper lobe opacity and 7 mm right lower lobe nodule. These may be infectious/inflammatory but malignancy cannot be excluded. 2.3 x 1.3 cm low-attenuation nodule in the mediastinum adjacent to the hiatal hernia, question benign or malignant lymph node.\"  Per daughter, patient was aware of nodules but RUL mass new. Patient does not want to follow-up on either.   Monitor vitals, oxygen, and potential malignancy related complications   Impaired mobility and ADLs  Patient was evaluated by the rehabilitation team MD and an appropriate candidate for acute inpatient rehabilitation program at this time.  The patient will tolerate 3 hours/day 5 to 7 days/week of intensive physical and  occupational therapy   in order to obtain goals for community discharge  Due to the patient's functional Compared to their baseline level of function in addition to their ongoing medical needs, the patient would benefit from daily supervision from a rehabilitation physician as well as rehabilitation nursing to implement and adjust the medical as well as " functional plan of care in order to meet the patient's goals.  Bladder: Monitor closely for urinary incontinence and/or retention. Monitor urine output and encourage spontaneous voids. If unable to void spontaneously, will monitor with PVR bladder scans and initiate CIC regimen.  Skin: Monitor for breakdown, frequent turns, pressure offloading  Sleep: Encourage sleep hygiene (routine that promotes healthy circadian rhythm,avoid caffeine later in the day, quiet/dark room at night, reduce electronic before bedtime)  Patient lives alone, previously independent. He has 10 children, many of  whom will be able to assist after discharge.   Anticipated Discharge Date:  Home with family support Saturday, May 3rd, 2025   HHOT/ HHPT/ nursing and DME which is in place.       Chronic left-sided low back pain  Pain levels tolerable, patient relates this pain to soreness from laying   First documented by PCP in 2020  Continue to monitor   PRN tylenol and Lidoderm   Anemia  First noted on 4/15, may be dilutional   Most recent hgb 10.4, continue to monitor CBC  History of sepsis  Possible severe sepsis given Cr increased >0.5 mg/dL from baseline and other criteria met   - INR jumped significantly as well   - Risk for post-sepsis syndrome  - Abx for per ID  - Optimal management of each as listed   - Optimal nutrition, hydration, and medical management  - Monitor vitals closely with and without activity  - Fall precautions   - Recommend acute comprehensive interdisciplinary inpatient rehabilitation to include intensive skilled therapies (PT, OT) as outlined with oversight and management by rehabilitation physician as well as inpatient rehab level nursing, case management and weekly interdisciplinary team meetings.     Potential for cognitive impairment  - Recommend MOCA cog screen in rehab later in course when more stable   - Recommend eval of med mgmt and IADLs   - Monitor neuro-exam, wakefulness, mood, cognition, insight into  "deficits and safety awareness  - Recommend patient (+/- family/caregiver education if applicable) and training as well as compensatory strategies to optimize safety, function, and decrease risk of complications  - Ensure optimal assistance/supervision after d/c   - Optimize sleep-wake cycle  - Limit sedating medications when possible  - Monitor and ensure optimal management electrolytes, nutrition, and hydration  - Monitor for signs or symptoms of infection, medication intolerances, other systemic etiologies  - Fall precautions with frequent rounding; proactive toileting program, patient should not be unattended in bathroom  - If concerns for safety in spite of frequent rounding consider virtual or in person sitter   - OP follow-up PCP and if needed additional specialists (ie neuro/zonia/pmr)  -4/21: \"MOCA as ordered completed during session scoring 19/30 with delayed recall and language most impaired.\"  Degenerative lumbar spinal stenosis  CT L spine - Diffuse severe thoracolumbar degenerative change with apex left lumbar scoliosis. A posteriorly directed vertebral body osteophyte on the right at L1-L2 results in moderate central canal narrowing  - Monitor neuro exam, b/b function, and pain  - Reports chronic pain about stable   Bilateral arm weakness  Reportedly started about 1-2 years ago and has been progressive  - B/L SH 3-/5, R EF 5, EE 4, WE 3 FF 3, L EF 5- We 2 EE 2; +Cuevas on L; brisk knee reflexes, strength fairly intact BLE   - Risk of cervical stenosis with compression and multilevel radiculopathy; hx of very severe deg lumbar stenosis  - Risk of RTC tears or combination - negative Marbella Butler  - Impacts ADL and function; some reports of worsening balance  - Per Dr. Goodwin, \"Discussed potential dx's with pt/family and potential work-up - they would not want sx at this point no matter what the cause but would like to know potential cause for weakness which could help with prognosis and rehab planning; " "apparently has metal in eye and cannot have MRI; they want to try to avoid contrast with slight risk of kidney toxicity\"  4/21: I discussed obtaining CT scan with patient and his daughter Lora, they stated that they would like to forgo CT scan as patient does would not want intervention regardless of diagnosis. He will continue to work with PT and OT for strengthening    - Monitor neuro exam, balance, symptoms closely   Chest wall asymmetry  L superior periscapular chest palpable and observable nontender region apparently there for a few year  - CT Chest did not mention concerns  - Recommend follow-up mgmt by PCP unless additional work-up needed in ARC at discretion of IM  Orthostatic hypotension  -4/23: episode of othostatic hypotention during PT (165/79 ---> 113/65), resolved after sitting  -FR advanced to 2L daily   Encounter for wound care  Assessment Findings:   Bilateral heels dry and intact   Right lower leg - dry and intact healed   Sacral buttocks dry and intact   Right lateral anterior thigh - partial thickness wounds with noted dry peeling skin of the gt wound area . Right leg has edema and redness noted. Moderate serous drainage noted . Gt wound area with dry peeling loose skin .    Right anterior medial thigh - partial thickness wound with small serous drainage and appearance is round as to present as a blister that unroofed . Daughter confirmed this was a blistered area .   Right lower leg and foot dry peeling areas .   Right elbow - partial thickness wound with 90% white biofilm on the wound and 10%  pink edges related to a skin tear . Noted dry scabbed area on the tip of the elbow.   Left 3rd toe - intact dry scabbed area . No drainage and no fluctuance . Daughter reports when patient was clipping his nails the clipper slipped      Subjective   Patient is a 89 year-old male, with a past medical history of lower extremity venous stasis with ulceration of his right lower extremity, hypertension, " asthma-COPD, factor V leiden on warfarin, presenting to ARC secondary to debility to recent sepsis. He initially presented to the ED on 4/14 due to RLE cellulitis and worsening weakness. He was noted to be in rapid a-fib with RVR in the ED and met sepsis criteria with tachycardia and leukocytosis. Cardizem infusion started in the ED and he converted back to NSR. Labs were significant for leukocytosis, elevated procalcitonin, hyponatremia and SONYA. CT of the RLE did not reveal soft tissue gas or fluid collections, but showed subcutaneous edema and skin thickening from the proximal thigh through the ankle consistent with cellulitis. He was subsequently started on IV cefazolin. Blood cultures negative after 72 hours. Renal and ID recommendations were appreciated. Per ID, lower extremity skin dry and cracked, which is a likely port of entry. He clinically improved with stabilization of lab work. To complete a 10-day course of antibiotics, he was transitioned to a 6-day course of cefadroxil upon discharge. He was evaluated by PT and OT and deemed an appropriate candidate for ARC due to functional deficits.        Chief Complaint: increased weakness    Interval:    Seen and evaluated patient in room. Daughter Lora present. He does not have any acute concerns.  Last BM 4/23, continent of bowel and bladder    Objective :  Temp:  [97.1 °F (36.2 °C)-97.6 °F (36.4 °C)] 97.1 °F (36.2 °C)  HR:  [88-89] 89  BP: (131-167)/(71-77) 131/71  Resp:  [16-18] 18  SpO2:  [96 %-98 %] 96 %  O2 Device: None (Room air)    Functional Update:  Physical Therapy Occupational Therapy   Weight Bearing Status: Weight Bearing as Tolerated  Transfers: Moderate Assistance  Bed Mobility: Minimal Assistance  Amulation Distance (ft): 15 feet  Ambulation: Total Assistance (min A, RW, wheelchair follow)  Assistive Device for Ambulation: Roller Walker  Wheelchair Mobility Distance: 120 ft  Wheelchair Mobility: Incidental Touching  Number of Stairs:  2  Assistive Device for Stairs: Bilateral Hand Rails  Stair Assistance: Moderate Assistance  Ramp:  (NT at this time)  Discharge Recommendations: Home with:  DC Home with:: Family Support, Home Physical Therapy   Eating: Supervision  Grooming: Supervision  Bathing: Minimal Assistance, Moderate Assistance  Bathing: Minimal Assistance, Moderate Assistance  Upper Body Dressing: Minimal Assistance  Lower Body Dressing: Maximum Assistance  Toileting: Moderate Assistance  Tub/Shower Transfer:  (TBA)  Toilet Transfer: Minimal Assistance, Moderate Assistance  Cognition: Within Defined Limits  Orientation: Person, Place, Time, Situation         Physical Exam  Vitals and nursing note reviewed.   Constitutional:       General: He is not in acute distress.     Appearance: He is not ill-appearing, toxic-appearing or diaphoretic.   HENT:      Head: Normocephalic and atraumatic.      Nose: Nose normal. No congestion.      Mouth/Throat:      Mouth: Mucous membranes are moist.   Pulmonary:      Effort: Pulmonary effort is normal. No respiratory distress.   Abdominal:      General: There is no distension.   Musculoskeletal:      Right lower leg: Edema present.   Skin:     General: Skin is warm and dry.   Neurological:      General: No focal deficit present.      Mental Status: He is alert.   Psychiatric:         Mood and Affect: Mood normal.         Behavior: Behavior normal.         Scheduled Meds:  Current Facility-Administered Medications   Medication Dose Route Frequency Provider Last Rate    acetaminophen  650 mg Oral Q6H PRN Nicolasa L Dagnall, PA-C      albuterol  2.5 mg Nebulization Q6H PRN Nicolasa L Dagnall, PA-C      artificial tear   Both Eyes HS Nicolasa L Dagnall, PA-C      Artificial Tears  1 drop Both Eyes 4x Daily PRN Nicolasa L Dagnall, PA-C      budesonide-formoterol  2 puff Inhalation BID Nicolasa L Dagnall, PA-C      cefadroxil  1,000 mg Oral Q12H CLARK Nicolasa L Dagnall, PA-C      dorzolamide  1 drop Left Eye BID  Nicolasa L Dagnall, PA-C      furosemide  20 mg Oral Daily Nicolasa L Dagnall, PA-C      guaiFENesin  600 mg Oral Q12H CLARK Nicolasa L Dagnall, PA-C      latanoprost  1 drop Both Eyes HS Nicolasa L Dagnall, PA-C      lidocaine  1 patch Topical Daily Nicolasa L Dagnall, PA-C      metoprolol tartrate  25 mg Oral Q12H CLARK Nicolasa L Dagnall, PA-C      montelukast  10 mg Oral Daily Nicolasa L Dagnall, PA-C      nystatin   Topical BID Liseth Owusu PA-C      pantoprazole  20 mg Oral Early Morning Nicolasa L Dagnall, PA-C      polyethylene glycol  17 g Oral Daily Liseth Owusu PA-C      prednisoLONE acetate  1 drop Left Eye BID Nicolasa L Dagnall, PA-C      warfarin  8 mg Oral Daily (warfarin) Jose Gamez MD      white petrolatum-mineral oil   Topical BID Ta Goodwin MD           Lab Results: I have reviewed the following results:  Results from last 7 days   Lab Units 04/20/25  0507 04/19/25  0457 04/18/25  0446   HEMOGLOBIN g/dL 10.4* 10.3* 10.2*   HEMATOCRIT % 32.1* 31.2* 30.6*   WBC Thousand/uL 8.29 11.10* 11.06*   PLATELETS Thousands/uL 452* 389 365     Results from last 7 days   Lab Units 04/21/25  0507 04/20/25  0507 04/19/25  0457   BUN mg/dL 30* 28* 24   SODIUM mmol/L 130* 129* 131*   POTASSIUM mmol/L 4.3 4.6 4.4   CHLORIDE mmol/L 98 99 99   CREATININE mg/dL 0.79 0.69 0.76   AST U/L  --  34  --    ALT U/L  --  14  --        Results from last 7 days   Lab Units 04/24/25  0538 04/23/25  0509 04/22/25  0612   PROTIME seconds 24.6* 21.1* 18.8*   INR  2.18* 1.78* 1.53*        Liseth Owusu PA-C  Physical Medicine and Rehabilitation  [facility]

## 2025-04-24 NOTE — PROGRESS NOTES
04/24/25 0700   Pain Assessment   Pain Assessment Tool 0-10   Pain Score No Pain   Restrictions/Precautions   Precautions Bed/chair alarms;Fall Risk;Fluid restriction;Supervision on toilet/commode  (2000 mL FR)   Weight Bearing Restrictions No   ROM Restrictions No   Cognition   Overall Cognitive Status WFL   Arousal/Participation Alert;Responsive;Cooperative   Attention Within functional limits   Following Commands Follows one step commands without difficulty   Subjective   Subjective Pt reports that he slept well and has no pain   Roll Left and Right   Type of Assistance Needed Independent   Physical Assistance Level No physical assistance   Comment bed rails   Roll Left and Right CARE Score 6   Lying to Sitting on Side of Bed   Type of Assistance Needed Incidental touching;Verbal cues   Physical Assistance Level 25% or less   Comment bed rails   Lying to Sitting on Side of Bed CARE Score 3   Sit to Stand   Type of Assistance Needed Incidental touching   Physical Assistance Level 25% or less   Comment CGA   Sit to Stand CARE Score 3   Bed-Chair Transfer   Type of Assistance Needed Incidental touching   Physical Assistance Level 25% or less   Comment CGA   Chair/Bed-to-Chair Transfer CARE Score 3   Car Transfer   Reason if not Attempted Environmental limitations   Car Transfer CARE Score 10   Walk 10 Feet   Type of Assistance Needed Physical assistance   Physical Assistance Level Total assistance   Comment RW, Min A, w/c follow   Walk 10 Feet CARE Score 1   Walk 50 Feet with Two Turns   Reason if not Attempted Safety concerns   Walk 50 Feet with Two Turns CARE Score 88   Walk 150 Feet   Reason if not Attempted Safety concerns   Walk 150 Feet CARE Score 88   Ambulation   Primary Mode of Locomotion Prior to Admission Walk   Distance Walked (feet) 35 ft  (35, 25, 20, 10)   Assist Device Roller Walker   Gait Pattern Inconsistant Wendi;Slow Wendi;Decreased foot clearance;Forward Flexion   Does the patient walk?  2. Yes   Wheel 50 Feet with Two Turns   Type of Assistance Needed Supervision   Physical Assistance Level No physical assistance   Comment CGA   Wheel 50 Feet with Two Turns CARE Score 4   Wheel 150 Feet   Type of Assistance Needed Supervision   Physical Assistance Level No physical assistance   Comment CGA   Wheel 150 Feet CARE Score 4   Wheelchair mobility   Method Right upper extremity;Left upper extremity   Assistance Provided For Locking Brakes;Obstacles   Distance Level Surface (feet) 225 ft   Type of Wheelchair Used 1. Manual   Curb or Single Stair   Style negotiated Single stair   Type of Assistance Needed Physical assistance   Physical Assistance Level 25% or less   Comment Min A, b/l HR   1 Step (Curb) CARE Score 3   4 Steps   Type of Assistance Needed Physical assistance   Physical Assistance Level 25% or less   Comment Min A, b/l HR   4 Steps CARE Score 3   12 Steps   Reason if not Attempted Activity not applicable   12 Steps CARE Score 9   Stairs   Type Stairs   # of Steps 6   Weight Bearing Precautions Fall Risk   Toilet Transfer   Type of Assistance Needed Physical assistance   Physical Assistance Level 25% or less   Comment grab bars   Toilet Transfer CARE Score 3   Therapeutic Interventions   Strengthening seated LE TE   Balance Transfer training   Neuromuscular Re-Education standing balance with dual tasking   Other Bed mobility, gait training   Assessment   Treatment Assessment Pt presents supine in bed, agreeable to PT. Pt reported that he currently has no pain. Pt noted that he completed LE TE while seated in recliner last night. Pt completed bed mobility with CGA and transferred OOB with CGA, followed by a Min A walk 10ft from bed to toilet with RW. Pt completed toileting with Min A and utilized grab bars and RW. Pt then ambulated 20ft to w/c in hallway. Pt completed w/c propelling in order to work on endurance and showed improved endurance as seen through increased distance. Pt ascended and  descended backwards three steps x 2 with no rest with b/l HR and Min A. Pt continues to display left hip abductor weakness during negotiation of stairs. Pt compelted cone stacking and close the box game while working on standing balance. Pt ambulated 35ft with Min A, RW and w/c follow. Pt continues to show improvements with endurance and overall strength as seen through increased ambulation distance and improved session tolerance. Pt denied any SOB or dizzyness throughout session. Pt returned to room seated in recliner set up for breakfast. Cont. POC and progress as able.   Problem List Decreased strength;Decreased range of motion;Decreased endurance;Impaired balance;Decreased safety awareness   PT Barriers   Physical Impairment Decreased strength;Decreased endurance;Decreased mobility;Impaired balance;Decreased range of motion;Decreased coordination   Functional Limitation Car transfers;Ramp negotiation;Stair negotiation;Standing;Transfers;Walking   Plan   Treatment/Interventions Functional transfer training;LE strengthening/ROM;Elevations;Therapeutic exercise;Endurance training;Cognitive reorientation;Patient/family training;Equipment eval/education;Bed mobility;Gait training   Progress Progressing toward goals   PT Therapy Minutes   PT Time In 0700   PT Time Out 0830   PT Total Time (minutes) 90   PT Mode of treatment - Individual (minutes) 90   PT Mode of treatment - Concurrent (minutes) 0   PT Mode of treatment - Group (minutes) 0   PT Mode of treatment - Co-treat (minutes) 0   PT Mode of Treatment - Total time(minutes) 90 minutes   PT Cumulative Minutes 430     Seated LE TE:  3x10, B/L LEs, 1#  March  LAQ  Hip ABd - green T-band  Hip ADd - Cyndy  GS  HR  TR    Patient remains OOB in chair, all needs in reach.  Alarm in place and activated.  Encouraged use of call bell, patient verbalizes understanding.

## 2025-04-25 LAB
ANION GAP SERPL CALCULATED.3IONS-SCNC: 3 MMOL/L (ref 4–13)
BUN SERPL-MCNC: 19 MG/DL (ref 5–25)
CALCIUM SERPL-MCNC: 8.3 MG/DL (ref 8.4–10.2)
CHLORIDE SERPL-SCNC: 104 MMOL/L (ref 96–108)
CO2 SERPL-SCNC: 26 MMOL/L (ref 21–32)
CREAT SERPL-MCNC: 0.68 MG/DL (ref 0.6–1.3)
GFR SERPL CREATININE-BSD FRML MDRD: 81 ML/MIN/1.73SQ M
GLUCOSE P FAST SERPL-MCNC: 90 MG/DL (ref 65–99)
GLUCOSE SERPL-MCNC: 90 MG/DL (ref 65–140)
INR PPP: 2.29 (ref 0.85–1.19)
POTASSIUM SERPL-SCNC: 4.2 MMOL/L (ref 3.5–5.3)
PROTHROMBIN TIME: 25.5 SECONDS (ref 12.3–15)
SODIUM SERPL-SCNC: 133 MMOL/L (ref 135–147)

## 2025-04-25 PROCEDURE — 80048 BASIC METABOLIC PNL TOTAL CA: CPT

## 2025-04-25 PROCEDURE — 97110 THERAPEUTIC EXERCISES: CPT | Performed by: PHYSICAL THERAPIST

## 2025-04-25 PROCEDURE — 97530 THERAPEUTIC ACTIVITIES: CPT

## 2025-04-25 PROCEDURE — 99232 SBSQ HOSP IP/OBS MODERATE 35: CPT | Performed by: PHYSICAL MEDICINE & REHABILITATION

## 2025-04-25 PROCEDURE — 85610 PROTHROMBIN TIME: CPT

## 2025-04-25 PROCEDURE — 97535 SELF CARE MNGMENT TRAINING: CPT

## 2025-04-25 PROCEDURE — 97530 THERAPEUTIC ACTIVITIES: CPT | Performed by: PHYSICAL THERAPIST

## 2025-04-25 PROCEDURE — 97116 GAIT TRAINING THERAPY: CPT | Performed by: PHYSICAL THERAPIST

## 2025-04-25 PROCEDURE — 97110 THERAPEUTIC EXERCISES: CPT

## 2025-04-25 RX ADMIN — PREDNISOLONE ACETATE 1 DROP: 10 SUSPENSION/ DROPS OPHTHALMIC at 08:12

## 2025-04-25 RX ADMIN — NYSTATIN 1 APPLICATION: 100000 POWDER TOPICAL at 08:16

## 2025-04-25 RX ADMIN — LIDOCAINE 5% 1 PATCH: 700 PATCH TOPICAL at 08:11

## 2025-04-25 RX ADMIN — Medication: at 21:39

## 2025-04-25 RX ADMIN — METOPROLOL TARTRATE 25 MG: 25 TABLET, FILM COATED ORAL at 08:11

## 2025-04-25 RX ADMIN — CEFADROXIL 1000 MG: 500 CAPSULE ORAL at 08:13

## 2025-04-25 RX ADMIN — METOPROLOL TARTRATE 25 MG: 25 TABLET, FILM COATED ORAL at 21:36

## 2025-04-25 RX ADMIN — DORZOLAMIDE HCL 1 DROP: 20 SOLUTION/ DROPS OPHTHALMIC at 21:37

## 2025-04-25 RX ADMIN — GUAIFENESIN 600 MG: 600 TABLET ORAL at 08:11

## 2025-04-25 RX ADMIN — Medication 1 APPLICATION: at 08:16

## 2025-04-25 RX ADMIN — MONTELUKAST 10 MG: 10 TABLET, FILM COATED ORAL at 08:11

## 2025-04-25 RX ADMIN — PREDNISOLONE ACETATE 1 DROP: 10 SUSPENSION/ DROPS OPHTHALMIC at 18:15

## 2025-04-25 RX ADMIN — FUROSEMIDE 20 MG: 20 TABLET ORAL at 08:15

## 2025-04-25 RX ADMIN — NYSTATIN: 100000 POWDER TOPICAL at 21:39

## 2025-04-25 RX ADMIN — WHITE PETROLATUM 57.7 %-MINERAL OIL 31.9 % EYE OINTMENT: at 21:37

## 2025-04-25 RX ADMIN — POLYETHYLENE GLYCOL 3350 17 G: 17 POWDER, FOR SOLUTION ORAL at 08:10

## 2025-04-25 RX ADMIN — GUAIFENESIN 600 MG: 600 TABLET ORAL at 21:36

## 2025-04-25 RX ADMIN — BUDESONIDE AND FORMOTEROL FUMARATE DIHYDRATE 2 PUFF: 160; 4.5 AEROSOL RESPIRATORY (INHALATION) at 08:13

## 2025-04-25 RX ADMIN — DORZOLAMIDE HCL 1 DROP: 20 SOLUTION/ DROPS OPHTHALMIC at 08:15

## 2025-04-25 RX ADMIN — WARFARIN SODIUM 8 MG: 5 TABLET ORAL at 18:14

## 2025-04-25 RX ADMIN — BUDESONIDE AND FORMOTEROL FUMARATE DIHYDRATE 2 PUFF: 160; 4.5 AEROSOL RESPIRATORY (INHALATION) at 18:15

## 2025-04-25 RX ADMIN — LATANOPROST 1 DROP: 50 SOLUTION OPHTHALMIC at 21:37

## 2025-04-25 RX ADMIN — PANTOPRAZOLE SODIUM 20 MG: 20 TABLET, DELAYED RELEASE ORAL at 05:23

## 2025-04-25 NOTE — ASSESSMENT & PLAN NOTE
Most recently 130 on 4/21 (126 in ED)   Nephrology managed in acute care   Na 124 on 11/11/24; suspect chronicity  2000 mL FR daily   Continue to monitor BMP    4/25 stable at 133

## 2025-04-25 NOTE — PROGRESS NOTES
04/25/25 0900   Pain Assessment   Pain Assessment Tool 0-10   Pain Score No Pain   Restrictions/Precautions   Precautions Bed/chair alarms;Fall Risk;Fluid restriction;Supervision on toilet/commode  (2000 mL FR)   Weight Bearing Restrictions No   ROM Restrictions No   Cognition   Overall Cognitive Status WFL   Arousal/Participation Alert;Responsive;Cooperative   Attention Within functional limits   Following Commands Follows one step commands without difficulty   Subjective   Subjective Pt reports he would like to use the bathroom   Roll Left and Right   Comment Pt OOB   Sit to Lying   Comment Pt OOB   Lying to Sitting on Side of Bed   Comment Pt OOB   Sit to Stand   Type of Assistance Needed Physical assistance   Physical Assistance Level 25% or less   Comment Min A   Sit to Stand CARE Score 3   Bed-Chair Transfer   Type of Assistance Needed Incidental touching   Physical Assistance Level 25% or less   Comment CGA   Chair/Bed-to-Chair Transfer CARE Score 3   Car Transfer   Reason if not Attempted Environmental limitations   Car Transfer CARE Score 10   Walk 10 Feet   Type of Assistance Needed Incidental touching   Physical Assistance Level No physical assistance   Comment CGA, RW   Walk 10 Feet CARE Score 4   Walk 50 Feet with Two Turns   Reason if not Attempted Safety concerns   Walk 50 Feet with Two Turns CARE Score 88   Walk 150 Feet   Reason if not Attempted Safety concerns   Walk 150 Feet CARE Score 88   Ambulation   Primary Mode of Locomotion Prior to Admission Walk   Distance Walked (feet) 25 ft  (20, 25, 18)   Assist Device Roller Walker   Does the patient walk? 2. Yes   Wheel 50 Feet with Two Turns   Type of Assistance Needed Independent   Physical Assistance Level No physical assistance   Wheel 50 Feet with Two Turns CARE Score 6   Wheel 150 Feet   Type of Assistance Needed Independent   Physical Assistance Level No physical assistance   Wheel 150 Feet CARE Score 6   Wheelchair mobility   Method Right  "upper extremity;Left upper extremity   Assistance Provided For Locking Brakes;Obstacles;Remove Leg Rest;Replace Leg Rest   Distance Level Surface (feet) 250 ft   Type of Wheelchair Used 1. Manual   Curb or Single Stair   Style negotiated Single stair   Type of Assistance Needed Incidental touching;Verbal cues   Comment CGA, B/L HR   1 Step (Curb) CARE Score 4   4 Steps   Type of Assistance Needed Incidental touching;Verbal cues   Physical Assistance Level No physical assistance   Comment CGA, B/L HR   4 Steps CARE Score 4   12 Steps   Reason if not Attempted Activity not applicable   12 Steps CARE Score 9   Stairs   Type Stairs   # of Steps 9   Weight Bearing Precautions Fall Risk   Assist Devices Bilateral Rail   Toilet Transfer   Type of Assistance Needed Physical assistance   Physical Assistance Level 25% or less   Comment grab bars   Toilet Transfer CARE Score 3   Toilet Transfer   Findings bowel movement, RN notified   Therapeutic Interventions   Strengthening seated LE TE, standing LE TE   Balance Transfer training   Other Gait training, w/c propelling, stiar training   Assessment   Treatment Assessment Pt presents seated in recliner, agreeable to PT. Pt reported that he currently has no pain. Pt completed toileting with Min A and utilized grab bars and RW. Pt had bowel movement, RN notified. Pt then ambulated 20ft to w/c in hallway. Pt requested a new w/c as he did not feel comfortable in a highback so pt was given standard 18\" w/c which was similar to home chair. Pt reported more comfort with new w/c. Pt completed w/c propelling in order to work on endurance and showed improved endurance as seen through increased distance. Pt ascended and descended backwards three steps x 3 with no rest with b/l HR and CGA. Pt ambulated 35ft with Min A, RW and w/c follow. Pt continues to show improvements with endurance and overall strength as seen through increased stair negotiation and improved session tolerance. Pt denied " any SOB or dizzyness throughout session. Pt returned to room in w/c for OT session. Cont. POC and progress as able.   Problem List Decreased strength;Decreased range of motion;Decreased endurance;Impaired balance;Decreased safety awareness;Decreased skin integrity   PT Barriers   Physical Impairment Decreased strength;Decreased endurance;Decreased mobility;Impaired balance;Decreased range of motion;Decreased coordination   Functional Limitation Car transfers;Ramp negotiation;Stair negotiation;Standing;Transfers;Walking   Plan   Treatment/Interventions Functional transfer training;LE strengthening/ROM;Elevations;Therapeutic exercise;Endurance training;Cognitive reorientation;Patient/family training;Equipment eval/education;Bed mobility;Gait training   Progress Improving as expected   PT Therapy Minutes   PT Time In 0900   PT Time Out 1030   PT Total Time (minutes) 90   PT Mode of treatment - Individual (minutes) 90   PT Mode of treatment - Concurrent (minutes) 0   PT Mode of treatment - Group (minutes) 0   PT Mode of treatment - Co-treat (minutes) 0   PT Mode of Treatment - Total time(minutes) 90 minutes   PT Cumulative Minutes 520   Seated LE TE:  3x10, B/L LEs, 1#  March  LAQ  Hip ABd - red T-band  Hip ADd - Cyndy  GS  HR  TR    Standing LE TE:  3x10, B/L LEs, 1#  March  Hip ABd   Hip flex    Patient remains OOB in chair, all needs in reach.  Alarm in place and activated.  Encouraged use of call bell, patient verbalizes understanding.

## 2025-04-25 NOTE — ASSESSMENT & PLAN NOTE
First noted on 4/15, may be dilutional   Most recent hgb 10.4, continue to monitor CBC  Repeat CBC on Monday

## 2025-04-25 NOTE — NURSING NOTE
Pt with audible wheezing when he walks to bathroom and back to bed. Resoloves after a few minutes of rest. Gait is slow and steady.

## 2025-04-25 NOTE — PROGRESS NOTES
04/25/25 1030   Pain Assessment   Pain Assessment Tool 0-10   Pain Score No Pain   Restrictions/Precautions   Precautions Bed/chair alarms;Fall Risk;Fluid restriction;Supervision on toilet/commode  (2000 mL FR)   Weight Bearing Restrictions No   ROM Restrictions No   Grooming   Able To Initiate Tasks;Comb/Brush Hair;Wash/Dry Face;Wash/Dry Hands   Limitation Noted In Safety;Strength   Findings setup seated   Shower/Bathe Self   Type of Assistance Needed Incidental touching   Shower/Bathe Self CARE Score 4   Bathing   Assessed Bath Style Shower   Anticipated D/C Bath Style Shower;Sponge Bath   Able to Gather/Transport No   Able to Adjust Water Temperature No   Able to Wash/Rinse/Dry (body part) Left Arm;Right Arm;L Upper Leg;R Upper Leg;L Lower Leg/Foot;Chest;R Lower Leg/Foot;Abdomen;Perineal Area;Buttocks   Limitations Noted in Balance;Endurance;Problem Solving;ROM;Safety;Strength   Positioning Seated;Standing   Adaptive Equipment Longhand Sponge;Longhand Reacher;Tub Bench;Shower Bars;Hand Held Shower   Findings  dressing to RLE removed per PA recommendation and nursing measures comlpeted after shower   Tub/Shower Transfer   Limitations Noted In Balance;Endurance;Problem Solving;ROM;Safety;LE Strength   Adaptive Equipment Grab Bars;Transfer Bench   Assessed Shower   Findings CGA/ Hazel  syepping in and out   Upper Body Dressing   Type of Assistance Needed Physical assistance   Physical Assistance Level 25% or less   Comment min A due to decreased shoulder ROM   Upper Body Dressing CARE Score 3   Lower Body Dressing   Type of Assistance Needed Physical assistance   Physical Assistance Level 25% or less   Comment with reacher and ressing stick   Lower Body Dressing CARE Score 3   Putting On/Taking Off Footwear   Type of Assistance Needed Physical assistance   Physical Assistance Level 25% or less   Comment min A to matthew R sock and supervision donning L sock with wide sock aide as well as removing B socks using the  dressing stick   Putting On/Taking Off Footwear CARE Score 3   Picking Up Object   Type of Assistance Needed Incidental touching   Comment CGA supporting with one arm and reaching with the other   Picking Up Object CARE Score 4   Dressing/Undressing Clothing   Remove UB Clothes Pullover Shirt   Don UB Clothes Pullover Shirt   Remove LB Clothes Pants;Undergarment;Socks   Don LB Clothes Pants;Undergarment;Socks   Limitations Noted In Balance;Endurance;Problem Solving;Safety;Strength;ROM   Adaptive Equipment Reacher;Sock Aide   Positioning Supported Sit;Standing   Sit to Stand   Type of Assistance Needed Physical assistance   Physical Assistance Level 25% or less   Sit to Stand CARE Score 3   Bed-Chair Transfer   Type of Assistance Needed Incidental touching   Comment CGA with RW   Chair/Bed-to-Chair Transfer CARE Score 4   Exercise Tools   Other Exercise Tool 1 yellow flex therabar 2 sets 15 up and down with BUE   Other Exercise Tool 2 1# wt therex 2 sets 15 BUE including shoulde abd/ add, elbow flexion/ extesion, supination/ pronation and wrist flexion/ extension   Cognition   Orientation Level Oriented X4   Additional Activities   Additional Activities Other (Comment)   Additional Activities Comments w/c mobility S/ min A in standard w/c using BUE to propel chair   Other Comments   Assessment Pt participates in 30 minutes concurrent treatment focusing on BUE therex/ theract with similar goals as another patient to increase strength/ ROM and activity tolerance   Assessment   Treatment Assessment Pt presents seated in w/c agreeable to OT session including ADLs, transfers/ mobility and BUE therex/ theract. Pt tolerates session without complaints and is progressing towards goals with LB A/E issued/ reviewed. Pt requires assist/ supervision due to decreased balance, safety, endurance and generalized strength/ ROM . Pt will benefit from conitnued skilled OT services to increase independence with daily tasks.   Problem  List Decreased strength;Decreased range of motion;Decreased endurance;Impaired balance;Decreased safety awareness;Decreased skin integrity   Plan   Treatment/Interventions ADL retraining;Functional transfer training;Therapeutic exercise;Endurance training;Patient/family training;Equipment eval/education;Compensatory technique education   Progress Progressing toward goals   OT Therapy Minutes   OT Time In 1030   OT Time Out 1200   OT Total Time (minutes) 90   OT Mode of treatment - Individual (minutes) 60   OT Mode of treatment - Concurrent (minutes) 30   Therapy Time missed   Time missed? No

## 2025-04-25 NOTE — ASSESSMENT & PLAN NOTE
Baseline creatinine appears to be around 0.8-0.9. Creatinine on admission 1.62, likely prerenal secondary to sepsis   4/15 ultrasound negative for hydronephrosis   Treated with IVF   Most recent creatinine (4/25) 0.68  Continue to monitor BMP

## 2025-04-25 NOTE — ASSESSMENT & PLAN NOTE
Follows with cardiologist Dr. Mtz ([facility]), no documented history of a-fib. Patient and daughter also deny a history of dysthymia.   Continue metoprolol 25 mg q12h and chronic warfarin   Continue to monitor vitals and hemodynamic status   Per daughter, follow-up scheduled for after discharge

## 2025-04-25 NOTE — PLAN OF CARE
Problem: PAIN - ADULT  Goal: Verbalizes/displays adequate comfort level or baseline comfort level  Description: Interventions:- Encourage patient to monitor pain and request assistance- Assess pain using appropriate pain scale- Administer analgesics based on type and severity of pain and evaluate response- Implement non-pharmacological measures as appropriate and evaluate response- Consider cultural and social influences on pain and pain management- Notify physician/advanced practitioner if interventions unsuccessful or patient reports new pain  Outcome: Progressing     Problem: INFECTION - ADULT  Goal: Absence or prevention of progression during hospitalization  Description: INTERVENTIONS:- Assess and monitor for signs and symptoms of infection- Monitor lab/diagnostic results- Monitor all insertion sites, i.e. indwelling lines, tubes, and drains- Monitor endotracheal if appropriate and nasal secretions for changes in amount and color- [place] appropriate cooling/warming therapies per order- Administer medications as ordered- Instruct and encourage patient and family to use good hand hygiene technique- Identify and instruct in appropriate isolation precautions for identified infection/condition  Outcome: Progressing     Problem: Knowledge Deficit  Goal: Patient/family/caregiver demonstrates understanding of disease process, treatment plan, medications, and discharge instructions  Description: Complete learning assessment and assess knowledge base.Interventions:- Provide teaching at level of understanding- Provide teaching via preferred learning methods  Outcome: Progressing

## 2025-04-25 NOTE — PROGRESS NOTES
Progress Note - PMR   Name: Enrico Chavira 89 y.o. male I MRN: [id number]  Unit/Bed#: -01 I Date of Admission: 4/19/2025   Date of Service: 4/25/2025 I Hospital Day: 6     Assessment & Plan  Cellulitis of right lower extremity  HPI:   Patient with a history of venous stasis with ulceration, recently discharged from wound care (2/24/25)   Patient presented to the ED on 4/14 due to concern for RLE infection and worsening weakness   Met sepsis criteria with tachycardia, leukocytosis, and RLE cellulitis   2 g cefriaxone given in ED   CT RLE did not reveal soft tissue gas or fluid collections, but showed subcutaneous edema and skin thickening from the proximal thigh through the ankle consistent with cellulitis  Started on IV cefazolin on 4/15 ---> transitioned to 10 day course of cefadroxil on 4/19   Procalcitonin 15.61-->10.82-->4.70-->2.23-->1.32-->0.71  Blood cultures x 2 no growth after 72 hours  Lower extremity Doppler negative for DVT    Plan:   Cefadroxil 1 g q12 hours completed on 4/25- monitor off antibiotics   PT and OT for 3 hours a day, 5-6 days a week   Seriel skin checks and monitoring   Consult wound care RN (evaluated on 4/22)   Encourage leg elevation  Continue local wound care   Hypertension  Management at discretion of IM   Continue furosemide and metoprolol   Asthma-COPD overlap syndrome (HCC)  Managed at discretion of IM   Continue Symbicort and PRN albuterol   Chest x-ray 4/17: no acute pulmonary pathology  Factor V Leiden (HCC)  Diagnosed after unprovoked PE in 2018   Continue chronic warfarin (INR recently supratherapeutic in acute care)   Daily INRs while inpatient   Currently within therapeutic range   Hyponatremia  Most recently 130 on 4/21 (126 in ED)   Nephrology managed in acute care   Na 124 on 11/11/24; suspect chronicity  2000 mL FR daily   Continue to monitor BMP    4/25 stable at 133     Atrial fibrillation (HCC)  Follows with cardiologist Dr. Mtz ([facility]), no documented  "history of a-fib. Patient and daughter also deny a history of dysthymia.   Continue metoprolol 25 mg q12h and chronic warfarin   Continue to monitor vitals and hemodynamic status   Per daughter, follow-up scheduled for after discharge     SONYA (acute kidney injury) (HCC)  Baseline creatinine appears to be around 0.8-0.9. Creatinine on admission 1.62, likely prerenal secondary to sepsis   4/15 ultrasound negative for hydronephrosis   Treated with IVF   Most recent creatinine (4/25) 0.68  Continue to monitor BMP  Fall  Patient reports only 1 fall in the past year. This fall occurred a few days before his admission. He describes trying to climb into bed, bt being too weak to lift his legs and subsequently falling. Daughter is not aware of any other falls beside this one.   PT and OT and fall precautions     Abnormal CT of the chest  4/18/25 CT: \"3.7 cm anterior right upper lobe solid mass with septated cystic component and calcification. 2.6 cm lateral right upper lobe opacity and 7 mm right lower lobe nodule. These may be infectious/inflammatory but malignancy cannot be excluded. 2.3 x 1.3 cm low-attenuation nodule in the mediastinum adjacent to the hiatal hernia, question benign or malignant lymph node.\"  Per daughter, patient was aware of nodules but RUL mass new. Patient does not want to follow-up on either.   Monitor vitals, oxygen, and potential malignancy related complications   Impaired mobility and ADLs  Patient was evaluated by the rehabilitation team MD and an appropriate candidate for acute inpatient rehabilitation program at this time.  The patient will tolerate 3 hours/day 5 to 7 days/week of intensive physical and  occupational therapy   in order to obtain goals for community discharge  Due to the patient's functional Compared to their baseline level of function in addition to their ongoing medical needs, the patient would benefit from daily supervision from a rehabilitation physician as well as " rehabilitation nursing to implement and adjust the medical as well as functional plan of care in order to meet the patient's goals.  Bladder: Monitor closely for urinary incontinence and/or retention. Monitor urine output and encourage spontaneous voids. If unable to void spontaneously, will monitor with PVR bladder scans and initiate CIC regimen.  Skin: Monitor for breakdown, frequent turns, pressure offloading  Sleep: Encourage sleep hygiene (routine that promotes healthy circadian rhythm,avoid caffeine later in the day, quiet/dark room at night, reduce electronic before bedtime)  Patient lives alone, previously independent. He has 10 children, many of  whom will be able to assist after discharge.   Anticipated Discharge Date:  Home with family support Saturday, May 3rd, 2025   HHOT/ HHPT/ nursing and DME which is in place.       Chronic left-sided low back pain  Pain levels tolerable, patient relates this pain to soreness from laying   First documented by PCP in 2020  Continue to monitor   PRN tylenol and Lidoderm   Anemia  First noted on 4/15, may be dilutional   Most recent hgb 10.4, continue to monitor CBC  Repeat CBC on Monday   History of sepsis  Possible severe sepsis given Cr increased >0.5 mg/dL from baseline and other criteria met   - INR jumped significantly as well   - Risk for post-sepsis syndrome  - Abx for per ID  - Optimal management of each as listed   - Optimal nutrition, hydration, and medical management  - Monitor vitals closely with and without activity  - Fall precautions   - Recommend acute comprehensive interdisciplinary inpatient rehabilitation to include intensive skilled therapies (PT, OT) as outlined with oversight and management by rehabilitation physician as well as inpatient rehab level nursing, case management and weekly interdisciplinary team meetings.     Potential for cognitive impairment  - Recommend MOCA cog screen in rehab later in course when more stable   - Recommend eval of  "med mgmt and IADLs   - Monitor neuro-exam, wakefulness, mood, cognition, insight into deficits and safety awareness  - Recommend patient (+/- family/caregiver education if applicable) and training as well as compensatory strategies to optimize safety, function, and decrease risk of complications  - Ensure optimal assistance/supervision after d/c   - Optimize sleep-wake cycle  - Limit sedating medications when possible  - Monitor and ensure optimal management electrolytes, nutrition, and hydration  - Monitor for signs or symptoms of infection, medication intolerances, other systemic etiologies  - Fall precautions with frequent rounding; proactive toileting program, patient should not be unattended in bathroom  - If concerns for safety in spite of frequent rounding consider virtual or in person sitter   - OP follow-up PCP and if needed additional specialists (ie neuro/zonia/pmr)  -4/21: \"MOCA as ordered completed during session scoring 19/30 with delayed recall and language most impaired.\"  Degenerative lumbar spinal stenosis  CT L spine - Diffuse severe thoracolumbar degenerative change with apex left lumbar scoliosis. A posteriorly directed vertebral body osteophyte on the right at L1-L2 results in moderate central canal narrowing  - Monitor neuro exam, b/b function, and pain  - Reports chronic pain about stable   Bilateral arm weakness  Reportedly started about 1-2 years ago and has been progressive  - B/L SH 3-/5, R EF 5, EE 4, WE 3 FF 3, L EF 5- We 2 EE 2; +Cuevas on L; brisk knee reflexes, strength fairly intact BLE   - Risk of cervical stenosis with compression and multilevel radiculopathy; hx of very severe deg lumbar stenosis  - Risk of RTC tears or combination - negative Marbella Butler  - Impacts ADL and function; some reports of worsening balance  - Per Dr. Goodwin, \"Discussed potential dx's with pt/family and potential work-up - they would not want sx at this point no matter what the cause but would like to " "know potential cause for weakness which could help with prognosis and rehab planning; apparently has metal in eye and cannot have MRI; they want to try to avoid contrast with slight risk of kidney toxicity\"  4/21: I discussed obtaining CT scan with patient and his daughter Lora, they stated that they would like to forgo CT scan as patient does would not want intervention regardless of diagnosis. He will continue to work with PT and OT for strengthening    - Monitor neuro exam, balance, symptoms closely   Chest wall asymmetry  L superior periscapular chest palpable and observable nontender region apparently there for a few year  - CT Chest did not mention concerns  - Recommend follow-up mgmt by PCP unless additional work-up needed in ARC at discretion of IM  Orthostatic hypotension  -4/23: episode of othostatic hypotention during PT (165/79 ---> 113/65), resolved after sitting  -FR advanced to 2L daily   Encounter for wound care  Assessment Findings:   Bilateral heels dry and intact   Right lower leg - dry and intact healed   Sacral buttocks dry and intact   Right lateral anterior thigh - partial thickness wounds with noted dry peeling skin of the gt wound area . Right leg has edema and redness noted. Moderate serous drainage noted . Gt wound area with dry peeling loose skin .    Right anterior medial thigh - partial thickness wound with small serous drainage and appearance is round as to present as a blister that unroofed . Daughter confirmed this was a blistered area .   Right lower leg and foot dry peeling areas .   Right elbow - partial thickness wound with 90% white biofilm on the wound and 10%  pink edges related to a skin tear . Noted dry scabbed area on the tip of the elbow.   Left 3rd toe - intact dry scabbed area . No drainage and no fluctuance . Daughter reports when patient was clipping his nails the clipper slipped      Subjective   Patient is a 89 year-old male, with a past medical history of lower " extremity venous stasis with ulceration of his right lower extremity, hypertension, asthma-COPD, factor V leiden on warfarin, presenting to ARC secondary to debility to recent sepsis. He initially presented to the ED on 4/14 due to RLE cellulitis and worsening weakness. He was noted to be in rapid a-fib with RVR in the ED and met sepsis criteria with tachycardia and leukocytosis. Cardizem infusion started in the ED and he converted back to NSR. Labs were significant for leukocytosis, elevated procalcitonin, hyponatremia and SONYA. CT of the RLE did not reveal soft tissue gas or fluid collections, but showed subcutaneous edema and skin thickening from the proximal thigh through the ankle consistent with cellulitis. He was subsequently started on IV cefazolin. Blood cultures negative after 72 hours. Renal and ID recommendations were appreciated. Per ID, lower extremity skin dry and cracked, which is a likely port of entry. He clinically improved with stabilization of lab work. To complete a 10-day course of antibiotics, he was transitioned to a 6-day course of cefadroxil upon discharge. He was evaluated by PT and OT and deemed an appropriate candidate for ARC due to functional deficits.        Chief Complaint: increased weakness    Interval: Seen and evaluated patient during OT. He does not have any acute concerns.  He denies pain, Right thigh appears to be healing. Last BM 4/25, continent of bowel and bladder. 4/25 labs reviewed, INR in therapeutic range and hyponatremia stable at 133.     Objective :  Temp:  [97.8 °F (36.6 °C)-97.9 °F (36.6 °C)] 97.8 °F (36.6 °C)  HR:  [85-92] 85  BP: (144-156)/(68-85) 156/85  Resp:  [16] 16  SpO2:  [96 %-98 %] 98 %  O2 Device: None (Room air)    Functional Update:  Physical Therapy Occupational Therapy   Weight Bearing Status: Weight Bearing as Tolerated  Transfers: Moderate Assistance  Bed Mobility: Minimal Assistance  Amulation Distance (ft): 15 feet  Ambulation: Total Assistance  (min A, RW, wheelchair follow)  Assistive Device for Ambulation: Roller Walker  Wheelchair Mobility Distance: 120 ft  Wheelchair Mobility: Incidental Touching  Number of Stairs: 2  Assistive Device for Stairs: Bilateral Hand Rails  Stair Assistance: Moderate Assistance  Ramp:  (NT at this time)  Discharge Recommendations: Home with:  DC Home with:: Family Support, Home Physical Therapy   Eating: Supervision  Grooming: Supervision  Bathing: Minimal Assistance, Moderate Assistance  Bathing: Minimal Assistance, Moderate Assistance  Upper Body Dressing: Minimal Assistance  Lower Body Dressing: Maximum Assistance  Toileting: Moderate Assistance  Tub/Shower Transfer:  (TBA)  Toilet Transfer: Minimal Assistance, Moderate Assistance  Cognition: Within Defined Limits  Orientation: Person, Place, Time, Situation           Physical Exam  Vitals and nursing note reviewed.   Constitutional:       General: He is not in acute distress.     Appearance: He is not ill-appearing, toxic-appearing or diaphoretic.   HENT:      Head: Normocephalic and atraumatic.      Nose: Nose normal. No congestion.      Mouth/Throat:      Mouth: Mucous membranes are moist.   Pulmonary:      Effort: Pulmonary effort is normal. No respiratory distress.   Abdominal:      General: There is no distension.   Musculoskeletal:      Right lower leg: Edema present.   Skin:     General: Skin is warm and dry.   Neurological:      General: No focal deficit present.      Mental Status: He is alert.   Psychiatric:         Mood and Affect: Mood normal.         Behavior: Behavior normal.             Scheduled Meds:  Current Facility-Administered Medications   Medication Dose Route Frequency Provider Last Rate    acetaminophen  650 mg Oral Q6H PRN Nicolasa L Dagnall, PA-C      albuterol  2.5 mg Nebulization Q6H PRN Nicolasa L Dagnall, PA-C      artificial tear   Both Eyes HS Nicolasa L Dagnall, PA-C      Artificial Tears  1 drop Both Eyes 4x Daily PRN Nicolasa L Dagnall,  PA-MEDARDO      budesonide-formoterol  2 puff Inhalation BID Nicolasa L Dagnall, PA-C      dorzolamide  1 drop Left Eye BID Nicolasa L Dagnall, PA-C      furosemide  20 mg Oral Daily Nicolasa L Dagnall, PA-C      guaiFENesin  600 mg Oral Q12H CLARK Nicolasa L Dagnall, PA-C      latanoprost  1 drop Both Eyes HS Nicolasa L Dagnall, PA-C      lidocaine  1 patch Topical Daily Nicolasa L Dagnall, PA-C      metoprolol tartrate  25 mg Oral Q12H CLARK Nicolasa L Dagnall, PA-C      montelukast  10 mg Oral Daily Nicolasa L Dagnall, PA-C      nystatin   Topical BID Liseth Owusu, PRATIMA      pantoprazole  20 mg Oral Early Morning Nicolasa L Dagnall, PA-C      polyethylene glycol  17 g Oral Daily Liseth Owusu, PA-MEDARDO      prednisoLONE acetate  1 drop Left Eye BID Nicolasa L Dagnall, PA-C      warfarin  8 mg Oral Daily (warfarin) Jose Gamez MD      white petrolatum-mineral oil   Topical BID Ta Goodwin MD           Lab Results: I have reviewed the following results:  Results from last 7 days   Lab Units 04/20/25  0507 04/19/25  0457   HEMOGLOBIN g/dL 10.4* 10.3*   HEMATOCRIT % 32.1* 31.2*   WBC Thousand/uL 8.29 11.10*   PLATELETS Thousands/uL 452* 389     Results from last 7 days   Lab Units 04/25/25  0527 04/21/25  0507 04/20/25  0507   BUN mg/dL 19 30* 28*   SODIUM mmol/L 133* 130* 129*   POTASSIUM mmol/L 4.2 4.3 4.6   CHLORIDE mmol/L 104 98 99   CREATININE mg/dL 0.68 0.79 0.69   AST U/L  --   --  34   ALT U/L  --   --  14       Results from last 7 days   Lab Units 04/25/25  0527 04/24/25  0538 04/23/25  0509   PROTIME seconds 25.5* 24.6* 21.1*   INR  2.29* 2.18* 1.78*        Liseth Owusu PA-C  Physical Medicine and Rehabilitation  [facility]

## 2025-04-25 NOTE — PLAN OF CARE
Problem: PAIN - ADULT  Goal: Verbalizes/displays adequate comfort level or baseline comfort level  Description: Interventions:- Encourage patient to monitor pain and request assistance- Assess pain using appropriate pain scale- Administer analgesics based on type and severity of pain and evaluate response- Implement non-pharmacological measures as appropriate and evaluate response- Consider cultural and social influences on pain and pain management- Notify physician/advanced practitioner if interventions unsuccessful or patient reports new pain  Outcome: Progressing     Problem: INFECTION - ADULT  Goal: Absence or prevention of progression during hospitalization  Description: INTERVENTIONS:- Assess and monitor for signs and symptoms of infection- Monitor lab/diagnostic results- Monitor all insertion sites, i.e. indwelling lines, tubes, and drains- Monitor endotracheal if appropriate and nasal secretions for changes in amount and color- [place] appropriate cooling/warming therapies per order- Administer medications as ordered- Instruct and encourage patient and family to use good hand hygiene technique- Identify and instruct in appropriate isolation precautions for identified infection/condition  Outcome: Progressing     Problem: SAFETY ADULT  Goal: Patient will remain free of falls  Description: INTERVENTIONS:- Educate patient/family on patient safety including physical limitations- Instruct patient to call for assistance with activity - Consult OT/PT to assist with strengthening/mobility - Keep Call bell within reach- Keep bed low and locked with side rails adjusted as appropriate- Keep care items and personal belongings within reach- Initiate and maintain comfort rounds- Make Fall Risk Sign visible to staff- Offer Toileting every 2 Hours, in advance of need- Initiate/Maintain bed/chair alarm- Apply yellow socks and bracelet for high fall risk patients- Consider moving patient to room near nurses  station  Outcome: Progressing     Problem: Prexisting or High Potential for Compromised Skin Integrity  Goal: Skin integrity is maintained or improved  Description: INTERVENTIONS:- Identify patients at risk for skin breakdown- Assess and monitor skin integrity- Assess and monitor nutrition and hydration status- Monitor labs - Assess for incontinence - Turn and reposition patient- Assist with mobility/ambulation- Relieve pressure over bony prominences- Avoid friction and shearing- Provide appropriate hygiene as needed including keeping skin clean and dry- Evaluate need for skin moisturizer/barrier cream- Collaborate with interdisciplinary team - Patient/family teaching- Consider wound care consult   Outcome: Progressing

## 2025-04-25 NOTE — PROGRESS NOTES
04/25/25 1400   Pain Assessment   Pain Assessment Tool 0-10   Pain Score No Pain   Restrictions/Precautions   Precautions Bed/chair alarms;Fall Risk;Fluid restriction;Supervision on toilet/commode  (2000 mL FR)   Weight Bearing Restrictions No   ROM Restrictions No   Sit to Stand   Type of Assistance Needed Physical assistance   Physical Assistance Level 25% or less   Sit to Stand CARE Score 3   Bed-Chair Transfer   Type of Assistance Needed Incidental touching   Comment CGA direct stand pivot   Chair/Bed-to-Chair Transfer CARE Score 4   Exercise Tools   Other Exercise Tool 1 Sanderbox 2 sets 15 all directions with BUE and 1# wt   Other Exercise Tool 2 knots out of tubing using B hands   Cognition   Orientation Level Oriented X4   Other Comments   Assessment Pt participates in 30 minutes concurrent treatment focusing on BUE therex/ theract with similar goals as another patient to increase strength/ ROM and activity tolerance   Assessment   Treatment Assessment Pt presents seated in recliner agreeable to brief OT session including transfers and BUE therex/ Pt toelrates session with barriers same as listed during earlier session. Pt will benefit from continued skilled OT services to increase independence with daily tasks.   Problem List Decreased strength;Decreased range of motion;Decreased endurance;Impaired balance;Decreased safety awareness;Decreased skin integrity   Plan   Treatment/Interventions ADL retraining;Functional transfer training;Therapeutic exercise;Endurance training;Patient/family training;Equipment eval/education;Compensatory technique education   Progress Progressing toward goals   OT Therapy Minutes   OT Time In 1400   OT Time Out 1445   OT Total Time (minutes) 45   OT Mode of treatment - Individual (minutes) 10   OT Mode of treatment - Concurrent (minutes) 35   Therapy Time missed   Time missed? No

## 2025-04-25 NOTE — PROGRESS NOTES
"Progress Note - Enrico Chavira 89 y.o. male MRN: [id number]    Unit/Bed#: [facility] 219-01 Encounter: [id number]        Subjective:   Patient seen and examined at bedside after reviewing the chart and discussing the case with the caring staff.      Patient examined at bedside.  Patient denies any acute symptoms.  Continues to feel short of breath with exertion at times.  Patient completed antibiotic course today.     INR 2.29 on 4/25.  Patient is on Coumadin 8 mg daily  Repeat BMP Friday AM.     Physical Exam   Vitals: Blood pressure 156/85, pulse 85, temperature 97.8 °F (36.6 °C), temperature source Temporal, resp. rate 16, height 5' 7\" (1.702 m), weight 83.9 kg (184 lb 15.5 oz), SpO2 98%.,Body mass index is 28.97 kg/m².  Constitutional: He appears well-developed.   HEENT: PERR, EOMI, MMM  Cardiovascular: Normal rate and regular rhythm.    Pulmonary/Chest: Effort normal and breath sounds normal.   Abdomen: Soft, + BS, NT    Assessment/Plan:  Enrico Chavira is a(n) 89 y.o. male with debility due to sepsis.      Cardiac hx with HTN, new onset A-fib.  Patient is on Lasix 20 mg daily and Lopressor 25 mg q12h (new).  Of note, pt was in rapid a-fib while in ER, converted to normal sinus rhythm s/p Cardizem drip.  Home amlodipine on hold since starting Lopressor for a-fib.  Cont to monitor vitals.  Hx of PE.  Home: warfarin 7.5mg daily.  Here: warfarin 8 mg daily (incr from 7.5 on 4/22).  Initially on hold due to supratherapeutic INR and restarted on 4/18 at 2 mg.  Goal 2-3.    Asthma-COPD overlap syndrome.  Continue Symbicort inhaler, Singulair 10 mg daily, Mucinex 600mg q12h, albuterol neb as needed.  Pt on Breo Ellipta at home however prefers Symbicort.   GERD.  Patient is on Protonix 20 mg daily (Prilosec nonform).   Glaucoma.  Continue home prednisolone, Xalatan, Trusopt eye drops.  Artifical tear drops as needed.  Anemia.  Hgb 10.3 -> 10.4 on 4/21/2025..  No s/sx active bleeding.  Cont to trend.   SONYA.  " Resolved.  Baseline Cr 0.8-0.9.  Cr 0.76 -> 0.79 on 4/21/2025.  Peak Cr 1.62 on 4/14.  Cont to monitor.  Chronic hyponatremia.  Level 126 on 4/14 -> 131 -> 133 on 4/25.  Fluid restriction 2L (decr from 1500 mL 4/23).  Cont to monitor.   Sepsis secondary to cellulitis of right lower extremity.  Pt treated with IV cefazolin and transitioned to Duricef 1000mg q12h to complete 10-day course of antibiotics per ID recommendations (thru 4/25).  Cont to monitor off antibiotics.   Pain and bowel regimen.  As per physiatry.    Debility due to sepsis.  Patient is receiving intensive PT OT with management as per physiatry.      Anticipated date of discharge.  5/3.    The patient was discussed with Dr. Gamez and he is in agreement with the above note.

## 2025-04-25 NOTE — ASSESSMENT & PLAN NOTE
Diagnosed after unprovoked PE in 2018   Continue chronic warfarin (INR recently supratherapeutic in acute care)   Daily INRs while inpatient   Currently within therapeutic range

## 2025-04-25 NOTE — ASSESSMENT & PLAN NOTE
HPI:   Patient with a history of venous stasis with ulceration, recently discharged from wound care (2/24/25)   Patient presented to the ED on 4/14 due to concern for RLE infection and worsening weakness   Met sepsis criteria with tachycardia, leukocytosis, and RLE cellulitis   2 g cefriaxone given in ED   CT RLE did not reveal soft tissue gas or fluid collections, but showed subcutaneous edema and skin thickening from the proximal thigh through the ankle consistent with cellulitis  Started on IV cefazolin on 4/15 ---> transitioned to 10 day course of cefadroxil on 4/19   Procalcitonin 15.61-->10.82-->4.70-->2.23-->1.32-->0.71  Blood cultures x 2 no growth after 72 hours  Lower extremity Doppler negative for DVT    Plan:   Cefadroxil 1 g q12 hours completed on 4/25- monitor off antibiotics   PT and OT for 3 hours a day, 5-6 days a week   Seriel skin checks and monitoring   Consult wound care RN (evaluated on 4/22)   Encourage leg elevation  Continue local wound care

## 2025-04-26 LAB
INR PPP: 2.72 (ref 0.85–1.19)
PROTHROMBIN TIME: 29.1 SECONDS (ref 12.3–15)

## 2025-04-26 PROCEDURE — 97530 THERAPEUTIC ACTIVITIES: CPT

## 2025-04-26 PROCEDURE — 85610 PROTHROMBIN TIME: CPT

## 2025-04-26 PROCEDURE — 97110 THERAPEUTIC EXERCISES: CPT

## 2025-04-26 PROCEDURE — 97537 COMMUNITY/WORK REINTEGRATION: CPT

## 2025-04-26 PROCEDURE — 97116 GAIT TRAINING THERAPY: CPT

## 2025-04-26 RX ADMIN — Medication: at 21:27

## 2025-04-26 RX ADMIN — WHITE PETROLATUM 57.7 %-MINERAL OIL 31.9 % EYE OINTMENT: at 21:26

## 2025-04-26 RX ADMIN — FUROSEMIDE 20 MG: 20 TABLET ORAL at 09:19

## 2025-04-26 RX ADMIN — PREDNISOLONE ACETATE 1 DROP: 10 SUSPENSION/ DROPS OPHTHALMIC at 17:54

## 2025-04-26 RX ADMIN — METOPROLOL TARTRATE 25 MG: 25 TABLET, FILM COATED ORAL at 09:19

## 2025-04-26 RX ADMIN — LIDOCAINE 5% 1 PATCH: 700 PATCH TOPICAL at 09:18

## 2025-04-26 RX ADMIN — BUDESONIDE AND FORMOTEROL FUMARATE DIHYDRATE 2 PUFF: 160; 4.5 AEROSOL RESPIRATORY (INHALATION) at 09:19

## 2025-04-26 RX ADMIN — MONTELUKAST 10 MG: 10 TABLET, FILM COATED ORAL at 09:19

## 2025-04-26 RX ADMIN — GUAIFENESIN 600 MG: 600 TABLET ORAL at 09:19

## 2025-04-26 RX ADMIN — WARFARIN SODIUM 7 MG: 5 TABLET ORAL at 17:54

## 2025-04-26 RX ADMIN — GUAIFENESIN 600 MG: 600 TABLET ORAL at 21:26

## 2025-04-26 RX ADMIN — NYSTATIN: 100000 POWDER TOPICAL at 21:27

## 2025-04-26 RX ADMIN — NYSTATIN: 100000 POWDER TOPICAL at 09:20

## 2025-04-26 RX ADMIN — DORZOLAMIDE HCL 1 DROP: 20 SOLUTION/ DROPS OPHTHALMIC at 09:19

## 2025-04-26 RX ADMIN — PANTOPRAZOLE SODIUM 20 MG: 20 TABLET, DELAYED RELEASE ORAL at 05:36

## 2025-04-26 RX ADMIN — PREDNISOLONE ACETATE 1 DROP: 10 SUSPENSION/ DROPS OPHTHALMIC at 09:19

## 2025-04-26 RX ADMIN — METOPROLOL TARTRATE 25 MG: 25 TABLET, FILM COATED ORAL at 21:26

## 2025-04-26 RX ADMIN — Medication: at 09:20

## 2025-04-26 RX ADMIN — LATANOPROST 1 DROP: 50 SOLUTION OPHTHALMIC at 21:27

## 2025-04-26 RX ADMIN — BUDESONIDE AND FORMOTEROL FUMARATE DIHYDRATE 2 PUFF: 160; 4.5 AEROSOL RESPIRATORY (INHALATION) at 17:54

## 2025-04-26 RX ADMIN — DORZOLAMIDE HCL 1 DROP: 20 SOLUTION/ DROPS OPHTHALMIC at 21:27

## 2025-04-26 NOTE — PLAN OF CARE
Problem: PAIN - ADULT  Goal: Verbalizes/displays adequate comfort level or baseline comfort level  Description: Interventions:- Encourage patient to monitor pain and request assistance- Assess pain using appropriate pain scale- Administer analgesics based on type and severity of pain and evaluate response- Implement non-pharmacological measures as appropriate and evaluate response- Consider cultural and social influences on pain and pain management- Notify physician/advanced practitioner if interventions unsuccessful or patient reports new pain  Outcome: Progressing     Problem: SAFETY ADULT  Goal: Patient will remain free of falls  Description: INTERVENTIONS:- Educate patient/family on patient safety including physical limitations- Instruct patient to call for assistance with activity - Consult OT/PT to assist with strengthening/mobility - Keep Call bell within reach- Keep bed low and locked with side rails adjusted as appropriate- Keep care items and personal belongings within reach- Initiate and maintain comfort rounds- Make Fall Risk Sign visible to staff- Offer Toileting every 2 Hours, in advance of need- Initiate/Maintain bed/chair alarm- Apply yellow socks and bracelet for high fall risk patients- Consider moving patient to room near nurses station  Outcome: Progressing

## 2025-04-26 NOTE — PROGRESS NOTES
04/26/25 0935   Pain Assessment   Pain Assessment Tool 0-10   Pain Score 5   Pain Location/Orientation Location: Generalized  (B UEs and LEs)   Restrictions/Precautions   Precautions Bed/chair alarms;Fall Risk;Fluid restriction;Supervision on toilet/commode  (2000 mL FR)   Weight Bearing Restrictions No   ROM Restrictions No   Exercise Tools   Hand Gripper gripper with pegs B hands following retrieval of pegs from the floor using the reacher in the RUE   Other Exercise Tool 1 yellow flex therabar 2 sets 15 up and down with BUE   Other Exercise Tool 2 Sanderbox 2 sets 15 all directions with BUE and 1# wt   Cognition   Orientation Level Oriented X4   Additional Activities   Additional Activities Other (Comment)   Additional Activities Comments w/c mobility S/ min A in standard w/c using BUE to propel chair to and from OT room   Other Comments   Assessment Pt participates in 25 minutes concurrent treatment focusing on BUE therex/ theract with similar goals as another patient to increase strength/ ROM and activity tolerance   Assessment   Treatment Assessment Pt presents seated in w/c following toileting completion agreeable to OT session including w/c mobility and BUE therex/ theract. Pt tolerates session without complaints and is progressing towards goals enjoying ability to navigate w/c in the room and around the unit. Pt requires assist due to decreased balance, safety, endurance and generalized strength/ ROM. Pt will benefit from continued skilled OT services to increase indepednence with daily tasks.   Problem List Decreased strength;Decreased range of motion;Decreased endurance;Impaired balance;Decreased safety awareness;Decreased skin integrity   Plan   Treatment/Interventions ADL retraining;Functional transfer training;Therapeutic exercise;Endurance training;Patient/family training;Equipment eval/education;Compensatory technique education   Progress Progressing toward goals   OT Therapy Minutes   OT Time In  0935   OT Time Out 1030   OT Total Time (minutes) 55   OT Mode of treatment - Individual (minutes) 30   OT Mode of treatment - Concurrent (minutes) 25   Therapy Time missed   Time missed? No

## 2025-04-26 NOTE — PROGRESS NOTES
"Progress Note - Enrico Chavira 89 y.o. male MRN: [id number]    Unit/Bed#: [facility] 219-01 Encounter: [id number]        Subjective:   Patient seen and examined at bedside after reviewing the chart and discussing the case with the caring staff.      Patient examined at bedside.  Patient denies any acute symptoms.  Patient's right leg still seems slightly erythematous with edema.     INR 2.72 on 4/26/2025.  Patient is on Coumadin 8 mg daily  Repeat BMP Friday AM.     Physical Exam   Vitals: Blood pressure 161/79, pulse 89, temperature (!) 97.2 °F (36.2 °C), temperature source Temporal, resp. rate 17, height 5' 7\" (1.702 m), weight 83.9 kg (184 lb 15.5 oz), SpO2 95%.,Body mass index is 28.97 kg/m².  Constitutional: He appears well-developed.   HEENT: PERR, EOMI, MMM  Cardiovascular: Normal rate and regular rhythm.    Pulmonary/Chest: Effort normal and breath sounds normal.   Abdomen: Soft, + BS, NT    Assessment/Plan:  Enrico Chavira is a(n) 89 y.o. male with debility due to sepsis.      Cardiac hx with HTN, new onset A-fib.  Patient is on Lasix 20 mg daily and Lopressor 25 mg q12h (new).  Of note, pt was in rapid a-fib while in ER, converted to normal sinus rhythm s/p Cardizem drip.  Home amlodipine on hold since starting Lopressor for a-fib.  Cont to monitor vitals.  Hx of PE.  Home: warfarin 7.5mg daily.  Here: warfarin 7 mg mg daily (decreased from 8 on 4/26).  Initially on hold due to supratherapeutic INR and restarted on 4/18 at 2 mg.  Goal 2-3.    Asthma-COPD overlap syndrome.  Continue Symbicort inhaler, Singulair 10 mg daily, Mucinex 600mg q12h, albuterol neb as needed.  Pt on Breo Ellipta at home however prefers Symbicort.   GERD.  Patient is on Protonix 20 mg daily (Prilosec nonform).   Glaucoma.  Continue home prednisolone, Xalatan, Trusopt eye drops.  Artifical tear drops as needed.  Anemia.  Hgb 10.3 -> 10.4 on 4/21/2025..  No s/sx active bleeding.  Cont to trend.   SONYA.  Resolved.  Baseline Cr " 0.8-0.9.  Cr 0.76 -> 0.79 on 4/21/2025.  Peak Cr 1.62 on 4/14.  Cont to monitor.  Chronic hyponatremia.  Level 126 on 4/14 -> 131 -> 133 on 4/25.  Fluid restriction 2L (decr from 1500 mL 4/23).  Cont to monitor.   Sepsis secondary to cellulitis of right lower extremity.  Pt treated with IV cefazolin and transitioned to Duricef 1000mg q12h to complete 10-day course of antibiotics per ID recommendations (thru 4/25).  Cont to monitor off antibiotics.   Pain and bowel regimen.  As per physiatry.    Debility due to sepsis.  Patient is receiving intensive PT OT with management as per physiatry.      Anticipated date of discharge.   Saturday, 5/3/2025.   [Subsequent Evaluation] : a subsequent evaluation for [Epistaxis] : epistaxis [FreeTextEntry2] : nasal pain

## 2025-04-26 NOTE — PROGRESS NOTES
"   04/26/25 1100   Pain Assessment   Pain Assessment Tool 0-10   Pain Score No Pain   Restrictions/Precautions   Precautions Bed/chair alarms;Fall Risk;Fluid restriction;Supervision on toilet/commode  (2000cc FR)   Weight Bearing Restrictions No   ROM Restrictions No   Cognition   Overall Cognitive Status WFL   Arousal/Participation Alert;Responsive;Cooperative   Attention Within functional limits   Orientation Level Oriented X4   Memory Within functional limits   Following Commands Follows one step commands without difficulty   Subjective   Subjective \"that's a new record for me\"   Roll Left and Right   Comment DNT, pt OOB   Sit to Lying   Comment DNT, pt OOB   Lying to Sitting on Side of Bed   Comment DNT, pt OOB   Sit to Stand   Type of Assistance Needed Physical assistance   Physical Assistance Level 26%-50%   Comment min A /c RW   Sit to Stand CARE Score 3   Bed-Chair Transfer   Type of Assistance Needed Physical assistance   Physical Assistance Level 26%-50%   Comment min A STS, CGA once stnading /c RW   Chair/Bed-to-Chair Transfer CARE Score 3   Car Transfer   Reason if not Attempted Environmental limitations   Car Transfer CARE Score 10   Walk 10 Feet   Type of Assistance Needed Incidental touching   Physical Assistance Level   (-)   Comment CGA /c RW   Walk 10 Feet CARE Score 4   Walk 50 Feet with Two Turns   Reason if not Attempted Safety concerns   Walk 50 Feet with Two Turns CARE Score 88   Walk 150 Feet   Reason if not Attempted Safety concerns   Walk 150 Feet CARE Score 88   Walking 10 Feet on Uneven Surfaces   Type of Assistance Needed Incidental touching   Physical Assistance Level   (-)   Comment CGA /c RW over green mat   Walking 10 Feet on Uneven Surfaces CARE Score 4   Ambulation   Primary Mode of Locomotion Prior to Admission Walk   Distance Walked (feet) 30 ft  (30'x2 +shorter distance ambulation for task completion)   Assist Device Roller Walker   Gait Pattern Inconsistant Wendi;Slow " Wendi;Decreased foot clearance;Narrow DAVID;Shuffle;Step to;Trendelenburg;Improper weight shift   Limitations Noted In Balance;Endurance;Heel Strike;Posture;Safety;Sensation;Sequencing;Strength;Speed;Swing   Provided Assistance with: Direction   Walk Assist Level Contact Guard   Findings over level and unlevel surfaces   Does the patient walk? 2. Yes   Curb or Single Stair   Style negotiated Single stair   Type of Assistance Needed Incidental touching   Physical Assistance Level   (-)   Comment CGA 6 steps /c BHRs, retro descent   1 Step (Curb) CARE Score 4   4 Steps   Type of Assistance Needed Incidental touching   Physical Assistance Level   (-)   Comment CGA 6 steps /c BHRs, retro descent\   Reason if not Attempted   (-)   4 Steps CARE Score 4   12 Steps   Reason if not Attempted Activity not applicable   12 Steps CARE Score 9   Stairs   Type Stairs   Weight Bearing Precautions Fall Risk   Assist Devices Bilateral Rail   Toilet Transfer   Type of Assistance Needed Incidental touching   Physical Assistance Level   (-)   Comment CGA /c RW, grab bar   Toilet Transfer CARE Score 4   Toilet Transfer   Surface Assessed Standard Toilet   Limitations Noted In Balance;Endurance;Safety;Sensation;Sequencing;UE Strength;LE Strength   Adaptive Equipment Grab Bar  (RW and BSC over toielt)   Positioning Concerns Safety;Grab Bars   Therapeutic Interventions   Strengthening NuStep   Balance gait and txfer training   Other elevations   Equipment Use   NuStep L 1x10'   Assessment   Treatment Assessment Pt received seated on toilet in BR agreeable to PT session. CGA required for STS from toilet (A) required for CM and thoroughness for hygiene tasks post BM. Transfer training completed focusing on sequence and technique for improved safety and balance /c functional mobility utilizing RW. Progressed GT to 30' /c RW requiring w/c follow from same therapist 2* inconsistent ambulation distance. Ambulates /c slow, unsteady, antalgic,  Trendelenburg gait, decreased/unequal step length, decreased step height, heel strike, b/l foot clearance, impaired WSing, visual scanning, and forward flexed posture. Negotiated 6 steps /c BHRs at [facility], retro descent. Completed training on NuStep for LE strengthening, aerobic conditioning, and ROM/NMR to improve overall gait mechanics and functional mobility.  Pt required increased time and therapeutic rest breaks to complete tasks 2* decreased cardiovascular stamina and muscular fatigue. Pt remains limited 2* decreased strength, endurance, balance, ROM, coordination, righting reactions, safety awareness/insights into deficits, motor planning/problem solving abilities, functional mobility, postural stability, gait deviations/disorders, and decreased ambulation safety. Pt would continue to benefit from skilled [facility] PT services as he is functioning below baseline as well as to maximize independence and safety /c MRADLs.   Problem List Decreased strength;Decreased range of motion;Decreased endurance;Impaired balance;Decreased mobility;Decreased skin integrity;Decreased coordination   Barriers to Discharge Inaccessible home environment   PT Barriers   Physical Impairment Decreased strength;Decreased endurance;Decreased mobility;Impaired balance;Decreased range of motion;Decreased coordination;Decreased skin integrity   Functional Limitation Car transfers;Ramp negotiation;Stair negotiation;Standing;Transfers;Walking   Plan   Treatment/Interventions Functional transfer training;LE strengthening/ROM;Elevations;Therapeutic exercise;Endurance training;Patient/family training;Equipment eval/education;Gait training;Bed mobility;Compensatory technique education;Continued evaluation   Progress Progressing toward goals   PT Therapy Minutes   PT Time In 1100   PT Time Out 1200   PT Total Time (minutes) 60   PT Mode of treatment - Individual (minutes) 60   PT Mode of treatment - Concurrent (minutes) 0   PT Mode of treatment - Group  (minutes) 0   PT Mode of treatment - Co-treat (minutes) 0   PT Mode of Treatment - Total time(minutes) 60 minutes   PT Cumulative Minutes 580   Therapy Time missed   Time missed? No

## 2025-04-26 NOTE — NURSING NOTE
Pt resting in bed, offers no complaints. Slept well during the night. Ambulates CG with RW. RLE edematous and red in color, dry no drainage present. 2000 FR maintained. Fall precautions maintained. Compliant with SCDs overnight. Fall precautions maintained. Continuing to monitor and follow plan of care, bed alarm intact for safety.

## 2025-04-27 LAB
INR PPP: 2.91 (ref 0.85–1.19)
PROTHROMBIN TIME: 30.6 SECONDS (ref 12.3–15)

## 2025-04-27 PROCEDURE — 97110 THERAPEUTIC EXERCISES: CPT

## 2025-04-27 PROCEDURE — 85610 PROTHROMBIN TIME: CPT

## 2025-04-27 PROCEDURE — 97530 THERAPEUTIC ACTIVITIES: CPT

## 2025-04-27 RX ADMIN — WHITE PETROLATUM 57.7 %-MINERAL OIL 31.9 % EYE OINTMENT: at 21:49

## 2025-04-27 RX ADMIN — GUAIFENESIN 600 MG: 600 TABLET ORAL at 09:08

## 2025-04-27 RX ADMIN — Medication: at 21:46

## 2025-04-27 RX ADMIN — GUAIFENESIN 600 MG: 600 TABLET ORAL at 21:45

## 2025-04-27 RX ADMIN — BUDESONIDE AND FORMOTEROL FUMARATE DIHYDRATE 2 PUFF: 160; 4.5 AEROSOL RESPIRATORY (INHALATION) at 09:09

## 2025-04-27 RX ADMIN — LIDOCAINE 5% 1 PATCH: 700 PATCH TOPICAL at 09:08

## 2025-04-27 RX ADMIN — MONTELUKAST 10 MG: 10 TABLET, FILM COATED ORAL at 09:08

## 2025-04-27 RX ADMIN — METOPROLOL TARTRATE 25 MG: 25 TABLET, FILM COATED ORAL at 21:45

## 2025-04-27 RX ADMIN — WARFARIN SODIUM 5.5 MG: 2.5 TABLET ORAL at 17:54

## 2025-04-27 RX ADMIN — PANTOPRAZOLE SODIUM 20 MG: 20 TABLET, DELAYED RELEASE ORAL at 05:10

## 2025-04-27 RX ADMIN — NYSTATIN: 100000 POWDER TOPICAL at 09:11

## 2025-04-27 RX ADMIN — NYSTATIN: 100000 POWDER TOPICAL at 21:47

## 2025-04-27 RX ADMIN — Medication: at 09:10

## 2025-04-27 RX ADMIN — METOPROLOL TARTRATE 25 MG: 25 TABLET, FILM COATED ORAL at 09:08

## 2025-04-27 RX ADMIN — PREDNISOLONE ACETATE 1 DROP: 10 SUSPENSION/ DROPS OPHTHALMIC at 17:55

## 2025-04-27 RX ADMIN — PREDNISOLONE ACETATE 1 DROP: 10 SUSPENSION/ DROPS OPHTHALMIC at 09:10

## 2025-04-27 RX ADMIN — ACETAMINOPHEN 650 MG: 325 TABLET ORAL at 21:51

## 2025-04-27 RX ADMIN — FUROSEMIDE 20 MG: 20 TABLET ORAL at 09:09

## 2025-04-27 RX ADMIN — DORZOLAMIDE HCL 1 DROP: 20 SOLUTION/ DROPS OPHTHALMIC at 21:48

## 2025-04-27 RX ADMIN — POLYETHYLENE GLYCOL 3350 17 G: 17 POWDER, FOR SOLUTION ORAL at 09:09

## 2025-04-27 RX ADMIN — BUDESONIDE AND FORMOTEROL FUMARATE DIHYDRATE 2 PUFF: 160; 4.5 AEROSOL RESPIRATORY (INHALATION) at 17:55

## 2025-04-27 RX ADMIN — LATANOPROST 1 DROP: 50 SOLUTION OPHTHALMIC at 21:49

## 2025-04-27 RX ADMIN — DORZOLAMIDE HCL 1 DROP: 20 SOLUTION/ DROPS OPHTHALMIC at 09:09

## 2025-04-27 NOTE — PROGRESS NOTES
"Progress Note - Enrico Chavira 89 y.o. male MRN: [id number]    Unit/Bed#: [facility] 219-01 Encounter: [id number]        Subjective:   Patient seen and examined at bedside after reviewing the chart and discussing the case with the caring staff.      Patient examined at bedside.  Patient denies any acute symptoms.     INR 2.91 on 4/27/2025.  Patient is on Coumadin 7 mg daily    Physical Exam   Vitals: Blood pressure 141/85, pulse 85, temperature (!) 97 °F (36.1 °C), temperature source Temporal, resp. rate 18, height 5' 7\" (1.702 m), weight 83.9 kg (184 lb 15.5 oz), SpO2 95%.,Body mass index is 28.97 kg/m².  Constitutional: He appears well-developed.   HEENT: PERR, EOMI, MMM  Cardiovascular: Normal rate and regular rhythm.    Pulmonary/Chest: Effort normal and breath sounds normal.   Abdomen: Soft, + BS, NT    Assessment/Plan:  Enrico Chavira is a(n) 89 y.o. male with debility due to sepsis.      Cardiac hx with HTN, new onset A-fib.  Patient is on Lasix 20 mg daily and Lopressor 25 mg q12h (new).  Of note, pt was in rapid a-fib while in ER, converted to normal sinus rhythm s/p Cardizem drip.  Home amlodipine on hold since starting Lopressor for a-fib.  Cont to monitor vitals.  Hx of PE.  Home: warfarin 7.5mg daily.  Here: warfarin 5.5 mg mg daily (decreased from 7 on 4/27/25).  Initially on hold due to supratherapeutic INR and restarted on 4/18 at 2 mg.  Goal 2-3.    Asthma-COPD overlap syndrome.  Continue Symbicort inhaler, Singulair 10 mg daily, Mucinex 600mg q12h, albuterol neb as needed.  Pt on Breo Ellipta at home however prefers Symbicort.   GERD.  Patient is on Protonix 20 mg daily (Prilosec nonform).   Glaucoma.  Continue home prednisolone, Xalatan, Trusopt eye drops.  Artifical tear drops as needed.  Anemia.  Hgb 10.3 -> 10.4 on 4/21/2025..  No s/sx active bleeding.  Cont to trend.   SONYA.  Resolved.  Baseline Cr 0.8-0.9.  Cr 0.76 -> 0.79 on 4/21/2025.  Peak Cr 1.62 on 4/14.  Cont to monitor.  Chronic " hyponatremia.  Level 126 on 4/14 -> 131 -> 133 on 4/25.  Fluid restriction 2L (decr from 1500 mL 4/23).  Cont to monitor.   Sepsis secondary to cellulitis of right lower extremity.  Pt treated with IV cefazolin and transitioned to Duricef 1000mg q12h to complete 10-day course of antibiotics per ID recommendations (thru 4/25).  Cont to monitor off antibiotics.   Pain and bowel regimen.  As per physiatry.    Debility due to sepsis.  Patient is receiving intensive PT OT with management as per physiatry.      Anticipated date of discharge.   Saturday, 5/3/2025.

## 2025-04-27 NOTE — PROGRESS NOTES
04/27/25 0930   Pain Assessment   Pain Assessment Tool 0-10   Pain Score 5   Pain Location/Orientation Location: Knee;Orientation: Left   Restrictions/Precautions   Precautions Bed/chair alarms;Fall Risk;Fluid restriction;Supervision on toilet/commode  (2000cc FR)   Weight Bearing Restrictions No   ROM Restrictions No   Sit to Stand   Type of Assistance Needed Incidental touching   Sit to Stand CARE Score 4   Bed-Chair Transfer   Type of Assistance Needed Incidental touching   Chair/Bed-to-Chair Transfer CARE Score 4   Exercise Tools   Hand Gripper 5# digiflex 2 sets 15 B hands   Other Exercise Tool 1 Sanderbox 2 sets 15 all directions with BUE and 1# wt   Other Exercise Tool 2 card match reaching with BUE while seated to place and remove sorting into 4 suits   Other Exercise Tool 3 yellow flex therabar 2 sets 15 up and down with BUE   Cognition   Orientation Level Oriented X4   Additional Activities   Additional Activities Other (Comment)   Additional Activities Comments w/c mobility supervision/ mod I in rooma nd hallway using BUE to propel chair   Other Comments   Assessment Pt participates in 50 minutes concurrent treatment focusing on BUE therex/ theract with similar goals as another patient to increase strength/ ROM and activity tolerance; RN notified that family brought Myrna from home for pain relief L knee   Assessment   Treatment Assessment Pt presents seated in recliner agreeable to OT Session including transfers, w/c mobility and BUE therex/ theract. Pt tolerates session without complaints and is progressing towards goals. Pt requires assist due to decreased balance, safety, endurance and strength/ ROM. Pt will benefit from continued skilled OT services to increase independence with daily tasks.   Problem List Decreased strength;Decreased range of motion;Decreased endurance;Impaired balance;Decreased safety awareness;Decreased skin integrity   Plan   Treatment/Interventions ADL  retraining;Functional transfer training;Therapeutic exercise;Endurance training;Patient/family training;Equipment eval/education;Compensatory technique education   Progress Progressing toward goals   OT Therapy Minutes   OT Time In 0930   OT Time Out 1025   OT Total Time (minutes) 55   OT Mode of treatment - Individual (minutes) 5   OT Mode of treatment - Concurrent (minutes) 50   Therapy Time missed   Time missed? No

## 2025-04-27 NOTE — NURSING NOTE
Pt resting in bed, offers no complaints. States slept well during the night. Ambulates CG with RW.Cont b&b. RLE edematous and red in color, dry. 2000 FR maintained, fall precautions maintained. Compliant with SCDs overnight. Labs obtained Continuing to monitor and follow plan of care. Bed alarm intact for safety.

## 2025-04-28 LAB
ALBUMIN SERPL BCG-MCNC: 2.3 G/DL (ref 3.5–5)
ALP SERPL-CCNC: 59 U/L (ref 34–104)
ALT SERPL W P-5'-P-CCNC: 14 U/L (ref 7–52)
ANION GAP SERPL CALCULATED.3IONS-SCNC: 5 MMOL/L (ref 4–13)
AST SERPL W P-5'-P-CCNC: 18 U/L (ref 13–39)
BASOPHILS # BLD AUTO: 0.03 THOUSANDS/ÂΜL (ref 0–0.1)
BASOPHILS NFR BLD AUTO: 1 % (ref 0–1)
BILIRUB SERPL-MCNC: 0.28 MG/DL (ref 0.2–1)
BUN SERPL-MCNC: 19 MG/DL (ref 5–25)
CALCIUM ALBUM COR SERPL-MCNC: 9.7 MG/DL (ref 8.3–10.1)
CALCIUM SERPL-MCNC: 8.3 MG/DL (ref 8.4–10.2)
CHLORIDE SERPL-SCNC: 104 MMOL/L (ref 96–108)
CO2 SERPL-SCNC: 24 MMOL/L (ref 21–32)
CREAT SERPL-MCNC: 0.75 MG/DL (ref 0.6–1.3)
EOSINOPHIL # BLD AUTO: 0.14 THOUSAND/ÂΜL (ref 0–0.61)
EOSINOPHIL NFR BLD AUTO: 3 % (ref 0–6)
ERYTHROCYTE [DISTWIDTH] IN BLOOD BY AUTOMATED COUNT: 13.9 % (ref 11.6–15.1)
GFR SERPL CREATININE-BSD FRML MDRD: 78 ML/MIN/1.73SQ M
GLUCOSE SERPL-MCNC: 88 MG/DL (ref 65–140)
HCT VFR BLD AUTO: 29.4 % (ref 36.5–49.3)
HGB BLD-MCNC: 9.5 G/DL (ref 12–17)
IMM GRANULOCYTES # BLD AUTO: 0.05 THOUSAND/UL (ref 0–0.2)
IMM GRANULOCYTES NFR BLD AUTO: 1 % (ref 0–2)
INR PPP: 3.08 (ref 0.85–1.19)
LYMPHOCYTES # BLD AUTO: 0.95 THOUSANDS/ÂΜL (ref 0.6–4.47)
LYMPHOCYTES NFR BLD AUTO: 20 % (ref 14–44)
MCH RBC QN AUTO: 30.3 PG (ref 26.8–34.3)
MCHC RBC AUTO-ENTMCNC: 32.3 G/DL (ref 31.4–37.4)
MCV RBC AUTO: 94 FL (ref 82–98)
MONOCYTES # BLD AUTO: 0.72 THOUSAND/ÂΜL (ref 0.17–1.22)
MONOCYTES NFR BLD AUTO: 15 % (ref 4–12)
NEUTROPHILS # BLD AUTO: 2.92 THOUSANDS/ÂΜL (ref 1.85–7.62)
NEUTS SEG NFR BLD AUTO: 60 % (ref 43–75)
NRBC BLD AUTO-RTO: 0 /100 WBCS
PLATELET # BLD AUTO: 381 THOUSANDS/UL (ref 149–390)
PMV BLD AUTO: 9 FL (ref 8.9–12.7)
POTASSIUM SERPL-SCNC: 4 MMOL/L (ref 3.5–5.3)
PROT SERPL-MCNC: 5.9 G/DL (ref 6.4–8.4)
PROTHROMBIN TIME: 32 SECONDS (ref 12.3–15)
RBC # BLD AUTO: 3.14 MILLION/UL (ref 3.88–5.62)
SODIUM SERPL-SCNC: 133 MMOL/L (ref 135–147)
WBC # BLD AUTO: 4.81 THOUSAND/UL (ref 4.31–10.16)

## 2025-04-28 PROCEDURE — 85610 PROTHROMBIN TIME: CPT

## 2025-04-28 PROCEDURE — 97110 THERAPEUTIC EXERCISES: CPT

## 2025-04-28 PROCEDURE — 80053 COMPREHEN METABOLIC PANEL: CPT

## 2025-04-28 PROCEDURE — 97112 NEUROMUSCULAR REEDUCATION: CPT | Performed by: PHYSICAL THERAPIST

## 2025-04-28 PROCEDURE — 97535 SELF CARE MNGMENT TRAINING: CPT

## 2025-04-28 PROCEDURE — 99232 SBSQ HOSP IP/OBS MODERATE 35: CPT | Performed by: PHYSICAL MEDICINE & REHABILITATION

## 2025-04-28 PROCEDURE — 97116 GAIT TRAINING THERAPY: CPT | Performed by: PHYSICAL THERAPIST

## 2025-04-28 PROCEDURE — 97530 THERAPEUTIC ACTIVITIES: CPT | Performed by: PHYSICAL THERAPIST

## 2025-04-28 PROCEDURE — 97530 THERAPEUTIC ACTIVITIES: CPT

## 2025-04-28 PROCEDURE — 85025 COMPLETE CBC W/AUTO DIFF WBC: CPT

## 2025-04-28 PROCEDURE — 97110 THERAPEUTIC EXERCISES: CPT | Performed by: PHYSICAL THERAPIST

## 2025-04-28 RX ORDER — WARFARIN SODIUM 5 MG/1
5 TABLET ORAL
Status: DISCONTINUED | OUTPATIENT
Start: 2025-04-28 | End: 2025-04-29

## 2025-04-28 RX ADMIN — FUROSEMIDE 20 MG: 20 TABLET ORAL at 09:32

## 2025-04-28 RX ADMIN — LIDOCAINE 5% 1 PATCH: 700 PATCH TOPICAL at 09:33

## 2025-04-28 RX ADMIN — WARFARIN SODIUM 5 MG: 5 TABLET ORAL at 17:51

## 2025-04-28 RX ADMIN — LATANOPROST 1 DROP: 50 SOLUTION OPHTHALMIC at 22:15

## 2025-04-28 RX ADMIN — Medication: at 09:34

## 2025-04-28 RX ADMIN — MONTELUKAST 10 MG: 10 TABLET, FILM COATED ORAL at 09:32

## 2025-04-28 RX ADMIN — BUDESONIDE AND FORMOTEROL FUMARATE DIHYDRATE 2 PUFF: 160; 4.5 AEROSOL RESPIRATORY (INHALATION) at 17:51

## 2025-04-28 RX ADMIN — METOPROLOL TARTRATE 25 MG: 25 TABLET, FILM COATED ORAL at 09:32

## 2025-04-28 RX ADMIN — BUDESONIDE AND FORMOTEROL FUMARATE DIHYDRATE 2 PUFF: 160; 4.5 AEROSOL RESPIRATORY (INHALATION) at 09:32

## 2025-04-28 RX ADMIN — PREDNISOLONE ACETATE 1 DROP: 10 SUSPENSION/ DROPS OPHTHALMIC at 17:51

## 2025-04-28 RX ADMIN — NYSTATIN: 100000 POWDER TOPICAL at 09:34

## 2025-04-28 RX ADMIN — NYSTATIN: 100000 POWDER TOPICAL at 20:30

## 2025-04-28 RX ADMIN — GUAIFENESIN 600 MG: 600 TABLET ORAL at 20:27

## 2025-04-28 RX ADMIN — PANTOPRAZOLE SODIUM 20 MG: 20 TABLET, DELAYED RELEASE ORAL at 05:04

## 2025-04-28 RX ADMIN — ACETAMINOPHEN 650 MG: 325 TABLET ORAL at 18:16

## 2025-04-28 RX ADMIN — GUAIFENESIN 600 MG: 600 TABLET ORAL at 09:32

## 2025-04-28 RX ADMIN — Medication: at 20:30

## 2025-04-28 RX ADMIN — DORZOLAMIDE HCL 1 DROP: 20 SOLUTION/ DROPS OPHTHALMIC at 09:32

## 2025-04-28 RX ADMIN — WHITE PETROLATUM 57.7 %-MINERAL OIL 31.9 % EYE OINTMENT: at 22:15

## 2025-04-28 RX ADMIN — PREDNISOLONE ACETATE 1 DROP: 10 SUSPENSION/ DROPS OPHTHALMIC at 09:34

## 2025-04-28 RX ADMIN — DICLOFENAC SODIUM 4 G: 10 GEL TOPICAL at 20:27

## 2025-04-28 RX ADMIN — METOPROLOL TARTRATE 25 MG: 25 TABLET, FILM COATED ORAL at 20:28

## 2025-04-28 RX ADMIN — DORZOLAMIDE HCL 1 DROP: 20 SOLUTION/ DROPS OPHTHALMIC at 20:28

## 2025-04-28 NOTE — NURSING NOTE
Report from the prior shift at 1500, pt presently oob in his chair in no acute distress watching tv and visiting with family, small dsg applied to right upper thigh d/t small area weeping, pt tolerated well

## 2025-04-28 NOTE — ASSESSMENT & PLAN NOTE
[facility] no documented history of a-fib. Patient and daughter also deny a history of dysthymia.   Continue metoprolol 25 mg q12h and chronic warfarin   Continue to monitor vitals and hemodynamic status   Per daughter, follow-up scheduled for after discharge      No indicators present

## 2025-04-28 NOTE — PLAN OF CARE
Problem: PAIN - ADULT  Goal: Verbalizes/displays adequate comfort level or baseline comfort level  Description: Interventions:- Encourage patient to monitor pain and request assistance- Assess pain using appropriate pain scale- Administer analgesics based on type and severity of pain and evaluate response- Implement non-pharmacological measures as appropriate and evaluate response- Consider cultural and social influences on pain and pain management- Notify physician/advanced practitioner if interventions unsuccessful or patient reports new pain  Outcome: Progressing     Problem: INFECTION - ADULT  Goal: Absence or prevention of progression during hospitalization  Description: INTERVENTIONS:- Assess and monitor for signs and symptoms of infection- Monitor lab/diagnostic results- Monitor all insertion sites, i.e. indwelling lines, tubes, and drains- Monitor endotracheal if appropriate and nasal secretions for changes in amount and color- Spencer appropriate cooling/warming therapies per order- Administer medications as ordered- Instruct and encourage patient and family to use good hand hygiene technique- Identify and instruct in appropriate isolation precautions for identified infection/condition  Outcome: Progressing     Problem: SAFETY ADULT  Goal: Patient will remain free of falls  Description: INTERVENTIONS:- Educate patient/family on patient safety including physical limitations- Instruct patient to call for assistance with activity - Consult OT/PT to assist with strengthening/mobility - Keep Call bell within reach- Keep bed low and locked with side rails adjusted as appropriate- Keep care items and personal belongings within reach- Initiate and maintain comfort rounds- Make Fall Risk Sign visible to staff- Offer Toileting every 2 Hours, in advance of need- Initiate/Maintain bed/chair alarm- Apply yellow socks and bracelet for high fall risk patients- Consider moving patient to room near nurses  station  Outcome: Progressing     Problem: Nutrition/Hydration-ADULT  Goal: Nutrient/Hydration intake appropriate for improving, restoring or maintaining nutritional needs  Description: Monitor and assess patient's nutrition/hydration status for malnutrition. Collaborate with interdisciplinary team and initiate plan and interventions as ordered.  Monitor patient's weight and dietary intake as ordered or per policy. Utilize nutrition screening tool and intervene as necessary. Determine patient's food preferences and provide high-protein, high-caloric foods as appropriate. INTERVENTIONS:- Monitor oral intake, urinary output, labs, and treatment plans- Assess nutrition and hydration status and recommend course of action- Evaluate amount of meals eaten- Assist patient with eating if necessary - Allow adequate time for meals- Recommend/ encourage appropriate diets, oral nutritional supplements, and vitamin/mineral supplements- Order, calculate, and assess calorie counts as needed- Recommend, monitor, and adjust tube feedings and TPN/PPN based on assessed needs- Assess need for intravenous fluids- Provide specific nutrition/hydration education as appropriate- Include patient/family/caregiver in decisions related to nutrition  Outcome: Progressing     Problem: Prexisting or High Potential for Compromised Skin Integrity  Goal: Skin integrity is maintained or improved  Description: INTERVENTIONS:- Identify patients at risk for skin breakdown- Assess and monitor skin integrity- Assess and monitor nutrition and hydration status- Monitor labs - Assess for incontinence - Turn and reposition patient- Assist with mobility/ambulation- Relieve pressure over bony prominences- Avoid friction and shearing- Provide appropriate hygiene as needed including keeping skin clean and dry- Evaluate need for skin moisturizer/barrier cream- Collaborate with interdisciplinary team - Patient/family teaching- Consider wound care consult   Outcome:  Progressing

## 2025-04-28 NOTE — PROGRESS NOTES
Progress Note - PMR   Name: Enrico Chavira 89 y.o. male I MRN: [id number]  Unit/Bed#: -01 I Date of Admission: 4/19/2025   Date of Service: 4/28/2025 I Hospital Day: 9     Assessment & Plan  Cellulitis of right lower extremity  HPI:   Patient with a history of venous stasis with ulceration, recently discharged from wound care (2/24/25)   Patient presented to the ED on 4/14 due to concern for RLE infection and worsening weakness   Met sepsis criteria with tachycardia, leukocytosis, and RLE cellulitis   2 g cefriaxone given in ED   CT RLE did not reveal soft tissue gas or fluid collections, but showed subcutaneous edema and skin thickening from the proximal thigh through the ankle consistent with cellulitis  Started on IV cefazolin on 4/15 ---> transitioned to 10 day course of cefadroxil on 4/19   Procalcitonin 15.61-->10.82-->4.70-->2.23-->1.32-->0.71  Blood cultures x 2 no growth after 72 hours  Lower extremity Doppler negative for DVT    Plan:   Cefadroxil 1 g q12 hours completed on 4/25- monitor off antibiotics   PT and OT for 3 hours a day, 5-6 days a week   Seriel skin checks and monitoring   Consult wound care RN (evaluated on 4/22)   Encourage leg elevation  Continue local wound care   Hypertension  Management at discretion of IM   Continue furosemide and metoprolol   Asthma-COPD overlap syndrome (HCC)  Managed at discretion of IM   Continue Symbicort and PRN albuterol   Chest x-ray 4/17: no acute pulmonary pathology  Factor V Leiden (HCC)  Diagnosed after unprovoked PE in 2018   Continue chronic warfarin (INR recently supratherapeutic in acute care)   Daily INRs while inpatient   4/28: INR slightly supratherapeutic at 3.08 (warfarin decreased to 5 mg daily per IM)   Hyponatremia  Most recently 130 on 4/21 (126 in ED)   Nephrology managed in acute care   Na 124 on 11/11/24; suspect chronicity  2000 mL FR daily   Continue to monitor BMP    4/25 stable at 133     Atrial fibrillation (HCC)  [facility]  [facility] no documented history of a-fib. Patient and daughter also deny a history of dysthymia.   Continue metoprolol 25 mg q12h and chronic warfarin   Continue to monitor vitals and hemodynamic status   Per daughter, follow-up scheduled for after discharge     SONYA (acute kidney injury) (HCC)  Baseline creatinine appears to be around 0.8-0.9. Creatinine on admission 1.62, likely prerenal secondary to sepsis   4/15 ultrasound negative for hydronephrosis   Treated with IVF   Most recent creatinine (4/28) 0.68  Continue to monitor BMP  Fall  Patient reports only 1 fall in the past year. This fall occurred a few days before his admission. He describes trying to climb into bed, bt being too weak to lift his legs and subsequently falling. Daughter is not aware of any other falls beside this one.   PT and OT and fall precautions     Abnormal CT of the chest  4/18/25 CT: \"3.7 cm anterior right upper lobe solid mass with septated cystic component and calcification. 2.6 cm lateral right upper lobe opacity and 7 mm right lower lobe nodule. These may be infectious/inflammatory but malignancy cannot be excluded. 2.3 x 1.3 cm low-attenuation nodule in the mediastinum adjacent to the hiatal hernia, question benign or malignant lymph node.\"  Per daughter, patient was aware of nodules but RUL mass new. Patient does not want to follow-up on either.   Monitor vitals, oxygen, and potential malignancy related complications   Impaired mobility and ADLs  Patient was evaluated by the rehabilitation team MD and an appropriate candidate for acute inpatient rehabilitation program at this time.  The patient will tolerate 3 hours/day 5 to 7 days/week of intensive physical and  occupational therapy   in order to obtain goals for community discharge  Due to the patient's functional Compared to their baseline level of function in addition to their ongoing medical needs, the patient would benefit from daily " supervision from a rehabilitation physician as well as rehabilitation nursing to implement and adjust the medical as well as functional plan of care in order to meet the patient's goals.  Bladder: Monitor closely for urinary incontinence and/or retention. Monitor urine output and encourage spontaneous voids. If unable to void spontaneously, will monitor with PVR bladder scans and initiate CIC regimen.  Skin: Monitor for breakdown, frequent turns, pressure offloading  Sleep: Encourage sleep hygiene (routine that promotes healthy circadian rhythm,avoid caffeine later in the day, quiet/dark room at night, reduce electronic before bedtime)  Patient lives alone, previously independent. He has 10 children, many of  whom will be able to assist after discharge.   Anticipated Discharge Date:  Home with family support Saturday, May 3rd, 2025   HHOT/ HHPT/ nursing and DME which is in place.       Chronic left-sided low back pain  Pain levels tolerable, patient relates this pain to soreness from laying   First documented by PCP in 2020  Continue to monitor   PRN tylenol and Lidoderm   Anemia  First noted on 4/15  Most recent hgb 9.5, continue to monitor CBC    History of sepsis  Possible severe sepsis given Cr increased >0.5 mg/dL from baseline and other criteria met   - INR jumped significantly as well   - Risk for post-sepsis syndrome  - Abx for per ID  - Optimal management of each as listed   - Optimal nutrition, hydration, and medical management  - Monitor vitals closely with and without activity  - Fall precautions   - Recommend acute comprehensive interdisciplinary inpatient rehabilitation to include intensive skilled therapies (PT, OT) as outlined with oversight and management by rehabilitation physician as well as inpatient rehab level nursing, case management and weekly interdisciplinary team meetings.     Potential for cognitive impairment  - Recommend MOCA cog screen in rehab later in course when more stable   -  "Recommend eval of med mgmt and IADLs   - Monitor neuro-exam, wakefulness, mood, cognition, insight into deficits and safety awareness  - Recommend patient (+/- family/caregiver education if applicable) and training as well as compensatory strategies to optimize safety, function, and decrease risk of complications  - Ensure optimal assistance/supervision after d/c   - Optimize sleep-wake cycle  - Limit sedating medications when possible  - Monitor and ensure optimal management electrolytes, nutrition, and hydration  - Monitor for signs or symptoms of infection, medication intolerances, other systemic etiologies  - Fall precautions with frequent rounding; proactive toileting program, patient should not be unattended in bathroom  - If concerns for safety in spite of frequent rounding consider virtual or in person sitter   - OP follow-up PCP and if needed additional specialists (ie neuro/zonia/pmr)  -4/21: \"MOCA as ordered completed during session scoring 19/30 with delayed recall and language most impaired.\"  Degenerative lumbar spinal stenosis  CT L spine - Diffuse severe thoracolumbar degenerative change with apex left lumbar scoliosis. A posteriorly directed vertebral body osteophyte on the right at L1-L2 results in moderate central canal narrowing  - Monitor neuro exam, b/b function, and pain  - Reports chronic pain about stable   Bilateral arm weakness  Reportedly started about 1-2 years ago and has been progressive  - B/L SH 3-/5, R EF 5, EE 4, WE 3 FF 3, L EF 5- We 2 EE 2; +Cuevas on L; brisk knee reflexes, strength fairly intact BLE   - Risk of cervical stenosis with compression and multilevel radiculopathy; hx of very severe deg lumbar stenosis  - Risk of RTC tears or combination - negative Marbella Butler  - Impacts ADL and function; some reports of worsening balance  - Per Dr. Goodwin, \"Discussed potential dx's with pt/family and potential work-up - they would not want sx at this point no matter what the cause " "but would like to know potential cause for weakness which could help with prognosis and rehab planning; apparently has metal in eye and cannot have MRI; they want to try to avoid contrast with slight risk of kidney toxicity\"  4/21: I discussed obtaining CT scan with patient and his daughter Lora, they stated that they would like to forgo CT scan as patient does would not want intervention regardless of diagnosis. He will continue to work with PT and OT for strengthening    - Monitor neuro exam, balance, symptoms closely   Chest wall asymmetry  L superior periscapular chest palpable and observable nontender region apparently there for a few year  - CT Chest did not mention concerns  - Recommend follow-up mgmt by PCP unless additional work-up needed in [facility] at discretion of IM  Orthostatic hypotension  -4/23: episode of othostatic hypotention during PT (165/79 ---> 113/65), resolved after sitting  -FR advanced to 2L daily   Encounter for wound care  Assessment Findings:   Bilateral heels dry and intact   Right lower leg - dry and intact healed   Sacral buttocks dry and intact   Right lateral anterior thigh - partial thickness wounds with noted dry peeling skin of the gt wound area . Right leg has edema and redness noted. Moderate serous drainage noted . Gt wound area with dry peeling loose skin .    Right anterior medial thigh - partial thickness wound with small serous drainage and appearance is round as to present as a blister that unroofed . Daughter confirmed this was a blistered area .   Right lower leg and foot dry peeling areas .   Right elbow - partial thickness wound with 90% white biofilm on the wound and 10%  pink edges related to a skin tear . Noted dry scabbed area on the tip of the elbow.   Left 3rd toe - intact dry scabbed area . No drainage and no fluctuance . Daughter reports when patient was clipping his nails the clipper slipped      Subjective   Patient is a 89 year-old male, with a past medical " history of lower extremity venous stasis with ulceration of his right lower extremity, hypertension, asthma-COPD, factor V leiden on warfarin, presenting to ARC secondary to debility to recent sepsis. He initially presented to the ED on 4/14 due to RLE cellulitis and worsening weakness. He was noted to be in rapid a-fib with RVR in the ED and met sepsis criteria with tachycardia and leukocytosis. Cardizem infusion started in the ED and he converted back to NSR. Labs were significant for leukocytosis, elevated procalcitonin, hyponatremia and SONYA. CT of the RLE did not reveal soft tissue gas or fluid collections, but showed subcutaneous edema and skin thickening from the proximal thigh through the ankle consistent with cellulitis. He was subsequently started on IV cefazolin. Blood cultures negative after 72 hours. Renal and ID recommendations were appreciated. Per ID, lower extremity skin dry and cracked, which is a likely port of entry. He clinically improved with stabilization of lab work. To complete a 10-day course of antibiotics, he was transitioned to a 6-day course of cefadroxil upon discharge. He was evaluated by PT and OT and deemed an appropriate candidate for AR[facility] due to functional deficits.        Chief Complaint: increased weakness    Interval:  Seen and evaluated patient in. He does not have any acute concerns.  He denies pain, Possible fungal infection of right inner thigh, Nystatin power ordered. Last BM 4/25, continent of bowel and bladder. 4/28 labs reviewed, INR slightly supratherapeutic and hyponatremia stable at 133. Last BM 4/27, continent of bowel and bladder.          Objective :  Temp:  [97.5 °F (36.4 °C)-97.8 °F (36.6 °C)] 97.5 °F (36.4 °C)  HR:  [88-92] 88  BP: (123-163)/(70-80) 123/70  Resp:  [16] 16  SpO2:  [97 %-98 %] 97 %  O2 Device: None (Room air)    Functional Update:  Physical Therapy Occupational Therapy   Weight Bearing Status: Weight Bearing as Tolerated  Transfers: Moderate  Assistance  Bed Mobility: Minimal Assistance  Amulation Distance (ft): 15 feet  Ambulation: Total Assistance (min A, RW, wheelchair follow)  Assistive Device for Ambulation: Roller Walker  Wheelchair Mobility Distance: 120 ft  Wheelchair Mobility: Incidental Touching  Number of Stairs: 2  Assistive Device for Stairs: Bilateral Hand Rails  Stair Assistance: Moderate Assistance  Ramp:  (NT at this time)  Discharge Recommendations: Home with:  DC Home with:: Family Support, Home Physical Therapy   Eating: Supervision  Grooming: Supervision  Bathing: Minimal Assistance, Moderate Assistance  Bathing: Minimal Assistance, Moderate Assistance  Upper Body Dressing: Minimal Assistance  Lower Body Dressing: Maximum Assistance  Toileting: Moderate Assistance  Tub/Shower Transfer:  (TBA)  Toilet Transfer: Minimal Assistance, Moderate Assistance  Cognition: Within Defined Limits  Orientation: Person, Place, Time, Situation           Physical Exam  Vitals and nursing note reviewed.   Constitutional:       General: He is not in acute distress.     Appearance: He is not ill-appearing, toxic-appearing or diaphoretic.   HENT:      Head: Normocephalic and atraumatic.      Nose: Nose normal. No congestion.      Mouth/Throat:      Mouth: Mucous membranes are moist.   Pulmonary:      Effort: Pulmonary effort is normal. No respiratory distress.   Abdominal:      General: There is no distension.   Musculoskeletal:      Right lower leg: Edema present.   Skin:     General: Skin is warm and dry.   Neurological:      General: No focal deficit present.      Mental Status: He is alert.   Psychiatric:         Mood and Affect: Mood normal.         Behavior: Behavior normal.       Scheduled Meds:  Current Facility-Administered Medications   Medication Dose Route Frequency Provider Last Rate    acetaminophen  650 mg Oral Q6H PRN Nicolasa Shetty PA-C      albuterol  2.5 mg Nebulization Q6H PRN Nicolasa Shetty PA-C      artificial tear   Both  Eyes HS Nicolasa L Dagnall, PA-C      Artificial Tears  1 drop Both Eyes 4x Daily PRN Nicolasa L Dagnall, PA-C      budesonide-formoterol  2 puff Inhalation BID Nicolasa L Dagnall, PA-C      Diclofenac Sodium  4 g Topical BID PRN Liseth Owusu, PA-C      dorzolamide  1 drop Left Eye BID Nicolasa L Dagnall, PA-C      furosemide  20 mg Oral Daily Nicolasa L Dagnall, PA-C      guaiFENesin  600 mg Oral Q12H CLARK Nicolasa L Dagnall, PA-C      latanoprost  1 drop Both Eyes HS Nicolasa L Dagnall, PA-C      lidocaine  1 patch Topical Daily Nicolasa L Dagnall, PA-C      metoprolol tartrate  25 mg Oral Q12H CLARK Nicolasa L Dagnall, PA-C      montelukast  10 mg Oral Daily Nicolasa L Dagnall, PA-C      nystatin   Topical BID Liseth Owusu, PA-C      pantoprazole  20 mg Oral Early Morning Nicolasa L Dagnall, PA-C      polyethylene glycol  17 g Oral Daily Liseth Owusu, PA-C      prednisoLONE acetate  1 drop Left Eye BID Nicolasa L Dagnall, PA-C      warfarin  5 mg Oral Daily (warfarin) Jose Gamez MD      white petrolatum-mineral oil   Topical BID Ta Goodwin MD           Lab Results: I have reviewed the following results:  Results from last 7 days   Lab Units 04/28/25  0509   HEMOGLOBIN g/dL 9.5*   HEMATOCRIT % 29.4*   WBC Thousand/uL 4.81   PLATELETS Thousands/uL 381     Results from last 7 days   Lab Units 04/28/25  0509 04/25/25  0527   BUN mg/dL 19 19   SODIUM mmol/L 133* 133*   POTASSIUM mmol/L 4.0 4.2   CHLORIDE mmol/L 104 104   CREATININE mg/dL 0.75 0.68   AST U/L 18  --    ALT U/L 14  --        Results from last 7 days   Lab Units 04/28/25  0509 04/27/25  0513 04/26/25  0537   PROTIME seconds 32.0* 30.6* 29.1*   INR  3.08* 2.91* 2.72*      Liseth Owusu PA-C  Physical Medicine and Rehabilitation  [facility]

## 2025-04-28 NOTE — ASSESSMENT & PLAN NOTE
Baseline creatinine appears to be around 0.8-0.9. Creatinine on admission 1.62, likely prerenal secondary to sepsis   4/15 ultrasound negative for hydronephrosis   Treated with IVF   Most recent creatinine (4/28) 0.68  Continue to monitor BMP

## 2025-04-28 NOTE — ASSESSMENT & PLAN NOTE
Diagnosed after unprovoked PE in 2018   Continue chronic warfarin (INR recently supratherapeutic in acute care)   Daily INRs while inpatient   4/28: INR slightly supratherapeutic at 3.08 (warfarin decreased to 5 mg daily per IM)

## 2025-04-28 NOTE — PROGRESS NOTES
04/28/25 1000   Pain Assessment   Pain Assessment Tool 0-10   Pain Score No Pain   Restrictions/Precautions   Precautions Bed/chair alarms;Fall Risk;Fluid restriction;Supervision on toilet/commode  (2000cc FR)   Weight Bearing Restrictions No   ROM Restrictions No   Grooming   Able To Initiate Tasks;Comb/Brush Hair;Wash/Dry Face;Wash/Dry Hands   Limitation Noted In Safety;Strength   Findings setup seated; reports completing oral hygiene earlier   Shower/Bathe Self   Type of Assistance Needed Incidental touching   Shower/Bathe Self CARE Score 4   Bathing   Assessed Bath Style Shower   Anticipated D/C Bath Style Sponge Bath   Able to Gather/Transport No   Able to Adjust Water Temperature No   Able to Wash/Rinse/Dry (body part) Left Arm;Right Arm;L Upper Leg;R Upper Leg;L Lower Leg/Foot;R Lower Leg/Foot;Chest;Abdomen;Perineal Area;Buttocks   Limitations Noted in Balance;Endurance;Problem Solving;ROM;Safety;Strength   Positioning Seated;Standing   Adaptive Equipment Longhand Sponge;Longhand Reacher;Tub Bench;Shower Seat;Hand Held Shower   Tub/Shower Transfer   Limitations Noted In Balance;Endurance;Problem Solving;ROM;Safety;LE Strength   Adaptive Equipment Grab Bars;Transfer Bench   Assessed Shower   Findings CGA stepping in and out   Upper Body Dressing   Type of Assistance Needed Set-up / clean-up   Upper Body Dressing CARE Score 5   Lower Body Dressing   Type of Assistance Needed Supervision;Incidental touching   Lower Body Dressing CARE Score 4   Putting On/Taking Off Footwear   Type of Assistance Needed Supervision   Putting On/Taking Off Footwear CARE Score 4   Picking Up Object   Type of Assistance Needed Incidental touching   Picking Up Object CARE Score 4   Sit to Stand   Type of Assistance Needed Incidental touching   Sit to Stand CARE Score 4   Bed-Chair Transfer   Type of Assistance Needed Supervision;Incidental touching   Comment with RW   Chair/Bed-to-Chair Transfer CARE Score 4   Toileting Hygiene    Type of Assistance Needed Supervision;Incidental touching   Toileting Hygiene CARE Score 4   Toileting   Able to Pull Clothing down yes, up yes.   Manage Hygiene Bladder;Bowel   Limitations Noted In Balance;Problem Solving;ROM;Safety;LE Strength   Adaptive Equipment Grab Bar   Findings CGA standing   Toilet Transfer   Type of Assistance Needed Incidental touching   Toilet Transfer CARE Score 4   Toilet Transfer   Surface Assessed Raised Toilet   Transfer Technique Standard   Limitations Noted In Balance;Endurance;Problem Solving;ROM;Safety;LE Strength   Adaptive Equipment Grab Bar;Walker   Coordination   Fine Motor knots out of tubing with BUE   Cognition   Orientation Level Oriented X4   Additional Activities   Additional Activities Other (Comment)   Additional Activities Comments fxl mobility to and from BR with RW and CGA   Other Comments   Assessment Pt participates in 30 minutes concurrent treatment focusing on transfers/ mobility and ADL tasks with similar goals as another patient to increase strength/ ROM and activity tolerance   Assessment   Treatment Assessment Pt presents seated in recliner agreeable to OT session including toileting, ADLs, transfers/ mobility and BUE therex. Pt toleratess ession without complaints and is progressing towards goals with improved ability to rise from lower surfaces demonstrated consistency. pt requires supervision and assist due to decreased balance, safety, endurance and strength/ ROM and BLE pain. pt will benefit from conitnued skilled OT services to increase independence with daily tasks.   Problem List Decreased strength;Decreased range of motion;Decreased endurance;Impaired balance;Decreased safety awareness;Decreased skin integrity   Plan   Treatment/Interventions ADL retraining;Functional transfer training;Therapeutic exercise;Endurance training;Patient/family training;Equipment eval/education;Compensatory technique education   Progress Progressing toward goals   OT  Therapy Minutes   OT Time In 1000   OT Time Out 1130   OT Total Time (minutes) 90   OT Mode of treatment - Individual (minutes) 60   OT Mode of treatment - Concurrent (minutes) 30   Therapy Time missed   Time missed? No   Addendum:  Pt is min A donning shoes due to BLE edema.

## 2025-04-28 NOTE — PROGRESS NOTES
04/28/25 0700   Pain Assessment   Pain Assessment Tool 0-10   Pain Score 5   Pain Location/Orientation Location: Back;Orientation: Left;Location: Knee   Restrictions/Precautions   Precautions Bed/chair alarms;Fall Risk;Fluid restriction;Supervision on toilet/commode  (2000cc FR)   Weight Bearing Restrictions No   ROM Restrictions No   Cognition   Overall Cognitive Status WFL   Arousal/Participation Alert;Responsive;Cooperative   Attention Within functional limits   Following Commands Follows one step commands without difficulty   Subjective   Subjective Pt reported that he did not have his call bell and needed to use the restroom   Roll Left and Right   Type of Assistance Needed Independent   Physical Assistance Level No physical assistance   Roll Left and Right CARE Score 6   Lying to Sitting on Side of Bed   Type of Assistance Needed Supervision;Verbal cues   Physical Assistance Level No physical assistance   Lying to Sitting on Side of Bed CARE Score 4   Sit to Stand   Type of Assistance Needed Incidental touching   Physical Assistance Level No physical assistance   Comment CGA, RW   Sit to Stand CARE Score 4   Bed-Chair Transfer   Type of Assistance Needed Incidental touching   Physical Assistance Level No physical assistance   Comment CGA, RW   Chair/Bed-to-Chair Transfer CARE Score 4   Car Transfer   Type of Assistance Needed Incidental touching   Physical Assistance Level No physical assistance   Comment stimulated on NuStep   Car Transfer CARE Score 4   Walk 10 Feet   Type of Assistance Needed Incidental touching   Physical Assistance Level No physical assistance   Comment CGA, RW   Walk 10 Feet CARE Score 4   Walk 150 Feet   Reason if not Attempted Safety concerns   Walk 150 Feet CARE Score 88   Ambulation   Primary Mode of Locomotion Prior to Admission Walk   Distance Walked (feet) 45 ft  (20, 45, 30)   Assist Device Roller Walker   Does the patient walk? 2. Yes   Wheel 50 Feet with Two Turns   Type of  Assistance Needed Independent   Physical Assistance Level No physical assistance   Wheel 50 Feet with Two Turns CARE Score 6   Wheel 150 Feet   Type of Assistance Needed Independent   Physical Assistance Level No physical assistance   Wheel 150 Feet CARE Score 6   Wheelchair mobility   Method Right upper extremity;Left upper extremity   Assistance Provided For Locking Brakes;Remove Leg Rest;Replace Leg Rest   Distance Level Surface (feet) 250 ft   Type of Wheelchair Used 1. Manual   Curb or Single Stair   Style negotiated Single stair   Type of Assistance Needed Incidental touching   Physical Assistance Level No physical assistance   Comment CGA, B/L railing   1 Step (Curb) CARE Score 4   4 Steps   Type of Assistance Needed Incidental touching   Physical Assistance Level No physical assistance   Comment CGA, B/L railing   4 Steps CARE Score 4   12 Steps   Reason if not Attempted Activity not applicable   12 Steps CARE Score 9   Stairs   Type Stairs   # of Steps 9  (9 x 2)   Weight Bearing Precautions Fall Risk   Assist Devices Bilateral Rail   Toilet Transfer   Type of Assistance Needed Incidental touching   Physical Assistance Level No physical assistance   Comment CGA /c RW, grab bar   Toilet Transfer CARE Score 4   Therapeutic Interventions   Strengthening Seated LE TE 1#   Balance transfer training   Other w/c mobility, gait training, bed mobility   Equipment Use   NuStep L1, 10 min   Assessment   Treatment Assessment Pt presents supine in bed agreeable to PT. Pt reported that he had to use the restroom and was trying to call nursing but his call bell was out of reach. Pt call bell was on recliner chair. Pt reported increased left knee and back pain. Pt appeared to have decreased overall fluidity of movements secondary to the pain. Pt reported that his daughter brought him voltarin gel for his knees but he did not want to put it on at this time. Pt completed toileting with CGA and VC for utilizing grab bars. Pt  completed a total of 18 steps as he ascended and descended backwards three steps x 3 twice with b/l HR and CGA. Pt displayed improved endurance and session tolerance as seen by increased ambulation distance and stair negotiation. Pt returned to room seated in recliner set up for breakfast. Pt is scheduled for family training tomorrow morning. Cont. POC and progress as able.   Problem List Decreased strength;Decreased range of motion;Decreased endurance;Impaired balance;Decreased safety awareness;Decreased skin integrity   PT Barriers   Physical Impairment Decreased strength;Decreased endurance;Decreased mobility;Impaired balance;Decreased range of motion;Decreased coordination;Decreased skin integrity   Functional Limitation Car transfers;Ramp negotiation;Stair negotiation;Standing;Transfers;Walking   Plan   Treatment/Interventions Functional transfer training;LE strengthening/ROM;Elevations;Therapeutic exercise;Endurance training;Cognitive reorientation;Patient/family training;Equipment eval/education;Bed mobility;Gait training   Progress Progressing toward goals   PT Therapy Minutes   PT Time In 0700   PT Time Out 0830   PT Total Time (minutes) 90   PT Mode of treatment - Individual (minutes) 90   PT Mode of treatment - Concurrent (minutes) 0   PT Mode of treatment - Group (minutes) 0   PT Mode of treatment - Co-treat (minutes) 0   PT Mode of Treatment - Total time(minutes) 90 minutes   PT Cumulative Minutes 670     Seated LE TE:  4x10, B/L LEs, 1#  March  LAQ  Hip ABd - green T-band  Hip ADd - Cyndy  GS  HR  TR    Patient remains OOB in chair, all needs in reach.  Alarm in place and activated.  Encouraged use of call bell, patient verbalizes understanding.

## 2025-04-28 NOTE — PROGRESS NOTES
"Progress Note - Enrico Chavira 89 y.o. male MRN: [id number]    Unit/Bed#: [facility] 219-01 Encounter: [id number]        Subjective:   Patient seen and examined at bedside after reviewing the chart and discussing the case with the caring staff.      Patient examined at bedside.  Patient is doing well.  Denies any acute symptoms.  Reports a good weekend.     Na remains stable at 133.  INR 3.08 on 4/28.  Coumadin decreased to 5.0 mg per Dr. Gamez.     Physical Exam   Vitals: Blood pressure 123/70, pulse 88, temperature 97.5 °F (36.4 °C), temperature source Temporal, resp. rate 16, height 5' 7\" (1.702 m), weight 83.9 kg (184 lb 15.5 oz), SpO2 97%.,Body mass index is 28.97 kg/m².  Constitutional: He appears well-developed.   HEENT: PERR, EOMI, MMM  Cardiovascular: Normal rate and regular rhythm.    Pulmonary/Chest: Effort normal and breath sounds normal.   Abdomen: Soft, + BS, NT    Assessment/Plan:  Enrico Chavira is a(n) 89 y.o. male with debility due to sepsis.      Cardiac hx with HTN, new onset A-fib.  Patient is on Lasix 20 mg daily and Lopressor 25 mg q12h (new).  Of note, pt was in rapid a-fib while in ER, converted to normal sinus rhythm s/p Cardizem drip.  Home amlodipine on hold since starting Lopressor for a-fib.  Cont to monitor vitals.  Hx of PE.  Home: warfarin 7.5mg daily.  Here: warfarin 5.0 mg daily (decreased from 5.5 on 4/28).  Initially on hold due to supratherapeutic INR and restarted on 4/18 at 2 mg.  Goal 2-3.    Asthma-COPD overlap syndrome.  Continue Symbicort inhaler, Singulair 10 mg daily, Mucinex 600mg q12h, albuterol neb as needed.  Pt on Breo Ellipta at home however prefers Symbicort.   GERD.  Patient is on Protonix 20 mg daily (Prilosec nonform).   Glaucoma.  Continue home prednisolone, Xalatan, Trusopt eye drops.  Artifical tear drops as needed.  Anemia.  Hgb 10.3 -> 10.4 -> 9.5 on 4/28.  No s/sx active bleeding.  Cont to trend.   SONYA.  Resolved.  Baseline Cr 0.8-0.9.  Cr 0.76 -> 0.79 " on 4/21/2025.  Peak Cr 1.62 on 4/14.  Cont to monitor.  Chronic hyponatremia.  Level 126 on 4/14 -> 131 -> 133 on 4/28.  Fluid restriction 2L (decr from 1500 mL 4/23).  Cont to monitor.   Sepsis secondary to cellulitis of right lower extremity.  Pt treated with IV cefazolin and transitioned to Duricef 1000mg q12h to complete 10-day course of antibiotics per ID recommendations (thru 4/25).  Cont to monitor off antibiotics.   Pain and bowel regimen.  As per physiatry.    Debility due to sepsis.  Patient is receiving intensive PT OT with management as per physiatry.      Anticipated date of discharge.   Saturday, 5/3/2025.    The patient was discussed with Dr. Gamez and he is in agreement with the above note.

## 2025-04-28 NOTE — PLAN OF CARE
Problem: PAIN - ADULT  Goal: Verbalizes/displays adequate comfort level or baseline comfort level  Description: Interventions:- Encourage patient to monitor pain and request assistance- Assess pain using appropriate pain scale- Administer analgesics based on type and severity of pain and evaluate response- Implement non-pharmacological measures as appropriate and evaluate response- Consider cultural and social influences on pain and pain management- Notify physician/advanced practitioner if interventions unsuccessful or patient reports new pain  Outcome: Progressing     Problem: INFECTION - ADULT  Goal: Absence or prevention of progression during hospitalization  Description: INTERVENTIONS:- Assess and monitor for signs and symptoms of infection- Monitor lab/diagnostic results- Monitor all insertion sites, i.e. indwelling lines, tubes, and drains- Monitor endotracheal if appropriate and nasal secretions for changes in amount and color- [place] appropriate cooling/warming therapies per order- Administer medications as ordered- Instruct and encourage patient and family to use good hand hygiene technique- Identify and instruct in appropriate isolation precautions for identified infection/condition  Outcome: Progressing     Problem: SAFETY ADULT  Goal: Patient will remain free of falls  Description: INTERVENTIONS:- Educate patient/family on patient safety including physical limitations- Instruct patient to call for assistance with activity - Consult OT/PT to assist with strengthening/mobility - Keep Call bell within reach- Keep bed low and locked with side rails adjusted as appropriate- Keep care items and personal belongings within reach- Initiate and maintain comfort rounds- Make Fall Risk Sign visible to staff- Offer Toileting every 2 Hours, in advance of need- Initiate/Maintain bed/chair alarm- Apply yellow socks and bracelet for high fall risk patients- Consider moving patient to room near nurses  station  Outcome: Progressing  Goal: Maintain or return to baseline ADL function  Description: INTERVENTIONS:-  Assess patient's ability to carry out ADLs; assess patient's baseline for ADL function and identify physical deficits which impact ability to perform ADLs (bathing, care of mouth/teeth, toileting, grooming, dressing, etc.)- Assess/evaluate cause of self-care deficits - Assess range of motion- Assess patient's mobility; develop plan if impaired- Assess patient's need for assistive devices and provide as appropriate- Encourage maximum independence but intervene and supervise when necessary- Involve family in performance of ADLs- Assess for home care needs following discharge - Consider OT consult to assist with ADL evaluation and planning for discharge- Provide patient education as appropriate  Outcome: Progressing  Goal: Maintains/Returns to pre admission functional level  Description: INTERVENTIONS:- Perform AM-PAC 6 Click Basic Mobility/ Daily Activity assessment daily.- Set and communicate daily mobility goal to care team and patient/family/caregiver. - Collaborate with rehabilitation services on mobility goals if consulted-  Out of bed for toileting- Record patient progress and toleration of activity level   Outcome: Progressing     Problem: DISCHARGE PLANNING  Goal: Discharge to home or other facility with appropriate resources  Description: INTERVENTIONS:- Identify barriers to discharge w/patient and caregiver- Arrange for needed discharge resources and transportation as appropriate- Identify discharge learning needs (meds, wound care, etc.)- Arrange for interpretive services to assist at discharge as needed- Refer to Case Management Department for coordinating discharge planning if the patient needs post-hospital services based on physician/advanced practitioner order or complex needs related to functional status, cognitive ability, or social support system  Outcome: Progressing     Problem:  Knowledge Deficit  Goal: Patient/family/caregiver demonstrates understanding of disease process, treatment plan, medications, and discharge instructions  Description: Complete learning assessment and assess knowledge base.Interventions:- Provide teaching at level of understanding- Provide teaching via preferred learning methods  Outcome: Progressing     Problem: Nutrition/Hydration-ADULT  Goal: Nutrient/Hydration intake appropriate for improving, restoring or maintaining nutritional needs  Description: Monitor and assess patient's nutrition/hydration status for malnutrition. Collaborate with interdisciplinary team and initiate plan and interventions as ordered.  Monitor patient's weight and dietary intake as ordered or per policy. Utilize nutrition screening tool and intervene as necessary. Determine patient's food preferences and provide high-protein, high-caloric foods as appropriate. INTERVENTIONS:- Monitor oral intake, urinary output, labs, and treatment plans- Assess nutrition and hydration status and recommend course of action- Evaluate amount of meals eaten- Assist patient with eating if necessary - Allow adequate time for meals- Recommend/ encourage appropriate diets, oral nutritional supplements, and vitamin/mineral supplements- Order, calculate, and assess calorie counts as needed- Recommend, monitor, and adjust tube feedings and TPN/PPN based on assessed needs- Assess need for intravenous fluids- Provide specific nutrition/hydration education as appropriate- Include patient/family/caregiver in decisions related to nutrition  Outcome: Progressing     Problem: Prexisting or High Potential for Compromised Skin Integrity  Goal: Skin integrity is maintained or improved  Description: INTERVENTIONS:- Identify patients at risk for skin breakdown- Assess and monitor skin integrity- Assess and monitor nutrition and hydration status- Monitor labs - Assess for incontinence - Turn and reposition patient- Assist with  mobility/ambulation- Relieve pressure over bony prominences- Avoid friction and shearing- Provide appropriate hygiene as needed including keeping skin clean and dry- Evaluate need for skin moisturizer/barrier cream- Collaborate with interdisciplinary team - Patient/family teaching- Consider wound care consult   Outcome: Progressing     Problem: MOBILITY - ADULT  Goal: Maintain or return to baseline ADL function  Description: INTERVENTIONS:-  Assess patient's ability to carry out ADLs; assess patient's baseline for ADL function and identify physical deficits which impact ability to perform ADLs (bathing, care of mouth/teeth, toileting, grooming, dressing, etc.)- Assess/evaluate cause of self-care deficits - Assess range of motion- Assess patient's mobility; develop plan if impaired- Assess patient's need for assistive devices and provide as appropriate- Encourage maximum independence but intervene and supervise when necessary- Involve family in performance of ADLs- Assess for home care needs following discharge - Consider OT consult to assist with ADL evaluation and planning for discharge- Provide patient education as appropriate  Outcome: Progressing  Goal: Maintains/Returns to pre admission functional level  Description: INTERVENTIONS:- Perform AM-PAC 6 Click Basic Mobility/ Daily Activity assessment daily.- Set and communicate daily mobility goal to care team and patient/family/caregiver. - Collaborate with rehabilitation services on mobility goals if consulted- Perform Range of Motion 3 times a day.- Reposition patient every 2 hours.- Dangle patient 3 times a day- Stand patient 3 times a day- Ambulate patient 3 times a day- Out of bed to chair 3 times a day - Out of bed for meals 3 times a day- Out of bed for toileting- Record patient progress and toleration of activity level   Outcome: Progressing

## 2025-04-29 PROBLEM — B36.9 FUNGAL DERMATITIS: Status: ACTIVE | Noted: 2025-04-29

## 2025-04-29 LAB
INR PPP: 3.18 (ref 0.85–1.19)
PROTHROMBIN TIME: 32.7 SECONDS (ref 12.3–15)

## 2025-04-29 PROCEDURE — 97535 SELF CARE MNGMENT TRAINING: CPT

## 2025-04-29 PROCEDURE — 99232 SBSQ HOSP IP/OBS MODERATE 35: CPT

## 2025-04-29 PROCEDURE — 97116 GAIT TRAINING THERAPY: CPT | Performed by: PHYSICAL THERAPIST

## 2025-04-29 PROCEDURE — 97530 THERAPEUTIC ACTIVITIES: CPT | Performed by: PHYSICAL THERAPIST

## 2025-04-29 PROCEDURE — 85610 PROTHROMBIN TIME: CPT

## 2025-04-29 PROCEDURE — 97110 THERAPEUTIC EXERCISES: CPT | Performed by: PHYSICAL THERAPIST

## 2025-04-29 PROCEDURE — 97110 THERAPEUTIC EXERCISES: CPT

## 2025-04-29 PROCEDURE — 97530 THERAPEUTIC ACTIVITIES: CPT

## 2025-04-29 RX ORDER — WARFARIN SODIUM 2 MG/1
4 TABLET ORAL
Status: DISCONTINUED | OUTPATIENT
Start: 2025-04-29 | End: 2025-05-02

## 2025-04-29 RX ORDER — ZINC OXIDE 20 %
OINTMENT (GRAM) TOPICAL 2 TIMES DAILY PRN
Status: DISCONTINUED | OUTPATIENT
Start: 2025-04-29 | End: 2025-05-03 | Stop reason: HOSPADM

## 2025-04-29 RX ORDER — POLYETHYLENE GLYCOL 3350 17 G/17G
17 POWDER, FOR SOLUTION ORAL DAILY PRN
Status: DISCONTINUED | OUTPATIENT
Start: 2025-04-29 | End: 2025-05-03 | Stop reason: HOSPADM

## 2025-04-29 RX ADMIN — PREDNISOLONE ACETATE 1 DROP: 10 SUSPENSION/ DROPS OPHTHALMIC at 08:31

## 2025-04-29 RX ADMIN — Medication 1 APPLICATION: at 19:52

## 2025-04-29 RX ADMIN — WARFARIN SODIUM 4 MG: 2 TABLET ORAL at 17:40

## 2025-04-29 RX ADMIN — DORZOLAMIDE HCL 1 DROP: 20 SOLUTION/ DROPS OPHTHALMIC at 08:34

## 2025-04-29 RX ADMIN — DORZOLAMIDE HCL 1 DROP: 20 SOLUTION/ DROPS OPHTHALMIC at 21:08

## 2025-04-29 RX ADMIN — PREDNISOLONE ACETATE 1 DROP: 10 SUSPENSION/ DROPS OPHTHALMIC at 17:39

## 2025-04-29 RX ADMIN — METOPROLOL TARTRATE 25 MG: 25 TABLET, FILM COATED ORAL at 21:08

## 2025-04-29 RX ADMIN — BUDESONIDE AND FORMOTEROL FUMARATE DIHYDRATE 2 PUFF: 160; 4.5 AEROSOL RESPIRATORY (INHALATION) at 08:31

## 2025-04-29 RX ADMIN — FUROSEMIDE 20 MG: 20 TABLET ORAL at 08:30

## 2025-04-29 RX ADMIN — PANTOPRAZOLE SODIUM 20 MG: 20 TABLET, DELAYED RELEASE ORAL at 05:52

## 2025-04-29 RX ADMIN — METOPROLOL TARTRATE 25 MG: 25 TABLET, FILM COATED ORAL at 08:30

## 2025-04-29 RX ADMIN — BUDESONIDE AND FORMOTEROL FUMARATE DIHYDRATE 2 PUFF: 160; 4.5 AEROSOL RESPIRATORY (INHALATION) at 17:39

## 2025-04-29 RX ADMIN — Medication 1 APPLICATION: at 08:34

## 2025-04-29 RX ADMIN — WHITE PETROLATUM 57.7 %-MINERAL OIL 31.9 % EYE OINTMENT 1 APPLICATION: at 21:08

## 2025-04-29 RX ADMIN — GUAIFENESIN 600 MG: 600 TABLET ORAL at 08:30

## 2025-04-29 RX ADMIN — LIDOCAINE 5% 1 PATCH: 700 PATCH TOPICAL at 08:30

## 2025-04-29 RX ADMIN — NYSTATIN 1 APPLICATION: 100000 POWDER TOPICAL at 08:34

## 2025-04-29 RX ADMIN — LATANOPROST 1 DROP: 50 SOLUTION OPHTHALMIC at 21:08

## 2025-04-29 RX ADMIN — GUAIFENESIN 600 MG: 600 TABLET ORAL at 21:08

## 2025-04-29 RX ADMIN — POLYETHYLENE GLYCOL 3350 17 G: 17 POWDER, FOR SOLUTION ORAL at 08:30

## 2025-04-29 RX ADMIN — NYSTATIN 1 APPLICATION: 100000 POWDER TOPICAL at 19:53

## 2025-04-29 RX ADMIN — MONTELUKAST 10 MG: 10 TABLET, FILM COATED ORAL at 08:30

## 2025-04-29 NOTE — PROGRESS NOTES
"Progress Note - Enrico Chavira 89 y.o. male MRN: [id number]    Unit/Bed#: [facility] 219-01 Encounter: [id number]        Subjective:   Patient seen and examined at bedside after reviewing the chart and discussing the case with the caring staff.      Patient examined at bedside.  Patient denies any acute symptoms.     INR 3.18 on 4/29/2025.  Patient is on Coumadin 5 mg daily    Physical Exam   Vitals: Blood pressure 119/65, pulse 84, temperature (!) 97.2 °F (36.2 °C), temperature source Temporal, resp. rate 16, height 5' 7\" (1.702 m), weight 83.9 kg (184 lb 15.5 oz), SpO2 97%.,Body mass index is 28.97 kg/m².  Constitutional: He appears well-developed.   HEENT: PERR, EOMI, MMM  Cardiovascular: Normal rate and regular rhythm.    Pulmonary/Chest: Effort normal and breath sounds normal.   Abdomen: Soft, + BS, NT    Assessment/Plan:  Enrico Chavira is a(n) 89 y.o. male with debility due to sepsis.      Cardiac hx with HTN, new onset A-fib.  Patient is on Lasix 20 mg daily and Lopressor 25 mg q12h (new).  Of note, pt was in rapid a-fib while in ER, converted to normal sinus rhythm s/p Cardizem drip.  Home amlodipine on hold since starting Lopressor for a-fib.  Cont to monitor vitals.  Hx of PE.  Home: warfarin 7.5mg daily.  Here: I will reduce warfarin 4 mg mg daily (decreased from 5 on 4/29/25).  Initially on hold due to supratherapeutic INR and restarted on 4/18 at 2 mg.  Goal 2-3.    Asthma-COPD overlap syndrome.  Continue Symbicort inhaler, Singulair 10 mg daily, Mucinex 600mg q12h, albuterol neb as needed.  Pt on Breo Ellipta at home however prefers Symbicort.   GERD.  Patient is on Protonix 20 mg daily (Prilosec nonform).   Glaucoma.  Continue home prednisolone, Xalatan, Trusopt eye drops.  Artifical tear drops as needed.  Anemia.  Hgb 10.3 -> 10.4 on 4/21/2025..  No s/sx active bleeding.  Cont to trend.   SONYA.  Resolved.  Baseline Cr 0.8-0.9.  Cr 0.76 -> 0.79 on 4/21/2025.  Peak Cr 1.62 on 4/14.  Cont to " monitor.  Chronic hyponatremia.  Level 126 on 4/14 -> 131 -> 133 on 4/25.  Fluid restriction 2L (decr from 1500 mL 4/23).  Cont to monitor.   Sepsis secondary to cellulitis of right lower extremity.  Pt treated with IV cefazolin and transitioned to Duricef 1000mg q12h to complete 10-day course of antibiotics per ID recommendations (thru 4/25).  Cont to monitor off antibiotics.   Pain and bowel regimen.  As per physiatry.    Debility due to sepsis.  Patient is receiving intensive PT OT with management as per physiatry.      Anticipated date of discharge.   Saturday, 5/3/2025.

## 2025-04-29 NOTE — ASSESSMENT & PLAN NOTE
RLE venous stasis complicated by intermittent fungal infections. Currently located in inner right thigh, being treated with Nystatin powder (as opposed to ointment) to keep skin dry.   Consider referral to dermatology

## 2025-04-29 NOTE — PROGRESS NOTES
04/29/25 0700   Pain Assessment   Pain Assessment Tool 0-10   Pain Score No Pain   Restrictions/Precautions   Precautions Bed/chair alarms;Fall Risk;Fluid restriction;Supervision on toilet/commode  (2000cc FR)   Weight Bearing Restrictions No   ROM Restrictions No   Cognition   Overall Cognitive Status WFL   Arousal/Participation Alert;Responsive;Cooperative   Attention Within functional limits   Following Commands Follows one step commands without difficulty   Subjective   Subjective Pt reported that he already used restroom   Roll Left and Right   Comment Seated EOB   Sit to Lying   Comment Seated EOB   Lying to Sitting on Side of Bed   Comment Seated EOB   Sit to Stand   Type of Assistance Needed Incidental touching;Verbal cues   Physical Assistance Level No physical assistance   Sit to Stand CARE Score 4   Bed-Chair Transfer   Type of Assistance Needed Incidental touching;Verbal cues   Physical Assistance Level No physical assistance   Chair/Bed-to-Chair Transfer CARE Score 4   Car Transfer   Type of Assistance Needed Supervision;Verbal cues   Physical Assistance Level No physical assistance   Comment Stimulated on NuStep   Car Transfer CARE Score 4   Walk 10 Feet   Type of Assistance Needed Incidental touching;Verbal cues   Physical Assistance Level No physical assistance   Comment CGA, RW   Walk 10 Feet CARE Score 4   Walk 50 Feet with Two Turns   Type of Assistance Needed Incidental touching;Verbal cues   Physical Assistance Level No physical assistance   Comment CGA, RW   Walk 50 Feet with Two Turns CARE Score 4   Walk 150 Feet   Reason if not Attempted Safety concerns   Walk 150 Feet CARE Score 88   Ambulation   Primary Mode of Locomotion Prior to Admission Walk   Distance Walked (feet) 62 ft   Assist Device Roller Walker   Does the patient walk? 2. Yes   Wheel 50 Feet with Two Turns   Type of Assistance Needed Independent   Physical Assistance Level No physical assistance   Wheel 50 Feet with Two Turns  CARE Score 6   Wheel 150 Feet   Type of Assistance Needed Independent   Physical Assistance Level No physical assistance   Wheel 150 Feet CARE Score 6   Wheelchair mobility   Method Right upper extremity;Left upper extremity   Assistance Provided For Locking Brakes;Remove Leg Rest;Replace Leg Rest   Distance Level Surface (feet) 250 ft   Type of Wheelchair Used 1. Manual   Curb or Single Stair   Comment not the focus of this session   4 Steps   Comment not the focus of this session   12 Steps   Reason if not Attempted Activity not applicable   12 Steps CARE Score 9   Toilet Transfer   Comment not required this session   Therapeutic Interventions   Strengthening Seated LE TE 1#   Balance transfer training   Other w/c propelling, gait training   Equipment Use   NuStep L1, 20 min   Assessment   Treatment Assessment Pt presents seated EOB, agreeable to PT. Pt reported that he already used the bathroom this morning. Pt noted that he has no pain and slept well. Pt ambulated 62 ft with CGA and RW. Pt displayed improved endurance as seen through increased time tolerance on NuStep and new maximum ambulation distance. Pt returned to room seated in recliner set up for breakfast. Pt will be seen later this AM for family training. Cont. POC and progress as able.   Problem List Decreased strength;Decreased range of motion;Decreased endurance;Impaired balance;Decreased safety awareness;Decreased skin integrity   PT Barriers   Physical Impairment Decreased strength;Decreased endurance;Decreased mobility;Impaired balance;Decreased range of motion;Decreased coordination;Decreased skin integrity   Functional Limitation Car transfers;Ramp negotiation;Stair negotiation;Standing;Transfers;Walking   Plan   Treatment/Interventions Functional transfer training;LE strengthening/ROM;Elevations;Therapeutic exercise;Endurance training;Cognitive reorientation;Patient/family training;Equipment eval/education;Bed mobility;Gait training   Progress  Progressing toward goals   PT Therapy Minutes   PT Time In 0700   PT Time Out 0800   PT Total Time (minutes) 60   PT Mode of treatment - Individual (minutes) 30   PT Mode of treatment - Concurrent (minutes) 30   PT Mode of treatment - Group (minutes) 0   PT Mode of treatment - Co-treat (minutes) 0   PT Mode of Treatment - Total time(minutes) 60 minutes   PT Cumulative Minutes 730     Seated LE TE:  3x10, B/L LEs, 1#  March  LAQ  Hip ABd - green T-band  Hip ADd - Cyndy  GS  HR  TR  HS Curls - green T-band     Patient remains OOB in chair, all needs in reach.  Alarm in place and activated.  Encouraged use of call bell, patient verbalizes understanding.

## 2025-04-29 NOTE — ASSESSMENT & PLAN NOTE
Diagnosed after unprovoked PE in 2018   Continue chronic warfarin (INR recently supratherapeutic in acute care)   Daily INRs while inpatient   4/29: INR slightly supratherapeutic at 3.18 (warfarin decreased to from 5 to 4 mg daily per IM on 4/29)

## 2025-04-29 NOTE — ASSESSMENT & PLAN NOTE
[facility] cardi[facility] no documented history of a-fib. Patient and daughter also deny a history of dysthymia.   Continue metoprolol 25 mg q12h and chronic warfarin   Continue to monitor vitals and hemodynamic status   Per daughter, follow-up scheduled for after discharge

## 2025-04-29 NOTE — PROGRESS NOTES
"   04/29/25 0856   Pain Assessment   Pain Assessment Tool 0-10   Pain Score No Pain   Restrictions/Precautions   Precautions Bed/chair alarms;Fall Risk;Fluid restriction;Supervision on toilet/commode  (2000cc FR)   Weight Bearing Restrictions No   ROM Restrictions No   Grooming   Able To Initiate Tasks;Wash/Dry Hands   Limitation Noted In Strength;Safety   Findings supervision standing at the sink   Putting On/Taking Off Footwear   Type of Assistance Needed Physical assistance   Physical Assistance Level 25% or less   Comment Hazel to doff and matthew R sock and shoe   Putting On/Taking Off Footwear CARE Score 3   Sit to Stand   Type of Assistance Needed Incidental touching   Sit to Stand CARE Score 4   Bed-Chair Transfer   Type of Assistance Needed Incidental touching   Chair/Bed-to-Chair Transfer CARE Score 4   Toileting Hygiene   Type of Assistance Needed Supervision   Comment RN notified of scant amount of blood noted following BM with pt reporting \"My protime is working.\"   Toileting Hygiene CARE Score 4   Toileting   Able to Pull Clothing down yes, up yes.   Manage Hygiene Bladder;Bowel   Limitations Noted In Balance;Problem Solving;ROM;Safety;LE Strength   Adaptive Equipment Grab Bar   Toilet Transfer   Type of Assistance Needed Incidental touching   Toilet Transfer CARE Score 4   Toilet Transfer   Surface Assessed Raised Toilet   Transfer Technique Standard   Limitations Noted In Balance;Endurance;Problem Solving;ROM;Safety;LE Strength   Adaptive Equipment Grab Bar;Walker   Exercise Tools   Other Exercise Tool 1 yellow flex therabar 2 sets 15 up and down with BUE   Other Exercise Tool 2 5# digiflex 2 sets 15 B hands   Cognition   Orientation Level Oriented X4   Additional Activities   Additional Activities Other (Comment)   Additional Activities Comments fxl mobility to and from BR with S/CGA and RW   Assessment   Treatment Assessment Pt presents seated in relciner agreebale to OT Session including toileting, " grooming, transfers/ mobility and BUE therex prior to FT scheduled at 9:30. Pt tolerates session without complaints and is progressing nicely towards stated goals. Pt requires assist and supervision due to decreased balance, safety, endurance and strength/ ROM vida of LE with edema R>L. Pt will benefit from continued skilled OT Services to increase independence with daily tasks.   OT Family training done with: FT completed this day with 4 daughters (Lora, Eden, Melissa, Gladys) and granddaughter via Video chat. Pt demonstrates LB ADLs using A/E and toileting/ shower/ realated transfers reviewed verbally. Family showed pictures of setup at home and reports DME in place. Family report no concerns regarding OT function however does have questions for RN and MD which were writen on board and relayed.   Problem List Decreased strength;Decreased range of motion;Decreased endurance;Impaired balance;Decreased safety awareness;Decreased skin integrity   Plan   Treatment/Interventions ADL retraining;Functional transfer training;Therapeutic exercise;Endurance training;Patient/family training;Equipment eval/education;Compensatory technique education   Progress Progressing toward goals   OT Therapy Minutes   OT Time In 0856   OT Time Out 1000   OT Total Time (minutes) 64   OT Mode of treatment - Individual (minutes) 64   Therapy Time missed   Time missed? No

## 2025-04-29 NOTE — PROGRESS NOTES
04/29/25 1000   Pain Assessment   Pain Assessment Tool 0-10   Pain Score No Pain   Restrictions/Precautions   Precautions Bed/chair alarms;Fall Risk;Fluid restriction;Supervision on toilet/commode  (2000cc FR)   Weight Bearing Restrictions No   ROM Restrictions No   Cognition   Overall Cognitive Status WFL   Arousal/Participation Alert;Responsive;Cooperative   Attention Within functional limits   Memory Within functional limits   Following Commands Follows one step commands without difficulty   Subjective   Subjective Pt reported he is tired   Roll Left and Right   Comment Pt OOB   Sit to Lying   Comment Pt OOB   Lying to Sitting on Side of Bed   Comment Pt OOB   Sit to Stand   Type of Assistance Needed Incidental touching;Verbal cues   Physical Assistance Level No physical assistance   Comment CGA, RW   Sit to Stand CARE Score 4   Bed-Chair Transfer   Type of Assistance Needed Incidental touching;Verbal cues   Physical Assistance Level No physical assistance   Comment RW   Chair/Bed-to-Chair Transfer CARE Score 4   Car Transfer   Reason if not Attempted Environmental limitations   Car Transfer CARE Score 10   Walk 10 Feet   Type of Assistance Needed Incidental touching;Verbal cues   Physical Assistance Level No physical assistance   Comment CGA, RW   Walk 10 Feet CARE Score 4   Walk 50 Feet with Two Turns   Type of Assistance Needed Incidental touching;Verbal cues   Physical Assistance Level No physical assistance   Comment CGA, RW   Walk 50 Feet with Two Turns CARE Score 4   Walk 150 Feet   Reason if not Attempted Safety concerns   Walk 150 Feet CARE Score 88   Ambulation   Primary Mode of Locomotion Prior to Admission Walk   Distance Walked (feet) 60 ft   Assist Device Roller Walker   Does the patient walk? 2. Yes   Wheel 50 Feet with Two Turns   Type of Assistance Needed Independent   Physical Assistance Level No physical assistance   Wheel 50 Feet with Two Turns CARE Score 6   Wheelchair mobility   Method  Right upper extremity;Left upper extremity   Assistance Provided For Remove Leg Rest;Replace Leg Rest   Distance Level Surface (feet) 100 ft   Type of Wheelchair Used 1. Manual   Curb or Single Stair   Style negotiated Single stair   Type of Assistance Needed Incidental touching   Physical Assistance Level No physical assistance   1 Step (Curb) CARE Score 4   4 Steps   Type of Assistance Needed Incidental touching   Physical Assistance Level No physical assistance   4 Steps CARE Score 4   12 Steps   Reason if not Attempted Activity not applicable   12 Steps CARE Score 9   Stairs   Type Stairs   # of Steps 9   Weight Bearing Precautions Fall Risk   Assist Devices Bilateral Rail   Toilet Transfer   Comment not required this session   Therapeutic Interventions   Balance transfer training   Other w/c propelling, gait training, stair training   Assessment   Treatment Assessment Pt presents seated in recliner, agreeable to PT. Pts 4 daughters were present with granddaughter on facetime for family training. Pt ambulated 60 ft with RW and CGA. Pt was visibly fatigued at conclusion of walk. Pt ascended and descended backwards three steps x 3 with no rest with b/l HR and CGA. Pt was given print out of HEP and exercises were reviwed with pt and family. Pt propelled w/c 50 ft back to room. Pt transfered to recliner at conclusion of session. Cont. POC and progress as able.   Family/Caregiver Present 4 daughters (Gladys, Eden, Melissa, Naomi) present and granddaughter on facetime   PT Family training done with: Pt completed 60ft of ambulation and 9 steps utilizing b/l handrails. Amount of assistance was reviewed along with having chairs/ places to sit for rest breaks with ambulation. Pt and family were given print out of HEP. All exercises were reviewed. Pt daughter showed pictures of all available equipment. Pt has RW, rollator, commode and w/c so no further equipment needs to be ordered. Pts daughter asked if pt could use his  inversion table, PT advised against this and recommended that if they have any further questions speak to nursing/MD. Pt and pt family denied having any further questions.   Problem List Decreased strength;Decreased range of motion;Decreased endurance;Impaired balance;Decreased safety awareness;Decreased skin integrity   PT Barriers   Physical Impairment Decreased strength;Decreased endurance;Decreased mobility;Impaired balance;Decreased range of motion;Decreased coordination;Decreased skin integrity   Functional Limitation Car transfers;Ramp negotiation;Stair negotiation;Standing;Transfers;Walking   Plan   Treatment/Interventions Functional transfer training;LE strengthening/ROM;Elevations;Therapeutic exercise;Endurance training;Cognitive reorientation;Patient/family training;Equipment eval/education;Bed mobility;Gait training   Progress Progressing toward goals   PT Therapy Minutes   PT Time In 1000   PT Time Out 1030   PT Total Time (minutes) 30   PT Mode of treatment - Individual (minutes) 0   PT Mode of treatment - Concurrent (minutes) 30   PT Mode of treatment - Group (minutes) 0   PT Mode of treatment - Co-treat (minutes) 0   PT Mode of Treatment - Total time(minutes) 30 minutes   PT Cumulative Minutes 760     Patient remains OOB in chair, all needs in reach.  Alarm in place and activated.  Encouraged use of call bell, patient verbalizes understanding.

## 2025-04-29 NOTE — PROGRESS NOTES
04/29/25 1235   Pain Assessment   Pain Assessment Tool 0-10   Pain Score No Pain   Restrictions/Precautions   Precautions Bed/chair alarms;Fall Risk;Fluid restriction;Supervision on toilet/commode  (2000cc FR)   Weight Bearing Restrictions No   ROM Restrictions No   Sit to Stand   Type of Assistance Needed Incidental touching   Sit to Stand CARE Score 4   Bed-Chair Transfer   Type of Assistance Needed Incidental touching   Chair/Bed-to-Chair Transfer CARE Score 4   Exercise Tools   Other Exercise Tool 1 Sanderbox 2 sets 15 all directions with BUE and 2# wt   Other Exercise Tool 2 card match reaching with BUE while seated   Cognition   Orientation Level Oriented X4   Additional Activities   Additional Activities Other (Comment)   Additional Activities Comments w/c mobility longer distances S/ Mod I in hallway using BUE to propel   Other Comments   Assessment Pt participates in 35 minutes concurrent treatment focusing on BUE therex/ theract with similar goals as another patient to increase independence with daily tasks.   Assessment   Treatment Assessment Pt presents seated in recliner agreeable to brief OT session.  Pt toelrates session and barriers same as listed during earlier session. Pt will benefit from continued skilled OT servcies to increase indepednence with daily tasks.   Problem List Decreased strength;Decreased range of motion;Decreased endurance;Impaired balance;Decreased safety awareness;Decreased skin integrity   Plan   Treatment/Interventions ADL retraining;Functional transfer training;Therapeutic exercise;Endurance training;Patient/family training;Equipment eval/education;Compensatory technique education   Progress Progressing toward goals   OT Therapy Minutes   OT Time In 1235   OT Time Out 1310   OT Total Time (minutes) 35   OT Mode of treatment - Concurrent (minutes) 35   Therapy Time missed   Time missed? No

## 2025-04-29 NOTE — PLAN OF CARE
Problem: PAIN - ADULT  Goal: Verbalizes/displays adequate comfort level or baseline comfort level  Description: Interventions:- Encourage patient to monitor pain and request assistance- Assess pain using appropriate pain scale- Administer analgesics based on type and severity of pain and evaluate response- Implement non-pharmacological measures as appropriate and evaluate response- Consider cultural and social influences on pain and pain management- Notify physician/advanced practitioner if interventions unsuccessful or patient reports new pain  Outcome: Progressing     Problem: Nutrition/Hydration-ADULT  Goal: Nutrient/Hydration intake appropriate for improving, restoring or maintaining nutritional needs  Description: Monitor and assess patient's nutrition/hydration status for malnutrition. Collaborate with interdisciplinary team and initiate plan and interventions as ordered.  Monitor patient's weight and dietary intake as ordered or per policy. Utilize nutrition screening tool and intervene as necessary. Determine patient's food preferences and provide high-protein, high-caloric foods as appropriate. INTERVENTIONS:- Monitor oral intake, urinary output, labs, and treatment plans- Assess nutrition and hydration status and recommend course of action- Evaluate amount of meals eaten- Assist patient with eating if necessary - Allow adequate time for meals- Recommend/ encourage appropriate diets, oral nutritional supplements, and vitamin/mineral supplements- Order, calculate, and assess calorie counts as needed- Recommend, monitor, and adjust tube feedings and TPN/PPN based on assessed needs- Assess need for intravenous fluids- Provide specific nutrition/hydration education as appropriate- Include patient/family/caregiver in decisions related to nutrition  Outcome: Progressing

## 2025-04-29 NOTE — WOUND OSTOMY CARE
Progress Note - Wound   Enrico Chavira 89 y.o. male MRN: [id number]  Unit/Bed#: [facility] 219-01 Encounter: [id number]        Assessment: Patient is seen for weekly wound care assessment of the skin . He is in the chair with his family at the bedside for the assessment of the skin . He is pleasant and accepting of the assessment . Close supervision with the walker to stand . EHOB cushion is on the chair . Continent of bowel and bladder .      Assessment Findings  Bilateral heels dry and intact   Right lower leg dry and intact   Sacral buttocks dry and intact   Right lateral anterior thigh - partial thickness wound 100% pink with a dry peeling gt wound area . Small serous drainage noted   Right anterior medial  aspect of the thigh is dry and intact closed   Right elbow is dry and intact   Left 3rd toe is dry and intact     Discussed importance of elevation of the leg to reduce edema when sitting in the recliner to elevate the legs .       Skin Care Plan:  1-Right Lateral thigh  : Cleanse with VASHE, pat dry. Apply eucerin cream to the dry scaly intact skin then Apply adaptic then the calcium alginate with silver (Melgisorb Ag) to the wound bed, cover with  ABD and wrap with polly wrap or secure with burn mesh to hold in place . Chadwick with T for treatment, date. Change every other day  and PRN if soiled/displaced.   2-Turn/reposition q2h to offload and redistribution of the skin .  3-Elevate heels to offload pressure, utilize pillows for offloading float heels while in bed or chair   4-Moisturize skin daily with skin nourishing cream  5-Ehob cushion in chair when out of bed in chair/ Wheelchair  .  6- Apply  Hydraguard to bilateral sacro-buttocks BID and PRN  7-Recommend patient follow up with out patient for the right leg   8. Apply eucerin cream to dry scaly parts if red or draining do not put on the area . Apply to feet and legs as per the MD order.  9. Right elbow - cleanse with soap and water then apply a  silicone foam teresa with a P for prevention and date change every 3 days          Wound 12/26/24 Pretibial Right;Anterior (Active)       Wound 04/15/25 Other (Comments) Thigh Anterior;Right;Lateral (Active)   Wound Image   04/29/25 1058   Wound Description Fragile;Pink 04/29/25 1058   Non-staged Wound Description Partial thickness 04/29/25 1058   Wound Length (cm) 6 cm 04/29/25 1058   Wound Width (cm) 1 cm 04/29/25 1058   Wound Depth (cm) 0.1 cm 04/29/25 1058   Wound Surface Area (cm^2) 6 cm^2 04/29/25 1058   Wound Volume (cm^3) 0.6 cm^3 04/29/25 1058   Calculated Wound Volume (cm^3) 0.6 cm^3 04/29/25 1058   Change in Wound Size % 77.78 04/29/25 1058   Drainage Amount Small 04/29/25 1058   Drainage Description Serous 04/29/25 1058   Marisol-wound Assessment Clean;Dry;Intact;Fragile 04/29/25 1058   Treatments Cleansed;Site care;Irrigation with NSS 04/29/25 1058   Dressing Calcium Alginate;Non adherent 04/29/25 1058   Wound packed? No 04/18/25 1400   Dressing Changed Changed 04/29/25 1058   Patient Tolerance Tolerated well 04/29/25 1058   Dressing Status Clean;Dry;Intact 04/28/25 1930       Wound 04/15/25  Thigh Anterior;Right;Medial (Active)   Wound Image   04/29/25 1101   Wound Description Clean;Dry;Intact 04/29/25 1101   Non-staged Wound Description Not applicable 04/29/25 1101   Wound Length (cm) 0 cm 04/29/25 1101   Wound Width (cm) 0 cm 04/29/25 1101   Wound Depth (cm) 0 cm 04/29/25 1101   Wound Surface Area (cm^2) 0 cm^2 04/29/25 1101   Wound Volume (cm^3) 0 cm^3 04/29/25 1101   Calculated Wound Volume (cm^3) 0 cm^3 04/29/25 1101   Change in Wound Size % 100 04/29/25 1101   Drainage Amount None 04/29/25 1101   Drainage Description Serous 04/24/25 0835   Marisol-wound Assessment Clean;Dry;Intact 04/29/25 1101   Treatments Cleansed;Site care 04/28/25 1026   Dressing Open to air 04/28/25 1026   Wound packed? No 04/18/25 1400   Dressing Changed Changed 04/28/25 0517   Patient Tolerance Tolerated well 04/29/25 1101    Dressing Status Clean;Dry;Intact 04/28/25 1930       Wound 04/19/25 Traumatic Skin Tear Elbow Posterior;Right (Active)   Wound Image   04/29/25 1109   Wound Description Clean;Dry;Intact 04/29/25 1109   Non-staged Wound Description Not applicable 04/29/25 1109   Wound Length (cm) 0 cm 04/29/25 1109   Wound Width (cm) 0 cm 04/29/25 1109   Wound Depth (cm) 0 cm 04/29/25 1109   Wound Surface Area (cm^2) 0 cm^2 04/29/25 1109   Wound Volume (cm^3) 0 cm^3 04/29/25 1109   Calculated Wound Volume (cm^3) 0 cm^3 04/29/25 1109   Change in Wound Size % 100 04/29/25 1109   Drainage Amount None 04/29/25 1109   Drainage Description Serosanguineous 04/22/25 0923   Marisol-wound Assessment Clean;Dry;Intact 04/29/25 1109   Treatments Site care 04/29/25 1109   Dressing Foam, Silicon (eg. Allevyn, etc) 04/29/25 1109   Dressing Changed Changed 04/29/25 1109   Patient Tolerance Tolerated well 04/29/25 1109   Dressing Status Clean;Dry;Intact 04/28/25 1930       Wound care will follow weekly secure chat with questions or concerns     Christal MARROQUINN RN [facility]N

## 2025-04-29 NOTE — PROGRESS NOTES
Progress Note - PMR   Name: Enrico Chavira 89 y.o. male I MRN: [id number]  Unit/Bed#: -01 I Date of Admission: 4/19/2025   Date of Service: 4/29/2025 I Hospital Day: 10     Assessment & Plan  Cellulitis of right lower extremity  HPI:   Patient with a history of venous stasis with ulceration, recently discharged from wound care (2/24/25)   Patient presented to the ED on 4/14 due to concern for RLE infection and worsening weakness   Met sepsis criteria with tachycardia, leukocytosis, and RLE cellulitis   2 g cefriaxone given in ED   CT RLE did not reveal soft tissue gas or fluid collections, but showed subcutaneous edema and skin thickening from the proximal thigh through the ankle consistent with cellulitis  Started on IV cefazolin on 4/15 ---> transitioned to 10 day course of cefadroxil on 4/19   Procalcitonin 15.61-->10.82-->4.70-->2.23-->1.32-->0.71  Blood cultures x 2 no growth after 72 hours  Lower extremity Doppler negative for DVT    Plan:   Cefadroxil 1 g q12 hours completed on 4/25- monitor off antibiotics   PT and OT for 3 hours a day, 5-6 days a week   Seriel skin checks and monitoring   Consult wound care RN (evaluated on 4/22)   Encourage leg elevation  Continue local wound care   Hypertension  Management at discretion of IM   Continue furosemide and metoprolol   Asthma-COPD overlap syndrome (HCC)  Managed at discretion of IM   Continue Symbicort and PRN albuterol   Chest x-ray 4/17: no acute pulmonary pathology  Factor V Leiden (HCC)  Diagnosed after unprovoked PE in 2018   Continue chronic warfarin (INR recently supratherapeutic in acute care)   Daily INRs while inpatient   4/29: INR slightly supratherapeutic at 3.18 (warfarin decreased to from 5 to 4 mg daily per IM on 4/29)   Hyponatremia  Most recently 130 on 4/21 (126 in ED)   Nephrology managed in acute care   Na 124 on 11/11/24; suspect chronicity  2000 mL FR daily   Continue to monitor BMP    4/25 stable at 133     Atrial  "fibrillation (HCC)  [facility] no documented history of a-fib. Patient and daughter also deny a history of dysthymia.   Continue metoprolol 25 mg q12h and chronic warfarin   Continue to monitor vitals and hemodynamic status   Per daughter, follow-up scheduled for after discharge     SONYA (acute kidney injury) ([facility])  Baseline creatinine appears to be around 0.8-0.9. Creatinine on admission 1.62, likely prerenal secondary to sepsis   4/15 ultrasound negative for hydronephrosis   Treated with IVF   Most recent creatinine (4/28) 0.68  Continue to monitor BMP  Fall  Patient reports only 1 fall in the past year. This fall occurred a few days before his admission. He describes trying to climb into bed, bt being too weak to lift his legs and subsequently falling. Daughter is not aware of any other falls beside this one.   PT and OT and fall precautions     Abnormal CT of the chest  4/18/25 CT: \"3.7 cm anterior right upper lobe solid mass with septated cystic component and calcification. 2.6 cm lateral right upper lobe opacity and 7 mm right lower lobe nodule. These may be infectious/inflammatory but malignancy cannot be excluded. 2.3 x 1.3 cm low-attenuation nodule in the mediastinum adjacent to the hiatal hernia, question benign or malignant lymph node.\"  Per daughter, patient was aware of nodules but RUL mass new. Patient does not want to follow-up on either.   Monitor vitals, oxygen, and potential malignancy related complications   Impaired mobility and ADLs  Patient was evaluated by the rehabilitation team MD and an appropriate candidate for acute inpatient rehabilitation program at this time.  The patient will tolerate 3 hours/day 5 to 7 days/week of intensive physical and  occupational therapy   in order to obtain goals for community discharge  Due to the patient's functional Compared to their baseline level of function in addition to their ongoing medical needs, the patient would " benefit from daily supervision from a rehabilitation physician as well as rehabilitation nursing to implement and adjust the medical as well as functional plan of care in order to meet the patient's goals.  Bladder: Monitor closely for urinary incontinence and/or retention. Monitor urine output and encourage spontaneous voids. If unable to void spontaneously, will monitor with PVR bladder scans and initiate CIC regimen.  Skin: Monitor for breakdown, frequent turns, pressure offloading  Sleep: Encourage sleep hygiene (routine that promotes healthy circadian rhythm,avoid caffeine later in the day, quiet/dark room at night, reduce electronic before bedtime)  Patient lives alone, previously independent. He has 10 children, many of  whom will be able to assist after discharge.   Anticipated Discharge Date:  Home with family support Saturday, May 3rd, 2025   HHOT/ HHPT/ nursing and DME which is in place.       Chronic left-sided low back pain  Pain levels tolerable, patient relates this pain to soreness from laying   First documented by PCP in 2020  Continue to monitor   PRN tylenol and Lidoderm   Anemia  First noted on 4/15  Most recent hgb 9.5, continue to monitor CBC    History of sepsis  Possible severe sepsis given Cr increased >0.5 mg/dL from baseline and other criteria met   - INR jumped significantly as well   - Risk for post-sepsis syndrome  - Abx for per ID  - Optimal management of each as listed   - Optimal nutrition, hydration, and medical management  - Monitor vitals closely with and without activity  - Fall precautions   - Recommend acute comprehensive interdisciplinary inpatient rehabilitation to include intensive skilled therapies (PT, OT) as outlined with oversight and management by rehabilitation physician as well as inpatient rehab level nursing, case management and weekly interdisciplinary team meetings.     Potential for cognitive impairment  - Recommend MOCA cog screen in rehab later in course when  "more stable   - Recommend eval of med mgmt and IADLs   - Monitor neuro-exam, wakefulness, mood, cognition, insight into deficits and safety awareness  - Recommend patient (+/- family/caregiver education if applicable) and training as well as compensatory strategies to optimize safety, function, and decrease risk of complications  - Ensure optimal assistance/supervision after d/c   - Optimize sleep-wake cycle  - Limit sedating medications when possible  - Monitor and ensure optimal management electrolytes, nutrition, and hydration  - Monitor for signs or symptoms of infection, medication intolerances, other systemic etiologies  - Fall precautions with frequent rounding; proactive toileting program, patient should not be unattended in bathroom  - If concerns for safety in spite of frequent rounding consider virtual or in person sitter   - OP follow-up PCP and if needed additional specialists (ie neuro/zonia/pmr)  -4/21: \"MOCA as ordered completed during session scoring 19/30 with delayed recall and language most impaired.\"  Degenerative lumbar spinal stenosis  CT L spine - Diffuse severe thoracolumbar degenerative change with apex left lumbar scoliosis. A posteriorly directed vertebral body osteophyte on the right at L1-L2 results in moderate central canal narrowing  - Monitor neuro exam, b/b function, and pain  - Reports chronic pain about stable   Bilateral arm weakness  Reportedly started about 1-2 years ago and has been progressive  - B/L SH 3-/5, R EF 5, EE 4, WE 3 FF 3, L EF 5- We 2 EE 2; +Cuevas on L; brisk knee reflexes, strength fairly intact BLE   - Risk of cervical stenosis with compression and multilevel radiculopathy; hx of very severe deg lumbar stenosis  - Risk of RTC tears or combination - negative Marbella Butler  - Impacts ADL and function; some reports of worsening balance  - Per Dr. Goodwin, \"Discussed potential dx's with pt/family and potential work-up - they would not want sx at this point no matter " "what the cause but would like to know potential cause for weakness which could help with prognosis and rehab planning; apparently has metal in eye and cannot have MRI; they want to try to avoid contrast with slight risk of kidney toxicity\"  4/21: I discussed obtaining CT scan with patient and his daughter Lora, they stated that they would like to forgo CT scan as patient does would not want intervention regardless of diagnosis. He will continue to work with PT and OT for strengthening    - Monitor neuro exam, balance, symptoms closely   Chest wall asymmetry  L superior periscapular chest palpable and observable nontender region apparently there for a few year  - CT Chest did not mention concerns  - Recommend follow-up mgmt by PCP unless additional work-up needed in [facility]C at discretion of IM  Orthostatic hypotension  -4/23: episode of othostatic hypotention during PT (165/79 ---> 113/65), resolved after sitting  -FR advanced to 2L daily   Encounter for wound care  Assessment Findings:   Bilateral heels dry and intact   Right lower leg - dry and intact healed   Sacral buttocks dry and intact   Right lateral anterior thigh - partial thickness wounds with noted dry peeling skin of the gt wound area . Right leg has edema and redness noted. Moderate serous drainage noted . Gt wound area with dry peeling loose skin .    Right anterior medial thigh - partial thickness wound with small serous drainage and appearance is round as to present as a blister that unroofed . Daughter confirmed this was a blistered area .   Right lower leg and foot dry peeling areas .   Right elbow - partial thickness wound with 90% white biofilm on the wound and 10%  pink edges related to a skin tear . Noted dry scabbed area on the tip of the elbow.   Left 3rd toe - intact dry scabbed area . No drainage and no fluctuance . Daughter reports when patient was clipping his nails the clipper slipped    Fungal dermatitis  RLE venous stasis complicated by " intermittent fungal infections. Currently located in inner right thigh, being treated with Nystatin powder (as opposed to ointment) to keep skin dry.   Consider referral to dermatology     Subjective   Patient is a 89 year-old male, with a past medical history of lower extremity venous stasis with ulceration of his right lower extremity, hypertension, asthma-COPD, factor V leiden on warfarin, presenting to ARC secondary to debility to recent sepsis. He initially presented to the ED on 4/14 due to RLE cellulitis and worsening weakness. He was noted to be in rapid a-fib with RVR in the ED and met sepsis criteria with tachycardia and leukocytosis. Cardizem infusion started in the ED and he converted back to NSR. Labs were significant for leukocytosis, elevated procalcitonin, hyponatremia and SONYA. CT of the RLE did not reveal soft tissue gas or fluid collections, but showed subcutaneous edema and skin thickening from the proximal thigh through the ankle consistent with cellulitis. He was subsequently started on IV cefazolin. Blood cultures negative after 72 hours. Renal and ID recommendations were appreciated. Per ID, lower extremity skin dry and cracked, which is a likely port of entry. He clinically improved with stabilization of lab work. To complete a 10-day course of antibiotics, he was transitioned to a 6-day course of cefadroxil upon discharge. He was evaluated by PT and OT and deemed an appropriate candidate for AR[facility] due to functional deficits.     Chief Complaint: increased weakness    Interval: Seen and evaluated patient in room. Four daughters present for family training. All questions answered. Patient reports a small smear of blood on his toilet paper after wiping. He states this often happens when he has loose stools. I suspect this is related to gt anal irritation from excessive wiping. Patient denies saturation of toilet paper.  Zinc oxide ointment ordered and Miralax modified to PRN. He has been having  daily bowel movements. Last BM 4/29, brown.and soft. He is continent of bowel and bladder. Seen by wound care today, pending documentation. Wound care images reviewed.     Objective :  Temp:  [97.2 °F (36.2 °C)-98.6 °F (37 °C)] 97.2 °F (36.2 °C)  HR:  [84-94] 84  BP: (119-124)/(65-70) 119/65  Resp:  [16-18] 16  SpO2:  [97 %-98 %] 97 %  O2 Device: None (Room air)    Functional Update:  Physical Therapy Occupational Therapy   Weight Bearing Status: Weight Bearing as Tolerated  Transfers: Incidental Touching  Bed Mobility: Supervision  Amulation Distance (ft): 62 feet  Ambulation: Incidental Touching (RW)  Assistive Device for Ambulation: Roller Walker  Wheelchair Mobility Distance: 250 ft  Wheelchair Mobility: Independent  Number of Stairs: 9  Assistive Device for Stairs: Bilateral Hand Rails  Stair Assistance: Incidental Touching  Ramp: Minimal Assistance  Assistive Device for Ramp: Roller Walker  Discharge Recommendations: Home with:  DC Home with:: Family Support, Home Physical Therapy   Eating: Supervision  Grooming: Supervision  Bathing: Supervision, Incidental Touching  Bathing: Supervision, Incidental Touching  Upper Body Dressing: Supervision  Lower Body Dressing: Incidental Touching, Minimal Assistance  Toileting: Supervision, Incidental Touching  Tub/Shower Transfer: Incidental Touching  Toilet Transfer: Supervision, Incidental Touching  Cognition: Within Defined Limits  Orientation: Person, Place, Time, Situation           Physical Exam  Vitals and nursing note reviewed.   Constitutional:       General: He is not in acute distress.     Appearance: He is not ill-appearing, toxic-appearing or diaphoretic.   HENT:      Head: Normocephalic and atraumatic.      Nose: Nose normal. No congestion.      Mouth/Throat:      Mouth: Mucous membranes are moist.   Pulmonary:      Effort: Pulmonary effort is normal. No respiratory distress.   Abdominal:      General: There is no distension.   Skin:     General: Skin is  dry.   Neurological:      General: No focal deficit present.      Mental Status: He is alert.   Psychiatric:         Mood and Affect: Mood normal.         Behavior: Behavior normal.       Scheduled Meds:  Current Facility-Administered Medications   Medication Dose Route Frequency Provider Last Rate    acetaminophen  650 mg Oral Q6H PRN Nicolasa L Dagnall, PA-C      albuterol  2.5 mg Nebulization Q6H PRN Nicolasa L Dagnall, PA-C      artificial tear   Both Eyes HS Nicolasa L Dagnall, PA-C      Artificial Tears  1 drop Both Eyes 4x Daily PRN Nicolasa L Dagnall, PA-C      budesonide-formoterol  2 puff Inhalation BID Nicolasa L Dagnall, PA-C      Diclofenac Sodium  4 g Topical BID PRN Liseth Maramyak, PA-C      dorzolamide  1 drop Left Eye BID Nicolasa L Dagnall, PA-C      furosemide  20 mg Oral Daily Nicolasa L Dagnall, PA-C      guaiFENesin  600 mg Oral Q12H CLARK Nicolasa L Dagnall, PA-C      latanoprost  1 drop Both Eyes HS Nicolasa L Dagnall, PA-C      lidocaine  1 patch Topical Daily Nicolasa L Dagnall, PA-C      metoprolol tartrate  25 mg Oral Q12H CLARK Nicolasa L Dagnall, PA-C      montelukast  10 mg Oral Daily Nicolasa L Dagnall, PA-C      nystatin   Topical BID Liseth Maramyak, PA-C      pantoprazole  20 mg Oral Early Morning Nicolasa L Dagnall, PA-C      polyethylene glycol  17 g Oral Daily PRN Liseth Maramyak, PA-C      prednisoLONE acetate  1 drop Left Eye BID Nicolasa L Dagnall, PA-C      warfarin  5 mg Oral Daily (warfarin) Jose Gamez MD      white petrolatum-mineral oil   Topical BID Ta Goodwin MD      zinc oxide   Topical BID PRN Liseth Owusu, PA-C           Lab Results: I have reviewed the following results:  Results from last 7 days   Lab Units 04/28/25  0509   HEMOGLOBIN g/dL 9.5*   HEMATOCRIT % 29.4*   WBC Thousand/uL 4.81   PLATELETS Thousands/uL 381     Results from last 7 days   Lab Units 04/28/25  0509 04/25/25  0527   BUN mg/dL 19 19   SODIUM mmol/L 133* 133*   POTASSIUM mmol/L 4.0 4.2   CHLORIDE  mmol/L 104 104   CREATININE mg/dL 0.75 0.68   AST U/L 18  --    ALT U/L 14  --        Results from last 7 days   Lab Units 04/29/25  0527 04/28/25  0509 04/27/25  0513   PROTIME seconds 32.7* 32.0* 30.6*   INR  3.18* 3.08* 2.91*      Liseth Owusu PA-C  Physical Medicine and Rehabilitation  [facility]

## 2025-04-29 NOTE — TEAM CONFERENCE
Acute RehabilitationTeam Conference Note  Date: 4/29/2025   Time: 11:07 AM       Patient Name:  Enrico Chavira       Medical Record Number: [id number]   YOB: 1930  Sex: Male          Room/Bed:  [facility] 219/[facility] 219-01  Payor Info:  Payor: MEDICARE / Plan: MEDICARE A AND B / Product Type: Medicare A & B Fee for Service /      Admitting Diagnosis: Sepsis (HCC) [A41.9]   Admit Date/Time:  4/19/2025 11:44 AM  Admission Comments: No comment available     Primary Diagnosis:  Cellulitis of right lower extremity  Principal Problem: Cellulitis of right lower extremity    Patient Active Problem List    Diagnosis Date Noted    Orthostatic hypotension 04/23/2025    History of sepsis 04/20/2025    Potential for cognitive impairment 04/20/2025    Degenerative lumbar spinal stenosis 04/20/2025    Bilateral arm weakness 04/20/2025    Chest wall asymmetry 04/20/2025    Fall 04/19/2025    Abnormal CT of the chest 04/19/2025    Impaired mobility and ADLs 04/19/2025    Chronic left-sided low back pain 04/19/2025    Anemia 04/19/2025    Swelling of right knee 04/15/2025    Atrial fibrillation (Formerly [facility]) 04/14/2025    Supratherapeutic INR 04/14/2025    Sepsis (Formerly [facility]) 04/14/2025    Cellulitis of right lower extremity 04/14/2025    SONYA (acute kidney injury) (Formerly [facility]) 04/14/2025    Venous stasis ulcer of other part of right lower leg with fat layer exposed with varicose veins (Formerly [facility]) 01/16/2025    Edema of both lower extremities due to peripheral venous insufficiency 01/16/2025    Venous stasis ulcer of right calf limited to breakdown of skin without varicose veins ([facility]ly [facility]) 12/09/2024    Hashimoto's thyroiditis 12/09/2024    Acquired hypothyroidism 07/29/2024    Other fatigue 04/29/2024    Encounter for wound care 10/23/2023    Shortness of breath 09/19/2022    Hyponatremia 08/01/2022    COVID-19 virus infection 07/28/2022    Hypercalcemia 02/10/2022    Influenza vaccine refused 11/22/2021    Endothelial corneal dystrophy of both eyes  03/12/2021    Mature cataract 03/12/2021    Primary open-angle glaucoma, bilateral, mild stage 03/12/2021    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    GERD without esophagitis     Neoplasm of uncertain behavior of skin 11/19/2020    Encounter for long-term (current) use of medications 11/19/2020    Primary osteoarthritis of both knees 09/29/2020    Age-related cataract of both eyes 09/04/2020    Premature beats 09/04/2020    Chronic pulmonary embolism without acute cor pulmonale (HCC) 08/19/2020    Osteoarthritis of both knees 06/25/2020    Other seborrheic dermatitis 05/19/2020    Abnormality of gait 05/19/2020    Intrinsic eczema 12/17/2019    Prothrombin gene mutation (Formerly [facility]) 09/03/2019    Primary generalized (osteo)arthritis 09/03/2019    Hypertension 09/03/2019    Dyslipidemia 09/03/2019    Factor V Leiden (Formerly [facility]) 11/05/2018    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[facility]) 11/05/2018    History of pulmonary embolism 11/01/2018    TB lung, latent 11/01/2018    Asthma-COPD overlap syndrome (Formerly [facility]) 06/20/2012       Physical Therapy:    Weight Bearing Status: Weight Bearing as Tolerated  Transfers: Incidental Touching  Bed Mobility: Supervision  Amulation Distance (ft): 45 feet  Ambulation: Incidental Touching (RW)  Assistive Device for Ambulation: Roller Walker  Wheelchair Mobility Distance: 250 ft  Wheelchair Mobility: Independent  Number of Stairs: 9  Assistive Device for Stairs: Bilateral Hand Rails  Stair Assistance: Incidental Touching  Ramp: Minimal Assistance  Assistive Device for Ramp: Roller Walker  Discharge Recommendations: Home with:  DC Home with:: Family Support, Home Physical Therapy    4/22/25:  Patient following with ARU PT.  Presents following right LE cellulitis with sepsis.  Pt now with decreased ROM/strength, decreased balance and safety, decreased endurance, and pain.   Patient min A bed mobility, min-mod A transfers with RW, min A ambulation up to 15 feet  with wheelchair  follow on level and unlevel surfaces with RW, mod A negotiation of 2 steps with b/l handrails.  Patient would benefit from continued inpatient ARC PT to increase function, safety, and increased independence in prep for safe d/c to home.    4/29/25:  Patient following with ARU PT.  Presents following right LE cellulitis with sepsis.  Pt now with decreased ROM/strength, decreased balance and safety, decreased endurance, and pain.   Patient supervision bed mobility, CGA transfers with RW, CGA ambulation up to 45 feet on level and unlevel surfaces with RW, CGA negotiation of 9 steps with b/l handrails.  Patient would benefit from continued inpatient ARC PT to increase function, safety, and increased independence in prep for safe d/c to home.      Occupational Therapy:  Eating: Supervision  Grooming: Supervision  Bathing: Supervision, Incidental Touching  Bathing: Supervision, Incidental Touching  Upper Body Dressing: Supervision  Lower Body Dressing: Incidental Touching, Minimal Assistance  Toileting: Supervision, Incidental Touching  Tub/Shower Transfer: Incidental Touching  Toilet Transfer: Supervision, Incidental Touching  Cognition: Within Defined Limits  Orientation: Person, Place, Time, Situation  Discharge Recommendations: Home with:  DC Home with:: Family Support, First Floor Setup, Home Occupational Therapy       4/21/25: Pt presents following hospitalization due to Sepsis due to R LE cellulitis, recent fall; PMH of hypertension, COPD/asthma, GERD, chronic swelling of right lower extremity. Pt requires assist due to decreased balance, safety, endurance and generalized strength/ ROM. Pt will benefit from skilled OT services to increase independence with daily tasks.     4/28/25: Pt participates in ADLs, transfers/ mobility and BUE therex/ theract. Pt is progressing towards goals with current LOF as listed above. Pt requires assist due to decreased balance, safety, endurance and generalized strength/ ROM. Pt will  benefit from skilled OT services to increase independence with daily tasks. FT planned for 4/29 and HHOT/ family support, DME use, and A/E use recommend during transition to home.    Speech Therapy:           No notes on file    Nursing Notes:  Appetite: Good  Diet Type: Dysphagia II                      Diet Patient/Family Education Complete: Yes    Type of Wound (LDA): Wound (rle cellulitis)                    Type of Wound Patient/Family Education: Yes (rle cellulits)  Bladder: Continent     Bladder Patient/Family Education: Yes  Bowel: Continent     Bowel Patient/Family Education: Yes  Pain Location/Orientation: Orientation: Lower, Location: Back  Pain Score: 0                       Hospital Pain Intervention(s): Repositioned, Medication (See MAR)  Pain Patient/Family Education: Yes  Medication Management/Safety  Medication Patient/Family Education Complete: Yes    4/21/2025  pt. Admit from Hospital for Rt leg cellulits leading to sepsis, generalized weakness. Aox4 with forgetfulness, Heart Irregular, lungs decreased, SOB on exertion, needs rest periods, Edema BLE R>L, RLE reddened ,upper/lower dentures, [place],1 assist RW goes slow , tires easily ,Continent BB- occ bowel leakage   1500 ml  Fluid restriction, SKin: scab left 3rd toe- ,Excoriated groin- nystatin powder,Skin tear Right elbow- xeroform/mepilex, Right upper leg- open weeping blisters-  melgisorb, ABD, Kerlix, Wound Care consult. Bed/chair alarm for safety, SCD for LLE. NISH    4/29/2025  Continues with some forgetfulness. Less wheezing with ambulation. Less edema in lower legs. Less weeping from right thigh wounds. Maintains 2000ml fluid restriction with dysphagia 2 soft diet. Has been taking his meds whole with water. Continent of B & B . Wound care continues for left 3rd toe, right arm . Nystatin powder for groin area. Pt on Voltaren rub for his lower back pain and tylenol. Continues with bed and chair alarms for safety. Refuses SCD's at times.      Case Management:     Discharge Planning  Living Arrangements: Lives Alone  Support Systems: Family members  Assistance Needed: TBD  Type of Current Residence: Private residence  Current Home Care Services: No  04/22/2025:  Patient admitted to [facility] on 04/19/2025 after inpatient stay for RLE cellulitis.  Patient lives alone in a 1st floor apartment with 2 (1 +1) NILESH and B/L handrails.  Full bath including walk-in shower with lip and shower seat in main living area.   Patient has multiple children that live within an hour away and has neighbor that could also assist if needed.  Patient retired and was independent PTA using no AD.  Has active 's license but family prefers to drive.   Has all DME.   Has used [facility] for nursing and therapy needs in the past.  4/29/25 planned d/c date is 5/3/25, recommendations for [facility] RN/PT/OT, family training 4/29 10:00 am.    Is the patient actively participating in therapies? yes  List any modifications to the treatment plan:      Barriers Interventions   Cellulitis of RLE; afib with RVR; chronic hyponatremia, wounds RLE thigh and shin, skin tear R elbow, left knee pain Medication management, medical oversite, pt/ family education, 2000 ml FR, chronic Warfarin with monitoring for therapeutic level, wound care to RLE and RUE   Decreased balance, endurance, edema, strength/ ROM, increase SOB/ fatigue ADL training, gait/ transfer training, therex/ theract, pt/ family education   Baseline dysphagia level II              Is the patient making expected progress toward goals? yes  List any update or changes to goals:     Mod I bed mobility, transfers, short distance amb with RW and supervision 9 stairs.  Mod I for ADLs, toileting with use of A/ E and related transfers/ mobility.    Health and wellness:  Pt will increase activity tolerance to allow for return to routine with home management and checking in with family.    Medical Goals: Patient will be able to manage medical  conditions and comorbid conditions with medications and follow up upon completion of rehab program    Weekly Team Goals:   Rehab Team Goals  ADL Team Goal: Patient will be independent with ADLs with least restrictive device upon completion of rehab program  Bowel/Bladder Team Goal: Patient will return to premorbid level for bladder/bowel management upon completion of rehab program  Transfer Team Goal: Patient will be independent with transfers with least restrictive device upon completion of rehab program  Locomotion Team Goal: Patient will be independent with locomotion with least restrictive device upon completion of rehab program  Cognitive Team Goal: Patient will return to premorbid level of cognitive activity upon completion of rehab program    Discussion: Family training completed.  Plan for return home alone with family support from daughter with [facility] for PT, OT, and nursing.    Anticipated Discharge Date:  Saturday, May 3rd, 2025  [facility] Team Members Present:  The following team members are supervising care for this patient and were present during this Weekly Team Conference.    MD Gladys Bianchi, RN  Antionette Slaughter, RN and DAI Augustin, PT  Margo Blakely, OTR/L and Trish Garcia OTR/L

## 2025-04-29 NOTE — PLAN OF CARE
Problem: PAIN - ADULT  Goal: Verbalizes/displays adequate comfort level or baseline comfort level  Description: Interventions:- Encourage patient to monitor pain and request assistance- Assess pain using appropriate pain scale- Administer analgesics based on type and severity of pain and evaluate response- Implement non-pharmacological measures as appropriate and evaluate response- Consider cultural and social influences on pain and pain management- Notify physician/advanced practitioner if interventions unsuccessful or patient reports new pain  Outcome: Progressing     Problem: INFECTION - ADULT  Goal: Absence or prevention of progression during hospitalization  Description: INTERVENTIONS:- Assess and monitor for signs and symptoms of infection- Monitor lab/diagnostic results- Monitor all insertion sites, i.e. indwelling lines, tubes, and drains- Monitor endotracheal if appropriate and nasal secretions for changes in amount and color- [place] appropriate cooling/warming therapies per order- Administer medications as ordered- Instruct and encourage patient and family to use good hand hygiene technique- Identify and instruct in appropriate isolation precautions for identified infection/condition  Outcome: Progressing     Problem: SAFETY ADULT  Goal: Patient will remain free of falls  Description: INTERVENTIONS:- Educate patient/family on patient safety including physical limitations- Instruct patient to call for assistance with activity - Consult OT/PT to assist with strengthening/mobility - Keep Call bell within reach- Keep bed low and locked with side rails adjusted as appropriate- Keep care items and personal belongings within reach- Initiate and maintain comfort rounds- Make Fall Risk Sign visible to staff- Offer Toileting every 2 Hours, in advance of need- Initiate/Maintain bed/chair alarm- Apply yellow socks and bracelet for high fall risk patients- Consider moving patient to room near nurses  station  Outcome: Progressing     Problem: DISCHARGE PLANNING  Goal: Discharge to home or other facility with appropriate resources  Description: INTERVENTIONS:- Identify barriers to discharge w/patient and caregiver- Arrange for needed discharge resources and transportation as appropriate- Identify discharge learning needs (meds, wound care, etc.)- Arrange for interpretive services to assist at discharge as needed- Refer to Case Management Department for coordinating discharge planning if the patient needs post-hospital services based on physician/advanced practitioner order or complex needs related to functional status, cognitive ability, or social support system  Outcome: Progressing     Problem: Nutrition/Hydration-ADULT  Goal: Nutrient/Hydration intake appropriate for improving, restoring or maintaining nutritional needs  Description: Monitor and assess patient's nutrition/hydration status for malnutrition. Collaborate with interdisciplinary team and initiate plan and interventions as ordered.  Monitor patient's weight and dietary intake as ordered or per policy. Utilize nutrition screening tool and intervene as necessary. Determine patient's food preferences and provide high-protein, high-caloric foods as appropriate. INTERVENTIONS:- Monitor oral intake, urinary output, labs, and treatment plans- Assess nutrition and hydration status and recommend course of action- Evaluate amount of meals eaten- Assist patient with eating if necessary - Allow adequate time for meals- Recommend/ encourage appropriate diets, oral nutritional supplements, and vitamin/mineral supplements- Order, calculate, and assess calorie counts as needed- Recommend, monitor, and adjust tube feedings and TPN/PPN based on assessed needs- Assess need for intravenous fluids- Provide specific nutrition/hydration education as appropriate- Include patient/family/caregiver in decisions related to nutrition  Outcome: Progressing     Problem:  Prexisting or High Potential for Compromised Skin Integrity  Goal: Skin integrity is maintained or improved  Description: INTERVENTIONS:- Identify patients at risk for skin breakdown- Assess and monitor skin integrity- Assess and monitor nutrition and hydration status- Monitor labs - Assess for incontinence - Turn and reposition patient- Assist with mobility/ambulation- Relieve pressure over bony prominences- Avoid friction and shearing- Provide appropriate hygiene as needed including keeping skin clean and dry- Evaluate need for skin moisturizer/barrier cream- Collaborate with interdisciplinary team - Patient/family teaching- Consider wound care consult   Outcome: Progressing     Problem: MOBILITY - ADULT  Goal: Maintain or return to baseline ADL function  Description: INTERVENTIONS:-  Assess patient's ability to carry out ADLs; assess patient's baseline for ADL function and identify physical deficits which impact ability to perform ADLs (bathing, care of mouth/teeth, toileting, grooming, dressing, etc.)- Assess/evaluate cause of self-care deficits - Assess range of motion- Assess patient's mobility; develop plan if impaired- Assess patient's need for assistive devices and provide as appropriate- Encourage maximum independence but intervene and supervise when necessary- Involve family in performance of ADLs- Assess for home care needs following discharge - Consider OT consult to assist with ADL evaluation and planning for discharge- Provide patient education as appropriate  Outcome: Progressing

## 2025-04-30 ENCOUNTER — HOME HEALTH ADMISSION (OUTPATIENT)
Dept: HOME HEALTH SERVICES | Facility: HOME HEALTHCARE | Age: OVER 89
End: 2025-04-30
Payer: MEDICARE

## 2025-04-30 LAB
INR PPP: 2.66 (ref 0.85–1.19)
PROTHROMBIN TIME: 28.6 SECONDS (ref 12.3–15)

## 2025-04-30 PROCEDURE — 97535 SELF CARE MNGMENT TRAINING: CPT

## 2025-04-30 PROCEDURE — 85610 PROTHROMBIN TIME: CPT

## 2025-04-30 PROCEDURE — 97530 THERAPEUTIC ACTIVITIES: CPT

## 2025-04-30 PROCEDURE — 99232 SBSQ HOSP IP/OBS MODERATE 35: CPT | Performed by: PHYSICAL MEDICINE & REHABILITATION

## 2025-04-30 PROCEDURE — 97116 GAIT TRAINING THERAPY: CPT

## 2025-04-30 PROCEDURE — 97110 THERAPEUTIC EXERCISES: CPT

## 2025-04-30 RX ADMIN — DORZOLAMIDE HCL 1 DROP: 20 SOLUTION/ DROPS OPHTHALMIC at 08:52

## 2025-04-30 RX ADMIN — LIDOCAINE 5% 1 PATCH: 700 PATCH TOPICAL at 08:53

## 2025-04-30 RX ADMIN — METOPROLOL TARTRATE 25 MG: 25 TABLET, FILM COATED ORAL at 08:52

## 2025-04-30 RX ADMIN — PREDNISOLONE ACETATE 1 DROP: 10 SUSPENSION/ DROPS OPHTHALMIC at 17:26

## 2025-04-30 RX ADMIN — GUAIFENESIN 600 MG: 600 TABLET ORAL at 20:35

## 2025-04-30 RX ADMIN — DORZOLAMIDE HCL 1 DROP: 20 SOLUTION/ DROPS OPHTHALMIC at 20:29

## 2025-04-30 RX ADMIN — METOPROLOL TARTRATE 25 MG: 25 TABLET, FILM COATED ORAL at 20:35

## 2025-04-30 RX ADMIN — PANTOPRAZOLE SODIUM 20 MG: 20 TABLET, DELAYED RELEASE ORAL at 05:51

## 2025-04-30 RX ADMIN — Medication 1 APPLICATION: at 08:57

## 2025-04-30 RX ADMIN — BUDESONIDE AND FORMOTEROL FUMARATE DIHYDRATE 2 PUFF: 160; 4.5 AEROSOL RESPIRATORY (INHALATION) at 08:49

## 2025-04-30 RX ADMIN — PREDNISOLONE ACETATE 1 DROP: 10 SUSPENSION/ DROPS OPHTHALMIC at 08:58

## 2025-04-30 RX ADMIN — NYSTATIN 1 APPLICATION: 100000 POWDER TOPICAL at 08:57

## 2025-04-30 RX ADMIN — NYSTATIN 1 APPLICATION: 100000 POWDER TOPICAL at 20:32

## 2025-04-30 RX ADMIN — GUAIFENESIN 600 MG: 600 TABLET ORAL at 08:52

## 2025-04-30 RX ADMIN — MONTELUKAST 10 MG: 10 TABLET, FILM COATED ORAL at 08:52

## 2025-04-30 RX ADMIN — FUROSEMIDE 20 MG: 20 TABLET ORAL at 08:52

## 2025-04-30 RX ADMIN — Medication 1 APPLICATION: at 20:32

## 2025-04-30 RX ADMIN — ACETAMINOPHEN 650 MG: 325 TABLET ORAL at 21:06

## 2025-04-30 RX ADMIN — WHITE PETROLATUM 57.7 %-MINERAL OIL 31.9 % EYE OINTMENT 1 APPLICATION: at 20:30

## 2025-04-30 RX ADMIN — LATANOPROST 1 DROP: 50 SOLUTION OPHTHALMIC at 20:30

## 2025-04-30 RX ADMIN — BUDESONIDE AND FORMOTEROL FUMARATE DIHYDRATE 2 PUFF: 160; 4.5 AEROSOL RESPIRATORY (INHALATION) at 17:26

## 2025-04-30 RX ADMIN — WARFARIN SODIUM 4 MG: 2 TABLET ORAL at 17:26

## 2025-04-30 NOTE — PROGRESS NOTES
"Progress Note - Enrico Chavira 89 y.o. male MRN: [id number]    Unit/Bed#: [facility] 219-01 Encounter: [id number]        Subjective:   Patient seen and examined at bedside after reviewing the chart and discussing the case with the caring staff.      Patient examined at bedside.  Daughter present.  Patient denies any acute symptoms.     INR 2.66 on 4/30.  Coumadin reduced from 5mg to 4mg on 4/29.  Cont daily INR.      Physical Exam   Vitals: Blood pressure 125/61, pulse 81, temperature 97.5 °F (36.4 °C), temperature source Temporal, resp. rate 16, height 5' 7\" (1.702 m), weight 83.9 kg (184 lb 15.5 oz), SpO2 97%.,Body mass index is 28.97 kg/m².  Constitutional: Patient in no acute distress.   HEENT: PERR, EOMI, MMM.  Cardiovascular: Normal rate and regular rhythm.    Pulmonary/Chest: Effort normal and breath sounds normal.   Abdomen: Soft, + BS, NT.    Assessment/Plan:  Enrico Chavira is a(n) 89 y.o. male with debility due to sepsis.      Cardiac hx with HTN, new onset A-fib.  Patient is on Lasix 20 mg daily and Lopressor 25 mg q12h (new).  Of note, pt was in rapid a-fib while in ER, converted to normal sinus rhythm s/p Cardizem drip.  Home amlodipine on hold since starting Lopressor for a-fib.  Cont to monitor vitals.  Hx of PE.  Home: warfarin 7.5mg daily.  Here: Reduce warfarin to 4 mg daily (decreased from 5mg on 4/29/25).  Initially on hold due to supratherapeutic INR and restarted on 4/18 at 2 mg.  Goal 2-3.    Asthma-COPD overlap syndrome.  Continue Symbicort inhaler, Singulair 10 mg daily, Mucinex 600mg q12h, albuterol neb as needed.  Pt on Breo Ellipta at home however prefers Symbicort.   GERD.  Patient is on Protonix 20 mg daily (Prilosec nonform).   Glaucoma.  Continue home prednisolone, Xalatan, Trusopt eye drops.  Artifical tear drops as needed.  Anemia.  Hgb 10.3 -> 10.4 -> 9.5 on 4/28/25.  No s/sx active bleeding.  Cont to trend.   SONYA.  Resolved.  Baseline Cr 0.8-0.9.  Cr 0.76 -> 0.79 on 4/21/25.  " Peak Cr 1.62 on 4/14.  Cont to monitor.  Chronic hyponatremia.  Level 126 on 4/14 -> 131 -> 133 -> 133 on 4/28.  Fluid restriction 2L (decr from 1500 mL 4/23).  Cont to monitor.   Sepsis secondary to cellulitis of right lower extremity.  Pt treated with IV cefazolin and transitioned to Duricef 1000mg q12h to complete 10-day course of antibiotics per ID recommendations (thru 4/25).  Cont to monitor off antibiotics.   Pain and bowel regimen.  As per physiatry.    Debility due to sepsis.  Patient is receiving intensive PT OT with management as per physiatry.      Anticipated date of discharge.   Saturday, 5/3/2025    The patient was discussed with Dr. Gamez and he is in agreement with the above note.

## 2025-04-30 NOTE — ASSESSMENT & PLAN NOTE
HPI:   Patient with a history of venous stasis with ulceration, recently discharged from wound care (2/24/25)   Patient presented to the ED on 4/14 due to concern for RLE infection and worsening weakness   Met sepsis criteria with tachycardia, leukocytosis, and RLE cellulitis   2 g cefriaxone given in ED   CT RLE did not reveal soft tissue gas or fluid collections, but showed subcutaneous edema and skin thickening from the proximal thigh through the ankle consistent with cellulitis  Started on IV cefazolin on 4/15 ---> transitioned to 10 day course of cefadroxil on 4/19   Procalcitonin 15.61-->10.82-->4.70-->2.23-->1.32-->0.71  Blood cultures x 2 no growth after 72 hours  Lower extremity Doppler negative for DVT    Plan:   Cefadroxil 1 g q12 hours completed on 4/25- monitor off antibiotics   PT and OT for 3 hours a day, 5-6 days a week   Seriel skin checks and monitoring   Consult wound care RN (last evaluated on 4/30)   Encourage leg elevation  Continue local wound care

## 2025-04-30 NOTE — PLAN OF CARE
Problem: PAIN - ADULT  Goal: Verbalizes/displays adequate comfort level or baseline comfort level  Description: Interventions:- Encourage patient to monitor pain and request assistance- Assess pain using appropriate pain scale- Administer analgesics based on type and severity of pain and evaluate response- Implement non-pharmacological measures as appropriate and evaluate response- Consider cultural and social influences on pain and pain management- Notify physician/advanced practitioner if interventions unsuccessful or patient reports new pain  Outcome: Progressing     Problem: INFECTION - ADULT  Goal: Absence or prevention of progression during hospitalization  Description: INTERVENTIONS:- Assess and monitor for signs and symptoms of infection- Monitor lab/diagnostic results- Monitor all insertion sites, i.e. indwelling lines, tubes, and drains- Monitor endotracheal if appropriate and nasal secretions for changes in amount and color- [place] appropriate cooling/warming therapies per order- Administer medications as ordered- Instruct and encourage patient and family to use good hand hygiene technique- Identify and instruct in appropriate isolation precautions for identified infection/condition  Outcome: Progressing     Problem: SAFETY ADULT  Goal: Patient will remain free of falls  Description: INTERVENTIONS:- Educate patient/family on patient safety including physical limitations- Instruct patient to call for assistance with activity - Consult OT/PT to assist with strengthening/mobility - Keep Call bell within reach- Keep bed low and locked with side rails adjusted as appropriate- Keep care items and personal belongings within reach- Initiate and maintain comfort rounds- Make Fall Risk Sign visible to staff- Offer Toileting every 2 Hours, in advance of need- Initiate/Maintain bed/chair alarm- Apply yellow socks and bracelet for high fall risk patients- Consider moving patient to room near nurses  station  Outcome: Progressing     Problem: Nutrition/Hydration-ADULT  Goal: Nutrient/Hydration intake appropriate for improving, restoring or maintaining nutritional needs  Description: Monitor and assess patient's nutrition/hydration status for malnutrition. Collaborate with interdisciplinary team and initiate plan and interventions as ordered.  Monitor patient's weight and dietary intake as ordered or per policy. Utilize nutrition screening tool and intervene as necessary. Determine patient's food preferences and provide high-protein, high-caloric foods as appropriate. INTERVENTIONS:- Monitor oral intake, urinary output, labs, and treatment plans- Assess nutrition and hydration status and recommend course of action- Evaluate amount of meals eaten- Assist patient with eating if necessary - Allow adequate time for meals- Recommend/ encourage appropriate diets, oral nutritional supplements, and vitamin/mineral supplements- Order, calculate, and assess calorie counts as needed- Recommend, monitor, and adjust tube feedings and TPN/PPN based on assessed needs- Assess need for intravenous fluids- Provide specific nutrition/hydration education as appropriate- Include patient/family/caregiver in decisions related to nutrition  Outcome: Progressing     Problem: Prexisting or High Potential for Compromised Skin Integrity  Goal: Skin integrity is maintained or improved  Description: INTERVENTIONS:- Identify patients at risk for skin breakdown- Assess and monitor skin integrity- Assess and monitor nutrition and hydration status- Monitor labs - Assess for incontinence - Turn and reposition patient- Assist with mobility/ambulation- Relieve pressure over bony prominences- Avoid friction and shearing- Provide appropriate hygiene as needed including keeping skin clean and dry- Evaluate need for skin moisturizer/barrier cream- Collaborate with interdisciplinary team - Patient/family teaching- Consider wound care consult   Outcome:  Progressing

## 2025-04-30 NOTE — ASSESSMENT & PLAN NOTE
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

## 2025-04-30 NOTE — ASSESSMENT & PLAN NOTE
[facility] no documented history of a-fib. Patient and daughter also deny a history of dysthymia.   Continue metoprolol 25 mg q12h and chronic warfarin   Continue to monitor vitals and hemodynamic status   Per daughter, follow-up scheduled for after discharge

## 2025-04-30 NOTE — PROGRESS NOTES
04/30/25 0700   Pain Assessment   Pain Assessment Tool 0-10   Pain Score No Pain   Restrictions/Precautions   Precautions Bed/chair alarms;Fall Risk;Fluid restriction;Supervision on toilet/commode  (2000 mL)   Weight Bearing Restrictions No   ROM Restrictions No   Cognition   Overall Cognitive Status WFL   Arousal/Participation Alert;Responsive;Cooperative   Attention Within functional limits   Orientation Level Oriented X4   Memory Within functional limits   Following Commands Follows one step commands without difficulty   Subjective   Subjective Pt reports no complaints, needs to use the bathroom   Roll Left and Right   Type of Assistance Needed Independent   Physical Assistance Level No physical assistance   Comment bed rail   Roll Left and Right CARE Score 6   Lying to Sitting on Side of Bed   Type of Assistance Needed Supervision   Physical Assistance Level No physical assistance   Comment bed rail, HOB slightly elevated   Lying to Sitting on Side of Bed CARE Score 4   Sit to Stand   Type of Assistance Needed Supervision   Physical Assistance Level No physical assistance   Comment Cl S RW   Sit to Stand CARE Score 4   Bed-Chair Transfer   Type of Assistance Needed Supervision   Physical Assistance Level No physical assistance   Comment Cl S RW   Chair/Bed-to-Chair Transfer CARE Score 4   Car Transfer   Type of Assistance Needed Supervision   Physical Assistance Level No physical assistance   Comment simulated on NuStep, RW   Car Transfer CARE Score 4   Walk 10 Feet   Type of Assistance Needed Supervision   Physical Assistance Level No physical assistance   Comment Cl S RW   Walk 10 Feet CARE Score 4   Walk 50 Feet with Two Turns   Type of Assistance Needed Incidental touching   Physical Assistance Level No physical assistance   Comment Cl S / CGA RW, HR elevated to 133 following prolonged ambulation, 93% spO2   Walk 50 Feet with Two Turns CARE Score 4   Walk 150 Feet   Comment fatigue   Reason if not  Attempted Safety concerns   Walk 150 Feet CARE Score 88   Ambulation   Primary Mode of Locomotion Prior to Admission Walk   Distance Walked (feet) 57 ft  (15' x 2, 20' x 2)   Assist Device Roller Walker   Findings Cl S RW, increased B/L foot drag   Does the patient walk? 2. Yes   Wheel 50 Feet with Two Turns   Type of Assistance Needed Independent   Physical Assistance Level No physical assistance   Wheel 50 Feet with Two Turns CARE Score 6   Wheelchair mobility   Method Right upper extremity;Left upper extremity   Distance Level Surface (feet) 100 ft   Does the patient use a wheelchair? 1. Yes   Type of Wheelchair Used 1. Manual   Curb or Single Stair   Style negotiated Single stair   Type of Assistance Needed Supervision   Physical Assistance Level No physical assistance   Comment Cl S up/down 9  steps, B/L HR, non-reciprocal   1 Step (Curb) CARE Score 4   4 Steps   Type of Assistance Needed Supervision   Physical Assistance Level No physical assistance   Comment Cl S up/down 9  steps, B/L HR, non-reciprocal   4 Steps CARE Score 4   12 Steps   Reason if not Attempted Activity not applicable   12 Steps CARE Score 9   Toilet Transfer   Type of Assistance Needed Supervision   Physical Assistance Level No physical assistance   Comment Cl S RW, grab bar   Toilet Transfer CARE Score 4   Therapeutic Interventions   Strengthening seated LE TE   Other bed mobility, transfer training, gait training, stair training   Equipment Use   NuStep L2, 20 min, B/L UE/LE   Assessment   Treatment Assessment Pt agreeable to PT this AM, received supine in bed. Pt ambulated to nathroom to start session, required increased time for toileting. Pt is continuing to progress with LE strength/endurance as demonstrated by increased resistance on NuStep, good tolerance with seated LE TE and stair negotiation. Pt continues to be limited by fatigue and decreased endurance, HR elevated to 133, spO2 93% following prolonged ambulation at  end of session, returned to 95 bpm and 98% spO2 within ~3 min seated rest break. Pt appropriate for concurrent therapy to increase motivation and engagement for therapeutic inverventions. Will continue with current PT POC to improve deficits and promote return to PLOF.   Problem List Decreased strength;Decreased range of motion;Decreased endurance;Impaired balance;Decreased safety awareness;Decreased skin integrity   PT Barriers   Physical Impairment Decreased strength;Decreased endurance;Decreased mobility;Impaired balance;Decreased range of motion;Decreased coordination;Decreased skin integrity   Functional Limitation Car transfers;Ramp negotiation;Stair negotiation;Standing;Transfers;Walking   Plan   Treatment/Interventions Functional transfer training;LE strengthening/ROM;Therapeutic exercise;Endurance training;Patient/family training;Equipment eval/education;Bed mobility;Gait training   Progress Progressing toward goals   PT Therapy Minutes   PT Time In 0700   PT Time Out 0830   PT Total Time (minutes) 90   PT Mode of treatment - Individual (minutes) 0   PT Mode of treatment - Concurrent (minutes) 90   PT Mode of treatment - Group (minutes) 0   PT Mode of treatment - Co-treat (minutes) 0   PT Mode of Treatment - Total time(minutes) 90 minutes   PT Cumulative Minutes 850     Patient remains OOB in w/c, all needs in reach.  Alarm in place and activated.  Encouraged use of call bell, patient verbalizes understanding.

## 2025-04-30 NOTE — PROGRESS NOTES
Progress Note - PMR   Name: Enrico Chavira 89 y.o. male I MRN: [id number]  Unit/Bed#: -01 I Date of Admission: 4/19/2025   Date of Service: 4/30/2025 I Hospital Day: 11     Assessment & Plan  Cellulitis of right lower extremity  HPI:   Patient with a history of venous stasis with ulceration, recently discharged from wound care (2/24/25)   Patient presented to the ED on 4/14 due to concern for RLE infection and worsening weakness   Met sepsis criteria with tachycardia, leukocytosis, and RLE cellulitis   2 g cefriaxone given in ED   CT RLE did not reveal soft tissue gas or fluid collections, but showed subcutaneous edema and skin thickening from the proximal thigh through the ankle consistent with cellulitis  Started on IV cefazolin on 4/15 ---> transitioned to 10 day course of cefadroxil on 4/19   Procalcitonin 15.61-->10.82-->4.70-->2.23-->1.32-->0.71  Blood cultures x 2 no growth after 72 hours  Lower extremity Doppler negative for DVT    Plan:   Cefadroxil 1 g q12 hours completed on 4/25- monitor off antibiotics   PT and OT for 3 hours a day, 5-6 days a week   Seriel skin checks and monitoring   Consult wound care RN (last evaluated on 4/30)   Encourage leg elevation  Continue local wound care   Hypertension  Management at discretion of IM   Continue furosemide and metoprolol   Asthma-COPD overlap syndrome (HCC)  Managed at discretion of IM   Continue Symbicort and PRN albuterol   Chest x-ray 4/17: no acute pulmonary pathology  Factor V Leiden (HCC)  Diagnosed after unprovoked PE in 2018   Continue chronic warfarin (INR recently supratherapeutic in acute care)   Daily INRs while inpatient   4/30: INR within range (2.66)   Hyponatremia  Most recently 130 on 4/21 (126 in ED)   Nephrology managed in acute care   Na 124 on 11/11/24; suspect chronicity  2000 mL FR daily   Continue to monitor BMP    4/25 stable at 133     Atrial fibrillation (HCC)  Follows with cardiologist Dr. Mtz ([facility]), no  "documented history of a-fib. Patient and daughter also deny a history of dysthymia.   Continue metoprolol 25 mg q12h and chronic warfarin   Continue to monitor vitals and hemodynamic status   Per daughter, follow-up scheduled for after discharge     SONYA (acute kidney injury) (HCC)  Baseline creatinine appears to be around 0.8-0.9. Creatinine on admission 1.62, likely prerenal secondary to sepsis   4/15 ultrasound negative for hydronephrosis   Treated with IVF   Most recent creatinine (4/28) 0.68  Continue to monitor BMP  Fall  Patient reports only 1 fall in the past year. This fall occurred a few days before his admission. He describes trying to climb into bed, bt being too weak to lift his legs and subsequently falling. Daughter is not aware of any other falls beside this one.   PT and OT and fall precautions     Abnormal CT of the chest  4/18/25 CT: \"3.7 cm anterior right upper lobe solid mass with septated cystic component and calcification. 2.6 cm lateral right upper lobe opacity and 7 mm right lower lobe nodule. These may be infectious/inflammatory but malignancy cannot be excluded. 2.3 x 1.3 cm low-attenuation nodule in the mediastinum adjacent to the hiatal hernia, question benign or malignant lymph node.\"  Per daughter, patient was aware of nodules but RUL mass new. Patient does not want to follow-up on either.   Monitor vitals, oxygen, and potential malignancy related complications   Impaired mobility and ADLs  Patient was evaluated by the rehabilitation team MD and an appropriate candidate for acute inpatient rehabilitation program at this time.  The patient will tolerate 3 hours/day 5 to 7 days/week of intensive physical and  occupational therapy   in order to obtain goals for community discharge  Due to the patient's functional Compared to their baseline level of function in addition to their ongoing medical needs, the patient would benefit from daily supervision from a rehabilitation physician as well " as rehabilitation nursing to implement and adjust the medical as well as functional plan of care in order to meet the patient's goals.  Bladder: Monitor closely for urinary incontinence and/or retention. Monitor urine output and encourage spontaneous voids. If unable to void spontaneously, will monitor with PVR bladder scans and initiate CIC regimen.  Skin: Monitor for breakdown, frequent turns, pressure offloading  Sleep: Encourage sleep hygiene (routine that promotes healthy circadian rhythm,avoid caffeine later in the day, quiet/dark room at night, reduce electronic before bedtime)  Patient lives alone, previously independent. He has 10 children, many of  whom will be able to assist after discharge.   Anticipated Discharge Date:  Home with family support Saturday, May 3rd, 2025   HHOT/ HHPT/ nursing and DME which is in place.       Chronic left-sided low back pain  Pain levels tolerable, patient relates this pain to soreness from laying   First documented by PCP in 2020  Continue to monitor   PRN tylenol and Lidoderm   Anemia  First noted on 4/15  Most recent hgb 9.5, continue to monitor CBC    History of sepsis  Possible severe sepsis given Cr increased >0.5 mg/dL from baseline and other criteria met   - INR jumped significantly as well   - Risk for post-sepsis syndrome  - Abx for per ID  - Optimal management of each as listed   - Optimal nutrition, hydration, and medical management  - Monitor vitals closely with and without activity  - Fall precautions   - Recommend acute comprehensive interdisciplinary inpatient rehabilitation to include intensive skilled therapies (PT, OT) as outlined with oversight and management by rehabilitation physician as well as inpatient rehab level nursing, case management and weekly interdisciplinary team meetings.     Potential for cognitive impairment  - Recommend MOCA cog screen in rehab later in course when more stable   - Recommend eval of med mgmt and IADLs   - Monitor  "neuro-exam, wakefulness, mood, cognition, insight into deficits and safety awareness  - Recommend patient (+/- family/caregiver education if applicable) and training as well as compensatory strategies to optimize safety, function, and decrease risk of complications  - Ensure optimal assistance/supervision after d/c   - Optimize sleep-wake cycle  - Limit sedating medications when possible  - Monitor and ensure optimal management electrolytes, nutrition, and hydration  - Monitor for signs or symptoms of infection, medication intolerances, other systemic etiologies  - Fall precautions with frequent rounding; proactive toileting program, patient should not be unattended in bathroom  - If concerns for safety in spite of frequent rounding consider virtual or in person sitter   - OP follow-up PCP and if needed additional specialists (ie neuro/zonia/pmr)  -4/21: \"MOCA as ordered completed during session scoring 19/30 with delayed recall and language most impaired.\"  Degenerative lumbar spinal stenosis  CT L spine - Diffuse severe thoracolumbar degenerative change with apex left lumbar scoliosis. A posteriorly directed vertebral body osteophyte on the right at L1-L2 results in moderate central canal narrowing  - Monitor neuro exam, b/b function, and pain  - Reports chronic pain about stable   Bilateral arm weakness  Reportedly started about 1-2 years ago and has been progressive  - B/L SH 3-/5, R EF 5, EE 4, WE 3 FF 3, L EF 5- We 2 EE 2; +Cuevas on L; brisk knee reflexes, strength fairly intact BLE   - Risk of cervical stenosis with compression and multilevel radiculopathy; hx of very severe deg lumbar stenosis  - Risk of RTC tears or combination - negative Marbella Butler  - Impacts ADL and function; some reports of worsening balance  - Per Dr. Goodwin, \"Discussed potential dx's with pt/family and potential work-up - they would not want sx at this point no matter what the cause but would like to know potential cause for " "weakness which could help with prognosis and rehab planning; apparently has metal in eye and cannot have MRI; they want to try to avoid contrast with slight risk of kidney toxicity\"  4/21: I discussed obtaining CT scan with patient and his daughter Lora, they stated that they would like to forgo CT scan as patient does would not want intervention regardless of diagnosis. He will continue to work with PT and OT for strengthening    - Monitor neuro exam, balance, symptoms closely   Chest wall asymmetry  L superior periscapular chest palpable and observable nontender region apparently there for a few year  - CT Chest did not mention concerns  - Recommend follow-up mgmt by PCP unless additional work-up needed in ARC at discretion of IM  Orthostatic hypotension  -4/23: episode of othostatic hypotention during PT (165/79 ---> 113/65), resolved after sitting  -FR advanced to 2L daily   Encounter for wound care  Assessment Findings:   Bilateral heels dry and intact   Right lower leg - dry and intact healed   Sacral buttocks dry and intact   Right lateral anterior thigh - partial thickness wounds with noted dry peeling skin of the gt wound area . Right leg has edema and redness noted. Moderate serous drainage noted . Gt wound area with dry peeling loose skin .    Right anterior medial thigh - partial thickness wound with small serous drainage and appearance is round as to present as a blister that unroofed . Daughter confirmed this was a blistered area .   Right lower leg and foot dry peeling areas .   Right elbow - partial thickness wound with 90% white biofilm on the wound and 10%  pink edges related to a skin tear . Noted dry scabbed area on the tip of the elbow.   Left 3rd toe - intact dry scabbed area . No drainage and no fluctuance . Daughter reports when patient was clipping his nails the clipper slipped    Fungal dermatitis  RLE venous stasis complicated by intermittent fungal infections. Currently located in " inner right thigh, being treated with Nystatin powder (as opposed to ointment) to keep skin dry.   Consider referral to dermatology     Subjective   Patient is a 89 year-old male, with a past medical history of lower extremity venous stasis with ulceration of his right lower extremity, hypertension, asthma-COPD, factor V leiden on warfarin, presenting to ARC secondary to debility to recent sepsis. He initially presented to the ED on 4/14 due to RLE cellulitis and worsening weakness. He was noted to be in rapid a-fib with RVR in the ED and met sepsis criteria with tachycardia and leukocytosis. Cardizem infusion started in the ED and he converted back to NSR. Labs were significant for leukocytosis, elevated procalcitonin, hyponatremia and SONYA. CT of the RLE did not reveal soft tissue gas or fluid collections, but showed subcutaneous edema and skin thickening from the proximal thigh through the ankle consistent with cellulitis. He was subsequently started on IV cefazolin. Blood cultures negative after 72 hours. Renal and ID recommendations were appreciated. Per ID, lower extremity skin dry and cracked, which is a likely port of entry. He clinically improved with stabilization of lab work. To complete a 10-day course of antibiotics, he was transitioned to a 6-day course of cefadroxil upon discharge. He was evaluated by PT and OT and deemed an appropriate candidate for ARC due to functional deficits.     Chief Complaint: increased weakness    Interval: Seen and evaluated patient in room. Daughter present. He denies any concerns, SOB, chest pain. He states he feels well. Last BM 4/30, continent of bowel and bladder.     Objective :  Temp:  [97.5 °F (36.4 °C)-97.8 °F (36.6 °C)] 97.5 °F (36.4 °C)  HR:  [81-87] 81  BP: (125-141)/(61-71) 125/61  Resp:  [16] 16  SpO2:  [97 %] 97 %  O2 Device: None (Room air)    Functional Update:  Physical Therapy Occupational Therapy   Weight Bearing Status: Weight Bearing as  Tolerated  Transfers: Incidental Touching  Bed Mobility: Supervision  Amulation Distance (ft): 62 feet  Ambulation: Incidental Touching (RW)  Assistive Device for Ambulation: Roller Walker  Wheelchair Mobility Distance: 250 ft  Wheelchair Mobility: Independent  Number of Stairs: 9  Assistive Device for Stairs: Bilateral Hand Rails  Stair Assistance: Incidental Touching  Ramp: Minimal Assistance  Assistive Device for Ramp: Roller Walker  Discharge Recommendations: Home with:  DC Home with:: Family Support, Home Physical Therapy   Eating: Supervision  Grooming: Supervision  Bathing: Supervision, Incidental Touching  Bathing: Supervision, Incidental Touching  Upper Body Dressing: Supervision  Lower Body Dressing: Incidental Touching, Minimal Assistance  Toileting: Supervision, Incidental Touching  Tub/Shower Transfer: Incidental Touching  Toilet Transfer: Supervision, Incidental Touching  Cognition: Within Defined Limits  Orientation: Person, Place, Time, Situation           Physical Exam  Vitals and nursing note reviewed.   Constitutional:       General: He is not in acute distress.     Appearance: He is not ill-appearing, toxic-appearing or diaphoretic.   HENT:      Head: Normocephalic and atraumatic.      Nose: Nose normal. No congestion.      Mouth/Throat:      Mouth: Mucous membranes are moist.   Pulmonary:      Effort: Pulmonary effort is normal. No respiratory distress.   Abdominal:      General: There is no distension.   Skin:     General: Skin is dry.   Neurological:      General: No focal deficit present.      Mental Status: He is alert.   Psychiatric:         Mood and Affect: Mood normal.         Behavior: Behavior normal.               Scheduled Meds:  Current Facility-Administered Medications   Medication Dose Route Frequency Provider Last Rate    acetaminophen  650 mg Oral Q6H PRN Nicolasa ABIGAIL Shetty PA-C      albuterol  2.5 mg Nebulization Q6H PRN Nicolasa Shetty PA-C      artificial tear   Both Eyes  HS Nicolasa L Dagnall, PA-C      Artificial Tears  1 drop Both Eyes 4x Daily PRN Nicolasa L Dagnall, PA-C      budesonide-formoterol  2 puff Inhalation BID Nicolasa L Dagnall, PA-C      Diclofenac Sodium  4 g Topical BID PRN Liseth Owusu PA-C      dorzolamide  1 drop Left Eye BID Nicolasa L Dagnall, PA-C      furosemide  20 mg Oral Daily Nicolasa L Dagnall, PA-C      guaiFENesin  600 mg Oral Q12H CLARK Nicolasa L Dagnall, PA-C      latanoprost  1 drop Both Eyes HS Nicolasa L Dagnall, PA-C      lidocaine  1 patch Topical Daily Nicolasa L Dagnall, PA-C      metoprolol tartrate  25 mg Oral Q12H CLARK Nicolasa L Dagnall, PA-MEDARDO      montelukast  10 mg Oral Daily Nicolasa L Dagnall, PA-C      nystatin   Topical BID Liseth Owusu PA-C      pantoprazole  20 mg Oral Early Morning Nicolasa L Dagnall, PA-C      polyethylene glycol  17 g Oral Daily PRN Liseth Owusu PA-C      prednisoLONE acetate  1 drop Left Eye BID Nicolasa L Dagnall, PRATIMA      warfarin  4 mg Oral Daily (warfarin) Jose Gamez MD      white petrolatum-mineral oil   Topical BID Ta Goodwin MD      zinc oxide   Topical BID PRN Liseth Owusu PA-C           Lab Results: I have reviewed the following results:  Results from last 7 days   Lab Units 04/28/25  0509   HEMOGLOBIN g/dL 9.5*   HEMATOCRIT % 29.4*   WBC Thousand/uL 4.81   PLATELETS Thousands/uL 381     Results from last 7 days   Lab Units 04/28/25  0509 04/25/25  0527   BUN mg/dL 19 19   SODIUM mmol/L 133* 133*   POTASSIUM mmol/L 4.0 4.2   CHLORIDE mmol/L 104 104   CREATININE mg/dL 0.75 0.68   AST U/L 18  --    ALT U/L 14  --        Results from last 7 days   Lab Units 04/30/25  0551 04/29/25  0527 04/28/25  0509   PROTIME seconds 28.6* 32.7* 32.0*   INR  2.66* 3.18* 3.08*      Liseth Owusu PA-C  Physical Medicine and Rehabilitation  [facility]

## 2025-05-01 LAB
INR PPP: 2.29 (ref 0.85–1.19)
PROTHROMBIN TIME: 25.5 SECONDS (ref 12.3–15)

## 2025-05-01 PROCEDURE — 99232 SBSQ HOSP IP/OBS MODERATE 35: CPT | Performed by: PHYSICAL MEDICINE & REHABILITATION

## 2025-05-01 PROCEDURE — 97110 THERAPEUTIC EXERCISES: CPT

## 2025-05-01 PROCEDURE — 97530 THERAPEUTIC ACTIVITIES: CPT

## 2025-05-01 PROCEDURE — 97116 GAIT TRAINING THERAPY: CPT

## 2025-05-01 PROCEDURE — 97535 SELF CARE MNGMENT TRAINING: CPT

## 2025-05-01 PROCEDURE — 85610 PROTHROMBIN TIME: CPT

## 2025-05-01 RX ADMIN — GUAIFENESIN 600 MG: 600 TABLET ORAL at 21:36

## 2025-05-01 RX ADMIN — METOPROLOL TARTRATE 25 MG: 25 TABLET, FILM COATED ORAL at 08:36

## 2025-05-01 RX ADMIN — FUROSEMIDE 20 MG: 20 TABLET ORAL at 08:36

## 2025-05-01 RX ADMIN — LATANOPROST 1 DROP: 50 SOLUTION OPHTHALMIC at 21:36

## 2025-05-01 RX ADMIN — DORZOLAMIDE HCL 1 DROP: 20 SOLUTION/ DROPS OPHTHALMIC at 21:36

## 2025-05-01 RX ADMIN — WARFARIN SODIUM 4 MG: 2 TABLET ORAL at 17:27

## 2025-05-01 RX ADMIN — PREDNISOLONE ACETATE 1 DROP: 10 SUSPENSION/ DROPS OPHTHALMIC at 08:41

## 2025-05-01 RX ADMIN — MONTELUKAST 10 MG: 10 TABLET, FILM COATED ORAL at 08:36

## 2025-05-01 RX ADMIN — BUDESONIDE AND FORMOTEROL FUMARATE DIHYDRATE 2 PUFF: 160; 4.5 AEROSOL RESPIRATORY (INHALATION) at 17:28

## 2025-05-01 RX ADMIN — Medication 1 APPLICATION: at 20:47

## 2025-05-01 RX ADMIN — NYSTATIN 1 APPLICATION: 100000 POWDER TOPICAL at 20:46

## 2025-05-01 RX ADMIN — PANTOPRAZOLE SODIUM 20 MG: 20 TABLET, DELAYED RELEASE ORAL at 05:58

## 2025-05-01 RX ADMIN — PREDNISOLONE ACETATE 1 DROP: 10 SUSPENSION/ DROPS OPHTHALMIC at 17:28

## 2025-05-01 RX ADMIN — LIDOCAINE 5% 1 PATCH: 700 PATCH TOPICAL at 08:38

## 2025-05-01 RX ADMIN — WHITE PETROLATUM 57.7 %-MINERAL OIL 31.9 % EYE OINTMENT 1 APPLICATION: at 21:37

## 2025-05-01 RX ADMIN — Medication 1 APPLICATION: at 08:38

## 2025-05-01 RX ADMIN — POLYETHYLENE GLYCOL 3350 17 G: 17 POWDER, FOR SOLUTION ORAL at 08:43

## 2025-05-01 RX ADMIN — NYSTATIN 1 APPLICATION: 100000 POWDER TOPICAL at 08:38

## 2025-05-01 RX ADMIN — GUAIFENESIN 600 MG: 600 TABLET ORAL at 08:36

## 2025-05-01 RX ADMIN — METOPROLOL TARTRATE 25 MG: 25 TABLET, FILM COATED ORAL at 21:36

## 2025-05-01 RX ADMIN — DORZOLAMIDE HCL 1 DROP: 20 SOLUTION/ DROPS OPHTHALMIC at 08:37

## 2025-05-01 RX ADMIN — ACETAMINOPHEN 650 MG: 325 TABLET ORAL at 19:58

## 2025-05-01 RX ADMIN — BUDESONIDE AND FORMOTEROL FUMARATE DIHYDRATE 2 PUFF: 160; 4.5 AEROSOL RESPIRATORY (INHALATION) at 08:37

## 2025-05-01 NOTE — ASSESSMENT & PLAN NOTE
[facility] no documented history of a-fib. Patient and daughter also deny a history of dysthymia.   Continue metoprolol 25 mg q12h and chronic warfarin   Continue to monitor vitals and hemodynamic status   Per daughter, follow-up scheduled for after discharge

## 2025-05-01 NOTE — ASSESSMENT & PLAN NOTE
Baseline creatinine appears to be around 0.8-0.9. Creatinine on admission 1.62, likely prerenal secondary to sepsis   4/15 ultrasound negative for hydronephrosis   Treated with IVF   Most recent creatinine (4/28) 0.75  Continue to monitor BMP

## 2025-05-01 NOTE — PROGRESS NOTES
05/01/25 1230   Pain Assessment   Pain Assessment Tool 0-10   Pain Score No Pain   Restrictions/Precautions   Precautions Bed/chair alarms;Fall Risk;Pain;Supervision on toilet/commode;Fluid restriction   Weight Bearing Restrictions No   ROM Restrictions No   Cognition   Overall Cognitive Status WFL   Arousal/Participation Alert;Responsive;Cooperative   Attention Within functional limits   Orientation Level Oriented X4   Memory Within functional limits   Subjective   Subjective Pt reports he is tired from the first session   Roll Left and Right   Comment Pt OOB   Sit to Lying   Comment Pt OOB   Lying to Sitting on Side of Bed   Comment Pt OOB   Sit to Stand   Type of Assistance Needed Independent   Physical Assistance Level No physical assistance   Comment RW   Sit to Stand CARE Score 6   Bed-Chair Transfer   Type of Assistance Needed Independent   Physical Assistance Level No physical assistance   Comment RW   Chair/Bed-to-Chair Transfer CARE Score 6   Car Transfer   Reason if not Attempted Environmental limitations   Car Transfer CARE Score 10   Walk 10 Feet   Type of Assistance Needed Independent   Physical Assistance Level No physical assistance   Comment RW   Walk 10 Feet CARE Score 6   Walk 50 Feet with Two Turns   Type of Assistance Needed Supervision   Physical Assistance Level No physical assistance   Comment RW   Walk 50 Feet with Two Turns CARE Score 4   Walk 150 Feet   Reason if not Attempted Safety concerns   Walk 150 Feet CARE Score 88   Walking 10 Feet on Uneven Surfaces   Type of Assistance Needed Physical assistance   Physical Assistance Level 25% or less   Comment CGA/Hazel RW on ramp   Walking 10 Feet on Uneven Surfaces CARE Score 3   Ambulation   Primary Mode of Locomotion Prior to Admission Walk   Distance Walked (feet) 55 ft  (55', 20')   Assist Device Roller Walker   Does the patient walk? 2. Yes   Wheel 50 Feet with Two Turns   Type of Assistance Needed Independent   Physical Assistance  Level No physical assistance   Wheel 50 Feet with Two Turns CARE Score 6   Wheelchair mobility   Method Right upper extremity;Left upper extremity   Does the patient use a wheelchair? 1. Yes   Type of Wheelchair Used 1. Manual   Curb or Single Stair   Comment not focus of session   Toilet Transfer   Comment did not require during session   Therapeutic Interventions   Other transfer training, gait training, ramp negotiation   Equipment Use   NuStep L2, 20 min, B/L UE/LE   Assessment   Treatment Assessment Pt agreeable to PT this PM, received sitting upright in recliner. Pt able to complete 20 min of NuStep at start of session with good tolerance. Following NuStep, pt required increased seated rest breaks due to fatigue. Pt required intermittent Hazel during ramp negotiation due to LE fatigue and slight unsteadiness. Pt ambulated back to room, positioned for comfort in recliner chair with all needs in reach. Continue to recommend S for increased ambulatory distances due to LE fatigue, especially later in day. Will continue with current PT POC to improve deficits and promote return to PLOF.   Problem List Decreased strength;Decreased range of motion;Decreased endurance;Impaired balance;Decreased mobility;Decreased skin integrity;Pain   PT Barriers   Physical Impairment Decreased strength;Decreased endurance;Decreased mobility;Impaired balance;Decreased range of motion;Decreased coordination;Decreased skin integrity   Functional Limitation Car transfers;Ramp negotiation;Stair negotiation;Standing;Transfers;Walking   Plan   Treatment/Interventions Functional transfer training;LE strengthening/ROM;Therapeutic exercise;Endurance training;Patient/family training;Equipment eval/education;Bed mobility;Gait training   Progress Improving as expected   PT Therapy Minutes   PT Time In 1230   PT Time Out 1330   PT Total Time (minutes) 60   PT Mode of treatment - Individual (minutes) 0   PT Mode of treatment - Concurrent (minutes)  60   PT Mode of treatment - Group (minutes) 0   PT Mode of treatment - Co-treat (minutes) 0   PT Mode of Treatment - Total time(minutes) 60 minutes   PT Cumulative Minutes 970     Patient remains OOB in chair, all needs in reach.  Alarm in place and activated.  Encouraged use of call bell, patient verbalizes understanding.

## 2025-05-01 NOTE — PROGRESS NOTES
Progress Note - PMR   Name: Enrico Chavira 89 y.o. male I MRN: [id number]  Unit/Bed#: -01 I Date of Admission: 4/19/2025   Date of Service: 5/1/2025 I Hospital Day: 12     Assessment & Plan  Cellulitis of right lower extremity  HPI:   Patient with a history of venous stasis with ulceration, recently discharged from wound care (2/24/25)   Patient presented to the ED on 4/14 due to concern for RLE infection and worsening weakness   Met sepsis criteria with tachycardia, leukocytosis, and RLE cellulitis   2 g cefriaxone given in ED   CT RLE did not reveal soft tissue gas or fluid collections, but showed subcutaneous edema and skin thickening from the proximal thigh through the ankle consistent with cellulitis  Started on IV cefazolin on 4/15 ---> transitioned to 10 day course of cefadroxil on 4/19   Procalcitonin 15.61-->10.82-->4.70-->2.23-->1.32-->0.71  Blood cultures x 2 no growth after 72 hours  Lower extremity Doppler negative for DVT    Plan:   Cefadroxil 1 g q12 hours completed on 4/25- monitor off antibiotics   PT and OT for 3 hours a day, 5-6 days a week   Seriel skin checks and monitoring   Consult wound care RN (last evaluated on 4/30)   Encourage leg elevation  Continue local wound care   Hypertension  Management at discretion of IM   Continue furosemide and metoprolol   Asthma-COPD overlap syndrome (HCC)  Managed at discretion of IM   Continue Symbicort and PRN albuterol   Chest x-ray 4/17: no acute pulmonary pathology  Factor V Leiden (HCC)  Diagnosed after unprovoked PE in 2018   Continue chronic warfarin (INR recently supratherapeutic in acute care)   Daily INRs while inpatient   5/1: INR within range (2.29)   Hyponatremia  Most recently 130 on 4/21 (126 in ED)   Nephrology managed in acute care   Na 124 on 11/11/24; suspect chronicity  2000 mL FR daily   Continue to monitor BMP    4/28 stable at 133     Atrial fibrillation (HCC)  Follows with cardiologist Dr. Mtz ([facility]), no documented  "history of a-fib. Patient and daughter also deny a history of dysthymia.   Continue metoprolol 25 mg q12h and chronic warfarin   Continue to monitor vitals and hemodynamic status   Per daughter, follow-up scheduled for after discharge     SONYA (acute kidney injury) (HCC)  Baseline creatinine appears to be around 0.8-0.9. Creatinine on admission 1.62, likely prerenal secondary to sepsis   4/15 ultrasound negative for hydronephrosis   Treated with IVF   Most recent creatinine (4/28) 0.75  Continue to monitor BMP  Fall  Patient reports only 1 fall in the past year. This fall occurred a few days before his admission. He describes trying to climb into bed, bt being too weak to lift his legs and subsequently falling. Daughter is not aware of any other falls beside this one.   PT and OT and fall precautions     Abnormal CT of the chest  4/18/25 CT: \"3.7 cm anterior right upper lobe solid mass with septated cystic component and calcification. 2.6 cm lateral right upper lobe opacity and 7 mm right lower lobe nodule. These may be infectious/inflammatory but malignancy cannot be excluded. 2.3 x 1.3 cm low-attenuation nodule in the mediastinum adjacent to the hiatal hernia, question benign or malignant lymph node.\"  Per daughter, patient was aware of nodules but RUL mass new. Patient does not want to follow-up on either.   Monitor vitals, oxygen, and potential malignancy related complications   Impaired mobility and ADLs  Patient was evaluated by the rehabilitation team MD and an appropriate candidate for acute inpatient rehabilitation program at this time.  The patient will tolerate 3 hours/day 5 to 7 days/week of intensive physical and  occupational therapy   in order to obtain goals for community discharge  Due to the patient's functional Compared to their baseline level of function in addition to their ongoing medical needs, the patient would benefit from daily supervision from a rehabilitation physician as well as " rehabilitation nursing to implement and adjust the medical as well as functional plan of care in order to meet the patient's goals.  Bladder: Monitor closely for urinary incontinence and/or retention. Monitor urine output and encourage spontaneous voids. If unable to void spontaneously, will monitor with PVR bladder scans and initiate CIC regimen.  Skin: Monitor for breakdown, frequent turns, pressure offloading  Sleep: Encourage sleep hygiene (routine that promotes healthy circadian rhythm,avoid caffeine later in the day, quiet/dark room at night, reduce electronic before bedtime)  Patient lives alone, previously independent. He has 10 children, many of  whom will be able to assist after discharge.   Anticipated Discharge Date:  Home with family support Saturday, May 3rd, 2025   HHOT/ HHPT/ nursing and DME which is in place.       Chronic left-sided low back pain  Pain levels tolerable, patient relates this pain to soreness from laying   First documented by PCP in 2020  Continue to monitor   PRN tylenol and Lidoderm   Anemia  First noted on 4/15  Most recent hgb 9.5, continue to monitor CBC    History of sepsis  Possible severe sepsis given Cr increased >0.5 mg/dL from baseline and other criteria met   - INR jumped significantly as well   - Risk for post-sepsis syndrome  - Abx for per ID  - Optimal management of each as listed   - Optimal nutrition, hydration, and medical management  - Monitor vitals closely with and without activity  - Fall precautions   - Recommend acute comprehensive interdisciplinary inpatient rehabilitation to include intensive skilled therapies (PT, OT) as outlined with oversight and management by rehabilitation physician as well as inpatient rehab level nursing, case management and weekly interdisciplinary team meetings.     Potential for cognitive impairment  - Recommend MOCA cog screen in rehab later in course when more stable   - Recommend eval of med mgmt and IADLs   - Monitor  "neuro-exam, wakefulness, mood, cognition, insight into deficits and safety awareness  - Recommend patient (+/- family/caregiver education if applicable) and training as well as compensatory strategies to optimize safety, function, and decrease risk of complications  - Ensure optimal assistance/supervision after d/c   - Optimize sleep-wake cycle  - Limit sedating medications when possible  - Monitor and ensure optimal management electrolytes, nutrition, and hydration  - Monitor for signs or symptoms of infection, medication intolerances, other systemic etiologies  - Fall precautions with frequent rounding; proactive toileting program, patient should not be unattended in bathroom  - If concerns for safety in spite of frequent rounding consider virtual or in person sitter   - OP follow-up PCP and if needed additional specialists (ie neuro/zonia/pmr)  -4/21: \"MOCA as ordered completed during session scoring 19/30 with delayed recall and language most impaired.\"  Degenerative lumbar spinal stenosis  CT L spine - Diffuse severe thoracolumbar degenerative change with apex left lumbar scoliosis. A posteriorly directed vertebral body osteophyte on the right at L1-L2 results in moderate central canal narrowing  - Monitor neuro exam, b/b function, and pain  - Reports chronic pain about stable   Bilateral arm weakness  Reportedly started about 1-2 years ago and has been progressive  - B/L SH 3-/5, R EF 5, EE 4, WE 3 FF 3, L EF 5- We 2 EE 2; +Cuevas on L; brisk knee reflexes, strength fairly intact BLE   - Risk of cervical stenosis with compression and multilevel radiculopathy; hx of very severe deg lumbar stenosis  - Risk of RTC tears or combination - negative Marbella Butler  - Impacts ADL and function; some reports of worsening balance  - Per Dr. Godowin, \"Discussed potential dx's with pt/family and potential work-up - they would not want sx at this point no matter what the cause but would like to know potential cause for " "weakness which could help with prognosis and rehab planning; apparently has metal in eye and cannot have MRI; they want to try to avoid contrast with slight risk of kidney toxicity\"  4/21: I discussed obtaining CT scan with patient and his daughter Lora, they stated that they would like to forgo CT scan as patient does would not want intervention regardless of diagnosis. He will continue to work with PT and OT for strengthening    - Monitor neuro exam, balance, symptoms closely   Chest wall asymmetry  L superior periscapular chest palpable and observable nontender region apparently there for a few year  - CT Chest did not mention concerns  - Recommend follow-up mgmt by PCP unless additional work-up needed in ARC at discretion of IM  Orthostatic hypotension  -4/23: episode of othostatic hypotention during PT (165/79 ---> 113/65), resolved after sitting  -FR advanced to 2L daily   Encounter for wound care  Assessment Findings:   Bilateral heels dry and intact   Right lower leg - dry and intact healed   Sacral buttocks dry and intact   Right lateral anterior thigh - partial thickness wounds with noted dry peeling skin of the gt wound area . Right leg has edema and redness noted. Moderate serous drainage noted . Gt wound area with dry peeling loose skin .    Right anterior medial thigh - partial thickness wound with small serous drainage and appearance is round as to present as a blister that unroofed . Daughter confirmed this was a blistered area .   Right lower leg and foot dry peeling areas .   Right elbow - partial thickness wound with 90% white biofilm on the wound and 10%  pink edges related to a skin tear . Noted dry scabbed area on the tip of the elbow.   Left 3rd toe - intact dry scabbed area . No drainage and no fluctuance . Daughter reports when patient was clipping his nails the clipper slipped    Fungal dermatitis  RLE venous stasis complicated by intermittent fungal infections. Currently located in " inner right thigh, being treated with Nystatin powder (as opposed to ointment) to keep skin dry.   Consider referral to dermatology     Subjective   Patient is a 89 year-old male, with a past medical history of lower extremity venous stasis with ulceration of his right lower extremity, hypertension, asthma-COPD, factor V leiden on warfarin, presenting to ARC secondary to debility to recent sepsis. He initially presented to the ED on 4/14 due to RLE cellulitis and worsening weakness. He was noted to be in rapid a-fib with RVR in the ED and met sepsis criteria with tachycardia and leukocytosis. Cardizem infusion started in the ED and he converted back to NSR. Labs were significant for leukocytosis, elevated procalcitonin, hyponatremia and SONYA. CT of the RLE did not reveal soft tissue gas or fluid collections, but showed subcutaneous edema and skin thickening from the proximal thigh through the ankle consistent with cellulitis. He was subsequently started on IV cefazolin. Blood cultures negative after 72 hours. Renal and ID recommendations were appreciated. Per ID, lower extremity skin dry and cracked, which is a likely port of entry. He clinically improved with stabilization of lab work. To complete a 10-day course of antibiotics, he was transitioned to a 6-day course of cefadroxil upon discharge. He was evaluated by PT and OT and deemed an appropriate candidate for ARC due to functional deficits.     Chief Complaint: increased weakness    Interval:  Seen and evaluated patient during OT.  He denies any concerns, SOB, chest pain. He states he feels well. Last BM 4/30, continent of bowel and bladder.     Objective :  Temp:  [96.6 °F (35.9 °C)-97.6 °F (36.4 °C)] 96.6 °F (35.9 °C)  HR:  [85-89] 85  BP: (149-170)/(70-92) 170/92  Resp:  [12-16] 16  SpO2:  [96 %-98 %] 96 %  O2 Device: None (Room air)    Functional Update:  Physical Therapy Occupational Therapy   Weight Bearing Status: Weight Bearing as Tolerated  Transfers:  Incidental Touching  Bed Mobility: Supervision  Amulation Distance (ft): 62 feet  Ambulation: Incidental Touching (RW)  Assistive Device for Ambulation: Roller Walker  Wheelchair Mobility Distance: 250 ft  Wheelchair Mobility: Independent  Number of Stairs: 9  Assistive Device for Stairs: Bilateral Hand Rails  Stair Assistance: Incidental Touching  Ramp: Minimal Assistance  Assistive Device for Ramp: Roller Walker  Discharge Recommendations: Home with:  DC Home with:: Family Support, Home Physical Therapy   Eating: Supervision  Grooming: Supervision  Bathing: Supervision, Incidental Touching  Bathing: Supervision, Incidental Touching  Upper Body Dressing: Supervision  Lower Body Dressing: Incidental Touching, Minimal Assistance  Toileting: Supervision, Incidental Touching  Tub/Shower Transfer: Incidental Touching  Toilet Transfer: Supervision, Incidental Touching  Cognition: Within Defined Limits  Orientation: Person, Place, Time, Situation           Physical Exam  Vitals and nursing note reviewed.   Constitutional:       General: He is not in acute distress.     Appearance: He is not ill-appearing, toxic-appearing or diaphoretic.   HENT:      Head: Normocephalic and atraumatic.      Nose: Nose normal. No congestion.      Mouth/Throat:      Mouth: Mucous membranes are moist.   Pulmonary:      Effort: Pulmonary effort is normal. No respiratory distress.   Abdominal:      General: There is no distension.   Skin:     General: Skin is dry.   Neurological:      General: No focal deficit present.      Mental Status: He is alert.   Psychiatric:         Mood and Affect: Mood normal.         Behavior: Behavior normal.         Scheduled Meds:  Current Facility-Administered Medications   Medication Dose Route Frequency Provider Last Rate    acetaminophen  650 mg Oral Q6H PRN Nicolasa L VICTORIANO Shetty-C      albuterol  2.5 mg Nebulization Q6H PRN Nicolasa L Diogenes PA-C      artificial tear   Both Eyes HS Nicolasa Shetty PA-C       Artificial Tears  1 drop Both Eyes 4x Daily PRN Nicolasa L Dagnall, PA-MEDARDO      budesonide-formoterol  2 puff Inhalation BID Nicolasa L Dagnall, PA-MEDARDO      Diclofenac Sodium  4 g Topical BID PRN Liseth Owusu PA-C      dorzolamide  1 drop Left Eye BID Nicolasa L Dagnall, PA-C      furosemide  20 mg Oral Daily Nicolasa L Dagnall, PA-C      guaiFENesin  600 mg Oral Q12H CLARK Nicolasa L Dagnall, PA-MEDARDO      latanoprost  1 drop Both Eyes HS Nicolasa L Dagnall, PA-C      lidocaine  1 patch Topical Daily Nicolasa L Dagnall, PA-C      metoprolol tartrate  25 mg Oral Q12H CLARK Nicolasa L Dagnall, PA-MEDARDO      montelukast  10 mg Oral Daily Nicolasa L Dagnall, PA-MEDARDO      nystatin   Topical BID Liseth Owusu PA-C      pantoprazole  20 mg Oral Early Morning Nicolasa L Dagnall, PA-C      polyethylene glycol  17 g Oral Daily PRN Liseth Owusu PA-C      prednisoLONE acetate  1 drop Left Eye BID Nicolasa L Dagnall, PRATIMA      warfarin  4 mg Oral Daily (warfarin) Jose Gamez MD      white petrolatum-mineral oil   Topical BID Ta Goodwin MD      zinc oxide   Topical BID PRN Liseth Owusu PA-C           Lab Results: I have reviewed the following results:  Results from last 7 days   Lab Units 04/28/25  0509   HEMOGLOBIN g/dL 9.5*   HEMATOCRIT % 29.4*   WBC Thousand/uL 4.81   PLATELETS Thousands/uL 381     Results from last 7 days   Lab Units 04/28/25  0509 04/25/25  0527   BUN mg/dL 19 19   SODIUM mmol/L 133* 133*   POTASSIUM mmol/L 4.0 4.2   CHLORIDE mmol/L 104 104   CREATININE mg/dL 0.75 0.68   AST U/L 18  --    ALT U/L 14  --        Results from last 7 days   Lab Units 05/01/25  0541 04/30/25  0551 04/29/25  0527   PROTIME seconds 25.5* 28.6* 32.7*   INR  2.29* 2.66* 3.18*      Liseth Owusu PA-C  Physical Medicine and Rehabilitation  [facility]

## 2025-05-01 NOTE — PROGRESS NOTES
05/01/25 1100   Pain Assessment   Pain Assessment Tool 0-10   Pain Score No Pain   Restrictions/Precautions   Precautions Bed/chair alarms;Fall Risk;Pain;Fluid restriction;Supervision on toilet/commode  (2000 mL FR)   Weight Bearing Restrictions No   ROM Restrictions No   Cognition   Overall Cognitive Status WFL   Arousal/Participation Alert;Responsive;Cooperative   Attention Within functional limits   Orientation Level Oriented X4   Memory Within functional limits   Following Commands Follows all commands and directions without difficulty   Subjective   Subjective Pt reports of no pain or soreness, ready to get to work   Roll Left and Right   Comment Pt OOB   Sit to Lying   Comment Pt OOB   Lying to Sitting on Side of Bed   Comment Pt OOB   Sit to Stand   Type of Assistance Needed Independent   Physical Assistance Level No physical assistance   Comment RW   Sit to Stand CARE Score 6   Bed-Chair Transfer   Type of Assistance Needed Independent   Physical Assistance Level No physical assistance   Comment RW, vs direct SPT no AD   Chair/Bed-to-Chair Transfer CARE Score 6   Car Transfer   Reason if not Attempted Environmental limitations   Car Transfer CARE Score 10   Walk 10 Feet   Type of Assistance Needed Independent   Physical Assistance Level No physical assistance   Comment RW   Walk 10 Feet CARE Score 6   Walk 50 Feet with Two Turns   Type of Assistance Needed Supervision   Physical Assistance Level No physical assistance   Comment RW   Walk 50 Feet with Two Turns CARE Score 4   Walk 150 Feet   Reason if not Attempted Safety concerns   Walk 150 Feet CARE Score 88   Ambulation   Primary Mode of Locomotion Prior to Admission Walk   Distance Walked (feet) 54 ft  (54', 20' x 2)   Assist Device Roller Walker   Findings mod I within 10', S increased household distances due to fatigue and SOB   Does the patient walk? 2. Yes   Wheel 50 Feet with Two Turns   Type of Assistance Needed Independent   Physical Assistance  "Level No physical assistance   Wheel 50 Feet with Two Turns CARE Score 6   Wheel 150 Feet   Type of Assistance Needed Independent   Physical Assistance Level No physical assistance   Wheel 150 Feet CARE Score 6   Wheelchair mobility   Method Right upper extremity;Left upper extremity   Distance Level Surface (feet) 150 ft   Does the patient use a wheelchair? 1. Yes   Type of Wheelchair Used 1. Manual   Curb or Single Stair   Style negotiated Single stair   Type of Assistance Needed Supervision   Physical Assistance Level No physical assistance   Comment Cl S 12  steps, B/L HR, non-reciprocal, descending backwards   1 Step (Curb) CARE Score 4   4 Steps   Type of Assistance Needed Supervision   Physical Assistance Level No physical assistance   Comment Cl S 12  steps, B/L HR, non-reciprocal, descending backwards   4 Steps CARE Score 4   12 Steps   Type of Assistance Needed Supervision   Physical Assistance Level No physical assistance   Comment Cl S 12  steps, B/L HR, non-reciprocal, descending backwards   12 Steps CARE Score 4   Toilet Transfer   Comment did not require during session   Therapeutic Interventions   Strengthening seated LE TE   Other standing tolerance unsupported: 5 min 40s, transfer training, gait training, stair training   Other Comments   Comments , spO2 96% following prolonged ambulation at end of session, improved with seated rest break   Assessment   Treatment Assessment Pt agreeable to PT this AM, received sitting upright in w/c. Pt is demonstrating improved LE strength/endurance, as demonstrated by increased stair negotiation and ability to stand unsupported for 5' 40\". Completed seated LE TE well, with intermittent rest breaks due to fatigue. He remains limited by decreased endurance and LE weakness, with increased KHALIL and fatigue for ambulation greater than short household distances. Will continue with current PT POC to improve deficits and promote return to PLOF. "   Problem List Decreased strength;Decreased range of motion;Decreased endurance;Impaired balance;Decreased mobility;Decreased skin integrity;Pain   PT Barriers   Physical Impairment Decreased strength;Decreased endurance;Decreased mobility;Impaired balance;Decreased range of motion;Decreased coordination;Decreased skin integrity   Functional Limitation Car transfers;Ramp negotiation;Stair negotiation;Standing;Transfers;Walking   Plan   Treatment/Interventions Functional transfer training;LE strengthening/ROM;Therapeutic exercise;Endurance training;Patient/family training;Equipment eval/education;Bed mobility;Gait training   Progress Improving as expected   PT Therapy Minutes   PT Time In 1100   PT Time Out 1200   PT Total Time (minutes) 60   PT Mode of treatment - Individual (minutes) 60   PT Mode of treatment - Concurrent (minutes) 0   PT Mode of treatment - Group (minutes) 0   PT Mode of treatment - Co-treat (minutes) 0   PT Mode of Treatment - Total time(minutes) 60 minutes   PT Cumulative Minutes 910     Patient remains OOB in chair, all needs in reach.  Alarm in place and activated.  Encouraged use of call bell, patient verbalizes understanding.

## 2025-05-01 NOTE — PROGRESS NOTES
05/01/25 1000   Pain Assessment   Pain Assessment Tool 0-10   Pain Score No Pain   Restrictions/Precautions   Precautions Bed/chair alarms;Fall Risk;Pain;Fluid restriction;Supervision on toilet/commode   Sit to Stand   Type of Assistance Needed Supervision   Physical Assistance Level No physical assistance   Sit to Stand CARE Score 4   Toileting Hygiene   Type of Assistance Needed Supervision   Physical Assistance Level No physical assistance   Toileting Hygiene CARE Score 4   Toileting   Able to Pull Clothing down yes, up yes.   Manage Hygiene Bladder   Limitations Noted In Balance;LE Strength;Safety;ROM   Toilet Transfer   Type of Assistance Needed Supervision;Adaptive equipment   Physical Assistance Level No physical assistance   Toilet Transfer CARE Score 4   Toilet Transfer   Surface Assessed Raised Toilet   Transfer Technique Standard   Limitations Noted In Balance;Safety;LE Strength   Adaptive Equipment Grab Bar;Walker   Exercise Tools   Exercise Tools Yes   UE Ergometer minimal resistance 5 F/B   Hand Gripper 5# L 7# R digi flex x 30 B hands   Other Exercise Tool 1 2# beryl box 3 sets x 15 reps in 4 planes of motion   Cognition   Overall Cognitive Status WFL   Arousal/Participation Alert;Cooperative   Attention Within functional limits   Orientation Level Oriented X4   Memory Within functional limits   Following Commands Follows all commands and directions without difficulty   Additional Activities   Additional Activities Comments fxnl mobility in room CGA with RW to/from BR; w/c propulsion MI to OT room and then to PT   Activity Tolerance   Activity Tolerance Patient tolerated treatment well   Assessment   Treatment Assessment OT tx addressed the following: safety with all aspects of toileting, fxnl mobility with w/c and RW and BUE therex/ theract for generalized strength and endurance. Pt tolerates session without complaints and is progressing towards goals. Details listed in respective sections of  lvm for patient to call back and get rescheduled   note. Pt barriers include:  decreased balance and safety, decreased endurance and strength/ ROM, BLE edema and pain. Pt will benefit from continued skilled OT services to increase independence with daily tasks to prepare for d/c to home.   Prognosis Good   Problem List Decreased strength;Decreased range of motion;Decreased endurance;Impaired balance;Decreased mobility;Decreased skin integrity;Pain   Plan   Treatment/Interventions ADL retraining;Functional transfer training;Therapeutic exercise;Endurance training;Patient/family training;Gait training;Compensatory technique education;Spoke to nursing;OT;Family   Progress Improving as expected   OT Therapy Minutes   OT Time In 1000   OT Time Out 1100   OT Total Time (minutes) 60   OT Mode of treatment - Individual (minutes) 60   OT Mode of treatment - Concurrent (minutes) 0   OT Mode of treatment - Group (minutes) 0   OT Mode of treatment - Co-treat (minutes) 0   OT Mode of Treatment - Total time(minutes) 60 minutes   OT Cumulative Minutes 60   Therapy Time missed   Time missed? No

## 2025-05-01 NOTE — ASSESSMENT & PLAN NOTE
Most recently 130 on 4/21 (126 in ED)   Nephrology managed in acute care   Na 124 on 11/11/24; suspect chronicity  2000 mL FR daily   Continue to monitor BMP    4/28 stable at 133

## 2025-05-01 NOTE — PLAN OF CARE
Problem: PAIN - ADULT  Goal: Verbalizes/displays adequate comfort level or baseline comfort level  Description: Interventions:- Encourage patient to monitor pain and request assistance- Assess pain using appropriate pain scale- Administer analgesics based on type and severity of pain and evaluate response- Implement non-pharmacological measures as appropriate and evaluate response- Consider cultural and social influences on pain and pain management- Notify physician/advanced practitioner if interventions unsuccessful or patient reports new pain  Outcome: Progressing     Problem: INFECTION - ADULT  Goal: Absence or prevention of progression during hospitalization  Description: INTERVENTIONS:- Assess and monitor for signs and symptoms of infection- Monitor lab/diagnostic results- Monitor all insertion sites, i.e. indwelling lines, tubes, and drains- Monitor endotracheal if appropriate and nasal secretions for changes in amount and color- Howell appropriate cooling/warming therapies per order- Administer medications as ordered- Instruct and encourage patient and family to use good hand hygiene technique- Identify and instruct in appropriate isolation precautions for identified infection/condition  Outcome: Progressing     Problem: SAFETY ADULT  Goal: Patient will remain free of falls  Description: INTERVENTIONS:- Educate patient/family on patient safety including physical limitations- Instruct patient to call for assistance with activity - Consult OT/PT to assist with strengthening/mobility - Keep Call bell within reach- Keep bed low and locked with side rails adjusted as appropriate- Keep care items and personal belongings within reach- Initiate and maintain comfort rounds- Make Fall Risk Sign visible to staff- Offer Toileting every 2 Hours, in advance of need- Initiate/Maintain bed/chair alarm- Apply yellow socks and bracelet for high fall risk patients- Consider moving patient to room near nurses  station  Outcome: Progressing     Problem: Nutrition/Hydration-ADULT  Goal: Nutrient/Hydration intake appropriate for improving, restoring or maintaining nutritional needs  Description: Monitor and assess patient's nutrition/hydration status for malnutrition. Collaborate with interdisciplinary team and initiate plan and interventions as ordered.  Monitor patient's weight and dietary intake as ordered or per policy. Utilize nutrition screening tool and intervene as necessary. Determine patient's food preferences and provide high-protein, high-caloric foods as appropriate. INTERVENTIONS:- Monitor oral intake, urinary output, labs, and treatment plans- Assess nutrition and hydration status and recommend course of action- Evaluate amount of meals eaten- Assist patient with eating if necessary - Allow adequate time for meals- Recommend/ encourage appropriate diets, oral nutritional supplements, and vitamin/mineral supplements- Order, calculate, and assess calorie counts as needed- Recommend, monitor, and adjust tube feedings and TPN/PPN based on assessed needs- Assess need for intravenous fluids- Provide specific nutrition/hydration education as appropriate- Include patient/family/caregiver in decisions related to nutrition  Outcome: Progressing     Problem: Prexisting or High Potential for Compromised Skin Integrity  Goal: Skin integrity is maintained or improved  Description: INTERVENTIONS:- Identify patients at risk for skin breakdown- Assess and monitor skin integrity- Assess and monitor nutrition and hydration status- Monitor labs - Assess for incontinence - Turn and reposition patient- Assist with mobility/ambulation- Relieve pressure over bony prominences- Avoid friction and shearing- Provide appropriate hygiene as needed including keeping skin clean and dry- Evaluate need for skin moisturizer/barrier cream- Collaborate with interdisciplinary team - Patient/family teaching- Consider wound care consult   Outcome:  Progressing     Problem: MOBILITY - ADULT  Goal: Maintain or return to baseline ADL function  Description: INTERVENTIONS:-  Assess patient's ability to carry out ADLs; assess patient's baseline for ADL function and identify physical deficits which impact ability to perform ADLs (bathing, care of mouth/teeth, toileting, grooming, dressing, etc.)- Assess/evaluate cause of self-care deficits - Assess range of motion- Assess patient's mobility; develop plan if impaired- Assess patient's need for assistive devices and provide as appropriate- Encourage maximum independence but intervene and supervise when necessary- Involve family in performance of ADLs- Assess for home care needs following discharge - Consider OT consult to assist with ADL evaluation and planning for discharge- Provide patient education as appropriate  Outcome: Progressing

## 2025-05-01 NOTE — ASSESSMENT & PLAN NOTE
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

## 2025-05-01 NOTE — PROGRESS NOTES
"Progress Note - nErico Chavira 89 y.o. male MRN: [id number]    Unit/Bed#: [facility] 219-01 Encounter: [id number]        Subjective:   Patient seen and examined at bedside after reviewing the chart and discussing the case with the caring staff.      Patient examined at bedside.  Patient denies any acute symptoms.     INR 2.29 on 5/1.  Coumadin was reduced from 5mg to 4mg on 4/29.  Cont daily INR.      Physical Exam   Vitals: Blood pressure 170/92, pulse 85, temperature (!) 96.6 °F (35.9 °C), temperature source Temporal, resp. rate 16, height 5' 7\" (1.702 m), weight 83.9 kg (184 lb 15.5 oz), SpO2 96%.,Body mass index is 28.97 kg/m².  Constitutional: Patient in no acute distress.   HEENT: PERR, EOMI, MMM.  Cardiovascular: Normal rate and regular rhythm.    Pulmonary/Chest: Effort normal and breath sounds normal.   Abdomen: Soft, + BS, NT.    Assessment/Plan:  Enrico Chavira is a(n) 89 y.o. male with debility due to sepsis.      Cardiac hx with HTN, new onset A-fib.  Patient is on Lasix 20 mg daily and Lopressor 25 mg q12h (new).  Of note, pt was in rapid a-fib while in ER, converted to normal sinus rhythm s/p Cardizem drip.  Home amlodipine on hold since starting Lopressor for a-fib.  Cont to monitor vitals.  Hx of PE.  Home: warfarin 7.5mg daily.  Here: Reduce warfarin to 4 mg daily (decreased from 5mg on 4/29/25).  Initially on hold due to supratherapeutic INR and restarted on 4/18 at 2 mg.  Goal 2-3.    Asthma-COPD overlap syndrome.  Continue Symbicort inhaler, Singulair 10 mg daily, Mucinex 600mg q12h, albuterol neb as needed.  Pt on Breo Ellipta at home however prefers Symbicort.   GERD.  Patient is on Protonix 20 mg daily (Prilosec nonform).   Glaucoma.  Continue home prednisolone, Xalatan, Trusopt eye drops.  Artifical tear drops as needed.  Anemia.  Hgb 10.3 -> 10.4 -> 9.5 on 4/28/25.  No s/sx active bleeding.  Cont to trend.   SONYA.  Resolved.  Baseline Cr 0.8-0.9.  Cr 0.76 -> 0.79 on 4/21/25.  Peak Cr 1.62 " on 4/14.  Cont to monitor.  Chronic hyponatremia.  Level 126 on 4/14 -> 131 -> 133 -> 133 on 4/28.  Fluid restriction 2L (decr from 1500 mL 4/23).  Cont to monitor.   Sepsis secondary to cellulitis of right lower extremity.  Pt treated with IV cefazolin and transitioned to Duricef 1000mg q12h to complete 10-day course of antibiotics per ID recommendations (thru 4/25).  Cont to monitor off antibiotics.   Pain and bowel regimen.  As per physiatry.    Debility due to sepsis.  Patient is receiving intensive PT OT with management as per physiatry.      Anticipated date of discharge.   Saturday, 5/3/2025    The patient was discussed with Dr. Gamez and he is in agreement with the above note.

## 2025-05-02 ENCOUNTER — PATIENT OUTREACH (OUTPATIENT)
Dept: CASE MANAGEMENT | Facility: OTHER | Age: OVER 89
End: 2025-05-02

## 2025-05-02 LAB
INR PPP: 2.02 (ref 0.85–1.19)
PROTHROMBIN TIME: 23.3 SECONDS (ref 12.3–15)

## 2025-05-02 PROCEDURE — 97116 GAIT TRAINING THERAPY: CPT | Performed by: PHYSICAL THERAPIST

## 2025-05-02 PROCEDURE — 85610 PROTHROMBIN TIME: CPT

## 2025-05-02 PROCEDURE — 97530 THERAPEUTIC ACTIVITIES: CPT | Performed by: PHYSICAL THERAPIST

## 2025-05-02 PROCEDURE — 97110 THERAPEUTIC EXERCISES: CPT | Performed by: PHYSICAL THERAPIST

## 2025-05-02 PROCEDURE — 99232 SBSQ HOSP IP/OBS MODERATE 35: CPT

## 2025-05-02 PROCEDURE — 97110 THERAPEUTIC EXERCISES: CPT

## 2025-05-02 PROCEDURE — 97535 SELF CARE MNGMENT TRAINING: CPT

## 2025-05-02 PROCEDURE — 97542 WHEELCHAIR MNGMENT TRAINING: CPT | Performed by: PHYSICAL THERAPIST

## 2025-05-02 PROCEDURE — 97530 THERAPEUTIC ACTIVITIES: CPT

## 2025-05-02 RX ORDER — BUDESONIDE AND FORMOTEROL FUMARATE DIHYDRATE 160; 4.5 UG/1; UG/1
2 AEROSOL RESPIRATORY (INHALATION) 2 TIMES DAILY
Qty: 10.2 G | Refills: 0 | Status: SHIPPED | OUTPATIENT
Start: 2025-05-02 | End: 2025-05-12 | Stop reason: SDUPTHER

## 2025-05-02 RX ORDER — METOPROLOL TARTRATE 25 MG/1
25 TABLET, FILM COATED ORAL EVERY 12 HOURS SCHEDULED
Qty: 60 TABLET | Refills: 0 | Status: SHIPPED | OUTPATIENT
Start: 2025-05-02

## 2025-05-02 RX ORDER — WARFARIN SODIUM 5 MG/1
5 TABLET ORAL
Qty: 30 TABLET | Refills: 0 | Status: SHIPPED | OUTPATIENT
Start: 2025-05-02 | End: 2025-05-03

## 2025-05-02 RX ORDER — NYSTATIN 100000 [USP'U]/G
POWDER TOPICAL 2 TIMES DAILY
Qty: 15 G | Refills: 0 | Status: SHIPPED | OUTPATIENT
Start: 2025-05-02

## 2025-05-02 RX ADMIN — DORZOLAMIDE HCL 1 DROP: 20 SOLUTION/ DROPS OPHTHALMIC at 08:03

## 2025-05-02 RX ADMIN — LATANOPROST 1 DROP: 50 SOLUTION OPHTHALMIC at 21:19

## 2025-05-02 RX ADMIN — WHITE PETROLATUM 57.7 %-MINERAL OIL 31.9 % EYE OINTMENT 1 APPLICATION: at 21:19

## 2025-05-02 RX ADMIN — MONTELUKAST 10 MG: 10 TABLET, FILM COATED ORAL at 08:06

## 2025-05-02 RX ADMIN — METOPROLOL TARTRATE 25 MG: 25 TABLET, FILM COATED ORAL at 21:19

## 2025-05-02 RX ADMIN — PREDNISOLONE ACETATE 1 DROP: 10 SUSPENSION/ DROPS OPHTHALMIC at 08:09

## 2025-05-02 RX ADMIN — NYSTATIN 1 APPLICATION: 100000 POWDER TOPICAL at 08:09

## 2025-05-02 RX ADMIN — GUAIFENESIN 600 MG: 600 TABLET ORAL at 08:07

## 2025-05-02 RX ADMIN — POLYETHYLENE GLYCOL 3350 17 G: 17 POWDER, FOR SOLUTION ORAL at 08:07

## 2025-05-02 RX ADMIN — DORZOLAMIDE HCL 1 DROP: 20 SOLUTION/ DROPS OPHTHALMIC at 21:19

## 2025-05-02 RX ADMIN — LIDOCAINE 5% 1 PATCH: 700 PATCH TOPICAL at 08:07

## 2025-05-02 RX ADMIN — GUAIFENESIN 600 MG: 600 TABLET ORAL at 21:18

## 2025-05-02 RX ADMIN — PREDNISOLONE ACETATE 1 DROP: 10 SUSPENSION/ DROPS OPHTHALMIC at 17:42

## 2025-05-02 RX ADMIN — BUDESONIDE AND FORMOTEROL FUMARATE DIHYDRATE 2 PUFF: 160; 4.5 AEROSOL RESPIRATORY (INHALATION) at 17:42

## 2025-05-02 RX ADMIN — WARFARIN SODIUM 4.5 MG: 2.5 TABLET ORAL at 17:42

## 2025-05-02 RX ADMIN — Medication 1 APPLICATION: at 19:51

## 2025-05-02 RX ADMIN — PANTOPRAZOLE SODIUM 20 MG: 20 TABLET, DELAYED RELEASE ORAL at 05:11

## 2025-05-02 RX ADMIN — ACETAMINOPHEN 650 MG: 325 TABLET ORAL at 22:35

## 2025-05-02 RX ADMIN — FUROSEMIDE 20 MG: 20 TABLET ORAL at 08:06

## 2025-05-02 RX ADMIN — Medication 1 APPLICATION: at 08:10

## 2025-05-02 RX ADMIN — BUDESONIDE AND FORMOTEROL FUMARATE DIHYDRATE 2 PUFF: 160; 4.5 AEROSOL RESPIRATORY (INHALATION) at 08:06

## 2025-05-02 RX ADMIN — METOPROLOL TARTRATE 25 MG: 25 TABLET, FILM COATED ORAL at 08:06

## 2025-05-02 RX ADMIN — NYSTATIN 1 APPLICATION: 100000 POWDER TOPICAL at 19:51

## 2025-05-02 NOTE — PROGRESS NOTES
"Progress Note - Enrico Chavira 89 y.o. male MRN: [id number]    Unit/Bed#: [facility] 219-01 Encounter: [id number]        Subjective:   Patient seen and examined at bedside after reviewing the chart and discussing the case with the caring staff.      Patient examined at bedside.  Patient denies any acute symptoms.  He is anxious for discharge.      INR 2.02 on 5/2.  Coumadin increased to 4.5 5/2.       Physical Exam   Vitals: Blood pressure 158/77, pulse 75, temperature (!) 97.2 °F (36.2 °C), temperature source Temporal, resp. rate 18, height 5' 7\" (1.702 m), weight 83.9 kg (184 lb 15.5 oz), SpO2 95%.,Body mass index is 28.97 kg/m².  Constitutional: Patient in no acute distress.   HEENT: PERR, EOMI, MMM.  Cardiovascular: Normal rate and regular rhythm.    Pulmonary/Chest: Effort normal and breath sounds normal.   Abdomen: Soft, + BS, NT.    Assessment/Plan:  Enrico Chavira is a(n) 89 y.o. male with debility due to sepsis.      Cardiac hx with HTN, new onset A-fib.  Patient is on Lasix 20 mg daily and Lopressor 25 mg q12h (new).  Of note, pt was in rapid a-fib while in ER, converted to normal sinus rhythm s/p Cardizem drip.  Home amlodipine on hold since starting Lopressor for a-fib.  Cont to monitor vitals.  Hx of PE.  Home: warfarin 7.5mg daily.  Here: incr warfarin to 4.5 mg daily 5/2.  Patient will be discharged on 5 mg daily with close follow up outpt.  Initially on hold due to supratherapeutic INR and restarted on 4/18 at 2 mg.  Goal 2-3.    Asthma-COPD overlap syndrome.  Continue Symbicort inhaler, Singulair 10 mg daily, Mucinex 600mg q12h, albuterol neb as needed.  Pt on Breo Ellipta at home however prefers Symbicort.   GERD.  Patient is on Protonix 20 mg daily (Prilosec nonform).   Glaucoma.  Continue home prednisolone, Xalatan, Trusopt eye drops.  Artifical tear drops as needed.  Anemia.  Hgb 10.3 -> 10.4 -> 9.5 on 4/28/25.  No s/sx active bleeding.  Cont to trend.   SONYA.  Resolved.  Baseline Cr 0.8-0.9.  " Cr 0.76 -> 0.79 on 4/21/25.  Peak Cr 1.62 on 4/14.  Cont to monitor.  Chronic hyponatremia.  Level 126 on 4/14 -> 131 -> 133 -> 133 on 4/28.  Fluid restriction 2L (decr from 1500 mL 4/23).  Cont to monitor.   Sepsis secondary to cellulitis of right lower extremity.  Pt treated with IV cefazolin and transitioned to Duricef 1000mg q12h to complete 10-day course of antibiotics per ID recommendations (thru 4/25).  Cont to monitor off antibiotics.   Pain and bowel regimen.  As per physiatry.    Debility due to sepsis.  Patient is receiving intensive PT OT with management as per physiatry.      Anticipated date of discharge.   Saturday, 5/3/2025    The patient was discussed with Dr. Gamez and he is in agreement with the above note.

## 2025-05-02 NOTE — ASSESSMENT & PLAN NOTE
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

## 2025-05-02 NOTE — PROGRESS NOTES
Progress Note - PMR   Name: Enrico Chavira 89 y.o. male I MRN: [id number]  Unit/Bed#: -01 I Date of Admission: 4/19/2025   Date of Service: 5/2/2025 I Hospital Day: 13     Assessment & Plan  Cellulitis of right lower extremity  HPI:   Patient with a history of venous stasis with ulceration, recently discharged from wound care (2/24/25)   Patient presented to the ED on 4/14 due to concern for RLE infection and worsening weakness   Met sepsis criteria with tachycardia, leukocytosis, and RLE cellulitis   2 g cefriaxone given in ED   CT RLE did not reveal soft tissue gas or fluid collections, but showed subcutaneous edema and skin thickening from the proximal thigh through the ankle consistent with cellulitis  Started on IV cefazolin on 4/15 ---> transitioned to 10 day course of cefadroxil on 4/19   Procalcitonin 15.61-->10.82-->4.70-->2.23-->1.32-->0.71  Blood cultures x 2 no growth after 72 hours  Lower extremity Doppler negative for DVT    Plan:   Cefadroxil 1 g q12 hours completed on 4/25- monitor off antibiotics   PT and OT for 3 hours a day, 5-6 days a week   Seriel skin checks and monitoring   Consult wound care RN (last evaluated on 4/30)   Encourage leg elevation  Continue local wound care   Hypertension  Management at discretion of IM   Continue furosemide and metoprolol   Asthma-COPD overlap syndrome (HCC)  Managed at discretion of IM   Continue Symbicort and PRN albuterol   Chest x-ray 4/17: no acute pulmonary pathology  Factor V Leiden (HCC)  Diagnosed after unprovoked PE in 2018   Continue chronic warfarin (INR recently supratherapeutic in acute care)   Daily INRs while inpatient   5/1: INR within range (2.02)   Hyponatremia  Most recently 130 on 4/21 (126 in ED)   Nephrology managed in acute care   Na 124 on 11/11/24; suspect chronicity  2000 mL FR daily   Continue to monitor BMP    4/28 stable at 133     Atrial fibrillation (HCC)  Follows with cardiologist Dr. Mtz ([facility]), no documented  "history of a-fib. Patient and daughter also deny a history of dysthymia.   Continue metoprolol 25 mg q12h and chronic warfarin   Continue to monitor vitals and hemodynamic status   Per daughter, follow-up scheduled for after discharge     SONYA (acute kidney injury) (HCC)  Baseline creatinine appears to be around 0.8-0.9. Creatinine on admission 1.62, likely prerenal secondary to sepsis   4/15 ultrasound negative for hydronephrosis   Treated with IVF   Most recent creatinine (4/28) 0.75  Continue to monitor BMP  Fall  Patient reports only 1 fall in the past year. This fall occurred a few days before his admission. He describes trying to climb into bed, bt being too weak to lift his legs and subsequently falling. Daughter is not aware of any other falls beside this one.   PT and OT and fall precautions     Abnormal CT of the chest  4/18/25 CT: \"3.7 cm anterior right upper lobe solid mass with septated cystic component and calcification. 2.6 cm lateral right upper lobe opacity and 7 mm right lower lobe nodule. These may be infectious/inflammatory but malignancy cannot be excluded. 2.3 x 1.3 cm low-attenuation nodule in the mediastinum adjacent to the hiatal hernia, question benign or malignant lymph node.\"  Per daughter, patient was aware of nodules but RUL mass new. Patient does not want to follow-up on either.   Monitor vitals, oxygen, and potential malignancy related complications   Impaired mobility and ADLs  Patient was evaluated by the rehabilitation team MD and an appropriate candidate for acute inpatient rehabilitation program at this time.  The patient will tolerate 3 hours/day 5 to 7 days/week of intensive physical and  occupational therapy   in order to obtain goals for community discharge  Due to the patient's functional Compared to their baseline level of function in addition to their ongoing medical needs, the patient would benefit from daily supervision from a rehabilitation physician as well as " rehabilitation nursing to implement and adjust the medical as well as functional plan of care in order to meet the patient's goals.  Bladder: Monitor closely for urinary incontinence and/or retention. Monitor urine output and encourage spontaneous voids. If unable to void spontaneously, will monitor with PVR bladder scans and initiate CIC regimen.  Skin: Monitor for breakdown, frequent turns, pressure offloading  Sleep: Encourage sleep hygiene (routine that promotes healthy circadian rhythm,avoid caffeine later in the day, quiet/dark room at night, reduce electronic before bedtime)  Patient lives alone, previously independent. He has 10 children, many of  whom will be able to assist after discharge.   Anticipated Discharge Date:  Home with family support Saturday, May 3rd, 2025   HHOT/ HHPT/ nursing and DME which is in place.       Chronic left-sided low back pain  Pain levels tolerable, patient relates this pain to soreness from laying   First documented by PCP in 2020  Continue to monitor   PRN tylenol and Lidoderm   Anemia  First noted on 4/15  Most recent hgb 9.5, continue to monitor CBC    History of sepsis  Possible severe sepsis given Cr increased >0.5 mg/dL from baseline and other criteria met   - INR jumped significantly as well   - Risk for post-sepsis syndrome  - Abx for per ID  - Optimal management of each as listed   - Optimal nutrition, hydration, and medical management  - Monitor vitals closely with and without activity  - Fall precautions   - Recommend acute comprehensive interdisciplinary inpatient rehabilitation to include intensive skilled therapies (PT, OT) as outlined with oversight and management by rehabilitation physician as well as inpatient rehab level nursing, case management and weekly interdisciplinary team meetings.     Potential for cognitive impairment  - Recommend MOCA cog screen in rehab later in course when more stable   - Recommend eval of med mgmt and IADLs   - Monitor  "neuro-exam, wakefulness, mood, cognition, insight into deficits and safety awareness  - Recommend patient (+/- family/caregiver education if applicable) and training as well as compensatory strategies to optimize safety, function, and decrease risk of complications  - Ensure optimal assistance/supervision after d/c   - Optimize sleep-wake cycle  - Limit sedating medications when possible  - Monitor and ensure optimal management electrolytes, nutrition, and hydration  - Monitor for signs or symptoms of infection, medication intolerances, other systemic etiologies  - Fall precautions with frequent rounding; proactive toileting program, patient should not be unattended in bathroom  - If concerns for safety in spite of frequent rounding consider virtual or in person sitter   - OP follow-up PCP and if needed additional specialists (ie neuro/zonia/pmr)  -4/21: \"MOCA as ordered completed during session scoring 19/30 with delayed recall and language most impaired.\"  Degenerative lumbar spinal stenosis  CT L spine - Diffuse severe thoracolumbar degenerative change with apex left lumbar scoliosis. A posteriorly directed vertebral body osteophyte on the right at L1-L2 results in moderate central canal narrowing  - Monitor neuro exam, b/b function, and pain  - Reports chronic pain about stable   Bilateral arm weakness  Reportedly started about 1-2 years ago and has been progressive  - B/L SH 3-/5, R EF 5, EE 4, WE 3 FF 3, L EF 5- We 2 EE 2; +Cuevas on L; brisk knee reflexes, strength fairly intact BLE   - Risk of cervical stenosis with compression and multilevel radiculopathy; hx of very severe deg lumbar stenosis  - Risk of RTC tears or combination - negative Marbella Butler  - Impacts ADL and function; some reports of worsening balance  - Per Dr. Goodwin, \"Discussed potential dx's with pt/family and potential work-up - they would not want sx at this point no matter what the cause but would like to know potential cause for " "weakness which could help with prognosis and rehab planning; apparently has metal in eye and cannot have MRI; they want to try to avoid contrast with slight risk of kidney toxicity\"  4/21: I discussed obtaining CT scan with patient and his daughter Lora, they stated that they would like to forgo CT scan as patient does would not want intervention regardless of diagnosis. He will continue to work with PT and OT for strengthening    - Monitor neuro exam, balance, symptoms closely   Chest wall asymmetry  L superior periscapular chest palpable and observable nontender region apparently there for a few year  - CT Chest did not mention concerns  - Recommend follow-up mgmt by PCP unless additional work-up needed in ARC at discretion of IM  Orthostatic hypotension  -4/23: episode of othostatic hypotention during PT (165/79 ---> 113/65), resolved after sitting  -FR advanced to 2L daily   Encounter for wound care  Assessment Findings:   Bilateral heels dry and intact   Right lower leg - dry and intact healed   Sacral buttocks dry and intact   Right lateral anterior thigh - partial thickness wounds with noted dry peeling skin of the gt wound area . Right leg has edema and redness noted. Moderate serous drainage noted . Gt wound area with dry peeling loose skin .    Right anterior medial thigh - partial thickness wound with small serous drainage and appearance is round as to present as a blister that unroofed . Daughter confirmed this was a blistered area .   Right lower leg and foot dry peeling areas .   Right elbow - partial thickness wound with 90% white biofilm on the wound and 10%  pink edges related to a skin tear . Noted dry scabbed area on the tip of the elbow.   Left 3rd toe - intact dry scabbed area . No drainage and no fluctuance . Daughter reports when patient was clipping his nails the clipper slipped    Fungal dermatitis  RLE venous stasis complicated by intermittent fungal infections. Currently located in " inner right thigh, being treated with Nystatin powder (as opposed to ointment) to keep skin dry.   Consider referral to dermatology     Subjective   Patient is a 89 year-old male, with a past medical history of lower extremity venous stasis with ulceration of his right lower extremity, hypertension, asthma-COPD, factor V leiden on warfarin, presenting to ARC secondary to debility to recent sepsis. He initially presented to the ED on 4/14 due to RLE cellulitis and worsening weakness. He was noted to be in rapid a-fib with RVR in the ED and met sepsis criteria with tachycardia and leukocytosis. Cardizem infusion started in the ED and he converted back to NSR. Labs were significant for leukocytosis, elevated procalcitonin, hyponatremia and SONYA. CT of the RLE did not reveal soft tissue gas or fluid collections, but showed subcutaneous edema and skin thickening from the proximal thigh through the ankle consistent with cellulitis. He was subsequently started on IV cefazolin. Blood cultures negative after 72 hours. Renal and ID recommendations were appreciated. Per ID, lower extremity skin dry and cracked, which is a likely port of entry. He clinically improved with stabilization of lab work. To complete a 10-day course of antibiotics, he was transitioned to a 6-day course of cefadroxil upon discharge. He was evaluated by PT and OT and deemed an appropriate candidate for ARC due to functional deficits.     Chief Complaint: increased weakness    Interval: Seen and evaluated patient during OT.  He denies any concerns, SOB, and pain. He states he feels well. Last BM 4/30, continent of bowel and bladder.     Objective :  Temp:  [97.2 °F (36.2 °C)-97.8 °F (36.6 °C)] 97.2 °F (36.2 °C)  HR:  [75-86] 75  BP: (158-173)/(77-81) 158/77  Resp:  [18] 18  SpO2:  [95 %-97 %] 95 %  O2 Device: None (Room air)    Functional Update:  Physical Therapy Occupational Therapy   Weight Bearing Status: Weight Bearing as Tolerated  Transfers:  Incidental Touching  Bed Mobility: Supervision  Amulation Distance (ft): 62 feet  Ambulation: Incidental Touching (RW)  Assistive Device for Ambulation: Roller Walker  Wheelchair Mobility Distance: 250 ft  Wheelchair Mobility: Independent  Number of Stairs: 9  Assistive Device for Stairs: Bilateral Hand Rails  Stair Assistance: Incidental Touching  Ramp: Minimal Assistance  Assistive Device for Ramp: Roller Walker  Discharge Recommendations: Home with:  DC Home with:: Family Support, Home Physical Therapy   Eating: Supervision  Grooming: Supervision  Bathing: Supervision, Incidental Touching  Bathing: Supervision, Incidental Touching  Upper Body Dressing: Supervision  Lower Body Dressing: Incidental Touching, Minimal Assistance  Toileting: Supervision, Incidental Touching  Tub/Shower Transfer: Incidental Touching  Toilet Transfer: Supervision, Incidental Touching  Cognition: Within Defined Limits  Orientation: Person, Place, Time, Situation           Physical Exam  Vitals and nursing note reviewed.   Constitutional:       General: He is not in acute distress.     Appearance: He is not ill-appearing, toxic-appearing or diaphoretic.   HENT:      Head: Normocephalic and atraumatic.      Nose: Nose normal. No congestion.      Mouth/Throat:      Mouth: Mucous membranes are moist.   Pulmonary:      Effort: Pulmonary effort is normal. No respiratory distress.   Abdominal:      General: There is no distension.   Skin:     General: Skin is dry.   Neurological:      Mental Status: He is alert.      Comments: No changes in neurological status    Psychiatric:         Mood and Affect: Mood normal.         Behavior: Behavior normal.             Scheduled Meds:  Current Facility-Administered Medications   Medication Dose Route Frequency Provider Last Rate    acetaminophen  650 mg Oral Q6H PRN Nicolasa L Diogenes PA-C      albuterol  2.5 mg Nebulization Q6H PRN Nicolasa L Diogenes PA-C      artificial tear   Both Eyes HS Nicolasa L  Diogenes, PA-MEDARDO      Artificial Tears  1 drop Both Eyes 4x Daily PRN Nicolasa L Dagnall, PA-C      budesonide-formoterol  2 puff Inhalation BID Nicolasa Breennall, PA-C      Diclofenac Sodium  4 g Topical BID PRN Liseth Owusu PA-C      dorzolamide  1 drop Left Eye BID Nicolasa L Dagnall, PA-C      furosemide  20 mg Oral Daily Nicolasa L Dagnall, PA-C      guaiFENesin  600 mg Oral Q12H CLARK Nicolasa L Dagnall, PA-C      latanoprost  1 drop Both Eyes HS Nicolasa L Dagnall, PA-C      lidocaine  1 patch Topical Daily Nicolasa L Dagnall, PA-C      metoprolol tartrate  25 mg Oral Q12H CLARK Nicolasa L Jamshidnall, PA-C      montelukast  10 mg Oral Daily Nicolasa L Dagnall, PA-C      nystatin   Topical BID Liseth Owsuu PA-C      pantoprazole  20 mg Oral Early Morning Nicolasa L Dagnall, PA-C      polyethylene glycol  17 g Oral Daily PRN Liseth Owusu PA-C      prednisoLONE acetate  1 drop Left Eye BID Nicolasagadiel Breennall, PRATIMA      warfarin  4.5 mg Oral Daily (warfarin) Nicolasa Breennall, PRATIMA      white petrolatum-mineral oil   Topical BID Ta Goodwin MD      zinc oxide   Topical BID PRN Liseth Owusu PA-C           Lab Results: I have reviewed the following results:  Results from last 7 days   Lab Units 04/28/25  0509   HEMOGLOBIN g/dL 9.5*   HEMATOCRIT % 29.4*   WBC Thousand/uL 4.81   PLATELETS Thousands/uL 381     Results from last 7 days   Lab Units 04/28/25  0509   BUN mg/dL 19   SODIUM mmol/L 133*   POTASSIUM mmol/L 4.0   CHLORIDE mmol/L 104   CREATININE mg/dL 0.75   AST U/L 18   ALT U/L 14       Results from last 7 days   Lab Units 05/02/25  0517 05/01/25  0541 04/30/25  0551   PROTIME seconds 23.3* 25.5* 28.6*   INR  2.02* 2.29* 2.66*        Liseth Owusu PA-C  Physical Medicine and Rehabilitation  [facility]

## 2025-05-02 NOTE — PROGRESS NOTES
05/02/25 1241   Pain Assessment   Pain Assessment Tool 0-10   Pain Score No Pain   Restrictions/Precautions   Precautions Bed/chair alarms;Fall Risk;Pain;Fluid restriction  (2000 mL FR)   Weight Bearing Restrictions No   ROM Restrictions No   Cognition   Overall Cognitive Status WFL   Subjective   Subjective Pt reports he is doing well   Roll Left and Right   Type of Assistance Needed Independent   Physical Assistance Level No physical assistance   Roll Left and Right CARE Score 6   Sit to Lying   Type of Assistance Needed Supervision   Physical Assistance Level No physical assistance   Sit to Lying CARE Score 4   Lying to Sitting on Side of Bed   Type of Assistance Needed Independent   Physical Assistance Level No physical assistance   Lying to Sitting on Side of Bed CARE Score 6   Sit to Stand   Type of Assistance Needed Independent   Physical Assistance Level No physical assistance   Comment RW   Sit to Stand CARE Score 6   Bed-Chair Transfer   Type of Assistance Needed Independent   Physical Assistance Level No physical assistance   Comment RW   Chair/Bed-to-Chair Transfer CARE Score 6   Car Transfer   Reason if not Attempted Environmental limitations   Car Transfer CARE Score 10   Walk 10 Feet   Type of Assistance Needed Independent   Physical Assistance Level No physical assistance   Comment RW   Walk 10 Feet CARE Score 6   Walk 50 Feet with Two Turns   Type of Assistance Needed Supervision   Physical Assistance Level No physical assistance   Comment RW   Walk 50 Feet with Two Turns CARE Score 4   Walk 150 Feet   Reason if not Attempted Safety concerns   Walk 150 Feet CARE Score 88   Walking 10 Feet on Uneven Surfaces   Type of Assistance Needed Physical assistance   Physical Assistance Level 25% or less   Comment min A, RW   Walking 10 Feet on Uneven Surfaces CARE Score 3   Ambulation   Primary Mode of Locomotion Prior to Admission Walk   Distance Walked (feet) 55 ft   Assist Device Roller Walker   Does  the patient walk? 2. Yes   Wheel 50 Feet with Two Turns   Type of Assistance Needed Independent   Wheel 50 Feet with Two Turns CARE Score 6   Wheel 150 Feet   Type of Assistance Needed Independent   Wheel 150 Feet CARE Score 6   Wheelchair mobility   Does the patient use a wheelchair? 1. Yes   Type of Wheelchair Used 1. Manual   Curb or Single Stair   Style negotiated Single stair   Type of Assistance Needed Supervision   Physical Assistance Level No physical assistance   1 Step (Curb) CARE Score 4   4 Steps   Type of Assistance Needed Supervision   Physical Assistance Level No physical assistance   4 Steps CARE Score 4   12 Steps   Type of Assistance Needed Supervision   Physical Assistance Level No physical assistance   Comment RW, 15 steps, B/L HRs   12 Steps CARE Score 4   Toilet Transfer   Type of Assistance Needed Supervision   Physical Assistance Level No physical assistance   Comment RW   Toilet Transfer CARE Score 4   Therapeutic Interventions   Strengthening Seated LE TE   Equipment Use   NuStep L2, 20 mins   Assessment   Treatment Assessment Pt progressing steadily with endurance and activity, no LOB, mild KHALIL.  In need of ongoing interventions to maximize return to PLOF.  Plan for discharge tomorrow with home care services and family support   Problem List Decreased strength;Decreased range of motion;Decreased endurance;Impaired balance;Decreased mobility;Decreased skin integrity;Pain   PT Barriers   Physical Impairment Decreased strength;Decreased endurance;Decreased mobility;Impaired balance;Decreased range of motion;Decreased coordination;Decreased skin integrity   Functional Limitation Car transfers;Ramp negotiation;Stair negotiation;Standing;Transfers;Walking   Plan   Treatment/Interventions ADL retraining;Functional transfer training;LE strengthening/ROM;Elevations;Therapeutic exercise;Endurance training;Patient/family training;Equipment eval/education;Bed mobility;Gait training   Progress  Progressing toward goals   PT Therapy Minutes   PT Time In 1241   PT Time Out 1411   PT Total Time (minutes) 90   PT Mode of treatment - Individual (minutes) 0   PT Mode of treatment - Concurrent (minutes) 90   PT Mode of treatment - Group (minutes) 0   PT Mode of treatment - Co-treat (minutes) 0   PT Mode of Treatment - Total time(minutes) 90 minutes   PT Cumulative Minutes 1060     Seated LE TE, 2#, B/L LEs, 3x10    Patient remains OOB in chair, all needs in reach.  Alarm in place and activated.  Encouraged use of call bell, patient verbalizes understanding.

## 2025-05-02 NOTE — PROGRESS NOTES
Outpatient care management [facility]. Discharged 4/19/25 to [facility]. Inbasket reminder sent to self to do next outreach.

## 2025-05-02 NOTE — DISCHARGE SUMMARY
Discharge Summary   Enrico Chavira 89 y.o. male MRN: [id number]  Unit/Bed#: -01 Encounter: [id number]    Admission Date: 4/19/2025     Discharge Date:  5/3/25     Etiologic/Rehabilitation Diagnosis: Impairment of mobility, safety and Activities of Daily Living (ADLs) due to Debility:  16  Debility (Non-cardiac/Non-pulmonary)  Etiologic: Sepsis due to R LE cellulitis     HPI: Enrico Chavira is a(n) 89 y.o. year old male who is admitted to [facility] ARU.  Patient originally presented to [facility] ED on 4/14/25 for generalized weakness.  He met sepsis criteria with tachycardia and leukocytosis in setting of right lower extremitity cellulitis as noted on CT imaging.  Patient was started on IV cefazolin.  His home warfarin was placed on hold due to supratherapeutic INR.  Patient was also noted to be in atrial fibrillation with RVR and converted to normal sinus rhythm after cardizem infusion.  He was started on metoprolol for rate control.  Nephrology was consulted for SONYA and hyponatremia.  Patient was transitioned to oral Duricef on discharge per infectious disease recommenations to complete total of 10 days of antibitioic treatment.  Patient was elevated by PT OT in consultation who recommended post-acute rehab services.     The medical team was managing the following medical conditions during the course of the patient's admission:       Cardiac hx with HTN, new onset A-fib.  Patient is on Lasix 20 mg daily and Lopressor 25 mg q12h (new).  Of note, pt was in rapid a-fib while in ER, converted to normal sinus rhythm s/p Cardizem drip.  Home amlodipine on hold since starting Lopressor for a-fib.  Cont to monitor vitals.  Hx of PE.  Home: warfarin 7.5mg daily.  Here: incr warfarin to 6 mg daily 5/3.  Patient will be discharged on 6 mg daily with close follow up outpt.  Initially on hold due to supratherapeutic INR and restarted on 4/18 at 2 mg.  Goal 2-3.    Asthma-COPD overlap  syndrome.  Continue Symbicort inhaler, Singulair 10 mg daily, Mucinex 600mg q12h, albuterol neb as needed.  Pt on Breo Ellipta at home however prefers Symbicort.   GERD.  Patient is on Protonix 20 mg daily (Prilosec nonform).   Glaucoma.  Continue home prednisolone, Xalatan, Trusopt eye drops.  Artifical tear drops as needed.  Anemia.  Hgb 10.3 -> 10.4 -> 9.5 on 4/28/25.  No s/sx active bleeding.  Cont to trend.   SONYA.  Resolved.  Baseline Cr 0.8-0.9.  Cr 0.76 -> 0.79 on 4/21/25.  Peak Cr 1.62 on 4/14.  Cont to monitor.  Chronic hyponatremia.  Level 126 on 4/14 -> 131 -> 133 -> 133 on 4/28.  Fluid restriction 2L (decr from 1500 mL 4/23).  Cont to monitor.   Sepsis secondary to cellulitis of right lower extremity.  Pt treated with IV cefazolin and transitioned to Duricef 1000mg q12h to complete 10-day course of antibiotics per ID recommendations (thru 4/25).  Cont to monitor off antibiotics.   Pain and bowel regimen.  As per physiatry.    Debility due to sepsis.  Patient is receiving intensive PT OT with management as per physiatry.    Procedures Performed During ARC Admission: none    Complications: n/a    Discharge Physical Examination: stable.       Physiatry Additions/Comorbidities:    Cellulitis of right lower extremity  HPI:   Patient with a history of venous stasis with ulceration, recently discharged from wound care (2/24/25)   Patient presented to the ED on 4/14 due to concern for RLE infection and worsening weakness   Met sepsis criteria with tachycardia, leukocytosis, and RLE cellulitis   2 g cefriaxone given in ED   CT RLE did not reveal soft tissue gas or fluid collections, but showed subcutaneous edema and skin thickening from the proximal thigh through the ankle consistent with cellulitis  Started on IV cefazolin on 4/15 ---> transitioned to 10 day course of cefadroxil on 4/19   Procalcitonin 15.61-->10.82-->4.70-->2.23-->1.32-->0.71  Blood cultures x 2 no growth after 72 hours  Lower extremity Doppler  "negative for DVT     Plan:   Cefadroxil 1 g q12 hours completed on 4/25- (stable off antibiotics while in ARC)   [facility] OT, PT, RN, brendon Horne skin checks and monitoring while in ARC   Consult wound care RN (last evaluated on 4/30)   Encouraged leg elevation  Continue local wound care, [facility] RN and referral to wound care   Hypertension  Management at discretion of IM   Continue furosemide and metoprolol   Follow-up with PCP  Asthma-COPD overlap syndrome (HCC)  Managed at discretion of IM   Continue Symbicort and PRN albuterol   Chest x-ray 4/17: no acute pulmonary pathology  Follow-up with PCP  Factor V Leiden ([facility])  Diagnosed after unprovoked PE in 2018   Continue chronic warfarin (INR recently supratherapeutic in acute care)   Daily INRs while inpatient   Managed by IM, follow-up with PCP  Hyponatremia  Most recently 130 on 4/21 (126 in ED)   Nephrology managed in acute care   Na 124 on 11/11/24; suspect chronicity  2000 mL FR daily   Continue to monitor BMP    4/28 stable at 133   Follow-up with PCP     Atrial fibrillation ([facility])  Follows with cardiologist Dr. Mtz ([facility]), no documented history of a-fib. Patient and daughter also deny a history of dysthymia.   Continue metoprolol 25 mg q12h and chronic warfarin   Vitals and hemodynamic status stable in ARC   Per daughter, follow-up scheduled for after discharge      SONYA (acute kidney injury) ([facility])  Baseline creatinine appears to be around 0.8-0.9. Creatinine on admission 1.62, likely prerenal secondary to sepsis   4/15 ultrasound negative for hydronephrosis   Treated with IVF   Most recent creatinine (4/28) 0.75  Follow-up with PCP  Fall  Patient reports only 1 fall in the past year. This fall occurred a few days before his admission. He describes trying to climb into bed, bt being too weak to lift his legs and subsequently falling. Daughter is not aware of any other falls beside this one.   Continue PT and OT     Abnormal CT of the chest  4/18/25 CT: \"3.7 cm " "anterior right upper lobe solid mass with septated cystic component and calcification. 2.6 cm lateral right upper lobe opacity and 7 mm right lower lobe nodule. These may be infectious/inflammatory but malignancy cannot be excluded. 2.3 x 1.3 cm low-attenuation nodule in the mediastinum adjacent to the hiatal hernia, question benign or malignant lymph node.\"  Per daughter, patient was aware of nodules but RUL mass new. Patient does not want to follow-up on either.   Impaired mobility and ADLs  Family training completed in ARC   HHOT/ HHPT/ nursing and DME which is in place.   Chronic left-sided low back pain  Pain levels tolerable, patient relates this pain to soreness from laying   First documented by PCP in 2020  Stable while in ARC   PRN tylenol and Lidoderm   Anemia  First noted on 4/15  Most recent hgb 9.5, continue to monitor CBC   Potential for cognitive impairment  -Discharged home with adequate family support   -4/21: \"MOCA as ordered completed during session scoring 19/30 with delayed recall and language most impaired.\"  -Follow-up with PCP  Degenerative lumbar spinal stenosis  CT L spine - Diffuse severe thoracolumbar degenerative change with apex left lumbar scoliosis. A posteriorly directed vertebral body osteophyte on the right at L1-L2 results in moderate central canal narrowing  - Reports chronic pain but stable   Bilateral arm weakness  Reportedly started about 1-2 years ago and has been progressive  - B/L SH 3-/5, R EF 5, EE 4, WE 3 FF 3, L EF 5- We 2 EE 2; +Cuevas on L; brisk knee reflexes, strength fairly intact BLE   - Risk of cervical stenosis with compression and multilevel radiculopathy; hx of very severe deg lumbar stenosis  - Risk of RTC tears or combination - negative Marbella Butler  - Per Dr. Goodwin, \"Discussed potential dx's with pt/family and potential work-up - they would not want sx at this point no matter what the cause but would like to know potential cause for weakness which could " "help with prognosis and rehab planning; apparently has metal in eye and cannot have MRI; they want to try to avoid contrast with slight risk of kidney toxicity\"  4/21: I discussed obtaining CT scan with patient and his daughter Lora, they stated that they would like to forgo CT scan as patient does would not want intervention regardless of diagnosis. He will continue to work with PT and OT for strengthening      Chest wall asymmetry  L superior periscapular chest palpable and observable nontender region apparently there for a few year  - CT Chest did not mention concerns  - Recommend follow-up mgmt by PCP   Orthostatic hypotension  -4/23: episode of othostatic hypotention during PT (165/79 ---> 113/65), resolved after sitting  -FR advanced to 2L daily with no further episodes of orthostasis   -Follow-up with PCP  Encounter for wound care  Skin Care Plan:  1-Right Lateral thigh  : Cleanse with VASHE, pat dry. Apply eucerin cream to the dry scaly intact skin then Apply adaptic then the calcium alginate with silver (Melgisorb Ag) to the wound bed, cover with  ABD and wrap with polly wrap or secure with burn mesh to hold in place . Teresa with T for treatment, date. Change every other day  and PRN if soiled/displaced.   2-Turn/reposition q2h to offload and redistribution of the skin .  3-Elevate heels to offload pressure, utilize pillows for offloading float heels while in bed or chair   4-Moisturize skin daily with skin nourishing cream  5-Ehob cushion in chair when out of bed in chair/ Wheelchair  .  6- Apply  Hydraguard to bilateral sacro-buttocks BID and PRN  7-Recommend patient follow up with out patient for the right leg   8. Apply eucerin cream to dry scaly parts if red or draining do not put on the area . Apply to feet and legs as per the MD order.  9. Right elbow - cleanse with soap and water then apply a silicone foam teresa with a P for prevention and date change every 3 days       Fungal dermatitis  RLE venous " "stasis complicated by intermittent fungal infections. Currently located in inner right thigh, being treated with Nystatin powder (as opposed to ointment) to keep skin dry.   Referred to dermatology     Acute Rehabilitation Center Course: Patient participated in a comprehensive interdisciplinary inpatient rehabilitation program which included involvment of Rehab MD, therapies (PT, OT), RN, CM, SW, and dietary services.     Significant Findings, Care, Treatment and Services Provided: Acute comprehensive interdisciplinary inpatient rehabilitation including PT, OT, RN, CM, SW, dietary, etc.    Functional Status Upon Admission to ARC:  Self Care:  Eatin: Independent  Oral hygiene: 06: Independent  Toilet hygiene: 03: Partial/moderate assistance  Shower/bathing self: 03: Partial/moderate assistance  Upper body dressin: Supervision or touching  assistance  Lower body dressin: Partial/moderate assistance  Putting on/taking off footwear: 03: Partial/moderate assistance  Transfers:  Roll left and right: 03: Partial/moderate assistance  Sit to lyin: Partial/moderate assistance  Lying to sitting on side of bed: 03: Partial/moderate assistance  Sit to stand: 01: Dependent ( Min A x2)  Chair/bed to chair transfer: 01: Dependent (Min A x2)  Toilet transfer: 09: Not applicable  Mobility: (Min A x2 with RW 8')  Walk 10 ft: 88: Not attempted due to medical conditions or safety concerns  Walk 50 ft with two turns: 88: Not attempted due to medical conditions or safety concerns  Walk 150ft: 88: Not attempted due to medical conditions or safety concerns    Functional Status Upon Discharge from ARC:     Physical Therapy Occupational Therapy   Per PT,  \"Patient HAS met max benefit from inpatient ARC PT.  Patient MI Bed mobility;  S/MI Transfers; S/CGA Gait with RW on level and unlevel surfaces.  Stairs with S/Min assist.  Patient continues to require cues for upright posture and pacing activity.  Will benefit from " "continued PT services post discharge.\"     Eating: independent   Oral hygiene: independent   Shower/bathe: set-up/clean-up   Dressing: set-up   Footwear: min A (assist to don shoes)   Toilet hygiene: independent   Toilet transfer: supervision          Discharge Diagnosis: Impairment of mobility, safety and Activities of Daily Living (ADLs) due to Debility:  16  Debility (Non-cardiac/Non-pulmonary)  Etiologic: Sepsis due to R LE cellulitis       Discharge Medications:   See after visit summary for reconciled discharge medications provided to patient and family.      Condition at Discharge: stable     Discharge instructions/Information to patient and family:   See after visit summary for information provided to patient and family.      Provisions for Follow-Up Care:  See after visit summary for information related to follow-up care and any pertinent home health orders.      Future Appointments   Date Time Provider Department Center   5/5/2025 11:15 AM DO SILVANA Easley  [facility]   5/6/2025 To Be Determined Gladys Palma, OT VN  [facility]       Disposition: Home with [facility] and family support     Planned Readmission: No    Discharge Statement   I spent 45 minutes discharging the patient. This time was spent on the day of discharge. I had direct contact with the patient on the day of discharge. Greater than 50% of the total time was spent examining patient, answering all patient questions, arranging and discussing plan of care with patient as well as directly providing post-discharge instructions. Additional time then spent on discharge activities.    Discharge Medications:  See after visit summary for reconciled discharge medications provided to patient and family.      Facility Administered Medications Prior to Discharge:    Current Facility-Administered Medications   Medication Dose Route Frequency Provider Last Rate    acetaminophen  650 mg Oral Q6H PRN Nicolasa Shetty PA-C      albuterol  2.5 " mg Nebulization Q6H PRN Nicolasa L Dagnall, PA-C      artificial tear   Both Eyes HS Nicolasa L Dagnall, PA-C      Artificial Tears  1 drop Both Eyes 4x Daily PRN Nicolasa L Jamshidnall, PA-C      budesonide-formoterol  2 puff Inhalation BID Nicolasa L Jamshidnall, PA-C      Diclofenac Sodium  4 g Topical BID PRN Liseth Owusu, PA-C      dorzolamide  1 drop Left Eye BID Nicolasa Breennall, PA-C      furosemide  20 mg Oral Daily Nicolasa L Jamshidnall, PA-C      guaiFENesin  600 mg Oral Q12H CLARK Nicolasa L Jamshidnall, PA-C      latanoprost  1 drop Both Eyes HS Nicolasa L Jamshidnall, PA-C      lidocaine  1 patch Topical Daily Nicolasa L Jamshidnall, PA-C      metoprolol tartrate  25 mg Oral Q12H CLARK Nicolasa L Jamshidnall, PA-C      montelukast  10 mg Oral Daily Nicolasa Breennall, PA-C      nystatin   Topical BID VICTORIANO Maldonado-C      pantoprazole  20 mg Oral Early Morning Nicolasagadiel Breennall, PA-C      polyethylene glycol  17 g Oral Daily PRN Liseth Owusu, PA-C      prednisoLONE acetate  1 drop Left Eye BID Nicolasa ABIGAIL Breennall, PA-C      warfarin  4.5 mg Oral Daily (warfarin) Nicolasagadiel Breennall, PA-C      white petrolatum-mineral oil   Topical BID Ta Goodwin MD      zinc oxide   Topical BID PRN Liseth Owusu PA-C

## 2025-05-02 NOTE — DISCHARGE INSTR - AVS FIRST PAGE
DISCHARGE INSTRUCTIONS: [facility] ACUTE REHABILITATION [place]    Bring these instructions with you to your Outpatient Physician appointments so they can order and follow-up any additional lab work or imaging recommended at time of discharge.    Resume follow-up with all your prior providers that you have established care prior to this hospitalization.  Discuss with primary care physician (PCP) if you have additional questions.     It  is you or your caregivers responsibility to obtain follow-up MEDICATION REFILLS  As indicated through your Primary Care Physician (PCP) and other outpatient specialty provider(s) after discharge.  Please follow-up with your PCP as soon as possible after discharge to set-up follow-up management and when appropriate refills.        You remain a fall and injury risk which could be severe.  - Your risk of fall has decreased however since admission to acute rehab.  Caregiver training has been completed with our staff.  - Appropriate supervision +/- assistance as instructed during your rehab course is recommended to decrease risk of fall and injury.    - Continue skilled therapy as discussed after discharge to further decrease this risk    If you (or your health care proxy) have any questions or concerns regarding your acute rehabilitation stay including issues with medications, rehabilitation, and follow-up plan, please call:            [facility] Acute Rehabilitation Unit at [facility] at [phone]    Should you develop fevers, chills, new weakness, changes in sensation, difficulty speaking, facial weakness, confusion, shortness of breath, chest pain, or other concerning symptoms please call 911 and/or obtain transportation to nearest ER immediately.    Should you develop feelings of significant hopelessness, severe depression, severe anxiety, or suicide you should call 911 or obtain transportation to nearest ER.    24-7 [employer] suicide and crisis  "lifeline call \"988\"  National Suicide Prevention Lifeline is 1-585.159.8925 and is available 24 hrs/7 days a week.   [facility] Crisis [phone] is available 24 hrs/7 days for mental health  [facility] Crisis [phone] is available 24 hrs/7 days for mental health   [facility] Crisis [phone]    PHYSICIANS to see: PCP, cardiology, dermatology and wound care   Please see your doctors listed in the follow up providers section of your discharge paperwork, and take the discharge paperwork with you to your appointments.    Home health has been ordered for you:  Your home health agency should reach out to you or your family soon to arrange follow-up.    (If [facility] home health is your provider their phone number is [phone])    If you are unable to get in touch with home health you may contact your  at [phone]. [place]    SKIN CARE INSTRUCTIONS to follow:  Monitor incision(s) for increased redness, swelling, pain/tenderness, discharge/pus and promptly notify your surgeon should these develop.  - Should you develop significant pain, swelling, or drainage obtain transportation to nearest emergency room for immediate evaluation if unable to reach your surgeon promptly   - Should you develop uncontrolled pain, fever, chills, sweats, changes in strength, sensation, or color of this area obtain transportation to nearest emergency room for immediate evaluation.      Monitor skin for increased redness or breadown and promptly notify your physician should these develop    If instructed while in [facility] - be sure you stand +/- walk every 1-2 hours and if advised use appropriate supervision/assistance to optimally offload your buttock/sacral region.  While seated or lying in bed shift positions and from side to side often.  Can use barrier type cream such as Hydragaurd 2 times per day and as needed.    Turn patient (yourself) fully every 2 hours while in bed.  "     WOUND CARE INSTRUCTIONS to follow:  Home health nursing will assist you with wound management.  You may contact them with issues as well once this service is set-up.    If [facility] is your provider their phone number is [phone].    If you are unable to get in touch with home health you may contact your  at [phone].      Skin Care Plan:  1-Right Lateral thigh  : Cleanse with VASHE, pat dry. Apply eucerin cream to the dry scaly intact skin then Apply adaptic then the calcium alginate with silver (Melgisorb Ag) to the wound bed, cover with  ABD and wrap with polly wrap or secure with burn mesh to hold in place . Teresa with T for treatment, date. Change every other day  and PRN if soiled/displaced.   2-Turn/reposition q2h to offload and redistribution of the skin .  3-Elevate heels to offload pressure, utilize pillows for offloading float heels while in bed or chair   4-Moisturize skin daily with skin nourishing cream  5-Ehob cushion in chair when out of bed in chair/ Wheelchair  .  6- Apply  Hydraguard to bilateral sacro-buttocks BID and PRN  7-Recommend patient follow up with out patient for the right leg   8. Apply eucerin cream to dry scaly parts if red or draining do not put on the area . Apply to feet and legs as per the MD order.  9. Right elbow - cleanse with soap and water then apply a silicone foam teresa with a P for prevention and date change every 3 days        Due to the following you are at increased risk of skin breakdown/wounds/worsening wounds:  - Impaired mobility  - Impaired nutrition/intake/low albumin  - Medical co-morbidities      Buttocks/Sacrum  Turn as full as possible off sacrum/buttocks every 2 hours  Use Cushion while in chair or wheelchair  Weight shift every 10-15 minutes while in chair  Keep skin clean and dry as possible.  Remove wet or soiled clothing/linens promptly  Use barrier cream or similar 2 times per day   Monitor skin for increased breakdown  which you are at risk of and notify nursing, PCP, or other physician providers should this occur right away    Heels/bony edges of feet  Float heels off edge of pillow for added pressure relief.  Monitor heels and bony protuberances and notify physician or nursing for any increased redness, bogginess, or breakdown    Ensure appropriate wound care to optimize chances for healing and decrease risks of complications.    Your wound(s) remains at risk for worsening and infection.    - Should you develop significant pain, swelling, or drainage obtain transportation to nearest emergency room for immediate evaluation   - Should you develop uncontrolled pain, fever, chills, sweats, changes in strength, sensation, or color of this area obtain transportation to nearest emergency room for immediate evaluation.      Driving restrictions: You are recommended against driving until cleared by an outpatient physician.      Work restrictions:  You should NOT return to work (if working) until cleared by an outpatient physician.    You should not operate heavy machinery (if applicable) until cleared by an outpatient physician.      Alcohol restrictions:  You are recommended to not drink alcohol at this time unless cleared by an outpatient physician.  Drinking alcohol in your current functional condition can increase your risk of injury which could be severe.  Drinking alcohol given your current health problems can lead to increased medical complications which could be severe.    Combining alcohol with your current medications can increase your risk of injury which could be severe.      Smoking restrictions:  You are recommended to not smoke nicotine.  Smoking increases your risk of heart attack, stroke, emphysema/COPD, and lung cancer.      Cannabis restrictions:  You are recommended to not smoke/ingest/consume/use cannabis/marijuana at this time unless cleared by an outpatient physician.  Cannabis use/intoxication in your current  functional condition can increase your risk of injury which could be severe.  Cannabis use/intoxication given your current health problems may lead to increased medical complications and sub-optimal functional recovery    Illicit drug use restrictions:   You should not use cocaine, crack, speed/methamphetamine, heroin, LSD, ecstasy or other illicit substances as they can increase your risk of injury and medical complication which could be severe and lead to sub-optimal functional recovery.    MEDICATIONS:  Please see a full list of your medications outlined in the After Visit Summary that is attached to these Discharge Instructions.  Please note changes may have been made to your medications please refer to your discharge paperwork for your current medications and take this list with you to all your doctors appointments for your doctors to review.  Please do not resume a home medication unless the medication reconciliation sheet indicates to do so, please do not assume that a medication that you were given a prescription for is the same as a medication you have at home based on both medications having the same name as dosages and frequency may have changed.      Unless specifically noted in your medication list provided to you in your discharge paper work do not resume prior vitamins, minerals, or supplements you may have been taking prior to your hospitalization unless instructed by an outpatient physician in the future.  Discuss with your primary care at next visit if applicable.        Blood thinners prescribed to optimize long and short term health and decrease risk of complications but with risks related to bleeding and other complications.    Coumadin/Warfarin instructions:  You have been prescribed warfarin (coumadin).  This medication is used to prevent clots which can cause severe disability and even death.      Follow-up with PCP after discharge from the hospital to ensure appropriate follow-up management  of this medication.      You primary care physician will need to manage your warfarin dosing after discharge from the hospital.  Contact them within next business day to ensure appropriate follow-up management.       Warfarin is a strong anticoagulant (blood thinner).  It is being recommended for use by you (or your family) to decrease the risk of clotting which can be severely disabling and even life-threatening.  Even when provided as recommended it can cause severely disabling and even life-threatening bleeding.  Too much or too little warfarin further increases your risk of this medication causing serious and even life-threatening complications such as severe bleeding, clots, strokes and death.  With that said, at this time based on available evidence and consensus agreement, your physicians recommend you take warfarin medication based on your overall risks and benefits in your specific medical situation.      Warfarin levels are measured by lab work called PT/INR.  Too high number or too low number further increases your risks.      Your current PT/INR lab goal is 2.0-3.0.      You will need to have your PT/INR lab drawn frequently at first and followed closely by your outpatient doctor.  Check with your outpatient warfarin provider to ensure your warfarin dose does not need to be adjusted.  It is not uncommon to need changes in warfarin dosing particularly early on.  If you (or your family/caregiver) notice black stools, bloody stools, vomit blood, develop new weakness, slurred speech, confusion or have any other concerning symptoms call 911 or obtain transportation to nearest emergency room immediately.       MEDICAL MANAGEMENT AT HOME specific to you:      Hypertension Management:  Only take the medications prescribed for you at time of discharge - overly high or low blood pressure increases your risk for health complications    Follow-up with PCP/family doctor regularly to ensure blood pressure remains  adequately controlled.      Please check your blood pressure prior to taking your blood pressure medications and keep a log that you will bring with you to your follow-up doctors' appointments.    >Please contact your family doctor or cardiologist immediately for a blood pressure below 90/60 and do not take your blood pressure medications until speaking with them.  >Please contact your family doctor or cardiologist as soon as possible for blood pressure greater than 160/100.    Leg edema (swelling):   Notify your physician if you develop increased swelling, pain, or redness in legs or feet.   ACE wrap to right leg and BENITA to left leg, please remove in evening and assess for skin integrity    COPD Management:  Do not smoke.  Take your respiratory medications as prescribed.  Take your as needed respiratory medications as prescribed.  If you develop increased difficulty breathing, fever, chills, sweats, or confusion call 911 or obtain transport for family/other to hospital right away.  If you have mild worsening symptoms notify your PCP and/or pulmonologist as soon as possible.      Anemia management:  Follow-up with primary care physician at next visit.  Continue to monitor blood counts intermittently.  Follow-up management at discretion of PCP.  >If you develop increased lightheadedness, shortness of breath, chest pain, confusion, difficulty staying awake, or other concerning signs or symptoms obtain transport to nearest emergency room as soon as possible.      Urinary dysfunction management:  If you develop increased urinary frequency, burning, cramping, or difficulty urinating this may be a sign of an infection or other acute urologic issue.  Notify your PCP or urologist right away.  If it does not improve, worsen, or you develop fever, chills, sweats, confusion, or other concerning signs or symptoms, please obtain transportation to nearest emergency room.  Urinary retention can lead to significant kidney injury  and infection which can be serious.  If you do not urinate for 8 hours or more or you have the urge to urinate and are unable to, please obtain transportation to nearest emergency room (, for likely placement of mcpherson.      Hyponatremia management (Low blood sodium level):  You are recommended to maintain a 2000 ml fluid restriction per day.  1 cup (8 oz) is roughly equal to 235 ml.   5 (8 oz) cups is equal to close to 1200 ml of fluid.  Your outside physicians will need to check you blood sodium levels and adjust management as needed after you discharge.    Please follow-up with them promptly to ensure optimal management.    If you develop increased swelling, lightheadedness, dizziness contact your physicians as soon as possible.  If you develop confusion, weakness, difficulty staying awake, or other significant symptoms obtain transportation to nearest emergency room.      Bowel management (constipation risk):  - Ideally you would have 1 well formed BM every 1-2 days.  Adjust bowel regimen based on that goal or as close to that as possible  - Start with taking miralax 1 time per day and senna twice daily if you become constipated   - If still constipated you can increase miralax to twice per day and if needed take either oral or by rectum dulcolax (but not at the same time)  - If unable to go for more than 4 days notify your physician for additional recommendations    - If you develop significant abdominal pain, nausea/vomiting, or other concerns seek medical attention right away.        NSAID Warning:  Please avoid NSAID (including but not limited to advil, aleve, motrin, naproxen, ibuprofen, mobic, meloxicam, diclofenac etc) medications as NSAID medications may increase your risk of bleeding (which can be life-threatening), stroke, worsening kidney disease, heart attack, developing/worsening GI ulcers, worsening heart failure, worsening liver (cirrhosis) disease, potentially delay bone healing.      Please note  a summary of your hospital stay with relevant information for your doctors will try to be sent to them.  Please confirm with your doctors at your follow up visits that they have received this summary and have them contact [facility] Records if they have not received them along with any other medical records they may require.     [facility] Phone Number:  [phone]

## 2025-05-02 NOTE — ASSESSMENT & PLAN NOTE
[facility] no documented history of a-fib. Patient and daughter also deny a history of dysthymia.   Continue metoprolol 25 mg q12h and chronic warfarin   Continue to monitor vitals and hemodynamic status   Per daughter, follow-up scheduled for after discharge

## 2025-05-02 NOTE — PLAN OF CARE
Problem: PAIN - ADULT  Goal: Verbalizes/displays adequate comfort level or baseline comfort level  Description: Interventions:- Encourage patient to monitor pain and request assistance- Assess pain using appropriate pain scale- Administer analgesics based on type and severity of pain and evaluate response- Implement non-pharmacological measures as appropriate and evaluate response- Consider cultural and social influences on pain and pain management- Notify physician/advanced practitioner if interventions unsuccessful or patient reports new pain  Outcome: Progressing     Problem: INFECTION - ADULT  Goal: Absence or prevention of progression during hospitalization  Description: INTERVENTIONS:- Assess and monitor for signs and symptoms of infection- Monitor lab/diagnostic results- Monitor all insertion sites, i.e. indwelling lines, tubes, and drains- Monitor endotracheal if appropriate and nasal secretions for changes in amount and color- [place] appropriate cooling/warming therapies per order- Administer medications as ordered- Instruct and encourage patient and family to use good hand hygiene technique- Identify and instruct in appropriate isolation precautions for identified infection/condition  Outcome: Progressing     Problem: SAFETY ADULT  Goal: Patient will remain free of falls  Description: INTERVENTIONS:- Educate patient/family on patient safety including physical limitations- Instruct patient to call for assistance with activity - Consult OT/PT to assist with strengthening/mobility - Keep Call bell within reach- Keep bed low and locked with side rails adjusted as appropriate- Keep care items and personal belongings within reach- Initiate and maintain comfort rounds- Make Fall Risk Sign visible to staff- Offer Toileting every 2 Hours, in advance of need- Initiate/Maintain bed/chair alarm- Apply yellow socks and bracelet for high fall risk patients- Consider moving patient to room near nurses  station  Outcome: Progressing     Problem: Prexisting or High Potential for Compromised Skin Integrity  Goal: Skin integrity is maintained or improved  Description: INTERVENTIONS:- Identify patients at risk for skin breakdown- Assess and monitor skin integrity- Assess and monitor nutrition and hydration status- Monitor labs - Assess for incontinence - Turn and reposition patient- Assist with mobility/ambulation- Relieve pressure over bony prominences- Avoid friction and shearing- Provide appropriate hygiene as needed including keeping skin clean and dry- Evaluate need for skin moisturizer/barrier cream- Collaborate with interdisciplinary team - Patient/family teaching- Consider wound care consult   Outcome: Progressing

## 2025-05-02 NOTE — PROGRESS NOTES
05/02/25 0900   Pain Assessment   Pain Assessment Tool 0-10   Pain Score No Pain   Restrictions/Precautions   Precautions Bed/chair alarms;Fall Risk;Pain;Fluid restriction  (2000 mL FR)   Weight Bearing Restrictions No   ROM Restrictions No   Eating   Type of Assistance Needed Independent   Eating CARE Score 6   Oral Hygiene   Type of Assistance Needed Independent   Comment seated   Oral Hygiene CARE Score 6   Grooming   Able To Initiate Tasks;Acquire Items;Wash/Dry Face;Comb/Brush Hair;Brush/Clean Teeth;Wash/Dry Hands   Findings independent setaed with w/c available at home   Shower/Bathe Self   Type of Assistance Needed Set-up / clean-up   Shower/Bathe Self CARE Score 5   Bathing   Assessed Bath Style Shower   Anticipated D/C Bath Style Shower   Able to Gather/Transport No   Able to Adjust Water Temperature No   Able to Wash/Rinse/Dry (body part) Left Arm;Right Arm;L Upper Leg;R Upper Leg;L Lower Leg/Foot;R Lower Leg/Foot;Chest;Abdomen;Perineal Area;Buttocks   Limitations Noted in Balance;Endurance;ROM;Safety;Strength   Positioning Seated;Standing   Adaptive Equipment Longhand Sponge;Longhand Reacher;Shower Bars;Shower Seat;Hand Held Shower   Tub/Shower Transfer   Limitations Noted In Balance;Endurance;Problem Solving;ROM;Safety;LE Strength   Adaptive Equipment Grab Bars;Seat with Back   Assessed Shower   Findings Supervision stepping in and out   Upper Body Dressing   Type of Assistance Needed Set-up / clean-up   Upper Body Dressing CARE Score 5   Lower Body Dressing   Type of Assistance Needed Set-up / clean-up   Lower Body Dressing CARE Score 5   Putting On/Taking Off Footwear   Type of Assistance Needed Physical assistance   Physical Assistance Level 25% or less   Comment assist to matthew shoes   Putting On/Taking Off Footwear CARE Score 3   Picking Up Object   Type of Assistance Needed Independent;Adaptive equipment   Picking Up Object CARE Score 6   Dressing/Undressing Clothing   Remove UB Clothes Pullover  Shirt   Don UB Clothes Pullover Shirt   Remove LB Clothes Pants;Undergarment;Socks   Don LB Clothes Pants;Undergarment;Socks;Shoes   Limitations Noted In Balance;Endurance;Safety;Problem Solving;Strength;ROM   Adaptive Equipment Reacher;LH Shoehorn;Sock Aide;Dressing Stick   Positioning Supported Sit;Standing   Sit to Stand   Type of Assistance Needed Independent   Sit to Stand CARE Score 6   Bed-Chair Transfer   Type of Assistance Needed Supervision   Chair/Bed-to-Chair Transfer CARE Score 4   Toileting Hygiene   Type of Assistance Needed Independent   Toileting Hygiene CARE Score 6   Toilet Transfer   Type of Assistance Needed Supervision   Toilet Transfer CARE Score 4   Kitchen Mobility   Kitchen-Mobility Level Wheelchair   Kitchen Activity Retrieve items;Transport items   Kitchen Mobility Comments mod I simulating use of aron chair pt typically uses in the kitchen   Light Housekeeping   Light Housekeeping Level Walker   Light Housekeeping Level of Assistance Close supervision   Light Housekeeping transport of clothing needed for ADLs completion   Exercise Tools   Other Exercise Tool 1 Sanderbox 2 sets 15 all directions with BUE and 2# wt   Cognition   Orientation Level Oriented X4   Additional Activities   Additional Activities Other (Comment)   Additional Activities Comments fxl mobility in room supervision using RW   Assessment   Treatment Assessment Pt presents seated in BR agreeable to OT Session including toileting, ADLs, transfers/ mobility, light homemaking and BUE therex/ theract. Pt toleratess ession without complaints. OT discussed current LOF of superviison for transfers./ mobility allowing pt to be Mod I for toileting and setup/ supervision ADLs except min A to matthew shoes. Pt requires assist and supervision due to decreased balance, safety, endurance and strength/ ROM. Pt will benefit form skilled HHOT, family support and use of DME/ A/E during transition to home.  Discahrge planned for tomorrow,  5/23/25.   Problem List Decreased range of motion;Decreased strength;Decreased endurance;Impaired balance;Decreased safety awareness   Plan   Treatment/Interventions ADL retraining;Functional transfer training;Therapeutic exercise;Endurance training;Patient/family training;Equipment eval/education;Compensatory technique education   Progress Progressing toward goals   Discharge Recommendation   Discharge Summary Pt participates in toileting, ADLs, transfers/ mobility, light homemaking and BUE therex/ theract. Pt has made great progress with FT comlpeted and continued superviison for transfers./ mobility allowing pt to be Mod I for toileting and setup/ supervision ADLs except min A to matthew shoes. Pt requires assist and supervision due to decreased balance, safety, endurance and strength/ ROM. Pt will benefit form skilled HHOT, family support and use of DME/ A/E during transition to home. Discahrge planned for tomorrow, 5/23/25.   OT Therapy Minutes   OT Time In 0900   OT Time Out 1030   OT Total Time (minutes) 90   OT Mode of treatment - Individual (minutes) 90   Therapy Time missed   Time missed? No

## 2025-05-03 VITALS
HEIGHT: 67 IN | OXYGEN SATURATION: 97 % | DIASTOLIC BLOOD PRESSURE: 84 MMHG | SYSTOLIC BLOOD PRESSURE: 156 MMHG | TEMPERATURE: 97.6 F | BODY MASS INDEX: 29.03 KG/M2 | RESPIRATION RATE: 20 BRPM | HEART RATE: 85 BPM | WEIGHT: 184.97 LBS

## 2025-05-03 LAB
INR PPP: 1.79 (ref 0.85–1.19)
PROTHROMBIN TIME: 21.2 SECONDS (ref 12.3–15)

## 2025-05-03 PROCEDURE — 85610 PROTHROMBIN TIME: CPT

## 2025-05-03 RX ORDER — WARFARIN SODIUM 6 MG/1
6 TABLET ORAL
Qty: 30 TABLET | Refills: 0 | Status: SHIPPED | OUTPATIENT
Start: 2025-05-03

## 2025-05-03 RX ADMIN — Medication: at 09:29

## 2025-05-03 RX ADMIN — MONTELUKAST 10 MG: 10 TABLET, FILM COATED ORAL at 09:22

## 2025-05-03 RX ADMIN — GUAIFENESIN 600 MG: 600 TABLET ORAL at 09:22

## 2025-05-03 RX ADMIN — PANTOPRAZOLE SODIUM 20 MG: 20 TABLET, DELAYED RELEASE ORAL at 05:04

## 2025-05-03 RX ADMIN — LIDOCAINE 5% 1 PATCH: 700 PATCH TOPICAL at 09:22

## 2025-05-03 RX ADMIN — DORZOLAMIDE HCL 1 DROP: 20 SOLUTION/ DROPS OPHTHALMIC at 09:27

## 2025-05-03 RX ADMIN — BUDESONIDE AND FORMOTEROL FUMARATE DIHYDRATE 2 PUFF: 160; 4.5 AEROSOL RESPIRATORY (INHALATION) at 09:27

## 2025-05-03 RX ADMIN — PREDNISOLONE ACETATE 1 DROP: 10 SUSPENSION/ DROPS OPHTHALMIC at 09:27

## 2025-05-03 RX ADMIN — METOPROLOL TARTRATE 25 MG: 25 TABLET, FILM COATED ORAL at 09:22

## 2025-05-03 RX ADMIN — NYSTATIN 1 APPLICATION: 100000 POWDER TOPICAL at 09:23

## 2025-05-03 RX ADMIN — FUROSEMIDE 20 MG: 20 TABLET ORAL at 09:22

## 2025-05-03 NOTE — NURSING NOTE
Assisted down to car by staff which interdisciplinary team said was safest means of transportation.

## 2025-05-04 ENCOUNTER — HOME CARE VISIT (OUTPATIENT)
Dept: HOME HEALTH SERVICES | Facility: HOME HEALTHCARE | Age: OVER 89
End: 2025-05-04
Payer: MEDICARE

## 2025-05-04 VITALS — SYSTOLIC BLOOD PRESSURE: 112 MMHG | DIASTOLIC BLOOD PRESSURE: 62 MMHG

## 2025-05-04 PROCEDURE — 400013 VN SOC

## 2025-05-04 PROCEDURE — 10330081 VN NO-PAY CLAIM PROCEDURE

## 2025-05-04 PROCEDURE — G0299 HHS/HOSPICE OF RN EA 15 MIN: HCPCS

## 2025-05-04 NOTE — CASE COMMUNICATION
Patient has a post hospital follow up scheduled for Mon 5/5. He was previously on Losartan 50mg, but most current AVS and medication list no longer has it listed. Instructed patient to hold until confirmation is made. If patient is to take, please let daughter know at tomorrows appt.    Patient is also on Coumadin with current dose 6mg daily. Last INR on 5/3 was 1.79. Please place order for PT/INR so [facility] nurses can draw level at next vi sit scheduled for Tues 5/6, if appropriate, or when next level should be obtained.     Thank you in advance.

## 2025-05-05 ENCOUNTER — PATIENT OUTREACH (OUTPATIENT)
Dept: CASE MANAGEMENT | Facility: OTHER | Age: OVER 89
End: 2025-05-05

## 2025-05-05 ENCOUNTER — EPISODE CHANGES (OUTPATIENT)
Dept: GERIATRICS | Facility: CLINIC | Age: OVER 89
End: 2025-05-05

## 2025-05-05 ENCOUNTER — TRANSITIONAL CARE MANAGEMENT (OUTPATIENT)
Dept: FAMILY MEDICINE CLINIC | Facility: CLINIC | Age: OVER 89
End: 2025-05-05

## 2025-05-05 ENCOUNTER — HOME CARE VISIT (OUTPATIENT)
Dept: HOME HEALTH SERVICES | Facility: HOME HEALTHCARE | Age: OVER 89
End: 2025-05-05
Payer: MEDICARE

## 2025-05-05 ENCOUNTER — OFFICE VISIT (OUTPATIENT)
Dept: FAMILY MEDICINE CLINIC | Facility: CLINIC | Age: OVER 89
End: 2025-05-05
Payer: MEDICARE

## 2025-05-05 VITALS
TEMPERATURE: 96.2 F | BODY MASS INDEX: 28.38 KG/M2 | HEART RATE: 92 BPM | DIASTOLIC BLOOD PRESSURE: 70 MMHG | OXYGEN SATURATION: 98 % | SYSTOLIC BLOOD PRESSURE: 142 MMHG | WEIGHT: 181.2 LBS

## 2025-05-05 DIAGNOSIS — I10 PRIMARY HYPERTENSION: ICD-10-CM

## 2025-05-05 DIAGNOSIS — J44.89 ASTHMA-COPD OVERLAP SYNDROME (HCC): ICD-10-CM

## 2025-05-05 DIAGNOSIS — I27.82 CHRONIC PULMONARY EMBOLISM WITHOUT ACUTE COR PULMONALE, UNSPECIFIED PULMONARY EMBOLISM TYPE (HCC): ICD-10-CM

## 2025-05-05 DIAGNOSIS — N17.9 AKI (ACUTE KIDNEY INJURY) (HCC): ICD-10-CM

## 2025-05-05 DIAGNOSIS — E87.1 HYPONATREMIA: ICD-10-CM

## 2025-05-05 DIAGNOSIS — I48.0 PAROXYSMAL ATRIAL FIBRILLATION (HCC): ICD-10-CM

## 2025-05-05 DIAGNOSIS — L03.115 CELLULITIS OF RIGHT LOWER EXTREMITY: Primary | ICD-10-CM

## 2025-05-05 PROCEDURE — 99496 TRANSJ CARE MGMT HIGH F2F 7D: CPT | Performed by: FAMILY MEDICINE

## 2025-05-05 RX ORDER — FLUTICASONE PROPIONATE AND SALMETEROL 250; 50 UG/1; UG/1
1 POWDER RESPIRATORY (INHALATION) 2 TIMES DAILY
Qty: 60 BLISTER | Refills: 5 | Status: SHIPPED | OUTPATIENT
Start: 2025-05-05 | End: 2025-05-07

## 2025-05-05 NOTE — ASSESSMENT & PLAN NOTE
Patient had an acute kidney injury during his hospitalization.  Renal ultrasound was negative.  I was able to review his labs and his renal function did return to normal.  I did ask home health care to check a repeat CMP.  Orders:    Basic metabolic panel; Future

## 2025-05-05 NOTE — PROGRESS NOTES
05/05/25 1131   Hello, [Guardian’s Name / Patient’s Name], this is [Caller Name] from [facility], and our clinical care team wanted to check on you / your child after your recent visit to the hospital. It will only take 3-5 minutes. Is this a good time?   Discharge Call Type/ Specific Diagnosis: [facility] General   [facility] Discharge Follow- Up   [facility] Follow- Up Time Frame 5 Day follow up   [facility] 5 Day General Follow- up Questions   Patient location at time of call Home   Do you have questions about your medications? No   Are you/ your loved one taking all medications as prescribed? Yes   Are you/ your loved ones having any unusual symptoms or problems? No   Follow up appointments   Follow up appointment with PCP Future appointment scheduled   Healthy Lifestyle   Are you participating in ongoing therapy? Yes   Call Complete   Discharge phone call complete? Complete

## 2025-05-05 NOTE — ASSESSMENT & PLAN NOTE
Patient had a cellulitis of the right lower extremity.  I reviewed his discharge summary from the hospital.  This included his discharge summary and labs as well as diagnostic studies.  Patient did test negative for DVT.  Blood cultures were negative x 2.  CAT scan suggested cellulitis.  As noted, the patient was treated with IV Ancef and ultimately transition to oral Duricef.  He has completed his course of antibiotics.  Cellulitis is resolved.  Patient is high risk for reoccurrence due to the edema.  He needs to keep his leg elevated is much as possible.  Orders:    CBC and differential; Future

## 2025-05-05 NOTE — PROGRESS NOTES
Update obtained from PCC the patient discharged 5/3/25 to Home with [facility]. I updated the care coordination note, removed myself as the responsible staff, added the appropriate responsible staff.

## 2025-05-05 NOTE — ASSESSMENT & PLAN NOTE
Patient has done well over the years with Symbicort inhaler.  His insurance will no longer cover this.  The patient is asking us to complete a prior authorization to continue with the Symbicort.  Advair discus is covered.  Family requested a prescription today for the Advair discus, pending the prior authorization.  I sent in a prescription for the Advair discus.  Orders:    Fluticasone-Salmeterol (Advair Diskus) 250-50 mcg/dose inhaler; Inhale 1 puff 2 (two) times a day Rinse mouth after use.

## 2025-05-05 NOTE — CASE COMMUNICATION
Medication discrepancies or Major drug interactions  Abnormal clinical findings none- patient has no open wounds at this time. BLE moisturized per AVS  This report is informational only, no response is needed  [facility] has Admitted your patient to Home Health service with the following disciplines: SN, PT, OT and HHA  Patient stated goals of care increase strength   Potential barriers to goal achievement comorbidities  Primary foc us of home health care:Cardiac Circulatory  Anticipated visit pattern and next visit date 3w1 2w2 next visit planned for Tues 5/6, patient may need INR done then  Thank you for allowing us to participate in the care of your patient.

## 2025-05-05 NOTE — PROGRESS NOTES
Transition of Care Visit:  Name: Enrico Chavira      : 1930      MRN: [id number]  Encounter Provider: Brodie Anne DO  Encounter Date: 2025   Encounter department: [facility] PRIMARY CARE    Assessment & Plan  Cellulitis of right lower extremity  Patient had a cellulitis of the right lower extremity.  I reviewed his discharge summary from the hospital.  This included his discharge summary and labs as well as diagnostic studies.  Patient did test negative for DVT.  Blood cultures were negative x 2.  CAT scan suggested cellulitis.  As noted, the patient was treated with IV Ancef and ultimately transition to oral Duricef.  He has completed his course of antibiotics.  Cellulitis is resolved.  Patient is high risk for reoccurrence due to the edema.  He needs to keep his leg elevated is much as possible.  Orders:    CBC and differential; Future    Asthma-COPD overlap syndrome (HCC)  Patient has done well over the years with Symbicort inhaler.  His insurance will no longer cover this.  The patient is asking us to complete a prior authorization to continue with the Symbicort.  Advair discus is covered.  Family requested a prescription today for the Advair discus, pending the prior authorization.  I sent in a prescription for the Advair discus.  Orders:    Fluticasone-Salmeterol (Advair Diskus) 250-50 mcg/dose inhaler; Inhale 1 puff 2 (two) times a day Rinse mouth after use.    SONYA (acute kidney injury) ([facility])  Patient had an acute kidney injury during his hospitalization.  Renal ultrasound was negative.  I was able to review his labs and his renal function did return to normal.  I did ask home health care to check a repeat CMP.  Orders:    Basic metabolic panel; Future    Paroxysmal atrial fibrillation (HCC)  Patient has paroxysmal atrial fibrillation.  As noted, he was treated with IV Cardizem and converted to sinus rhythm.  He was then started on metoprolol 25 mg twice daily.  I will have the  patient continue metoprolol.  He is already anticoagulated with warfarin.         Primary hypertension  Patient had an acute kidney injury upon admission.  Losartan was held.  Blood pressures currently 142/70.  Given his age, this is satisfactory.  I am going to continue to monitor his blood pressure.  I suspect that as the patient improves his blood pressure may increase again.  We may need to consider restarting the losartan in the future.  This happened when he was hospitalized previously with COVID as well.         Chronic pulmonary embolism without acute cor pulmonale, unspecified pulmonary embolism type (HCC)  Patient has history of pulmonary embolism which was unprovoked.  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  He will continue warfarin indefinitely.  He will resume weekly protimes with his home PT-INR monitor.  He will call me with the results.         Hyponatremia  Patient had hyponatremia, likely due to diuretics as well as his sepsis.  At the time of discharge from the nursing home, his serum sodium improved to 133.  I explained to the patient that it was still a little low but not dangerously low.  I am going to have home health care recheck his serum sodium.  Lab order was placed.              History of Present Illness     Transitional Care Management Review:   Enrico Chavira is a 89 y.o. male here for TCM follow up.     During the TCM phone call patient stated:  TCM Call (since 4/21/2025)       Date and time call was made  5/5/2025 10:47 AM    Hospital care reviewed  Records reviewed    Patient was hospitialized at  Other (comment)    Comment  [facility] rehab    Date of Admission  04/19/25    Date of discharge  05/03/25    Diagnosis  Cellulitus right lower extremity    Disposition  Home    Were the patients medications reviewed and updated  Yes    Current Symptoms  Shortness of breath; Weakness          TCM Call (since 4/21/2025)       Post hospital issues   Reduced activity; Poor ADL (Activities of Daily Living) performance    Scheduled for follow up?  Yes    Not clinically warranted  pt was admitted to Rehab    Did you obtain your prescribed medications  Yes    Do you need help managing your prescriptions or medications  Yes    Why type of assitance do you need  Daughter sets up medications    Is transportation to your appointment needed  Yes    Specify why  Patient does not drive    I have advised the patient to call PCP with any new or worsening symptoms  Britney Saldana MA          This is a nine 4-year-old who presents to the office today as a transition of care management evaluation.  The patient was admitted to the hospital on April 14.  Apparently he had a fall out of bed.  He was unable to get himself up and he crawled to a phone and called for help.  Daughter reports that prior to his admission, he had nausea, vomiting, and diarrhea for a few days.  Patient argues that he did not but his daughter believes he simply forgot.  Upon admission to the hospital, the patient made criteria for sepsis.  He was found to have a cellulitis of the right lower extremity.  He was also in atrial fibrillation with a rapid ventricular response.  He was treated with IV Cardizem for the atrial fibrillation and he did convert to sinus rhythm.  For his cellulitis, he was treated with IV Ancef and ultimately converted to oral Duricef.  The patient was discharged on April 19 to acute rehab.  He remained in acute rehab from April 19 until May 3.  Patient reports that he is improving.  He is getting physical therapy and able to walk short distances again.      Review of Systems   Respiratory:  Negative for cough and shortness of breath.    Cardiovascular:  Positive for leg swelling (Positive for right lower extremity edema). Negative for chest pain and palpitations.   Gastrointestinal:  Negative for abdominal distention, abdominal pain, blood in stool, constipation and diarrhea.    Musculoskeletal:  Positive for gait problem.   Skin:  Negative for wound.        States no weeping or drainage     Objective   /70 (BP Location: Left arm, Patient Position: Sitting, Cuff Size: Standard)   Pulse 92   Temp (!) 96.2 °F (35.7 °C)   Wt 82.2 kg (181 lb 3.2 oz)   SpO2 98%   BMI 28.38 kg/m²     Physical Exam  Vitals reviewed.   Constitutional:       Comments: This is a 89-year-old white male who appears his stated age.  The patient is nonseptic in appearance and in no apparent distress   HENT:      Head: Normocephalic and atraumatic.      Right Ear: Tympanic membrane, ear canal and external ear normal. There is no impacted cerumen.      Left Ear: Tympanic membrane, ear canal and external ear normal. There is no impacted cerumen.      Mouth/Throat:      Mouth: Mucous membranes are moist.      Pharynx: Oropharynx is clear. No oropharyngeal exudate or posterior oropharyngeal erythema.   Eyes:      General: No scleral icterus.        Right eye: No discharge.         Left eye: No discharge.      Conjunctiva/sclera: Conjunctivae normal.      Pupils: Pupils are equal, round, and reactive to light.   Cardiovascular:      Rate and Rhythm: Normal rate and regular rhythm.      Heart sounds: Normal heart sounds. No murmur heard.     No friction rub. No gallop.   Pulmonary:      Effort: Pulmonary effort is normal. No respiratory distress.      Breath sounds: Normal breath sounds. No stridor. No wheezing, rhonchi or rales.   Abdominal:      General: Bowel sounds are normal. There is no distension.      Palpations: Abdomen is soft. There is no mass.      Tenderness: There is no abdominal tenderness. There is no guarding.   Musculoskeletal:      Cervical back: Neck supple.      Comments: Scoliosis is unchanged   Lymphadenopathy:      Cervical: No cervical adenopathy.   Skin:     Comments: Postinflammatory hyperpigmentation changes are present in the right leg.  There are some healed over wounds present on the  leg.  There is no drainage.   Psychiatric:         Mood and Affect: Mood normal.         Behavior: Behavior normal.         Thought Content: Thought content normal.         Judgment: Judgment normal.     Extremities: There is edema present of the right leg which extends into his thigh.  Medications have been reviewed by provider in current encounter

## 2025-05-05 NOTE — PHYSICAL THERAPY NOTE
PT DISCHARGE SUMMARY    Patient HAS met max benefit from inpatient [facility] PT.  Patient MI Bed mobility;  S/MI Transfers; S/CGA Gait with RW on level and unlevel surfaces.  Stairs with S/Min assist.  Patient continues to require cues for upright posture and pacing activity.  Will benefit from continued PT services post discharge.

## 2025-05-06 ENCOUNTER — PATIENT OUTREACH (OUTPATIENT)
Dept: CASE MANAGEMENT | Facility: OTHER | Age: OVER 89
End: 2025-05-06

## 2025-05-06 ENCOUNTER — TELEMEDICINE (OUTPATIENT)
Dept: GERIATRICS | Facility: CLINIC | Age: OVER 89
End: 2025-05-06
Payer: MEDICARE

## 2025-05-06 ENCOUNTER — HOME CARE VISIT (OUTPATIENT)
Dept: HOME HEALTH SERVICES | Facility: HOME HEALTHCARE | Age: OVER 89
End: 2025-05-06
Payer: MEDICARE

## 2025-05-06 ENCOUNTER — HOME CARE VISIT (OUTPATIENT)
Dept: HOME HEALTH SERVICES | Facility: HOME HEALTHCARE | Age: OVER 89
End: 2025-05-06
Attending: FAMILY MEDICINE
Payer: MEDICARE

## 2025-05-06 VITALS — SYSTOLIC BLOOD PRESSURE: 138 MMHG | DIASTOLIC BLOOD PRESSURE: 80 MMHG | HEART RATE: 80 BPM | OXYGEN SATURATION: 97 %

## 2025-05-06 VITALS
SYSTOLIC BLOOD PRESSURE: 138 MMHG | OXYGEN SATURATION: 98 % | TEMPERATURE: 97.2 F | RESPIRATION RATE: 20 BRPM | DIASTOLIC BLOOD PRESSURE: 66 MMHG | HEART RATE: 74 BPM

## 2025-05-06 VITALS — HEART RATE: 83 BPM | OXYGEN SATURATION: 98 %

## 2025-05-06 DIAGNOSIS — N18.2 CKD (CHRONIC KIDNEY DISEASE) STAGE 2, GFR 60-89 ML/MIN: ICD-10-CM

## 2025-05-06 DIAGNOSIS — R41.89 COGNITIVE IMPAIRMENT: ICD-10-CM

## 2025-05-06 DIAGNOSIS — I48.0 PAROXYSMAL ATRIAL FIBRILLATION (HCC): ICD-10-CM

## 2025-05-06 DIAGNOSIS — R26.2 AMBULATORY DYSFUNCTION: ICD-10-CM

## 2025-05-06 DIAGNOSIS — G89.29 CHRONIC LEFT-SIDED LOW BACK PAIN WITHOUT SCIATICA: ICD-10-CM

## 2025-05-06 DIAGNOSIS — M54.50 CHRONIC LEFT-SIDED LOW BACK PAIN WITHOUT SCIATICA: ICD-10-CM

## 2025-05-06 DIAGNOSIS — L03.115 CELLULITIS OF RIGHT LOWER EXTREMITY: Primary | ICD-10-CM

## 2025-05-06 DIAGNOSIS — D64.9 ANEMIA, UNSPECIFIED TYPE: ICD-10-CM

## 2025-05-06 DIAGNOSIS — J44.89 ASTHMA-COPD OVERLAP SYNDROME (HCC): ICD-10-CM

## 2025-05-06 DIAGNOSIS — I10 PRIMARY HYPERTENSION: ICD-10-CM

## 2025-05-06 PROCEDURE — G0299 HHS/HOSPICE OF RN EA 15 MIN: HCPCS

## 2025-05-06 PROCEDURE — G0152 HHCP-SERV OF OT,EA 15 MIN: HCPCS

## 2025-05-06 PROCEDURE — G0151 HHCP-SERV OF PT,EA 15 MIN: HCPCS

## 2025-05-06 PROCEDURE — 99205 OFFICE O/P NEW HI 60 MIN: CPT | Performed by: NURSE PRACTITIONER

## 2025-05-06 NOTE — ASSESSMENT & PLAN NOTE
Patient s/p antibiotic therapy  Continue wound care as ordered, No openings noted remains RAMONA  Continue to monitor skin for s/s of infection  Encourage frequent changes in position and offloading  [facility] will continue wound care services in home as ordered  Follow up with wound care center

## 2025-05-06 NOTE — PROGRESS NOTES
Referral for pt SONYA/SNF and [facility]. Chart reviewed. Admitted to [facility] 4/14-4/19 for sepsis r/t right lower extremity cellulitis, rapid afib, supratherapeutic INR, SONYA. Pt [facility]  Call placed to pt and he reports that he is willing to receive calls regarding care management however in a couple weeks because he had 3 people there today from [facility] and has wound care appt tomorrow that he has to go to. Pt states that his daughter will take him in a wheelchair. He would welcome discussion in a couple weeks once [facility] isnt coming in anymore.Will perform chart review next week for SONYA. Also, lab order in chart to be drawn after 5/11 requested by pt and daughter.Will place reminder for a call back in 2-3 weeks as per pt request.

## 2025-05-06 NOTE — ASSESSMENT & PLAN NOTE
Patient with chronic back pain  CT of lower spine revealed Diffuse severe thoracolumbar degenerative change with apex left lumbar scoliosis. A posteriorly directed vertebral body osteophyte on the right at L1-L2 results in moderate central canal narrowing   Continue multimodal pain management  Maintain fall and safety precautions  Follow up with PCP

## 2025-05-06 NOTE — ASSESSMENT & PLAN NOTE
Patient with history of COPD with no recent exacerbation  Continue inhaler therapy  Sa02 today 98%  Lungs clear upon assessment today  Continue to monitor respiratory status for acute changes in status  Follow up with PCP/Pulmonary

## 2025-05-06 NOTE — ASSESSMENT & PLAN NOTE
Lab Results   Component Value Date    EGFR 78 04/28/2025    EGFR 81 04/25/2025    EGFR 76 04/21/2025    CREATININE 0.75 04/28/2025    CREATININE 0.68 04/25/2025    CREATININE 0.79 04/21/2025   Most recent Creatinine 0.75 /Gfr 78  Continue to monitor BMP periodically  Avoid nephrotoxins and NSAIDS  Avoid hypotension  Continue to monitor for acute changes in condition  Follow up with PCP

## 2025-05-06 NOTE — PROGRESS NOTES
Virtual Regular VisitName: Enrico Chavira      : 1930      MRN: [id number]  Encounter Provider: RODY Matthew  Encounter Date: 2025   Encounter department: [facility] VIRTUAL  :  Assessment & Plan  Cellulitis of right lower extremity  Patient s/p antibiotic therapy  Continue wound care as ordered, No openings noted remains RAMONA  Continue to monitor skin for s/s of infection  Encourage frequent changes in position and offloading  VNA will continue wound care services in home as ordered  Follow up with wound care center     Primary hypertension  BP remains stable today, 138/66  Continue Lasix 20 mg po QD and Metoprolol 25 mg po BID  Avoid hypotension  Continue to monitor BP and for acute changes in condition  Follow up with PCP/Cardiology       Asthma-COPD overlap syndrome (HCC)  Patient with history of COPD with no recent exacerbation  Continue inhaler therapy  Sa02 today 98%  Lungs clear upon assessment today  Continue to monitor respiratory status for acute changes in status  Follow up with PCP/Pulmonary      Paroxysmal atrial fibrillation (HCC)  Continue Metoprolol for rate control  Continue to monitor HR, 74  Continue Warfarin for anticoag  Monitor for s/s of bleeding and acute changes in condition  Follow up with PCP/Cardiology       CKD (chronic kidney disease) stage 2, GFR 60-89 ml/min  Lab Results   Component Value Date    EGFR 78 2025    EGFR 81 2025    EGFR 76 2025    CREATININE 0.75 2025    CREATININE 0.68 2025    CREATININE 0.79 2025   Most recent Creatinine 0.75 /Gfr 78  Continue to monitor BMP periodically  Avoid nephrotoxins and NSAIDS  Avoid hypotension  Continue to monitor for acute changes in condition  Follow up with PCP     Chronic left-sided low back pain without sciatica  Patient with chronic back pain  CT of lower spine revealed Diffuse severe thoracolumbar degenerative change with apex left lumbar scoliosis.  A posteriorly directed vertebral body osteophyte on the right at L1-L2 results in moderate central canal narrowing   Continue multimodal pain management  Maintain fall and safety precautions  Follow up with PCP  Anemia, unspecified type  Most recent Hgb 9.5/Hct 29.4  Continue to monitor CBC periodically  Monitor for signs and symptoms of bleeding  Continue to monitor patient for acute changes in condition  Follow up with PCP       Cognitive impairment  Most recent MoCa 19/30  Continue supportive measures  Continue to reorient, redirect, and reassure patient prn  Encourage engagement and activity  Encourage daily involvement in ADLs  Maintain delirium precautions and sleep/wake cycle  Patient remains at risk for delirium r/t age, medications, sleep disturbance, and pain  Avoid deliriogenic medications such as tramadol, benzodiazepines, anticholinergics, and Benadryl  Limit interruptions at night as medically appropriate  Provide quiet environment, dim lighting, and avoidance tv at night  Patient continues to require assistance in which supportive services can be provided by VNA services  Encourage adequate nutrition and hydration  Continue to monitor for acute changes in condition  Follow up with PCP      Ambulatory dysfunction  History of falls  Maintain fall and safety precautions  Encourage use of asst devices  Continue PT/OT services with VNA  Assess for need with assistance with transfers, mobility, and ADLs in/out of home  Patient is at high risk for falls r/t cognitive impairment, chronic pain, b/l arm weakness, history of falls, lumbar spinal stenosis, polypharmacy, environmental barriers, and age  Continue to monitor for acute changes in condition   Follow up with PCP        History of Present Illness        Enrico Chavira is a 89 y.o. male patient, being seen for [facility] in conjuction with VNA nurse Karen Davis, who was in the home to perform physical assessment.      The patient is  being seen and examined today for follow-up on acute and chronic medical conditions s/p most recent hospitalization.     The patient reports he is feeling well today. He reports he does have dyspnea with exertion.  He reports he does have lower back pain that is exacerbated when he lays a certain way. He states that he takes Tylenol and this helps. Patient states he has a good appetite and if he is unable to eat something he uses his Nutra-bullet and makes a smoothie out of it. Patient denies any increase in dyspnea, CP, dizziness, HA, fever, chills, nausea, vomiting, diarrhea, or constipation. Patient has f/u with [facility] 5/7/25.         Review of Systems   Constitutional:  Positive for activity change and fatigue. Negative for chills and fever.   HENT:  Negative for ear pain and sore throat.    Eyes:  Negative for pain and visual disturbance.   Respiratory:  Positive for shortness of breath. Negative for cough.    Cardiovascular:  Negative for chest pain and palpitations.   Gastrointestinal:  Negative for abdominal pain and vomiting.   Genitourinary:  Negative for dysuria and hematuria.   Musculoskeletal:  Positive for arthralgias and gait problem. Negative for back pain.   Skin:  Negative for color change and rash.   Neurological:  Positive for weakness. Negative for seizures and syncope.   All other systems reviewed and are negative.    Pertinent Medical History   Patient originally presented to [facility] ED on 4/14/25 for generalized weakness. He met sepsis criteria with tachycardia and leukocytosis in setting of right lower extremitity cellulitis as noted on CT imaging. Patient was started on IV cefazolin. His home warfarin was placed on hold due to supratherapeutic INR. Patient was also noted to be in atrial fibrillation with RVR and converted to normal sinus rhythm after cardizem infusion. He was started on metoprolol for rate control. Nephrology was consulted for SONYA and hyponatremia. Patient was  transitioned to oral Duricef on discharge per infectious disease recommenations to complete total of 10 days of antibitioic treatment. Patient was elevated by PT OT in consultation who recommended post-acute rehab services.     Objective   Vitals    Vitals 5/6/2025  9:35 AM   /66   BP Location Left arm   Patient Position Sitting   Resp 20   SpO2 98 %   Pulse 74   Pulse Source Apical   Cardiac Regularity Regular   Temp (!) 97.2 °F (36.2 °C)   Temp src Temporal       CT CHEST WITHOUT IV CONTRAST     INDICATION:   worsening shortness of breath, wheezing. Per my review of the medical record, presented on 4/14/2025 with right lower extremity cellulitis, tachycardia, and leukocytosis. History of asthma COPD overlap syndrome. Stable chronic cough,   shortness of breath.     COMPARISON: CXR 4/17/2025, 7/31/2022.     TECHNIQUE: Chest CT without intravenous contrast.  Axial, sagittal, coronal 2D reformats and coronal MIPS from source data.     Radiation dose length product (DLP):  329 mGy-cm . Radiation dose exposure minimized using iterative reconstruction and automated exposure control.     FINDINGS:     LUNGS: 3.7 x 2.0 cm anterior juxtapleural right upper lobe solid mass with septated cystic component and focal calcification (3/50). 2.6 x 1.8 cm juxtapleural lateral right upper lobe opacity (3/119). 7 mm solid right lower lobe paraspinal nodule   (3/116).     3.1 x 2.0 cm calcified right middle lobe mass (3/151), likely benign. 1.7 x 1.1 cm irregularly calcified nodule paramediastinal right upper lobe, also likely benign (3/90).     Mild benign bilateral scar. Thin-walled septated cyst right middle lobe. 5 mm solid juxtapleural posterior basal right lower lobe nodule (3/143), of doubtful significance.     Mild bilateral scar.     AIRWAYS: No significant filling defects.     PLEURA:  Unremarkable.     HEART/GREAT VESSELS: Normal heart size. Moderate coronary artery calcification indicating atherosclerotic heart  disease.     MEDIASTINUM AND LACY: Moderate hiatal hernia. 2.3 x 1.3 cm low-attenuation nodule in the mediastinum adjacent to the hiatal hernia (2/80).     CHEST WALL AND LOWER NECK: Unremarkable.     UPPER ABDOMEN: Bilateral renal cysts.     OSSEOUS STRUCTURES: Moderate degenerative disease of the spine with moderate curvature. Severe left and moderate right glenohumeral degenerative disease with moderate left glenohumeral joint effusion.     IMPRESSION:     3.7 cm anterior right upper lobe solid mass with septated cystic component and calcification. 2.6 cm lateral right upper lobe opacity and 7 mm right lower lobe nodule. These may be infectious/inflammatory but malignancy cannot be excluded.     2.3 x 1.3 cm low-attenuation nodule in the mediastinum adjacent to the hiatal hernia, question benign or malignant lymph node.     Moderate hiatal hernia.     The study was marked in EPIC for immediate notification.        Workstation performed: [id number]       CHEST     INDICATION: Wheezing, short of breath.     COMPARISON: Chest radiograph from 7/31/2022 and 10/20/2016.     TECHNIQUE: XR CHEST PORTABLE     FINDINGS:     Evaluation is somewhat limited by patient rotation and lung hypoinflation. Likely area of scarring in the right midlung. No other discrete airspace opacities. No pleural effusions. No evidence of pneumothorax.     Cardiac silhouette not accurately accessed on this projection.     Scoliosis of the spine.     IMPRESSION:     No acute pulmonary pathology within limitations discussed above.         Workstation performed: [id number]    RENAL ULTRASOUND WITH PVR     INDICATION: SONYA.     COMPARISON: None     TECHNIQUE: Ultrasound of the retroperitoneum was performed with a curvilinear transducer utilizing volumetric sweeps and still imaging techniques.     FINDINGS:     KIDNEYS:  Symmetric and normal size.  Right kidney: 11.0 x 5.8 x 5.7 cm. Volume 190.9 mL  Left kidney: 11.4 x 5.1 x 4.8 cm. Volume 147.5 mL      Right kidney  Echogenic appearance with mild diffuse cortical thinning.  No mass is identified. Simple cysts measuring up to 5.6 cm.  No hydronephrosis.  No shadowing calculi.  No perinephric fluid collections.     Left kidney  Echogenic with diffuse cortical thinning.  No mass is identified.  Simple cysts with dominant thinly septated 3.8 cm cyst.  No hydronephrosis.  Nonobstructive 5 mm calculus or cortical calcification.  No perinephric fluid collections.     URETERS:  Nonvisualized.     BLADDER:  Normally distended.  No focal thickening or mass lesions.  Bilateral ureteral jets detected.  Prevoid: 72.4  Post void residual was not assessed. The patient could not void.        IMPRESSION:     No hydronephrosis.     Echogenic kidneys with cortical thinning in keeping with medical renal disease.     No significant post void residual.       Workstation performed: [id number]    CT right lower extremity without IV contrast     INDICATION: concern for diffuse overlying cellulitis.     COMPARISON: None.     TECHNIQUE: CT examination of the above was performed. This examination, like all CT scans performed in the  Network, was performed utilizing techniques to minimize radiation dose exposure, including the use of iterative reconstruction   and automated exposure control software.  Multiplanar 2D reformatted images were created from the source data.     Rad dose  1367 mGy-cm     FINDINGS:     OSSEOUS STRUCTURES:  No displaced fracture, dislocation or destructive osseous lesion. Advanced degenerative changes of the right knee with joint space narrowing, large marginal osteophytes, and chondrocalcinosis/heterotopic ossification. Small knee   joint effusion.     Gamma nail fixation of a right femoral neck fracture with heterotopic ossification and nonunion of the femoral neck fracture. Heterotopic ossification about the hip.     VISUALIZED MUSCULATURE: Fatty atrophy of the posterior compartment thigh and  calf musculature.     SOFT TISSUES: Superficial blistering of the proximal medial thigh. Subcutaneous edema and skin thickening extending from the proximal thigh through the ankle. No fluid collection within the limits of an unenhanced exam. No soft tissue gas.     OTHER PERTINENT FINDINGS:  None.     IMPRESSION:     Subcutaneous edema and skin thickening extending from the proximal thigh through the ankle, which can be seen with cellulitis. No fluid collection within the limits of an unenhanced exam. No soft tissue gas.     Workstation performed: [id number]    CT LUMBAR SPINE WITHOUT CONTRAST     INDICATION:   lower back pain.     COMPARISON: None.     TECHNIQUE:  Contiguous axial images through the lumbar spine were obtained. Sagittal and coronal reconstructions were performed.        Radiation dose length product (DLP) for this visit:  764 mGy-cm .  This examination, like all CT scans performed in the [facility], was performed utilizing techniques to minimize radiation dose exposure, including the use of iterative   reconstruction and automated exposure control.     IMAGE QUALITY:  Diagnostic.     FINDINGS:     ALIGNMENT:  There are 5 lumbar type vertebral bodies.  Normal alignment of the lumbar spine.  No spondylolysis or spondylolisthesis.     VERTEBRAE:  No fracture.  No lytic or blastic lesion.     DEGENERATIVE CHANGES:     Diffuse severe thoracolumbar degenerative change with apex left lumbar scoliosis.  A posteriorly directed vertebral body osteophyte on the right at L1-L2 results in moderate central canal narrowing (sagittal 602:92, axial 2:52)     PARASPINAL SOFT TISSUES:  Normal.     OTHER: None.        IMPRESSION:     Diffuse severe thoracolumbar degenerative change with apex left lumbar scoliosis.  No acute fracture or traumatic malalignment.     Workstation performed: [id number]    CT BRAIN - WITHOUT CONTRAST     INDICATION:   fall.     COMPARISON:  None.     TECHNIQUE:  CT  examination of the brain was performed.  Multiplanar 2D reformatted images were created from the source data.     Radiation dose length product (DLP) for this visit:  828 mGy-cm .  This examination, like all CT scans performed in the [facility], was performed utilizing techniques to minimize radiation dose exposure, including the use of iterative   reconstruction and automated exposure control.     IMAGE QUALITY:  Diagnostic.     FINDINGS:     PARENCHYMA:No intracranial mass, mass effect or midline shift. No CT signs of acute infarction.  No acute parenchymal hemorrhage. Moderate periventricular white matter hypodensity seen related to chronic small vessel ischemic     VENTRICLES AND EXTRA-AXIAL SPACES:  Normal for the patient's age.  Prominent extra-axial spaces  VISUALIZED ORBITS:  Normal visualized orbits.     PARANASAL SINUSES:  Opacification of the right frontal sinus likely due to chronic sinus disease     CALVARIUM AND EXTRACRANIAL SOFT TISSUES:  Normal.     IMPRESSION:     No acute intracranial hemorrhage seen  No mass effect or midline shift seen     Workstation performed: [id number]       XR KNEE 4+ VW RIGHT INJURY     INDICATION: swelling.     COMPARISON: None     FINDINGS:     Partially imaged femoral IM aleksandr with fractured distal interlocking screw. No acute fracture or dislocation.     No joint effusion.     Severe tricompartmental osteoarthritis.     No lytic or blastic osseous lesion.     Atherosclerotic calcifications.     Diffuse soft tissue swelling.     IMPRESSION:     No acute osseous abnormality.     IM aleksandr with fractured distal interlocking screw.        Computerized Assisted Algorithm (CAA) may have been used to analyze all applicable images.        Workstation performed: [id number]       4/28/2025  8:26 AM    Component Value Flag Ref Range Units Status   WBC 4.81  4.31 - 10.16 Thousand/uL Final   RBC 3.14  Low  3.88 - 5.62 Million/uL Final   Hemoglobin 9.5  Low  12.0 - 17.0  g/dL Final   Hematocrit 29.4  Low  36.5 - 49.3 % Final   MCV 94  82 - 98 fL Final   MCH 30.3  26.8 - 34.3 pg Final   MCHC 32.3  31.4 - 37.4 g/dL Final   RDW 13.9  11.6 - 15.1 % Final   MPV 9.0  8.9 - 12.7 fL Final   Platelets 381  149 - 390 Thousands/uL Final   nRBC 0   /100 WBCs Final   Segmented % 60  43 - 75 % Final   Immature Grans % 1  0 - 2 % Final   Lymphocytes % 20  14 - 44 % Final   Monocytes % 15  High  4 - 12 % Final   Eosinophils Relative 3  0 - 6 % Final   Basophils Relative 1  0 - 1 % Final   Absolute Neutrophils 2.92  1.85 - 7.62 Thousands/µL Final   Absolute Immature Grans 0.05  0.00 - 0.20 Thousand/uL Final   Absolute Lymphocytes 0.95  0.60 - 4.47 Thousands/µL Final   Absolute Monocytes 0.72  0.17 - 1.22 Thousand/µL Final   Eosinophils Absolute 0.14  0.00 - 0.61 Thousand/µL Final   Basophils Absolute 0.03  0.00 - 0.10 Thousands/µL Final     4/28/2025  8:27 AM    Component Value Flag Ref Range Units Status   Sodium 133  Low  135 - 147 mmol/L Final   Potassium 4.0  3.5 - 5.3 mmol/L Final   Chloride 104  96 - 108 mmol/L Final   CO2 24  21 - 32 mmol/L Final   ANION GAP 5  4 - 13 mmol/L Final   BUN 19  5 - 25 mg/dL Final   Creatinine 0.75  0.60 - 1.30 mg/dL Final   Comment:   Standardized to IDMS reference method   Glucose 88  65 - 140 mg/dL Final   Comment:   If the patient is fasting, the ADA then defines impaired fasting glucose as > 100 mg/dL and diabetes as > or equal to 123 mg/dL.   Calcium 8.3  Low  8.4 - 10.2 mg/dL Final   Corrected Calcium 9.7  8.3 - 10.1 mg/dL Final   AST 18  13 - 39 U/L Final   ALT 14  7 - 52 U/L Final   Comment:   Specimen collection should occur prior to Sulfasalazine administration due to the potential for falsely depressed results.   Alkaline Phosphatase 59  34 - 104 U/L Final   Total Protein 5.9  Low  6.4 - 8.4 g/dL Final   Albumin 2.3  Low  3.5 - 5.0 g/dL Final   Total Bilirubin 0.28  0.20 - 1.00 mg/dL Final   Comment:   Use of this assay is not recommended for patients  undergoing treatment with eltrombopag due to the potential for falsely elevated results.  N-acetyl-p-benzoquinone imine (metabolite of Acetaminophen) will generate erroneously low results in samples for patients that have taken an overdose of Acetaminophen.   eGFR 78   ml/min/1.73sq m Final       Physical Exam  Vitals and nursing note reviewed.   Constitutional:       General: He is not in acute distress.     Appearance: He is well-developed.   HENT:      Head: Normocephalic and atraumatic.     Eyes:      Conjunctiva/sclera: Conjunctivae normal.      Comments: Wears glasses     Cardiovascular:      Rate and Rhythm: Normal rate and regular rhythm.      Heart sounds: No murmur heard.  Pulmonary:      Effort: Pulmonary effort is normal. No respiratory distress.      Breath sounds: Decreased breath sounds present.   Abdominal:      Palpations: Abdomen is soft.      Tenderness: There is no abdominal tenderness.     Musculoskeletal:         General: No swelling.      Cervical back: Neck supple.     Skin:     General: Skin is warm and dry.      Capillary Refill: Capillary refill takes less than 2 seconds.     Neurological:      Mental Status: He is alert.      Motor: Weakness present.      Coordination: Coordination abnormal.      Gait: Gait abnormal.     Psychiatric:         Mood and Affect: Mood normal.      Comments: forgetful         Administrative Statements   Encounter provider RODY Matthew    The Patient is located at Home and in the following state in which I hold an active license [place].    The patient was identified by name and date of birth. Enrico Chavira was informed that this is a telemedicine visit and that the visit is being conducted through the [employer] Teams platform. He agrees to proceed..  My office door was closed. No one else was in the room.  He acknowledged consent and understanding of privacy and security of the video platform. The patient has agreed to participate and  understands they can discontinue the visit at any time.    I have spent a total time of 66 minutes in caring for this patient on the day of the visit/encounter including Diagnostic results, Prognosis, Risks and benefits of tx options, Instructions for management, Patient and family education, Importance of tx compliance, Risk factor reductions, Impressions, Counseling / Coordination of care, Documenting in the medical record, Reviewing/placing orders in the medical record (including tests, medications, and/or procedures), Obtaining or reviewing history  , and Communicating with other healthcare professionals , not including the time spent for establishing the audio/video connection.

## 2025-05-06 NOTE — ASSESSMENT & PLAN NOTE
Patient had hyponatremia, likely due to diuretics as well as his sepsis.  At the time of discharge from the nursing home, his serum sodium improved to 133.  I explained to the patient that it was still a little low but not dangerously low.  I am going to have home health care recheck his serum sodium.  Lab order was placed.

## 2025-05-06 NOTE — ASSESSMENT & PLAN NOTE
Patient has history of pulmonary embolism which was unprovoked.  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  He will continue warfarin indefinitely.  He will resume weekly protimes with his home PT-INR monitor.  He will call me with the results.

## 2025-05-06 NOTE — ASSESSMENT & PLAN NOTE
History of falls  Maintain fall and safety precautions  Encourage use of asst devices  Continue PT/OT services with VNA  Assess for need with assistance with transfers, mobility, and ADLs in/out of home  Patient is at high risk for falls r/t cognitive impairment, chronic pain, b/l arm weakness, history of falls, lumbar spinal stenosis, polypharmacy, environmental barriers, and age  Continue to monitor for acute changes in condition   Follow up with PCP

## 2025-05-06 NOTE — ASSESSMENT & PLAN NOTE
Most recent MoCa 19/30  Continue supportive measures  Continue to reorient, redirect, and reassure patient prn  Encourage engagement and activity  Encourage daily involvement in ADLs  Maintain delirium precautions and sleep/wake cycle  Patient remains at risk for delirium r/t age, medications, sleep disturbance, and pain  Avoid deliriogenic medications such as tramadol, benzodiazepines, anticholinergics, and Benadryl  Limit interruptions at night as medically appropriate  Provide quiet environment, dim lighting, and avoidance tv at night  Patient continues to require assistance in which supportive services can be provided by VNA services  Encourage adequate nutrition and hydration  Continue to monitor for acute changes in condition  Follow up with PCP

## 2025-05-06 NOTE — ASSESSMENT & PLAN NOTE
BP remains stable today, 138/66  Continue Lasix 20 mg po QD and Metoprolol 25 mg po BID  Avoid hypotension  Continue to monitor BP and for acute changes in condition  Follow up with PCP/Cardiology

## 2025-05-06 NOTE — ASSESSMENT & PLAN NOTE
Patient had an acute kidney injury upon admission.  Losartan was held.  Blood pressures currently 142/70.  Given his age, this is satisfactory.  I am going to continue to monitor his blood pressure.  I suspect that as the patient improves his blood pressure may increase again.  We may need to consider restarting the losartan in the future.  This happened when he was hospitalized previously with COVID as well.

## 2025-05-06 NOTE — ASSESSMENT & PLAN NOTE
Most recent Hgb 9.5/Hct 29.4  Continue to monitor CBC periodically  Monitor for signs and symptoms of bleeding  Continue to monitor patient for acute changes in condition  Follow up with PCP

## 2025-05-06 NOTE — ASSESSMENT & PLAN NOTE
Patient has paroxysmal atrial fibrillation.  As noted, he was treated with IV Cardizem and converted to sinus rhythm.  He was then started on metoprolol 25 mg twice daily.  I will have the patient continue metoprolol.  He is already anticoagulated with warfarin.

## 2025-05-06 NOTE — ASSESSMENT & PLAN NOTE
Continue Metoprolol for rate control  Continue to monitor HR, 74  Continue Warfarin for anticoag  Monitor for s/s of bleeding and acute changes in condition  Follow up with PCP/Cardiology

## 2025-05-07 ENCOUNTER — HOME CARE VISIT (OUTPATIENT)
Dept: HOME HEALTH SERVICES | Facility: HOME HEALTHCARE | Age: OVER 89
End: 2025-05-07
Payer: MEDICARE

## 2025-05-07 ENCOUNTER — OFFICE VISIT (OUTPATIENT)
Facility: HOSPITAL | Age: OVER 89
End: 2025-05-07
Payer: MEDICARE

## 2025-05-07 ENCOUNTER — APPOINTMENT (OUTPATIENT)
Dept: LAB | Facility: HOSPITAL | Age: OVER 89
End: 2025-05-07
Payer: MEDICARE

## 2025-05-07 VITALS
RESPIRATION RATE: 18 BRPM | BODY MASS INDEX: 29.09 KG/M2 | HEIGHT: 66 IN | DIASTOLIC BLOOD PRESSURE: 100 MMHG | TEMPERATURE: 96.1 F | WEIGHT: 181 LBS | SYSTOLIC BLOOD PRESSURE: 160 MMHG | HEART RATE: 93 BPM

## 2025-05-07 DIAGNOSIS — Z87.2 HISTORY OF CELLULITIS OF SKIN WITH LYMPHANGITIS: ICD-10-CM

## 2025-05-07 DIAGNOSIS — M79.89 RIGHT LEG SWELLING: ICD-10-CM

## 2025-05-07 DIAGNOSIS — S71.101S OPEN WOUND OF RIGHT THIGH, SEQUELA: ICD-10-CM

## 2025-05-07 DIAGNOSIS — S71.101S OPEN WOUND OF RIGHT THIGH, SEQUELA: Primary | ICD-10-CM

## 2025-05-07 DIAGNOSIS — J44.89 ASTHMA-COPD OVERLAP SYNDROME (HCC): ICD-10-CM

## 2025-05-07 LAB
ANION GAP SERPL CALCULATED.3IONS-SCNC: 6 MMOL/L (ref 4–13)
BASOPHILS # BLD AUTO: 0.04 THOUSANDS/ÂΜL (ref 0–0.1)
BASOPHILS NFR BLD AUTO: 1 % (ref 0–1)
BUN SERPL-MCNC: 23 MG/DL (ref 5–25)
CALCIUM SERPL-MCNC: 9.2 MG/DL (ref 8.4–10.2)
CHLORIDE SERPL-SCNC: 99 MMOL/L (ref 96–108)
CO2 SERPL-SCNC: 27 MMOL/L (ref 21–32)
CREAT SERPL-MCNC: 0.74 MG/DL (ref 0.6–1.3)
EOSINOPHIL # BLD AUTO: 0.24 THOUSAND/ÂΜL (ref 0–0.61)
EOSINOPHIL NFR BLD AUTO: 3 % (ref 0–6)
ERYTHROCYTE [DISTWIDTH] IN BLOOD BY AUTOMATED COUNT: 13.8 % (ref 11.6–15.1)
GFR SERPL CREATININE-BSD FRML MDRD: 78 ML/MIN/1.73SQ M
GLUCOSE SERPL-MCNC: 118 MG/DL (ref 65–140)
HCT VFR BLD AUTO: 35.6 % (ref 36.5–49.3)
HGB BLD-MCNC: 11.2 G/DL (ref 12–17)
IMM GRANULOCYTES # BLD AUTO: 0.04 THOUSAND/UL (ref 0–0.2)
IMM GRANULOCYTES NFR BLD AUTO: 1 % (ref 0–2)
LYMPHOCYTES # BLD AUTO: 1.79 THOUSANDS/ÂΜL (ref 0.6–4.47)
LYMPHOCYTES NFR BLD AUTO: 26 % (ref 14–44)
MCH RBC QN AUTO: 29.6 PG (ref 26.8–34.3)
MCHC RBC AUTO-ENTMCNC: 31.5 G/DL (ref 31.4–37.4)
MCV RBC AUTO: 94 FL (ref 82–98)
MONOCYTES # BLD AUTO: 0.81 THOUSAND/ÂΜL (ref 0.17–1.22)
MONOCYTES NFR BLD AUTO: 12 % (ref 4–12)
NEUTROPHILS # BLD AUTO: 4.11 THOUSANDS/ÂΜL (ref 1.85–7.62)
NEUTS SEG NFR BLD AUTO: 57 % (ref 43–75)
NRBC BLD AUTO-RTO: 0 /100 WBCS
PLATELET # BLD AUTO: 317 THOUSANDS/UL (ref 149–390)
PMV BLD AUTO: 8.9 FL (ref 8.9–12.7)
POTASSIUM SERPL-SCNC: 4.6 MMOL/L (ref 3.5–5.3)
RBC # BLD AUTO: 3.78 MILLION/UL (ref 3.88–5.62)
SODIUM SERPL-SCNC: 132 MMOL/L (ref 135–147)
WBC # BLD AUTO: 7.03 THOUSAND/UL (ref 4.31–10.16)

## 2025-05-07 PROCEDURE — 85025 COMPLETE CBC W/AUTO DIFF WBC: CPT

## 2025-05-07 PROCEDURE — 99213 OFFICE O/P EST LOW 20 MIN: CPT | Performed by: STUDENT IN AN ORGANIZED HEALTH CARE EDUCATION/TRAINING PROGRAM

## 2025-05-07 PROCEDURE — 80048 BASIC METABOLIC PNL TOTAL CA: CPT

## 2025-05-07 PROCEDURE — 36415 COLL VENOUS BLD VENIPUNCTURE: CPT

## 2025-05-07 PROCEDURE — G0156 HHCP-SVS OF AIDE,EA 15 MIN: HCPCS

## 2025-05-07 RX ORDER — FLUTICASONE PROPIONATE AND SALMETEROL XINAFOATE 45; 21 UG/1; UG/1
2 AEROSOL, METERED RESPIRATORY (INHALATION) 2 TIMES DAILY
Qty: 36 G | Refills: 3 | Status: SHIPPED | OUTPATIENT
Start: 2025-05-07 | End: 2025-05-12 | Stop reason: CLARIF

## 2025-05-07 NOTE — PROGRESS NOTES
Wound Procedure Treatment Anterior;Right;Lateral Thigh    Performed by: Brandee Llanes RN  Authorized by: Cornelia Goldsmith PA-C  Associated wounds:   Wound 05/07/25 Thigh Anterior;Right;Lateral    Wound cleansed with:  NSS   Applied primary dressing:  Non adherent contact layer, Silver and Calcium alginate   Applied secondary dressing:  ABD   Dressing secured with:  Tape   Comments:  Adaptic with overlying silver alginate  Ace wraps (2) applied

## 2025-05-07 NOTE — PATIENT INSTRUCTIONS
Orders Placed This Encounter   Procedures    Wound cleansing and dressings Anterior;Right;Lateral Thigh     Wound location: Right lateral thigh: **Please notice changes to orders!!      *[facility] to come every other day.       Change dressing Every other day  You may remove the dressing and shower on the days home health care is coming. Do not leave wound open to air, apply new dressing immediately.  Cleanse the wound with wound cleanser or mild soap and water, rinse, pat dry.  Apply adaptic to the wound.  Cover with silver alginate overtop adaptic.   Cover with ABD, and tape.   Apply Ace wraps from the base of the toes to the top of the thigh. Two were applied on the right leg at the wound center. Please do not apply ace wraps too tight, recommend comfortable compression. *Patient may take the ace wraps off at night if uncomfortable.       It is recommended you go get bloodwork today for the redness and swelling in your right leg.       A thigh high compression circaid will be ordered for you today.         Monitor for any signs or symptoms of infection such as increase redness or pain, fever/chills, nausea/vomiting, malodor, or  increased drainage. If you have any signs or symptoms please call the wound center, if after hours or on holiday/weekend call your primary care provider or go to the emergency department to be evaluated.    Please call the Wound Management Center Monday through Friday between 8-430 for any questions.            Follow up at the wound care center in 1 week.     Standing Status:   Future     Expiration Date:   5/14/2025    CBC and differential     This is a patient instruction: This test is non-fasting. Please drink two glasses of water morning of bloodwork.        Standing Status:   Future     Expiration Date:   7/7/2026     This patient has an active home care or hospice episode. Should this order be COMPLETED by [facility]?:   No    Basic metabolic panel     This is a patient  instruction: Patient fasting for 8 hours or longer recommended.     Standing Status:   Future     Expiration Date:   7/7/2026     This patient has an active home care or hospice episode. Should this order be COMPLETED by [facility]?:   No

## 2025-05-07 NOTE — PROGRESS NOTES
Patient ID: Enrico Chavira is a 89 y.o. male Date of Birth 8/12/1930       Chief Complaint   Patient presents with    New Patient Visit     Discharged from the hospital on Saturday. Visiting nurse comes 2 times a week. Wound on right thigh.       Allergies:  Alphagan p [brimonidine tartrate]    Diagnosis:   Diagnosis ICD-10-CM Associated Orders   1. Open wound of right thigh, sequela  S71.101S Wound cleansing and dressings Anterior;Right;Lateral Thigh     CBC and differential     Basic metabolic panel     Wound Procedure Treatment Anterior;Right;Lateral Thigh      2. Right leg swelling  M79.89 Wound cleansing and dressings Anterior;Right;Lateral Thigh     CBC and differential     Basic metabolic panel     Wound Procedure Treatment Anterior;Right;Lateral Thigh      3. History of cellulitis of skin with lymphangitis  Z87.2            Assessment  & Plan:    Initial evaluation of the R lateral thigh wounds following swelling related to bullous cellulitis infection of the RLE. There is significant tissue edema and small scattered areas of skin breakdown with weeping. There continues to be significant swelling of the entire RLE with residual hyperpigmentation post-infection. Skin of R lower leg with peau d'orange effect. Vitals stable.   Cleanse with gentle soap and water. Do not leave open to air. Recommend adaptic with overlying alginate.   Would benefit from gentle compression from the lower leg to the thigh given significant swelling remaining. Can utilize ACE for now. Wrap lightly. Remove if uncomfortable. Will order thigh-high CircAid for continued use.   Would like pt to have updated CBC and BMP complete given residual hyperpigmentation and swelling (persisting infection versus post-inflammatory changes). Will monitor patient off of antibiotics if appropriate based on labwork.   Elevate legs when resting. Avoid prolonged standing in one place.   Instructed to monitor for any changes including redness or  "swelling surrounding the wound, increased drainage or pain as well as fevers or chills. Proceed to ED should these occur.   F/u in one week. Instructed to call if any questions or concerns arise in meantime.            Subjective:   05/07/25: 89 y/o M with PMHx of HTN, PE, asthma/COPD, hypothyroidism, skin cancer, osteoarthritis, venous insufficiency, chronic hyponatremia, lumbar spinal stenosis, presents for evaluation of his RLE. He was previously seen at the wound care center for a venous ulceration on his RLE and was healed in February. Per chart review, in April pt had been feeling weak, had a recent fall and noticed new redness and blistering of his RLE which prompted him to present to the ED. Per hosptial course summary. \"...In the ED he was noted to be in a rapid A-fib.  He met sepsis criteria with tachycardia and leukocytosis in setting of right lower extremity cellulitis per CT imaging.  Venous Doppler of the lower extremities was negative for DVT.  He also had a supratherapeutic INR on arrival and Coumadin was placed on hold.  Patient was admitted to [facility] service for further management.  He initially required a Cardizem drip, heart rate improved and patient converted to sinus rhythm.  Cardizem drip was discontinued.  He was initiated on metoprolol for rate control.  His prehospital losartan was placed on hold given his SONYA.  Nephrology followed patient throughout, felt SONYA was related to ARB/outpatient diuretic.  Patient was treated with IV fluids and SONYA resolved.  He is euvolemic on exam today.  Will continue metoprolol for rate control on discharge, can continue to hold prehospital losartan.  Patient was resumed on Coumadin yesterday which we will continue on discharge.  Infectious disease also followed patient.  He received Ancef for his cellulitis per their recommendations and is being transition to Duricef on discharge for a total of 10 days of antibiotic treatment.  He is medically stable and being " "discharged to [facility] today as arranged by case management.\" Pt was recently d/c from acute rehab. Plan of care while there included cleansing the R lateral thigh with VASHE solution and using adaptic and alginate to open wounds. Presently the weeping sites of the R lateral thigh is open to air. Continues to have significant swelling of the lower leg but denies pain.               The following portions of the patient's history were reviewed and updated as appropriate:   Patient Active Problem List   Diagnosis    Prothrombin gene mutation (HCC)    Primary generalized (osteo)arthritis    Primary hypertension    Dyslipidemia    Intrinsic eczema    Other seborrheic dermatitis    Abnormality of gait    Osteoarthritis of both knees    Chronic pulmonary embolism without acute cor pulmonale (HCC)    Age-related cataract of both eyes    Premature beats    Primary osteoarthritis of both knees    Neoplasm of uncertain behavior of skin    Encounter for long-term (current) use of medications    GERD without esophagitis    Influenza vaccine refused    Hypercalcemia    COVID-19 virus infection    Asthma-COPD overlap syndrome (HCC)    Endothelial corneal dystrophy of both eyes    Factor V Leiden (HCC)    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)    History of pulmonary embolism    Mature cataract    Primary open-angle glaucoma, bilateral, mild stage    Pseudophakic bullous keratopathy of left eye    TB lung, latent    Hyponatremia    Shortness of breath    Encounter for wound care    Other fatigue    Acquired hypothyroidism    Venous stasis ulcer of right calf limited to breakdown of skin without varicose veins (HCC)    Hashimoto's thyroiditis    Venous stasis ulcer of other part of right lower leg with fat layer exposed with varicose veins (HCC)    Edema of both lower extremities due to peripheral venous insufficiency    Paroxysmal atrial fibrillation (HCC)    Supratherapeutic INR    Sepsis (HCC)    Cellulitis of right " lower extremity    SONYA (acute kidney injury) (HCC)    Swelling of right knee    Fall    Abnormal CT of the chest    Impaired mobility and ADLs    Chronic left-sided low back pain    Anemia    History of sepsis    Potential for cognitive impairment    Degenerative lumbar spinal stenosis    Bilateral arm weakness    Chest wall asymmetry    Orthostatic hypotension    Fungal dermatitis    Cognitive impairment    Ambulatory dysfunction     Past Medical History:   Diagnosis Date    Arthritis     Cellulitis of right lower extremity 04/19/2025    Coagulation defect ([facility])     last assessed: 11/28/2018    COPD (chronic obstructive pulmonary disease) ([facility])     last assessed: 5/14/2019, 12/6/2017    Generalized osteoarthritis     last assessed: 1/28/2019    GERD without esophagitis     last assessed: 1/28/2019    Glaucoma     last assessed: 8/4/2011    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[facility])     last assessed: 10/16/2018    Factor II Prothrombin Gene per [employer]    History of kidney stones 1985    Hx of blood clots     Hypertension     last assessed: 5/14/2019    Impaired fasting glucose     last assessed: 8/26/2013    Mild persistent asthma, uncomplicated     last assessed: 11/28/2018    Nephrolithiasis     Nondisplaced intertrochanteric fracture of right femur, initial encounter for closed fracture ([facility])     last assessed: 1/28/2019    Premature ventricular contractions     last assessed: 8/4/2011    Pulmonary nodule     Scoliosis (and kyphoscoliosis), idiopathic     Wears dentures      Past Surgical History:   Procedure Laterality Date    CARPAL TUNNEL RELEASE Left     ENDO 8/801    CATARACT EXTRACTION Left     COLONOSCOPY  10/07/2008    last assessed: 3/29/2016    EYE SURGERY Right 01/13/2022    HERNIA REPAIR      HIP SURGERY      HIP SURGERY Left 03/05/1999    ORIF    MULTIPLE TOOTH EXTRACTIONS Bilateral     OTHER SURGICAL HISTORY Right 10/30/2001    [facility]    TONSILLECTOMY Bilateral     WISDOM TOOTH  EXTRACTION Bilateral      Family History   Problem Relation Age of Onset    Hypertension Mother     Stroke Mother     Heart attack Father      Social History     Socioeconomic History    Marital status:      Spouse name: Not on file    Number of children: Not on file    Years of education: Not on file    Highest education level: Not on file   Occupational History    Not on file   Tobacco Use    Smoking status: Former     Types: Pipe     Start date:      Quit date:      Years since quittin.3     Passive exposure: Past    Smokeless tobacco: Never   Vaping Use    Vaping status: Never Used   Substance and Sexual Activity    Alcohol use: Yes     Comment: social    Drug use: Never    Sexual activity: Not Currently   Other Topics Concern    Not on file   Social History Narrative    Not on file     Social Drivers of Health     Financial Resource Strain: Low Risk  (10/23/2023)    Overall Financial Resource Strain (CARDIA)     Difficulty of Paying Living Expenses: Not very hard   Food Insecurity: No Food Insecurity (5/3/2025)    Nursing - Inadequate Food Risk Classification     Worried About Running Out of Food in the Last Year: Never true     Ran Out of Food in the Last Year: Never true     Ran Out of Food in the Last Year: Never true   Transportation Needs: No Transportation Needs (2025)    OASIS : Transportation     Lack of Transportation (Medical): No     Lack of Transportation (Non-Medical): No     Patient Unable or Declines to Respond: No   Physical Activity: Not on file   Stress: Not on file   Social Connections: Not on file   Intimate Partner Violence: Unknown (5/3/2025)    Nursing IPS     Feels Physically and Emotionally Safe: Not on file     Physically Hurt by Someone: Not on file     Humiliated or Emotionally Abused by Someone: Not on file     Physically Hurt by Someone: No     Hurt or Threatened by Someone: No   Housing Stability: Unknown (5/3/2025)    Nursing: Inadequate Housing Risk  Classification     Has Housing: Not on file     Worried About Losing Housing: Not on file     Unable to Get Utilities: Not on file     Unable to Pay for Housing in the Last Year: No     Has Housin       Current Outpatient Medications:     acetaminophen (TYLENOL) 325 mg tablet, Take 2 tablets (650 mg total) by mouth every 6 (six) hours as needed for mild pain, headaches or fever, Disp: , Rfl:     albuterol (PROVENTIL HFA,VENTOLIN HFA) 90 mcg/act inhaler, TAKE 2 PUFFS BY MOUTH EVERY 4 HOURS AS NEEDED FOR WHEEZE, Disp: 18 g, Rfl: 1    budesonide-formoterol (SYMBICORT) 160-4.5 mcg/act inhaler, Inhale 2 puffs 2 (two) times a day Rinse mouth after use., Disp: 10.2 g, Rfl: 0    Cholecalciferol (Vitamin D3) 125 MCG (5000 UT) CAPS, Take 1 capsule by mouth daily. Indications: Vitamin D Deficiency, Disp: , Rfl:     desonide (DESOWEN) 0.05 % cream, Apply to face twice a day, Disp: 60 g, Rfl: 0    Difluprednate 0.05 % EMUL, , Disp: , Rfl:     dorzolamide (TRUSOPT) 2 % ophthalmic solution, INSTILL 1 DROP INTO LEFT EYE TWICE A DAY, Disp: , Rfl:     fluticasone-salmeterol (Advair HFA) 45-21 MCG/ACT inhaler, Inhale 2 puffs 2 (two) times a day, Disp: 36 g, Rfl: 3    furosemide (LASIX) 20 mg tablet, Take 1 tablet (20 mg total) by mouth in the morning, Disp: 90 tablet, Rfl: 1    latanoprost (XALATAN) 0.005 % ophthalmic solution, Administer 1 drop to both eyes daily at bedtime, Disp: , Rfl:     lidocaine (LIDODERM) 5 %, Apply 1 patch topically over 12 hours daily Remove & Discard patch within 12 hours or as directed by MD  Apply to low back, Disp: , Rfl:     metoprolol tartrate (LOPRESSOR) 25 mg tablet, Take 1 tablet (25 mg total) by mouth every 12 (twelve) hours, Disp: 60 tablet, Rfl: 0    montelukast (SINGULAIR) 10 mg tablet, TAKE 1 TABLET BY MOUTH EVERY DAY, Disp: 90 tablet, Rfl: 1    multivitamin (THERAGRAN) TABS, Take 1 tablet by mouth daily, Disp: , Rfl:     nystatin (MYCOSTATIN) powder, Apply topically 2 (two) times a day  "(Patient taking differently: Apply topically 2 (two) times a day Apply topically to the groin area 2 times a day), Disp: 15 g, Rfl: 0    omeprazole (PriLOSEC OTC) 20 MG tablet, Take 20 mg by mouth daily, Disp: , Rfl:     Oral Electrolytes (ELECTROLYTE SR PO), Take 2 tablets by mouth daily., Disp: , Rfl:     Polyethyl Glycol-Propyl Glycol (SYSTANE OP), Administer 1 drop to both eyes 4 (four) times a day, Disp: , Rfl:     polyethylene glycol (GLYCOLAX) 17 GM/SCOOP powder, Take 17 g by mouth daily as needed (Constipation) Stir into 8 oz of liquid of choice., Disp: , Rfl:     prednisoLONE acetate (PRED FORTE) 1 % ophthalmic suspension, Administer 1 drop into the left eye 2 (two) times a day, Disp: , Rfl:     sodium chloride (ARIEL 128) 2 % hypertonic ophthalmic solution, Administer 1 drop into the left eye 4 (four) times a day, Disp: , Rfl:     warfarin (COUMADIN) 6 mg tablet, Take 1 tablet (6 mg total) by mouth daily, Disp: 30 tablet, Rfl: 0    Review of Systems      Objective:  /100 Comment: manual  Pulse 93   Temp (!) 96.1 °F (35.6 °C)   Resp 18   Ht 5' 6\" (1.676 m)   Wt 82.1 kg (181 lb)   BMI 29.21 kg/m²         Physical Exam  Vitals reviewed.   Constitutional:       Appearance: He is normal weight.   Cardiovascular:      Rate and Rhythm: Normal rate.      Pulses:           Dorsalis pedis pulses are 2+ on the right side.        Posterior tibial pulses are 2+ on the right side.      Comments: Entire R leg with significant swelling  Pulmonary:      Effort: Pulmonary effort is normal.      Breath sounds: No rhonchi or rales.   Musculoskeletal:      Right knee: Swelling present.      Right lower leg: Pitting Edema present.   Skin:     Findings: Erythema (Very mild) and wound present.          Neurological:      Mental Status: He is alert.      Motor: Weakness present.      Gait: Gait abnormal.             Wound 05/07/25 Thigh Anterior;Right;Lateral (Active)   Wound Image   05/07/25 6600   Wound Description " Granulation tissue;Pink;Epithelialization;Swelling 05/07/25 1420   Wound Length (cm) 4.5 cm 05/07/25 1420   Wound Width (cm) 3.1 cm 05/07/25 1420   Wound Depth (cm) 0.1 cm 05/07/25 1420   Wound Surface Area (cm^2) 13.95 cm^2 05/07/25 1420   Wound Volume (cm^3) 1.395 cm^3 05/07/25 1420   Calculated Wound Volume (cm^3) 1.4 cm^3 05/07/25 1420   Drainage Amount Small 05/07/25 1420   Drainage Description Serous 05/07/25 1420   Marisol-wound Assessment Erythema;Scaly;Dry;Edema 05/07/25 1420   Dressing Other (Comment) 05/07/25 1420                           Wound Instructions:  Orders Placed This Encounter   Procedures    Wound cleansing and dressings Anterior;Right;Lateral Thigh     Wound location: Right lateral thigh: **Please notice changes to orders!!      *[facility] to come every other day.       Change dressing Every other day  You may remove the dressing and shower on the days home health care is coming. Do not leave wound open to air, apply new dressing immediately.  Cleanse the wound with wound cleanser or mild soap and water, rinse, pat dry.  Apply adaptic to the wound.  Cover with silver alginate overtop adaptic.   Cover with ABD, and tape.   Apply Ace wraps from the base of the toes to the top of the thigh. Two were applied on the right leg at the wound center. Please do not apply ace wraps too tight, recommend comfortable compression. *Patient may take the ace wraps off at night if uncomfortable.       It is recommended you go get bloodwork today for the redness and swelling in your right leg.       A thigh high compression circaid will be ordered for you today.         Monitor for any signs or symptoms of infection such as increase redness or pain, fever/chills, nausea/vomiting, malodor, or  increased drainage. If you have any signs or symptoms please call the wound center, if after hours or on holiday/weekend call your primary care provider or go to the emergency department to be evaluated.    Please call the Wound  "Management Center Monday through Friday between 8-430 for any questions.            Follow up at the wound care center in 1 week.     Standing Status:   Future     Expiration Date:   5/14/2025    Wound Procedure Treatment Anterior;Right;Lateral Thigh     This order was created via procedure documentation    CBC and differential     This is a patient instruction: This test is non-fasting. Please drink two glasses of water morning of bloodwork.        Standing Status:   Future     Number of Occurrences:   1     Expiration Date:   7/7/2026     This patient has an active home care or hospice episode. [facility]   No    Basic metabolic panel     This is a patient instruction: Patient fasting for 8 hours or longer recommended.     Standing Status:   Future     Number of Occurrences:   1     Expiration Date:   7/7/2026     This patient has an active home care or hospice episode. Should this [facility]   No       Cornelia Goldsmith PA-C    Portions of the record may have been created with voice recognition software. Occasional wrong word or \"sound alike\" substitutions may have occurred due to the inherent limitations of voice recognition software. Read the chart carefully and recognize, using context, where substitutions have occurred.    "

## 2025-05-08 ENCOUNTER — HOME CARE VISIT (OUTPATIENT)
Dept: HOME HEALTH SERVICES | Facility: HOME HEALTHCARE | Age: OVER 89
End: 2025-05-08
Payer: MEDICARE

## 2025-05-08 ENCOUNTER — TELEPHONE (OUTPATIENT)
Dept: FAMILY MEDICINE CLINIC | Facility: CLINIC | Age: OVER 89
End: 2025-05-08

## 2025-05-08 ENCOUNTER — TELEPHONE (OUTPATIENT)
Facility: HOSPITAL | Age: OVER 89
End: 2025-05-08

## 2025-05-08 VITALS — SYSTOLIC BLOOD PRESSURE: 130 MMHG | OXYGEN SATURATION: 100 % | HEART RATE: 83 BPM | DIASTOLIC BLOOD PRESSURE: 80 MMHG

## 2025-05-08 PROCEDURE — G0155 HHCP-SVS OF CSW,EA 15 MIN: HCPCS

## 2025-05-08 PROCEDURE — NC001 PR NO CHARGE: Performed by: STUDENT IN AN ORGANIZED HEALTH CARE EDUCATION/TRAINING PROGRAM

## 2025-05-08 PROCEDURE — G0151 HHCP-SERV OF PT,EA 15 MIN: HCPCS

## 2025-05-08 PROCEDURE — G0152 HHCP-SERV OF OT,EA 15 MIN: HCPCS

## 2025-05-08 PROCEDURE — 10330064 TAPE, ADHSV TRANSPORE WHT 1 (12RL/BX 10"

## 2025-05-08 PROCEDURE — 10330064 SPONGE, GAUZE 8PLY N/S 4X4" (200/PK 20P"

## 2025-05-08 PROCEDURE — 10330064 DRESSING, ADAPTIC 3X3" (50/BX)"

## 2025-05-08 PROCEDURE — 10330064 BANDAGE, ELAS SLF-CLSR PREM N/S LF 4X5YD

## 2025-05-08 PROCEDURE — 10330064 DRESSING, CALCIUM ALGINATE AG SHEET 4X8"

## 2025-05-08 PROCEDURE — 10330064 PAD, ABD 5X9 STR LF (1/PK 20PK/BX) MGM1"

## 2025-05-08 RX ORDER — FLUTICASONE PROPIONATE AND SALMETEROL XINAFOATE 45; 21 UG/1; UG/1
AEROSOL, METERED RESPIRATORY (INHALATION)
Refills: 0 | OUTPATIENT
Start: 2025-05-08

## 2025-05-08 NOTE — TELEPHONE ENCOUNTER
Patients daughter called Rx refill line and states that the wrong inhaler was called into pharmacy for her father    They need an Advair Discus called in but an Advair HFA was called into pharmacy     Please correct order and send ASAP as pt's daughter is at pharmacy right now

## 2025-05-08 NOTE — TELEPHONE ENCOUNTER
Returned call to patient's daughter Anu and left message on identified voicemail regarding the Advair Discus not being covered by patient's insurance.  Provider called in the Advair HFA as a substitute that is on the formulary list.  Patient to call office back is there are any questions or concerns.

## 2025-05-08 NOTE — TELEPHONE ENCOUNTER
Called pt to relay that his labwork did not indicate evidence of infection (WBC count wnl, no increased neutrophils, no reactive thrombocytosis). Will opt to monitor off of antibiotics and see how pt responds to gentle compression of the leg.

## 2025-05-08 NOTE — TELEPHONE ENCOUNTER
PA for fluticasone-salmeterol (Advair HFA) 45-21 MCG SUBMITTED to     via    [x]CM-KEY: [id number]  []Surescripts-Case ID #   []Availity-Auth ID # NDC #   []Faxed to plan   []Other website   []Phone call Case ID #     [x]PA sent as URGENT    All office notes, labs and other pertaining documents and studies sent. Clinical questions answered. Awaiting determination from insurance company.     Turnaround time for your insurance to make a decision on your Prior Authorization can take 7-21 business days.

## 2025-05-09 ENCOUNTER — HOME CARE VISIT (OUTPATIENT)
Dept: HOME HEALTH SERVICES | Facility: HOME HEALTHCARE | Age: OVER 89
End: 2025-05-09
Payer: MEDICARE

## 2025-05-09 ENCOUNTER — TELEPHONE (OUTPATIENT)
Dept: FAMILY MEDICINE CLINIC | Facility: CLINIC | Age: OVER 89
End: 2025-05-09

## 2025-05-09 ENCOUNTER — ANTICOAG VISIT (OUTPATIENT)
Dept: FAMILY MEDICINE CLINIC | Facility: CLINIC | Age: OVER 89
End: 2025-05-09

## 2025-05-09 VITALS
HEART RATE: 68 BPM | OXYGEN SATURATION: 98 % | TEMPERATURE: 98.2 F | DIASTOLIC BLOOD PRESSURE: 72 MMHG | SYSTOLIC BLOOD PRESSURE: 140 MMHG | RESPIRATION RATE: 16 BRPM

## 2025-05-09 LAB — INR PPP: 2 (ref 0.85–1.19)

## 2025-05-09 PROCEDURE — G0156 HHCP-SVS OF AIDE,EA 15 MIN: HCPCS

## 2025-05-09 PROCEDURE — G0299 HHS/HOSPICE OF RN EA 15 MIN: HCPCS

## 2025-05-09 NOTE — TELEPHONE ENCOUNTER
PA for fluticasone-salmeterol (Advair HFA) 45-21 MCG  APPROVED     Date(s) approved 05/07/2025 until further notice    Case #[id number]    Patient advised by          [x]Smartestinghart Message  []Phone call   []LMOM  []L/M to call office as no active Communication consent on file  []Unable to leave detailed message as VM not approved on Communication consent       Pharmacy advised by    [x]Fax  []Phone call  []Secure Chat       Approval letter scanned into Media Yes

## 2025-05-11 ENCOUNTER — HOME CARE VISIT (OUTPATIENT)
Dept: HOME HEALTH SERVICES | Facility: HOME HEALTHCARE | Age: OVER 89
End: 2025-05-11
Payer: MEDICARE

## 2025-05-11 VITALS
RESPIRATION RATE: 18 BRPM | OXYGEN SATURATION: 95 % | DIASTOLIC BLOOD PRESSURE: 74 MMHG | HEART RATE: 76 BPM | TEMPERATURE: 97.5 F | SYSTOLIC BLOOD PRESSURE: 118 MMHG

## 2025-05-11 PROCEDURE — G0299 HHS/HOSPICE OF RN EA 15 MIN: HCPCS

## 2025-05-11 NOTE — CASE COMMUNICATION
Just an FYI- photos uploaded in media tab  when i got here today to see Enrico he had two ace wraps on, however they migrated and were on too tight and it created quite a bit of swelling to his right knee- cap refill to foot was wnl, unable to obtain pedal pulse by palpation due to swelling, skin is warm dry intact aside from one area we are treating. advised Enrico if his ace wraps get too tight that he should remove them and call VN A- he verbalized undestanding    two ace wraps applied lightly today with a figure 8 pattern around his knee to keep it in place    Thanks  Amira Akers RN

## 2025-05-12 ENCOUNTER — TELEPHONE (OUTPATIENT)
Age: OVER 89
End: 2025-05-12

## 2025-05-12 ENCOUNTER — HOME CARE VISIT (OUTPATIENT)
Dept: HOME HEALTH SERVICES | Facility: HOME HEALTHCARE | Age: OVER 89
End: 2025-05-12
Payer: MEDICARE

## 2025-05-12 DIAGNOSIS — J44.89 ASTHMA-COPD OVERLAP SYNDROME (HCC): ICD-10-CM

## 2025-05-12 PROCEDURE — G0156 HHCP-SVS OF AIDE,EA 15 MIN: HCPCS

## 2025-05-12 RX ORDER — BUDESONIDE AND FORMOTEROL FUMARATE DIHYDRATE 160; 4.5 UG/1; UG/1
2 AEROSOL RESPIRATORY (INHALATION) 2 TIMES DAILY
Qty: 30.6 G | Refills: 3 | Status: SHIPPED | OUTPATIENT
Start: 2025-05-12

## 2025-05-12 NOTE — TELEPHONE ENCOUNTER
Patient's daughter states they received an Exception from [facility] for patient to get the SYMBICORT She requests Advair script cancelled and Symbicort sent to pharmacy. She requests a call back to advise when sent.

## 2025-05-12 NOTE — TELEPHONE ENCOUNTER
Called and let Levy know couldn't reach his daughter Gladys who called in for the prescription to be changed.

## 2025-05-13 ENCOUNTER — TELEMEDICINE (OUTPATIENT)
Dept: GERIATRICS | Facility: CLINIC | Age: OVER 89
End: 2025-05-13
Payer: MEDICARE

## 2025-05-13 ENCOUNTER — HOME CARE VISIT (OUTPATIENT)
Dept: HOME HEALTH SERVICES | Facility: HOME HEALTHCARE | Age: OVER 89
End: 2025-05-13
Payer: MEDICARE

## 2025-05-13 ENCOUNTER — PATIENT OUTREACH (OUTPATIENT)
Dept: CASE MANAGEMENT | Facility: OTHER | Age: OVER 89
End: 2025-05-13

## 2025-05-13 VITALS — OXYGEN SATURATION: 98 % | HEART RATE: 71 BPM

## 2025-05-13 VITALS
HEART RATE: 76 BPM | OXYGEN SATURATION: 98 % | DIASTOLIC BLOOD PRESSURE: 62 MMHG | RESPIRATION RATE: 18 BRPM | SYSTOLIC BLOOD PRESSURE: 138 MMHG | TEMPERATURE: 97.2 F

## 2025-05-13 DIAGNOSIS — R41.89 COGNITIVE IMPAIRMENT: ICD-10-CM

## 2025-05-13 DIAGNOSIS — R26.2 AMBULATORY DYSFUNCTION: ICD-10-CM

## 2025-05-13 DIAGNOSIS — J44.89 ASTHMA-COPD OVERLAP SYNDROME (HCC): ICD-10-CM

## 2025-05-13 DIAGNOSIS — L03.115 CELLULITIS OF RIGHT LOWER EXTREMITY: Primary | ICD-10-CM

## 2025-05-13 DIAGNOSIS — G89.29 CHRONIC LEFT-SIDED LOW BACK PAIN WITHOUT SCIATICA: ICD-10-CM

## 2025-05-13 DIAGNOSIS — I10 PRIMARY HYPERTENSION: ICD-10-CM

## 2025-05-13 DIAGNOSIS — N18.2 CKD (CHRONIC KIDNEY DISEASE) STAGE 2, GFR 60-89 ML/MIN: ICD-10-CM

## 2025-05-13 DIAGNOSIS — I48.0 PAROXYSMAL ATRIAL FIBRILLATION (HCC): ICD-10-CM

## 2025-05-13 DIAGNOSIS — M54.50 CHRONIC LEFT-SIDED LOW BACK PAIN WITHOUT SCIATICA: ICD-10-CM

## 2025-05-13 DIAGNOSIS — D64.9 ANEMIA, UNSPECIFIED TYPE: ICD-10-CM

## 2025-05-13 PROCEDURE — G0299 HHS/HOSPICE OF RN EA 15 MIN: HCPCS

## 2025-05-13 PROCEDURE — G0151 HHCP-SERV OF PT,EA 15 MIN: HCPCS

## 2025-05-13 PROCEDURE — 99215 OFFICE O/P EST HI 40 MIN: CPT | Performed by: NURSE PRACTITIONER

## 2025-05-13 NOTE — ASSESSMENT & PLAN NOTE
Continue Metoprolol for rate control  Continue to monitor HR, 76  Continue Warfarin for anticoag  Monitor for s/s of bleeding and acute changes in condition  Follow up with PCP/Cardiology

## 2025-05-13 NOTE — ASSESSMENT & PLAN NOTE
Lab Results   Component Value Date    EGFR 78 05/07/2025    EGFR 78 04/28/2025    EGFR 81 04/25/2025    CREATININE 0.74 05/07/2025    CREATININE 0.75 04/28/2025    CREATININE 0.68 04/25/2025   Most recent Creatinine 0.74 /Gfr 78, remains stable  Continue to monitor BMP periodically  Avoid nephrotoxins and NSAIDS  Avoid hypotension  Continue to monitor for acute changes in condition  Follow up with PCP

## 2025-05-13 NOTE — PROGRESS NOTES
Virtual Regular VisitName: Enrico Chavira      : 1930      MRN: [id number]  Encounter Provider: RODY Matthew  Encounter Date: 2025   Encounter department: [facility] VIRTUAL  :  Assessment & Plan  Cellulitis of right lower extremity  Patient s/p antibiotic therapy  Continue wound care as ordered, No openings noted remains RAMONA  Continue to monitor skin for s/s of infection  Encourage frequent changes in position and offloading  [facility] will continue wound care services in home as ordered  Follow up with wound care center     Primary hypertension  BP remains stable today, 138/62  Continue Lasix 20 mg po QD and Metoprolol 25 mg po BID  Avoid hypotension  Continue to monitor BP and for acute changes in condition  Follow up with PCP/Cardiology     Paroxysmal atrial fibrillation (HCC)  Continue Metoprolol for rate control  Continue to monitor HR, 76  Continue Warfarin for anticoag  Monitor for s/s of bleeding and acute changes in condition  Follow up with PCP/Cardiology       Asthma-COPD overlap syndrome (HCC)  Patient with history of COPD with no recent exacerbation  Continue inhaler therapy  Sa02 today 98%  Lungs clear upon assessment today  Continue to monitor respiratory status for acute changes in status  Follow up with PCP/Pulmonary      CKD (chronic kidney disease) stage 2, GFR 60-89 ml/min  Lab Results   Component Value Date    EGFR 78 2025    EGFR 78 2025    EGFR 81 2025    CREATININE 0.74 2025    CREATININE 0.75 2025    CREATININE 0.68 2025   Most recent Creatinine 0.74 /Gfr 78, remains stable  Continue to monitor BMP periodically  Avoid nephrotoxins and NSAIDS  Avoid hypotension  Continue to monitor for acute changes in condition  Follow up with PCP     Anemia, unspecified type  Most recent Hgb 11.2, improved  Continue to monitor CBC periodically  Monitor for signs and symptoms of bleeding  Continue to monitor patient for acute  changes in condition  Follow up with PCP       Chronic left-sided low back pain without sciatica  Patient with chronic back pain  CT of lower spine revealed Diffuse severe thoracolumbar degenerative change with apex left lumbar scoliosis. A posteriorly directed vertebral body osteophyte on the right at L1-L2 results in moderate central canal narrowing   Continue multimodal pain management  Maintain fall and safety precautions  Follow up with PCP    Cognitive impairment  Most recent MoCa 19/30  Continue supportive measures  Continue to reorient, redirect, and reassure patient prn  Encourage engagement and activity  Encourage daily involvement in ADLs  Maintain delirium precautions and sleep/wake cycle  Patient remains at risk for delirium r/t age, medications, sleep disturbance, and pain  Avoid deliriogenic medications such as tramadol, benzodiazepines, anticholinergics, and Benadryl  Limit interruptions at night as medically appropriate  Provide quiet environment, dim lighting, and avoidance tv at night  Patient continues to require assistance in which supportive services can be provided by [facility]NA services  Encourage adequate nutrition and hydration  Continue to monitor for acute changes in condition  Follow up with PCP        Ambulatory dysfunction  History of falls  Maintain fall and safety precautions  Encourage use of asst devices  Continue PT/OT services with VNA  Assess for need with assistance with transfers, mobility, and ADLs in/out of home  Patient is at high risk for falls r/t cognitive impairment, chronic pain, b/l arm weakness, history of falls, lumbar spinal stenosis, polypharmacy, environmental barriers, and age  Continue to monitor for acute changes in condition   Follow up with PCP        History of Present Illness     Enrico Chavira is a 89 y.o. male patient, being seen for [facility] in conjuction with [facility] nurse Karen Davis, who was in the home to perform physical assessment.        The patient is being seen and examined today for follow-up on acute and chronic medical conditions s/p most recent hospitalization.      The patient reports he is feeling well today. He reports he had [facility] f/u and they ordered lab work for concern with infection WBC came back normal and therefore no abx were started.   Wound today with moderate amount of yellow drainage with swelling noted. He reports his lower back pain his about the same. I did review most recent labs with patient. Patient denies any increase in dyspnea, CP, dizziness, HA, fever, chills, nausea, vomiting, diarrhea, or constipation. Patient has f/u with [facility] 5/14/25.        Review of Systems   Constitutional:  Positive for activity change and fatigue. Negative for chills and fever.   HENT:  Negative for ear pain and sore throat.    Eyes:  Negative for pain and visual disturbance.   Respiratory:  Positive for shortness of breath. Negative for cough.    Cardiovascular:  Positive for leg swelling. Negative for chest pain and palpitations.   Gastrointestinal:  Negative for abdominal pain and vomiting.   Genitourinary:  Negative for dysuria and hematuria.   Musculoskeletal:  Positive for back pain and gait problem. Negative for arthralgias.   Skin:  Positive for wound. Negative for color change and rash.   Neurological:  Positive for weakness. Negative for seizures and syncope.   All other systems reviewed and are negative.    Pertinent Medical History   Patient originally presented to [facility] ED on 4/14/25 for generalized weakness. He met sepsis criteria with tachycardia and leukocytosis in setting of right lower extremitity cellulitis as noted on CT imaging. Patient was started on IV cefazolin. His home warfarin was placed on hold due to supratherapeutic INR. Patient was also noted to be in atrial fibrillation with RVR and converted to normal sinus rhythm after cardizem infusion. He was started on metoprolol for rate control.  Nephrology was consulted for SONYA and hyponatremia. Patient was transitioned to oral Duricef on discharge per infectious disease recommenations to complete total of 10 days of antibitioic treatment. Patient was elevated by PT OT in consultation who recommended post-acute rehab services.       Objective     Vitals    Vitals 5/13/2025  9:25 AM   /62   BP Location Left arm   Patient Position Sitting   Resp 18   SpO2 98 %   Pulse 76   Pulse Source Apical   Cardiac Regularity Regular   Temp (!) 97.2 °F (36.2 °C)   Temp src Temporal       5/7/2025  4:52 PM    Component Value Flag Ref Range Units Status   Sodium 132  Low  135 - 147 mmol/L Final   Potassium 4.6  3.5 - 5.3 mmol/L Final   Chloride 99  96 - 108 mmol/L Final   CO2 27  21 - 32 mmol/L Final   ANION GAP 6  4 - 13 mmol/L Final   BUN 23  5 - 25 mg/dL Final   Creatinine 0.74  0.60 - 1.30 mg/dL Final   Comment:   Standardized to IDMS reference method   Glucose 118  65 - 140 mg/dL Final   Comment:   If the patient is fasting, the ADA then defines impaired fasting glucose as > 100 mg/dL and diabetes as > or equal to 123 mg/dL.   Calcium 9.2  8.4 - 10.2 mg/dL Final   eGFR 78   ml/min/1.73sq m Final       Physical Exam  Vitals and nursing note reviewed.   Constitutional:       General: He is not in acute distress.     Appearance: He is well-developed.   HENT:      Head: Normocephalic and atraumatic.   Eyes:      Conjunctiva/sclera: Conjunctivae normal.   Cardiovascular:      Rate and Rhythm: Normal rate and regular rhythm.      Heart sounds: No murmur heard.  Pulmonary:      Effort: Pulmonary effort is normal. No respiratory distress.      Breath sounds: Normal breath sounds.   Abdominal:      Palpations: Abdomen is soft.      Tenderness: There is no abdominal tenderness.   Musculoskeletal:         General: No swelling.      Cervical back: Neck supple.      Right lower leg: Edema present.   Skin:     General: Skin is warm and dry.      Capillary Refill: Capillary  refill takes less than 2 seconds.   Neurological:      Mental Status: He is alert.      Motor: Weakness present.      Coordination: Coordination abnormal.      Gait: Gait abnormal.   Psychiatric:         Mood and Affect: Mood normal.         Administrative Statements   Encounter provider RODY Matthew    The Patient is located at Home and in the following state in which I hold an active license [place].    The patient was identified by name and date of birth. Enrico ANUJ Chavira was informed that this is a telemedicine visit and that the visit is being conducted through the [employer] Teams platform. He agrees to proceed..  My office door was closed. No one else was in the room.  He acknowledged consent and understanding of privacy and security of the video platform. The patient has agreed to participate and understands they can discontinue the visit at any time.    I have spent a total time of 43 minutes in caring for this patient on the day of the visit/encounter including Diagnostic results, Prognosis, Risks and benefits of tx options, Instructions for management, Patient and family education, Importance of tx compliance, Risk factor reductions, Impressions, Counseling / Coordination of care, Documenting in the medical record, Reviewing/placing orders in the medical record (including tests, medications, and/or procedures), Obtaining or reviewing history  , and Communicating with other healthcare professionals , not including the time spent for establishing the audio/video connection.

## 2025-05-13 NOTE — ASSESSMENT & PLAN NOTE
BP remains stable today, 138/62  Continue Lasix 20 mg po QD and Metoprolol 25 mg po BID  Avoid hypotension  Continue to monitor BP and for acute changes in condition  Follow up with PCP/Cardiology

## 2025-05-13 NOTE — ASSESSMENT & PLAN NOTE
Most recent Hgb 11.2, improved  Continue to monitor CBC periodically  Monitor for signs and symptoms of bleeding  Continue to monitor patient for acute changes in condition  Follow up with PCP

## 2025-05-13 NOTE — PROGRESS NOTES
Follow up call placed to pt and [facility] nurse there at he time for wound care. Nurse does state that pt has greater (chronic)edema to his right lower extremity, which is same leg as wound is present as well. It extends up to his thigh.   Pt reports that he is doing well, has 5 daughters who check in on him. He continues with PT,OT and SN visits. Pt had BMP drawn on 5/7 resulting as BUN 23/CRT 0.74 however his sodium was low again at 132. Enrico had cardiology f/[facility] appt [facility] (Dr Mtz)on 5/9 and will f/u in 4 months.Encouraged daily weights  Pt agreeable to a ollow up call in 2-3 weeks again since he has been receiving frequent visits with [facility].

## 2025-05-14 ENCOUNTER — HOME CARE VISIT (OUTPATIENT)
Dept: HOME HEALTH SERVICES | Facility: HOME HEALTHCARE | Age: OVER 89
End: 2025-05-14
Payer: MEDICARE

## 2025-05-14 ENCOUNTER — OFFICE VISIT (OUTPATIENT)
Facility: HOSPITAL | Age: OVER 89
End: 2025-05-14
Payer: MEDICARE

## 2025-05-14 VITALS
RESPIRATION RATE: 20 BRPM | TEMPERATURE: 96.3 F | DIASTOLIC BLOOD PRESSURE: 89 MMHG | HEART RATE: 86 BPM | SYSTOLIC BLOOD PRESSURE: 174 MMHG

## 2025-05-14 VITALS — DIASTOLIC BLOOD PRESSURE: 60 MMHG | SYSTOLIC BLOOD PRESSURE: 122 MMHG | OXYGEN SATURATION: 98 % | HEART RATE: 78 BPM

## 2025-05-14 DIAGNOSIS — S81.801A OPEN WOUND OF RIGHT LOWER LEG, INITIAL ENCOUNTER: Primary | ICD-10-CM

## 2025-05-14 DIAGNOSIS — L03.115 CELLULITIS OF RIGHT THIGH: ICD-10-CM

## 2025-05-14 DIAGNOSIS — I89.0 LYMPHEDEMA DUE TO INFECTION: ICD-10-CM

## 2025-05-14 DIAGNOSIS — B99.9 LYMPHEDEMA DUE TO INFECTION: ICD-10-CM

## 2025-05-14 PROBLEM — A41.9 SEPSIS (HCC): Status: RESOLVED | Noted: 2025-04-14 | Resolved: 2025-05-14

## 2025-05-14 PROCEDURE — 99213 OFFICE O/P EST LOW 20 MIN: CPT | Performed by: STUDENT IN AN ORGANIZED HEALTH CARE EDUCATION/TRAINING PROGRAM

## 2025-05-14 PROCEDURE — 99214 OFFICE O/P EST MOD 30 MIN: CPT | Performed by: STUDENT IN AN ORGANIZED HEALTH CARE EDUCATION/TRAINING PROGRAM

## 2025-05-14 PROCEDURE — G0152 HHCP-SERV OF OT,EA 15 MIN: HCPCS

## 2025-05-14 RX ORDER — CEFADROXIL 500 MG/1
500 CAPSULE ORAL EVERY 12 HOURS SCHEDULED
Qty: 20 CAPSULE | Refills: 0 | Status: SHIPPED | OUTPATIENT
Start: 2025-05-14 | End: 2025-05-24

## 2025-05-14 NOTE — PROGRESS NOTES
Wound Procedure Treatment Right;Lower Leg    Performed by: Janice Fsoter RN  Authorized by: Cornelia Goldsmith PA-C  Associated wounds:   Wound 05/14/25 Leg Right;Lower    Applied primary dressing:  Other   Dressing secured with:  Kerlix, Tape, Elastic tubular stocking and Size G   Comments:  8x10 ABD Pad

## 2025-05-14 NOTE — CASE MANAGEMENT
Patient progressed well in rehab. Patient discharged to home. Home care with [facility] reserved for RN/PT/OT/HHA.

## 2025-05-14 NOTE — PATIENT INSTRUCTIONS
Orders Placed This Encounter   Procedures    Wound cleansing and dressings Anterior;Right;Lateral Thigh     Right lateral thigh:        *[facility] to come every other day.         Change dressing Every other day  You may remove the dressing and shower on the days home health care is coming. Do not leave wound open to air, apply new dressing immediately.  Cleanse the wound with wound cleanser or mild soap and water, rinse, pat dry.  Apply adaptic to the wound.  Cover with silver alginate overtop adaptic.   Cover with ABD, and tape.     Elastic Tubular Stocking---Spandagroip Size H to the thigh    Tubular elastic bandage: Apply from base of toes to behind the knee. Apply in AM, may remove for sleep.    Avoid prolonged standing in one place.    Elevate leg(s) above the level of the heart when sitting or as much as possible.     Standing Status:   Future     Expiration Date:   5/21/2025    Wound cleansing and dressings Right;Lower Leg     Right LE:    Wash your hands with soap and water.  Remove old dressing, discard into plastic bag and place in trash.    Cleanse the wound with NSS prior to applying a clean dressing. Do not use tissue or cotton balls.   Do not scrub the wound. Pat dry using gauze.  Shower yes     Apply ABD Pad to the wound.  Secure with Kerlix and tape  Change dressing every other day and as needed    Elastic Tubular Stocking---Spandagrip Size G    Tubular elastic bandage: Apply from base of toes to behind the knee. Apply in AM, may remove for sleep.    Avoid prolonged standing in one place.    Elevate leg(s) above the level of the heart when sitting or as much as possible.     Standing Status:   Future     Expiration Date:   5/21/2025

## 2025-05-14 NOTE — PROGRESS NOTES
Patient ID: Enrico Chavira is a 89 y.o. male Date of Birth 8/12/1930       Chief Complaint   Patient presents with    Follow Up Wound Care Visit       Allergies:  Alphagan p [brimonidine tartrate]    Diagnosis:   Diagnosis ICD-10-CM Associated Orders   1. Open wound of right lower leg, initial encounter  S81.801A Wound cleansing and dressings Right;Lower Leg     Wound Procedure Treatment Right;Lower Leg      2. Cellulitis of right thigh  L03.115 cefadroxil (DURICEF) 500 mg capsule     Wound cleansing and dressings Anterior;Right;Lateral Thigh     Wound Procedure Treatment Anterior;Right;Lateral Thigh      3. Lymphedema due to infection  I89.0     B99.9            Assessment  & Plan:    Follow-up evaluation of the right lateral thigh following significant swelling secondary to bullous cellulitis infection of the right lower extremity.  Wounds of the thigh continue to be small openings with weeping of serous drainage.  There continues to be significant edema of the right lateral thigh as well as the right lower leg.  Lateral thigh is erythematous and warm.  There are also scattered areas of weeping on the right lower extremity due to significant edema. No diffuse erythema of the lower leg.   The continued erythema of the thigh and increased warm is concerning for persisting soft tissue infection. Duricef prescribed. Should he have worsening redness, pain, swelling, fevers, chills, malaise while on antibiotic recommend presenting to the hospital.   Continue with adaptic and alginate to the open wounds. ABD can be placed on weeping site of lower leg for drainage control. Will d/c ACE and trial Spandagrip for compression on the lower leg and larger size on the thigh. If uncomfortable remove and elevate leg.   F/u in 1 week. Instructed to call if any questions or concerns arise in meantime.         Subjective:   05/07/25: 89 y/o M with PMHx of HTN, PE, asthma/COPD, hypothyroidism, skin cancer, osteoarthritis, venous  "insufficiency, chronic hyponatremia, lumbar spinal stenosis, presents for evaluation of his RLE. He was previously seen at the wound care center for a venous ulceration on his RLE and was healed in February. Per chart review, in April pt had been feeling weak, had a recent fall and noticed new redness and blistering of his RLE which prompted him to present to the ED. Per hosptial course summary. \"...In the ED he was noted to be in a rapid A-fib.  He met sepsis criteria with tachycardia and leukocytosis in setting of right lower extremity cellulitis per CT imaging.  Venous Doppler of the lower extremities was negative for DVT.  He also had a supratherapeutic INR on arrival and Coumadin was placed on hold.  Patient was admitted to [facility] service for further management.  He initially required a Cardizem drip, heart rate improved and patient converted to sinus rhythm.  Cardizem drip was discontinued.  He was initiated on metoprolol for rate control.  His prehospital losartan was placed on hold given his SONYA.  Nephrology followed patient throughout, felt SONYA was related to ARB/outpatient diuretic.  Patient was treated with IV fluids and SONYA resolved.  He is euvolemic on exam today.  Will continue metoprolol for rate control on discharge, can continue to hold prehospital losartan.  Patient was resumed on Coumadin yesterday which we will continue on discharge.  Infectious disease also followed patient.  He received Ancef for his cellulitis per their recommendations and is being transition to Duricef on discharge for a total of 10 days of antibiotic treatment.  He is medically stable and being discharged to [facility] today as arranged by case management.\" Pt was recently d/c from acute rehab. Plan of care while there included cleansing the R lateral thigh with VASHE solution and using adaptic and alginate to open wounds. Presently the weeping sites of the R lateral thigh is open to air. Continues to have significant swelling of " the lower leg but denies pain.         05/14/25: Pt presents with his daughter for f/u wound care of his L thigh. Home care has been assisting with dressing changes. Adaptic and alginate is being used. Daughter reports the ACE wrap has been digging into pt's skin where velcro lays against the skin. Pt denies fevers, chills.           The following portions of the patient's history were reviewed and updated as appropriate:   Problem List[1]  Past Medical History:   Diagnosis Date    Arthritis     Cellulitis of right lower extremity 04/19/2025    Coagulation defect (HCC)     last assessed: 11/28/2018    COPD (chronic obstructive pulmonary disease) (HCC)     last assessed: 5/14/2019, 12/6/2017    Generalized osteoarthritis     last assessed: 1/28/2019    GERD without esophagitis     last assessed: 1/28/2019    Glaucoma     last assessed: 8/4/2011    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HCC)     last assessed: 10/16/2018    Factor II Prothrombin Gene per [employer]    History of kidney stones 1985    Hx of blood clots     Hypertension     last assessed: 5/14/2019    Impaired fasting glucose     last assessed: 8/26/2013    Mild persistent asthma, uncomplicated     last assessed: 11/28/2018    Nephrolithiasis     Nondisplaced intertrochanteric fracture of right femur, initial encounter for closed fracture ([facility])     last assessed: 1/28/2019    Premature ventricular contractions     last assessed: 8/4/2011    Pulmonary nodule     Scoliosis (and kyphoscoliosis), idiopathic     Wears dentures      Past Surgical History:   Procedure Laterality Date    CARPAL TUNNEL RELEASE Left     ENDO 8/801    CATARACT EXTRACTION Left     COLONOSCOPY  10/07/2008    last assessed: 3/29/2016    EYE SURGERY Right 01/13/2022    HERNIA REPAIR      HIP SURGERY      HIP SURGERY Left 03/05/1999    ORIF    MULTIPLE TOOTH EXTRACTIONS Bilateral     OTHER SURGICAL HISTORY Right 10/30/2001    [facility]    TONSILLECTOMY Bilateral     WISTRINITY  TOOTH EXTRACTION Bilateral      Family History   Problem Relation Age of Onset    Hypertension Mother     Stroke Mother     Heart attack Father      Social History     Socioeconomic History    Marital status:      Spouse name: Not on file    Number of children: Not on file    Years of education: Not on file    Highest education level: Not on file   Occupational History    Not on file   Tobacco Use    Smoking status: Former     Types: Pipe     Start date:      Quit date: 1970     Years since quittin.4     Passive exposure: Past    Smokeless tobacco: Never   Vaping Use    Vaping status: Never Used   Substance and Sexual Activity    Alcohol use: Yes     Comment: social    Drug use: Never    Sexual activity: Not Currently   Other Topics Concern    Not on file   Social History Narrative    Not on file     Social Drivers of Health     Financial Resource Strain: Low Risk  (10/23/2023)    Overall Financial Resource Strain (CARDIA)     Difficulty of Paying Living Expenses: Not very hard   Food Insecurity: No Food Insecurity (5/3/2025)    Nursing - Inadequate Food Risk Classification     Worried About Running Out of Food in the Last Year: Never true     Ran Out of Food in the Last Year: Never true     Ran Out of Food in the Last Year: Never true   Transportation Needs: No Transportation Needs (2025)    OASIS : Transportation     Lack of Transportation (Medical): No     Lack of Transportation (Non-Medical): No     Patient Unable or Declines to Respond: No   Physical Activity: Not on file   Stress: Not on file   Social Connections: Not on file   Intimate Partner Violence: Unknown (5/3/2025)    Nursing IPS     Feels Physically and Emotionally Safe: Not on file     Physically Hurt by Someone: Not on file     Humiliated or Emotionally Abused by Someone: Not on file     Physically Hurt by Someone: No     Hurt or Threatened by Someone: No   Housing Stability: Unknown (5/3/2025)    Nursing: Inadequate Housing  Risk Classification     Has Housing: Not on file     Worried About Losing Housing: Not on file     Unable to Get Utilities: Not on file     Unable to Pay for Housing in the Last Year: No     Has Housin     Current Medications[2]    Review of Systems      Objective:  BP (!) 174/89   Pulse 86   Temp (!) 96.3 °F (35.7 °C)   Resp 20   Pain Score: 0-No pain     Physical Exam  Vitals reviewed.   Constitutional:       Appearance: He is normal weight.     Cardiovascular:      Rate and Rhythm: Normal rate.      Pulses:           Dorsalis pedis pulses are 2+ on the right side.        Posterior tibial pulses are 2+ on the right side.      Comments: Entire R leg with significant swelling consistent with lymphedema secondary to infection  Pulmonary:      Effort: Pulmonary effort is normal.      Breath sounds: No rhonchi or rales.     Musculoskeletal:      Right knee: Swelling present.      Right lower leg: 3+ Edema present.     Skin:     Findings: Erythema (Very mild) and wound present.          Neurological:      Mental Status: He is alert.      Motor: Weakness present.      Gait: Gait abnormal.         Wound 25 Thigh Anterior;Right;Lateral (Active)   Wound Image No updated photo available.   25 1419   Wound Description Yellow 25 135   Non-staged Wound Description Full thickness 25 135   Wound Length (cm) 0.5 cm 25 135   Wound Width (cm) 0.4 cm 25   Wound Depth (cm) 0.1 cm 25   Wound Surface Area (cm^2) 0.16 cm^2 25 135   Wound Volume (cm^3) 0.01 cm^3 25 135   Calculated Wound Volume (cm^3) 0.02 cm^3 25 135   Change in Wound Size % 98.57 25 135   Drainage Amount Small 25 135   Drainage Description Serous;Yellow 25 135   Marisol-wound Assessment Erythema;Edema;Induration 25 135   Treatments Irrigation with NSS 25 135   Dressing Other (Comment) 25 1420   Dressing Status Intact 25 135       Wound 25  Leg Right;Lower (Active)   Wound Description Epithelialization;Pink 05/14/25 1400   Non-staged Wound Description Partial thickness 05/14/25 1400   Wound Length (cm) 0.1 cm 05/14/25 1400   Wound Width (cm) 0.1 cm 05/14/25 1400   Wound Depth (cm) 0.1 cm 05/14/25 1400   Wound Surface Area (cm^2) 0.01 cm^2 05/14/25 1400   Wound Volume (cm^3) 0.001 cm^3 05/14/25 1400   Calculated Wound Volume (cm^3) 0 cm^3 05/14/25 1400   Drainage Amount Scant 05/14/25 1400   Drainage Description Serous 05/14/25 1400   Marisol-wound Assessment Edema;Erythema 05/14/25 1400   Dressing Status Other (Comment) 05/14/25 1400                         Wound Instructions:  Orders Placed This Encounter   Procedures    Wound cleansing and dressings Anterior;Right;Lateral Thigh     Right lateral thigh:        *[facility] to come every other day.         Change dressing Every other day  You may remove the dressing and shower on the days home health care is coming. Do not leave wound open to air, apply new dressing immediately.  Cleanse the wound with wound cleanser or mild soap and water, rinse, pat dry.  Apply adaptic to the wound.  Cover with silver alginate overtop adaptic.   Cover with ABD, and tape.     Elastic Tubular Stocking---Spandagroip Size H to the thigh    Tubular elastic bandage: Apply from base of toes to behind the knee. Apply in AM, may remove for sleep.    Avoid prolonged standing in one place.    Elevate leg(s) above the level of the heart when sitting or as much as possible.     Standing Status:   Future     Expiration Date:   5/21/2025    Wound cleansing and dressings Right;Lower Leg     Right LE:    Wash your hands with soap and water.  Remove old dressing, discard into plastic bag and place in trash.    Cleanse the wound with NSS prior to applying a clean dressing. Do not use tissue or cotton balls.   Do not scrub the wound. Pat dry using gauze.  Shower yes     Apply ABD Pad to the wound.  Secure with Kerlix and tape  Change dressing  "every other day and as needed    Elastic Tubular Stocking---Spandagrip Size G    Tubular elastic bandage: Apply from base of toes to behind the knee. Apply in AM, may remove for sleep.    Avoid prolonged standing in one place.    Elevate leg(s) above the level of the heart when sitting or as much as possible.     Standing Status:   Future     Expiration Date:   5/21/2025    Wound Procedure Treatment Anterior;Right;Lateral Thigh     This order was created via procedure documentation    Wound Procedure Treatment Right;Lower Leg     This order was created via procedure documentation       Cally Goldsmith, PA-C      Portions of the record may have been created with voice recognition software. Occasional wrong word or \"sound alike\" substitutions may have occurred due to the inherent limitations of voice recognition software. Read the chart carefully and recognize, using context, where substitutions have occurred.         [1]   Patient Active Problem List  Diagnosis    Prothrombin gene mutation (HCC)    Primary generalized (osteo)arthritis    Primary hypertension    Dyslipidemia    Intrinsic eczema    Other seborrheic dermatitis    Abnormality of gait    Osteoarthritis of both knees    Chronic pulmonary embolism without acute cor pulmonale (HCC)    Age-related cataract of both eyes    Premature beats    Primary osteoarthritis of both knees    Neoplasm of uncertain behavior of skin    Encounter for long-term (current) use of medications    GERD without esophagitis    CKD (chronic kidney disease) stage 2, GFR 60-89 ml/min    Influenza vaccine refused    Hypercalcemia    COVID-19 virus infection    Asthma-COPD overlap syndrome (HCC)    Endothelial corneal dystrophy of both eyes    Factor V Leiden (HCC)    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)    History of pulmonary embolism    Mature cataract    Primary open-angle glaucoma, bilateral, mild stage    Pseudophakic bullous keratopathy of left eye    TB lung, " latent    Hyponatremia    Shortness of breath    Encounter for wound care    Other fatigue    Acquired hypothyroidism    Venous stasis ulcer of right calf limited to breakdown of skin without varicose veins (HCC)    Hashimoto's thyroiditis    Venous stasis ulcer of other part of right lower leg with fat layer exposed with varicose veins (HCC)    Edema of both lower extremities due to peripheral venous insufficiency    Paroxysmal atrial fibrillation (HCC)    Supratherapeutic INR    Cellulitis of right lower extremity    SONYA (acute kidney injury) (HCC)    Swelling of right knee    Fall    Abnormal CT of the chest    Impaired mobility and ADLs    Chronic left-sided low back pain    Anemia    History of sepsis    Potential for cognitive impairment    Degenerative lumbar spinal stenosis    Bilateral arm weakness    Chest wall asymmetry    Orthostatic hypotension    Fungal dermatitis    Cognitive impairment    Ambulatory dysfunction   [2]   Current Outpatient Medications:     cefadroxil (DURICEF) 500 mg capsule, Take 1 capsule (500 mg total) by mouth every 12 (twelve) hours for 10 days, Disp: 20 capsule, Rfl: 0    acetaminophen (TYLENOL) 325 mg tablet, Take 2 tablets (650 mg total) by mouth every 6 (six) hours as needed for mild pain, headaches or fever, Disp: , Rfl:     albuterol (PROVENTIL HFA,VENTOLIN HFA) 90 mcg/act inhaler, TAKE 2 PUFFS BY MOUTH EVERY 4 HOURS AS NEEDED FOR WHEEZE, Disp: 18 g, Rfl: 1    budesonide-formoterol (SYMBICORT) 160-4.5 mcg/act inhaler, Inhale 2 puffs 2 (two) times a day Rinse mouth after use., Disp: 30.6 g, Rfl: 3    Cholecalciferol (Vitamin D3) 125 MCG (5000 UT) CAPS, Take 1 capsule by mouth daily. Indications: Vitamin D Deficiency, Disp: , Rfl:     desonide (DESOWEN) 0.05 % cream, Apply to face twice a day, Disp: 60 g, Rfl: 0    Difluprednate 0.05 % EMUL, , Disp: , Rfl:     dorzolamide (TRUSOPT) 2 % ophthalmic solution, INSTILL 1 DROP INTO LEFT EYE TWICE A DAY, Disp: , Rfl:     furosemide  (LASIX) 20 mg tablet, Take 1 tablet (20 mg total) by mouth in the morning, Disp: 90 tablet, Rfl: 1    latanoprost (XALATAN) 0.005 % ophthalmic solution, Administer 1 drop to both eyes daily at bedtime, Disp: , Rfl:     lidocaine (LIDODERM) 5 %, Apply 1 patch topically over 12 hours daily Remove & Discard patch within 12 hours or as directed by MD  Apply to low back, Disp: , Rfl:     metoprolol tartrate (LOPRESSOR) 25 mg tablet, Take 1 tablet (25 mg total) by mouth every 12 (twelve) hours, Disp: 60 tablet, Rfl: 0    montelukast (SINGULAIR) 10 mg tablet, TAKE 1 TABLET BY MOUTH EVERY DAY, Disp: 90 tablet, Rfl: 1    multivitamin (THERAGRAN) TABS, Take 1 tablet by mouth daily, Disp: , Rfl:     nystatin (MYCOSTATIN) powder, Apply topically 2 (two) times a day (Patient taking differently: Apply topically 2 (two) times a day Apply topically to the groin area 2 times a day), Disp: 15 g, Rfl: 0    omeprazole (PriLOSEC OTC) 20 MG tablet, Take 20 mg by mouth daily, Disp: , Rfl:     Oral Electrolytes (ELECTROLYTE SR PO), Take 2 tablets by mouth daily., Disp: , Rfl:     Polyethyl Glycol-Propyl Glycol (SYSTANE OP), Administer 1 drop to both eyes 4 (four) times a day, Disp: , Rfl:     polyethylene glycol (GLYCOLAX) 17 GM/SCOOP powder, Take 17 g by mouth daily as needed (Constipation) Stir into 8 oz of liquid of choice., Disp: , Rfl:     prednisoLONE acetate (PRED FORTE) 1 % ophthalmic suspension, Administer 1 drop into the left eye 2 (two) times a day, Disp: , Rfl:     sodium chloride (ARIEL 128) 2 % hypertonic ophthalmic solution, Administer 1 drop into the left eye 4 (four) times a day, Disp: , Rfl:     warfarin (COUMADIN) 6 mg tablet, Take 1 tablet (6 mg total) by mouth daily, Disp: 30 tablet, Rfl: 0

## 2025-05-14 NOTE — PROGRESS NOTES
Wound Procedure Treatment Anterior;Right;Lateral Thigh    Performed by: Janice Foster RN  Authorized by: Cornelia Goldsmith PA-C  Associated wounds:   Wound 05/07/25 Thigh Anterior;Right;Lateral    Wound cleansed with:  NSS   Applied primary dressing:  Gelling fiber and Silver   Applied secondary dressing:  ABD   Dressing secured with:  Kerlix, Tape, Elastic tubular stocking and Size H

## 2025-05-15 ENCOUNTER — HOME CARE VISIT (OUTPATIENT)
Dept: HOME HEALTH SERVICES | Facility: HOME HEALTHCARE | Age: OVER 89
End: 2025-05-15
Payer: MEDICARE

## 2025-05-15 VITALS — OXYGEN SATURATION: 100 % | HEART RATE: 75 BPM | DIASTOLIC BLOOD PRESSURE: 62 MMHG | SYSTOLIC BLOOD PRESSURE: 110 MMHG

## 2025-05-15 PROCEDURE — 10330064 DRESSING, CALCIUM ALGINATE AG SHEET 4X8"

## 2025-05-15 PROCEDURE — G0156 HHCP-SVS OF AIDE,EA 15 MIN: HCPCS

## 2025-05-15 PROCEDURE — G0151 HHCP-SERV OF PT,EA 15 MIN: HCPCS

## 2025-05-15 PROCEDURE — 10330064 DRESSING, ADAPTIC 3X3" (50/BX)"

## 2025-05-15 PROCEDURE — 10330064 PAD, ABD 5X9 STR LF (1/PK 20PK/BX) MGM1"

## 2025-05-15 PROCEDURE — 10330064 BANDAGE, CNFRM 4X4.1YDS N/S LF (12RL/BG"

## 2025-05-15 PROCEDURE — 10330064 TAPE, ADHSV TRANSPORE WHT 2 (6RL/BX 10B"

## 2025-05-16 ENCOUNTER — HOME CARE VISIT (OUTPATIENT)
Dept: HOME HEALTH SERVICES | Facility: HOME HEALTHCARE | Age: OVER 89
End: 2025-05-16
Payer: MEDICARE

## 2025-05-16 VITALS
SYSTOLIC BLOOD PRESSURE: 148 MMHG | OXYGEN SATURATION: 96 % | DIASTOLIC BLOOD PRESSURE: 72 MMHG | HEART RATE: 68 BPM | TEMPERATURE: 96.9 F | RESPIRATION RATE: 20 BRPM

## 2025-05-16 PROCEDURE — G0299 HHS/HOSPICE OF RN EA 15 MIN: HCPCS

## 2025-05-17 NOTE — CASE COMMUNICATION
Please put in script for outpatient therapy to [facility] for when [facility] d/c     fax number: [phone].    Thank you.

## 2025-05-18 DIAGNOSIS — D68.52 PROTHROMBIN GENE MUTATION (HCC): ICD-10-CM

## 2025-05-18 RX ORDER — WARFARIN SODIUM 4 MG/1
TABLET ORAL
Qty: 180 TABLET | Refills: 2 | Status: SHIPPED | OUTPATIENT
Start: 2025-05-18

## 2025-05-19 ENCOUNTER — TELEPHONE (OUTPATIENT)
Facility: HOSPITAL | Age: OVER 89
End: 2025-05-19

## 2025-05-19 ENCOUNTER — ANTICOAG VISIT (OUTPATIENT)
Dept: FAMILY MEDICINE CLINIC | Facility: CLINIC | Age: OVER 89
End: 2025-05-19

## 2025-05-19 ENCOUNTER — HOME CARE VISIT (OUTPATIENT)
Dept: HOME HEALTH SERVICES | Facility: HOME HEALTHCARE | Age: OVER 89
End: 2025-05-19
Payer: MEDICARE

## 2025-05-19 VITALS — SYSTOLIC BLOOD PRESSURE: 130 MMHG | OXYGEN SATURATION: 98 % | HEART RATE: 82 BPM | DIASTOLIC BLOOD PRESSURE: 82 MMHG

## 2025-05-19 LAB — INR PPP: 2.7 (ref 0.85–1.19)

## 2025-05-19 PROCEDURE — NC001 PR NO CHARGE: Performed by: STUDENT IN AN ORGANIZED HEALTH CARE EDUCATION/TRAINING PROGRAM

## 2025-05-19 PROCEDURE — G0151 HHCP-SERV OF PT,EA 15 MIN: HCPCS

## 2025-05-19 NOTE — TELEPHONE ENCOUNTER
Called [employer], local supplier of compression garments, and left voicemail to inquire about status of pt's order for thigh high compression stockings that was previously placed as requested.

## 2025-05-20 ENCOUNTER — HOME CARE VISIT (OUTPATIENT)
Dept: HOME HEALTH SERVICES | Facility: HOME HEALTHCARE | Age: OVER 89
End: 2025-05-20
Payer: MEDICARE

## 2025-05-20 ENCOUNTER — TELEMEDICINE (OUTPATIENT)
Dept: GERIATRICS | Facility: OTHER | Age: OVER 89
End: 2025-05-20

## 2025-05-20 VITALS
WEIGHT: 174 LBS | HEART RATE: 80 BPM | BODY MASS INDEX: 28.08 KG/M2 | DIASTOLIC BLOOD PRESSURE: 80 MMHG | SYSTOLIC BLOOD PRESSURE: 140 MMHG | OXYGEN SATURATION: 94 % | TEMPERATURE: 98.2 F | RESPIRATION RATE: 18 BRPM

## 2025-05-20 VITALS
DIASTOLIC BLOOD PRESSURE: 80 MMHG | RESPIRATION RATE: 18 BRPM | TEMPERATURE: 98.2 F | HEART RATE: 80 BPM | OXYGEN SATURATION: 94 % | SYSTOLIC BLOOD PRESSURE: 140 MMHG | BODY MASS INDEX: 28.08 KG/M2 | WEIGHT: 174 LBS

## 2025-05-20 DIAGNOSIS — M54.50 CHRONIC LEFT-SIDED LOW BACK PAIN WITHOUT SCIATICA: ICD-10-CM

## 2025-05-20 DIAGNOSIS — D63.8 ANEMIA IN OTHER CHRONIC DISEASES CLASSIFIED ELSEWHERE: ICD-10-CM

## 2025-05-20 DIAGNOSIS — N18.2 CKD (CHRONIC KIDNEY DISEASE) STAGE 2, GFR 60-89 ML/MIN: ICD-10-CM

## 2025-05-20 DIAGNOSIS — G89.29 CHRONIC LEFT-SIDED LOW BACK PAIN WITHOUT SCIATICA: ICD-10-CM

## 2025-05-20 DIAGNOSIS — I10 PRIMARY HYPERTENSION: ICD-10-CM

## 2025-05-20 DIAGNOSIS — R41.89 COGNITIVE IMPAIRMENT: ICD-10-CM

## 2025-05-20 DIAGNOSIS — J44.89 ASTHMA-COPD OVERLAP SYNDROME (HCC): ICD-10-CM

## 2025-05-20 DIAGNOSIS — L03.115 CELLULITIS OF RIGHT LOWER EXTREMITY: Primary | ICD-10-CM

## 2025-05-20 DIAGNOSIS — R26.2 AMBULATORY DYSFUNCTION: ICD-10-CM

## 2025-05-20 DIAGNOSIS — I48.0 PAROXYSMAL ATRIAL FIBRILLATION (HCC): ICD-10-CM

## 2025-05-20 PROCEDURE — G0299 HHS/HOSPICE OF RN EA 15 MIN: HCPCS

## 2025-05-20 NOTE — ASSESSMENT & PLAN NOTE
HR stable, today 80  No acute cardiac complaints  Continue Metoprolol tartrate 25mg BID for rate control  Continue Warfarin for AC, 5/16 INR 2.7, checked weekly on Friday's.   Continue to monitor HR   Monitor for s/s of bleeding and acute changes in condition  Follow up with PCP/Cardiology

## 2025-05-20 NOTE — ASSESSMENT & PLAN NOTE
Most recent MOCA 19/30  Continue supportive measures  Patient lives alone, but has 5 daughters that assist with care/needs.   Patient does not drive.   Continue to reorient, redirect, and reassure patient as needed  Encourage engagement and activity  Encourage daily involvement in ADLs  Maintain delirium precautions and sleep/wake cycle  Patient remains at risk for delirium r/t age, medications, sleep disturbance, and pain  Avoid deliriogenic medications such as tramadol, benzodiazepines, anticholinergics, and Benadryl  Patient continues to require assistance in which supportive services can be provided by [facility] services  Encourage adequate nutrition and hydration  Continue to monitor for acute changes in condition  Follow up with PCP

## 2025-05-20 NOTE — ASSESSMENT & PLAN NOTE
BP stable, today 140/80 (after exertion)  Per patient he checks BP at home and SBP generally 130s-140s  No acute cardiac complaints  Avoid hypotension  Continue Lasix 20 mg daily and Metoprolol 25 mg BID  Continue to monitor BP and for acute changes in condition  Follow up with PCP/Cardiology

## 2025-05-20 NOTE — ASSESSMENT & PLAN NOTE
5/14 evaluated by wound care: concern for infection due to continued erythema of the thigh and increased warmth  Duricef prescribed through 5/24/25.    d/c ACE and trial Spandagrip for compression on the lower leg and larger size on the thigh.   Patient has been using Spandagrip, prefers this to ACE wrap  Per nursing much improved from last week, scant amount of drainage in his thigh area and lower extremity healed.    Continue wound care as ordered  Continue to monitor skin for s/s of infection  Encourage frequent changes in position and offloading  [facility] will continue wound care services in home as ordered  Follow up with wound care center tomorrow.

## 2025-05-20 NOTE — ASSESSMENT & PLAN NOTE
Most recent Hgb 11.2  Continue to monitor CBC periodically  Monitor for acute bleed - no present signs  Consider further workup, for persistent/worsening  Continue to monitor for acute changes  Follow up with PCP as appropriate

## 2025-05-20 NOTE — ASSESSMENT & PLAN NOTE
Lab Results   Component Value Date    EGFR 78 05/07/2025    EGFR 78 04/28/2025    EGFR 81 04/25/2025    CREATININE 0.74 05/07/2025    CREATININE 0.75 04/28/2025    CREATININE 0.68 04/25/2025   Most recent Creatinine 0.74 /Gfr 78  Continue to monitor BMP periodically  Avoid nephrotoxins and NSAIDS  Encourage fluids with respect to volume status.   Avoid hypotension  Continue to monitor for acute changes in condition  Follow up with PCP

## 2025-05-20 NOTE — ASSESSMENT & PLAN NOTE
Patient with chronic back pain  CT of lower spine revealed Diffuse severe thoracolumbar degenerative change with apex left lumbar scoliosis. A posteriorly directed vertebral body osteophyte on the right at L1-L2 results in moderate central canal narrowing   Continue multimodal pain management  Continue monitoring pain; pain worse with sitting/standing too long.   Maintain fall and safety precautions  Follow up with PCP

## 2025-05-20 NOTE — ASSESSMENT & PLAN NOTE
Multifactorial- acute and chronic conditions  History of falls  Maintain fall and safety precautions  Encourage use of asst devices, uses walker  Continue PT/OT services with VNA  Assess for need with assistance with transfers, mobility, and ADLs in/out of home  Patient is at high risk for falls r/t cognitive impairment, chronic pain, b/l arm weakness, history of falls, lumbar spinal stenosis, polypharmacy, environmental barriers, and age  Continue to monitor for acute changes in condition   Follow up with PCP

## 2025-05-20 NOTE — PROGRESS NOTES
Virtual Regular VisitName: Enrico Chavira      : 1930      MRN: [id number]  Encounter Provider: RODY Kinsey  Encounter Date: 2025   Encounter department: [facility] VIRTUAL    Heal At Home Visit   :  Assessment & Plan  Paroxysmal atrial fibrillation (HCC)  HR stable, today 80  No acute cardiac complaints  Continue Metoprolol tartrate 25mg BID for rate control  Continue Warfarin for AC,  INR 2.7, checked weekly on .   Continue to monitor HR   Monitor for s/s of bleeding and acute changes in condition  Follow up with PCP/Cardiology          Primary hypertension  BP stable, today 140/80 (after exertion)  Per patient he checks BP at home and SBP generally 130s-140s  No acute cardiac complaints  Avoid hypotension  Continue Lasix 20 mg daily and Metoprolol 25 mg BID  Continue to monitor BP and for acute changes in condition  Follow up with PCP/Cardiology            Asthma-COPD overlap syndrome (HCC)  Patient with history of COPD with no recent exacerbation  Continue inhaler therapy  Sa02 today 94% on RA  Lungs clear at rest but mild wheeze with exertion.   Continue to monitor respiratory status for acute changes in status  Follow up with PCP/Pulmonary             CKD (chronic kidney disease) stage 2, GFR 60-89 ml/min  Lab Results   Component Value Date    EGFR 78 2025    EGFR 78 2025    EGFR 81 2025    CREATININE 0.74 2025    CREATININE 0.75 2025    CREATININE 0.68 2025   Most recent Creatinine 0.74 /Gfr 78  Continue to monitor BMP periodically  Avoid nephrotoxins and NSAIDS  Encourage fluids with respect to volume status.   Avoid hypotension  Continue to monitor for acute changes in condition  Follow up with PCP          Anemia in other chronic diseases classified elsewhere  Most recent Hgb 11.2  Continue to monitor CBC periodically  Monitor for acute bleed - no present signs  Consider further workup, for persistent/worsening  Continue  to monitor for acute changes  Follow up with PCP as appropriate          Ambulatory dysfunction  Multifactorial- acute and chronic conditions  History of falls  Maintain fall and safety precautions  Encourage use of asst devices, uses walker  Continue PT/OT services with [facility]  Assess for need with assistance with transfers, mobility, and ADLs in/out of home  Patient is at high risk for falls r/t cognitive impairment, chronic pain, b/l arm weakness, history of falls, lumbar spinal stenosis, polypharmacy, environmental barriers, and age  Continue to monitor for acute changes in condition   Follow up with PCP            Chronic left-sided low back pain without sciatica  Patient with chronic back pain  CT of lower spine revealed Diffuse severe thoracolumbar degenerative change with apex left lumbar scoliosis. A posteriorly directed vertebral body osteophyte on the right at L1-L2 results in moderate central canal narrowing   Continue multimodal pain management  Continue monitoring pain; pain worse with sitting/standing too long.   Maintain fall and safety precautions  Follow up with PCP           Cognitive impairment  Most recent MOCA 19/30  Continue supportive measures  Patient lives alone, but has 5 daughters that assist with care/needs.   Patient does not drive.   Continue to reorient, redirect, and reassure patient as needed  Encourage engagement and activity  Encourage daily involvement in ADLs  Maintain delirium precautions and sleep/wake cycle  Patient remains at risk for delirium r/t age, medications, sleep disturbance, and pain  Avoid deliriogenic medications such as tramadol, benzodiazepines, anticholinergics, and Benadryl  Patient continues to require assistance in which supportive services can be provided by [facility] services  Encourage adequate nutrition and hydration  Continue to monitor for acute changes in condition  Follow up with PCP           Cellulitis of right lower extremity  5/14 evaluated by wound care:  concern for infection due to continued erythema of the thigh and increased warmth  Duricef prescribed through 5/24/25.    d/c ACE and trial Spandagrip for compression on the lower leg and larger size on the thigh.   Patient has been using Spandagrip, prefers this to ACE wrap  Per nursing much improved from last week, scant amount of drainage in his thigh area and lower extremity healed.    Continue wound care as ordered  Continue to monitor skin for s/s of infection  Encourage frequent changes in position and offloading  [facility] will continue wound care services in home as ordered  Follow up with wound care center tomorrow.                History of Present Illness     Enrico Chavira is a 89 y.o. male patient, being seen for Heal at home program in conjuction with [facility] nurse Elaine Robins, who was in the home to perform physical assessment.       The patient is being seen and examined today for follow-up on acute and chronic medical conditions s/p most recent hospitalization.      The patient reports he is overall improving. He states his breathing has improved, although get SOB and needs to rest if he walks 20+feet. He also reports wheezing with ambulation/exertion although returns to baseline with rest.  He ambulates with a walker at all times. He lives alone, but reports he has 5 daughters that take care of him and assist with needs, patient no longer drives. He was seen by wound care on 5/14 concern for infection, Duricef prescribed.  Per nursing RE much improved from last week, scant amount of drainage of his thigh area and lower extremity healed. Per nursing swelling also improved, +1RLE, trace LLE edema.  He reports his lower back pain is stable, worse with sitting or standing too long. Last INR was 2.7 on 5/16, repeat 5/23. Patient is in no acute distress, denies increased SOB from baseline, CP, palpitations, abdominal pain, N/V/C/D.   Patient has f/u with Wound Care 5/21.         Review of Systems    Constitutional:  Positive for activity change and fatigue. Negative for chills and fever.   HENT:  Negative for sore throat.    Respiratory:  Positive for shortness of breath (KHALIL). Negative for cough.    Cardiovascular:  Positive for leg swelling. Negative for chest pain and palpitations.   Gastrointestinal:  Negative for abdominal pain, constipation, diarrhea, nausea and vomiting.   Genitourinary:  Negative for dysuria and hematuria.   Musculoskeletal:  Positive for back pain and gait problem. Negative for arthralgias.   Skin:  Negative for color change and rash.        Scant drainage to upper thigh    Neurological:  Positive for weakness. Negative for dizziness, seizures, syncope and light-headedness.   All other systems reviewed and are negative.      Objective   /80   Pulse 80   Temp 98.2 °F (36.8 °C)   Resp 18   Wt 78.9 kg (174 lb)   SpO2 94%   BMI 28.08 kg/m²     Physical Exam  Vitals and nursing note reviewed.   Constitutional:       General: He is not in acute distress.     Appearance: Normal appearance. He is well-developed. He is not ill-appearing.   HENT:      Head: Normocephalic and atraumatic.     Eyes:      Conjunctiva/sclera: Conjunctivae normal.       Cardiovascular:      Rate and Rhythm: Normal rate and regular rhythm.   Pulmonary:      Effort: No respiratory distress.      Breath sounds: Wheezing (with exertion) present. No rhonchi or rales.   Abdominal:      Tenderness: There is no abdominal tenderness.     Musculoskeletal:      Right lower leg: Edema present.      Left lower leg: Edema present.      Comments: +1 RLE edema  Trace LLE     Skin:     General: Skin is warm and dry.     Neurological:      Mental Status: He is alert. Mental status is at baseline.      Motor: Weakness present.      Gait: Gait abnormal.     Psychiatric:         Mood and Affect: Mood normal.         Administrative Statements   Encounter provider RODY Kinsey    The Patient is located at Home and in the  following state in which I hold an active license [place].    The patient was identified by name and date of birth. Enrico ANUJ Chavira was informed that this is a telemedicine visit and that the visit is being conducted through the [employer] Teams platform. He agrees to proceed..  My office door was closed. No one else was in the room.  He acknowledged consent and understanding of privacy and security of the video platform. The patient has agreed to participate and understands they can discontinue the visit at any time.    I have spent a total time of 46 minutes in caring for this patient on the day of the visit/encounter including Risks and benefits of tx options, Instructions for management, Patient and family education, Importance of tx compliance, Risk factor reductions, Counseling / Coordination of care, Documenting in the medical record, Reviewing/placing orders in the medical record (including tests, medications, and/or procedures), Obtaining or reviewing history  , and Communicating with other healthcare professionals , not including the time spent for establishing the audio/video connection.

## 2025-05-20 NOTE — ASSESSMENT & PLAN NOTE
Patient with history of COPD with no recent exacerbation  Continue inhaler therapy  Sa02 today 94% on RA  Lungs clear at rest but mild wheeze with exertion.   Continue to monitor respiratory status for acute changes in status  Follow up with PCP/Pulmonary

## 2025-05-20 NOTE — CASE COMMUNICATION
d/c home PT this date.     please update me when plan for d/c [facility] as will contact [facility] to start.

## 2025-05-21 ENCOUNTER — HOME CARE VISIT (OUTPATIENT)
Dept: HOME HEALTH SERVICES | Facility: HOME HEALTHCARE | Age: OVER 89
End: 2025-05-21
Payer: MEDICARE

## 2025-05-21 ENCOUNTER — OFFICE VISIT (OUTPATIENT)
Facility: HOSPITAL | Age: OVER 89
End: 2025-05-21
Payer: MEDICARE

## 2025-05-21 ENCOUNTER — PATIENT OUTREACH (OUTPATIENT)
Dept: CASE MANAGEMENT | Facility: OTHER | Age: OVER 89
End: 2025-05-21

## 2025-05-21 VITALS
SYSTOLIC BLOOD PRESSURE: 150 MMHG | RESPIRATION RATE: 18 BRPM | DIASTOLIC BLOOD PRESSURE: 80 MMHG | TEMPERATURE: 96.8 F | HEART RATE: 80 BPM

## 2025-05-21 DIAGNOSIS — S81.801A OPEN WOUND OF RIGHT LOWER LEG, INITIAL ENCOUNTER: ICD-10-CM

## 2025-05-21 DIAGNOSIS — B99.9 LYMPHEDEMA DUE TO INFECTION: ICD-10-CM

## 2025-05-21 DIAGNOSIS — J44.89 ASTHMA-COPD OVERLAP SYNDROME (HCC): ICD-10-CM

## 2025-05-21 DIAGNOSIS — R60.0 EDEMA OF RIGHT LOWER LEG DUE TO PERIPHERAL VENOUS INSUFFICIENCY: ICD-10-CM

## 2025-05-21 DIAGNOSIS — I89.0 LYMPHEDEMA DUE TO INFECTION: ICD-10-CM

## 2025-05-21 DIAGNOSIS — I87.2 EDEMA OF RIGHT LOWER LEG DUE TO PERIPHERAL VENOUS INSUFFICIENCY: ICD-10-CM

## 2025-05-21 DIAGNOSIS — S71.101S OPEN WOUND OF RIGHT THIGH, SEQUELA: Primary | ICD-10-CM

## 2025-05-21 PROCEDURE — G0156 HHCP-SVS OF AIDE,EA 15 MIN: HCPCS

## 2025-05-21 PROCEDURE — 97597 DBRDMT OPN WND 1ST 20 CM/<: CPT | Performed by: STUDENT IN AN ORGANIZED HEALTH CARE EDUCATION/TRAINING PROGRAM

## 2025-05-21 RX ORDER — LIDOCAINE 40 MG/G
CREAM TOPICAL ONCE
Status: COMPLETED | OUTPATIENT
Start: 2025-05-21 | End: 2025-05-21

## 2025-05-21 RX ADMIN — LIDOCAINE: 40 CREAM TOPICAL at 14:13

## 2025-05-21 NOTE — PROGRESS NOTES
Follow up call to pt. SONYA episode ended on 5/17. Pt still in [facility] with incoming SN for wound care. Pt has appt today with wound center. He reports that he is doing well and upon chart review, had appt televisit with CRNP through [facility] program yesterday. Pt states that when his wound is healed, he plans on continuing PT in home.   He is agreeable to follow up again in 2 weeks. He has daily check ins with one of his family, he reports.

## 2025-05-21 NOTE — PATIENT INSTRUCTIONS
Orders Placed This Encounter   Procedures    Wound cleansing and dressings Anterior;Right;Lateral Thigh     Right lateral thigh:        *[facility] to come every other day.         Change dressing Every other day  You may remove the dressing and shower on the days home health care is coming. Do not leave wound open to air, apply new dressing immediately.  Cleanse the wound with wound cleanser or mild soap and water, rinse, pat dry.  Apply adaptic to the wound.  Cover with silver alginate overtop adaptic.   Cover with ABD, and tape.      Elastic Tubular Stocking---Spandagrip Size H to the thigh     Tubular elastic bandage: Apply in AM, may remove for sleep.     Avoid prolonged standing in one place.     Elevate leg(s) above the level of the heart when sitting or as much as possible.     Standing Status:   Future     Expiration Date:   5/28/2025    Wound cleansing and dressings Right;Lower Leg     Right LE:       Wash your hands with soap and water.  Remove old dressing, discard into plastic bag and place in trash.    Cleanse the wound with NSS prior to applying a clean dressing. Do not use tissue or cotton balls.   Do not scrub the wound. Pat dry using gauze.  Shower yes        Apply a piece of alginate to the right lower anterior leg wound  Apply ABD Pad to the wound.  Secure with Kerlix and tape  Change dressing every other day and as needed     Elastic Tubular Stocking---Spandagrip Size G     Tubular elastic bandage: Apply from base of toes to behind the knee. Apply in AM, may remove for sleep.     Avoid prolonged standing in one place.     Elevate leg(s) above the level of the heart when sitting or as much as possible.     Standing Status:   Future     Expiration Date:   5/28/2025

## 2025-05-21 NOTE — PROGRESS NOTES
Wound Procedure Treatment Anterior;Right;Lateral Thigh    Performed by: Brandee Llanes RN  Authorized by: Cornelia Goldsmith PA-C  Associated wounds:   Wound 05/07/25 Thigh Anterior;Right;Lateral    Wound cleansed with:  NSS   Applied primary dressing:  Non adherent contact layer, Silver and Calcium alginate   Applied secondary dressing:  ABD   Dressing secured with:  Kerlix, Tape, Elastic tubular stocking and Size H   Comments:  Adaptic with overlying silver alginate

## 2025-05-21 NOTE — PROGRESS NOTES
Wound Procedure Treatment Right;Lower Leg    Performed by: Brandee Llanes RN  Authorized by: Cornelia Goldsmith PA-C  Associated wounds:   Wound 05/14/25 Leg Right;Lower    Wound cleansed with:  NSS   Applied primary dressing:  Silver and Calcium alginate   Applied secondary dressing:  ABD   Dressing secured with:  Cain, Tape, Elastic tubular stocking and Size G

## 2025-05-22 RX ORDER — FLUTICASONE PROPIONATE AND SALMETEROL 250; 50 UG/1; UG/1
POWDER RESPIRATORY (INHALATION)
Refills: 5 | OUTPATIENT
Start: 2025-05-22

## 2025-05-22 NOTE — PROGRESS NOTES
Patient ID: Enrico Chavira is a 89 y.o. male Date of Birth 8/12/1930       Chief Complaint   Patient presents with    Follow Up Wound Care Visit     Right leg wounds       Allergies:  Alphagan p [brimonidine tartrate]    Diagnosis:   Diagnosis ICD-10-CM Associated Orders   1. Open wound of right thigh, sequela  S71.101S lidocaine (LMX) 4 % cream     Wound cleansing and dressings Anterior;Right;Lateral Thigh     Wound Procedure Treatment Anterior;Right;Lateral Thigh     Debridement      2. Open wound of right lower leg, initial encounter  S81.801A Wound cleansing and dressings Right;Lower Leg     Wound Procedure Treatment Right;Lower Leg      3. Lymphedema due to infection  I89.0     B99.9       4. Edema of right lower leg due to peripheral venous insufficiency  I87.2     R60.0            Assessment  & Plan:    F/u evaluation of R lateral thigh secondary to bullous cellulitis infection. Wound is small, continues to drain. Exudate present overlying fibrinous wound base. There is residual hyperpigmentation of the lateral thigh. Erythema and warmth has improved from previous visit. Edema is slowly subsiding, improved from previous visit. Distal RLE with scattered areas of partial thickness breakdown and weeping with mild maceration. No diffuse erythema to indicate cellulitis of the lower leg.   Selective debridement, as below.   Continue with adaptic, alginate, dry dressing to the weeping area of the thigh. Alginate to the distal lower leg. Keep sites covered. Cleanse with gentle soap and water.   Spandagrip for compression on lower and upper leg.   Complete course of antibiotics, as prescribed.   Instructed to monitor for any changes including redness or swelling surrounding the wound, increased drainage or pain as well as fevers or chills.    F/u in one week. Instructed to call if any questions or concerns arise in meantime.            Subjective:   05/07/25: 89 y/o M with PMHx of HTN, PE, asthma/COPD,  "hypothyroidism, skin cancer, osteoarthritis, venous insufficiency, chronic hyponatremia, lumbar spinal stenosis, presents for evaluation of his RLE. He was previously seen at the wound care center for a venous ulceration on his RLE and was healed in February. Per chart review, in April pt had been feeling weak, had a recent fall and noticed new redness and blistering of his RLE which prompted him to present to the ED. Per hosptial course summary. \"...In the ED he was noted to be in a rapid A-fib.  He met sepsis criteria with tachycardia and leukocytosis in setting of right lower extremity cellulitis per CT imaging.  Venous Doppler of the lower extremities was negative for DVT.  He also had a supratherapeutic INR on arrival and Coumadin was placed on hold.  Patient was admitted to [facility] service for further management.  He initially required a Cardizem drip, heart rate improved and patient converted to sinus rhythm.  Cardizem drip was discontinued.  He was initiated on metoprolol for rate control.  His prehospital losartan was placed on hold given his SONYA.  Nephrology followed patient throughout, felt SONYA was related to ARB/outpatient diuretic.  Patient was treated with IV fluids and SONYA resolved.  He is euvolemic on exam today.  Will continue metoprolol for rate control on discharge, can continue to hold prehospital losartan.  Patient was resumed on Coumadin yesterday which we will continue on discharge.  Infectious disease also followed patient.  He received Ancef for his cellulitis per their recommendations and is being transition to Duricef on discharge for a total of 10 days of antibiotic treatment.  He is medically stable and being discharged to [facility] today as arranged by case management.\" Pt was recently d/c from acute rehab. Plan of care while there included cleansing the R lateral thigh with VASHE solution and using adaptic and alginate to open wounds. Presently the weeping sites of the R lateral thigh is " open to air. Continues to have significant swelling of the lower leg but denies pain.         05/14/25: Pt presents with his daughter for f/u wound care of his L thigh. Home care has been assisting with dressing changes. Adaptic and alginate is being used. Daughter reports the ACE wrap has been digging into pt's skin where velcro lays against the skin. Pt denies fevers, chills.       05/21/25: Pt presents with his daughter for f/u wound care of his L thigh. He has been taking Duricef as prescribed and denies any SE with use of the medication. Has noticed an improvement in the redness and warmth of his R thigh since starting the antibiotic. Still has a few days left in his course. Has been using a Spandagrip on his lower leg and thigh for compression which has been more comfortable for him in comparison to the ACE bandage. Local supplier has his thigh-high compression garments available for  and he plans to obtain them. No fevers, chills, malaise.           The following portions of the patient's history were reviewed and updated as appropriate:   Problem List[1]  Past Medical History[2]  Past Surgical History[3]  Family History[4]  Social History[5]  Current Medications[6]    Review of Systems      Objective:  /80 Comment: manual  Pulse 80   Temp (!) 96.8 °F (36 °C)   Resp 18         Physical Exam  Vitals reviewed.   Constitutional:       Appearance: He is normal weight.     Cardiovascular:      Rate and Rhythm: Normal rate.      Pulses:           Dorsalis pedis pulses are 2+ on the right side.        Posterior tibial pulses are 2+ on the right side.      Comments: Entire R leg with significant swelling consistent with lymphedema secondary to infection  Pulmonary:      Effort: Pulmonary effort is normal.      Breath sounds: No rhonchi or rales.     Musculoskeletal:      Right knee: Swelling present.      Right lower leg: 3+ Edema present.     Skin:     Findings: Erythema (much improved from previous  "visit) and wound present.           Comments: See full wound assessment.      Neurological:      Mental Status: He is alert.      Motor: Weakness present.      Gait: Gait abnormal.           Wound 05/07/25 Thigh Anterior;Right;Lateral (Active)   Wound Image   05/21/25 1407   Wound Description White;Pink;Other (Comment) 05/21/25 1409   Non-staged Wound Description Full thickness 05/21/25 1409   Wound Length (cm) 0.3 cm 05/21/25 1409   Wound Width (cm) 0.3 cm 05/21/25 1409   Wound Depth (cm) 0.1 cm 05/21/25 1409   Wound Surface Area (cm^2) 0.07 cm^2 05/21/25 1409   Wound Volume (cm^3) 0.005 cm^3 05/21/25 1409   Calculated Wound Volume (cm^3) 0.01 cm^3 05/21/25 1409   Change in Wound Size % 99.29 05/21/25 1409   Drainage Amount Scant 05/21/25 1409   Drainage Description Serous;Yellow 05/21/25 1409   Marisol-wound Assessment Erythema;Edema;Induration 05/21/25 1409   Treatments Irrigation with NSS 05/14/25 1359   Dressing Other (Comment) 05/07/25 1420   Dressing Status Intact;Old drainage 05/21/25 1409       Wound 05/14/25 Leg Right;Lower (Active)   Wound Image   05/21/25 1406   Wound Description Epithelialization;Pink 05/21/25 1406   Non-staged Wound Description Partial thickness 05/21/25 1406   Wound Length (cm) 5.1 cm 05/21/25 1406   Wound Width (cm) 0.5 cm 05/21/25 1406   Wound Depth (cm) 0.1 cm 05/21/25 1406   Wound Surface Area (cm^2) 2 cm^2 05/21/25 1406   Wound Volume (cm^3) 0.134 cm^3 05/21/25 1406   Calculated Wound Volume (cm^3) 0.26 cm^3 05/21/25 1406   Drainage Amount Scant 05/21/25 1406   Drainage Description Serous 05/21/25 1406   Marisol-wound Assessment Edema;Erythema;White 05/21/25 1406   Dressing Status Intact 05/21/25 1406                          Debridement   Wound 05/07/25 Thigh Anterior;Right;Lateral     Date/Time: 5/21/2025 1:45 PM    Universal Protocol:  procedure performed by consultantConsent: Verbal consent obtained  Consent given by: patient  Time out: Immediately prior to procedure a \"time out\" " was called to verify the correct patient, procedure, equipment, support staff and site/side marked as required.  Patient understanding: patient states understanding of the procedure being performed  Patient identity confirmed: verbally with patient    Debridement Details  Performed by: PA  Debridement type: selective  Pain control: lidocaine 4%    Post-debridement measurements  Length (cm): 0.3  Width (cm): 0.3  Depth (cm): 0.1  Percent debrided: 90%  Surface Area (cm^2): 0.07  Area Debrided (cm^2): 0.06  Volume (cm^3): 0    Devitalized tissue debrided: exudate  Instrument(s) utilized: curette  Bleeding: small  Hemostasis obtained with: pressure  Response to treatment: procedure was tolerated well                   Wound Instructions:  Orders Placed This Encounter   Procedures    Wound cleansing and dressings Anterior;Right;Lateral Thigh     Right lateral thigh:        *[facility] to come every other day.         Change dressing Every other day  You may remove the dressing and shower on the days home health care is coming. Do not leave wound open to air, apply new dressing immediately.  Cleanse the wound with wound cleanser or mild soap and water, rinse, pat dry.  Apply adaptic to the wound.  Cover with silver alginate overtop adaptic.   Cover with ABD, and tape.      Elastic Tubular Stocking---Spandagrip Size H to the thigh     Tubular elastic bandage: Apply in AM, may remove for sleep.     Avoid prolonged standing in one place.     Elevate leg(s) above the level of the heart when sitting or as much as possible.     Standing Status:   Future     Expiration Date:   5/28/2025    Wound cleansing and dressings Right;Lower Leg     Right LE:       Wash your hands with soap and water.  Remove old dressing, discard into plastic bag and place in trash.    Cleanse the wound with NSS prior to applying a clean dressing. Do not use tissue or cotton balls.   Do not scrub the wound. Pat dry using gauze.  Shower yes        Apply a  "piece of alginate to the right lower anterior leg wound  Apply ABD Pad to the wound.  Secure with Kerlix and tape  Change dressing every other day and as needed     Elastic Tubular Stocking---Spandagrip Size G     Tubular elastic bandage: Apply from base of toes to behind the knee. Apply in AM, may remove for sleep.     Avoid prolonged standing in one place.     Elevate leg(s) above the level of the heart when sitting or as much as possible.     Standing Status:   Future     Expiration Date:   5/28/2025    Wound Procedure Treatment Anterior;Right;Lateral Thigh     This order was created via procedure documentation    Wound Procedure Treatment Right;Lower Leg     This order was created via procedure documentation    Debridement     This order was created via procedure documentation       Cally Goldsmith, PA-C    Portions of the record may have been created with voice recognition software. Occasional wrong word or \"sound alike\" substitutions may have occurred due to the inherent limitations of voice recognition software. Read the chart carefully and recognize, using context, where substitutions have occurred.           [1]   Patient Active Problem List  Diagnosis    Prothrombin gene mutation (HCC)    Primary generalized (osteo)arthritis    Primary hypertension    Dyslipidemia    Intrinsic eczema    Other seborrheic dermatitis    Abnormality of gait    Osteoarthritis of both knees    Chronic pulmonary embolism without acute cor pulmonale (HCC)    Age-related cataract of both eyes    Premature beats    Primary osteoarthritis of both knees    Neoplasm of uncertain behavior of skin    Encounter for long-term (current) use of medications    GERD without esophagitis    CKD (chronic kidney disease) stage 2, GFR 60-89 ml/min    Influenza vaccine refused    Hypercalcemia    COVID-19 virus infection    Asthma-COPD overlap syndrome (HCC)    Endothelial corneal dystrophy of both eyes    Factor V Leiden ([facility])    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)    History of pulmonary embolism    Mature cataract    Primary open-angle glaucoma, bilateral, mild stage    Pseudophakic bullous keratopathy of left eye    TB lung, latent    Hyponatremia    Shortness of breath    Encounter for wound care    Other fatigue    Acquired hypothyroidism    Venous stasis ulcer of right calf limited to breakdown of skin without varicose veins (HCC)    Hashimoto's thyroiditis    Venous stasis ulcer of other part of right lower leg with fat layer exposed with varicose veins (HCC)    Edema of both lower extremities due to peripheral venous insufficiency    Paroxysmal atrial fibrillation (HCC)    Supratherapeutic INR    Cellulitis of right lower extremity    SONYA (acute kidney injury) (HCC)    Swelling of right knee    Fall    Abnormal CT of the chest    Impaired mobility and ADLs    Chronic left-sided low back pain    Anemia    History of sepsis    Potential for cognitive impairment    Degenerative lumbar spinal stenosis    Bilateral arm weakness    Chest wall asymmetry    Orthostatic hypotension    Fungal dermatitis    Cognitive impairment    Ambulatory dysfunction   [2]   Past Medical History:  Diagnosis Date    Arthritis     Cellulitis of right lower extremity 04/19/2025    Coagulation defect (HCC)     last assessed: 11/28/2018    COPD (chronic obstructive pulmonary disease) (HCC)     last assessed: 5/14/2019, 12/6/2017    Generalized osteoarthritis     last assessed: 1/28/2019    GERD without esophagitis     last assessed: 1/28/2019    Glaucoma     last assessed: 8/4/2011    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HCC)     last assessed: 10/16/2018    Factor II Prothrombin Gene per [employer]    History of kidney stones 1985    Hx of blood clots     Hypertension     last assessed: 5/14/2019    Impaired fasting glucose     last assessed: 8/26/2013    Mild persistent asthma, uncomplicated     last assessed: 11/28/2018    Nephrolithiasis      Nondisplaced intertrochanteric fracture of right femur, initial encounter for closed fracture (HCC)     last assessed: 2019    Premature ventricular contractions     last assessed: 2011    Pulmonary nodule     Scoliosis (and kyphoscoliosis), idiopathic     Wears dentures    [3]   Past Surgical History:  Procedure Laterality Date    CARPAL TUNNEL RELEASE Left     ENDO     CATARACT EXTRACTION Left     COLONOSCOPY  10/07/2008    last assessed: 3/29/2016    EYE SURGERY Right 2022    HERNIA REPAIR      HIP SURGERY      HIP SURGERY Left 1999    ORIF    MULTIPLE TOOTH EXTRACTIONS Bilateral     OTHER SURGICAL HISTORY Right 10/30/2001    [facility]    TONSILLECTOMY Bilateral     WISDOM TOOTH EXTRACTION Bilateral    [4]   Family History  Problem Relation Name Age of Onset    Hypertension Mother      Stroke Mother      Heart attack Father     [5]   Social History  Socioeconomic History    Marital status:    Tobacco Use    Smoking status: Former     Types: Pipe     Start date:      Quit date:      Years since quittin.4     Passive exposure: Past    Smokeless tobacco: Never   Vaping Use    Vaping status: Never Used   Substance and Sexual Activity    Alcohol use: Yes     Comment: social    Drug use: Never    Sexual activity: Not Currently     Social Drivers of Health     Financial Resource Strain: Low Risk  (10/23/2023)    Overall Financial Resource Strain (CARDIA)     Difficulty of Paying Living Expenses: Not very hard   Food Insecurity: No Food Insecurity (5/3/2025)    Nursing - Inadequate Food Risk Classification     Worried About Running Out of Food in the Last Year: Never true     Ran Out of Food in the Last Year: Never true     Ran Out of Food in the Last Year: Never true   Transportation Needs: No Transportation Needs (2025)    OASIS : Transportation     Lack of Transportation (Medical): No     Lack of Transportation (Non-Medical): No     Patient Unable or Declines to  Respond: No   Intimate Partner Violence: Unknown (5/3/2025)    Nursing IPS     Physically Hurt by Someone: No     Hurt or Threatened by Someone: No   Housing Stability: Unknown (5/3/2025)    Nursing: Inadequate Housing Risk Classification     Unable to Pay for Housing in the Last Year: No     Has Housin   [6]   Current Outpatient Medications:     acetaminophen (TYLENOL) 325 mg tablet, Take 2 tablets (650 mg total) by mouth every 6 (six) hours as needed for mild pain, headaches or fever, Disp: , Rfl:     albuterol (PROVENTIL HFA,VENTOLIN HFA) 90 mcg/act inhaler, TAKE 2 PUFFS BY MOUTH EVERY 4 HOURS AS NEEDED FOR WHEEZE, Disp: 18 g, Rfl: 1    budesonide-formoterol (SYMBICORT) 160-4.5 mcg/act inhaler, Inhale 2 puffs 2 (two) times a day Rinse mouth after use., Disp: 30.6 g, Rfl: 3    cefadroxil (DURICEF) 500 mg capsule, Take 1 capsule (500 mg total) by mouth every 12 (twelve) hours for 10 days, Disp: 20 capsule, Rfl: 0    Cholecalciferol (Vitamin D3) 125 MCG (5000 UT) CAPS, Take 1 capsule by mouth daily. Indications: Vitamin D Deficiency, Disp: , Rfl:     desonide (DESOWEN) 0.05 % cream, Apply to face twice a day, Disp: 60 g, Rfl: 0    Difluprednate 0.05 % EMUL, , Disp: , Rfl:     dorzolamide (TRUSOPT) 2 % ophthalmic solution, INSTILL 1 DROP INTO LEFT EYE TWICE A DAY, Disp: , Rfl:     furosemide (LASIX) 20 mg tablet, Take 1 tablet (20 mg total) by mouth in the morning, Disp: 90 tablet, Rfl: 1    latanoprost (XALATAN) 0.005 % ophthalmic solution, Administer 1 drop to both eyes daily at bedtime, Disp: , Rfl:     lidocaine (LIDODERM) 5 %, Apply 1 patch topically over 12 hours daily Remove & Discard patch within 12 hours or as directed by MD  Apply to low back, Disp: , Rfl:     metoprolol tartrate (LOPRESSOR) 25 mg tablet, Take 1 tablet (25 mg total) by mouth every 12 (twelve) hours, Disp: 60 tablet, Rfl: 0    montelukast (SINGULAIR) 10 mg tablet, TAKE 1 TABLET BY MOUTH EVERY DAY, Disp: 90 tablet, Rfl: 1    multivitamin  (THERAGRAN) TABS, Take 1 tablet by mouth daily, Disp: , Rfl:     nystatin (MYCOSTATIN) powder, Apply topically 2 (two) times a day (Patient taking differently: Apply topically 2 (two) times a day Apply topically to the groin area 2 times a day), Disp: 15 g, Rfl: 0    omeprazole (PriLOSEC OTC) 20 MG tablet, Take 20 mg by mouth daily, Disp: , Rfl:     Oral Electrolytes (ELECTROLYTE SR PO), Take 2 tablets by mouth daily., Disp: , Rfl:     Polyethyl Glycol-Propyl Glycol (SYSTANE OP), Administer 1 drop to both eyes 4 (four) times a day, Disp: , Rfl:     polyethylene glycol (GLYCOLAX) 17 GM/SCOOP powder, Take 17 g by mouth daily as needed (Constipation) Stir into 8 oz of liquid of choice., Disp: , Rfl:     prednisoLONE acetate (PRED FORTE) 1 % ophthalmic suspension, Administer 1 drop into the left eye 2 (two) times a day, Disp: , Rfl:     sodium chloride (ARIEL 128) 2 % hypertonic ophthalmic solution, Administer 1 drop into the left eye 4 (four) times a day, Disp: , Rfl:     warfarin (COUMADIN) 4 mg tablet, TAKE 2 TABLET BY MOUTH DAILY OR AS DIRECTED, Disp: 180 tablet, Rfl: 2    warfarin (COUMADIN) 6 mg tablet, Take 1 tablet (6 mg total) by mouth daily, Disp: 30 tablet, Rfl: 0  No current facility-administered medications for this visit.

## 2025-05-23 ENCOUNTER — HOME CARE VISIT (OUTPATIENT)
Dept: HOME HEALTH SERVICES | Facility: HOME HEALTHCARE | Age: OVER 89
End: 2025-05-23
Payer: MEDICARE

## 2025-05-23 ENCOUNTER — ANTICOAG VISIT (OUTPATIENT)
Dept: FAMILY MEDICINE CLINIC | Facility: CLINIC | Age: OVER 89
End: 2025-05-23

## 2025-05-23 VITALS
DIASTOLIC BLOOD PRESSURE: 68 MMHG | TEMPERATURE: 97.7 F | RESPIRATION RATE: 20 BRPM | SYSTOLIC BLOOD PRESSURE: 122 MMHG | OXYGEN SATURATION: 97 % | HEART RATE: 82 BPM

## 2025-05-23 LAB — INR PPP: 4.7 (ref 0.85–1.19)

## 2025-05-23 PROCEDURE — G0299 HHS/HOSPICE OF RN EA 15 MIN: HCPCS

## 2025-05-25 ENCOUNTER — HOME CARE VISIT (OUTPATIENT)
Dept: HOME HEALTH SERVICES | Facility: HOME HEALTHCARE | Age: OVER 89
End: 2025-05-25
Payer: MEDICARE

## 2025-05-25 VITALS
SYSTOLIC BLOOD PRESSURE: 152 MMHG | RESPIRATION RATE: 16 BRPM | TEMPERATURE: 97.5 F | OXYGEN SATURATION: 95 % | DIASTOLIC BLOOD PRESSURE: 68 MMHG | HEART RATE: 76 BPM

## 2025-05-25 PROCEDURE — G0299 HHS/HOSPICE OF RN EA 15 MIN: HCPCS

## 2025-05-27 ENCOUNTER — HOME CARE VISIT (OUTPATIENT)
Dept: HOME HEALTH SERVICES | Facility: HOME HEALTHCARE | Age: OVER 89
End: 2025-05-27
Payer: MEDICARE

## 2025-05-27 VITALS
TEMPERATURE: 97.2 F | OXYGEN SATURATION: 98 % | SYSTOLIC BLOOD PRESSURE: 142 MMHG | HEART RATE: 74 BPM | RESPIRATION RATE: 18 BRPM | DIASTOLIC BLOOD PRESSURE: 70 MMHG

## 2025-05-27 PROCEDURE — G0299 HHS/HOSPICE OF RN EA 15 MIN: HCPCS

## 2025-05-28 ENCOUNTER — HOME CARE VISIT (OUTPATIENT)
Dept: HOME HEALTH SERVICES | Facility: HOME HEALTHCARE | Age: OVER 89
End: 2025-05-28
Payer: MEDICARE

## 2025-05-28 PROCEDURE — G0156 HHCP-SVS OF AIDE,EA 15 MIN: HCPCS

## 2025-05-29 ENCOUNTER — HOME CARE VISIT (OUTPATIENT)
Dept: HOME HEALTH SERVICES | Facility: HOME HEALTHCARE | Age: OVER 89
End: 2025-05-29
Payer: MEDICARE

## 2025-05-29 ENCOUNTER — OFFICE VISIT (OUTPATIENT)
Facility: HOSPITAL | Age: OVER 89
End: 2025-05-29
Payer: MEDICARE

## 2025-05-29 VITALS
DIASTOLIC BLOOD PRESSURE: 68 MMHG | TEMPERATURE: 96.9 F | SYSTOLIC BLOOD PRESSURE: 163 MMHG | HEART RATE: 80 BPM | RESPIRATION RATE: 20 BRPM

## 2025-05-29 DIAGNOSIS — S80.811A ABRASION OF RIGHT LOWER EXTREMITY, INITIAL ENCOUNTER: ICD-10-CM

## 2025-05-29 DIAGNOSIS — S71.101S OPEN WOUND OF RIGHT THIGH, SEQUELA: Primary | ICD-10-CM

## 2025-05-29 DIAGNOSIS — B99.9 LYMPHEDEMA DUE TO INFECTION: ICD-10-CM

## 2025-05-29 DIAGNOSIS — Z87.2 HISTORY OF CELLULITIS OF SKIN WITH LYMPHANGITIS: ICD-10-CM

## 2025-05-29 DIAGNOSIS — I89.0 LYMPHEDEMA DUE TO INFECTION: ICD-10-CM

## 2025-05-29 PROCEDURE — 99213 OFFICE O/P EST LOW 20 MIN: CPT | Performed by: STUDENT IN AN ORGANIZED HEALTH CARE EDUCATION/TRAINING PROGRAM

## 2025-05-29 PROCEDURE — 99214 OFFICE O/P EST MOD 30 MIN: CPT | Performed by: STUDENT IN AN ORGANIZED HEALTH CARE EDUCATION/TRAINING PROGRAM

## 2025-05-29 NOTE — PROGRESS NOTES
Wound Procedure Treatment Proximal;Right;Lower Leg    Performed by: Janice Foster RN  Authorized by: Cornelia Goldsmith PA-C  Associated wounds:   Wound 05/29/25 Leg Proximal;Right;Lower    Wound cleansed with:  NSS   Applied to periwound:  Moisture lotion   Applied topical:  Other   Applied secondary dressing:  Gauze   Dressing secured with:  Tape, Elastic tubular stocking and Size G   Comments:  Bacitracin

## 2025-05-29 NOTE — PATIENT INSTRUCTIONS
Orders Placed This Encounter   Procedures    Wound cleansing and dressings Anterior;Right;Lateral Thigh     Right Thigh wound is healed today     Standing Status:   Future     Expiration Date:   5/29/2025    Wound cleansing and dressings Right;Lower Leg     Right LE wound is healed today     Standing Status:   Future     Expiration Date:   5/29/2025    Wound cleansing and dressings Proximal;Right;Lower Leg     Right LE Proximal:---(new open area as of 5/29/25)        Wash your hands with soap and water.  Remove old dressing, discard into plastic bag and place in trash.    Cleanse the wound with NSS prior to applying a clean dressing. Do not use tissue or cotton balls.   Do not scrub the wound. Pat dry using gauze.  Shower yes         Apply a thin layer of antibiotic ointment the proximal RLE open area  Apply gauze and Secure with Kerlix and tape  Change dressing 3 x weekly and as needed    Elastic Tubular Stocking--Spandagrip Size G to the RLE and Size H to the right Thigh    Tubular elastic bandage: Apply from base of toes to behind the knee. Apply in AM, may remove for sleep.    Avoid prolonged standing in one place.    Elevate leg(s) above the level of the heart when sitting or as much as possible.     Once you receive the foot piece for the Circaid can then stop using the Spandagrip and begin using the Circaid at 20-30mmHG     Standing Status:   Future     Expiration Date:   6/5/2025

## 2025-05-29 NOTE — PROGRESS NOTES
Wound Procedure Treatment Anterior;Right;Lateral Thigh    Performed by: Janice Foster RN  Authorized by: Cornelia Goldsmith PA-C  Associated wounds:   Wound 05/07/25 Thigh Anterior;Right;Lateral    Wound cleansed with:  NSS   Applied secondary dressing:  Foam   Dressing secured with:  Elastic tubular stocking and Size H

## 2025-05-30 ENCOUNTER — ANTICOAG VISIT (OUTPATIENT)
Dept: FAMILY MEDICINE CLINIC | Facility: CLINIC | Age: OVER 89
End: 2025-05-30

## 2025-05-30 LAB — INR PPP: 2.4 (ref 0.85–1.19)

## 2025-05-30 NOTE — PROGRESS NOTES
Patient ID: Enrico Chavira is a 89 y.o. male Date of Birth 8/12/1930       Chief Complaint   Patient presents with    Follow Up Wound Care Visit     Follow up for the right thigh and right LE, new wound noted to the Proximal RLE.       Allergies:  Alphagan p [brimonidine tartrate]    Diagnosis:   Diagnosis ICD-10-CM Associated Orders   1. Open wound of right thigh, sequela  S71.101S Wound cleansing and dressings Anterior;Right;Lateral Thigh     Wound Procedure Treatment Anterior;Right;Lateral Thigh      2. Open wound of right lower leg, initial encounter  S81.801A Wound cleansing and dressings Right;Lower Leg     Wound cleansing and dressings Proximal;Right;Lower Leg     Wound Procedure Treatment Proximal;Right;Lower Leg      3. Lymphedema due to infection  I89.0     B99.9       4. History of cellulitis of skin with lymphangitis  Z87.2            Assessment  & Plan:    F/u evaluation of the R lateral thigh wound secondary to bullous cellulitis infection. No drainage present on examination; has fully epithelialized however the skin over the wound appears fragile. Continues to have swelling of the thigh and hyperpigmentation of the skin but no true erythema present, much improved with course of antibiotics.   Keep site covered with bandage for one additional week given newly healed site is fragile.   Would benefit from thigh-high compression given lymphedema of entire lower leg. Has brought CircAid with him today. No compressive element for foot which will be necessary for pt. Will continue with Spandagrip for additional week. Contacted supplier at [facility] to add on foot piece and compression sock to have element of foot compression to avoid foot edema.   Has completed course of antibiotics. Should there be a return of cellulitis following course completion plan to consult ID for further recommendations.   Instructed to monitor for any changes including redness or swelling surrounding the wound, increased  "drainage or pain as well as fevers or chills.    New small linear abrasion of the R anterior lower leg. Appears traumatic in nature. Minimal drainage. Partial thickness. No surrounding erythema. Skin of lower leg is dry and scaling.   Apply topical antibiotic ointment and keep covered. Change three times weekly. Spandagrip for compression. Will initiate wear of thigh high compression once foot piece arrives.   May use moisturizing cream on dry intact skin of lower leg.   F/u in 1 week. Instructed to call if any questions or concerns arise in meantime.            Subjective:   05/07/25: 89 y/o M with PMHx of HTN, PE, asthma/COPD, hypothyroidism, skin cancer, osteoarthritis, venous insufficiency, chronic hyponatremia, lumbar spinal stenosis, presents for evaluation of his RLE. He was previously seen at the wound care center for a venous ulceration on his RLE and was healed in February. Per chart review, in April pt had been feeling weak, had a recent fall and noticed new redness and blistering of his RLE which prompted him to present to the ED. Per hosptial course summary. \"...In the ED he was noted to be in a rapid A-fib.  He met sepsis criteria with tachycardia and leukocytosis in setting of right lower extremity cellulitis per CT imaging.  Venous Doppler of the lower extremities was negative for DVT.  He also had a supratherapeutic INR on arrival and Coumadin was placed on hold.  Patient was admitted to [facility] service for further management.  He initially required a Cardizem drip, heart rate improved and patient converted to sinus rhythm.  Cardizem drip was discontinued.  He was initiated on metoprolol for rate control.  His prehospital losartan was placed on hold given his SONYA.  Nephrology followed patient throughout, felt SONYA was related to ARB/outpatient diuretic.  Patient was treated with IV fluids and SONYA resolved.  He is euvolemic on exam today.  Will continue metoprolol for rate control on discharge, can " "continue to hold prehospital losartan.  Patient was resumed on Coumadin yesterday which we will continue on discharge.  Infectious disease also followed patient.  He received Ancef for his cellulitis per their recommendations and is being transition to Duricef on discharge for a total of 10 days of antibiotic treatment.  He is medically stable and being discharged to [facility] today as arranged by case management.\" Pt was recently d/c from acute rehab. Plan of care while there included cleansing the R lateral thigh with VASHE solution and using adaptic and alginate to open wounds. Presently the weeping sites of the R lateral thigh is open to air. Continues to have significant swelling of the lower leg but denies pain.         05/14/25: Pt presents with his daughter for f/u wound care of his L thigh. Home care has been assisting with dressing changes. Adaptic and alginate is being used. Daughter reports the ACE wrap has been digging into pt's skin where velcro lays against the skin. Pt denies fevers, chills.       05/21/25: Pt presents with his daughter for f/u wound care of his L thigh. He has been taking Duricef as prescribed and denies any SE with use of the medication. Has noticed an improvement in the redness and warmth of his R thigh since starting the antibiotic. Still has a few days left in his course. Has been using a Spandagrip on his lower leg and thigh for compression which has been more comfortable for him in comparison to the ACE bandage. Local supplier has his thigh-high compression garments available for  and he plans to obtain them. No fevers, chills, malaise.       05/30/25: Pt presents for f/u wound care of the L thigh and LLE. Pt received his thigh-high CircAid since his previous visit and has brought it with him today. Has completed his course of Duricef. No significant pain or redness of his thigh. Denies fevers, chills.           The following portions of the patient's history were reviewed " and updated as appropriate:   Problem List[1]  Past Medical History[2]  Past Surgical History[3]  Family History[4]  Social History[5]  Current Medications[6]    Review of Systems      Objective:  /68   Pulse 80   Temp (!) 96.9 °F (36.1 °C)   Resp 20         Physical Exam  Vitals reviewed.   Constitutional:       Appearance: He is normal weight.     Cardiovascular:      Rate and Rhythm: Normal rate.      Pulses:           Dorsalis pedis pulses are 2+ on the right side.        Posterior tibial pulses are 2+ on the right side.      Comments: Entire R leg with significant swelling consistent with lymphedema secondary to infection  Pulmonary:      Effort: Pulmonary effort is normal.      Breath sounds: No rhonchi or rales.     Musculoskeletal:      Right knee: Swelling present.      Right lower leg: 3+ Edema present.     Skin:     Findings: Erythema (much improved from previous visit) and wound present.           Comments: See full wound assessment.      Neurological:      Mental Status: He is alert.      Motor: Weakness present.      Gait: Gait abnormal.         Wound 05/07/25 Thigh Anterior;Right;Lateral (Active)   Wound Image   05/29/25 1323   Wound Description Yellow;Dry 05/29/25 1330   Non-staged Wound Description Not applicable 05/29/25 1330   Wound Length (cm) 0 cm 05/29/25 1330   Wound Width (cm) 0 cm 05/29/25 1330   Wound Depth (cm) 0 cm 05/29/25 1330   Wound Surface Area (cm^2) 0 cm^2 05/29/25 1330   Wound Volume (cm^3) 0 cm^3 05/29/25 1330   Calculated Wound Volume (cm^3) 0 cm^3 05/29/25 1330   Change in Wound Size % 100 05/29/25 1330   Drainage Amount None 05/29/25 1330   Drainage Description Serous;Yellow 05/21/25 1409   Marisol-wound Assessment Edema 05/29/25 1330   Treatments Irrigation with NSS 05/29/25 1330   Dressing Other (Comment) 05/07/25 1420   Dressing Status Intact;Old drainage 05/29/25 1330       Wound 05/14/25 Leg Right;Lower (Active)   Wound Image   05/29/25 1323   Wound Description  Epithelialization 05/29/25 1332   Non-staged Wound Description Not applicable 05/29/25 1332   Wound Length (cm) 0 cm 05/29/25 1332   Wound Width (cm) 0 cm 05/29/25 1332   Wound Depth (cm) 0 cm 05/29/25 1332   Wound Surface Area (cm^2) 0 cm^2 05/29/25 1332   Wound Volume (cm^3) 0 cm^3 05/29/25 1332   Calculated Wound Volume (cm^3) 0 cm^3 05/29/25 1332   Drainage Amount None 05/29/25 1332   Drainage Description Serous 05/21/25 1406   Marisol-wound Assessment Edema;Dry;Scaly 05/29/25 1332   Dressing Status Other (Comment) 05/29/25 1332       Wound 05/29/25 Leg Proximal;Right;Lower (Active)   Wound Image   05/29/25 1324   Wound Description Granulation tissue;Epithelialization 05/29/25 1331   Non-staged Wound Description Full thickness 05/29/25 1331   Wound Length (cm) 0.2 cm 05/29/25 1331   Wound Width (cm) 0.8 cm 05/29/25 1331   Wound Depth (cm) 0.1 cm 05/29/25 1331   Wound Surface Area (cm^2) 0.13 cm^2 05/29/25 1331   Wound Volume (cm^3) 0.008 cm^3 05/29/25 1331   Calculated Wound Volume (cm^3) 0.02 cm^3 05/29/25 1331   Drainage Amount Small 05/29/25 1331   Drainage Description Serosanguineous 05/29/25 1331   Marisol-wound Assessment Edema;Purple;Dry;Scaly;Hyperpigmented 05/29/25 1331   Treatments Irrigation with NSS 05/29/25 1331   Dressing Status Other (Comment) 05/29/25 1331                     Wound Instructions:  Orders Placed This Encounter   Procedures    Wound cleansing and dressings Anterior;Right;Lateral Thigh     Right Thigh wound is healed today     Standing Status:   Future     Expiration Date:   5/29/2025    Wound cleansing and dressings Right;Lower Leg     Right LE wound is healed today     Standing Status:   Future     Expiration Date:   5/29/2025    Wound cleansing and dressings Proximal;Right;Lower Leg     Right LE Proximal:---(new open area as of 5/29/25)        Wash your hands with soap and water.  Remove old dressing, discard into plastic bag and place in trash.    Cleanse the wound with NSS prior to  "applying a clean dressing. Do not use tissue or cotton balls.   Do not scrub the wound. Pat dry using gauze.  Shower yes         Apply a thin layer of antibiotic ointment the proximal RLE open area  Apply gauze and Secure with Kerlix and tape  Change dressing 3 x weekly and as needed    Elastic Tubular Stocking--Spandagrip Size G to the RLE and Size H to the right Thigh    Tubular elastic bandage: Apply from base of toes to behind the knee. Apply in AM, may remove for sleep.    Avoid prolonged standing in one place.    Elevate leg(s) above the level of the heart when sitting or as much as possible.     Once you receive the foot piece for the Circaid can then stop using the Spandagrip and begin using the Circaid at 20-30mmHG     Standing Status:   Future     Expiration Date:   6/5/2025    Wound Procedure Treatment Anterior;Right;Lateral Thigh     This order was created via procedure documentation    Wound Procedure Treatment Proximal;Right;Lower Leg     This order was created via procedure documentation       Cally Goldsmith, PA-C      Portions of the record may have been created with voice recognition software. Occasional wrong word or \"sound alike\" substitutions may have occurred due to the inherent limitations of voice recognition software. Read the chart carefully and recognize, using context, where substitutions have occurred.           [1]   Patient Active Problem List  Diagnosis    Prothrombin gene mutation (HCC)    Primary generalized (osteo)arthritis    Primary hypertension    Dyslipidemia    Intrinsic eczema    Other seborrheic dermatitis    Abnormality of gait    Osteoarthritis of both knees    Chronic pulmonary embolism without acute cor pulmonale (HCC)    Age-related cataract of both eyes    Premature beats    Primary osteoarthritis of both knees    Neoplasm of uncertain behavior of skin    Encounter for long-term (current) use of medications    GERD without esophagitis    CKD (chronic kidney disease) " stage 2, GFR 60-89 ml/min    Influenza vaccine refused    Hypercalcemia    COVID-19 virus infection    Asthma-COPD overlap syndrome (HCC)    Endothelial corneal dystrophy of both eyes    Factor V Leiden (HCC)    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)    History of pulmonary embolism    Mature cataract    Primary open-angle glaucoma, bilateral, mild stage    Pseudophakic bullous keratopathy of left eye    TB lung, latent    Hyponatremia    Shortness of breath    Encounter for wound care    Other fatigue    Acquired hypothyroidism    Venous stasis ulcer of right calf limited to breakdown of skin without varicose veins (HCC)    Hashimoto's thyroiditis    Venous stasis ulcer of other part of right lower leg with fat layer exposed with varicose veins (HCC)    Edema of both lower extremities due to peripheral venous insufficiency    Paroxysmal atrial fibrillation (HCC)    Supratherapeutic INR    Cellulitis of right lower extremity    SONYA (acute kidney injury) (HCC)    Swelling of right knee    Fall    Abnormal CT of the chest    Impaired mobility and ADLs    Chronic left-sided low back pain    Anemia    History of sepsis    Potential for cognitive impairment    Degenerative lumbar spinal stenosis    Bilateral arm weakness    Chest wall asymmetry    Orthostatic hypotension    Fungal dermatitis    Cognitive impairment    Ambulatory dysfunction   [2]   Past Medical History:  Diagnosis Date    Arthritis     Cellulitis of right lower extremity 04/19/2025    Coagulation defect (HCC)     last assessed: 11/28/2018    COPD (chronic obstructive pulmonary disease) (HCC)     last assessed: 5/14/2019, 12/6/2017    Generalized osteoarthritis     last assessed: 1/28/2019    GERD without esophagitis     last assessed: 1/28/2019    Glaucoma     last assessed: 8/4/2011    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HCC)     last assessed: 10/16/2018    Factor II Prothrombin Gene per [employer]    History of kidney stones  1985    Hx of blood clots     Hypertension     last assessed: 2019    Impaired fasting glucose     last assessed: 2013    Mild persistent asthma, uncomplicated     last assessed: 2018    Nephrolithiasis     Nondisplaced intertrochanteric fracture of right femur, initial encounter for closed fracture (HCC)     last assessed: 2019    Premature ventricular contractions     last assessed: 2011    Pulmonary nodule     Scoliosis (and kyphoscoliosis), idiopathic     Wears dentures    [3]   Past Surgical History:  Procedure Laterality Date    CARPAL TUNNEL RELEASE Left     ENDO     CATARACT EXTRACTION Left     COLONOSCOPY  10/07/2008    last assessed: 3/29/2016    EYE SURGERY Right 2022    HERNIA REPAIR      HIP SURGERY      HIP SURGERY Left 1999    ORIF    MULTIPLE TOOTH EXTRACTIONS Bilateral     OTHER SURGICAL HISTORY Right 10/30/2001    [facility]    TONSILLECTOMY Bilateral     WISDOM TOOTH EXTRACTION Bilateral    [4]   Family History  Problem Relation Name Age of Onset    Hypertension Mother      Stroke Mother      Heart attack Father     [5]   Social History  Socioeconomic History    Marital status:    Tobacco Use    Smoking status: Former     Types: Pipe     Start date:      Quit date:      Years since quittin.4     Passive exposure: Past    Smokeless tobacco: Never   Vaping Use    Vaping status: Never Used   Substance and Sexual Activity    Alcohol use: Yes     Comment: social    Drug use: Never    Sexual activity: Not Currently     Social Drivers of Health     Financial Resource Strain: Low Risk  (10/23/2023)    Overall Financial Resource Strain (CARDIA)     Difficulty of Paying Living Expenses: Not very hard   Food Insecurity: No Food Insecurity (5/3/2025)    Nursing - Inadequate Food Risk Classification     Worried About Running Out of Food in the Last Year: Never true     Ran Out of Food in the Last Year: Never true     Ran Out of Food in the Last Year:  Never true   Transportation Needs: No Transportation Needs (2025)    OASIS : Transportation     Lack of Transportation (Medical): No     Lack of Transportation (Non-Medical): No     Patient Unable or Declines to Respond: No   Intimate Partner Violence: Unknown (5/3/2025)    Nursing IPS     Physically Hurt by Someone: No     Hurt or Threatened by Someone: No   Housing Stability: Unknown (5/3/2025)    Nursing: Inadequate Housing Risk Classification     Unable to Pay for Housing in the Last Year: No     Has Housin   [6]   Current Outpatient Medications:     acetaminophen (TYLENOL) 325 mg tablet, Take 2 tablets (650 mg total) by mouth every 6 (six) hours as needed for mild pain, headaches or fever, Disp: , Rfl:     albuterol (PROVENTIL HFA,VENTOLIN HFA) 90 mcg/act inhaler, TAKE 2 PUFFS BY MOUTH EVERY 4 HOURS AS NEEDED FOR WHEEZE, Disp: 18 g, Rfl: 1    budesonide-formoterol (SYMBICORT) 160-4.5 mcg/act inhaler, Inhale 2 puffs 2 (two) times a day Rinse mouth after use., Disp: 30.6 g, Rfl: 3    Cholecalciferol (Vitamin D3) 125 MCG (5000 UT) CAPS, Take 1 capsule by mouth daily. Indications: Vitamin D Deficiency, Disp: , Rfl:     desonide (DESOWEN) 0.05 % cream, Apply to face twice a day, Disp: 60 g, Rfl: 0    Difluprednate 0.05 % EMUL, , Disp: , Rfl:     dorzolamide (TRUSOPT) 2 % ophthalmic solution, INSTILL 1 DROP INTO LEFT EYE TWICE A DAY, Disp: , Rfl:     furosemide (LASIX) 20 mg tablet, Take 1 tablet (20 mg total) by mouth in the morning, Disp: 90 tablet, Rfl: 1    latanoprost (XALATAN) 0.005 % ophthalmic solution, Administer 1 drop to both eyes daily at bedtime, Disp: , Rfl:     lidocaine (LIDODERM) 5 %, Apply 1 patch topically over 12 hours daily Remove & Discard patch within 12 hours or as directed by MD  Apply to low back, Disp: , Rfl:     metoprolol tartrate (LOPRESSOR) 25 mg tablet, Take 1 tablet (25 mg total) by mouth every 12 (twelve) hours, Disp: 60 tablet, Rfl: 0    montelukast (SINGULAIR) 10 mg  tablet, TAKE 1 TABLET BY MOUTH EVERY DAY, Disp: 90 tablet, Rfl: 1    multivitamin (THERAGRAN) TABS, Take 1 tablet by mouth daily, Disp: , Rfl:     nystatin (MYCOSTATIN) powder, Apply topically 2 (two) times a day (Patient taking differently: Apply topically 2 (two) times a day Apply topically to the groin area 2 times a day), Disp: 15 g, Rfl: 0    omeprazole (PriLOSEC OTC) 20 MG tablet, Take 20 mg by mouth daily, Disp: , Rfl:     Oral Electrolytes (ELECTROLYTE SR PO), Take 2 tablets by mouth daily., Disp: , Rfl:     Polyethyl Glycol-Propyl Glycol (SYSTANE OP), Administer 1 drop to both eyes 4 (four) times a day, Disp: , Rfl:     polyethylene glycol (GLYCOLAX) 17 GM/SCOOP powder, Take 17 g by mouth daily as needed (Constipation) Stir into 8 oz of liquid of choice., Disp: , Rfl:     prednisoLONE acetate (PRED FORTE) 1 % ophthalmic suspension, Administer 1 drop into the left eye 2 (two) times a day, Disp: , Rfl:     sodium chloride (ARIEL 128) 2 % hypertonic ophthalmic solution, Administer 1 drop into the left eye 4 (four) times a day, Disp: , Rfl:     warfarin (COUMADIN) 4 mg tablet, TAKE 2 TABLET BY MOUTH DAILY OR AS DIRECTED, Disp: 180 tablet, Rfl: 2    warfarin (COUMADIN) 6 mg tablet, Take 1 tablet (6 mg total) by mouth daily, Disp: 30 tablet, Rfl: 0

## 2025-06-02 DIAGNOSIS — I48.91 ATRIAL FIBRILLATION (HCC): ICD-10-CM

## 2025-06-02 DIAGNOSIS — L23.3 ALLERGIC CONTACT DERMATITIS DUE TO DRUGS IN CONTACT WITH SKIN: Primary | ICD-10-CM

## 2025-06-02 RX ORDER — METOPROLOL TARTRATE 25 MG/1
25 TABLET, FILM COATED ORAL EVERY 12 HOURS SCHEDULED
Qty: 180 TABLET | Refills: 1 | Status: SHIPPED | OUTPATIENT
Start: 2025-06-02

## 2025-06-02 RX ORDER — TRIAMCINOLONE ACETONIDE 1 MG/G
OINTMENT TOPICAL 2 TIMES DAILY
Qty: 80 G | Refills: 1 | Status: SHIPPED | OUTPATIENT
Start: 2025-06-02 | End: 2025-06-16

## 2025-06-02 NOTE — PROGRESS NOTES
Pt's daughter called. Pt has new rash and weeping of his lower leg and reports of itching as well. Suspect zinc allergy d/t previous negative reaction to use of Unna boot as well as zinc component of bacitracin. D/c use of zinc product. Cover wound with dry dressing. Topical steroid prescribed. D/c use of Spandagrip as well given reports of itching and rash on thigh with Spandagrip. Will see pt on Wednesday for closer f/u. If any worsening appearance of skin or diffuse redness call office and will consider abx/ID consult at that time.

## 2025-06-02 NOTE — TELEPHONE ENCOUNTER
Reason for call:   [x] Refill   [] Prior Auth  [] Other:     Office:   [x] PCP/Provider - Dayana  [] Specialty/Provider -     Medication:   Metoprolol 25 mg, 1 q12, 180    Pharmacy:   [facility]   Does the patient have enough for 3 days?   [] Yes   [x] No - Send as HP to POD    Mail Away Pharmacy   Does the patient have enough for 10 days?   [] Yes   [] No - Send as HP to POD

## 2025-06-04 ENCOUNTER — OFFICE VISIT (OUTPATIENT)
Facility: HOSPITAL | Age: OVER 89
End: 2025-06-04
Payer: MEDICARE

## 2025-06-04 ENCOUNTER — PATIENT OUTREACH (OUTPATIENT)
Dept: CASE MANAGEMENT | Facility: OTHER | Age: OVER 89
End: 2025-06-04

## 2025-06-04 ENCOUNTER — DOCUMENTATION (OUTPATIENT)
Dept: DERMATOLOGY | Facility: CLINIC | Age: OVER 89
End: 2025-06-04

## 2025-06-04 VITALS
HEART RATE: 92 BPM | RESPIRATION RATE: 18 BRPM | SYSTOLIC BLOOD PRESSURE: 167 MMHG | DIASTOLIC BLOOD PRESSURE: 95 MMHG | TEMPERATURE: 96.1 F

## 2025-06-04 DIAGNOSIS — B99.9 LYMPHEDEMA DUE TO INFECTION: ICD-10-CM

## 2025-06-04 DIAGNOSIS — Z86.19 HISTORY OF BACTERIAL INFECTION: ICD-10-CM

## 2025-06-04 DIAGNOSIS — I89.0 LYMPHEDEMA DUE TO INFECTION: ICD-10-CM

## 2025-06-04 DIAGNOSIS — S80.811D ABRASION OF RIGHT LOWER EXTREMITY, SUBSEQUENT ENCOUNTER: Primary | ICD-10-CM

## 2025-06-04 DIAGNOSIS — L23.89 ALLERGIC CONTACT DERMATITIS DUE TO OTHER AGENTS: ICD-10-CM

## 2025-06-04 PROCEDURE — 99213 OFFICE O/P EST LOW 20 MIN: CPT | Performed by: STUDENT IN AN ORGANIZED HEALTH CARE EDUCATION/TRAINING PROGRAM

## 2025-06-04 PROCEDURE — 99214 OFFICE O/P EST MOD 30 MIN: CPT | Performed by: STUDENT IN AN ORGANIZED HEALTH CARE EDUCATION/TRAINING PROGRAM

## 2025-06-04 NOTE — PROGRESS NOTES
Attending and resident physician present VIRTUALLY. Patient photos reviewed. Patient in ED/hospital setting. Assessment can only be provided based on clinical images received and symptoms conveyed.      Given photos provided in symptoms, reported, suspect contact dermatitis. Please apply triamcinolone 0.1% ointment BID to affected area for two weeks. If there is a vesicle or pustule, please obtain swab to send for culture and gram stain and viral culture. Our schedulers will call to set up an outpatient appointment. Please return to nearest emergency room if rash significantly worsens or systemic symptoms develop

## 2025-06-04 NOTE — PATIENT INSTRUCTIONS
Orders Placed This Encounter   Procedures    Wound cleansing and dressings Proximal;Right;Lower Leg     Right LE Proximal:        Wash your hands with soap and water.  Remove old dressing, discard into plastic bag and place in trash.    Cleanse the wound with NSS prior to applying a clean dressing. Do not use tissue or cotton balls.   Do not scrub the wound. Pat dry using gauze.  Shower yes      Apply Triamcinolone cream to the right thigh and right lower leg 2 x daily for 1 week     Apply dry dressing to RLE open area Secure with Kerlix and tape  Change dressing daily        Avoid prolonged standing in one place.     Elevate leg(s) above the level of the heart when sitting or as much as possible.      Once you receive the foot piece for the Circaid you can begin using the Circaid at 20-30mmHG     Standing Status:   Future     Expiration Date:   6/11/2025

## 2025-06-04 NOTE — PROGRESS NOTES
Wound Procedure Treatment Proximal;Right;Lower Leg    Performed by: Janice Foster RN  Authorized by: Cornelia Goldsmith PA-C  Associated wounds:   Wound 05/29/25 Leg Proximal;Right;Lower    Wound cleansed with:  NSS   Applied to periwound:  Steroid cream / ointment   Applied secondary dressing:  Gauze   Dressing secured with:  Cain and Tape   Comments:  Betamethasone applied to the right thigh and right le

## 2025-06-04 NOTE — PROGRESS NOTES
Patient ID: Enrico Chavira is a 89 y.o. male Date of Birth 8/12/1930       No chief complaint on file.      Allergies:  Alphagan p [brimonidine tartrate], Zinc oxide, and Bacitracin zinc w/polymyxin [bacitracin-polymyxin b]    Diagnosis:   Diagnosis ICD-10-CM Associated Orders   1. Abrasion of right lower extremity, subsequent encounter  S80.811D Wound cleansing and dressings Proximal;Right;Lower Leg      2. Allergic contact dermatitis due to other agents  L23.89       3. History of bacterial infection  Z86.19       4. 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  Almost completely closed.  Scant serous drainage from the site.  Weeping of the leg and rash is much improved with use of topical steroid ointment compared to photos sent from patient via email.  No diffuse erythema to indicate cellulitis of the lower leg.  Suspect that patient has zinc allergy due to previous rash with use of Unna boot and now reaction to bacitracin which does have zinc component. Will avoid use of zinc products in the future.   Apply dry dressing over the remaining abrasion.  Continue with twice daily application of triamcinolone ointment to the lower leg. Can utilize Circaid for compression when foot piece or sock arrives.   F/u evaluation of the R lateral thigh. Wound has closed. No drainage present. There is residual post-inflammatory hyperpigmentation of the skin but no true erythema. Does continue to have swelling of the thigh. New rash present with very small clustered white bumps underneath the skin with associated pruritus, unsure what to make of this rash on visual examination.   Discussed case with on-call dermatology team and photos sent for evaluation given off appearance of rash. Contact dermatitis is suspected, recommending twice daily application of triamcinolone ointment. Will d/c use of Spandagrip to thigh which has been in contact  "with the skin. Called pt and left voicemail to relay dermatologists assessment and recommendations following appointment.   Can trial CircAid for compression when foot piece or compression sock arrives.   Instructed to monitor for any changes including redness or swelling surrounding the wound, increased drainage or pain as well as fevers or chills.    F/u in 1 week. Instructed to call if any questions or concerns arise in meantime.            Subjective:   05/07/25: 89 y/o M with PMHx of HTN, PE, asthma/COPD, hypothyroidism, skin cancer, osteoarthritis, venous insufficiency, chronic hyponatremia, lumbar spinal stenosis, presents for evaluation of his RLE. He was previously seen at the wound care center for a venous ulceration on his RLE and was healed in February. Per chart review, in April pt had been feeling weak, had a recent fall and noticed new redness and blistering of his RLE which prompted him to present to the ED. Per hosptial course summary. \"...In the ED he was noted to be in a rapid A-fib.  He met sepsis criteria with tachycardia and leukocytosis in setting of right lower extremity cellulitis per CT imaging.  Venous Doppler of the lower extremities was negative for DVT.  He also had a supratherapeutic INR on arrival and Coumadin was placed on hold.  Patient was admitted to [facility] service for further management.  He initially required a Cardizem drip, heart rate improved and patient converted to sinus rhythm.  Cardizem drip was discontinued.  He was initiated on metoprolol for rate control.  His prehospital losartan was placed on hold given his SONYA.  Nephrology followed patient throughout, felt SONYA was related to ARB/outpatient diuretic.  Patient was treated with IV fluids and SONYA resolved.  He is euvolemic on exam today.  Will continue metoprolol for rate control on discharge, can continue to hold prehospital losartan.  Patient was resumed on Coumadin yesterday which we will continue on discharge.  " "Infectious disease also followed patient.  He received Ancef for his cellulitis per their recommendations and is being transition to Duricef on discharge for a total of 10 days of antibiotic treatment.  He is medically stable and being discharged to [facility] today as arranged by case management.\" Pt was recently d/c from acute rehab. Plan of care while there included cleansing the R lateral thigh with VASHE solution and using adaptic and alginate to open wounds. Presently the weeping sites of the R lateral thigh is open to air. Continues to have significant swelling of the lower leg but denies pain.         05/14/25: Pt presents with his daughter for f/u wound care of his L thigh. Home care has been assisting with dressing changes. Adaptic and alginate is being used. Daughter reports the ACE wrap has been digging into pt's skin where velcro lays against the skin. Pt denies fevers, chills.       05/21/25: Pt presents with his daughter for f/u wound care of his L thigh. He has been taking Duricef as prescribed and denies any SE with use of the medication. Has noticed an improvement in the redness and warmth of his R thigh since starting the antibiotic. Still has a few days left in his course. Has been using a Spandagrip on his lower leg and thigh for compression which has been more comfortable for him in comparison to the ACE bandage. Local supplier has his thigh-high compression garments available for  and he plans to obtain them. No fevers, chills, malaise.       05/30/25: Pt presents for f/u wound care of the L thigh and LLE. Pt received his thigh-high CircAid since his previous visit and has brought it with him today. Has completed his course of Duricef. No significant pain or redness of his thigh. Denies fevers, chills.       06/05/25: Pt presents with his daughter for f/u wound care of the L thigh and LLE. Since his previous visit he broke out in a rash on his lower leg after application of bacitracin as " well as experienced significant itching of the skin on his R thigh as well therefore Spandagrip was discontinued. Pt has not yet received foot piece or compression stock for his CircAid.           The following portions of the patient's history were reviewed and updated as appropriate:   Problem List[1]  Past Medical History[2]  Past Surgical History[3]  Family History[4]  Social History[5]  Current Medications[6]    Review of Systems      Objective:  /95   Pulse 92   Temp (!) 96.1 °F (35.6 °C)   Resp 18         Physical Exam  Vitals reviewed.   Constitutional:       Appearance: He is normal weight.     Cardiovascular:      Rate and Rhythm: Normal rate.      Pulses:           Dorsalis pedis pulses are 2+ on the right side.        Posterior tibial pulses are 2+ on the right side.      Comments: Entire R leg with significant swelling consistent with lymphedema secondary to infection  Pulmonary:      Effort: Pulmonary effort is normal.      Breath sounds: No rhonchi or rales.     Musculoskeletal:      Right knee: Swelling present.      Right lower leg: 3+ Edema present.     Skin:     Findings: Rash (clusters of very small white bumps on the R posterior and lateral  thigh) and wound present. No erythema.           Comments: See full wound assessment.      Neurological:      Mental Status: He is alert.      Motor: Weakness present.      Gait: Gait abnormal.           Wound 05/29/25 Leg Proximal;Right;Lower (Active)   Wound Image     06/04/25 1557   Wound Description Epithelialization;Pink 06/04/25 1509   Non-staged Wound Description Full thickness 05/29/25 1331   Wound Length (cm) 0.1 cm 06/04/25 1509   Wound Width (cm) 0.1 cm 06/04/25 1509   Wound Depth (cm) 0.1 cm 06/04/25 1509   Wound Surface Area (cm^2) 0.01 cm^2 06/04/25 1509   Wound Volume (cm^3) 0.001 cm^3 06/04/25 1509   Calculated Wound Volume (cm^3) 0 cm^3 06/04/25 1509   Change in Wound Size % 100 06/04/25 1509   Drainage Amount Scant 06/04/25 1509  "  Drainage Description Serosanguineous 06/04/25 1509   Marisol-wound Assessment Edema;Purple;Dry;Scaly;Hyperpigmented 06/04/25 1509   Treatments Irrigation with NSS 05/29/25 1331   Dressing Status Intact;Old drainage 06/04/25 1509             Photos of rash on R lateral and posterior thigh:                               Wound Instructions:  Orders Placed This Encounter   Procedures    Wound cleansing and dressings Proximal;Right;Lower Leg     Right LE Proximal:        Wash your hands with soap and water.  Remove old dressing, discard into plastic bag and place in trash.    Cleanse the wound with NSS prior to applying a clean dressing. Do not use tissue or cotton balls.   Do not scrub the wound. Pat dry using gauze.  Shower yes      Apply Triamcinolone cream to the right thigh and right lower leg 2 x daily for 1 week     Apply dry dressing to RLE open area Secure with Kerlix and tape  Change dressing daily        Avoid prolonged standing in one place.     Elevate leg(s) above the level of the heart when sitting or as much as possible.      Once you receive the foot piece for the Circaid you can begin using the Circaid at 20-30mmHG     Standing Status:   Future     Expiration Date:   6/11/2025    Wound Procedure Treatment Proximal;Right;Lower Leg     This order was created via procedure documentation       Cally Goldsmith, PA-C        Portions of the record may have been created with voice recognition software. Occasional wrong word or \"sound alike\" substitutions may have occurred due to the inherent limitations of voice recognition software. Read the chart carefully and recognize, using context, where substitutions have occurred.         [1]   Patient Active Problem List  Diagnosis    Prothrombin gene mutation (HCC)    Primary generalized (osteo)arthritis    Primary hypertension    Dyslipidemia    Intrinsic eczema    Other seborrheic dermatitis    Abnormality of gait    Osteoarthritis of both knees    Chronic pulmonary " embolism without acute cor pulmonale (HCC)    Age-related cataract of both eyes    Premature beats    Primary osteoarthritis of both knees    Neoplasm of uncertain behavior of skin    Encounter for long-term (current) use of medications    GERD without esophagitis    CKD (chronic kidney disease) stage 2, GFR 60-89 ml/min    Influenza vaccine refused    Hypercalcemia    COVID-19 virus infection    Asthma-COPD overlap syndrome (HCC)    Endothelial corneal dystrophy of both eyes    Factor V Leiden (HCC)    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)    History of pulmonary embolism    Mature cataract    Primary open-angle glaucoma, bilateral, mild stage    Pseudophakic bullous keratopathy of left eye    TB lung, latent    Hyponatremia    Shortness of breath    Encounter for wound care    Other fatigue    Acquired hypothyroidism    Venous stasis ulcer of right calf limited to breakdown of skin without varicose veins (HCC)    Hashimoto's thyroiditis    Venous stasis ulcer of other part of right lower leg with fat layer exposed with varicose veins (HCC)    Edema of both lower extremities due to peripheral venous insufficiency    Paroxysmal atrial fibrillation (HCC)    Supratherapeutic INR    Cellulitis of right lower extremity    SONYA (acute kidney injury) ([facility])    Swelling of right knee    Fall    Abnormal CT of the chest    Impaired mobility and ADLs    Chronic left-sided low back pain    Anemia    History of sepsis    Potential for cognitive impairment    Degenerative lumbar spinal stenosis    Bilateral arm weakness    Chest wall asymmetry    Orthostatic hypotension    Fungal dermatitis    Cognitive impairment    Ambulatory dysfunction   [2]   Past Medical History:  Diagnosis Date    Arthritis     Cellulitis of right lower extremity 04/19/2025    Coagulation defect (HCC)     last assessed: 11/28/2018    COPD (chronic obstructive pulmonary disease) ([facility])     last assessed: 5/14/2019, 12/6/2017    Generalized  osteoarthritis     last assessed: 2019    GERD without esophagitis     last assessed: 2019    Glaucoma     last assessed: 2011    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HCC)     last assessed: 10/16/2018    Factor II Prothrombin Gene per [employer]    History of kidney stones 1985    Hx of blood clots     Hypertension     last assessed: 2019    Impaired fasting glucose     last assessed: 2013    Mild persistent asthma, uncomplicated     last assessed: 2018    Nephrolithiasis     Nondisplaced intertrochanteric fracture of right femur, initial encounter for closed fracture (HCC)     last assessed: 2019    Premature ventricular contractions     last assessed: 2011    Pulmonary nodule     Scoliosis (and kyphoscoliosis), idiopathic     Wears dentures    [3]   Past Surgical History:  Procedure Laterality Date    CARPAL TUNNEL RELEASE Left     ENDO     CATARACT EXTRACTION Left     COLONOSCOPY  10/07/2008    last assessed: 3/29/2016    EYE SURGERY Right 2022    HERNIA REPAIR      HIP SURGERY      HIP SURGERY Left 1999    ORIF    MULTIPLE TOOTH EXTRACTIONS Bilateral     OTHER SURGICAL HISTORY Right 10/30/2001    [facility]    TONSILLECTOMY Bilateral     WISDOM TOOTH EXTRACTION Bilateral    [4]   Family History  Problem Relation Name Age of Onset    Hypertension Mother      Stroke Mother      Heart attack Father     [5]   Social History  Socioeconomic History    Marital status:    Tobacco Use    Smoking status: Former     Types: Pipe     Start date:      Quit date: 1970     Years since quittin.4     Passive exposure: Past    Smokeless tobacco: Never   Vaping Use    Vaping status: Never Used   Substance and Sexual Activity    Alcohol use: Yes     Comment: social    Drug use: Never    Sexual activity: Not Currently     Social Drivers of Health     Financial Resource Strain: Low Risk  (10/23/2023)    Overall Financial Resource Strain (CARDIA)      Difficulty of Paying Living Expenses: Not very hard   Food Insecurity: No Food Insecurity (5/3/2025)    Nursing - Inadequate Food Risk Classification     Worried About Running Out of Food in the Last Year: Never true     Ran Out of Food in the Last Year: Never true     Ran Out of Food in the Last Year: Never true   Transportation Needs: No Transportation Needs (2025)    OASIS : Transportation     Lack of Transportation (Medical): No     Lack of Transportation (Non-Medical): No     Patient Unable or Declines to Respond: No   Intimate Partner Violence: Unknown (5/3/2025)    Nursing IPS     Physically Hurt by Someone: No     Hurt or Threatened by Someone: No   Housing Stability: Unknown (5/3/2025)    Nursing: Inadequate Housing Risk Classification     Unable to Pay for Housing in the Last Year: No     Has Housin   [6]   Current Outpatient Medications:     acetaminophen (TYLENOL) 325 mg tablet, Take 2 tablets (650 mg total) by mouth every 6 (six) hours as needed for mild pain, headaches or fever, Disp: , Rfl:     albuterol (PROVENTIL HFA,VENTOLIN HFA) 90 mcg/act inhaler, TAKE 2 PUFFS BY MOUTH EVERY 4 HOURS AS NEEDED FOR WHEEZE, Disp: 18 g, Rfl: 1    budesonide-formoterol (SYMBICORT) 160-4.5 mcg/act inhaler, Inhale 2 puffs 2 (two) times a day Rinse mouth after use., Disp: 30.6 g, Rfl: 3    Cholecalciferol (Vitamin D3) 125 MCG (5000 UT) CAPS, Take 1 capsule by mouth daily. Indications: Vitamin D Deficiency, Disp: , Rfl:     desonide (DESOWEN) 0.05 % cream, Apply to face twice a day, Disp: 60 g, Rfl: 0    Difluprednate 0.05 % EMUL, , Disp: , Rfl:     dorzolamide (TRUSOPT) 2 % ophthalmic solution, INSTILL 1 DROP INTO LEFT EYE TWICE A DAY, Disp: , Rfl:     furosemide (LASIX) 20 mg tablet, Take 1 tablet (20 mg total) by mouth in the morning, Disp: 90 tablet, Rfl: 1    latanoprost (XALATAN) 0.005 % ophthalmic solution, Administer 1 drop to both eyes daily at bedtime, Disp: , Rfl:     lidocaine (LIDODERM) 5 %,  Apply 1 patch topically over 12 hours daily Remove & Discard patch within 12 hours or as directed by MD  Apply to low back, Disp: , Rfl:     metoprolol tartrate (LOPRESSOR) 25 mg tablet, Take 1 tablet (25 mg total) by mouth every 12 (twelve) hours, Disp: 180 tablet, Rfl: 1    montelukast (SINGULAIR) 10 mg tablet, TAKE 1 TABLET BY MOUTH EVERY DAY, Disp: 90 tablet, Rfl: 1    multivitamin (THERAGRAN) TABS, Take 1 tablet by mouth daily, Disp: , Rfl:     nystatin (MYCOSTATIN) powder, Apply topically 2 (two) times a day (Patient taking differently: Apply topically 2 (two) times a day Apply topically to the groin area 2 times a day), Disp: 15 g, Rfl: 0    omeprazole (PriLOSEC OTC) 20 MG tablet, Take 20 mg by mouth daily, Disp: , Rfl:     Oral Electrolytes (ELECTROLYTE SR PO), Take 2 tablets by mouth daily., Disp: , Rfl:     Polyethyl Glycol-Propyl Glycol (SYSTANE OP), Administer 1 drop to both eyes 4 (four) times a day, Disp: , Rfl:     polyethylene glycol (GLYCOLAX) 17 GM/SCOOP powder, Take 17 g by mouth daily as needed (Constipation) Stir into 8 oz of liquid of choice., Disp: , Rfl:     prednisoLONE acetate (PRED FORTE) 1 % ophthalmic suspension, Administer 1 drop into the left eye 2 (two) times a day, Disp: , Rfl:     sodium chloride (ARIEL 128) 2 % hypertonic ophthalmic solution, Administer 1 drop into the left eye 4 (four) times a day, Disp: , Rfl:     triamcinolone (KENALOG) 0.1 % ointment, Apply topically 2 (two) times a day for 14 days To itching skin of RLE, Disp: 80 g, Rfl: 1    warfarin (COUMADIN) 4 mg tablet, TAKE 2 TABLET BY MOUTH DAILY OR AS DIRECTED, Disp: 180 tablet, Rfl: 2    warfarin (COUMADIN) 6 mg tablet, Take 1 tablet (6 mg total) by mouth daily, Disp: 30 tablet, Rfl: 0

## 2025-06-06 ENCOUNTER — ANTICOAG VISIT (OUTPATIENT)
Dept: FAMILY MEDICINE CLINIC | Facility: CLINIC | Age: OVER 89
End: 2025-06-06

## 2025-06-06 ENCOUNTER — TELEMEDICINE (OUTPATIENT)
Dept: GERIATRICS | Facility: OTHER | Age: OVER 89
End: 2025-06-06
Payer: MEDICARE

## 2025-06-06 ENCOUNTER — HOME CARE VISIT (OUTPATIENT)
Dept: HOME HEALTH SERVICES | Facility: HOME HEALTHCARE | Age: OVER 89
End: 2025-06-06
Payer: MEDICARE

## 2025-06-06 VITALS
DIASTOLIC BLOOD PRESSURE: 70 MMHG | SYSTOLIC BLOOD PRESSURE: 132 MMHG | OXYGEN SATURATION: 97 % | HEART RATE: 84 BPM | RESPIRATION RATE: 20 BRPM | TEMPERATURE: 97 F

## 2025-06-06 DIAGNOSIS — R26.2 AMBULATORY DYSFUNCTION: ICD-10-CM

## 2025-06-06 DIAGNOSIS — I48.0 PAROXYSMAL ATRIAL FIBRILLATION (HCC): ICD-10-CM

## 2025-06-06 DIAGNOSIS — J44.89 ASTHMA-COPD OVERLAP SYNDROME (HCC): ICD-10-CM

## 2025-06-06 DIAGNOSIS — G89.29 CHRONIC LEFT-SIDED LOW BACK PAIN WITHOUT SCIATICA: ICD-10-CM

## 2025-06-06 DIAGNOSIS — I10 PRIMARY HYPERTENSION: ICD-10-CM

## 2025-06-06 DIAGNOSIS — R41.89 COGNITIVE IMPAIRMENT: ICD-10-CM

## 2025-06-06 DIAGNOSIS — L03.115 CELLULITIS OF RIGHT LOWER EXTREMITY: Primary | ICD-10-CM

## 2025-06-06 DIAGNOSIS — M54.50 CHRONIC LEFT-SIDED LOW BACK PAIN WITHOUT SCIATICA: ICD-10-CM

## 2025-06-06 LAB — INR PPP: 2.4 (ref 0.85–1.19)

## 2025-06-06 PROCEDURE — G0299 HHS/HOSPICE OF RN EA 15 MIN: HCPCS

## 2025-06-06 PROCEDURE — 99215 OFFICE O/P EST HI 40 MIN: CPT | Performed by: NURSE PRACTITIONER

## 2025-06-06 NOTE — ASSESSMENT & PLAN NOTE
Continue Metoprolol for rate control  Continue to monitor HR, 84  Continue Warfarin for anticoag  Monitor for s/s of bleeding and acute changes in condition  Follow up with PCP/Cardiology

## 2025-06-06 NOTE — ASSESSMENT & PLAN NOTE
Patient with history of COPD with no recent exacerbation  Continue inhaler therapy  Sa02 today 97% RA  B/l lower lung fields decreased  Continue to monitor respiratory status for acute changes in status  Follow up with PCP/Pulmonary

## 2025-06-06 NOTE — PROGRESS NOTES
Erroneous encounter, delete visit nurse forgot    Virtual Regular Visit  Name: Enrico Chavira      : 1930      MRN: [id number]  Encounter Provider: RODY Matthew  Encounter Date: 2025   Encounter department: [facility] VIRTUAL  :  Assessment & Plan  Cellulitis of right lower extremity  Patient s/p antibiotic therapy  Continue wound care as ordered, No openings noted remains RAMONA  Continue to monitor skin for s/s of infection  Encourage frequent changes in position and offloading  VNA will continue wound care services in home as ordered  Follow up with wound care center     Primary hypertension  BP remains stable today, 132/70  Continue Lasix 20 mg po QD and Metoprolol 25 mg po BID  Avoid hypotension  Continue to monitor BP and for acute changes in condition  Follow up with PCP/Cardiology       Paroxysmal atrial fibrillation (HCC)  Continue Metoprolol for rate control  Continue to monitor HR, 84  Continue Warfarin for anticoag  Monitor for s/s of bleeding and acute changes in condition  Follow up with PCP/Cardiology     Asthma-COPD overlap syndrome (HCC)  Patient with history of COPD with no recent exacerbation  Continue inhaler therapy  Sa02 today 97% RA  B/l lower lung fields decreased  Continue to monitor respiratory status for acute changes in status  Follow up with PCP/Pulmonary      Chronic left-sided low back pain without sciatica  Patient with chronic back pain  CT of lower spine revealed Diffuse severe thoracolumbar degenerative change with apex left lumbar scoliosis. A posteriorly directed vertebral body osteophyte on the right at L1-L2 results in moderate central canal narrowing   Continue multimodal pain management  Maintain fall and safety precautions  Follow up with PCP    Cognitive impairment  Most recent MoCa   Continue supportive measures  Continue to reorient, redirect, and reassure patient prn  Encourage engagement and activity  Encourage daily  involvement in ADLs  Maintain delirium precautions and sleep/wake cycle  Patient remains at risk for delirium r/t age, medications, sleep disturbance, and pain  Avoid deliriogenic medications such as tramadol, benzodiazepines, anticholinergics, and Benadryl  Limit interruptions at night as medically appropriate  Provide quiet environment, dim lighting, and avoidance tv at night  Patient continues to require assistance in which supportive services can be provided by VNA services  Encourage adequate nutrition and hydration  Continue to monitor for acute changes in condition  Follow up with PCP      Ambulatory dysfunction  History of falls  Maintain fall and safety precautions  Encourage use of asst devices  Continue PT/OT services with VNA  Assess for need with assistance with transfers, mobility, and ADLs in/out of home  Patient is at high risk for falls r/t cognitive impairment, chronic pain, b/l arm weakness, history of falls, lumbar spinal stenosis, polypharmacy, environmental barriers, and age  Continue to monitor for acute changes in condition   Follow up with PCP        History of Present Illness     Enrico Chavira is a 89 y.o. male patient, being seen for Heal at home program in conjuction with VNA nurse Karen Davis, who was in the home to perform physical assessment.       The patient is being seen and examined today for follow-up on acute and chronic medical conditions s/p most recent hospitalization.      The patient reports he is feeling well today. He reports he had [facility] f/u and they ordered lab work for concern with infection WBC came back normal and therefore no abx were started.   Wound today with moderate amount of yellow drainage with swelling noted. He reports his lower back pain his about the same. I did review most recent labs with patient. Patient denies any increase in dyspnea, CP, dizziness, HA, fever, chills, nausea, vomiting, diarrhea, or constipation. Patient has f/u with [facility]  6/12/25 and PCP 7/7.        Review of Systems   Constitutional:  Positive for activity change and fatigue. Negative for chills and fever.   HENT:  Positive for hearing loss. Negative for ear pain and sore throat.    Eyes:  Negative for pain and visual disturbance.   Respiratory:  Positive for shortness of breath. Negative for cough.    Cardiovascular:  Positive for leg swelling. Negative for chest pain and palpitations.   Gastrointestinal:  Negative for abdominal pain and vomiting.   Genitourinary:  Negative for dysuria and hematuria.   Musculoskeletal:  Positive for back pain and gait problem. Negative for arthralgias.   Skin:  Positive for wound. Negative for color change and rash.   Neurological:  Positive for weakness. Negative for seizures and syncope.   All other systems reviewed and are negative.    Pertinent Medical History   Patient originally presented to [facility] ED on 4/14/25 for generalized weakness. He met sepsis criteria with tachycardia and leukocytosis in setting of right lower extremitity cellulitis as noted on CT imaging. Patient was started on IV cefazolin. His home warfarin was placed on hold due to supratherapeutic INR. Patient was also noted to be in atrial fibrillation with RVR and converted to normal sinus rhythm after cardizem infusion. He was started on metoprolol for rate control. Nephrology was consulted for SONYA and hyponatremia. Patient was transitioned to oral Duricef on discharge per infectious disease recommenations to complete total of 10 days of antibitioic treatment. Patient was elevated by PT OT in consultation who recommended post-acute rehab services.     Objective   Vitals    Vitals 6/6/2025 12:25 PM   /70   BP Location Left arm   Patient Position Sitting   Resp 20   SpO2 97 %   Pulse 84   Pulse Source Apical   Cardiac Regularity Irregular   Temp (!) 97 °F (36.1 °C)   Temp src Temporal       5/30/2025  8:42 AM    Component Value Flag Ref Range Units  Status   INR 2.40  Abnormal  0.85 - 1.19  Final       Physical Exam  Vitals and nursing note reviewed.   Constitutional:       General: He is not in acute distress.     Appearance: He is well-developed.   HENT:      Head: Normocephalic and atraumatic.      Ears:      Comments: [place]    Eyes:      Conjunctiva/sclera: Conjunctivae normal.      Comments: Wears glasses     Cardiovascular:      Rate and Rhythm: Normal rate and regular rhythm.      Heart sounds: No murmur heard.  Pulmonary:      Effort: Pulmonary effort is normal. No respiratory distress.      Breath sounds: Normal breath sounds.   Abdominal:      Palpations: Abdomen is soft.      Tenderness: There is no abdominal tenderness.     Musculoskeletal:         General: No swelling.      Cervical back: Neck supple.      Right lower leg: Edema present.      Left lower leg: Edema present.     Skin:     General: Skin is warm and dry.      Capillary Refill: Capillary refill takes less than 2 seconds.     Neurological:      Mental Status: He is alert.      Motor: Weakness present.      Coordination: Coordination abnormal.      Gait: Gait abnormal.     Psychiatric:         Mood and Affect: Mood normal.         Administrative Statements   Encounter provider RODY Matthew    The Patient is located at Home and in the following state in which I hold an active license [place].    The patient was identified by name and date of birth. Enrico ANUJ Chavira was informed that this is a telemedicine visit and that the visit is being conducted through the [employer] Teams platform. He agrees to proceed..  My office door was closed. No one else was in the room.  He acknowledged consent and understanding of privacy and security of the video platform. The patient has agreed to participate and understands they can discontinue the visit at any time.    I have spent a total time of 43 minutes in caring for this patient on the day of the visit/encounter including Diagnostic  results, Prognosis, Risks and benefits of tx options, Instructions for management, Patient and family education, Importance of tx compliance, Risk factor reductions, Impressions, Counseling / Coordination of care, Documenting in the medical record, Reviewing/placing orders in the medical record (including tests, medications, and/or procedures), Obtaining or reviewing history  , and Communicating with other healthcare professionals , not including the time spent for establishing the audio/video connection.

## 2025-06-06 NOTE — ASSESSMENT & PLAN NOTE
BP remains stable today, 132/70  Continue Lasix 20 mg po QD and Metoprolol 25 mg po BID  Avoid hypotension  Continue to monitor BP and for acute changes in condition  Follow up with PCP/Cardiology

## 2025-06-11 ENCOUNTER — HOME CARE VISIT (OUTPATIENT)
Dept: HOME HEALTH SERVICES | Facility: HOME HEALTHCARE | Age: OVER 89
End: 2025-06-11
Payer: MEDICARE

## 2025-06-11 ENCOUNTER — TELEMEDICINE (OUTPATIENT)
Dept: GERIATRICS | Facility: OTHER | Age: OVER 89
End: 2025-06-11
Payer: MEDICARE

## 2025-06-11 VITALS
HEART RATE: 64 BPM | SYSTOLIC BLOOD PRESSURE: 114 MMHG | TEMPERATURE: 97.5 F | OXYGEN SATURATION: 98 % | DIASTOLIC BLOOD PRESSURE: 60 MMHG | RESPIRATION RATE: 16 BRPM

## 2025-06-11 DIAGNOSIS — I10 PRIMARY HYPERTENSION: ICD-10-CM

## 2025-06-11 DIAGNOSIS — R26.2 AMBULATORY DYSFUNCTION: ICD-10-CM

## 2025-06-11 DIAGNOSIS — R41.89 COGNITIVE IMPAIRMENT: ICD-10-CM

## 2025-06-11 DIAGNOSIS — L03.115 CELLULITIS OF RIGHT LOWER EXTREMITY: Primary | ICD-10-CM

## 2025-06-11 DIAGNOSIS — G89.29 CHRONIC LEFT-SIDED LOW BACK PAIN WITHOUT SCIATICA: ICD-10-CM

## 2025-06-11 DIAGNOSIS — J44.89 ASTHMA-COPD OVERLAP SYNDROME (HCC): ICD-10-CM

## 2025-06-11 DIAGNOSIS — I48.0 PAROXYSMAL ATRIAL FIBRILLATION (HCC): ICD-10-CM

## 2025-06-11 DIAGNOSIS — M54.50 CHRONIC LEFT-SIDED LOW BACK PAIN WITHOUT SCIATICA: ICD-10-CM

## 2025-06-11 PROCEDURE — 99215 OFFICE O/P EST HI 40 MIN: CPT | Performed by: NURSE PRACTITIONER

## 2025-06-11 PROCEDURE — G0299 HHS/HOSPICE OF RN EA 15 MIN: HCPCS

## 2025-06-11 NOTE — ASSESSMENT & PLAN NOTE
Continue Metoprolol for rate control  Continue to monitor HR, 64  Continue Warfarin for anticoag  Monitor for s/s of bleeding and acute changes in condition  Follow up with PCP/Cardiology

## 2025-06-11 NOTE — ASSESSMENT & PLAN NOTE
BP remains stable today, 114/60  Continue Lasix 20 mg po QD and Metoprolol 25 mg po BID  Avoid hypotension  Continue to monitor BP and for acute changes in condition  Follow up with PCP/Cardiology

## 2025-06-11 NOTE — ASSESSMENT & PLAN NOTE
Patient with history of COPD with no recent exacerbation  Continue inhaler therapy  Sa02 today 98% RA  B/l lower lung fields decreased  Continue to monitor respiratory status for acute changes in status  Follow up with PCP/Pulmonary

## 2025-06-11 NOTE — PROGRESS NOTES
Virtual Regular Visit  Name: Enrico Chavira      : 1930      MRN: [id number]  Encounter Provider: RODY Matthew  Encounter Date: 2025   Encounter department: [facility] VIRTUAL  :  Assessment & Plan  Cellulitis of right lower extremity  Patient s/p antibiotic therapy  Continue wound care as ordered, No openings noted remains RAMONA  Continue to monitor skin for s/s of infection  Encourage frequent changes in position and offloading  VNA will continue wound care services in home as ordered  Follow up with wound care center     Primary hypertension  BP remains stable today, 114/60  Continue Lasix 20 mg po QD and Metoprolol 25 mg po BID  Avoid hypotension  Continue to monitor BP and for acute changes in condition  Follow up with PCP/Cardiology       Paroxysmal atrial fibrillation (HCC)  Continue Metoprolol for rate control  Continue to monitor HR, 64  Continue Warfarin for anticoag  Monitor for s/s of bleeding and acute changes in condition  Follow up with PCP/Cardiology       Asthma-COPD overlap syndrome (HCC)  Patient with history of COPD with no recent exacerbation  Continue inhaler therapy  Sa02 today 98% RA  B/l lower lung fields decreased  Continue to monitor respiratory status for acute changes in status  Follow up with PCP/Pulmonary      Chronic left-sided low back pain without sciatica  Patient with chronic back pain, reports worse with standing for long periods of time  CT of lower spine revealed Diffuse severe thoracolumbar degenerative change with apex left lumbar scoliosis. A posteriorly directed vertebral body osteophyte on the right at L1-L2 results in moderate central canal narrowing   Continue multimodal pain management  Maintain fall and safety precautions  Follow up with PCP      Cognitive impairment  Most recent MoCa   Continue supportive measures  Continue to reorient, redirect, and reassure patient prn  Encourage engagement and  activity  Encourage daily involvement in ADLs  Maintain delirium precautions and sleep/wake cycle  Patient remains at risk for delirium r/t age, medications, sleep disturbance, and pain  Avoid deliriogenic medications such as tramadol, benzodiazepines, anticholinergics, and Benadryl  Limit interruptions at night as medically appropriate  Provide quiet environment, dim lighting, and avoidance tv at night  Patient continues to require assistance in which supportive services can be provided by VNA services  Encourage adequate nutrition and hydration  Continue to monitor for acute changes in condition  Follow up with PCP      Ambulatory dysfunction  History of falls  Maintain fall and safety precautions  Encourage use of asst devices  Continue PT/OT services with VNA  Assess for need with assistance with transfers, mobility, and ADLs in/out of home  Patient is at high risk for falls r/t cognitive impairment, chronic pain, b/l arm weakness, history of falls, lumbar spinal stenosis, polypharmacy, environmental barriers, and age  Continue to monitor for acute changes in condition   Follow up with PCP        History of Present Illness     Enrico Chavira is a 89 y.o. male patient, being seen for Heal at home program in conjuction with VNA nurse Ashli Mays, who was in the home to perform physical assessment.       The patient is being seen and examined today for follow-up on acute and chronic medical conditions s/p most recent hospitalization.      The patient reports he is feeling well today. Patient reports he has lower back ache when he stands to long. RLE wound today is closed but remains with swelling and some erythema. Patient denies any increase in dyspnea, CP, dizziness, HA, fever, chills, nausea, vomiting, diarrhea, or constipation. Patient has f/u with [facility] 6/12/25 and PCP 7/7.        Review of Systems   Constitutional:  Positive for activity change and fatigue. Negative for chills and fever.   HENT:   Positive for hearing loss. Negative for ear pain and sore throat.    Eyes:  Negative for pain and visual disturbance.   Respiratory:  Positive for shortness of breath. Negative for cough.    Cardiovascular:  Positive for leg swelling. Negative for chest pain and palpitations.   Gastrointestinal:  Negative for abdominal pain and vomiting.   Genitourinary:  Positive for frequency. Negative for dysuria and hematuria.   Musculoskeletal:  Positive for back pain and gait problem. Negative for arthralgias.   Skin:  Positive for wound. Negative for color change and rash.   Neurological:  Positive for weakness. Negative for seizures and syncope.   All other systems reviewed and are negative.      Objective   Vitals    Vitals 6/11/2025 11:19 AM   /60   BP Location Left arm   Patient Position Sitting   Cuff Size Adult   Resp 16   SpO2 98 %   SpO2 Activity At Rest   Pulse 64   Pulse Source Apical   Cardiac Regularity Irregular   Temp 97.5 °F (36.4 °C)   Temp src Temporal         Physical Exam  Vitals and nursing note reviewed.   Constitutional:       General: He is not in acute distress.     Appearance: He is well-developed.   HENT:      Head: Normocephalic and atraumatic.      Ears:      Comments: [place]    Eyes:      Conjunctiva/sclera: Conjunctivae normal.      Comments: Wears glasses     Cardiovascular:      Rate and Rhythm: Normal rate and regular rhythm.      Heart sounds: No murmur heard.  Pulmonary:      Effort: Pulmonary effort is normal. No respiratory distress.      Breath sounds: Normal breath sounds.   Abdominal:      Palpations: Abdomen is soft.      Tenderness: There is no abdominal tenderness.     Musculoskeletal:         General: No swelling.      Cervical back: Neck supple.      Right lower leg: Edema present.      Left lower leg: Edema present.     Skin:     General: Skin is warm and dry.      Capillary Refill: Capillary refill takes less than 2 seconds.     Neurological:      Mental Status: He is alert.      Psychiatric:         Mood and Affect: Mood normal.         Administrative Statements   Encounter provider RODY Matthew    The Patient is located at Home and in the following state in which I hold an active license [place].    The patient was identified by name and date of birth. Enrico ANUJ Chavira was informed that this is a telemedicine visit and that the visit is being conducted through the [employer] Teams platform. He agrees to proceed..  My office door was closed. No one else was in the room.  He acknowledged consent and understanding of privacy and security of the video platform. The patient has agreed to participate and understands they can discontinue the visit at any time.    I have spent a total time of 41 minutes in caring for this patient on the day of the visit/encounter including Prognosis, Risks and benefits of tx options, Instructions for management, Patient and family education, Importance of tx compliance, Risk factor reductions, Counseling / Coordination of care, Documenting in the medical record, Reviewing/placing orders in the medical record (including tests, medications, and/or procedures), Obtaining or reviewing history  , and Communicating with other healthcare professionals , not including the time spent for establishing the audio/video connection.

## 2025-06-11 NOTE — ASSESSMENT & PLAN NOTE
Patient with chronic back pain, reports worse with standing for long periods of time  CT of lower spine revealed Diffuse severe thoracolumbar degenerative change with apex left lumbar scoliosis. A posteriorly directed vertebral body osteophyte on the right at L1-L2 results in moderate central canal narrowing   Continue multimodal pain management  Maintain fall and safety precautions  Follow up with PCP

## 2025-06-12 ENCOUNTER — HOME CARE VISIT (OUTPATIENT)
Dept: HOME HEALTH SERVICES | Facility: HOME HEALTHCARE | Age: OVER 89
End: 2025-06-12
Payer: MEDICARE

## 2025-06-12 ENCOUNTER — OFFICE VISIT (OUTPATIENT)
Facility: HOSPITAL | Age: OVER 89
End: 2025-06-12
Payer: MEDICARE

## 2025-06-12 VITALS
TEMPERATURE: 96.6 F | RESPIRATION RATE: 20 BRPM | DIASTOLIC BLOOD PRESSURE: 74 MMHG | HEART RATE: 61 BPM | SYSTOLIC BLOOD PRESSURE: 167 MMHG

## 2025-06-12 DIAGNOSIS — L23.89 ALLERGIC CONTACT DERMATITIS DUE TO OTHER AGENTS: ICD-10-CM

## 2025-06-12 DIAGNOSIS — S80.811D ABRASION OF RIGHT LOWER EXTREMITY, SUBSEQUENT ENCOUNTER: Primary | ICD-10-CM

## 2025-06-12 DIAGNOSIS — B99.9 LYMPHEDEMA DUE TO INFECTION: ICD-10-CM

## 2025-06-12 DIAGNOSIS — I89.0 LYMPHEDEMA DUE TO INFECTION: ICD-10-CM

## 2025-06-12 DIAGNOSIS — Z86.19 HISTORY OF BACTERIAL INFECTION: ICD-10-CM

## 2025-06-12 LAB — INR PPP: 3.3 (ref 0.85–1.19)

## 2025-06-12 PROCEDURE — 99212 OFFICE O/P EST SF 10 MIN: CPT | Performed by: STUDENT IN AN ORGANIZED HEALTH CARE EDUCATION/TRAINING PROGRAM

## 2025-06-12 PROCEDURE — 99213 OFFICE O/P EST LOW 20 MIN: CPT | Performed by: STUDENT IN AN ORGANIZED HEALTH CARE EDUCATION/TRAINING PROGRAM

## 2025-06-12 NOTE — PATIENT INSTRUCTIONS
Orders Placed This Encounter   Procedures    Wound cleansing and dressings Proximal;Right;Lower Leg     Circ-Aid / Elastic Tubular Stocking/ Compression Stocking Instructions    May continue to use triamcinolone for 1 week if needed for itching .     Monitor legs daily for any open wounds     Circ-aid    Apply Circaid devices as instructed first thing qAM & remove qHS.  Do not sleep with the circaid on     Avoid prolonged standing in one place.    Elevate leg(s) above the level of the heart when sitting or as much as possible.     Cornelia Goldsmith Pa-C will call you with what the Infectious Disease doctor says.     Follow up at wound care center  if needed for any open draining wounds .      Circaid :  1st. Put on compression sock (black sock)   2nd put on leg sleeve on thigh to just below knee (ivory color sleeve )  3rd Apply lower leg circaid - 20-30 compression- line up the arrows to the circaid tool, do one strap at time starting at the ankle and moving upward Line it up at the ankle area where the compression stops  4th apply upper leg circaid  - 20-30 compression -attached all velvro straps  then check with tool each strap to make sure compression is 20-30 start at knee and go upward  5th apply knee piece     Standing Status:   Future     Expiration Date:   6/19/2025

## 2025-06-12 NOTE — PROGRESS NOTES
Patient ID: Enrico Chavira is a 89 y.o. male Date of Birth 8/12/1930       Chief Complaint   Patient presents with    Follow Up Wound Care Visit     Right lower leg wound        Allergies:  Alphagan p [brimonidine tartrate], Zinc oxide, and Bacitracin zinc w/polymyxin [bacitracin-polymyxin b]    Diagnosis:   Diagnosis ICD-10-CM Associated Orders   1. Abrasion of right lower extremity, subsequent encounter  S80.811D Wound cleansing and dressings Proximal;Right;Lower Leg      2. Allergic contact dermatitis due to other agents  L23.89       3. History of bacterial infection  Z86.19       4. 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 No drainage present. No areas of weeping or drainage from the lower leg on exam. Rash is resolving. Significant edema of the lower leg is present. Leg appears more erythematous in photograph taken today rather than on in-person examination. Low suspicion for persistent cellulitis at this time.   No further dressing required over the abrasion.   Continue application of triamcinolone to skin given recent allergic reaction to bacitracin for one additional week.   Thigh-high CircAid applied today for edema control. Use daily. Remove at night.   Given history of cellulitis, monitor leg very closely for increased redness, red streaking,,changes in skin temperature, pain, fevers, chills. Notify office or report to ED should this occur.   The right lateral thigh remains closed and no longer draining. There continues to be swelling and induration underneath the area of previously weeping skin. Skin is hyperpigmented with multiple small white bumps underneath the skin. No true erythema nor obvious increase in temperature of the skin.   Continue with application of triamcinolone ointment for additional week. Dermatology on-call provider reviewed photos of rash following previous visit and felt as though it was consistent with a  "contact dermatitis.   US could be pursues to further assess lump underneath the skin. Pt declines US at this time.   Utilize thigh high compression garment daily if tolerated.   Monitor leg closely for signs of infection such as redness, pain, red streaking, fevers.   Notify office of any drainage or weeping from the leg should this recur.   D/c from wound care center. Call in future with any questions, concerns, future wounds.           Subjective:   05/07/25: 89 y/o M with PMHx of HTN, PE, asthma/COPD, hypothyroidism, skin cancer, osteoarthritis, venous insufficiency, chronic hyponatremia, lumbar spinal stenosis, presents for evaluation of his RLE. He was previously seen at the wound care center for a venous ulceration on his RLE and was healed in February. Per chart review, in April pt had been feeling weak, had a recent fall and noticed new redness and blistering of his RLE which prompted him to present to the ED. Per hosptial course summary. \"...In the ED he was noted to be in a rapid A-fib.  He met sepsis criteria with tachycardia and leukocytosis in setting of right lower extremity cellulitis per CT imaging.  Venous Doppler of the lower extremities was negative for DVT.  He also had a supratherapeutic INR on arrival and Coumadin was placed on hold.  Patient was admitted to [facility] service for further management.  He initially required a Cardizem drip, heart rate improved and patient converted to sinus rhythm.  Cardizem drip was discontinued.  He was initiated on metoprolol for rate control.  His prehospital losartan was placed on hold given his SONYA.  Nephrology followed patient throughout, felt SONYA was related to ARB/outpatient diuretic.  Patient was treated with IV fluids and SONYA resolved.  He is euvolemic on exam today.  Will continue metoprolol for rate control on discharge, can continue to hold prehospital losartan.  Patient was resumed on Coumadin yesterday which we will continue on discharge.  Infectious " "disease also followed patient.  He received Ancef for his cellulitis per their recommendations and is being transition to Duricef on discharge for a total of 10 days of antibiotic treatment.  He is medically stable and being discharged to [facility] today as arranged by case management.\" Pt was recently d/c from acute rehab. Plan of care while there included cleansing the R lateral thigh with VASHE solution and using adaptic and alginate to open wounds. Presently the weeping sites of the R lateral thigh is open to air. Continues to have significant swelling of the lower leg but denies pain.         05/14/25: Pt presents with his daughter for f/u wound care of his L thigh. Home care has been assisting with dressing changes. Adaptic and alginate is being used. Daughter reports the ACE wrap has been digging into pt's skin where velcro lays against the skin. Pt denies fevers, chills.       05/21/25: Pt presents with his daughter for f/u wound care of his L thigh. He has been taking Duricef as prescribed and denies any SE with use of the medication. Has noticed an improvement in the redness and warmth of his R thigh since starting the antibiotic. Still has a few days left in his course. Has been using a Spandagrip on his lower leg and thigh for compression which has been more comfortable for him in comparison to the ACE bandage. Local supplier has his thigh-high compression garments available for  and he plans to obtain them. No fevers, chills, malaise.       05/30/25: Pt presents for f/u wound care of the L thigh and LLE. Pt received his thigh-high CircAid since his previous visit and has brought it with him today. Has completed his course of Duricef. No significant pain or redness of his thigh. Denies fevers, chills.       06/05/25: Pt presents with his daughter for f/u wound care of the L thigh and LLE. Since his previous visit he broke out in a rash on his lower leg after application of bacitracin as well as " experienced significant itching of the skin on his R thigh as well therefore Spandagrip was discontinued. Pt has not yet received foot piece or compression stock for his CircAid.       06/12/25: Pt presents with his daughter for f/u of his upper and lower leg. He has received a sock to go under his CircAid. Presently triamcinolone is being applied to the skin of the thigh and lower leg. Pt denies further itching. No fevers. Pt's geriatric provider mentioned cellulitis of his leg during his most recent visit via telemedicine and inquired about further antibiotics.           The following portions of the patient's history were reviewed and updated as appropriate:   Problem List[1]  Past Medical History[2]  Past Surgical History[3]  Family History[4]  Social History[5]  Current Medications[6]    Review of Systems      Objective:  /74   Pulse 61   Temp (!) 96.6 °F (35.9 °C) (Temporal)   Resp 20   Pain Score: 0-No pain     Physical Exam  Vitals reviewed.   Constitutional:       Appearance: He is normal weight.     Cardiovascular:      Rate and Rhythm: Normal rate.      Pulses:           Dorsalis pedis pulses are 2+ on the right side.        Posterior tibial pulses are 2+ on the right side.      Comments: Entire R leg with significant swelling consistent with lymphedema secondary to infection  Pulmonary:      Effort: Pulmonary effort is normal.      Breath sounds: No rhonchi or rales.     Musculoskeletal:      Right knee: Swelling present.      Right lower leg: 3+ Edema present.     Skin:     Findings: Rash (clusters of very small white bumps on the R posterior and lateral  thigh) and wound present. No erythema.           Comments: See full wound assessment.      Neurological:      Mental Status: He is alert.      Motor: Weakness present.      Gait: Gait abnormal.         Wound 05/29/25 Leg Proximal;Right;Lower (Active)   Wound Image   06/12/25 1407   Wound Description Epithelialization 06/12/25 1401   Non-staged  Wound Description Full thickness 05/29/25 1331   Wound Length (cm) 0 cm 06/12/25 1401   Wound Width (cm) 0 cm 06/12/25 1401   Wound Depth (cm) 0 cm 06/12/25 1401   Wound Surface Area (cm^2) 0 cm^2 06/12/25 1401   Wound Volume (cm^3) 0 cm^3 06/12/25 1401   Calculated Wound Volume (cm^3) 0 cm^3 06/12/25 1401   Change in Wound Size % 100 06/12/25 1401   Drainage Amount None 06/12/25 1401   Drainage Description Serosanguineous 06/04/25 1509   Marisol-wound Assessment Edema;Pink 06/12/25 1401   Treatments Irrigation with NSS 05/29/25 1331   Dressing Status Other (Comment) 06/12/25 1401                         Wound Instructions:  Orders Placed This Encounter   Procedures    Wound cleansing and dressings Proximal;Right;Lower Leg     Circ-Aid / Elastic Tubular Stocking/ Compression Stocking Instructions    May continue to use triamcinolone for 1 week if needed for itching .     Monitor legs daily for any open wounds     Circ-aid    Apply Circaid devices as instructed first thing qAM & remove qHS.  Do not sleep with the circaid on     Avoid prolonged standing in one place.    Elevate leg(s) above the level of the heart when sitting or as much as possible.     Cornelia Goldsmith Pa-C will call you with what the Infectious Disease doctor says.     Follow up at wound care center  if needed for any open draining wounds .      Circaid :  1st. Put on compression sock (black sock)   2nd put on leg sleeve on thigh to just below knee (ivory color sleeve )  3rd Apply lower leg circaid - 20-30 compression- line up the arrows to the circaid tool, do one strap at time starting at the ankle and moving upward Line it up at the ankle area where the compression stops  4th apply upper leg circaid  - 20-30 compression -attached all velvro straps  then check with tool each strap to make sure compression is 20-30 start at knee and go upward  5th apply knee piece     Standing Status:   Future     Expiration Date:   6/19/2025       Cornelia Goldsmith  "PA-C        Portions of the record may have been created with voice recognition software. Occasional wrong word or \"sound alike\" substitutions may have occurred due to the inherent limitations of voice recognition software. Read the chart carefully and recognize, using context, where substitutions have occurred.         [1]   Patient Active Problem List  Diagnosis    Prothrombin gene mutation (HCC)    Primary generalized (osteo)arthritis    Primary hypertension    Dyslipidemia    Intrinsic eczema    Other seborrheic dermatitis    Abnormality of gait    Osteoarthritis of both knees    Chronic pulmonary embolism without acute cor pulmonale (HCC)    Age-related cataract of both eyes    Premature beats    Primary osteoarthritis of both knees    Neoplasm of uncertain behavior of skin    Encounter for long-term (current) use of medications    GERD without esophagitis    CKD (chronic kidney disease) stage 2, GFR 60-89 ml/min    Influenza vaccine refused    Hypercalcemia    COVID-19 virus infection    Asthma-COPD overlap syndrome (HCC)    Endothelial corneal dystrophy of both eyes    Factor V Leiden (HCC)    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)    History of pulmonary embolism    Mature cataract    Primary open-angle glaucoma, bilateral, mild stage    Pseudophakic bullous keratopathy of left eye    TB lung, latent    Hyponatremia    Shortness of breath    Encounter for wound care    Other fatigue    Acquired hypothyroidism    Venous stasis ulcer of right calf limited to breakdown of skin without varicose veins (HCC)    Hashimoto's thyroiditis    Venous stasis ulcer of other part of right lower leg with fat layer exposed with varicose veins (HCC)    Edema of both lower extremities due to peripheral venous insufficiency    Paroxysmal atrial fibrillation (HCC)    Supratherapeutic INR    Cellulitis of right lower extremity    SONYA (acute kidney injury) (HCC)    Swelling of right knee    Fall    Abnormal CT of the " chest    Impaired mobility and ADLs    Chronic left-sided low back pain    Anemia    History of sepsis    Potential for cognitive impairment    Degenerative lumbar spinal stenosis    Bilateral arm weakness    Chest wall asymmetry    Orthostatic hypotension    Fungal dermatitis    Cognitive impairment    Ambulatory dysfunction   [2]   Past Medical History:  Diagnosis Date    Arthritis     Cellulitis of right lower extremity 04/19/2025    Coagulation defect (HCC)     last assessed: 11/28/2018    COPD (chronic obstructive pulmonary disease) ([facility])     last assessed: 5/14/2019, 12/6/2017    Generalized osteoarthritis     last assessed: 1/28/2019    GERD without esophagitis     last assessed: 1/28/2019    Glaucoma     last assessed: 8/4/2011    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[facility])     last assessed: 10/16/2018    Factor II Prothrombin Gene per [employer]    History of kidney stones 1985    Hx of blood clots     Hypertension     last assessed: 5/14/2019    Impaired fasting glucose     last assessed: 8/26/2013    Mild persistent asthma, uncomplicated     last assessed: 11/28/2018    Nephrolithiasis     Nondisplaced intertrochanteric fracture of right femur, initial encounter for closed fracture ([facility])     last assessed: 1/28/2019    Premature ventricular contractions     last assessed: 8/4/2011    Pulmonary nodule     Scoliosis (and kyphoscoliosis), idiopathic     Wears dentures    [3]   Past Surgical History:  Procedure Laterality Date    CARPAL TUNNEL RELEASE Left     ENDO 8/801    CATARACT EXTRACTION Left     COLONOSCOPY  10/07/2008    last assessed: 3/29/2016    EYE SURGERY Right 01/13/2022    HERNIA REPAIR      HIP SURGERY      HIP SURGERY Left 03/05/1999    ORIF    MULTIPLE TOOTH EXTRACTIONS Bilateral     OTHER SURGICAL HISTORY Right 10/30/2001    [facility]    TONSILLECTOMY Bilateral     WISDOM TOOTH EXTRACTION Bilateral    [4]   Family History  Problem Relation Name Age of Onset    Hypertension Mother       Stroke Mother      Heart attack Father     [5]   Social History  Socioeconomic History    Marital status:    Tobacco Use    Smoking status: Former     Types: Pipe     Start date:      Quit date: 1970     Years since quittin.4     Passive exposure: Past    Smokeless tobacco: Never   Vaping Use    Vaping status: Never Used   Substance and Sexual Activity    Alcohol use: Yes     Comment: social    Drug use: Never    Sexual activity: Not Currently     Social Drivers of Health     Financial Resource Strain: Low Risk  (10/23/2023)    Overall Financial Resource Strain (CARDIA)     Difficulty of Paying Living Expenses: Not very hard   Food Insecurity: No Food Insecurity (5/3/2025)    Nursing - Inadequate Food Risk Classification     Worried About Running Out of Food in the Last Year: Never true     Ran Out of Food in the Last Year: Never true     Ran Out of Food in the Last Year: Never true   Transportation Needs: No Transportation Needs (2025)    OASIS : Transportation     Lack of Transportation (Medical): No     Lack of Transportation (Non-Medical): No     Patient Unable or Declines to Respond: No   Intimate Partner Violence: Unknown (5/3/2025)    Nursing IPS     Physically Hurt by Someone: No     Hurt or Threatened by Someone: No   Housing Stability: Unknown (5/3/2025)    Nursing: Inadequate Housing Risk Classification     Unable to Pay for Housing in the Last Year: No     Has Housin   [6]   Current Outpatient Medications:     acetaminophen (TYLENOL) 325 mg tablet, Take 2 tablets (650 mg total) by mouth every 6 (six) hours as needed for mild pain, headaches or fever, Disp: , Rfl:     albuterol (PROVENTIL HFA,VENTOLIN HFA) 90 mcg/act inhaler, TAKE 2 PUFFS BY MOUTH EVERY 4 HOURS AS NEEDED FOR WHEEZE, Disp: 18 g, Rfl: 1    budesonide-formoterol (SYMBICORT) 160-4.5 mcg/act inhaler, Inhale 2 puffs 2 (two) times a day Rinse mouth after use., Disp: 30.6 g, Rfl: 3    Cholecalciferol (Vitamin D3)  125 MCG (5000 UT) CAPS, Take 1 capsule by mouth daily. Indications: Vitamin D Deficiency, Disp: , Rfl:     desonide (DESOWEN) 0.05 % cream, Apply to face twice a day, Disp: 60 g, Rfl: 0    Difluprednate 0.05 % EMUL, , Disp: , Rfl:     dorzolamide (TRUSOPT) 2 % ophthalmic solution, INSTILL 1 DROP INTO LEFT EYE TWICE A DAY, Disp: , Rfl:     furosemide (LASIX) 20 mg tablet, Take 1 tablet (20 mg total) by mouth in the morning, Disp: 90 tablet, Rfl: 1    latanoprost (XALATAN) 0.005 % ophthalmic solution, Administer 1 drop to both eyes daily at bedtime, Disp: , Rfl:     lidocaine (LIDODERM) 5 %, Apply 1 patch topically over 12 hours daily Remove & Discard patch within 12 hours or as directed by MD  Apply to low back, Disp: , Rfl:     metoprolol tartrate (LOPRESSOR) 25 mg tablet, Take 1 tablet (25 mg total) by mouth every 12 (twelve) hours, Disp: 180 tablet, Rfl: 1    montelukast (SINGULAIR) 10 mg tablet, TAKE 1 TABLET BY MOUTH EVERY DAY, Disp: 90 tablet, Rfl: 1    multivitamin (THERAGRAN) TABS, Take 1 tablet by mouth daily, Disp: , Rfl:     nystatin (MYCOSTATIN) powder, Apply topically 2 (two) times a day (Patient taking differently: Apply topically 2 (two) times a day Apply topically to the groin area 2 times a day), Disp: 15 g, Rfl: 0    omeprazole (PriLOSEC OTC) 20 MG tablet, Take 20 mg by mouth daily, Disp: , Rfl:     Oral Electrolytes (ELECTROLYTE SR PO), Take 2 tablets by mouth daily., Disp: , Rfl:     Polyethyl Glycol-Propyl Glycol (SYSTANE OP), Administer 1 drop to both eyes 4 (four) times a day, Disp: , Rfl:     polyethylene glycol (GLYCOLAX) 17 GM/SCOOP powder, Take 17 g by mouth daily as needed (Constipation) Stir into 8 oz of liquid of choice., Disp: , Rfl:     prednisoLONE acetate (PRED FORTE) 1 % ophthalmic suspension, Administer 1 drop into the left eye 2 (two) times a day, Disp: , Rfl:     sodium chloride (ARIEL 128) 2 % hypertonic ophthalmic solution, Administer 1 drop into the left eye 4 (four) times a  day, Disp: , Rfl:     triamcinolone (KENALOG) 0.1 % ointment, Apply topically 2 (two) times a day for 14 days To itching skin of RLE, Disp: 80 g, Rfl: 1    warfarin (COUMADIN) 4 mg tablet, TAKE 2 TABLET BY MOUTH DAILY OR AS DIRECTED (Patient taking differently: Take 7.5 mg by mouth daily. AS OF 6/6/25, PATIENT TO TAKE 7.5 MG COUMADIN DAILY AND RECHECK INR IN ONE WEEK.), Disp: 180 tablet, Rfl: 2    warfarin (COUMADIN) 6 mg tablet, Take 1 tablet (6 mg total) by mouth daily, Disp: 30 tablet, Rfl: 0

## 2025-06-13 ENCOUNTER — ANTICOAG VISIT (OUTPATIENT)
Dept: FAMILY MEDICINE CLINIC | Facility: CLINIC | Age: OVER 89
End: 2025-06-13

## 2025-06-16 ENCOUNTER — HOME CARE VISIT (OUTPATIENT)
Dept: HOME HEALTH SERVICES | Facility: HOME HEALTHCARE | Age: OVER 89
End: 2025-06-16
Payer: MEDICARE

## 2025-06-16 VITALS
OXYGEN SATURATION: 97 % | HEART RATE: 78 BPM | RESPIRATION RATE: 16 BRPM | TEMPERATURE: 97.4 F | SYSTOLIC BLOOD PRESSURE: 148 MMHG | DIASTOLIC BLOOD PRESSURE: 78 MMHG

## 2025-06-16 PROCEDURE — G0299 HHS/HOSPICE OF RN EA 15 MIN: HCPCS

## 2025-06-19 ENCOUNTER — PATIENT OUTREACH (OUTPATIENT)
Dept: CASE MANAGEMENT | Facility: OTHER | Age: OVER 89
End: 2025-06-19

## 2025-06-19 NOTE — PROGRESS NOTES
Called and spoke with pt who still has SN from [facility] visiting with what appears possible last visit on Monday 6/23. Pt reports that his lower leg wounds are healed. He continues with chronic swelling in his thigh, however pt had fx femur in 2018 and reports that this is not new issue.   Enrico has PT/INR machine. Weight today 174 lbs and he does record. He received call from [facility] [phone] who he has had before and they would like to continue therapies once pt d/c'd from [facility].   Enrico denies any shortness of breath out of the ordinary. He does admit to dyspnea with walking about 25 feet with his walker but has been this way 'for a long time' which he attributes to asthma. He sleeps with one pillow and has a craftmatic bed. Moisturizes his lower legs daily. Someone reportedly visits daily out of his 5 daughters and often provide meals for him.   Enrico denies any concerns today and he welcomes a possible final call to check back in 3-4 weeks.

## 2025-06-20 LAB — INR PPP: 2.6 (ref 0.85–1.19)

## 2025-06-23 ENCOUNTER — ANTICOAG VISIT (OUTPATIENT)
Dept: FAMILY MEDICINE CLINIC | Facility: CLINIC | Age: OVER 89
End: 2025-06-23

## 2025-06-24 ENCOUNTER — HOME CARE VISIT (OUTPATIENT)
Dept: HOME HEALTH SERVICES | Facility: HOME HEALTHCARE | Age: OVER 89
End: 2025-06-24
Payer: MEDICARE

## 2025-06-26 ENCOUNTER — ANTICOAG VISIT (OUTPATIENT)
Dept: FAMILY MEDICINE CLINIC | Facility: CLINIC | Age: OVER 89
End: 2025-06-26

## 2025-06-26 LAB — INR PPP: 2.1 (ref 0.85–1.19)

## 2025-06-27 DIAGNOSIS — J44.89 ASTHMA-COPD OVERLAP SYNDROME (HCC): ICD-10-CM

## 2025-06-27 DIAGNOSIS — L21.8 OTHER SEBORRHEIC DERMATITIS: ICD-10-CM

## 2025-06-27 NOTE — TELEPHONE ENCOUNTER
Reason for call:   [x] Refill   [] Prior Auth  [] Other:     Office:   [x] PCP/Provider - PRIMARY CARE [facility] POD  Authorized By: Brodie Anne,  [] Specialty/Provider -     Medication:  Desowen    Dose/Frequency: 0.05 % cream    Quantity: 60 g     Pharmacy: [facility] [address] [place] [address] Pharmacy   Does the patient have enough for 3 days?   [x] Yes   [] No - Send as HP to POD    Mail Away Pharmacy   Does the patient have enough for 10 days?   [] Yes   [] No - Send as HP to POD                    Reason for call:   [x] Refill   [] Prior Auth  [] Other:     Office:   [x] PCP/Provider -   [] Specialty/Provider -     Medication: Albuterol    Dose/Frequency: 90 mcg inhaler    Quantity: 18 g    Pharmacy: [facility]   Does the patient have enough for 3 days?   [x] Yes   [] No - Send as HP to POD    Mail Away Pharmacy   Does the patient have enough for 10 days?   [] Yes   [] No - Send as HP to POD

## 2025-06-30 RX ORDER — ALBUTEROL SULFATE 90 UG/1
INHALANT RESPIRATORY (INHALATION)
Qty: 18 G | Refills: 1 | Status: SHIPPED | OUTPATIENT
Start: 2025-06-30

## 2025-06-30 RX ORDER — DESONIDE 0.5 MG/G
CREAM TOPICAL
Qty: 60 G | Refills: 1 | Status: SHIPPED | OUTPATIENT
Start: 2025-06-30

## 2025-07-07 ENCOUNTER — OFFICE VISIT (OUTPATIENT)
Dept: FAMILY MEDICINE CLINIC | Facility: CLINIC | Age: OVER 89
End: 2025-07-07
Payer: MEDICARE

## 2025-07-07 VITALS
OXYGEN SATURATION: 97 % | DIASTOLIC BLOOD PRESSURE: 78 MMHG | HEIGHT: 64 IN | WEIGHT: 175 LBS | TEMPERATURE: 96.3 F | BODY MASS INDEX: 29.88 KG/M2 | HEART RATE: 77 BPM | SYSTOLIC BLOOD PRESSURE: 154 MMHG

## 2025-07-07 DIAGNOSIS — J44.89 ASTHMA-COPD OVERLAP SYNDROME (HCC): ICD-10-CM

## 2025-07-07 DIAGNOSIS — I10 PRIMARY HYPERTENSION: Primary | ICD-10-CM

## 2025-07-07 DIAGNOSIS — I27.82 CHRONIC PULMONARY EMBOLISM WITHOUT ACUTE COR PULMONALE, UNSPECIFIED PULMONARY EMBOLISM TYPE (HCC): ICD-10-CM

## 2025-07-07 PROBLEM — L97.812 VENOUS STASIS ULCER OF OTHER PART OF RIGHT LOWER LEG WITH FAT LAYER EXPOSED WITH VARICOSE VEINS (HCC): Status: RESOLVED | Noted: 2025-01-16 | Resolved: 2025-07-07

## 2025-07-07 PROBLEM — I83.018 VENOUS STASIS ULCER OF OTHER PART OF RIGHT LOWER LEG WITH FAT LAYER EXPOSED WITH VARICOSE VEINS (HCC): Status: RESOLVED | Noted: 2025-01-16 | Resolved: 2025-07-07

## 2025-07-07 PROCEDURE — G2211 COMPLEX E/M VISIT ADD ON: HCPCS | Performed by: FAMILY MEDICINE

## 2025-07-07 PROCEDURE — 99214 OFFICE O/P EST MOD 30 MIN: CPT | Performed by: FAMILY MEDICINE

## 2025-07-07 NOTE — PROGRESS NOTES
Name: Enrico Chavira      : 1930      MRN: [id number]  Encounter Provider: Brodie Anne DO  Encounter Date: 2025   Encounter department: [facility], [facility]  :  Assessment & Plan  Primary hypertension  Patient has hypertension.  I checked the patient's blood pressure myself today.  My medical assistant found his blood pressure to be 185/88.  I found his blood pressure to be 154/78.  He brought with him a logbook of his blood pressures.  At home, his blood pressures are controlled.  At 7 AM, his blood pressure was noted to be 138/74.  His heart rate was 71.  As such, going to recommend we continue his current regiment.  I asked the patient to continue to monitor his blood pressures regularly at home.         Asthma-COPD overlap syndrome (HCC)  Patient has asthma-COPD overlap syndrome which is currently stable.  He apparently was approved for Symbicort by his insurance.  He is currently back on that and feeling well.  He will continue Symbicort and albuterol as needed.         Chronic pulmonary embolism without acute cor pulmonale, unspecified pulmonary embolism type (HCC)  Patient has a history of pulmonary embolism which was unprovoked.  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  I gave the patient a written prescription for the new PT/INR meter and have my office staff fax it to the medical supply company.                History of Present Illness   This is a 89-year-old white male who presents to the office today for his routine checkup.  He is accompanied to the office today by his daughter.  Patient was recently discharged by home health care.  He is going to be starting in your home physical therapy.  He asked for a prescription for a different PT/INR meter.  His daughter tells him that this particular unit is difficult for him to do by himself.  He tells me that the company of the liver there is also has another unit that is much easier to  "use.  They requested a prescription for the other unit.      Review of Systems   Respiratory:  Negative for cough, shortness of breath and wheezing.    Cardiovascular:  Positive for leg swelling. Negative for chest pain and palpitations.   Gastrointestinal:  Negative for abdominal distention, abdominal pain, blood in stool, constipation, diarrhea and nausea.   Musculoskeletal:  Positive for gait problem.       Objective   /78 (BP Location: Left arm, Patient Position: Sitting, Cuff Size: Standard)   Pulse 77   Temp (!) 96.3 °F (35.7 °C) (Tympanic)   Ht 5' 4\" (1.626 m)   Wt 79.4 kg (175 lb)   SpO2 97%   BMI 30.04 kg/m²      Physical Exam  Vitals reviewed.   Constitutional:       Comments: Patient is a 89-year-old white male who appears his stated age.  He was seated in a wheelchair.  He was in no apparent distress.   HENT:      Head: Normocephalic and atraumatic.      Right Ear: Tympanic membrane, ear canal and external ear normal. There is no impacted cerumen.      Left Ear: Tympanic membrane, ear canal and external ear normal. There is no impacted cerumen.      Mouth/Throat:      Mouth: Mucous membranes are moist.      Pharynx: Oropharynx is clear. No oropharyngeal exudate or posterior oropharyngeal erythema.     Eyes:      General: No scleral icterus.        Right eye: No discharge.         Left eye: No discharge.      Conjunctiva/sclera: Conjunctivae normal.      Pupils: Pupils are equal, round, and reactive to light.     Neck:      Comments: No thyromegaly  Cardiovascular:      Rate and Rhythm: Normal rate and regular rhythm.      Heart sounds: Normal heart sounds. No murmur heard.     No friction rub. No gallop.   Pulmonary:      Effort: Pulmonary effort is normal. No respiratory distress.      Breath sounds: Normal breath sounds. No stridor. No wheezing, rhonchi or rales.   Abdominal:      General: There is no distension.      Palpations: Abdomen is soft. There is no mass.      Tenderness: There is " no abdominal tenderness. There is no guarding.      Comments: Due to ambulatory dysfunction, I had to examine him seated in his wheelchair     Musculoskeletal:      Cervical back: Neck supple.   Lymphadenopathy:      Cervical: No cervical adenopathy.     Psychiatric:         Mood and Affect: Mood normal.         Behavior: Behavior normal.         Thought Content: Thought content normal.         Judgment: Judgment normal.     Extremities: Without cyanosis or clubbing.  There is edema present of the right lower extremity.  I measured both lower extremities.  Right lower extremity is 42.25 cm in circumference.  Left lower extremity was 35 cm in circumference.  I asked the patient to wear his compression stocking on the right leg and elevate his leg is much as possible.  At this time, skin only showed chronic venous stasis changes.  No evidence of cellulitis.

## 2025-07-07 NOTE — ASSESSMENT & PLAN NOTE
Patient has a history of pulmonary embolism which was unprovoked.  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  I gave the patient a written prescription for the new PT/INR meter and have my office staff fax it to the medical supply company.

## 2025-07-07 NOTE — ASSESSMENT & PLAN NOTE
Patient has asthma-COPD overlap syndrome which is currently stable.  He apparently was approved for Symbicort by his insurance.  He is currently back on that and feeling well.  He will continue Symbicort and albuterol as needed.

## 2025-07-07 NOTE — ASSESSMENT & PLAN NOTE
Patient has hypertension.  I checked the patient's blood pressure myself today.  My medical assistant found his blood pressure to be 185/88.  I found his blood pressure to be 154/78.  He brought with him a logbook of his blood pressures.  At home, his blood pressures are controlled.  At 7 AM, his blood pressure was noted to be 138/74.  His heart rate was 71.  As such, going to recommend we continue his current regiment.  I asked the patient to continue to monitor his blood pressures regularly at home.

## 2025-07-14 ENCOUNTER — ANTICOAG VISIT (OUTPATIENT)
Dept: FAMILY MEDICINE CLINIC | Facility: CLINIC | Age: OVER 89
End: 2025-07-14

## 2025-07-14 LAB — INR PPP: 2 (ref 0.85–1.19)

## 2025-07-15 ENCOUNTER — PATIENT OUTREACH (OUTPATIENT)
Dept: CASE MANAGEMENT | Facility: OTHER | Age: OVER 89
End: 2025-07-15

## 2025-07-18 ENCOUNTER — ANTICOAG VISIT (OUTPATIENT)
Dept: FAMILY MEDICINE CLINIC | Facility: CLINIC | Age: OVER 89
End: 2025-07-18

## 2025-07-18 LAB — INR PPP: 2.3 (ref 0.85–1.19)

## 2025-07-27 DIAGNOSIS — J45.909 UNCOMPLICATED ASTHMA, UNSPECIFIED ASTHMA SEVERITY, UNSPECIFIED WHETHER PERSISTENT: ICD-10-CM

## 2025-07-29 RX ORDER — MONTELUKAST SODIUM 10 MG/1
10 TABLET ORAL DAILY
Qty: 90 TABLET | Refills: 1 | Status: SHIPPED | OUTPATIENT
Start: 2025-07-29